# Patient Record
Sex: FEMALE | Race: WHITE | NOT HISPANIC OR LATINO | Employment: FULL TIME | URBAN - METROPOLITAN AREA
[De-identification: names, ages, dates, MRNs, and addresses within clinical notes are randomized per-mention and may not be internally consistent; named-entity substitution may affect disease eponyms.]

---

## 2017-01-02 ENCOUNTER — OFFICE VISIT (OUTPATIENT)
Dept: ORTHOPEDICS | Facility: CLINIC | Age: 32
End: 2017-01-02

## 2017-01-02 DIAGNOSIS — M25.551 RIGHT HIP PAIN: Primary | ICD-10-CM

## 2017-01-02 NOTE — PROGRESS NOTES
Subjective:   Nory Camargo is a 31 year old female who is here to f/u on her R hip MRI with gerardo and DXA scan. She also notes some recent increased in L hip pain (s/p ORIF 2011 for a tension sided femoral neck stress fracture) that feels similar but not nearly as painful as the RIGHT side does.   MRI at [facility].       Exam:   There were no vitals taken for this visit.           CONSTITUTIONIAL: healthy, alert and no distress  GAIT: normal  NEUROLOGIC: Non-focal  PSYCHIATRIC: affect normal/bright and mentation appears normal.    MUSCULOSKELETAL:  No hip exam performed today.     MRI with gerardo of the R hip:  2. 1 cm labral tear and KEILA, no stress injury    DXA:  Lowest Z score = 0.0          ATTENTION:  Easy to read DXA/VFA reports (formatted and presented as tables)   can be found in EPIC under Chart review >  Imaging tab >  DXA    [facility]    [address] [place] [address]  Phone: [phone]   Fax: [phone]      FINAL RESULT    Patient name:                           NORY CAMARGO ([id number] )  Patient demographics:               [age] year [age] White Female of 66.0 in. height and 125.0 lbs. weight  Ordering provider:                     ZAHRAA LAU  History:                                     left hip hardware  Current treatments:                   Calcium, DIABETES MEDICATIONS, DIURETICS, Vitamin D  Scan:                                        DXA exam ([id number] ): [employer] [facility]  Exam date:                               12/09/2016    Comparison:                             None provided.     Dual energy x-ray absorptiometry results:      Region  BMD  T - score  Z - score    L1-L4  1.143 g/cm   -0.3  0.0              Right Neck  0.988 g/cm   -0.4  0.0    Right Total  0.978 g/cm   -0.2  0.1      Conclusions:  The most negative and valid T-score of  -0.4 at the level of the right femoral neck,    corresponds  with    normal.     The most negative and valid Z-score of   0.0 at the level of the lumbar spine,  0.0 at the level of the right femoral neck,  is   WITHIN expected range for age.    The risk of osteoporotic fracture increases approximately 2-fold for each 1.0 SD decrease in T-score.  Low bone density is not the only risk factor for fracture;  consider factors such as patient's age, fall risk, injury risk, previous osteoporotic fracture, family history of osteoporosis, etc.  People with elevated risk of fracture should be regularly evaluated for low bone mineral density.  For patients eligible for Medicare, routine testing is allowed once every 2 years.  Testing frequency can be increased for patients on corticosteroids.  Clinical correlation is recommended.    Considerations:    Feel free to contact DXA services if you have any questions or comments.  Thank you for the opportunity to be of service to you and your patient.    Principal result :          Munira Sellers MD, CCD   of Medicine  Division of Endocrinology    References:  1.  ISCD position statements:  www.[email or web address].org  (includes the report of the 2015  Position Development Conference)    Template revised 8/6/2015    Technical comments:        Satisfactory.          Assessment/Plan:     R hip labral tear with KEILA  L femor ORIF in 2011 secondary to tension sided femoral neck stress fracture   Normal bone mineral density    PLAN:  I have reviewed the results of both tests with the patient and discussed the treatment options.  I have recommended a MSK US CSI into the hip joint on the RIGHT and we will see how she responds.  We may consider doing the LEFT hip in the future but that pain may settle down if we can improve her RIGHT hip pain.    Injection is scheduled for this Friday afternoon.  We may need to xray her L hip as well to assess the hardware and for evidence of post-traumatic arthritis.     Ksenia Yin MD, CAQ, FACSM,  CCD  [facility]  Sports Medicine and Bone Health  Team Physician;  Athletics

## 2017-01-02 NOTE — Clinical Note
1/2/2017      RE: Nory Camargo  [address]  [facility] [address]        Subjective:   Nory Camargo is a 31 year old female who is here to f/u on her R hip MRI with gerardo and DXA scan. She also notes some recent increased in L hip pain (s/p ORIF 2011 for a tension sided femoral neck stress fracture) that feels similar but not nearly as painful as the RIGHT side does.   MRI at [facility].       Exam:   There were no vitals taken for this visit.           CONSTITUTIONIAL: healthy, alert and no distress  GAIT: normal  NEUROLOGIC: Non-focal  PSYCHIATRIC: affect normal/bright and mentation appears normal.    MUSCULOSKELETAL:  No hip exam performed today.     MRI with gerardo of the R hip:  2. 1 cm labral tear and KEILA, no stress injury    DXA:  Lowest Z score = 0.0          ATTENTION:  Easy to read DXA/VFA reports (formatted and presented as tables)   can be found in EPIC under Chart review >  Imaging tab >  DXA    [facility]    [address] [place] [address]  Phone: [phone]   Fax: [phone]      FINAL RESULT    Patient name:                           NORY CAMARGO ([id number] )  Patient demographics:               [age] year [age] White Female of 66.0 in. height and 125.0 lbs. weight  Ordering provider:                     ZAHRAA LAU  History:                                     left hip hardware  Current treatments:                   Calcium, DIABETES MEDICATIONS, DIURETICS, Vitamin D  Scan:                                        DXA exam ([id number] ): [employer]  Exam date:                               12/09/2016    Comparison:                             None provided.     Dual energy x-ray absorptiometry results:      Region  BMD  T - score  Z - score    L1-L4  1.143 g/cm   -0.3  0.0              Right Neck  0.988 g/cm   -0.4  0.0    Right Total  0.978 g/cm   -0.2  0.1      Conclusions:  The most negative  and valid T-score of  -0.4 at the level of the right femoral neck,    corresponds with    normal.     The most negative and valid Z-score of   0.0 at the level of the lumbar spine,  0.0 at the level of the right femoral neck,  is   WITHIN expected range for age.    The risk of osteoporotic fracture increases approximately 2-fold for each 1.0 SD decrease in T-score.  Low bone density is not the only risk factor for fracture;  consider factors such as patient's age, fall risk, injury risk, previous osteoporotic fracture, family history of osteoporosis, etc.  People with elevated risk of fracture should be regularly evaluated for low bone mineral density.  For patients eligible for Medicare, routine testing is allowed once every 2 years.  Testing frequency can be increased for patients on corticosteroids.  Clinical correlation is recommended.    Considerations:    Feel free to contact DXA services if you have any questions or comments.  Thank you for the opportunity to be of service to you and your patient.    Principal result :          Munira Sellers MD, CCD   of Medicine  Division of Endocrinology    References:  1.  ISCD position statements:  www.iscd.org  (includes the report of the 2015  Position Development Conference)    Template revised 8/6/2015    Technical comments:        Satisfactory.          Assessment/Plan:     R hip labral tear with KEILA  L femor ORIF in 2011 secondary to tension sided femoral neck stress fracture   Normal bone mineral density    PLAN:  I have reviewed the results of both tests with the patient and discussed the treatment options.  I have recommended a MSK US CSI into the hip joint on the RIGHT and we will see how she responds.  We may consider doing the LEFT hip in the future but that pain may settle down if we can improve her RIGHT hip pain.    Injection is scheduled for this Friday afternoon.  We may need to xray her L hip as well to assess the hardware  and for evidence of post-traumatic arthritis.     Ksenia Yin MD, CAQ, FACSM, CCD  [facility]  Sports Medicine and Bone Health  Team Physician;  Athletics

## 2017-01-02 NOTE — MR AVS SNAPSHOT
After Visit Summary   1/2/2017    Marilyn Ortega    MRN: [id number]           Patient Information     Date Of Birth          1985        Visit Information        Provider Department      1/2/2017 11:40 AM Ksenia Yin MD [facility] Sports Medicine         Follow-ups after your visit        Your next 10 appointments already scheduled     Jan 06, 2017  1:40 PM   (Arrive by 1:25 PM)   Return Visit with Ksenia Yin MD   [facility] ([facility])    [address] [place] [id number] [phone]              Who to contact     Please call your clinic at [phone] to:    Ask questions about your health    Make or cancel appointments    Discuss your medicines    Learn about your test results    Speak to your doctor   If you have compliments or concerns about an experience at your clinic, or if you wish to file a complaint, please contact [facility] Patient Relations at [phone] or email us at [email or web address]         Additional Information About Your Visit        MyChart Information     [employer] gives you secure access to your electronic health record. If you see a primary care provider, you can also send messages to your care team and make appointments. If you have questions, please call your primary care clinic.  If you do not have a primary care provider, please call [phone] and they will assist you.      ex[employer]et is an electronic gateway that provides easy, online access to your medical records. With exozet, you can request a clinic appointment, read your test results, renew a prescription or communicate with your care team.     To access your existing account, please contact your [facility] or call [phone] for assistance.         Blood Pressure from Last 3 Encounters:   10/16/12 131/86   07/11/12 120/84    06/20/12 103/70    Weight from Last 3 Encounters:   12/09/16 125 lb (56.7 kg)   10/16/12 131 lb 11.2 oz (59.739 kg)   07/11/12 131 lb (59.421 kg)              Today, you had the following     No orders found for display       Primary Care Provider Office Phone # Fax #    Chace Patel [phone]       [facility] [address] [address]        Thank you!     Thank you for choosing [facility]  for your care. Our goal is always to provide you with excellent care. Hearing back from our patients is one way we can continue to improve our services. Please take a few minutes to complete the written survey that you may receive in the mail after your visit with us. Thank you!             Your Updated Medication List - Protect others around you: Learn how to safely use, store and throw away your medicines at www.disposemymeds.org.          This list is accurate as of: 1/2/17 12:35 PM.  Always use your most recent med list.                   Brand Name Dispense Instructions for use    blood glucose monitoring test strip    no brand specified     4 times daily.       LEVEMIR FLEXpen 100 UNIT/ML injection   Generic drug:  insulin detemir      Inject  Subcutaneous See Admin Instructions. 25u in am, 10u pm       LISINOPRIL PO      Take 1 tablet by mouth.       NovoLOG penFILL 100 UNITS/ML injection   Generic drug:  insulin aspart      Inject  Subcutaneous. 4 units per 15 units of carb, plus 1 unit for 50 over 150.  .       SPRINTEC 28 0.25-35 MG-MCG per tablet   Generic drug:  norgestimate-ethinyl estradiol      Take 1 tablet by mouth daily.       TYLENOL 500 MG tablet   Generic drug:  acetaminophen      Take 1-2 tablets by mouth every 4 hours as needed.

## 2017-01-03 ASSESSMENT — ENCOUNTER SYMPTOMS
MYALGIAS: 1
JOINT SWELLING: 0
ARTHRALGIAS: 1
NECK PAIN: 0
MUSCLE WEAKNESS: 0
BACK PAIN: 1
STIFFNESS: 1
MUSCLE CRAMPS: 0

## 2017-01-06 ENCOUNTER — OFFICE VISIT (OUTPATIENT)
Dept: ORTHOPEDICS | Facility: CLINIC | Age: 32
End: 2017-01-06

## 2017-01-06 DIAGNOSIS — S73.191D ACETABULAR LABRUM TEAR, RIGHT, SUBSEQUENT ENCOUNTER: Primary | ICD-10-CM

## 2017-01-06 RX ORDER — TRIAMCINOLONE ACETONIDE 40 MG/ML
40 INJECTION, SUSPENSION INTRA-ARTICULAR; INTRAMUSCULAR ONCE
Qty: 2 ML | Refills: 0 | OUTPATIENT
Start: 2017-01-06 | End: 2017-01-06

## 2017-01-06 NOTE — NURSING NOTE
Reason For Visit:   Chief Complaint   Patient presents with     RECHECK     Right Hip US guided CSI.        Pain Assessment  Patient Currently in Pain: Denies            Current Outpatient Prescriptions   Medication Sig Dispense Refill     insulin detemir (LEVEMIR FLEXPEN) 100 UNIT/ML injection Inject  Subcutaneous See Admin Instructions. 25u in am, 10u pm       LISINOPRIL PO Take 1 tablet by mouth.       NOVOLOG PENFILL Inject  Subcutaneous. 4 units per 15 units of carb, plus 1 unit for 50 over 150.  .        norgestimate-ethinyl estradiol (SPRINTEC 28) 0.25-35 MG-MCG tablet Take 1 tablet by mouth daily.       acetaminophen (TYLENOL) 500 MG tablet Take 1-2 tablets by mouth every 4 hours as needed.       glucose blood VI test strips strip 4 times daily.            Allergies   Allergen Reactions     Chlorhexidine Hives, Itching and Rash     PN: Patient had swelling/rash that was very itchy with chlorprep.     Ceclor [Cefaclor Monohydrate]      Cefaclor Rash

## 2017-01-06 NOTE — Clinical Note
1/6/2017       RE: Marilyn Ortega  [address]  [facility] [address]     Dear Colleague,    Thank you for referring your patient, Marilyn Ortega, to the [facility] at [facility]. Please see a copy of my visit note below.    SUBJECTIVE: Marilyn Ortega is a 31 year old female who presents for f/u R hip pain.  She recently had an MRI which showed a labral tear, xrays show mild CAM deformity.  Her symptoms remain unchanged.    PAST MEDICAL, SOCIAL, SURGICAL AND FAMILY HISTORY: She  has a past medical history of Diabetes mellitus (H). She also has no past medical history of Arthritis, Congestive heart failure, unspecified, Asthma, Coronary artery disease, Hypertension, Unspecified cerebral artery occlusion with cerebral infarction, Thyroid disease, COPD (chronic obstructive pulmonary disease) (H), Malignant neoplasm (H), or Blood transfusion.  She  has no past surgical history on file.  Her family history includes Arthritis in her maternal grandmother; Cancer - colorectal in her maternal grandfather; DIABETES in her paternal grandfather; Hypertension in her maternal grandmother.  She reports that she has never smoked. She has never used smokeless tobacco. She reports that she drinks alcohol. She reports that she does not use illicit drugs.      ALLERGIES: She is allergic to chlorhexidine; ceclor; and cefaclor.    CURRENT MEDICATIONS: She has a current medication list which includes the following prescription(s): insulin detemir, lisinopril, novolog penfill, norgestimate-ethinyl estradiol, acetaminophen, and blood glucose monitoring.     REVIEW OF SYSTEMS:  CONSTITUTIONAL:NEGATIVE for fever, chills, change in weight  INTEGUMENTARY/SKIN: NEGATIVE for worrisome rashes, moles or lesions  MUSCULOSKELETAL:See HPI above  NEURO: NEGATIVE for weakness, dizziness or paresthesias      EXAM:  There were no vitals taken for this visit.  CONSTITUTIONIAL: healthy, alert  and no distress  HEENT: NC/AT, no scleral icteris  SKIN: no suspicious lesions or rashes  GAIT: normal  CARDIO: no LE edema  LUNGS: breathing non labored  ABD: soft, non obese  NEUROLOGIC: No focal neuro deficits  PSYCHIATRIC: affect normal/bright and mentation appears normal.    ASSESSMENT/PLAN:  R hip labral tear  -US guided steroid hip intra articular injection performed today, see procedure note below    PROCEDURE: Right hip intra articular injection  After discussing the indications, risks, and expected benefits, a formal consent was obtained. The acetabulum, femoral head, femoral neck and the anterior joint recess were identified by ultrasound.  Initially, 8 mL 1% Lidocaine were injected along the injection path using US guidance.  Using sterile technique and an anterolateral approach, an ultrasound-guided corticosteroid injection was performed into the right femoroacetabular joint space injecting 8 mL 1% Lidocaine and 2 mL 40 mg/mL Kenalog with a 110 mm, 22 gauge needle. Images and video were recorded demonstrating proper needle position. The wound was dressed with a bandaid.  The procedure was well tolerated.    F/u in 2-3 weeks for left hip injection if desired    Patient seen and examined with attending physician, Dr. Yin.    Lalitha Storey DO  Primary Care Sports Medicine Fellow          Attending Note:   I have personally examined this patient and have reviewed the clinical presentation and progress note with the fellow. I agree with the treatment plan as outlined. The plan was formulated with the fellow on the day of the patient's visit. I have reviewed all imaging with the fellow and agree with the findings in the documentation.   I have supervised the entire injection procedure.     Ksenia Yin MD, CAQ, CCD  [facility]  Sports Medicine and Bone Health

## 2017-01-09 NOTE — PROGRESS NOTES
Attending Note:   I have personally examined this patient and have reviewed the clinical presentation and progress note with the fellow. I agree with the treatment plan as outlined. The plan was formulated with the fellow on the day of the patient's visit. I have reviewed all imaging with the fellow and agree with the findings in the documentation.   I have supervised the entire injection procedure.     Ksenia Yin MD, CAQ, CCD  [facility]  Sports Medicine and Bone Health

## 2017-02-10 ENCOUNTER — OFFICE VISIT (OUTPATIENT)
Dept: ORTHOPEDICS | Facility: CLINIC | Age: 32
End: 2017-02-10

## 2017-02-10 DIAGNOSIS — M25.552 BILATERAL HIP PAIN: Primary | ICD-10-CM

## 2017-02-10 DIAGNOSIS — M25.551 BILATERAL HIP PAIN: Primary | ICD-10-CM

## 2017-02-10 NOTE — MR AVS SNAPSHOT
After Visit Summary   2/10/2017    Marilyn Ortega    MRN: [id number]           Patient Information     Date Of Birth          1985        Visit Information        Provider Department      2/10/2017 10:30 AM Ksenia Yin MD [facility]        Today's Diagnoses     Bilateral hip pain    -  1       Follow-ups after your visit        Additional Services     ORTHOPEDICS ADULT REFERRAL       Your provider has referred you to: Dr. Alan Rider at [facility]  Referral for: Labral tear and KEILA R hip  L hip ORIF 2011 secondary to femoral neck tension sided stress fracture with revision in 2015    Please be aware that coverage of these services is subject to the terms and limitations of your health insurance plan.  Call member services at your health plan with any benefit or coverage questions.      Please bring the following to your appointment:    >>   Any x-rays, CTs or MRIs which have been performed.  Contact the facility where they were done to arrange for  prior to your scheduled appointment.    >>   List of current medications   >>   This referral request   >>   Any documents/labs given to you for this referral                  Who to contact     Please call your clinic at [phone] to:    Ask questions about your health    Make or cancel appointments    Discuss your medicines    Learn about your test results    Speak to your doctor   If you have compliments or concerns about an experience at your clinic, or if you wish to file a complaint, please contact [facility] Patient Relations at [phone] or email us at [email or web address]         Additional Information About Your Visit        MyChart Information     [employer] gives you secure access to your electronic health record. If you see a primary care provider, you can also send messages to your care team and make appointments. If you have questions, please call your  primary care clinic.  If you do not have a primary care provider, please call [phone] and they will assist you.      [employer] is an electronic gateway that provides easy, online access to your medical records. With [employer], you can request a clinic appointment, read your test results, renew a prescription or communicate with your care team.     To access your existing account, please contact your [facility] or call [phone] for assistance.        Care EveryWhere ID     This is your Care EveryWhere ID. This could be used by other organizations to access your [facility] medical records  [id number]         Blood Pressure from Last 3 Encounters:   10/16/12 131/86   07/11/12 120/84   06/20/12 103/70    Weight from Last 3 Encounters:   12/09/16 125 lb (56.7 kg)   10/16/12 131 lb 11.2 oz (59.7 kg)   07/11/12 131 lb (59.4 kg)              We Performed the Following     ORTHOPEDICS ADULT REFERRAL        Primary Care Provider Office Phone # Fax #    Chace Patel [phone]       [facility] [address] [address]        Thank you!     Thank you for choosing [facility]  for your care. Our goal is always to provide you with excellent care. Hearing back from our patients is one way we can continue to improve our services. Please take a few minutes to complete the written survey that you may receive in the mail after your visit with us. Thank you!             Your Updated Medication List - Protect others around you: Learn how to safely use, store and throw away your medicines at www.disposemymeds.org.          This list is accurate as of: 2/10/17 11:59 PM.  Always use your most recent med list.                   Brand Name Dispense Instructions for use    blood glucose monitoring test strip    no brand specified     4 times daily.       LEVEMIR FLEXpen 100 UNIT/ML injection   Generic drug:  insulin detemir      Inject   Subcutaneous See Admin Instructions. 25u in am, 10u pm       LISINOPRIL PO      Take 1 tablet by mouth.       NovoLOG penFILL 100 UNITS/ML injection   Generic drug:  insulin aspart      Inject  Subcutaneous. 4 units per 15 units of carb, plus 1 unit for 50 over 150.  .       SPRINTEC 28 0.25-35 MG-MCG per tablet   Generic drug:  norgestimate-ethinyl estradiol      Take 1 tablet by mouth daily.       TYLENOL 500 MG tablet   Generic drug:  acetaminophen      Take 1-2 tablets by mouth every 4 hours as needed.

## 2017-02-10 NOTE — PROGRESS NOTES
SUBJECTIVE:     1.  Marilyn is a 31-year-old who is here today to follow up on her musculoskeletal ultrasound-guided right hip corticosteroid injection done for a labral tear.  We performed this in early January.  She states that the lidocaine numbing medicine took her pain away.  She reports that since that time the steroid has helped her some, approximately 30%, but she continues to have some significant difficulties with a catching type of pain with certain positions as well as limitation of external rotation.  It definitely hurts her more to try to work out, so she has not been exercising as much.   2.  Left hip tension side femoral neck stress fracture requiring ORIF in 2011 with a revision done afterwards.  It is somewhat bothersome.  She noticed that she had been doing well with this until her right hip was bothering her and then it seemed to flare up the left hip some.      OBJECTIVE:  Her exam is essentially unchanged, although she has less pain with FADIR testing on the right, although it is still present.      IMAGING:  Hip x-rays, an AP pelvis with bilateral modified Hedrick views and a complete left femur due to her hardware were obtained today.  It appears to me that the hardware is intact on the left side throughout.  On the right side, she has some flattening of the anterior femoral neck with some degenerative cyst formation noted.      ASSESSMENT AND PLAN:     1.  Right hip KEILA with labral tear with a good response to lidocaine and some benefit from a steroid injection but still having significant symptoms.  I would like to refer her to Dr. Alan Rider at [facility] to consider surgical options for her right hip.   2.  Left hip ORIF for a tension side femoral neck stress fracture/completed fracture with revision which appears to have intact hardware on the x-rays done today.  Likely this hip may settle down from its symptoms when she further addresses the right hip.  We will wait to make  any decisions about this and ask Dr. Rider for his input on this as well.  Marilyn can see me back on an as-needed basis.     Ksenia Yin MD, CAQ, FACSM, CCD  [facility]  Sports Medicine and Bone Health  Team Physician;  Athletics

## 2017-02-10 NOTE — LETTER
2/10/2017       RE: Marilyn Ortega  [address]  [place] [address]     Dear Colleague,    Thank you for referring your patient, Marilyn Ortega, to the [facility] at [facility]. Please see a copy of my visit note below.    SUBJECTIVE:     1.  Marilyn is a 31-year-old who is here today to follow up on her musculoskeletal ultrasound-guided right hip corticosteroid injection done for a labral tear.  We performed this in early January.  She states that the lidocaine numbing medicine took her pain away.  She reports that since that time the steroid has helped her some, approximately 30%, but she continues to have some significant difficulties with a catching type of pain with certain positions as well as limitation of external rotation.  It definitely hurts her more to try to work out, so she has not been exercising as much.   2.  Left hip tension side femoral neck stress fracture requiring ORIF in 2011 with a revision done afterwards.  It is somewhat bothersome.  She noticed that she had been doing well with this until her right hip was bothering her and then it seemed to flare up the left hip some.      OBJECTIVE:  Her exam is essentially unchanged, although she has less pain with FADIR testing on the right, although it is still present.      IMAGING:  Hip x-rays, an AP pelvis with bilateral modified Hedrick views and a complete left femur due to her hardware were obtained today.  It appears to me that the hardware is intact on the left side throughout.  On the right side, she has some flattening of the anterior femoral neck with some degenerative cyst formation noted.      ASSESSMENT AND PLAN:     1.  Right hip KEILA with labral tear with a good response to lidocaine and some benefit from a steroid injection but still having significant symptoms.  I would like to refer her to Dr. Alan Rider at [facility] to consider surgical options for her  right hip.   2.  Left hip ORIF for a tension side femoral neck stress fracture/completed fracture with revision which appears to have intact hardware on the x-rays done today.  Likely this hip may settle down from its symptoms when she further addresses the right hip.  We will wait to make any decisions about this and ask Dr. Rider for his input on this as well.  Marilyn can see me back on an as-needed basis.     Ksenia Yin MD, CAQ, FACSM, CCD  [facility]  Sports Medicine and Bone Health  Team Physician;  Athletics

## 2017-08-12 ENCOUNTER — HEALTH MAINTENANCE LETTER (OUTPATIENT)
Age: 32
End: 2017-08-12

## 2019-02-01 ENCOUNTER — TRANSFERRED RECORDS (OUTPATIENT)
Dept: HEALTH INFORMATION MANAGEMENT | Facility: CLINIC | Age: 34
End: 2019-02-01

## 2019-03-07 NOTE — PROGRESS NOTES
SUBJECTIVE: Marilyn Ortega is a 31 year old female who presents for f/u R hip pain.  She recently had an MRI which showed a labral tear, xrays show mild CAM deformity.  Her symptoms remain unchanged.    PAST MEDICAL, SOCIAL, SURGICAL AND FAMILY HISTORY: She  has a past medical history of Diabetes mellitus (H). She also has no past medical history of Arthritis, Congestive heart failure, unspecified, Asthma, Coronary artery disease, Hypertension, Unspecified cerebral artery occlusion with cerebral infarction, Thyroid disease, COPD (chronic obstructive pulmonary disease) (H), Malignant neoplasm (H), or Blood transfusion.  She  has no past surgical history on file.  Her family history includes Arthritis in her maternal grandmother; Cancer - colorectal in her maternal grandfather; DIABETES in her paternal grandfather; Hypertension in her maternal grandmother.  She reports that she has never smoked. She has never used smokeless tobacco. She reports that she drinks alcohol. She reports that she does not use illicit drugs.      ALLERGIES: She is allergic to chlorhexidine; ceclor; and cefaclor.    CURRENT MEDICATIONS: She has a current medication list which includes the following prescription(s): insulin detemir, lisinopril, novolog penfill, norgestimate-ethinyl estradiol, acetaminophen, and blood glucose monitoring.     REVIEW OF SYSTEMS:  CONSTITUTIONAL:NEGATIVE for fever, chills, change in weight  INTEGUMENTARY/SKIN: NEGATIVE for worrisome rashes, moles or lesions  MUSCULOSKELETAL:See HPI above  NEURO: NEGATIVE for weakness, dizziness or paresthesias      EXAM:  There were no vitals taken for this visit.  CONSTITUTIONIAL: healthy, alert and no distress  HEENT: NC/AT, no scleral icteris  SKIN: no suspicious lesions or rashes  GAIT: normal  CARDIO: no LE edema  LUNGS: breathing non labored  ABD: soft, non obese  NEUROLOGIC: No focal neuro deficits  PSYCHIATRIC: affect normal/bright and mentation appears  54 year old male with PMH of HTN, OA, otherwise with previous PNA - 2-3 years ago, was severe at the time, today noted some streaked blood in phlegm and cough - denies any definite fever, some chill noted last night otherwise - came in for evaluation due to cough. normal.    ASSESSMENT/PLAN:  R hip labral tear  -US guided steroid hip intra articular injection performed today, see procedure note below    PROCEDURE: Right hip intra articular injection  After discussing the indications, risks, and expected benefits, a formal consent was obtained. The acetabulum, femoral head, femoral neck and the anterior joint recess were identified by ultrasound.  Initially, 8 mL 1% Lidocaine were injected along the injection path using US guidance.  Using sterile technique and an anterolateral approach, an ultrasound-guided corticosteroid injection was performed into the right femoroacetabular joint space injecting 8 mL 1% Lidocaine and 2 mL 40 mg/mL Kenalog with a 110 mm, 22 gauge needle. Images and video were recorded demonstrating proper needle position. The wound was dressed with a bandaid.  The procedure was well tolerated.    F/u in 2-3 weeks for left hip injection if desired    Patient seen and examined with attending physician, Dr. Yin.    Lalitha Storey DO  Primary Care Sports Medicine Fellow

## 2019-11-06 ENCOUNTER — HEALTH MAINTENANCE LETTER (OUTPATIENT)
Age: 34
End: 2019-11-06

## 2020-06-11 ENCOUNTER — TRANSFERRED RECORDS (OUTPATIENT)
Dept: HEALTH INFORMATION MANAGEMENT | Facility: CLINIC | Age: 35
End: 2020-06-11

## 2020-08-14 ENCOUNTER — TRANSFERRED RECORDS (OUTPATIENT)
Dept: HEALTH INFORMATION MANAGEMENT | Facility: CLINIC | Age: 35
End: 2020-08-14

## 2020-08-15 ENCOUNTER — TRANSFERRED RECORDS (OUTPATIENT)
Dept: HEALTH INFORMATION MANAGEMENT | Facility: CLINIC | Age: 35
End: 2020-08-15

## 2020-09-09 ENCOUNTER — TRANSFERRED RECORDS (OUTPATIENT)
Dept: HEALTH INFORMATION MANAGEMENT | Facility: CLINIC | Age: 35
End: 2020-09-09

## 2020-11-29 ENCOUNTER — HEALTH MAINTENANCE LETTER (OUTPATIENT)
Age: 35
End: 2020-11-29

## 2020-12-15 LAB
ALBUMIN (URINE) MG/SPEC: 972.6 MG/L
ALBUMIN/CREATININE RATIO: 1351 MG/G
CREAT SERPL-MCNC: 2.59 MG/DL (ref 0.55–1.02)
CREATININE (URINE): 72 MG/DL
GFR SERPL CREATININE-BSD FRML MDRD: 23 ML/MIN/1.73M2
GLUCOSE SERPL-MCNC: 248 MG/DL (ref 70–100)
POTASSIUM SERPL-SCNC: 4.5 MMOL/L (ref 3.5–5.1)

## 2021-01-13 ENCOUNTER — TRANSFERRED RECORDS (OUTPATIENT)
Dept: HEALTH INFORMATION MANAGEMENT | Facility: CLINIC | Age: 36
End: 2021-01-13

## 2021-04-08 LAB
ALBUMIN (URINE) MG/SPEC: 421.1 MG/L
ALBUMIN/CREATININE RATIO: 2106 MG/G
CREAT SERPL-MCNC: 2.93 MG/DL (ref 0.55–1.02)
CREATININE (URINE): 20 MG/DL
GFR SERPL CREATININE-BSD FRML MDRD: 20 ML/MIN/1.73M2
GLUCOSE SERPL-MCNC: 106 MG/DL (ref 70–100)
POTASSIUM SERPL-SCNC: 3.8 MMOL/L (ref 3.5–5.1)

## 2021-05-05 ENCOUNTER — TRANSFERRED RECORDS (OUTPATIENT)
Dept: HEALTH INFORMATION MANAGEMENT | Facility: CLINIC | Age: 36
End: 2021-05-05

## 2021-05-05 LAB
ALBUMIN (URINE) MG/SPEC: 982.3 MG/L
ALBUMIN/CREATININE RATIO: 1786 MG/G
CREAT SERPL-MCNC: 2.69 MG/DL (ref 0.55–1.02)
CREATININE (URINE): 55 MG/DL
GFR SERPL CREATININE-BSD FRML MDRD: 22 ML/MIN/1.73M2
GLUCOSE SERPL-MCNC: 171 MG/DL (ref 70–100)
POTASSIUM SERPL-SCNC: 5 MMOL/L (ref 3.5–5.1)

## 2021-05-11 ENCOUNTER — REFERRAL (OUTPATIENT)
Dept: TRANSPLANT | Facility: CLINIC | Age: 36
End: 2021-05-11

## 2021-05-11 ENCOUNTER — TRANSFERRED RECORDS (OUTPATIENT)
Dept: HEALTH INFORMATION MANAGEMENT | Facility: CLINIC | Age: 36
End: 2021-05-11

## 2021-05-11 DIAGNOSIS — I10 ESSENTIAL HYPERTENSION: ICD-10-CM

## 2021-05-11 DIAGNOSIS — K31.84 GASTROPARESIS: ICD-10-CM

## 2021-05-11 DIAGNOSIS — N18.9 CHRONIC RENAL FAILURE: ICD-10-CM

## 2021-05-11 DIAGNOSIS — Z76.82 ORGAN TRANSPLANT CANDIDATE: ICD-10-CM

## 2021-05-11 DIAGNOSIS — Z01.818 ENCOUNTER FOR PRE-TRANSPLANT EVALUATION FOR KIDNEY AND PANCREAS TRANSPLANT: ICD-10-CM

## 2021-05-11 DIAGNOSIS — E10.9 TYPE 1 DIABETES MELLITUS (H): ICD-10-CM

## 2021-05-11 DIAGNOSIS — I10 HTN (HYPERTENSION): ICD-10-CM

## 2021-05-11 DIAGNOSIS — N18.4 CHRONIC KIDNEY DISEASE, STAGE 4, SEVERELY DECREASED GFR (H): ICD-10-CM

## 2021-05-11 DIAGNOSIS — N18.5 CHRONIC KIDNEY DISEASE, STAGE V (H): ICD-10-CM

## 2021-05-11 DIAGNOSIS — N18.9 CHRONIC KIDNEY DISEASE: ICD-10-CM

## 2021-05-11 DIAGNOSIS — E10.9 DIABETES MELLITUS TYPE 1 (H): Primary | ICD-10-CM

## 2021-05-11 NOTE — LETTER
May 20, 2021    Marilyn Ortega  [address]  [place] [address]    Dear Marilyn,    Thank you for your interest in the Transplant Center at [facility]. We look forward to being a part of your care team and assisting you through the transplant process.    As we discussed, your transplant coordinator is Renetta Haro (Pancreas, Kidney).  You may call your coordinator at any time with questions or concerns call [phone].    Please complete the following.    1. Fill out and return the enclosed forms    Authorization for Electronic Communication    Authorization to Discuss Protected Health Information    Authorization for Release of Protected Health Information    Authorization for Care Everywhere Release of Information    2. Sign up for:    [employer], access to your electronic medical record (see enclosed pamphlet)    [email or web address], a transplant education website    You can use these tools to learn more about your transplant, communicate with your care team, and track your medical details      Sincerely,  Solid Organ Transplant  [facility]    cc: Ines Collazo (PCP); Martha Christianson, CAITLIN

## 2021-05-18 VITALS — BODY MASS INDEX: 20.09 KG/M2 | HEIGHT: 66 IN | WEIGHT: 125 LBS

## 2021-05-18 ASSESSMENT — MIFFLIN-ST. JEOR: SCORE: 1273.75

## 2021-05-18 NOTE — TELEPHONE ENCOUNTER
PCP: Dr. Ines Collazo   Referring Provider: Dr. Martha Christianson   Referring Diagnosis: Type 1 DM, Insulin Depend/CKD Stage 4    Is patient under the age of 65? Yes  Is patient diabetic? Yes  Is patient on insulin? Yes  Was patient offered a pancreas transplant referral? Yes    Is patient in a group home/assisted living? No  Does patient have a guardian? No    Referral intake process completed.  Patient is aware that after financial approval is received, medical records will be requested.   Patient confirmed for a callback from transplant coordinator on 6/8/21. (within 2 weeks)  Tentative evaluation date 7/6/21. (within 4 weeks)    Confirmed coordinator will discuss evaluation process in more detail at the time of their call.   Patient is aware of the need to arrange age appropriate cancer screening, vaccinations, and dental care.  Reminded patient to complete questionnaire, complete medical records release, and review packet prior to evaluation visit .  Assessed patient for special needs (ie--wheelchair, assistance, guardian, and ):  None   Patient instructed to call [phone] with questions.     Patient gave verbal consent during intake call to obtain medical records and documents outside of [facility]:  Yes     WALESKA Enamorado, LPN   Solid Organ Transplant

## 2021-05-18 NOTE — TELEPHONE ENCOUNTER
Called patient kasandra as she was given a STD for Tues July 6 and she looks to be a KP patient, I'll move her to a virtual date with a KP surgeon.

## 2021-05-30 ENCOUNTER — HEALTH MAINTENANCE LETTER (OUTPATIENT)
Age: 36
End: 2021-05-30

## 2021-06-08 NOTE — TELEPHONE ENCOUNTER
Contacted patient and introduced myself as their Transplant Coordinator, also introduced the role of the Transplant Coordinator in the transplant process.  Explained the purpose of this call including reviewing next steps and answering questions.    Confirmed Referring Provider, Dialysis Center, and Primary Care Physician. Notified patient of the importance of continued communication with referring providers and primary care physicians.    Reviewed components of transplant evaluation process including necessary appointments, tests, and procedures.    Answered questions for patient regarding evaluation, provided my name and contact information and requested they call with any additional questions.    Determined that patient would like additional information regarding transplant by:     Drop Down choices: Mail, Email, MyChart, Phone Call   Encourage MyChart   Notified patients that they will hear from a Transplant  to schedule evaluation.     Reviewed medical records and interviewed the patient. CKD with GFR of 20 on 4/8/2021. No kidney biopsy. DM1 since age 15 and is managed with an insulin pump and CGM. HTN and gastroparesis. She recovered from COVID 11/2020 and has been vaccinated since. She broke her hip in 2011 and had a hip arthroplasty with revision and a labral tear repair on the other side. She has also had a toe surgery. No diabetic foot issues. Never received blood, non smoker, socially uses etoh and no recreational drugs. BMI 20.2 and is very active. Lives alone but has a significant other and family that will help her. She is independent in her ADL's and works full time. No potential donors at this time. PAP will be requested and dental is up to date.    We discussed the evaluation process including the goals and approval process. She has a virtual STD for 7/6/21 but does understand she will need to be seen in clinic in person by a pancreas surgeon. She will be ready by 0730 for the virtual  visit. Reviewed the list of providers she will see and their roles. She will watch the online MTP videos and knows she will receive electronic communication. Provided her my contact information.     Smart set orders routed to scheduling.

## 2021-06-08 NOTE — TELEPHONE ENCOUNTER
Tried to reach Marilyn for  scheduled call  for  referral. Got her voice mail. Introduced myself and purpose of my call. Asked for a return call.

## 2021-07-06 NOTE — PROGRESS NOTES
Pt evaluated via billable telephone visit d/t COVID-19 restrictions. Time spent: 15 min    [facility] Solid Organ Transplant  Outpatient MNT: Kidney Pancreas Transplant Evaluation    Current BMI: 20.2 (HT 66 in,  lbs/57 kg)- data from 5/18   BMI is within recommendation of <35 for KP transplant     Time Spent: 15 minutes  Visit Type: Initial   Referring Physician: Mark   Pt accompanied by: self    History of previous txp: none   Frequency of BG checks: has CGM  Dialysis: no     Nutrition Assessment  Gluten free since 2003. Mostly dairy free diet.    Appetite: good/baseline     Vitamins, Supplements, Pertinent Meds: vit D  Herbal Medicines/Supplements: none    Edema: yes     Weight hx: overall stable     Food Security: any concerns about having enough money to buy food or access to grocery stores? No     Diet Recall  Breakfast Mid AM- yogurt, turkey roll ups, crackers   Lunch Less of a structured lunch d/t not going into work, but often grazes per BF   Dinner Protein + veggie, some rice; eggs or omelet   Snacks Some chips, fruit   Beverages Water, diet soda (Dr Pepper, Cokaylee Zero, total of 1/day), black tea, coffee, some almond milk w/ cereal or smoothie, juice with low BG   Alcohol 4 drinks/week at most   Dining out Infrequent      Physical Activity  Runs 4x/week (3-4 miles each time)  Walking, some fitness classes      Labs  5/5 K 5 Phos 2.1    Nutrition Diagnosis  No nutrition diagnosis identified at this time.     Nutrition Intervention  Nutrition education provided:  Discussed sodium intake (low sodium foods and drinks, seasoning food without salt and tips for low sodium diet).  Reviewed K/Phos labs.     Reviewed post txp diet guidelines in brief (will review in further detail post txp):  (1) Review of proper food safety measures d/t immunosuppressant therapy post-op and increased risk for food-borne illness    (2) Avoid the following post txp d/t risk for rejection, unknown effects on the  organs, and/or potential interactions with immunosuppressants:  - Herbal, Chinese, holistic, chiropractic, natural, alternative medicines and supplements  - Detoxes and cleanses  - Weight loss pills  - Protein powders or other products with extracts or herbs (ie green tea extract)    (3) Med regimen and possible side effects    Patient Understanding: Pt verbalized understanding of education provided.  Expected Engagement: Good  Follow-Up Plans: PRN     Nutrition Goals  No nutrition goals identified at this time     Jovana Wright, RD, LD, CCTD

## 2021-07-07 ENCOUNTER — DOCUMENTATION ONLY (OUTPATIENT)
Dept: TRANSPLANT | Facility: CLINIC | Age: 36
End: 2021-07-07

## 2021-07-07 ENCOUNTER — ALLIED HEALTH/NURSE VISIT (OUTPATIENT)
Dept: TRANSPLANT | Facility: CLINIC | Age: 36
End: 2021-07-07
Attending: PHYSICIAN ASSISTANT
Payer: COMMERCIAL

## 2021-07-07 DIAGNOSIS — E10.22 TYPE 1 DIABETES MELLITUS WITH STAGE 5 CHRONIC KIDNEY DISEASE NOT ON CHRONIC DIALYSIS (H): ICD-10-CM

## 2021-07-07 DIAGNOSIS — Z01.818 ENCOUNTER FOR PRE-TRANSPLANT EVALUATION FOR KIDNEY AND PANCREAS TRANSPLANT: ICD-10-CM

## 2021-07-07 DIAGNOSIS — E10.22 TYPE 1 DIABETES MELLITUS WITH STAGE 4 CHRONIC KIDNEY DISEASE (H): Primary | ICD-10-CM

## 2021-07-07 DIAGNOSIS — Z01.818 ENCOUNTER FOR PRE-TRANSPLANT EVALUATION FOR KIDNEY AND PANCREAS TRANSPLANT: Primary | ICD-10-CM

## 2021-07-07 DIAGNOSIS — Z76.82 ORGAN TRANSPLANT CANDIDATE: Primary | ICD-10-CM

## 2021-07-07 DIAGNOSIS — N18.5 TYPE 1 DIABETES MELLITUS WITH STAGE 5 CHRONIC KIDNEY DISEASE NOT ON CHRONIC DIALYSIS (H): ICD-10-CM

## 2021-07-07 DIAGNOSIS — Z76.82 ORGAN TRANSPLANT CANDIDATE: ICD-10-CM

## 2021-07-07 DIAGNOSIS — N18.4 TYPE 1 DIABETES MELLITUS WITH STAGE 4 CHRONIC KIDNEY DISEASE (H): Primary | ICD-10-CM

## 2021-07-07 DIAGNOSIS — N18.5 STAGE 5 CHRONIC KIDNEY DISEASE NOT ON CHRONIC DIALYSIS (H): ICD-10-CM

## 2021-07-07 DIAGNOSIS — N18.4 CHRONIC KIDNEY DISEASE, STAGE 4, SEVERELY DECREASED GFR (H): ICD-10-CM

## 2021-07-07 PROCEDURE — 99204 OFFICE O/P NEW MOD 45 MIN: CPT | Mod: GT | Performed by: SURGERY

## 2021-07-07 PROCEDURE — 99205 OFFICE O/P NEW HI 60 MIN: CPT | Mod: GT

## 2021-07-07 RX ORDER — FUROSEMIDE 20 MG
TABLET ORAL
Status: ON HOLD | COMMUNITY
Start: 2021-06-15 | End: 2021-11-22

## 2021-07-07 RX ORDER — PRUCALOPRIDE 2 MG/1
TABLET, FILM COATED ORAL
COMMUNITY
Start: 2021-05-06 | End: 2022-04-25

## 2021-07-07 RX ORDER — PROCHLORPERAZINE 25 MG/1
SUPPOSITORY RECTAL
COMMUNITY
Start: 2020-08-06 | End: 2023-02-09

## 2021-07-07 RX ORDER — LOSARTAN POTASSIUM 50 MG/1
50 TABLET ORAL
Status: ON HOLD | COMMUNITY
Start: 2021-05-11 | End: 2021-11-22

## 2021-07-07 RX ORDER — SPIRONOLACTONE 25 MG/1
50 TABLET ORAL DAILY
Status: ON HOLD | COMMUNITY
Start: 2020-12-28 | End: 2021-11-22

## 2021-07-07 RX ORDER — CALCITRIOL 0.25 UG/1
0.25 CAPSULE, LIQUID FILLED ORAL
Status: ON HOLD | COMMUNITY
Start: 2021-05-11 | End: 2021-11-15

## 2021-07-07 NOTE — PROGRESS NOTES
"Kidney, Pancreas Transplant Referral - 5/11/2021  Marilyn Ortega attended the pre-transplant patient evaluation VIRTUALLY  The [facility]plant [facility] website pre-transplant modules were viewed;   Content reviewed:    Living Donation and how to access that program  [email or web address] donor phone [phone]    Paired exchange    Kidney Donor Profile Index (KDPI)     Kidney Donor Profile Index (KDPI) \"Donors get scored 1-100 based on their age, sex, race, cause of death and organ function.  This score is a very general predictor of future kidney performance. On average, kidneys with a higher score may not function as long as kidneys with a lower score.  This score does not necessarily predict how this specific kidney will perform.    Your surgeon will review KDPI and give recommendations of what is  appropriate for you to consider.\"    Marilyn will sign DocuSign NOT Willing to accept >85%    Waiting list issues (right to decline without penalty, high PHS risk donors, what to expect when called with an offer)    Hospital experience,  length of stay , need to stay locally post-discharge (2-4 weeks)    Surgical options (with pictures)                             Post-surgery lifting and driving restrictions    Post-transplant routines, frequency of lab work and clinic visits    Need to stay locally post-discharge (2-4 weeks)    Role of Transplant Coordinator    Participants were informed of the benefits of transplant as well as potential risks such as infection, cancer, and death.  The need for total adherence with immunosuppression medications and following transplant regimens was stressed.  The overall evaluation/approval/listing process was reviewed.        The patient was provided with the following documents:  What You Need to Know About a Kidney Transplant  Adult Kidney Transplant - A Guide for Patients  SRTR Data Sheet - Kidney  Brochure - Kidney Allocation  What You Need to Know About a Pancreas " "Transplant  Adult Pancreas Transplant-A Guide for Patients  SRTR Data Sheet - Pancreas  Brochure - Pancreas  SRTR Data Sheet - Kidney/Pancreas  Brochure - SPK  Brochure - Multiple Listing and Waiting Time Transfer  What Every Patient Needs to Know (UNOS)  UNOS Facts and Figures  Finding a Donor  My Transplant Place - Quick Start Guide  KDPI Consent  Receipt of Information form    Marilyn Ortega and I discussed the KDPI form and I advised her to sign NOT Willing as she is younger than 45, and surgeons recommend person 45 and younger to accept the 1-85% KDPI scored patients.  Marilyn to sign the JESSICA  Receipt of Information for Organ Transplant Recipient.\" She was provided Heyo VINICIO [employer]'s business card and instructed to call with additional questions.      Summary    Team s concerns/comments: CKD with GFR of 20 on 4/8/2021. No kidney biopsy. DM1 HTN and gastroparesis. She recovered from COVID 11/2020 and has been vaccinated since.BMI 20.2. No potential donors at this time.     Candidacy category: Green    Action/Plan: Pt wants K/P. She reports possible live donor    Expected Selection Meeting Discussion: 7/14/2021    "

## 2021-07-07 NOTE — LETTER
7/7/2021         RE: Marilyn Ortega  [address]  [facility] [address]        Dear Colleague,    Thank you for referring your patient, Marilyn Ortega, to the [facility]. Please see a copy of my visit note below.    Transplant Surgery Consult Note    Medical record number: [id number]  YOB: 1985,   Consult requested by Martha Christianson NP for evaluation of kidney and pancreas transplant candidacy.    Assessment and Recommendations: Ms. Ortega is a excellent candidate for transplantation and has a good understanding of the risks and benefits of this approach to management of renal failure and diabetes. The following issues should be addressed prior to transplant:     Ms. Ortega has Chronic renal failure due to diabetes mellitus type 1 whose condition is not expected to resolve, is expected to progress, and is expected to continue to develop related comorbid conditions.  Cardiology consult for cardiac risk stratification to be ordered: Yes  CT abdomen and pelvis without contrast to be ordered for assessment of vascular targets: Yes  Transplant listing labs ordered to include HLA, ABOx2, Cr, etc.  Dietician consult ordered: Yes  Social work consult ordered: Yes  Imaging reports reviewed: None available  Radiology images reviewed: None available  Recipient suitable to move forward with work up of living donors:  Yes  Her GFR only recently dropped to below 20.  Her last check was a GFR of 15.  She does endorse uremic symptoms such as nausea and extreme fatigue.  She also says it has been very difficult to keep her hemoglobin up as of late.  She does feel as though her decline has accelerated.  For this reason I do think she may benefit from a living donor kidney transplant followed by a pancreas after kidney given how she describes her symptoms and how miserable she is.  Her mother did express interest in donating.  I encouraged her to speak with her nephrologist  about timing of kidney transplant but given her description it does sound as though she is progressing more quickly and will likely benefit from a kidney transplant sooner than later.    She will need in person surgeon visit prior to being listed active.  She will need Dopplers of the iliacs.    The majority of our visit was spent in counselling, discussing the medical and surgical risks of living or  donor kidney and pancreas transplantation, either in a simultaneous or sequential fashion. I contrasted approximate wait time for SPK vs living vs  donor kidneys from normal (0-85%) or higher (%) kidney donor profile index (KDPI) donors and their associated outcomes. I would not recommend this individual to consider kidneys from high KDPI donors. The reason for this decision is best summarized as: improved long term graft survival.  Access to transplant will be impacted by living donor availability and overall candidacy for SPK, as well as the influence of blood type and degree of sensitization. We discussed advantages of preemptive transplant as well as living donor kidney transplant, and graft and patient survival outcomes associated with these options. Potential surgical complications of kidney and pancreas transplantation include bleeding, clotting, infection, wound complications, anastomotic failure and other issues such as cardiac complications, pneumonia, deep venous thrombosis, pulmonary embolism, post transplant diabetes and death. We discussed the need for protocol biopsy of the kidney and the possible need for a ureteral stent (and subsequent removal). We discussed benefits and risks associated with different approaches to exocrine drainage of pancreatic secretions. We also discussed differences in the average length of stay, recovery process, and posttransplant lab and monitoring protocol. We discussed the risk of graft rejection and recurrent diabetic nephropathy in the setting of  poor glycemic control. I emphasized the need for strict immunosuppression adherence and the potential for complications of immunosuppression such as skin cancer or lymphoma, as well as a very low but not zero risk of donor-derived disease transmission risks (infection, cancer). Ms. Ortega asked good questions and the patient's candidacy will be reviewed at our Multidisciplinary Selection Committee. Thank you for the opportunity to participate in Ms. Ortega's care.    Total time: 45 minutes        Paola Flores MD FACS  Assistant Professor of Surgery  Director, Living Kidney Donor Program.  ---------------------------------------------------------------------------------------------------    HPI: Ms. Ortega has Chronic renal failure due to diabetes mellitus type 1. The patient has had diabetes for 21 years. Management is by Humalog 25-30 units via pump. The patient usually checks her blood sugar CGM times/day. Has been told she is a brittle diabetic with wide swings especially with exercise.  Hypoglyemic unawareness is not an issue but doesn't feel it until it's less than 50 and did have to call the ambulance once.  The diabetes is controlled.    Complications of diabetes include:    Retinopathy:  No  Neuropathy: No  Gastroparesis:  No.  On procalipride for 1 year and has constipation.  Has diabetic bowel     The patient is not on dialysis.    Has potential kidney donors:  Yes .  Interested in participation in paired exchange if a donor is willing: Doesn't know     The patient has the following pertinent history:       No    Yes  Dialysis:    [x]      [] via:       Blood Transfusion                  []      [x]  Number of units:  1 Most recently:2015  Pregnancy:    [x]      [] Number:       Previous Transplant:  [x]      [] Details:    Cancer    [x]      [] Comment:   Kidney stones   [x]      [] Comment:      Recurrent infections  [x]      []  Type:  Had osteo once due to aggressive debridement on  ertapenum x 6 weeks                Bladder dysfunction  [x]      [] Cause:    Claudication   [x]      [] Distance:    Previous Amputation  [x]      [] Cause:     Chronic anticoagulation  [x]      [] Indication:  Faith  [x]      []     Past Medical History:   Diagnosis Date     Diabetes mellitus (H)     Type 1 DM, Insulin Depend.      HTN (hypertension)      Past Surgical History:   Procedure Laterality Date     ARTHROPLASTY HIP BILATERAL Bilateral      ORTHOPEDIC SURGERY       Family History   Problem Relation Age of Onset     Diabetes Paternal Grandfather      Arthritis Maternal Grandmother      Hypertension Maternal Grandmother      Cancer - colorectal Maternal Grandfather      Social History     Socioeconomic History     Marital status: Single     Spouse name: Not on file     Number of children: Not on file     Years of education: Not on file     Highest education level: Not on file   Occupational History     Not on file   Social Needs     Financial resource strain: Not on file     Food insecurity     Worry: Not on file     Inability: Not on file     Transportation needs     Medical: Not on file     Non-medical: Not on file   Tobacco Use     Smoking status: Never Smoker     Smokeless tobacco: Never Used   Substance and Sexual Activity     Alcohol use: Yes     Comment: socially     Drug use: No     Sexual activity: Yes   Lifestyle     Physical activity     Days per week: Not on file     Minutes per session: Not on file     Stress: Not on file   Relationships     Social connections     Talks on phone: Not on file     Gets together: Not on file     Attends Zoroastrian service: Not on file     Active member of club or organization: Not on file     Attends meetings of clubs or organizations: Not on file     Relationship status: Not on file     Intimate partner violence     Fear of current or ex partner: Not on file     Emotionally abused: Not on file     Physically abused: Not on file     Forced sexual  activity: Not on file   Other Topics Concern     Parent/sibling w/ CABG, MI or angioplasty before 65F 55M? Not Asked   Social History Narrative     Not on file       ROS:   CONSTITUTIONAL:  No fevers or chills  EYES: negative for icterus  ENT:  negative for hearing loss, tinnitus and sore throat  RESPIRATORY:  negative for cough, sputum, dyspnea  CARDIOVASCULAR:  negative for chest pain Fatigue  Renal Insufficiency  GASTROINTESTINAL:  negative for nausea, vomiting, diarrhea or constipation  GENITOURINARY:  negative for incontinence, dysuria, bladder emptying problems  HEME:  No easy bruising  INTEGUMENT:  negative for rash and pruritus  NEURO:  Negative for headache, seizure disorder    Allergies:   Allergies   Allergen Reactions     Chlorhexidine Hives, Itching and Rash     PN: Patient had swelling/rash that was very itchy with chlorprep.     Ceclor [Cefaclor Monohydrate]      Cefaclor Rash       Medications:  Prescription Medications as of 7/7/2021       Rx Number Disp Refills Start End Last Dispensed Date Next Fill Date Owning Pharmacy    acetaminophen (TYLENOL) 500 MG tablet            Sig: Take 1-2 tablets by mouth every 4 hours as needed.    Class: Historical    Route: Oral    calcitRIOL (ROCALTROL) 0.25 MCG capsule    5/11/2021    [id number] [place] [address] [address]    Sig: Take 0.25 mcg by mouth    Class: Historical    Route: Oral    cholecalciferol 25 MCG (1000 UT) TABS    5/11/2021    [id number] [place] [address] [address]    Sig: Take 2,000 Units by mouth    Class: Historical    Route: Oral    Continuous Blood Gluc Sensor (DEXCOM G6 SENSOR) MIS    8/6/2020    [id number] [place] [address] [address]    Sig: Use as directed for continuous glucose monitoring.  Change sensor every 10 days.    Class: Historical    Continuous Blood Gluc Transmit (DEXCOM G6 TRANSMITTER) MISC    8/6/2020    [id number] [place] [address] [address]     Sig: Use as directed for continuous glucose monitoring.  Change every 3 months.    Class: Historical    darbepoetin neftaly (ARANESP) 60 MCG/0.3ML injection    2021   [id number] [place] [address] [address]    Sig: Inject 60 mcg Subcutaneous    Class: Historical    Route: Subcutaneous    furosemide (LASIX) 20 MG tablet    6/15/2021    [id number] [place] [address] [address]    Sig: TAKE 1 TABLET BY MOUTH EVERY DAY    Class: Historical    glucose blood VI test strips strip            Si times daily.    Class: Historical    Route: As instructed    insulin detemir (LEVEMIR FLEXPEN) 100 UNIT/ML injection            Sig: Inject  Subcutaneous See Admin Instructions. 25u in am, 10u pm    Class: Historical    Route: Subcutaneous    insulin lispro (HUMALOG VIAL) 100 UNIT/ML vial    2021    [facility] [place] [address] [address]    Sig: Inject 40 Units Subcutaneous    Class: Historical    Route: Subcutaneous    LISINOPRIL PO            Sig: Take 1 tablet by mouth.    Class: Historical    Route: Oral    losartan (COZAAR) 50 MG tablet    2021    [facility] [place] [address] [address]    Sig: Take 50 mg by mouth    Class: Historical    Route: Oral    norgestimate-ethinyl estradiol (SPRINTEC 28) 0.25-35 MG-MCG tablet            Sig: Take 1 tablet by mouth daily.    Class: Historical    Route: Oral    NOVOLOG PENFILL            Sig: Inject  Subcutaneous. 4 units per 15 units of carb, plus 1 unit for 50 over 150.  .     Class: Historical    Route: Subcutaneous    Prucalopride Succinate (MOTEGRITY) 2 MG TABS    2021    [facility] [place] [address] [address]    Sig: Take one tablet by mouth daily    Class: Historical    spironolactone (ALDACTONE) 25 MG tablet    2020    [facility] [place] [address] [address]    Sig: Take 12.5 mg by mouth    Class: Historical    Route: Oral          Exam:    Constitutional - A&O in NAD.   Eyes - no redness or discharge.  Sclera anicteric  Respiratory - no cough, no labored breathing  Musculoskeletal - range of motion normal  Skin - no discoloration, no jaundice  Neurological - no tremors.  No facial droop or dysarthria  Psychiatric - normal mood and affect  The rest of a comprehensive physical examination is deferred due to PHE (public health emergency) video visit restrictions    Diagnostics:   No results found for this or any previous visit (from the past 672 hour(s)).  No results found for: JEN Sanders is a 36 year old who is being evaluated via a billable video visit.      How would you like to obtain your AVS? MyChart  If the video visit is dropped, the invitation should be resent by: Text to cell phone: [phone]  Will anyone else be joining your video visit? No      Video Start Time: 10:40 AM  Video-Visit Details    Type of service:  Video Visit    Video End Time:11:23 AM    Originating Location (pt. Location): Home    Distant Location (provider location):  [facility]     Platform used for Video Visit: Ronyimity      Again, thank you for allowing me to participate in the care of your patient.        Sincerely,        TOBIAS

## 2021-07-07 NOTE — LETTER
7/7/2021         RE: Marilyn Ortega  [address]  [facility] [address]        Dear Colleague,    Thank you for referring your patient, Marilyn Ortega, to the [facility]. Please see a copy of my visit note below.    Marilyn is a 36-year-old who is being evaluated via a billable video visit.      How would you like to obtain your AVS? MyChart  If the video visit is dropped, the invitation should be resent by: Text to cell phone: [phone]  Will anyone else be joining your video visit? No      Video Start Time: 9:09 AM  Video-Visit Details    Type of service:  Video Visit    Video End Time: 10:29 Am    Originating Location (pt. Location): Home    Distant Location (provider location):  [facility]     Platform used for Video Visit: [employer]          TRANSPLANT NEPHROLOGY RECIPIENT EVALUATION NOTE    Assessment and Plan:  # Kidney/Pancreas Transplant Evaluation: Patient is an excellent candidate overall. Benefits of a living donor transplant were discussed.    # CKD Stage 4 from Type 1 Diabetes: with progression of disease over the last several years, not yet on dialysis, and overall feeling OK, but starting to develop some uremic symptoms, and would likely benefit from a kidney transplant.    # Type 1 Diabetes: currently controlled with 25-35 units insulin daily via pump and CGM. She does have unawareness. Given evidence of end organ damage, she may benefit from a pancreas transplant.    # Cardiac Risk: she will need risk assessment due to longstanding type 1 diabetes, CKD, and HTN. She is quite physically active and denies exertional symptoms.     # Gastroparesis/GI Dysmotility: significant delay noted on 2018 study, as below. Symptoms typically include abdominal bloating with alternating diarrhea and constipation. Followed by local GI and maintained on Motegrity, now with resolution of diarrhea, but tends more towards constipation.    FINDINGS: (2018  GES)  Percent remaining activity within the stomach:   Immediate: 100% (normal greater than 70%)   1 hour: 94% (normal 30-90%)   2 hours: 93% (normal less than 60%)  3 hours: 81% (normal less than 30%)  4 hours: 61% (normal less than 10%)    # COVID-19: November 2020. Since recovered. She has been vaccinated.    # Health Maintenance: PAP: Up to date and Dental: Up to date    Discussed the risks and benefits of a transplant, including the risk of surgery and immunosuppression medications.  Patient's overall evaluation will be discussed in the Transplant Program's regular meeting with a final recommendation on the patients suitability for transplant to be made at that time.    Pending completion of the full evaluation, patient presently appears to be enough of an acceptable kidney transplant recipient candidate to have any potential kidney donors start the evaluation process.  Patient was seen in conjunction with Dr. Reanna Lee as part of a shared visit.    Evaluation:  Marilyn Ortega was seen in consultation at the request of Dr. Paola Flores for evaluation as a potential kidney/pancreas transplant recipient.    Reason for Visit:  Marilyn Ortega is a 36-year-old female with type 1 diabetes who presents for kidney/pancreas transplant evaluation.    History of Present Illness:  Ms Ortega is a 36-year-old female with type 1 diabetes with CKD stage 4, not yet on dialysis, gastroparesis and GI dysmotility, history of bilateral toe osteomyelitis, and left femur fracture s/p surgical intervention x 4.         Kidney Disease Hx: followed by nephrology for elevated creatinine (since at least 2002) and proteinuria since 2016. Has had continued progression. Baseline creatinine 2.4-2.6 mg/dl, but was 3.47 mg/dl with eGFR of 16 ml/min on 7/1/2021. Reports fatigue and BLE edema.       Kidney Disease Dx: Diabetes mellitus type 1       Biopsy Proven: No         On Dialysis: No       Primary Nephrologist:   Yamini/Martha Christianson       H/o Kidney Stones: No       H/o Recurrent/Frequent UTI: No         Diabetic Hx: Type 1        Diagnosis Date: age 15       Medication History: Followed by local endocrinology. Currently uses 25-35 units insulin via pump and CGM daily.        Diabetic Control: Borderline control (HbA1c 7-9%)   Last HbA1c: 7.7%       Hypoglycemic Unawareness: Yes; typically symptomatic with sugars around 50-60, but occasionally can drop into the 20's without symptoms       End-Organ Damage due to DM: nephropathy. Denies retinopathy or peripheral neuropathy.    Gastroparesis/GI dysmotility: has followed with local GI for several years. Had issues with chronic diarrhea in the past. Started on motegrity and now tends more towards constipation. Did not have BM last week x 6 days.           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 No chest pain, SOB, or claudication.      Fatigue/Decreased Energy: [] No [x] Yes    Chest Pain or SOB with Exertion: [x] No [] Yes    Significant Weight Change: [x] No [] Yes    Nausea, Vomiting or Diarrhea: [x] No [] Yes    Fever, Sweats or Chills:  [x] No [] Yes    Leg Swelling [] No [x] Yes        History of Cancer: None    Potential Living Kidney Donors: Yes;     Review of Systems:  A comprehensive review of systems was obtained and negative, except as noted in the HPI or PMH.    Past Medical History:   Medical record was reviewed and PMH was discussed with patient and noted below.  Past Medical History:   Diagnosis Date     CKD (chronic kidney disease) stage 4, GFR 15-29 ml/min (H)      Gastroparesis      HTN (hypertension)      Type 1 diabetes mellitus (H)        Past Social History:   Past  Surgical History:   Procedure Laterality Date     ARTHROPLASTY HIP BILATERAL Bilateral      ORTHOPEDIC SURGERY       Personal history of bleeding or anesthesia problems: No    Family History:  Family History   Problem Relation Age of Onset     Diabetes Paternal Grandfather      Arthritis Maternal Grandmother      Hypertension Maternal Grandmother      Cancer - colorectal Maternal Grandfather        Personal History:   Social History     Socioeconomic History     Marital status: Single     Spouse name: Not on file     Number of children: Not on file     Years of education: Not on file     Highest education level: Not on file   Occupational History     Not on file   Social Needs     Financial resource strain: Not on file     Food insecurity     Worry: Not on file     Inability: Not on file     Transportation needs     Medical: Not on file     Non-medical: Not on file   Tobacco Use     Smoking status: Never Smoker     Smokeless tobacco: Never Used   Substance and Sexual Activity     Alcohol use: Yes     Comment: socially     Drug use: No     Sexual activity: Yes   Lifestyle     Physical activity     Days per week: Not on file     Minutes per session: Not on file     Stress: Not on file   Relationships     Social connections     Talks on phone: Not on file     Gets together: Not on file     Attends Gnosticist service: Not on file     Active member of club or organization: Not on file     Attends meetings of clubs or organizations: Not on file     Relationship status: Not on file     Intimate partner violence     Fear of current or ex partner: Not on file     Emotionally abused: Not on file     Physically abused: Not on file     Forced sexual activity: Not on file   Other Topics Concern     Parent/sibling w/ CABG, MI or angioplasty before 65F 55M? Not Asked   Social History Narrative     Not on file       Allergies:  Allergies   Allergen Reactions     Chlorhexidine Hives, Itching and Rash     PN: Patient had swelling/rash  that was very itchy with chlorprep.     Ceclor [Cefaclor Monohydrate]      Cefaclor Rash       Medications:  Current Outpatient Medications   Medication Sig     calcitRIOL (ROCALTROL) 0.25 MCG capsule Take 0.25 mcg by mouth     cholecalciferol 25 MCG (1000 UT) TABS Take 2,000 Units by mouth     Continuous Blood Gluc Sensor (DEXCOM G6 SENSOR) MISC Use as directed for continuous glucose monitoring.  Change sensor every 10 days.     Continuous Blood Gluc Transmit (DEXCOM G6 TRANSMITTER) MISC Use as directed for continuous glucose monitoring.  Change every 3 months.     darbepoetin neftaly (ARANESP) 60 MCG/0.3ML injection Inject 60 mcg Subcutaneous     furosemide (LASIX) 20 MG tablet TAKE 1 TABLET BY MOUTH EVERY DAY     glucose blood VI test strips strip 4 times daily.     insulin lispro (HUMALOG VIAL) 100 UNIT/ML vial Inject 40 Units Subcutaneous     losartan (COZAAR) 50 MG tablet Take 50 mg by mouth     Prucalopride Succinate (MOTEGRITY) 2 MG TABS Take one tablet by mouth daily     spironolactone (ALDACTONE) 25 MG tablet Take 12.5 mg by mouth     acetaminophen (TYLENOL) 500 MG tablet Take 1-2 tablets by mouth every 4 hours as needed.     insulin detemir (LEVEMIR FLEXPEN) 100 UNIT/ML injection Inject  Subcutaneous See Admin Instructions. 25u in am, 10u pm     LISINOPRIL PO Take 1 tablet by mouth.     norgestimate-ethinyl estradiol (SPRINTEC 28) 0.25-35 MG-MCG tablet Take 1 tablet by mouth daily.     NOVOLOG PENFILL Inject  Subcutaneous. 4 units per 15 units of carb, plus 1 unit for 50 over 150.  .      No current facility-administered medications for this visit.        Exam:  GENERAL: Healthy, alert and no distress  EYES: Eyes grossly normal to inspection.  No discharge or erythema, or obvious scleral/conjunctival abnormalities.  RESP: No audible wheeze, cough, or visible cyanosis.  No visible retractions or increased work of breathing.    SKIN: Visible skin clear. No significant rash, abnormal pigmentation or  lesions.  NEURO: Cranial nerves grossly intact.  Mentation and speech appropriate for age.  PSYCH: Mentation appears normal, affect normal/bright, judgement and insight intact, normal speech and appearance well-groomed.    Physician Attestation   I, Reanna Lee, saw and evaluated Marilyn Ortega as part of a shared visit.  I have reviewed and discussed with the advanced practice provider their history, physical and plan.    I personally reviewed the vital signs, medications, labs and imaging.    My key history or physical exam findings: 37 yo female with CKD IV 2/2 DM I, gastroparesis, COVID 19 11/2021 presents for kidney transplant evaluation    Key management decisions made by me:  . Kidney/Pancreas Transplant candidacy: good candidate overall, CKD IV with mild uremic sx's & type1 DM with microvascular complications and hypoglycemia unawareness, would benefit from SPK. No potential living donors at present  . Type 1 DM: +nephropathy and hypoglycemia unawareness, on insulin pump & CGM, would benefit from pancreas transplant   . Cardiac risk: asymptomatic on exertion. will need EKG, echocardiogram. Cardiac risk assessment  . COVID19: 11/2020. Recovered   . Cancer screening: UTD pap smear    Reanna Lee  Date of Service (when I saw the patient): 7/7/2021        Again, thank you for allowing me to participate in the care of your patient.        Sincerely,        TOBIAS

## 2021-07-07 NOTE — PROGRESS NOTES
"Marilyn is a 36 year old who is being evaluated via a billable video visit.      How would you like to obtain your AVS? MyChart  If the video visit is dropped, the invitation should be resent by: Text to cell phone: [phone]  Will anyone else be joining your video visit? No  {If patient encounters technical issues they should call [phone] :[phone]}    Video Start Time: {video visit start/end time for provider to select:152948}  Video-Visit Details    Type of service:  Video Visit    Video End Time:{video visit start/end time for provider to select:152948}    Originating Location (pt. Location): {video visit patient location:638040::\"Home\"}    Distant Location (provider location):  [facility]     Platform used for Video Visit: {Virtual Visit Platforms:680340::\"[employer] [address] [employer]\"}    "

## 2021-07-07 NOTE — PROGRESS NOTES
Psychosocial Assessment  Patient Name/ Age: Marilyn Ortega 36 year old   Medical Record #: [id number]  Duration of Interview:     40  min  Process:   Phone Interview                (counseling < 50%)   Present at Appointment: Kami        :SUSI Conway, [facility] Date:  July 7, 2021        Type of transplant: Kidney/Pancreas    Donor type:   Kami indicated she does not know of any potential kidney donors at this time.   Cadaver   Prior Transplants:    No Status of Transplant:       Current Living Situation    Location:   [address] [place] [address] [address]  [facility] [address]  With Whom: lives alone       Family/ Social Support:    Marilyn's parents live in [place], [place].  She has one brother ([place], [place]).   Available, helpful   Committed relationship:   Single   Other supports:   Friends Available, helpful       Activities/ Functional Ability    Current level:  Marilyn indicated she wears corrective lenses (contacts).   Ambulatory, visually impaired and independent with ADL's     Transportation Drives self       Vocational/Employment/Financial     Employment  [employer]   Full time   Job Description  Sales      Income   Salary/wages   Insurance  [employer] through employer.    At this time, patient can afford medication costs:  Yes  Private Insurance       Medical Status    Current mode of treatment for ESRD None   Complications - Diabetes controlled with an insulin pump and she has a monitor. Glucose Unawareness       Behavioral    Tobacco Use No Chemical Dependency No    Kamilla indicated she may have 2-3 drinks 2-3 times a week.     Psychiatric Impairment No    Reading ability Good  Education Level: Bachelors Degree Recent Legal History No    Coping Style/Strategies: Marilyn indicated when under stress she will exercise or be alone.   Ability to Adhere to Complex Medical Regime: Yes     Adherence History:  Kamilla indicated she will usually follow her physician's recommendations.         Education  _X_ Medicare  _X_ Rehabilitation  _X_ Donor issues  _X_ Community resources  _X_ Post discharge housing  _X_ Financial resources  _X_ Medical insurance options  _X_ Psych adjustment  _X_ Family adjustment  _X_ Health Care Directive - Provided Education and Declined Completing at this time.  Kamilla indicated she is in agreement with her parents making her medical decisions for her if she is unable. Psychosocial Risks of Transplant Reviewed and Discussed:  _X_ Increased stress related to emotional,            family, social, employment or financial           situation  _X_ Affect on work and/or disability benefits  _X_ Affect on future life insurance  _X_ Transplant outcome expectations may           not be met  _X_ Mental Health Risks: anxiety,           depression, PTSD, guilt, grief and           chronic fatigue     Notable Items:   Kamilla indicated her parents will be able to assist her post transplant.      Final Evaluation/Assessment   Patient seemed to process information well. Appeared well informed, motivated and able to follow post transplant requirements. Behavior was appropriate during interview. Has adequate income and insurance coverage. Adequate social support. No major contraindications noted for transplant.  At this time patient appears to understand the risks and benefits of transplant.      Recommendation  Acceptable    Selection Criteria Met:  Plan for support Yes   Chemical Dependence Yes   Smoking Yes   Mental Health Yes   Adequate Finances Yes    Signature: SUSI Conway, [facility]   Title: Clinical

## 2021-07-07 NOTE — PROGRESS NOTES
Marilyn is a 36-year-old who is being evaluated via a billable video visit.      How would you like to obtain your AVS? MyChart  If the video visit is dropped, the invitation should be resent by: Text to cell phone: [phone]  Will anyone else be joining your video visit? No      Video Start Time: 9:09 AM  Video-Visit Details    Type of service:  Video Visit    Video End Time: 10:29 Am    Originating Location (pt. Location): Home    Distant Location (provider location):  [facility]     Platform used for Video Visit: [facility]          TRANSPLANT NEPHROLOGY RECIPIENT EVALUATION NOTE    Assessment and Plan:  # Kidney/Pancreas Transplant Evaluation: Patient is an excellent candidate overall. Benefits of a living donor transplant were discussed.    # CKD Stage 4 from Type 1 Diabetes: with progression of disease over the last several years, not yet on dialysis, and overall feeling OK, but starting to develop some uremic symptoms, and would likely benefit from a kidney transplant.    # Type 1 Diabetes: currently controlled with 25-35 units insulin daily via pump and CGM. She does have unawareness. Given evidence of end organ damage, she may benefit from a pancreas transplant.    # Cardiac Risk: she will need risk assessment due to longstanding type 1 diabetes, CKD, and HTN. She is quite physically active and denies exertional symptoms.     # Gastroparesis/GI Dysmotility: significant delay noted on 2018 study, as below. Symptoms typically include abdominal bloating with alternating diarrhea and constipation. Followed by local GI and maintained on Motegrity, now with resolution of diarrhea, but tends more towards constipation.    FINDINGS: (2018 GES)  Percent remaining activity within the stomach:   Immediate: 100% (normal greater than 70%)   1 hour: 94% (normal 30-90%)   2 hours: 93% (normal less than 60%)  3 hours: 81% (normal less than 30%)  4 hours: 61% (normal less than 10%)    # COVID-19:  November 2020. Since recovered. She has been vaccinated.    # Health Maintenance: PAP: Up to date and Dental: Up to date    Discussed the risks and benefits of a transplant, including the risk of surgery and immunosuppression medications.  Patient's overall evaluation will be discussed in the Transplant Program's regular meeting with a final recommendation on the patients suitability for transplant to be made at that time.    Pending completion of the full evaluation, patient presently appears to be enough of an acceptable kidney transplant recipient candidate to have any potential kidney donors start the evaluation process.  Patient was seen in conjunction with Dr. Reanna Lee as part of a shared visit.    Evaluation:  Marilyn Ortega was seen in consultation at the request of Dr. Paola Flores for evaluation as a potential kidney/pancreas transplant recipient.    Reason for Visit:  Marilyn Ortega is a 36-year-old female with type 1 diabetes who presents for kidney/pancreas transplant evaluation.    History of Present Illness:  Ms Ortega is a 36-year-old female with type 1 diabetes with CKD stage 4, not yet on dialysis, gastroparesis and GI dysmotility, history of bilateral toe osteomyelitis, and left femur fracture s/p surgical intervention x 4.         Kidney Disease Hx: followed by nephrology for elevated creatinine (since at least 2002) and proteinuria since 2016. Has had continued progression. Baseline creatinine 2.4-2.6 mg/dl, but was 3.47 mg/dl with eGFR of 16 ml/min on 7/1/2021. Reports fatigue and BLE edema.       Kidney Disease Dx: Diabetes mellitus type 1       Biopsy Proven: No         On Dialysis: No       Primary Nephrologist: Dr. Kc/Martha Christianson       H/o Kidney Stones: No       H/o Recurrent/Frequent UTI: No         Diabetic Hx: Type 1        Diagnosis Date: age 15       Medication History: Followed by local endocrinology. Currently uses 25-35 units insulin via pump and CGM  daily.        Diabetic Control: Borderline control (HbA1c 7-9%)   Last HbA1c: 7.7%       Hypoglycemic Unawareness: Yes; typically symptomatic with sugars around 50-60, but occasionally can drop into the 20's without symptoms       End-Organ Damage due to DM: nephropathy. Denies retinopathy or peripheral neuropathy.    Gastroparesis/GI dysmotility: has followed with local GI for several years. Had issues with chronic diarrhea in the past. Started on motegrity and now tends more towards constipation. Did not have BM last week x 6 days.           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 No chest pain, SOB, or claudication.      Fatigue/Decreased Energy: [] No [x] Yes    Chest Pain or SOB with Exertion: [x] No [] Yes    Significant Weight Change: [x] No [] Yes    Nausea, Vomiting or Diarrhea: [x] No [] Yes    Fever, Sweats or Chills:  [x] No [] Yes    Leg Swelling [] No [x] Yes        History of Cancer: None    Potential Living Kidney Donors: Yes;     Review of Systems:  A comprehensive review of systems was obtained and negative, except as noted in the HPI or PMH.    Past Medical History:   Medical record was reviewed and PMH was discussed with patient and noted below.  Past Medical History:   Diagnosis Date     CKD (chronic kidney disease) stage 4, GFR 15-29 ml/min (H)      Gastroparesis      HTN (hypertension)      Type 1 diabetes mellitus (H)        Past Social History:   Past Surgical History:   Procedure Laterality Date     ARTHROPLASTY HIP BILATERAL Bilateral      ORTHOPEDIC SURGERY       Personal history of bleeding or anesthesia problems: No    Family History:  Family History   Problem Relation Age of Onset     Diabetes  Paternal Grandfather      Arthritis Maternal Grandmother      Hypertension Maternal Grandmother      Cancer - colorectal Maternal Grandfather        Personal History:   Social History     Socioeconomic History     Marital status: Single     Spouse name: Not on file     Number of children: Not on file     Years of education: Not on file     Highest education level: Not on file   Occupational History     Not on file   Social Needs     Financial resource strain: Not on file     Food insecurity     Worry: Not on file     Inability: Not on file     Transportation needs     Medical: Not on file     Non-medical: Not on file   Tobacco Use     Smoking status: Never Smoker     Smokeless tobacco: Never Used   Substance and Sexual Activity     Alcohol use: Yes     Comment: socially     Drug use: No     Sexual activity: Yes   Lifestyle     Physical activity     Days per week: Not on file     Minutes per session: Not on file     Stress: Not on file   Relationships     Social connections     Talks on phone: Not on file     Gets together: Not on file     Attends Synagogue service: Not on file     Active member of club or organization: Not on file     Attends meetings of clubs or organizations: Not on file     Relationship status: Not on file     Intimate partner violence     Fear of current or ex partner: Not on file     Emotionally abused: Not on file     Physically abused: Not on file     Forced sexual activity: Not on file   Other Topics Concern     Parent/sibling w/ CABG, MI or angioplasty before 65F 55M? Not Asked   Social History Narrative     Not on file       Allergies:  Allergies   Allergen Reactions     Chlorhexidine Hives, Itching and Rash     PN: Patient had swelling/rash that was very itchy with chlorprep.     Ceclor [Cefaclor Monohydrate]      Cefaclor Rash       Medications:  Current Outpatient Medications   Medication Sig     calcitRIOL (ROCALTROL) 0.25 MCG capsule Take 0.25 mcg by mouth     cholecalciferol 25 MCG  (1000 UT) TABS Take 2,000 Units by mouth     Continuous Blood Gluc Sensor (DEXCOM G6 SENSOR) MISC Use as directed for continuous glucose monitoring.  Change sensor every 10 days.     Continuous Blood Gluc Transmit (DEXCOM G6 TRANSMITTER) MISC Use as directed for continuous glucose monitoring.  Change every 3 months.     darbepoetin neftaly (ARANESP) 60 MCG/0.3ML injection Inject 60 mcg Subcutaneous     furosemide (LASIX) 20 MG tablet TAKE 1 TABLET BY MOUTH EVERY DAY     glucose blood VI test strips strip 4 times daily.     insulin lispro (HUMALOG VIAL) 100 UNIT/ML vial Inject 40 Units Subcutaneous     losartan (COZAAR) 50 MG tablet Take 50 mg by mouth     Prucalopride Succinate (MOTEGRITY) 2 MG TABS Take one tablet by mouth daily     spironolactone (ALDACTONE) 25 MG tablet Take 12.5 mg by mouth     acetaminophen (TYLENOL) 500 MG tablet Take 1-2 tablets by mouth every 4 hours as needed.     insulin detemir (LEVEMIR FLEXPEN) 100 UNIT/ML injection Inject  Subcutaneous See Admin Instructions. 25u in am, 10u pm     LISINOPRIL PO Take 1 tablet by mouth.     norgestimate-ethinyl estradiol (SPRINTEC 28) 0.25-35 MG-MCG tablet Take 1 tablet by mouth daily.     NOVOLOG PENFILL Inject  Subcutaneous. 4 units per 15 units of carb, plus 1 unit for 50 over 150.  .      No current facility-administered medications for this visit.        Exam:  GENERAL: Healthy, alert and no distress  EYES: Eyes grossly normal to inspection.  No discharge or erythema, or obvious scleral/conjunctival abnormalities.  RESP: No audible wheeze, cough, or visible cyanosis.  No visible retractions or increased work of breathing.    SKIN: Visible skin clear. No significant rash, abnormal pigmentation or lesions.  NEURO: Cranial nerves grossly intact.  Mentation and speech appropriate for age.  PSYCH: Mentation appears normal, affect normal/bright, judgement and insight intact, normal speech and appearance well-groomed.    Physician Attestation   Reanna DERAS  Rosa, saw and evaluated Marilyn Ortega as part of a shared visit.  I have reviewed and discussed with the advanced practice provider their history, physical and plan.    I personally reviewed the vital signs, medications, labs and imaging.    My key history or physical exam findings: 35 yo female with CKD IV 2/2 DM I, gastroparesis, COVID 19 11/2021 presents for kidney transplant evaluation    Key management decisions made by me:  . Kidney/Pancreas Transplant candidacy: good candidate overall, CKD IV with mild uremic sx's & type1 DM with microvascular complications and hypoglycemia unawareness, would benefit from SPK. No potential living donors at present  . Type 1 DM: +nephropathy and hypoglycemia unawareness, on insulin pump & CGM, would benefit from pancreas transplant   . Cardiac risk: asymptomatic on exertion. will need EKG, echocardiogram. Cardiac risk assessment  . COVID19: 11/2020. Recovered   . Cancer screening: UTD pap smear    Reanna Lee  Date of Service (when I saw the patient): 7/7/2021

## 2021-07-07 NOTE — PROGRESS NOTES
Transplant Surgery Consult Note    Medical record number: [id number]  YOB: 1985,   Consult requested by Martha Christianson NP for evaluation of kidney and pancreas transplant candidacy.    Assessment and Recommendations: Ms. Ortega is a excellent candidate for transplantation and has a good understanding of the risks and benefits of this approach to management of renal failure and diabetes. The following issues should be addressed prior to transplant:     Ms. Ortega has Chronic renal failure due to diabetes mellitus type 1 whose condition is not expected to resolve, is expected to progress, and is expected to continue to develop related comorbid conditions.  Cardiology consult for cardiac risk stratification to be ordered: Yes  CT abdomen and pelvis without contrast to be ordered for assessment of vascular targets: Yes  Transplant listing labs ordered to include HLA, ABOx2, Cr, etc.  Dietician consult ordered: Yes  Social work consult ordered: Yes  Imaging reports reviewed: None available  Radiology images reviewed: None available  Recipient suitable to move forward with work up of living donors:  Yes  Her GFR only recently dropped to below 20.  Her last check was a GFR of 15.  She does endorse uremic symptoms such as nausea and extreme fatigue.  She also says it has been very difficult to keep her hemoglobin up as of late.  She does feel as though her decline has accelerated.  For this reason I do think she may benefit from a living donor kidney transplant followed by a pancreas after kidney given how she describes her symptoms and how miserable she is.  Her mother did express interest in donating.  I encouraged her to speak with her nephrologist about timing of kidney transplant but given her description it does sound as though she is progressing more quickly and will likely benefit from a kidney transplant sooner than later.    She will need in person surgeon visit prior to being listed  active.  She will need Dopplers of the iliacs.    The majority of our visit was spent in counselling, discussing the medical and surgical risks of living or  donor kidney and pancreas transplantation, either in a simultaneous or sequential fashion. I contrasted approximate wait time for SPK vs living vs  donor kidneys from normal (0-85%) or higher (%) kidney donor profile index (KDPI) donors and their associated outcomes. I would not recommend this individual to consider kidneys from high KDPI donors. The reason for this decision is best summarized as: improved long term graft survival.  Access to transplant will be impacted by living donor availability and overall candidacy for SPK, as well as the influence of blood type and degree of sensitization. We discussed advantages of preemptive transplant as well as living donor kidney transplant, and graft and patient survival outcomes associated with these options. Potential surgical complications of kidney and pancreas transplantation include bleeding, clotting, infection, wound complications, anastomotic failure and other issues such as cardiac complications, pneumonia, deep venous thrombosis, pulmonary embolism, post transplant diabetes and death. We discussed the need for protocol biopsy of the kidney and the possible need for a ureteral stent (and subsequent removal). We discussed benefits and risks associated with different approaches to exocrine drainage of pancreatic secretions. We also discussed differences in the average length of stay, recovery process, and posttransplant lab and monitoring protocol. We discussed the risk of graft rejection and recurrent diabetic nephropathy in the setting of poor glycemic control. I emphasized the need for strict immunosuppression adherence and the potential for complications of immunosuppression such as skin cancer or lymphoma, as well as a very low but not zero risk of donor-derived disease  transmission risks (infection, cancer). Ms. Orteag asked good questions and the patient's candidacy will be reviewed at our Multidisciplinary Selection Committee. Thank you for the opportunity to participate in Ms. Ortega's care.    Total time: 45 minutes        Paola Flores MD FACS  Assistant Professor of Surgery  Director, Living Kidney Donor Program.  ---------------------------------------------------------------------------------------------------    HPI: Ms. Ortega has Chronic renal failure due to diabetes mellitus type 1. The patient has had diabetes for 21 years. Management is by Humalog 25-30 units via pump. The patient usually checks her blood sugar CGM times/day. Has been told she is a brittle diabetic with wide swings especially with exercise.  Hypoglyemic unawareness is not an issue but doesn't feel it until it's less than 50 and did have to call the ambulance once.  The diabetes is controlled.    Complications of diabetes include:    Retinopathy:  No  Neuropathy: No  Gastroparesis:  No.  On procalipride for 1 year and has constipation.  Has diabetic bowel     The patient is not on dialysis.    Has potential kidney donors:  Yes .  Interested in participation in paired exchange if a donor is willing: Doesn't know     The patient has the following pertinent history:       No    Yes  Dialysis:    [x]      [] via:       Blood Transfusion                  []      [x]  Number of units:  1 Most recently:2015  Pregnancy:    [x]      [] Number:       Previous Transplant:  [x]      [] Details:    Cancer    [x]      [] Comment:   Kidney stones   [x]      [] Comment:      Recurrent infections  [x]      []  Type:  Had osteo once due to aggressive debridement on ertapenum x 6 weeks                Bladder dysfunction  [x]      [] Cause:    Claudication   [x]      [] Distance:    Previous Amputation  [x]      [] Cause:     Chronic anticoagulation  [x]      [] Indication:  Christian  [x]       []     Past Medical History:   Diagnosis Date     Diabetes mellitus (H)     Type 1 DM, Insulin Depend.      HTN (hypertension)      Past Surgical History:   Procedure Laterality Date     ARTHROPLASTY HIP BILATERAL Bilateral      ORTHOPEDIC SURGERY       Family History   Problem Relation Age of Onset     Diabetes Paternal Grandfather      Arthritis Maternal Grandmother      Hypertension Maternal Grandmother      Cancer - colorectal Maternal Grandfather      Social History     Socioeconomic History     Marital status: Single     Spouse name: Not on file     Number of children: Not on file     Years of education: Not on file     Highest education level: Not on file   Occupational History     Not on file   Social Needs     Financial resource strain: Not on file     Food insecurity     Worry: Not on file     Inability: Not on file     Transportation needs     Medical: Not on file     Non-medical: Not on file   Tobacco Use     Smoking status: Never Smoker     Smokeless tobacco: Never Used   Substance and Sexual Activity     Alcohol use: Yes     Comment: socially     Drug use: No     Sexual activity: Yes   Lifestyle     Physical activity     Days per week: Not on file     Minutes per session: Not on file     Stress: Not on file   Relationships     Social connections     Talks on phone: Not on file     Gets together: Not on file     Attends Jain service: Not on file     Active member of club or organization: Not on file     Attends meetings of clubs or organizations: Not on file     Relationship status: Not on file     Intimate partner violence     Fear of current or ex partner: Not on file     Emotionally abused: Not on file     Physically abused: Not on file     Forced sexual activity: Not on file   Other Topics Concern     Parent/sibling w/ CABG, MI or angioplasty before 65F 55M? Not Asked   Social History Narrative     Not on file       ROS:   CONSTITUTIONAL:  No fevers or chills  EYES: negative for icterus  ENT:   negative for hearing loss, tinnitus and sore throat  RESPIRATORY:  negative for cough, sputum, dyspnea  CARDIOVASCULAR:  negative for chest pain Fatigue  Renal Insufficiency  GASTROINTESTINAL:  negative for nausea, vomiting, diarrhea or constipation  GENITOURINARY:  negative for incontinence, dysuria, bladder emptying problems  HEME:  No easy bruising  INTEGUMENT:  negative for rash and pruritus  NEURO:  Negative for headache, seizure disorder    Allergies:   Allergies   Allergen Reactions     Chlorhexidine Hives, Itching and Rash     PN: Patient had swelling/rash that was very itchy with chlorprep.     Ceclor [Cefaclor Monohydrate]      Cefaclor Rash       Medications:  Prescription Medications as of 7/7/2021       Rx Number Disp Refills Start End Last Dispensed Date Next Fill Date Owning Pharmacy    acetaminophen (TYLENOL) 500 MG tablet            Sig: Take 1-2 tablets by mouth every 4 hours as needed.    Class: Historical    Route: Oral    calcitRIOL (ROCALTROL) 0.25 MCG capsule    5/11/2021    [facility] [place] [address]    Sig: Take 0.25 mcg by mouth    Class: Historical    Route: Oral    cholecalciferol 25 MCG (1000 UT) TABS    5/11/2021    [facility] [place] [address]    Sig: Take 2,000 Units by mouth    Class: Historical    Route: Oral    Continuous Blood Gluc Sensor (DEXCOM G6 SENSOR) [facility]    8/6/2020    [facility] [place] [address]    Sig: Use as directed for continuous glucose monitoring.  Change sensor every 10 days.    Class: Historical    Continuous Blood Gluc Transmit (DEXCOM G6 TRANSMITTER) [facility]    8/6/2020    [facility] [place] [address] [address]    Sig: Use as directed for continuous glucose monitoring.  Change every 3 months.    Class: Historical    darbepoetin neftaly (ARANESP) 60 MCG/0.3ML injection    6/4/2021 6/17/2022   [facility] [place] [address] [address]    Sig:  Inject 60 mcg Subcutaneous    Class: Historical    Route: Subcutaneous    furosemide (LASIX) 20 MG tablet    6/15/2021    [facility] [place] [address] [address]    Sig: TAKE 1 TABLET BY MOUTH EVERY DAY    Class: Historical    glucose blood VI test strips strip            Si times daily.    Class: Historical    Route: As instructed    insulin detemir (LEVEMIR FLEXPEN) 100 UNIT/ML injection            Sig: Inject  Subcutaneous See Admin Instructions. 25u in am, 10u pm    Class: Historical    Route: Subcutaneous    insulin lispro (HUMALOG VIAL) 100 UNIT/ML vial    2021    [facility] [place] [address]    Sig: Inject 40 Units Subcutaneous    Class: Historical    Route: Subcutaneous    LISINOPRIL PO            Sig: Take 1 tablet by mouth.    Class: Historical    Route: Oral    losartan (COZAAR) 50 MG tablet    2021    [facility] [place] [address]    Sig: Take 50 mg by mouth    Class: Historical    Route: Oral    norgestimate-ethinyl estradiol (SPRINTEC 28) 0.25-35 MG-MCG tablet            Sig: Take 1 tablet by mouth daily.    Class: Historical    Route: Oral    NOVOLOG PENFILL            Sig: Inject  Subcutaneous. 4 units per 15 units of carb, plus 1 unit for 50 over 150.  .     Class: Historical    Route: Subcutaneous    Prucalopride Succinate (MOTEGRITY) 2 MG TABS    2021    [facility] [place] [address]    Sig: Take one tablet by mouth daily    Class: Historical    spironolactone (ALDACTONE) 25 MG tablet    2020    [facility] [place] [address]    Sig: Take 12.5 mg by mouth    Class: Historical    Route: Oral          Exam:   Constitutional - A&O in NAD.   Eyes - no redness or discharge.  Sclera anicteric  Respiratory - no cough, no labored breathing  Musculoskeletal - range of motion normal  Skin - no discoloration, no jaundice  Neurological - no tremors.  No  facial droop or dysarthria  Psychiatric - normal mood and affect  The rest of a comprehensive physical examination is deferred due to PHE (public health emergency) video visit restrictions    Diagnostics:   No results found for this or any previous visit (from the past 672 hour(s)).  No results found for: JEN Sanders is a 36 year old who is being evaluated via a billable video visit.      How would you like to obtain your AVS? MyChart  If the video visit is dropped, the invitation should be resent by: Text to cell phone: [phone]  Will anyone else be joining your video visit? No      Video Start Time: 10:40 AM  Video-Visit Details    Type of service:  Video Visit    Video End Time:11:23 AM    Originating Location (pt. Location): Home    Distant Location (provider location):  [facility]     Platform used for Video Visit: [employer]

## 2021-07-14 ENCOUNTER — COMMITTEE REVIEW (OUTPATIENT)
Dept: TRANSPLANT | Facility: CLINIC | Age: 36
End: 2021-07-14

## 2021-07-14 DIAGNOSIS — Z76.82 AWAITING ORGAN TRANSPLANT: Primary | ICD-10-CM

## 2021-07-14 NOTE — LETTER
2021    Marilyn Ortega  [address]  [facility] [address]      Dear Marilyn,    It was a pleasure to start working with you toward the goal of a kidney and pancreas transplant. Your pre transplant evaluation began as a virtual visit on 2021. You evaluation results were discussed at our Multidisciplinary Selection Committee on 2021. You have been approved for simultaneous kidney/pancreas and kidney alone pending the completion of your evaluation. We are asking for the following items:    1. You will need an in person visit with a pancreas surgeon. Our  will call you with this appointment.  2. You will need to see cardiology to make sure your heart is healthy enough for a transplant. Our  will call you with this appointment.  3. We are asking you to have a CT of the abdomen and pelvis to look at the best place for new organs. Our  will call you with this appointment.  4. We are asking you to have iliac ultrasounds done to look at the blood flow to your legs. Our  will call you with this appointment.  5. We are asking you to complete the evaluation items of chest xray, ekg, echocardiogram and labs. Our  will call you with these appointments.    Once I have your listing labs I will place you on the  donor wait list for simultaneous kidney/pancreas and kidney alone with a start date of 2021. A modification form will be sent to [employer] to reflect the start date as the date you were approved for transplant. This is done so you are not disadvantaged by the pandemic. I will let you know when you are listed.    Please have any potential live donors register now online with our program to initiate their evaluations at [email or web address] or call [phone]. You will be notified by our office in the event of an approved live donor.    If you have any questions please call, e mail or [employer] message me.      Sincerely,        Diane Haro, RN, BSN Transplant Coordinator  Solid Organ Transplant PWB [address]  [facility], [address] [address]  Phone [phone]  Fax [phone]  [email or web address]    CC:Ines Collazo, Martha Christianson, Venice Banegas

## 2021-07-14 NOTE — COMMITTEE REVIEW
Abdominal Committee Review Note     Evaluation Date: 7/7/2021  Committee Review Date: 7/14/2021    Organ being evaluated for: Kidney/Pancreas    Transplant Phase: Evaluation  Transplant Status: Active    Transplant Coordinator: Renetta Haro  Transplant Surgeon:   Paola Flores    Referring Physician: Martha Christianson    Primary Diagnosis: Diabetes Mellitus - Type I (Pancreas)  Secondary Diagnosis:     Committee Review Members:  Nephrology Kamran Corona MD, Fransico Moreno, APRN CNP, Joseph Motta MD   Nutrition Jovana Wright,    Pharmacy Beka Roldan, [facility]    - Clinical Lisa Laura Katie, [facility]   Transplant Jaydon Wright MD, Madalyn Blanco, RN, Sally Brito, CAITLIN, Caitlyn Arriola, EDWARD, Radha Porter, RN, Renetta Haro, RN       Transplant Eligibility: Insulin-dependent diabetes mellitus, Irreversible chronic kidney disease treated w/dialysis or expected need for dialysis, Significant diabetic complications    Committee Review Decision: Approved    Relative Contraindications: None    Absolute Contraindications: None    Committee Chair Jaydon Wright MD verbally attested to the committee's decision.    Committee Discussion Details: Reviewed medical records and evaluation results to date. She is approved for spk and kidney alone. She was seen virtually so will need an in person visit with a pancreas surgeon, labs, ekg, echo and cxr to be able to list her. Modification form will be sent to [employer] for listing date to coincide with the approval date so she is not disadvantaged by the pandemic. She will also need cardiology, CT a/p and iliacs. Patient will be called and transplant summary letter will be sent.

## 2021-07-14 NOTE — Clinical Note
Needs cxr, ekg, echo, labs, cardiology, CT a/p, iliacs and in person with pancreas surgeon and it should be Paola

## 2021-07-15 ENCOUNTER — DOCUMENTATION ONLY (OUTPATIENT)
Dept: TRANSPLANT | Facility: CLINIC | Age: 36
End: 2021-07-15

## 2021-07-15 ENCOUNTER — TELEPHONE (OUTPATIENT)
Dept: TRANSPLANT | Facility: CLINIC | Age: 36
End: 2021-07-15

## 2021-07-15 NOTE — TELEPHONE ENCOUNTER
Called Marilyn to discuss the outcome of the Selection Committee from yesterday 7/14/2021. She is approved for spk and kidney alone. Once we have her listing labs she will be listed with a start date of 7/14/2021 to coincide with her approval date so she is not disadvantaged by the pandemic. She will need the following items: cardiology, CT a/p, iliacs, labs, ekg, cxr, and echo. She has a PAP scheduled for 7/20/2021 at [facility]. She will also need an in person visit with a pancreas surgeon. Transplant summary letter sent.

## 2021-07-15 NOTE — PROGRESS NOTES
Kidney, Pancreas Transplant Evaluation - 7/7/2021  Marilyn GERA Ortega watched the online MTP videos at home due to the pandemic. We reviewed the content by phone today.    Content reviewed:    Living Donation and how to access that program    Paired exchange    Kidney Donor Profile Index (KDPI)    Waiting list issues (right to decline without penalty, high PHS risk donors, what to expect when called with an offer)    Hospital experience,  length of stay , need to stay locally post-discharge (2-4 weeks)    Surgical options (with pictures)                             Post-surgery lifting and driving restrictions    Post-transplant routines, frequency of lab work and clinic visits    Need to stay locally post-discharge (2-4 weeks)    Role of Transplant Coordinator    Participants were informed of the benefits of transplant as well as potential risks such as infection, cancer, and death.  The need for total adherence with immunosuppression medications and following transplant regimens was stressed.  The overall evaluation/approval/listing process was reviewed.        The patient was provided with the following documents:  What You Need to Know About a Kidney Transplant  Adult Kidney Transplant - A Guide for Patients  SRTR Data Sheet - Kidney  Brochure - Kidney Allocation  What You Need to Know About a Pancreas Transplant  Adult Pancreas Transplant-A Guide for Patients  SRTR Data Sheet - Pancreas  Brochure - Pancreas  SRTR Data Sheet - Kidney/Pancreas  Brochure - SPK  Brochure - Multiple Listing and Waiting Time Transfer  What Every Patient Needs to Know ([facility])  [employer] Facts and Figures  Finding a Donor  My Transplant Place - Quick Start Guide  KDPI Consent  Receipt of Information form

## 2021-07-16 NOTE — TELEPHONE ENCOUNTER
Action    Action Taken 7-16: Requested EKG 8-25-20 from   7-20: sent second request for EKG from   7-20: sent EKG to scanning

## 2021-07-20 ENCOUNTER — TRANSFERRED RECORDS (OUTPATIENT)
Dept: HEALTH INFORMATION MANAGEMENT | Facility: CLINIC | Age: 36
End: 2021-07-20

## 2021-07-20 LAB — PAP-ABSTRACT: NORMAL

## 2021-07-20 NOTE — PROGRESS NOTES
"Electrophysiology Clinic Video Virtual Visit    Marilyn Ortega is a 36 year old female who is being evaluated via a billable video visit.      The patient has been notified of following:     \"This video visit will be conducted via a call between you and your physician/provider. We have found that certain health care needs can be provided without the need for an in-person physical exam.  This service lets us provide the care you need with a video conversation.  If a prescription is necessary we can send it directly to your pharmacy.  If lab work is needed we can place an order for that and you can then stop by our lab to have the test done at a later time.    If during the course of the call the physician/provider feels a video visit is not appropriate, you will not be charged for this service.\"     Physician has received verbal consent for a Video Visit from the patient? Yes    Patient would like the video invitation sent by: [employer]    Video Start Time: 10:30 am    Video Stop Time: 10:54 am    Mode of Communication:  Video Conference via Friend Traveler    Originating Location (pt. Location): Home    Distant Location (provider location): [facility]    Mode of Communication:  Video Conference via Friend Traveler      HPI:   35 yo referred for cardiovascular evaluation prior to possible kidney/pancreas transplant.   She has Type I diabetes since age 14, brittle. Kidney failure, not yet on dialysis.   No prior cardiac history. She works in sales, single, lives alone. Very active, main constraint is in regulating blood glucose. She can run up to 4 miles in 45 minutes without chest discomfort, dyspnea, lightheadedness. No h/o syncope, denies orthopnea/PND.    Cardiac risk factors: +T1D, -HTN, -FHx, -HL, never smoked    PAST MEDICAL HISTORY:  1. Diabetes type I diagnosed age 14, brittle. Called EMT once when BG 20-30s for over an hour, admitted 2/28/2015 - found to have significant EKG changes ultimately attributed " to high K or 6.8.  Echo was normal. She has Dexcom CGM, omnipod pump.  2. Left femoral neck fracture 2011 sustained while running, s/p ORIF  3. COVID+ November 2020; now fully vaccinated    CURRENT MEDICATIONS:  Losartan 50 every day  Spironolactone 12.5 qd  Lasix 40 every day qd    Insulin pump    ALLERGIES:     Allergies   Allergen Reactions     Chlorhexidine Hives, Itching and Rash     PN: Patient had swelling/rash that was very itchy with chlorprep.     Ceclor [Cefaclor Monohydrate]      Cefaclor Rash       FAMILY HISTORY:  Family History   Problem Relation Age of Onset     Diabetes Paternal Grandfather      Arthritis Maternal Grandmother      Hypertension Maternal Grandmother      Cancer - colorectal Maternal Grandfather        SOCIAL HISTORY:  Social History     Tobacco Use     Smoking status: Never Smoker     Smokeless tobacco: Never Used   Substance Use Topics     Alcohol use: Yes     Comment: socially     Drug use: No       ROS:  10 point ROS neg other than the symptoms noted above in the HPI.    Labs:  7/21/2021: K 3.3, cr 3.64, A1C 7.1, hgb 12, plt 284K    Testing/Procedures:  EKG 7/21/2021 - not available    Exam:  The rest of a comprehensive physical examination is deferred due to public health emergency video visit restrictions.  CONSITUTIONAL: no acute distress  HEENT: no icterus, no redness or discharge, neck supple  CV: no visible edema of visualized extremities. No JVD.   RESPIRATORY: respirations nonlabored, no cough  NEURO: AA&Ox3, speech fluent/appropriate, motor grossly nonfocal  PSYCH: cooperative, affect appropriate  DERM: no rashes on visualized face/neck/upper extremities      Assessment and Plan:   Cardiovascular assessment prior to possible kidney/pancreas transplant.  She has no ischemic symptoms at a high level of activity, but she does have type 1 diabetes since age 14. Given chronic renal disease not yet on dialysis, I would recommend exercise stress echocardiogram. I will also try to  find the EKG she did yesterday, as well as her EKG from 2/28/2015 for review.      In addition to video time documented above, I spent an additional 15 minutes on data review and documentation.    I have reviewed the note as documented above.  This accurately captures the substance of my virtual visit with the patient. The patient states understanding and is agreeable with the plan.     Prema Diez MD  Cardiology      Addendum:  EKG 8/14/2020: normal sinus, normal intervals  Exercise stress echo 8/24/2021: baseline normal EF 55-60%.   Biked for 14:06 minutes, MVO2 99%. Target heart rate achieved, normal BP response, no ischemia.    She is a low risk for major cardiovascular events during surgery.    RUSSELL  8/25/2021

## 2021-07-21 ENCOUNTER — ANCILLARY PROCEDURE (OUTPATIENT)
Dept: ULTRASOUND IMAGING | Facility: CLINIC | Age: 36
End: 2021-07-21
Attending: PHYSICIAN ASSISTANT
Payer: COMMERCIAL

## 2021-07-21 ENCOUNTER — ANCILLARY PROCEDURE (OUTPATIENT)
Dept: CT IMAGING | Facility: CLINIC | Age: 36
End: 2021-07-21
Attending: PHYSICIAN ASSISTANT
Payer: COMMERCIAL

## 2021-07-21 ENCOUNTER — ANCILLARY PROCEDURE (OUTPATIENT)
Dept: GENERAL RADIOLOGY | Facility: CLINIC | Age: 36
End: 2021-07-21
Attending: PHYSICIAN ASSISTANT
Payer: COMMERCIAL

## 2021-07-21 ENCOUNTER — LAB (OUTPATIENT)
Dept: LAB | Facility: CLINIC | Age: 36
End: 2021-07-21
Attending: SURGERY
Payer: COMMERCIAL

## 2021-07-21 ENCOUNTER — LAB (OUTPATIENT)
Dept: LAB | Facility: CLINIC | Age: 36
End: 2021-07-21
Attending: SURGERY

## 2021-07-21 DIAGNOSIS — Z76.82 ORGAN TRANSPLANT CANDIDATE: ICD-10-CM

## 2021-07-21 DIAGNOSIS — R77.8 ELEVATED TOTAL PROTEIN: Primary | ICD-10-CM

## 2021-07-21 DIAGNOSIS — Z76.82 AWAITING ORGAN TRANSPLANT: ICD-10-CM

## 2021-07-21 DIAGNOSIS — N18.4 CHRONIC KIDNEY DISEASE, STAGE 4, SEVERELY DECREASED GFR (H): ICD-10-CM

## 2021-07-21 DIAGNOSIS — E10.9 TYPE 1 DIABETES MELLITUS (H): ICD-10-CM

## 2021-07-21 DIAGNOSIS — N18.5 CHRONIC KIDNEY DISEASE, STAGE V (H): ICD-10-CM

## 2021-07-21 DIAGNOSIS — I10 ESSENTIAL HYPERTENSION: ICD-10-CM

## 2021-07-21 DIAGNOSIS — N18.9 CHRONIC RENAL FAILURE: ICD-10-CM

## 2021-07-21 DIAGNOSIS — N18.4 CKD (CHRONIC KIDNEY DISEASE) STAGE 4, GFR 15-29 ML/MIN (H): ICD-10-CM

## 2021-07-21 DIAGNOSIS — E10.22 TYPE 1 DIABETES MELLITUS WITH STAGE 4 CHRONIC KIDNEY DISEASE (H): ICD-10-CM

## 2021-07-21 DIAGNOSIS — Z01.818 ENCOUNTER FOR PRE-TRANSPLANT EVALUATION FOR KIDNEY AND PANCREAS TRANSPLANT: ICD-10-CM

## 2021-07-21 DIAGNOSIS — N18.4 TYPE 1 DIABETES MELLITUS WITH STAGE 4 CHRONIC KIDNEY DISEASE (H): ICD-10-CM

## 2021-07-21 DIAGNOSIS — E10.9 DIABETES MELLITUS TYPE 1 (H): ICD-10-CM

## 2021-07-21 LAB
ALBUMIN SERPL-MCNC: 4.7 G/DL (ref 3.4–5)
ALBUMIN UR-MCNC: 100 MG/DL
ALP SERPL-CCNC: 94 U/L (ref 40–150)
ALT SERPL W P-5'-P-CCNC: 24 U/L (ref 0–50)
ANION GAP SERPL CALCULATED.3IONS-SCNC: 12 MMOL/L (ref 3–14)
APPEARANCE UR: CLEAR
APTT PPP: 30 SECONDS (ref 22–38)
AST SERPL W P-5'-P-CCNC: 19 U/L (ref 0–45)
BASOPHILS # BLD AUTO: 0.1 10E3/UL (ref 0–0.2)
BASOPHILS NFR BLD AUTO: 1 %
BILIRUB SERPL-MCNC: 0.4 MG/DL (ref 0.2–1.3)
BILIRUB UR QL STRIP: NEGATIVE
BUN SERPL-MCNC: 58 MG/DL (ref 7–30)
CALCIUM SERPL-MCNC: 10.3 MG/DL (ref 8.5–10.1)
CHLORIDE BLD-SCNC: 100 MMOL/L (ref 94–109)
CO2 SERPL-SCNC: 28 MMOL/L (ref 20–32)
COLOR UR AUTO: ABNORMAL
CREAT SERPL-MCNC: 3.64 MG/DL (ref 0.52–1.04)
EOSINOPHIL # BLD AUTO: 0.3 10E3/UL (ref 0–0.7)
EOSINOPHIL NFR BLD AUTO: 3 %
ERYTHROCYTE [DISTWIDTH] IN BLOOD BY AUTOMATED COUNT: 12.2 % (ref 10–15)
FACTOR 2 INTERPRETATION: ABNORMAL
FACTOR V INTERPRETATION: ABNORMAL
GFR SERPL CREATININE-BSD FRML MDRD: 15 ML/MIN/1.73M2
GLUCOSE BLD-MCNC: 56 MG/DL (ref 70–99)
GLUCOSE UR STRIP-MCNC: 150 MG/DL
HBA1C MFR BLD: 7.1 % (ref 0–5.6)
HBV CORE AB SERPL QL IA: NONREACTIVE
HBV SURFACE AB SERPL IA-ACNC: 35.12 M[IU]/ML
HBV SURFACE AG SERPL QL IA: NONREACTIVE
HCG SERPL QL: NEGATIVE
HCT VFR BLD AUTO: 36.8 % (ref 35–47)
HCV AB SERPL QL IA: NONREACTIVE
HGB BLD-MCNC: 12.1 G/DL (ref 11.7–15.7)
HGB UR QL STRIP: NEGATIVE
HIV 1+2 AB+HIV1 P24 AG SERPL QL IA: NONREACTIVE
HYALINE CASTS: 3 /LPF
IMM GRANULOCYTES # BLD: 0 10E3/UL
IMM GRANULOCYTES NFR BLD: 0 %
INR PPP: 0.89 (ref 0.85–1.15)
KETONES UR STRIP-MCNC: NEGATIVE MG/DL
LAB DIRECTOR COMMENTS: ABNORMAL
LAB DIRECTOR DISCLAIMER: ABNORMAL
LAB DIRECTOR INTERPRETATION: ABNORMAL
LAB DIRECTOR METHODOLOGY: ABNORMAL
LAB DIRECTOR RESULTS: ABNORMAL
LEUKOCYTE ESTERASE UR QL STRIP: NEGATIVE
LYMPHOCYTES # BLD AUTO: 2.3 10E3/UL (ref 0.8–5.3)
LYMPHOCYTES NFR BLD AUTO: 30 %
MCH RBC QN AUTO: 30 PG (ref 26.5–33)
MCHC RBC AUTO-ENTMCNC: 32.9 G/DL (ref 31.5–36.5)
MCV RBC AUTO: 91 FL (ref 78–100)
MDL NUMBER: ABNORMAL
MONOCYTES # BLD AUTO: 0.5 10E3/UL (ref 0–1.3)
MONOCYTES NFR BLD AUTO: 6 %
NEUTROPHILS # BLD AUTO: 4.4 10E3/UL (ref 1.6–8.3)
NEUTROPHILS NFR BLD AUTO: 60 %
NITRATE UR QL: NEGATIVE
NRBC # BLD AUTO: 0 10E3/UL
NRBC BLD AUTO-RTO: 0 /100
PH UR STRIP: 6 [PH] (ref 5–7)
PLATELET # BLD AUTO: 284 10E3/UL (ref 150–450)
POTASSIUM BLD-SCNC: 3.3 MMOL/L (ref 3.4–5.3)
PROT SERPL-MCNC: 9.4 G/DL (ref 6.8–8.8)
RBC # BLD AUTO: 4.04 10E6/UL (ref 3.8–5.2)
RBC URINE: 1 /HPF
SODIUM SERPL-SCNC: 140 MMOL/L (ref 133–144)
SP GR UR STRIP: 1.01 (ref 1–1.03)
SPECIMEN DESCRIPTION: ABNORMAL
SQUAMOUS EPITHELIAL: 5 /HPF
UROBILINOGEN UR STRIP-MCNC: NORMAL MG/DL
WBC # BLD AUTO: 7.6 10E3/UL (ref 4–11)
WBC URINE: 1 /HPF

## 2021-07-21 PROCEDURE — 87340 HEPATITIS B SURFACE AG IA: CPT | Mod: 90 | Performed by: PATHOLOGY

## 2021-07-21 PROCEDURE — 86481 TB AG RESPONSE T-CELL SUSP: CPT | Performed by: PHYSICIAN ASSISTANT

## 2021-07-21 PROCEDURE — 86803 HEPATITIS C AB TEST: CPT | Mod: 90 | Performed by: PATHOLOGY

## 2021-07-21 PROCEDURE — 83036 HEMOGLOBIN GLYCOSYLATED A1C: CPT | Performed by: PATHOLOGY

## 2021-07-21 PROCEDURE — 93978 VASCULAR STUDY: CPT | Mod: GC | Performed by: RADIOLOGY

## 2021-07-21 PROCEDURE — 85730 THROMBOPLASTIN TIME PARTIAL: CPT | Mod: 90 | Performed by: PATHOLOGY

## 2021-07-21 PROCEDURE — 85025 COMPLETE CBC W/AUTO DIFF WBC: CPT | Performed by: PATHOLOGY

## 2021-07-21 PROCEDURE — 80053 COMPREHEN METABOLIC PANEL: CPT | Performed by: PATHOLOGY

## 2021-07-21 PROCEDURE — 81241 F5 GENE: CPT | Mod: 90 | Performed by: PATHOLOGY

## 2021-07-21 PROCEDURE — 85613 RUSSELL VIPER VENOM DILUTED: CPT | Mod: 90 | Performed by: PATHOLOGY

## 2021-07-21 PROCEDURE — 86780 TREPONEMA PALLIDUM: CPT | Performed by: PHYSICIAN ASSISTANT

## 2021-07-21 PROCEDURE — 84703 CHORIONIC GONADOTROPIN ASSAY: CPT | Performed by: PATHOLOGY

## 2021-07-21 PROCEDURE — 86665 EPSTEIN-BARR CAPSID VCA: CPT | Mod: 90 | Performed by: PATHOLOGY

## 2021-07-21 PROCEDURE — 86900 BLOOD TYPING SEROLOGIC ABO: CPT | Performed by: SURGERY

## 2021-07-21 PROCEDURE — 86833 HLA CLASS II HIGH DEFIN QUAL: CPT | Performed by: PHYSICIAN ASSISTANT

## 2021-07-21 PROCEDURE — 86147 CARDIOLIPIN ANTIBODY EA IG: CPT | Mod: 90 | Performed by: PATHOLOGY

## 2021-07-21 PROCEDURE — 71046 X-RAY EXAM CHEST 2 VIEWS: CPT | Mod: GC | Performed by: RADIOLOGY

## 2021-07-21 PROCEDURE — 36415 COLL VENOUS BLD VENIPUNCTURE: CPT | Performed by: PATHOLOGY

## 2021-07-21 PROCEDURE — 86706 HEP B SURFACE ANTIBODY: CPT | Mod: 90 | Performed by: PATHOLOGY

## 2021-07-21 PROCEDURE — 81378 HLA I & II TYPING HR: CPT | Performed by: PHYSICIAN ASSISTANT

## 2021-07-21 PROCEDURE — 86886 COOMBS TEST INDIRECT TITER: CPT | Mod: 90 | Performed by: PATHOLOGY

## 2021-07-21 PROCEDURE — 81240 F2 GENE: CPT | Mod: 90 | Performed by: PATHOLOGY

## 2021-07-21 PROCEDURE — 85390 FIBRINOLYSINS SCREEN I&R: CPT | Mod: 26 | Performed by: PATHOLOGY

## 2021-07-21 PROCEDURE — 85670 THROMBIN TIME PLASMA: CPT | Mod: 90 | Performed by: PATHOLOGY

## 2021-07-21 PROCEDURE — 85730 THROMBOPLASTIN TIME PARTIAL: CPT | Mod: 59 | Performed by: PATHOLOGY

## 2021-07-21 PROCEDURE — 86704 HEP B CORE ANTIBODY TOTAL: CPT | Mod: 90 | Performed by: PATHOLOGY

## 2021-07-21 PROCEDURE — 86832 HLA CLASS I HIGH DEFIN QUAL: CPT | Performed by: PHYSICIAN ASSISTANT

## 2021-07-21 PROCEDURE — 86644 CMV ANTIBODY: CPT | Mod: 90 | Performed by: PATHOLOGY

## 2021-07-21 PROCEDURE — 85610 PROTHROMBIN TIME: CPT | Performed by: PATHOLOGY

## 2021-07-21 PROCEDURE — 86787 VARICELLA-ZOSTER ANTIBODY: CPT | Mod: 90 | Performed by: PATHOLOGY

## 2021-07-21 PROCEDURE — 81001 URINALYSIS AUTO W/SCOPE: CPT | Performed by: PATHOLOGY

## 2021-07-21 PROCEDURE — 84681 ASSAY OF C-PEPTIDE: CPT | Mod: 90 | Performed by: PATHOLOGY

## 2021-07-21 PROCEDURE — 74176 CT ABD & PELVIS W/O CONTRAST: CPT | Mod: GC | Performed by: RADIOLOGY

## 2021-07-21 PROCEDURE — G0452 MOLECULAR PATHOLOGY INTERPR: HCPCS | Mod: 26 | Performed by: PATHOLOGY

## 2021-07-22 ENCOUNTER — PRE VISIT (OUTPATIENT)
Dept: CARDIOLOGY | Facility: CLINIC | Age: 36
End: 2021-07-22

## 2021-07-22 ENCOUNTER — VIRTUAL VISIT (OUTPATIENT)
Dept: CARDIOLOGY | Facility: CLINIC | Age: 36
End: 2021-07-22
Attending: INTERNAL MEDICINE
Payer: COMMERCIAL

## 2021-07-22 DIAGNOSIS — Z01.810 PREOP CARDIOVASCULAR EXAM: ICD-10-CM

## 2021-07-22 DIAGNOSIS — E10.22 TYPE 1 DIABETES MELLITUS WITH DIABETIC CHRONIC KIDNEY DISEASE, UNSPECIFIED CKD STAGE (H): Primary | ICD-10-CM

## 2021-07-22 LAB
ABO/RH(D): NORMAL
ABO/RH(D): NORMAL
ANTIBODY SCREEN: NEGATIVE
C PEPTIDE SERPL-MCNC: <0.1 NG/ML (ref 0.9–6.9)
CARDIOLIPIN IGG SER IA-ACNC: <2 GPL-U/ML
CARDIOLIPIN IGG SER IA-ACNC: NEGATIVE
CARDIOLIPIN IGM SER IA-ACNC: 2.2 MPL-U/ML
CARDIOLIPIN IGM SER IA-ACNC: NEGATIVE
CMV IGG SERPL IA-ACNC: <0.2 U/ML
CMV IGG SERPL IA-ACNC: NORMAL
DRVVT SCREEN RATIO: 0.84
EBV VCA IGG SER IA-ACNC: 412 U/ML
EBV VCA IGG SER IA-ACNC: POSITIVE
LA PPP-IMP: NEGATIVE
LUPUS INTERPRETATION: NORMAL
PTT RATIO: 0.95
REVIEWING PATHOLOGIST:: NORMAL
SPECIMEN EXPIRATION DATE: NORMAL
SPECIMEN EXPIRATION DATE: NORMAL
THROMBIN TIME: 16.7 SECONDS (ref 13–19)
VZV IGG SER QL IA: 3362 INDEX
VZV IGG SER QL IA: POSITIVE
XXX BLOOD GROUP AB TITR SERPL: NORMAL {TITER}

## 2021-07-22 PROCEDURE — 99204 OFFICE O/P NEW MOD 45 MIN: CPT | Mod: GT | Performed by: INTERNAL MEDICINE

## 2021-07-22 NOTE — PROGRESS NOTES
Marilyn Ortega is a 36 year old who is being evaluated via a billable video visit.      How would you like to obtain your AVS? MyChart  If the video visit is dropped, the invitation should be resent by: Text to cell phone: [phone]  Will anyone else be joining your video visit? no      Vitals - Patient Reported  Weight (Patient Reported): 55.8 kg (123 lb)  Pain Score: No Pain (0) (No SOB)    Vitals were taken and medications reconciled.    Austin Baldwin, EMT  9:00 AM

## 2021-07-22 NOTE — PATIENT INSTRUCTIONS
"You were evaluated today in the Cardiovascular Telemedicine Clinic at the [facility].     Cardiology Provider during your visit: Dr. Prema Diez    Diagnosis:  Type 1 diabetes, kidney failure    Results: discussed with patient    Orders:   None    Medication Changes:   None    Recommendations:   Exercise stress echocardiogram    Follow-up:   As needed  Please follow up with primary care provider for medication refills         Please feel free to call me with any questions or concerns.     Maegan Hanna RN     Questions and [phone].   First press #1 for the [employer] and then press #3 for \"Medical Questions\" to reach us Cardiology Nurses.      On Call Cardiologist for after hours or on weekends: [phone]   option #4 and ask to speak to the on-call Cardiologist.          If you need a medication refill please contact your pharmacy.  Please allow 3 business days for your refill to be completed.   "

## 2021-07-22 NOTE — LETTER
"7/22/2021      RE: Marilyn Ortega  [address]  [facility] [address]       Dear Colleague,    Thank you for the opportunity to participate in the care of your patient, Marilyn Ortega, at the [facility] [place] [facility]. Please see a copy of my visit note below.    Electrophysiology Clinic Video Virtual Visit    Marilyn Ortega is a 36 year old female who is being evaluated via a billable video visit.      The patient has been notified of following:     \"This video visit will be conducted via a call between you and your physician/provider. We have found that certain health care needs can be provided without the need for an in-person physical exam.  This service lets us provide the care you need with a video conversation.  If a prescription is necessary we can send it directly to your pharmacy.  If lab work is needed we can place an order for that and you can then stop by our lab to have the test done at a later time.    If during the course of the call the physician/provider feels a video visit is not appropriate, you will not be charged for this service.\"     Physician has received verbal consent for a Video Visit from the patient? Yes    Patient would like the video invitation sent by: [email or web address]    Video Start Time: 10:30 am    Video Stop Time: 10:54 am    Mode of Communication:  Video Conference via [employer]    Originating Location (pt. Location): Home    Distant Location (provider location): [facility]    Mode of Communication:  Video Conference via [employer]      HPI:   35 yo referred for cardiovascular evaluation prior to possible kidney/pancreas transplant.   She has Type I diabetes since age 14, brittle. Kidney failure, not yet on dialysis.   No prior cardiac history. She works in sales, single, lives alone. Very active, main constraint is in regulating blood glucose. She can run up to 4 miles in 45 minutes " without chest discomfort, dyspnea, lightheadedness. No h/o syncope, denies orthopnea/PND.    Cardiac risk factors: +T1D, -HTN, -FHx, -HL, never smoked    PAST MEDICAL HISTORY:  1. Diabetes type I diagnosed age 14, brittle. Called EMT once when BG 20-30s for over an hour, admitted 2/28/2015 - found to have significant EKG changes ultimately attributed to high K or 6.8.  Echo was normal. She has Dexcom CGM, omnipod pump.  2. Left femoral neck fracture 2011 sustained while running, s/p ORIF  3. COVID+ November 2020; now fully vaccinated    CURRENT MEDICATIONS:  Losartan 50 every day  Spironolactone 12.5 qd  Lasix 40 every day qd    Insulin pump    ALLERGIES:     Allergies   Allergen Reactions     Chlorhexidine Hives, Itching and Rash     PN: Patient had swelling/rash that was very itchy with chlorprep.     Ceclor [Cefaclor Monohydrate]      Cefaclor Rash       FAMILY HISTORY:  Family History   Problem Relation Age of Onset     Diabetes Paternal Grandfather      Arthritis Maternal Grandmother      Hypertension Maternal Grandmother      Cancer - colorectal Maternal Grandfather        SOCIAL HISTORY:  Social History     Tobacco Use     Smoking status: Never Smoker     Smokeless tobacco: Never Used   Substance Use Topics     Alcohol use: Yes     Comment: socially     Drug use: No       ROS:  10 point ROS neg other than the symptoms noted above in the HPI.    Labs:  7/21/2021: K 3.3, cr 3.64, A1C 7.1, hgb 12, plt 284K    Testing/Procedures:  EKG 7/21/2021 - not available    Exam:  The rest of a comprehensive physical examination is deferred due to public health emergency video visit restrictions.  CONSITUTIONAL: no acute distress  HEENT: no icterus, no redness or discharge, neck supple  CV: no visible edema of visualized extremities. No JVD.   RESPIRATORY: respirations nonlabored, no cough  NEURO: AA&Ox3, speech fluent/appropriate, motor grossly nonfocal  PSYCH: cooperative, affect appropriate  DERM: no rashes on visualized  face/neck/upper extremities      Assessment and Plan:   Cardiovascular assessment prior to possible kidney/pancreas transplant.  She has no ischemic symptoms at a high level of activity, but she does have type 1 diabetes since age 14. Given chronic renal disease not yet on dialysis, I would recommend exercise stress echocardiogram. I will also try to find the EKG she did yesterday, as well as her EKG from 2/28/2015 for review.      In addition to video time documented above, I spent an additional 15 minutes on data review and documentation.    I have reviewed the note as documented above.  This accurately captures the substance of my virtual visit with the patient. The patient states understanding and is agreeable with the plan.     Prema Diez MD  Cardiology        CC  CRISTOFER HUANG

## 2021-07-23 LAB
QUANTIFERON MITOGEN: 5.25 IU/ML
QUANTIFERON NIL TUBE: 0.03 IU/ML
QUANTIFERON TB1 TUBE: 0.03 IU/ML
QUANTIFERON TB2 TUBE: 0.06

## 2021-07-24 LAB
GAMMA INTERFERON BACKGROUND BLD IA-ACNC: 0.03 IU/ML
M TB IFN-G BLD-IMP: NEGATIVE
M TB IFN-G CD4+ BCKGRND COR BLD-ACNC: 5.22 IU/ML
MITOGEN IGNF BCKGRD COR BLD-ACNC: 0 IU/ML
MITOGEN IGNF BCKGRD COR BLD-ACNC: 0.03 IU/ML
T PALLIDUM AB SER QL: NONREACTIVE

## 2021-07-26 DIAGNOSIS — Z76.82 AWAITING ORGAN TRANSPLANT: Primary | ICD-10-CM

## 2021-07-27 ENCOUNTER — DOCUMENTATION ONLY (OUTPATIENT)
Dept: TRANSPLANT | Facility: CLINIC | Age: 36
End: 2021-07-27

## 2021-07-27 NOTE — PROGRESS NOTES
Reviewed CT a/p and iliacs with Dr Flores 7/27/2021 and all vessels are acceptable for transplant.

## 2021-07-28 LAB
A*: NORMAL
A*LOCUS SEROLOGIC EQUIVALENT: 1
A*LOCUS: NORMAL
A*SEROLOGIC EQUIVALENT: 11
ABTEST METHOD: NORMAL
B*: NORMAL
B*LOCUS SEROLOGIC EQUIVALENT: 8
B*LOCUS: NORMAL
B*SEROLOGIC EQUIVALENT: 51
BW-1: NORMAL
BW-2: NORMAL
C*: NORMAL
C*LOCUS SEROLOGIC EQUIVALENT: 9
C*LOCUS: NORMAL
C*SEROLOGIC EQUIVALENT: 7
DPA1*: NORMAL
DPA1*LOCUS: NORMAL
DPB1*: NORMAL
DPB1*LOCUS NMDP: NORMAL
DPB1*LOCUS: NORMAL
DPB1*NMDP: NORMAL
DQA1*: NORMAL
DQA1*LOCUS: NORMAL
DQB1*: NORMAL
DQB1*LOCUS SEROLOGIC EQUIVALENT: 8
DQB1*LOCUS: NORMAL
DQB1*SEROLOGIC EQUIVALENT: 9
DRB1*: NORMAL
DRB1*LOCUS SEROLOGIC EQUIVALENT: 4
DRB1*LOCUS: NORMAL
DRB1*SEROLOGIC EQUIVALENT: 9
DRB4*LOCUS SEROLOGIC EQUIVALENT: 53
DRB4*LOCUS: NORMAL
DRSSO TEST METHOD: NORMAL
PROTOCOL CUTOFF: NORMAL
SA 1 CELL: NORMAL
SA 1 TEST METHOD: NORMAL
SA 2 CELL: NORMAL
SA 2 TEST METHOD: NORMAL
SA1 HI RISK ABY: NORMAL
SA1 MOD RISK ABY: NORMAL
SA2 HI RISK ABY: NORMAL
SA2 MOD RISK ABY: NORMAL
UNACCEPTABLE ANTIGENS: NORMAL
UNOS CPRA: 29
ZZZSA 1  COMMENTS: NORMAL
ZZZSA 2 COMMENTS: NORMAL

## 2021-07-29 LAB
HISTOTRAC: YES
HISTOTRAC: YES

## 2021-08-24 ENCOUNTER — HOSPITAL ENCOUNTER (OUTPATIENT)
Dept: CARDIOLOGY | Facility: CLINIC | Age: 36
End: 2021-08-24
Attending: INTERNAL MEDICINE
Payer: COMMERCIAL

## 2021-08-24 ENCOUNTER — HOSPITAL ENCOUNTER (OUTPATIENT)
Dept: GENERAL RADIOLOGY | Facility: CLINIC | Age: 36
End: 2021-08-24
Attending: PHYSICIAN ASSISTANT
Payer: COMMERCIAL

## 2021-08-24 ENCOUNTER — OFFICE VISIT (OUTPATIENT)
Dept: TRANSPLANT | Facility: CLINIC | Age: 36
End: 2021-08-24
Attending: SURGERY
Payer: COMMERCIAL

## 2021-08-24 VITALS
DIASTOLIC BLOOD PRESSURE: 88 MMHG | HEIGHT: 66 IN | TEMPERATURE: 97.8 F | HEART RATE: 84 BPM | OXYGEN SATURATION: 100 % | BODY MASS INDEX: 19.59 KG/M2 | SYSTOLIC BLOOD PRESSURE: 142 MMHG | WEIGHT: 121.9 LBS

## 2021-08-24 DIAGNOSIS — E10.22 TYPE 1 DIABETES MELLITUS WITH DIABETIC CHRONIC KIDNEY DISEASE, UNSPECIFIED CKD STAGE (H): ICD-10-CM

## 2021-08-24 DIAGNOSIS — Z01.810 PREOP CARDIOVASCULAR EXAM: ICD-10-CM

## 2021-08-24 DIAGNOSIS — N18.5 STAGE 5 CHRONIC KIDNEY DISEASE NOT ON CHRONIC DIALYSIS (H): ICD-10-CM

## 2021-08-24 DIAGNOSIS — Z01.818 ENCOUNTER FOR PRE-TRANSPLANT EVALUATION FOR KIDNEY AND PANCREAS TRANSPLANT: Primary | ICD-10-CM

## 2021-08-24 DIAGNOSIS — E10.22 TYPE 1 DIABETES MELLITUS WITH STAGE 5 CHRONIC KIDNEY DISEASE NOT ON CHRONIC DIALYSIS (H): ICD-10-CM

## 2021-08-24 DIAGNOSIS — Z76.82 AWAITING ORGAN TRANSPLANT: ICD-10-CM

## 2021-08-24 DIAGNOSIS — N18.5 TYPE 1 DIABETES MELLITUS WITH STAGE 5 CHRONIC KIDNEY DISEASE NOT ON CHRONIC DIALYSIS (H): ICD-10-CM

## 2021-08-24 PROCEDURE — 93321 DOPPLER ECHO F-UP/LMTD STD: CPT | Mod: TC

## 2021-08-24 PROCEDURE — 93321 DOPPLER ECHO F-UP/LMTD STD: CPT | Mod: 26 | Performed by: STUDENT IN AN ORGANIZED HEALTH CARE EDUCATION/TRAINING PROGRAM

## 2021-08-24 PROCEDURE — 93018 CV STRESS TEST I&R ONLY: CPT | Performed by: STUDENT IN AN ORGANIZED HEALTH CARE EDUCATION/TRAINING PROGRAM

## 2021-08-24 PROCEDURE — 255N000002 HC RX 255 OP 636: Performed by: STUDENT IN AN ORGANIZED HEALTH CARE EDUCATION/TRAINING PROGRAM

## 2021-08-24 PROCEDURE — 93016 CV STRESS TEST SUPVJ ONLY: CPT | Performed by: STUDENT IN AN ORGANIZED HEALTH CARE EDUCATION/TRAINING PROGRAM

## 2021-08-24 PROCEDURE — 71046 X-RAY EXAM CHEST 2 VIEWS: CPT

## 2021-08-24 PROCEDURE — 71046 X-RAY EXAM CHEST 2 VIEWS: CPT | Mod: 26 | Performed by: RADIOLOGY

## 2021-08-24 PROCEDURE — 93325 DOPPLER ECHO COLOR FLOW MAPG: CPT | Mod: 26 | Performed by: STUDENT IN AN ORGANIZED HEALTH CARE EDUCATION/TRAINING PROGRAM

## 2021-08-24 PROCEDURE — C8928 TTE W OR W/O FOL W/CON,STRES: HCPCS

## 2021-08-24 PROCEDURE — 99215 OFFICE O/P EST HI 40 MIN: CPT | Performed by: SURGERY

## 2021-08-24 PROCEDURE — 93350 STRESS TTE ONLY: CPT | Mod: 26 | Performed by: STUDENT IN AN ORGANIZED HEALTH CARE EDUCATION/TRAINING PROGRAM

## 2021-08-24 RX ADMIN — PERFLUTREN 5 ML: 6.52 INJECTION, SUSPENSION INTRAVENOUS at 10:46

## 2021-08-24 ASSESSMENT — MIFFLIN-ST. JEOR: SCORE: 1259.68

## 2021-08-24 ASSESSMENT — PAIN SCALES - GENERAL: PAINLEVEL: NO PAIN (0)

## 2021-08-24 NOTE — PROGRESS NOTES
Transplant Surgery Consult Note    Medical record number: [id number]  YOB: 1985,   Consult requested by Sulema Servin evaluation of kidney and pancreas transplant candidacy.    Assessment and Recommendations: Ms. Ortega is a excellent candidate for transplantation and has a good understanding of the risks and benefits of this approach to management of renal failure and diabetes. The following issues should be addressed prior to transplant:     Ms. Ortega has Chronic renal failure due to diabetes mellitus type 1 whose condition is not expected to resolve, is expected to progress, and is expected to continue to develop related comorbid conditions.  Cardiology consult for cardiac risk stratification to be ordered: Yes. Completed   CT abdomen and pelvis without contrast to be ordered for assessment of vascular targets: Yes  Transplant listing labs ordered to include HLA, ABOx2, Cr, etc.  Dietician consult ordered: Yes  Social work consult ordered: Yes  Imaging reports reviewed:  IMPRESSION:   1. No acute pathology in the abdomen or pelvis.  2. Incidental findings as described above.  Radiology images reviewed: CT from 2021 reviewed and vessels suitable for SPK   Recipient suitable to move forward with work up of living donors:  Yes  Make active on P and KP list  Het for factor V so will need DVT prophylaxis and may want to consider higher dose of heparin gtt post op.  We did discuss LDKT followed by DENNIS given her uremic symptoms and having living donors    The majority of our visit was spent in counselling, discussing the medical and surgical risks of living or  donor kidney and pancreas transplantation, either in a simultaneous or sequential fashion. I contrasted approximate wait time for SPK vs living vs  donor kidneys from normal (0-85%) or higher (%) kidney donor profile index (KDPI) donors and their associated outcomes. I would not recommend this individual to  consider kidneys from high KDPI donors. The reason for this decision is best summarized as: multi-organ transplant candidate.  Access to transplant will be impacted by living donor availability and overall candidacy for SPK, as well as the influence of blood type and degree of sensitization. We discussed advantages of preemptive transplant as well as living donor kidney transplant, and graft and patient survival outcomes associated with these options. Potential surgical complications of kidney and pancreas transplantation include bleeding, clotting, infection, wound complications, anastomotic failure and other issues such as cardiac complications, pneumonia, deep venous thrombosis, pulmonary embolism, post transplant diabetes and death. We discussed the need for protocol biopsy of the kidney and the possible need for a ureteral stent (and subsequent removal). We discussed benefits and risks associated with different approaches to exocrine drainage of pancreatic secretions. We also discussed differences in the average length of stay, recovery process, and posttransplant lab and monitoring protocol. We discussed the risk of graft rejection and recurrent diabetic nephropathy in the setting of poor glycemic control. I emphasized the need for strict immunosuppression adherence and the potential for complications of immunosuppression such as skin cancer or lymphoma, as well as a very low but not zero risk of donor-derived disease transmission risks (infection, cancer). Ms. Ortega asked good questions and the patient's candidacy will be reviewed at our Multidisciplinary Selection Committee. Thank you for the opportunity to participate in Ms. Ortega's care.      Sally Brito CNP  Total time: 45 minutes        Paola Flores MD FACS  Assistant Professor of Surgery  Director, Living Kidney Donor Program.  ---------------------------------------------------------------------------------------------------    HPI:  Ms. Ortega has Chronic renal failure due to diabetes mellitus type 1. The patient has had diabetes for 21 years. Management is by Humalog 25-30 units via pump. The patient usually checks her blood sugar CGM times/day. Has been told she is a brittle diabetic with wide swings especially with exercise.  Hypoglyemic unawareness is not an issue but doesn't feel it until it's less than 50 and did have to call the ambulance once.  The diabetes is controlled.    Complications of diabetes include:    Retinopathy:  No  Neuropathy: No  Gastroparesis:  No.  On procalipride for 1 year and has constipation.  Has diabetic bowel        The patient is not on dialysis.    Has potential kidney donors:  Yes .  Interested in participation in paired exchange if a donor is willing: Doesn't know     The patient has the following pertinent history:                                                   No    Yes  Dialysis:                                   [x]?      []?          via:         Blood Transfusion                   []?      [x]?          Number of units:  1     Most recently:2015  Pregnancy:                              [x]?      []?          Number:       Previous Transplant:               [x]?      []?          Details:    Cancer                                     [x]?      []?          Comment:   Kidney stones                         [x]?      []?          Comment:      Recurrent infections                [x]?      []?          Type:  Had osteo once due to aggressive debridement on ertapenum x 6 weeks                Bladder dysfunction                [x]?      []?          Cause:    Claudication                            [x]?      []?          Distance:    Previous Amputation               [x]?      []?          Cause:     Chronic anticoagulation                      [x]?      []?          Indication:  Druze                  [x]?      []?              Past Medical History:   Diagnosis Date     CKD (chronic kidney  disease) stage 4, GFR 15-29 ml/min (H)      Gastroparesis      HTN (hypertension)      Type 1 diabetes mellitus (H)      Past Surgical History:   Procedure Laterality Date     ARTHROPLASTY HIP BILATERAL Bilateral      ORTHOPEDIC SURGERY       Family History   Problem Relation Age of Onset     Diabetes Paternal Grandfather      Arthritis Maternal Grandmother      Hypertension Maternal Grandmother      Cancer - colorectal Maternal Grandfather      Social History     Socioeconomic History     Marital status: Single     Spouse name: Not on file     Number of children: Not on file     Years of education: Not on file     Highest education level: Not on file   Occupational History     Not on file   Tobacco Use     Smoking status: Never Smoker     Smokeless tobacco: Never Used   Substance and Sexual Activity     Alcohol use: Yes     Comment: socially     Drug use: No     Sexual activity: Yes   Other Topics Concern     Parent/sibling w/ CABG, MI or angioplasty before 65F 55M? Not Asked   Social History Narrative     Not on file     Social Determinants of Health     Financial Resource Strain:      Difficulty of Paying Living Expenses:    Food Insecurity:      Worried About Running Out of Food in the Last Year:      Ran Out of Food in the Last Year:    Transportation Needs:      Lack of Transportation (Medical):      Lack of Transportation (Non-Medical):    Physical Activity:      Days of Exercise per Week:      Minutes of Exercise per Session:    Stress:      Feeling of Stress :    Social Connections:      Frequency of Communication with Friends and Family:      Frequency of Social Gatherings with Friends and Family:      Attends Jew Services:      Active Member of Clubs or Organizations:      Attends Club or Organization Meetings:      Marital Status:    Intimate Partner Violence:      Fear of Current or Ex-Partner:      Emotionally Abused:      Physically Abused:      Sexually Abused:        ROS:   CONSTITUTIONAL:  No  fevers or chills  EYES: negative for icterus  ENT:  negative for hearing loss, tinnitus and sore throat  RESPIRATORY:  negative for cough, sputum, dyspnea  CARDIOVASCULAR:  negative for chest pain Fatigue  Renal Insufficiency  GASTROINTESTINAL:  negative for nausea, vomiting, diarrhea or constipation  GENITOURINARY:  negative for incontinence, dysuria, bladder emptying problems  HEME:  No easy bruising  INTEGUMENT:  negative for rash and pruritus  NEURO:  Negative for headache, seizure disorder    Allergies:   Allergies   Allergen Reactions     Chlorhexidine Hives, Itching and Rash     PN: Patient had swelling/rash that was very itchy with chlorprep.     Ceclor [Cefaclor Monohydrate]      Cefaclor Rash       Medications:  Prescription Medications as of 8/24/2021       Rx Number Disp Refills Start End Last Dispensed Date Next Fill Date Owning Pharmacy    calcitRIOL (ROCALTROL) 0.25 MCG capsule    5/11/2021    [facility] [place] [address]    Sig: Take 0.25 mcg by mouth    Class: Historical    Route: Oral    cholecalciferol 25 MCG (1000 UT) TABS    5/11/2021    [facility] [place] [address]    Sig: Take 2,000 Units by mouth    Class: Historical    Route: Oral    Continuous Blood Gluc Sensor (DEXCOM G6 SENSOR) MIS    8/6/2020    [facility] [place] [address]    Sig: Use as directed for continuous glucose monitoring.  Change sensor every 10 days.    Class: Historical    Continuous Blood Gluc Transmit (DEXCOM G6 TRANSMITTER) MISC    8/6/2020    [facility] [place] [address]    Sig: Use as directed for continuous glucose monitoring.  Change every 3 months.    Class: Historical    darbepoetin neftaly (ARANESP) 60 MCG/0.3ML injection    6/4/2021 6/17/2022   [facility] [place] [address]    Sig: Inject 60 mcg Subcutaneous    Class: Historical    Route: Subcutaneous    furosemide (LASIX) 20  MG tablet    6/15/2021    [facility] [place] [address] [place]    Sig: TAKE 1 TABLET BY MOUTH EVERY DAY    Class: Historical    glucose blood VI test strips strip            Si times daily.    Class: Historical    Route: As instructed    insulin lispro (HUMALOG VIAL) 100 UNIT/ML vial    2021    [facility] [place] [address] [place]    Sig: Inject 40 Units Subcutaneous    Class: Historical    Route: Subcutaneous    losartan (COZAAR) 50 MG tablet    2021    [facility] [place] [address] [place]    Sig: Take 50 mg by mouth    Class: Historical    Route: Oral    Prucalopride Succinate (MOTEGRITY) 2 MG TABS    2021    [facility] [place] [address] [place]    Sig: Take one tablet by mouth daily    Class: Historical    spironolactone (ALDACTONE) 25 MG tablet    2020    [facility] [place] [address] [place]    Sig: Take 12.5 mg by mouth    Class: Historical    Route: Oral    acetaminophen (TYLENOL) 500 MG tablet            Sig: Take 1-2 tablets by mouth every 4 hours as needed.    Class: Historical    Route: Oral    insulin detemir (LEVEMIR FLEXPEN) 100 UNIT/ML injection            Sig: Inject  Subcutaneous See Admin Instructions. 25u in am, 10u pm    Class: Historical    Route: Subcutaneous    LISINOPRIL PO            Sig: Take 1 tablet by mouth.    Class: Historical    Route: Oral    norgestimate-ethinyl estradiol (SPRINTEC 28) 0.25-35 MG-MCG tablet            Sig: Take 1 tablet by mouth daily.    Class: Historical    Route: Oral    NOVOLOG PENFILL            Sig: Inject  Subcutaneous. 4 units per 15 units of carb, plus 1 unit for 50 over 150.  .     Class: Historical    Route: Subcutaneous      Hospital Medications as of 2021       Dose Frequency Start End    perflutren diluted in saline (DEFINITY) injection 5 mL (Completed) 5 mL ONCE 2021    Class: E-Prescribe    Route:  Intravenous          Exam:   Temp:  [97.8  F (36.6  C)] 97.8  F (36.6  C)  Pulse:  [84] 84  BP: (142)/(88) 142/88  SpO2:  [100 %] 100 %  Appearance: in no apparent distress.   Skin: normal  Head and Neck: Normal, no rashes or jaundice  Respiratory: easy respirations, no audible wheezing.  Cardiovascular: RRR  Abdomen: flat, No distention and No surgical scars   Extremeties: femoral 3+/3+, Edema, none  Neuro: without deficit     Diagnostics:   No results found for this or any previous visit (from the past 672 hour(s)).  No results found for: CPRA

## 2021-08-24 NOTE — LETTER
2021         RE: Marilyn Ortega  [address]  [facility] [address]        Dear Colleague,    Thank you for referring your patient, Marilyn Ortega, to the [facility]. Please see a copy of my visit note below.    Transplant Surgery Consult Note    Medical record number: [id number]  YOB: 1985,   Consult requested by Sulema Servin evaluation of kidney and pancreas transplant candidacy.    Assessment and Recommendations: Ms. Ortega is a excellent candidate for transplantation and has a good understanding of the risks and benefits of this approach to management of renal failure and diabetes. The following issues should be addressed prior to transplant:     Ms. Ortega has Chronic renal failure due to diabetes mellitus type 1 whose condition is not expected to resolve, is expected to progress, and is expected to continue to develop related comorbid conditions.  Cardiology consult for cardiac risk stratification to be ordered: Yes. Completed   CT abdomen and pelvis without contrast to be ordered for assessment of vascular targets: Yes  Transplant listing labs ordered to include HLA, ABOx2, Cr, etc.  Dietician consult ordered: Yes  Social work consult ordered: Yes  Imaging reports reviewed:  IMPRESSION:   1. No acute pathology in the abdomen or pelvis.  2. Incidental findings as described above.  Radiology images reviewed: CT from 2021 reviewed and vessels suitable for SPK   Recipient suitable to move forward with work up of living donors:  Yes  Make active on P and KP list  Het for factor V so will need DVT prophylaxis and may want to consider higher dose of heparin gtt post op.  We did discuss LDKT followed by DENNIS given her uremic symptoms and having living donors    The majority of our visit was spent in counselling, discussing the medical and surgical risks of living or  donor kidney and pancreas transplantation, either in a simultaneous or  sequential fashion. I contrasted approximate wait time for SPK vs living vs  donor kidneys from normal (0-85%) or higher (%) kidney donor profile index (KDPI) donors and their associated outcomes. I would not recommend this individual to consider kidneys from high KDPI donors. The reason for this decision is best summarized as: multi-organ transplant candidate.  Access to transplant will be impacted by living donor availability and overall candidacy for SPK, as well as the influence of blood type and degree of sensitization. We discussed advantages of preemptive transplant as well as living donor kidney transplant, and graft and patient survival outcomes associated with these options. Potential surgical complications of kidney and pancreas transplantation include bleeding, clotting, infection, wound complications, anastomotic failure and other issues such as cardiac complications, pneumonia, deep venous thrombosis, pulmonary embolism, post transplant diabetes and death. We discussed the need for protocol biopsy of the kidney and the possible need for a ureteral stent (and subsequent removal). We discussed benefits and risks associated with different approaches to exocrine drainage of pancreatic secretions. We also discussed differences in the average length of stay, recovery process, and posttransplant lab and monitoring protocol. We discussed the risk of graft rejection and recurrent diabetic nephropathy in the setting of poor glycemic control. I emphasized the need for strict immunosuppression adherence and the potential for complications of immunosuppression such as skin cancer or lymphoma, as well as a very low but not zero risk of donor-derived disease transmission risks (infection, cancer). Ms. Ortega asked good questions and the patient's candidacy will be reviewed at our Multidisciplinary Selection Committee. Thank you for the opportunity to participate in Ms. Ortega's care.      Sally  Alisha, CNP  Total time: 45 minutes        Paola Flores MD FACS  Assistant Professor of Surgery  Director, Living Kidney Donor Program.  ---------------------------------------------------------------------------------------------------    HPI: Ms. Ortega has Chronic renal failure due to diabetes mellitus type 1. The patient has had diabetes for 21 years. Management is by Humalog 25-30 units via pump. The patient usually checks her blood sugar CGM times/day. Has been told she is a brittle diabetic with wide swings especially with exercise.  Hypoglyemic unawareness is not an issue but doesn't feel it until it's less than 50 and did have to call the ambulance once.  The diabetes is controlled.    Complications of diabetes include:    Retinopathy:  No  Neuropathy: No  Gastroparesis:  No.  On procalipride for 1 year and has constipation.  Has diabetic bowel        The patient is not on dialysis.    Has potential kidney donors:  Yes .  Interested in participation in paired exchange if a donor is willing: Doesn't know     The patient has the following pertinent history:                                                   No    Yes  Dialysis:                                   [x]?      []?          via:         Blood Transfusion                   []?      [x]?          Number of units:  1     Most recently:2015  Pregnancy:                              [x]?      []?          Number:       Previous Transplant:               [x]?      []?          Details:    Cancer                                     [x]?      []?          Comment:   Kidney stones                         [x]?      []?          Comment:      Recurrent infections                [x]?      []?          Type:  Had osteo once due to aggressive debridement on ertapenum x 6 weeks                Bladder dysfunction                [x]?      []?          Cause:    Claudication                            [x]?      []?          Distance:    Previous  Amputation               [x]?      []?          Cause:     Chronic anticoagulation                      [x]?      []?          Indication:  Mormonism                  [x]?      []?              Past Medical History:   Diagnosis Date     CKD (chronic kidney disease) stage 4, GFR 15-29 ml/min (H)      Gastroparesis      HTN (hypertension)      Type 1 diabetes mellitus (H)      Past Surgical History:   Procedure Laterality Date     ARTHROPLASTY HIP BILATERAL Bilateral      ORTHOPEDIC SURGERY       Family History   Problem Relation Age of Onset     Diabetes Paternal Grandfather      Arthritis Maternal Grandmother      Hypertension Maternal Grandmother      Cancer - colorectal Maternal Grandfather      Social History     Socioeconomic History     Marital status: Single     Spouse name: Not on file     Number of children: Not on file     Years of education: Not on file     Highest education level: Not on file   Occupational History     Not on file   Tobacco Use     Smoking status: Never Smoker     Smokeless tobacco: Never Used   Substance and Sexual Activity     Alcohol use: Yes     Comment: socially     Drug use: No     Sexual activity: Yes   Other Topics Concern     Parent/sibling w/ CABG, MI or angioplasty before 65F 55M? Not Asked   Social History Narrative     Not on file     Social Determinants of Health     Financial Resource Strain:      Difficulty of Paying Living Expenses:    Food Insecurity:      Worried About Running Out of Food in the Last Year:      Ran Out of Food in the Last Year:    Transportation Needs:      Lack of Transportation (Medical):      Lack of Transportation (Non-Medical):    Physical Activity:      Days of Exercise per Week:      Minutes of Exercise per Session:    Stress:      Feeling of Stress :    Social Connections:      Frequency of Communication with Friends and Family:      Frequency of Social Gatherings with Friends and Family:      Attends L[employer] Services:      Active  Member of Clubs or Organizations:      Attends Club or Organization Meetings:      Marital Status:    Intimate Partner Violence:      Fear of Current or Ex-Partner:      Emotionally Abused:      Physically Abused:      Sexually Abused:        ROS:   CONSTITUTIONAL:  No fevers or chills  EYES: negative for icterus  ENT:  negative for hearing loss, tinnitus and sore throat  RESPIRATORY:  negative for cough, sputum, dyspnea  CARDIOVASCULAR:  negative for chest pain Fatigue  Renal Insufficiency  GASTROINTESTINAL:  negative for nausea, vomiting, diarrhea or constipation  GENITOURINARY:  negative for incontinence, dysuria, bladder emptying problems  HEME:  No easy bruising  INTEGUMENT:  negative for rash and pruritus  NEURO:  Negative for headache, seizure disorder    Allergies:   Allergies   Allergen Reactions     Chlorhexidine Hives, Itching and Rash     PN: Patient had swelling/rash that was very itchy with chlorprep.     Ceclor [Cefaclor Monohydrate]      Cefaclor Rash       Medications:  Prescription Medications as of 8/24/2021       Rx Number Disp Refills Start End Last Dispensed Date Next Fill Date Owning Pharmacy    calcitRIOL (ROCALTROL) 0.25 MCG capsule    5/11/2021    [facility] [place] [address]    Sig: Take 0.25 mcg by mouth    Class: Historical    Route: Oral    cholecalciferol 25 MCG (1000 UT) TABS    5/11/2021    [facility] [place] [address]    Sig: Take 2,000 Units by mouth    Class: Historical    Route: Oral    Continuous Blood Gluc Sensor (DEXCOM G6 SENSOR) MIS    8/6/2020    [facility] [place] [address]    Sig: Use as directed for continuous glucose monitoring.  Change sensor every 10 days.    Class: Historical    Continuous Blood Gluc Transmit (DEXCOM G6 TRANSMITTER) MISC    8/6/2020    [facility] [place] [address] [address]    Sig: Use as directed for continuous glucose monitoring.  Change every  3 months.    Class: Historical    darbepoetin neftaly (ARANESP) 60 MCG/0.3ML injection    2021   [facility] [place] [address] [address]    Sig: Inject 60 mcg Subcutaneous    Class: Historical    Route: Subcutaneous    furosemide (LASIX) 20 MG tablet    6/15/2021    [facility] [place] [address]    Sig: TAKE 1 TABLET BY MOUTH EVERY DAY    Class: Historical    glucose blood VI test strips strip            Si times daily.    Class: Historical    Route: As instructed    insulin lispro (HUMALOG VIAL) 100 UNIT/ML vial    2021    [facility] [place] [address] [address]    Sig: Inject 40 Units Subcutaneous    Class: Historical    Route: Subcutaneous    losartan (COZAAR) 50 MG tablet    2021    [facility] [place] [address]    Sig: Take 50 mg by mouth    Class: Historical    Route: Oral    Prucalopride Succinate (MOTEGRITY) 2 MG TABS    2021    [facility] [place] [address]    Sig: Take one tablet by mouth daily    Class: Historical    spironolactone (ALDACTONE) 25 MG tablet    2020    [facility] [place] [address]    Sig: Take 12.5 mg by mouth    Class: Historical    Route: Oral    acetaminophen (TYLENOL) 500 MG tablet            Sig: Take 1-2 tablets by mouth every 4 hours as needed.    Class: Historical    Route: Oral    insulin detemir (LEVEMIR FLEXPEN) 100 UNIT/ML injection            Sig: Inject  Subcutaneous See Admin Instructions. 25u in am, 10u pm    Class: Historical    Route: Subcutaneous    LISINOPRIL PO            Sig: Take 1 tablet by mouth.    Class: Historical    Route: Oral    norgestimate-ethinyl estradiol (SPRINTEC 28) 0.25-35 MG-MCG tablet            Sig: Take 1 tablet by mouth daily.    Class: Historical    Route: Oral    NOVOLOG PENFILL            Sig: Inject  Subcutaneous. 4 units per 15 units of carb, plus 1 unit for 50  over 150.  .     Class: Historical    Route: Subcutaneous      Hospital Medications as of 8/24/2021       Dose Frequency Start End    perflutren diluted in saline (DEFINITY) injection 5 mL (Completed) 5 mL ONCE 8/24/2021 8/24/2021    Class: E-Prescribe    Route: Intravenous          Exam:   Temp:  [97.8  F (36.6  C)] 97.8  F (36.6  C)  Pulse:  [84] 84  BP: (142)/(88) 142/88  SpO2:  [100 %] 100 %  Appearance: in no apparent distress.   Skin: normal  Head and Neck: Normal, no rashes or jaundice  Respiratory: easy respirations, no audible wheezing.  Cardiovascular: RRR  Abdomen: flat, No distention and No surgical scars   Extremeties: femoral 3+/3+, Edema, none  Neuro: without deficit     Diagnostics:   No results found for this or any previous visit (from the past 672 hour(s)).  No results found for: CPRA      Again, thank you for allowing me to participate in the care of your patient.        Sincerely,        Paola Flores MD, MD

## 2021-09-01 ENCOUNTER — COMMITTEE REVIEW (OUTPATIENT)
Dept: TRANSPLANT | Facility: CLINIC | Age: 36
End: 2021-09-01

## 2021-09-01 DIAGNOSIS — Z76.82 AWAITING ORGAN TRANSPLANT: Primary | ICD-10-CM

## 2021-09-01 NOTE — LETTER
PHYSICIAN ORDER   ALA/PRA BLOOD    DATE & TIME ISSUED: 2021 3:06 PM  PATIENT NAME: Marilyn Ortega   : 1985     [facility] MR#  [id number]     DIAGNOSIS/ICD-10 CODE: Awaiting Organ transplant [Z76.82}   EXPIRES: (1 YEAR AFTER DATE ISSUED)  EVERY 12 weeks / 3 months  Your next PRA will be due 2021  1. Please draw 20ml of blood in red top (plain) tube for Antileukocyte Antibody (ALA or PRA).   2. Label tubes with the patient s name, complete lab slip.         3. Mailers, lab slips with instructions are sent to patient separately.      4. Call the Outreach Lab at [phone] to reorder mailers.       5. Mail blood to (this address is also on the mailers):    IMMUNOLOGY LABORATORY   [facility]   Room 7139 [address]  [address]      Paola Flores MD FACS  Assistant Professor of Surgery  Director, Living Kidney Donor Program.

## 2021-09-01 NOTE — COMMITTEE REVIEW
Abdominal Committee Review Note     Evaluation Date: 7/7/2021  Committee Review Date: 9/1/2021    Organ being evaluated for: Kidney/Pancreas    Transplant Phase: Waitlist  Transplant Status: Inactive    Transplant Coordinator: Renetta Haro  Transplant Surgeon:   Paola Flores    Referring Physician: Martha Christianson    Primary Diagnosis: Diabetes Mellitus - Type I (Pancreas)  Secondary Diagnosis:     Committee Review Members:  Nephrology Kamran Corona MD, Fransico Moreno, APRN CNP, Mason Pack MD   Nutrition Jovana Wright,    Pharmacy Jane Benavidez [facility]    - Clinical Grace Bashir [facility], Lisa Wilson, [facility]   Transplant Ricarda Ravi, RN, Madalyn Blanco, RN, Caitlyn Arriola, RN, Radha Porter, RN, Renetta Haro, RN   Transplant Surgery Paola Flores MD, MD       Transplant Eligibility: Insulin-dependent diabetes mellitus, Irreversible chronic kidney disease treated w/dialysis or expected need for dialysis, Insulin-dependent diabetes mellitus w/hypoglycemic unawareness    Committee Review Decision: Make Active    Relative Contraindications: None    Absolute Contraindications: None    Committee Chair Paola Flores MD verbally attested to the committee's decision.    Committee Discussion Details: Reviewed completed evaluation items. EKG and echo were missed at [facility] and team did not feel it was necessary to complete since cardiology cleared her with a normal exercise stress echo. She is listed today as inactive to capture the modified wait time back to her initial committee approval of 7/14/2021. She is listed for spk and kidney alone.. She will need  PA before attaining active status. Factor V heterozygous noted on snapshot. Patient will be called and listing letter/ PRA orders and mailers will be sent.

## 2021-09-01 NOTE — LETTER
September 1, 2021    Marilyn Ortega  [address]  [facility] [address]      Dear Marilyn,    This letter is sent to confirm that you have completed your transplant work-up and you are a candidate in the simultaneous kidney/pancreas and kidney alone transplant program at the [facility]. You were placed on the simultaneous kidney/pancreas and kidney alone inactive waitlist on 9/1/2021 and a modification form was sent to [employer] to adjust your wait time to the date you were approved as a transplant candidate. Your approval date is  7/14/2021. This is done so you are not disadvantaged by the pandemic. We are waiting for financial approval from your insurance carrier and then will be able to change you to active status.      When you are active on the waitlist and an organ becomes available, a coordinator will need to speak to you immediately.  You could be contacted at any time during the day or night as an organ could become available at any time.  Please make certain our office always has your current telephone numbers and address. Keep your phone on and charged at all times. Answer all incoming calls even if you do not recognize the incoming number.If you miss a call return it as soon as possible. Remember, the on call coordinator will not leave a message and you have 30 minutes to return the call before the organ is offered to the next recipient. You will be given all known information about the donor and you may decline an offer for any reason without penalty or consequence. You will be told when to arrive at the hospital, when to stop eating/drinking and what medications to take.     Items we will need from you:      We have received approval from your insurance company for the transplant procedure.  It is critical that you notify us if there is any change in your insurance.  It is also important that you familiarize yourself with the details of your specific  insurance policy.  Our patient  is available to assist you if you should have any questions regarding your coverage.      An ALA or PRA blood sample will need to be sent here every 3 months to match you with  donors or any potential living donors.  If you need this testing, special mailing boxes (called mailers) will be sent to you directly from the Outreach Department. You should take the physician order form and the  to your home laboratory when it is time to for this testing to be done.  Additional mailers can be obtained by calling the Transplant Office and asking to speak to a kidney . Your next PRA is due 2021.      During this waiting period, we may request additional periodic laboratory tests with your primary physician.  It will be your responsibility to remind your physician to forward your results to the Transplant Office.      We need to be kept informed of any changes in your medical condition such as:    o changes in your medications,   o significant changes in your health  o significant infections (such as pneumonia or abscesses)  o blood transfusions  o any condition which requires hospitalization  o any surgery  o If you start dialysis      Remember to complete any routine cancer screening tests required before your transplant.  This includes colonoscopy; prostate screening for men, and mammogram and gynecologic testing for women, as well as dental work.  Your primary care clinic can assist you with arranging for these exams.  Remind your caregivers to forward copies of the records and final reports.    We want you to know that our program has physician and surgeon coverage 24 hours a day, 365 days a year. If this coverage changes or there are substantial program changes, you will be notified in writing by letter. You will now have a new set of coordinators while on the wait list. Ricarda Ravi [phone] and Naz Sierra LPN  [phone].     Attached is a letter from the [employer]aring ([employer]). It describes the services and information offered to patients by [employer] and the Organ Procurement and Transplantation Network.    We appreciate having had the opportunity to participate in your care.  If you have questions, please feel free to call the Transplant Office at [phone] or [phone].      Sincerely,      Solid Organ Transplant  [facility], [facility]      Enclosures: Telephone Contact List, Travel Resources, [employer] Letter, Waitlist Information Update and While You Are Waiting  cc: Ines Collazo, Martha Christianson, Venice Banegas                          The Organ Procurement and Transplantation Network  Toll-free patient services line:     Your resource for organ transplant information    If you have a question regarding your own medical care, you always should call your transplant hospital first. However, for general organ transplant-related information, you can call the Organ Procurement and Transplantation Network (OPTN) toll-free patient services line at [phone]- 3499. Anyone, including potential transplant candidates, candidates, recipients, family members, friends, living donors, and donor family members, can call this number to:          Talk about organ donation, living donation, the transplant process, the donation process, and transplant policies.    Get a free patient information kit with helpful booklets, waiting list and transplant information, and a list of all transplant hospitals.    Ask questions about the OPTN website (https://optn.transplant.hrsa.gov/), the [employer] Organ [employer]aring s ([employer]) website (https://unos.org/), or the [employer] website for living donors and transplant recipients. (https://www.transplantliving.org/).    Learn how the OPTN can help you.    Talk about any concerns that you may have  with a transplant hospital.    The [facility] s transplant system, the OPTN, is managed under federal contract by the [employer]aring ([employer]), which is a non-profit charitable organization. The OPTN helps create and define organ sharing policies that make the best use of donated organs. This process continuously evaluating new advances and discoveries so policies can be adapted to best serve patients waiting for transplants. To do so, the OPTN works closely with transplant professionals, transplant patients, transplant candidates, donor families, living donors, and the public. All transplant programs and organ procurement organizations throughout the country are OPTN members and are obligated to follow the policies the OPTN creates for allocating organs.    The OPTN also is responsible for:      Providing educational material for patients, the public, and professionals.    Raising awareness of the need for donated organs and tissue.    Coordinating organ procurement, matching, and placement.    Collecting information about every organ transplant and donation that occurs in the [place].    Remember, you should contact your transplant hospital directly if you have questions or concerns about your own medical care including medical records, work-up progress, and test results.    We are not your transplant hospital, and our staff will not be able to answer questions about your case, so please keep your transplant hospital s phone number handy.    However, while you research your transplant needs and learn as much as you can about transplantation and donation, we welcome your call to our toll-free patient services line at [phone].          Updated 4/1/2019

## 2021-09-01 NOTE — Clinical Note
Inactive status. Vidal, tammy will be registering to donate and she will be eligible to switch to active as soon as PA comes through.

## 2021-09-02 ENCOUNTER — TELEPHONE (OUTPATIENT)
Dept: TRANSPLANT | Facility: CLINIC | Age: 36
End: 2021-09-02

## 2021-09-02 NOTE — TELEPHONE ENCOUNTER
Called Marilyn to discuss the outcome of the Selection Committee from 9/1/2021. I was only able to leave her a message that I was able to list her for transplant but still need to talk to her. Asked for a return call.

## 2021-09-02 NOTE — TELEPHONE ENCOUNTER
Spoke to Marilyn and she is aware she was listed as inactive for spk and kidney alone yesterday 9/1/2021 so the modification form could be sent to [employer] to capture her wait time from the date she was approved. Her insurance requires PA so as soon as that comes through she will be changed to active status. We discussed active status and she will keep her phone on and charged at all times. Answer all incoming calls even if she does not recognize the incoming number. If she misses a call she should return it asap. Reminded her the on call coordinator will not leave a message and she has 30 minutes to return the missed call before the organ is offered to the next recipient. She will be given all known information about the donor and she may decline an offer for any reason without penalty or consequence. She will be told when to arrive at the hospital, when to stop eating/drinking and what medications to take. Reviewed the admission process, perip, surgery length, pacu, intermediate care and 7A routines. She is aware of length of stay for each organ and the expectation to be seen in [facility] daily for 2-4 weeks and the strict medication regimen. Her next PRA is due in October. She is aware she will have an new set of coordinators while on the wait list and their names have been included in the listing letter. Listing letter/PRA orders and mailers have been sent.

## 2021-09-03 ENCOUNTER — DOCUMENTATION ONLY (OUTPATIENT)
Dept: TRANSPLANT | Facility: CLINIC | Age: 36
End: 2021-09-03

## 2021-09-07 ENCOUNTER — TELEPHONE (OUTPATIENT)
Dept: TRANSPLANT | Facility: CLINIC | Age: 36
End: 2021-09-07

## 2021-09-07 NOTE — TELEPHONE ENCOUNTER
Called Marilyn to let her know I received her insurance PA this morning and changed her to active status for spk and kidney alone. She did not have any questions. Active listing letter will be sent.

## 2021-09-07 NOTE — LETTER
2021    Marilyn Ortega  [address]  [facility] [address]      Dear Marilyn,    This letter is sent to confirm that you have completed your transplant work-up and you are a candidate in the simultaneous kidey/pancreas and kidney alone transplant program at the [facility].  You were placed on the simultaneous kidney/pancreas and kidney alone active waitlist on 2021.      When you are active on the waitlist and an organ becomes available, a coordinator will need to speak to you immediately.  You could be contacted at any time during the day or night as an organ could become available at any time.  Please make certain our office always has your current telephone numbers and address.  Please keep your phone on and charged at all times. Answer all incoming calls even if you do not recognize the incoming number. If you miss a call you should return it as soon as possible. Remember, the on call coordinator will not leave a message and you have 30 minutes to return the missed call before the organ(s) are offered to the next recipient. You will be given all known information about the donor and you may decline an offer for any reason without penalty or consequence. You will be told when to report to the hospital, when to stop eating/drinking and what medications to take.    Items we will need from you:      We have received approval from your insurance company for the transplant procedure.  It is critical that you notify us if there is any change in your insurance.  It is also important that you familiarize yourself with the details of your specific insurance policy.  Our patient  is available to assist you if you should have any questions regarding your coverage.      An ALA or PRA blood sample will  need to be sent here every 3 months to match you with  donors or any potential living donors.  If you need this  testing, special mailing boxes (called mailers) will be sent to you directly from the Outreach Department. You should take the physician order form and the  to your home laboratory when it is time to for this testing to be done.  Additional mailers can be obtained by calling the Transplant Office and asking to speak to a kidney and pancreas . Your next PRA is due October 21, 2021.      During this waiting period, we may request additional periodic laboratory tests with your primary physician.  It will be your responsibility to remind your physician to forward your results to the Transplant Office.      We need to be kept informed of any changes in your medical condition such as:    o changes in your medications,   o significant changes in your health  o significant infections (such as pneumonia or abscesses)  o blood transfusions  o any condition which requires hospitalization  o any surgery  o If you start dialysis      Remember to complete any routine cancer screening tests required before your transplant.  This includes colonoscopy; prostate screening for men, and mammogram and gynecologic testing for women, as well as dental work.  Your primary care clinic can assist you with arranging for these exams.  Remind your caregivers to forward copies of the records and final reports.    We want you to know that our program has physician and surgeon coverage 24 hours a day, 365 days a year. If this coverage changes or there are substantial program changes, you will be notified in writing by letter. You will now have a new set of coordinators while on the wait list. Ricarda Ravi -[phone] and Naz Sierra [phone].    Attached is a letter from the [employer] ([employer]). It describes the services and information offered to patients by [employer] and the Organ Procurement and Transplantation Network.    We appreciate having had the opportunity to participate in your  care.  If you have questions, please feel free to call the Transplant Office at [phone] or [phone].      Sincerely,      Solid Organ Transplant  [facility]      cc: Martha Mack, Venice Berry                The Organ Procurement and Transplantation Network  Toll-free patient services line:     Your resource for organ transplant information    If you have a question regarding your own medical care, you always should call your transplant hospital first. However, for general organ transplant-related information, you can call the Organ Procurement and Transplantation Network (OPTN) toll-free patient services line at [phone]. Anyone, including potential transplant candidates, candidates, recipients, family members, friends, living donors, and donor family members, can call this number to:          Talk about organ donation, living donation, the transplant process, the donation process, and transplant policies.    Get a free patient information kit with helpful booklets, waiting list and transplant information, and a list of all transplant hospitals.    Ask questions about the OPTN website (https://optn.transplant.hrsa.gov/), the [employer] s ([employer]) website (https://unos.org/), or the [employer] website for living donors and transplant recipients. (https://www.transplantliving.org/).    Learn how the OPTN can help you.    Talk about any concerns that you may have with a transplant hospital.    The [facility] s transplant system, the OPTN, is managed under federal contract by the [employer] ([employer]), which is a non-profit charitable organization. The OPTN helps create and define organ sharing policies that make the best use of donated organs. This process continuously evaluating new advances and discoveries so policies can be adapted to best serve  patients waiting for transplants. To do so, the OPTN works closely with transplant professionals, transplant patients, transplant candidates, donor families, living donors, and the public. All transplant programs and organ procurement organizations throughout the country are OPTN members and are obligated to follow the policies the OPTN creates for allocating organs.    The OPTN also is responsible for:      Providing educational material for patients, the public, and professionals.    Raising awareness of the need for donated organs and tissue.    Coordinating organ procurement, matching, and placement.    Collecting information about every organ transplant and donation that occurs in the [place].                  Remember, you should contact your transplant hospital directly if you have questions or concerns about your own medical care including medical records, work-up progress, and test results.    We are not your transplant hospital, and our staff will not be able to answer questions about your case, so please keep your transplant hospital s phone number handy.    However, while you research your transplant needs and learn as much as you can about transplantation and donation, we welcome your call to our toll-free patient services line at [phone].          Updated 4/1/2019

## 2021-09-09 ENCOUNTER — TELEPHONE (OUTPATIENT)
Dept: TRANSPLANT | Facility: CLINIC | Age: 36
End: 2021-09-09

## 2021-09-10 ENCOUNTER — DOCUMENTATION ONLY (OUTPATIENT)
Dept: TRANSPLANT | Facility: CLINIC | Age: 36
End: 2021-09-10

## 2021-09-10 NOTE — PROGRESS NOTES
Diane Haro, RN BSN asked for Modification form to [employer] on Marilyn Ortega. Approval date 7/14/2021. eGFR 16 met minimum criteria used the 7/1/2021 lab date     elvira Lambert      MODIFICATION OF KIDNEY WAITING TIME INITIATION FOR NON-DIALYSIS CANDIDATES     CANDIDATE NAME  Marilyn Ortega  WAITING LIST ORGAN  Kidney _______  CANDIDATE HIC #/SSN [id number]  TRANSPLANT PROGRAM NAME/CODE  MNUM___  Requested listing/waiting time start date:  07/14/2021    Brief Explanation for request (include additional documentation as necessary): Patient completed a multi-virtual evaluation utilizing video visiting technology to assess candidacy for transplant and was approved by the selection committee to be listed as soon as it was safe for the candidate to complete on site testing including lab work, imaging, and appropriate consults. Due to the risk of exposure to COVID-19, the team planned to defer on-site testing until it is safe for ESRD patient to enter the health care facility.  At this time we are requesting the candidate s wait time be modified to the date indication above.     To apply for a kidney waiting time modification for candidates affected by the COVID-19 public health emergency, the candidate s transplant program must submit an application to the OPTN with all of the following information:     1. DONE Documentation confirming the transplant program s decision to register the candidate at the requested listing/waiting time start date above.   2. DONE Documentation explaining why the circumstances of the COVID-19 public health emergency prevented the candidate from beginning to accrue waiting time.   3. For candidates registered at age 18 years or older, documentation confirming candidate s measured or calculated creatinine clearance or GFR was less than or equal to 20ml/min.   Date criteria was met (must be prior to the requested listing/waiting time start date): eGFR = 16 on 07/01/2021  ( see lab  report)    TRANSPLANT PHYSICIAN/SURGEON SIGNATURE DATE  09/10/2021                  TRANSPLANT PHYSICIAN/SURGEON NAME (Please print or type)   Transplant Program Contact Name Ricarda Ravi RN BSN  Transplant Program Contact Email  [email or web address]  Transplant Program Contact Phone Number [phone] option 4     **The OPTN will notify the transplant program involved by secure email of the completed modification of waiting time**     PLEASE FAX TO [facility] AT [phone]                                    Date:  09/10/2021  Total Pages (Including Cover):    5 pages  To: [facility]   Name:   [facility]   Fax Number:  [phone]  From:  Transplant Office /Coordinator   Name: Ricarda Ravi RN BSN   Best way to contact:  [phone] or [phone]  Fax Number: [phone]  Message: Modification of Kidney Waiting Time Initiation for Non-Dialysis Candidates form, Selection committee, eGFR results on patient:  Regarding: Marilyn Otto  1985  If you have questions you may call Ricarda at [phone] or [phone]  Thank you so much,    Sincerely, Ricarda   submitted by Maggy Pineda RN BSN on behalf of Ricarda

## 2021-09-19 ENCOUNTER — HEALTH MAINTENANCE LETTER (OUTPATIENT)
Age: 36
End: 2021-09-19

## 2021-10-13 DIAGNOSIS — Z76.82 AWAITING ORGAN TRANSPLANT: Primary | ICD-10-CM

## 2021-10-14 ENCOUNTER — DOCUMENTATION ONLY (OUTPATIENT)
Dept: TRANSPLANT | Facility: CLINIC | Age: 36
End: 2021-10-14

## 2021-10-14 NOTE — PROGRESS NOTES
PATHOLOGY HLA CROSSMATCH CONSULTATION: DONOR/RECIPIENT  VIRTUAL CROSSMATCH - Kidney-Pancreas  Consultation Date: 10/14/2021  Consultation Requested by: Dr. Reed    Regarding: Compatibility of  donor organ UNOS #[id number] from OPO: CAGS with Marilyn Ortega    Findings: Regarding a virtual crossmatch between Marilyn Ortega and  donor listed above (match ID [id number]):  The most recent (7.) and no additional patient serum/sera  were analyzed.  The patient has no antibodies listed with HLA specificity against the donor organ.      Record Review Indicates: I personally reviewed the most recent serum, the historic peak sera, and all other sera with solid-phase HLA Single Antigen test results:  The patient has no HLA antibodies against the donor organ.     The results of this virtual XM are:   -most recent serum: compatible     Disclaimer: Clinical judgement must take into account other factors, such as non-HLA antibodies not detected in the assay. The VXM gives probabilities only.  The probability does not account for the potential for auto-antibodies that may be present in the patient's serum.  These autoantibodies may render the physical crossmatch falsely positive, and would be detected by an autologous crossmatch.  When possible, confirm findings with prospective allogeneic and autologous flow crossmatches before going to transplant as clinically indicated.     Rashmi Shi MD    Medical Director, Immunology/Histocompatibility Laboratory

## 2021-10-18 ENCOUNTER — TELEPHONE (OUTPATIENT)
Dept: TRANSPLANT | Facility: CLINIC | Age: 36
End: 2021-10-18

## 2021-10-18 NOTE — TELEPHONE ENCOUNTER
Patient reports she received the PRA kits, but didn't receive the 09/01/2021 PRA order nor the 09/01/2021 nor 09/07/2021 listing letters.  Confirmed patient's mailing address and will have the information re-sent to patient.  Per patient request, reviewed organ offer process and average wait times for patient's blood type.  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  Noted we recommend patients follow current CDC and MD guidelines related to COVID19.   Patient will contact me with the fax number for her local lab and I will fax the PRA order to the lab, so she may have the PRA sample drawn on 10/21/2021 when she has other labs drawn.  Encouraged patient to contact me should she have additional questions or concerns.

## 2021-10-20 ENCOUNTER — LAB (OUTPATIENT)
Dept: LAB | Facility: CLINIC | Age: 36
End: 2021-10-20
Payer: COMMERCIAL

## 2021-10-20 DIAGNOSIS — Z76.82 AWAITING ORGAN TRANSPLANT: ICD-10-CM

## 2021-10-20 PROCEDURE — 86832 HLA CLASS I HIGH DEFIN QUAL: CPT

## 2021-10-20 PROCEDURE — 86833 HLA CLASS II HIGH DEFIN QUAL: CPT

## 2021-10-26 ENCOUNTER — DOCUMENTATION ONLY (OUTPATIENT)
Dept: TRANSPLANT | Facility: CLINIC | Age: 36
End: 2021-10-26

## 2021-10-26 DIAGNOSIS — Z76.82 AWAITING ORGAN TRANSPLANT: Primary | ICD-10-CM

## 2021-10-26 LAB
PROTOCOL CUTOFF: NORMAL
SA 1 CELL: NORMAL
SA 1 TEST METHOD: NORMAL
SA 2 CELL: NORMAL
SA 2 TEST METHOD: NORMAL
SA1 HI RISK ABY: NORMAL
SA1 MOD RISK ABY: NORMAL
SA2 HI RISK ABY: NORMAL
SA2 MOD RISK ABY: NORMAL
UNACCEPTABLE ANTIGENS: NORMAL
UNOS CPRA: 29
ZZZSA 1  COMMENTS: NORMAL
ZZZSA 2 COMMENTS: NORMAL

## 2021-10-27 NOTE — PROGRESS NOTES
PATHOLOGY HLA CROSSMATCH CONSULTATION: DONOR/RECIPIENT  VIRTUAL CROSSMATCH - Kidney-Pancreas  Consultation Date: 10/26/2021  Consultation Requested by: Dr. Reed    Regarding: Compatibility of  donor organ UNOS #[id number] from OPO: MOMA  with Marilyn Ortega    Findings: Regarding a virtual crossmatch between Marilyn Ortega and  donor listed above (match ID [id number]):  The most recent (10.20.21) and 1 additional patient serum/sera  were analyzed.  The patient has no antibodies listed with HLA specificity against the donor organ.      Record Review Indicates: I personally reviewed the most recent serum, the historic peak sera, and all other sera with solid-phase HLA Single Antigen test results:  The patient has no HLA antibodies against the donor organ.     The results of this virtual XM are:   -most recent serum: compatible   -peak #1: compatible      Disclaimer: Clinical judgement must take into account other factors, such as non-HLA antibodies not detected in the assay. The VXM gives probabilities only.  The probability does not account for the potential for auto-antibodies that may be present in the patient's serum.  These autoantibodies may render the physical crossmatch falsely positive, and would be detected by an autologous crossmatch.  When possible, confirm findings with prospective allogeneic and autologous flow crossmatches before going to transplant as clinically indicated.     Rashmi Shi MD  Medical Director, Immunology/Histocompatibility Laboratory

## 2021-11-15 ENCOUNTER — DOCUMENTATION ONLY (OUTPATIENT)
Dept: TRANSPLANT | Facility: CLINIC | Age: 36
End: 2021-11-15

## 2021-11-15 ENCOUNTER — HOSPITAL ENCOUNTER (INPATIENT)
Facility: CLINIC | Age: 36
LOS: 7 days | Discharge: HOME | DRG: 008 | End: 2021-11-22
Attending: SURGERY | Admitting: SURGERY
Payer: COMMERCIAL

## 2021-11-15 ENCOUNTER — ANESTHESIA EVENT (OUTPATIENT)
Dept: SURGERY | Facility: CLINIC | Age: 36
DRG: 008 | End: 2021-11-15
Payer: COMMERCIAL

## 2021-11-15 ENCOUNTER — ANESTHESIA (OUTPATIENT)
Dept: SURGERY | Facility: CLINIC | Age: 36
DRG: 008 | End: 2021-11-15
Payer: COMMERCIAL

## 2021-11-15 ENCOUNTER — APPOINTMENT (OUTPATIENT)
Dept: GENERAL RADIOLOGY | Facility: CLINIC | Age: 36
DRG: 008 | End: 2021-11-15
Attending: NURSE PRACTITIONER
Payer: COMMERCIAL

## 2021-11-15 ENCOUNTER — ORGAN (OUTPATIENT)
Dept: TRANSPLANT | Facility: CLINIC | Age: 36
End: 2021-11-15

## 2021-11-15 DIAGNOSIS — Z76.82 AWAITING ORGAN TRANSPLANT: Primary | ICD-10-CM

## 2021-11-15 DIAGNOSIS — Z94.83 PANCREAS REPLACED BY TRANSPLANT (H): Primary | ICD-10-CM

## 2021-11-15 LAB
ABO/RH(D): NORMAL
ALBUMIN SERPL-MCNC: 4 G/DL (ref 3.4–5)
ALBUMIN UR-MCNC: 100 MG/DL
ALP SERPL-CCNC: 104 U/L (ref 40–150)
ALT SERPL W P-5'-P-CCNC: 23 U/L (ref 0–50)
AMYLASE SERPL-CCNC: 56 U/L (ref 30–110)
ANION GAP SERPL CALCULATED.3IONS-SCNC: 10 MMOL/L (ref 3–14)
ANTIBODY SCREEN: NEGATIVE
APPEARANCE UR: CLEAR
APTT PPP: 30 SECONDS (ref 22–38)
AST SERPL W P-5'-P-CCNC: 16 U/L (ref 0–45)
B-HCG SERPL-ACNC: <1 IU/L (ref 0–5)
BACTERIA #/AREA URNS HPF: ABNORMAL /HPF
BASE EXCESS BLDA CALC-SCNC: -8.4 MMOL/L (ref -9.6–2)
BASE EXCESS BLDA CALC-SCNC: -8.8 MMOL/L (ref -9–1.8)
BASE EXCESS BLDA CALC-SCNC: -9.2 MMOL/L (ref -9.6–2)
BASE EXCESS BLDA CALC-SCNC: -9.6 MMOL/L (ref -9.6–2)
BILIRUB SERPL-MCNC: 0.4 MG/DL (ref 0.2–1.3)
BILIRUB UR QL STRIP: NEGATIVE
BUN SERPL-MCNC: 71 MG/DL (ref 7–30)
CA-I BLD-MCNC: 4.4 MG/DL (ref 4.4–5.2)
CA-I BLD-MCNC: 4.7 MG/DL (ref 4.4–5.2)
CA-I BLD-MCNC: 4.8 MG/DL (ref 4.4–5.2)
CA-I BLD-MCNC: 4.8 MG/DL (ref 4.4–5.2)
CALCIUM SERPL-MCNC: 8.9 MG/DL (ref 8.5–10.1)
CHLORIDE BLD-SCNC: 109 MMOL/L (ref 94–109)
CO2 SERPL-SCNC: 19 MMOL/L (ref 20–32)
COLOR UR AUTO: ABNORMAL
CREAT SERPL-MCNC: 2.65 MG/DL (ref 0.52–1.04)
CREAT UR-MCNC: 35 MG/DL
ERYTHROCYTE [DISTWIDTH] IN BLOOD BY AUTOMATED COUNT: 14.4 % (ref 10–15)
GFR SERPL CREATININE-BSD FRML MDRD: 22 ML/MIN/1.73M2
GLUCOSE BLD-MCNC: 139 MG/DL (ref 70–99)
GLUCOSE BLD-MCNC: 150 MG/DL (ref 70–99)
GLUCOSE BLD-MCNC: 202 MG/DL (ref 70–99)
GLUCOSE BLD-MCNC: 220 MG/DL (ref 70–99)
GLUCOSE BLD-MCNC: 78 MG/DL (ref 70–99)
GLUCOSE BLDC GLUCOMTR-MCNC: 122 MG/DL (ref 70–99)
GLUCOSE BLDC GLUCOMTR-MCNC: 163 MG/DL (ref 70–99)
GLUCOSE BLDC GLUCOMTR-MCNC: 170 MG/DL (ref 70–99)
GLUCOSE BLDC GLUCOMTR-MCNC: 91 MG/DL (ref 70–99)
GLUCOSE UR STRIP-MCNC: 30 MG/DL
HBA1C MFR BLD: 7.7 % (ref 0–5.6)
HCO3 BLD-SCNC: 17 MMOL/L (ref 21–28)
HCO3 BLDA-SCNC: 16 MMOL/L (ref 21–28)
HCO3 BLDA-SCNC: 17 MMOL/L (ref 21–28)
HCO3 BLDA-SCNC: 18 MMOL/L (ref 21–28)
HCT VFR BLD AUTO: 33.8 % (ref 35–47)
HGB BLD-MCNC: 10.2 G/DL (ref 11.7–15.7)
HGB BLD-MCNC: 10.3 G/DL (ref 11.7–15.7)
HGB BLD-MCNC: 11 G/DL (ref 11.7–15.7)
HGB BLD-MCNC: 7.8 G/DL (ref 11.7–15.7)
HGB BLD-MCNC: 9.8 G/DL (ref 11.7–15.7)
HGB UR QL STRIP: ABNORMAL
HYALINE CASTS: 4 /LPF
INR PPP: 0.96 (ref 0.85–1.15)
KETONES UR STRIP-MCNC: NEGATIVE MG/DL
LACTATE BLD-SCNC: 0.8 MMOL/L
LACTATE BLD-SCNC: 1.1 MMOL/L
LACTATE BLD-SCNC: 2.8 MMOL/L
LEUKOCYTE ESTERASE UR QL STRIP: NEGATIVE
LIPASE SERPL-CCNC: 78 U/L (ref 73–393)
MCH RBC QN AUTO: 28.3 PG (ref 26.5–33)
MCHC RBC AUTO-ENTMCNC: 32.5 G/DL (ref 31.5–36.5)
MCV RBC AUTO: 87 FL (ref 78–100)
MUCOUS THREADS #/AREA URNS LPF: PRESENT /LPF
NITRATE UR QL: NEGATIVE
O2/TOTAL GAS SETTING VFR VENT: 40 %
OXYHGB MFR BLDA: 74 % (ref 92–100)
OXYHGB MFR BLDA: 82 % (ref 92–100)
OXYHGB MFR BLDA: 98 % (ref 92–100)
PCO2 BLD: 35 MM HG (ref 35–45)
PCO2 BLDA: 35 MM HG (ref 35–45)
PCO2 BLDA: 35 MM HG (ref 35–45)
PCO2 BLDA: 37 MM HG (ref 35–45)
PH BLD: 7.29 [PH] (ref 7.35–7.45)
PH BLDA: 7.27 [PH] (ref 7.35–7.45)
PH BLDA: 7.28 [PH] (ref 7.35–7.45)
PH BLDA: 7.29 [PH] (ref 7.35–7.45)
PH UR STRIP: 5.5 [PH] (ref 5–7)
PLATELET # BLD AUTO: 282 10E3/UL (ref 150–450)
PO2 BLD: 48 MM HG (ref 80–105)
PO2 BLDA: 230 MM HG (ref 80–105)
PO2 BLDA: 44 MM HG (ref 80–105)
PO2 BLDA: 51 MM HG (ref 80–105)
POTASSIUM BLD-SCNC: 3 MMOL/L (ref 3.5–5)
POTASSIUM BLD-SCNC: 3.1 MMOL/L (ref 3.4–5.3)
POTASSIUM BLD-SCNC: 3.2 MMOL/L (ref 3.4–5.3)
POTASSIUM BLD-SCNC: 3.5 MMOL/L (ref 3.5–5)
POTASSIUM BLD-SCNC: 3.5 MMOL/L (ref 3.5–5)
PROT SERPL-MCNC: 8.4 G/DL (ref 6.8–8.8)
PROT UR-MCNC: 1.67 G/L
PROT/CREAT 24H UR: 4.77 G/G CR (ref 0–0.2)
RBC # BLD AUTO: 3.89 10E6/UL (ref 3.8–5.2)
RBC URINE: 2 /HPF
SARS-COV-2 RNA RESP QL NAA+PROBE: NEGATIVE
SODIUM BLD-SCNC: 138 MMOL/L (ref 133–144)
SODIUM BLD-SCNC: 138 MMOL/L (ref 133–144)
SODIUM BLD-SCNC: 139 MMOL/L (ref 133–144)
SODIUM SERPL-SCNC: 138 MMOL/L (ref 133–144)
SODIUM SERPL-SCNC: 139 MMOL/L (ref 133–144)
SP GR UR STRIP: 1.01 (ref 1–1.03)
SPECIMEN EXPIRATION DATE: NORMAL
TRANSITIONAL EPI: <1 /HPF
UROBILINOGEN UR STRIP-MCNC: NORMAL MG/DL
WBC # BLD AUTO: 8.9 10E3/UL (ref 4–11)
WBC URINE: 1 /HPF

## 2021-11-15 PROCEDURE — 30233S1 TRANSFUSION OF NONAUTOLOGOUS GLOBULIN INTO PERIPHERAL VEIN, PERCUTANEOUS APPROACH: ICD-10-PCS | Performed by: SURGERY

## 2021-11-15 PROCEDURE — 81001 URINALYSIS AUTO W/SCOPE: CPT | Performed by: NURSE PRACTITIONER

## 2021-11-15 PROCEDURE — C2617 STENT, NON-COR, TEM W/O DEL: HCPCS | Performed by: SURGERY

## 2021-11-15 PROCEDURE — 93010 ELECTROCARDIOGRAM REPORT: CPT | Mod: 59 | Performed by: INTERNAL MEDICINE

## 2021-11-15 PROCEDURE — 86833 HLA CLASS II HIGH DEFIN QUAL: CPT | Performed by: NURSE PRACTITIONER

## 2021-11-15 PROCEDURE — 87516 HEPATITIS B DNA AMP PROBE: CPT | Performed by: NURSE PRACTITIONER

## 2021-11-15 PROCEDURE — 250N000009 HC RX 250: Performed by: NURSE PRACTITIONER

## 2021-11-15 PROCEDURE — 84156 ASSAY OF PROTEIN URINE: CPT | Performed by: NURSE PRACTITIONER

## 2021-11-15 PROCEDURE — 250N000011 HC RX IP 250 OP 636: Performed by: NURSE PRACTITIONER

## 2021-11-15 PROCEDURE — 86825 HLA X-MATH NON-CYTOTOXIC: CPT | Performed by: NURSE PRACTITIONER

## 2021-11-15 PROCEDURE — 87389 HIV-1 AG W/HIV-1&-2 AB AG IA: CPT | Performed by: NURSE PRACTITIONER

## 2021-11-15 PROCEDURE — 82330 ASSAY OF CALCIUM: CPT | Performed by: SURGERY

## 2021-11-15 PROCEDURE — 82330 ASSAY OF CALCIUM: CPT

## 2021-11-15 PROCEDURE — 250N000025 HC SEVOFLURANE, PER MIN: Performed by: SURGERY

## 2021-11-15 PROCEDURE — 258N000003 HC RX IP 258 OP 636: Performed by: STUDENT IN AN ORGANIZED HEALTH CARE EDUCATION/TRAINING PROGRAM

## 2021-11-15 PROCEDURE — 83690 ASSAY OF LIPASE: CPT | Performed by: NURSE PRACTITIONER

## 2021-11-15 PROCEDURE — 82150 ASSAY OF AMYLASE: CPT | Performed by: NURSE PRACTITIONER

## 2021-11-15 PROCEDURE — 999N000141 HC STATISTIC PRE-PROCEDURE NURSING ASSESSMENT: Performed by: SURGERY

## 2021-11-15 PROCEDURE — 250N000009 HC RX 250: Performed by: STUDENT IN AN ORGANIZED HEALTH CARE EDUCATION/TRAINING PROGRAM

## 2021-11-15 PROCEDURE — 82810 BLOOD GASES O2 SAT ONLY: CPT

## 2021-11-15 PROCEDURE — 88304 TISSUE EXAM BY PATHOLOGIST: CPT | Mod: 26 | Performed by: PATHOLOGY

## 2021-11-15 PROCEDURE — 250N000011 HC RX IP 250 OP 636: Performed by: STUDENT IN AN ORGANIZED HEALTH CARE EDUCATION/TRAINING PROGRAM

## 2021-11-15 PROCEDURE — 85027 COMPLETE CBC AUTOMATED: CPT | Performed by: NURSE PRACTITIONER

## 2021-11-15 PROCEDURE — 88304 TISSUE EXAM BY PATHOLOGIST: CPT | Mod: TC | Performed by: SURGERY

## 2021-11-15 PROCEDURE — P9041 ALBUMIN (HUMAN),5%, 50ML: HCPCS | Performed by: STUDENT IN AN ORGANIZED HEALTH CARE EDUCATION/TRAINING PROGRAM

## 2021-11-15 PROCEDURE — 812N000002 HC ACQUISITION KIDNEY CADAVER OF SPK

## 2021-11-15 PROCEDURE — 86706 HEP B SURFACE ANTIBODY: CPT | Performed by: NURSE PRACTITIONER

## 2021-11-15 PROCEDURE — 71046 X-RAY EXAM CHEST 2 VIEWS: CPT | Mod: 26 | Performed by: RADIOLOGY

## 2021-11-15 PROCEDURE — 272N000001 HC OR GENERAL SUPPLY STERILE: Performed by: SURGERY

## 2021-11-15 PROCEDURE — U0005 INFEC AGEN DETEC AMPLI PROBE: HCPCS | Performed by: NURSE PRACTITIONER

## 2021-11-15 PROCEDURE — 360N000078 HC SURGERY LEVEL 5, PER MIN: Performed by: SURGERY

## 2021-11-15 PROCEDURE — 370N000017 HC ANESTHESIA TECHNICAL FEE, PER MIN: Performed by: SURGERY

## 2021-11-15 PROCEDURE — 71046 X-RAY EXAM CHEST 2 VIEWS: CPT

## 2021-11-15 PROCEDURE — 0TY10Z0 TRANSPLANTATION OF LEFT KIDNEY, ALLOGENEIC, OPEN APPROACH: ICD-10-PCS | Performed by: SURGERY

## 2021-11-15 PROCEDURE — 0FYG0Z0 TRANSPLANTATION OF PANCREAS, ALLOGENEIC, OPEN APPROACH: ICD-10-PCS | Performed by: SURGERY

## 2021-11-15 PROCEDURE — 85730 THROMBOPLASTIN TIME PARTIAL: CPT | Performed by: NURSE PRACTITIONER

## 2021-11-15 PROCEDURE — 86704 HEP B CORE ANTIBODY TOTAL: CPT | Performed by: NURSE PRACTITIONER

## 2021-11-15 PROCEDURE — 85610 PROTHROMBIN TIME: CPT | Performed by: NURSE PRACTITIONER

## 2021-11-15 PROCEDURE — 83036 HEMOGLOBIN GLYCOSYLATED A1C: CPT | Performed by: NURSE PRACTITIONER

## 2021-11-15 PROCEDURE — 87340 HEPATITIS B SURFACE AG IA: CPT | Performed by: NURSE PRACTITIONER

## 2021-11-15 PROCEDURE — 87086 URINE CULTURE/COLONY COUNT: CPT | Performed by: NURSE PRACTITIONER

## 2021-11-15 PROCEDURE — 86832 HLA CLASS I HIGH DEFIN QUAL: CPT | Performed by: NURSE PRACTITIONER

## 2021-11-15 PROCEDURE — 250N000009 HC RX 250: Performed by: SURGERY

## 2021-11-15 PROCEDURE — 258N000003 HC RX IP 258 OP 636: Performed by: NURSE PRACTITIONER

## 2021-11-15 PROCEDURE — 93005 ELECTROCARDIOGRAM TRACING: CPT

## 2021-11-15 PROCEDURE — 86644 CMV ANTIBODY: CPT | Performed by: NURSE PRACTITIONER

## 2021-11-15 PROCEDURE — 250N000013 HC RX MED GY IP 250 OP 250 PS 637: Performed by: ANESTHESIOLOGY

## 2021-11-15 PROCEDURE — 48554 TRANSPL ALLOGRAFT PANCREAS: CPT | Mod: GC | Performed by: SURGERY

## 2021-11-15 PROCEDURE — 812N000013 HC ACQUISITION PANCREAS CADAVER OF SPK

## 2021-11-15 PROCEDURE — 50360 RNL ALTRNSPLJ W/O RCP NFRCT: CPT | Mod: LT | Performed by: SURGERY

## 2021-11-15 PROCEDURE — 710N000011 HC RECOVERY PHASE 1, LEVEL 3, PER MIN: Performed by: SURGERY

## 2021-11-15 PROCEDURE — 86900 BLOOD TYPING SEROLOGIC ABO: CPT | Performed by: NURSE PRACTITIONER

## 2021-11-15 PROCEDURE — 0DTJ0ZZ RESECTION OF APPENDIX, OPEN APPROACH: ICD-10-PCS | Performed by: SURGERY

## 2021-11-15 PROCEDURE — 84702 CHORIONIC GONADOTROPIN TEST: CPT | Performed by: NURSE PRACTITIONER

## 2021-11-15 PROCEDURE — 250N000011 HC RX IP 250 OP 636: Mod: JA | Performed by: NURSE PRACTITIONER

## 2021-11-15 PROCEDURE — 86645 CMV ANTIBODY IGM: CPT | Performed by: NURSE PRACTITIONER

## 2021-11-15 PROCEDURE — 82803 BLOOD GASES ANY COMBINATION: CPT

## 2021-11-15 PROCEDURE — 999N000127 HC STATISTIC PERIPHERAL IV START W US GUIDANCE

## 2021-11-15 PROCEDURE — 82962 GLUCOSE BLOOD TEST: CPT

## 2021-11-15 PROCEDURE — 86803 HEPATITIS C AB TEST: CPT | Performed by: NURSE PRACTITIONER

## 2021-11-15 PROCEDURE — 86665 EPSTEIN-BARR CAPSID VCA: CPT | Performed by: NURSE PRACTITIONER

## 2021-11-15 PROCEDURE — 80053 COMPREHEN METABOLIC PANEL: CPT | Performed by: NURSE PRACTITIONER

## 2021-11-15 PROCEDURE — 36415 COLL VENOUS BLD VENIPUNCTURE: CPT | Performed by: NURSE PRACTITIONER

## 2021-11-15 RX ORDER — PAPAVERINE HYDROCHLORIDE 30 MG/ML
INJECTION INTRAMUSCULAR; INTRAVENOUS PRN
Status: DISCONTINUED | OUTPATIENT
Start: 2021-11-15 | End: 2021-11-16 | Stop reason: HOSPADM

## 2021-11-15 RX ORDER — NICOTINE POLACRILEX 4 MG
15-30 LOZENGE BUCCAL
Status: DISCONTINUED | OUTPATIENT
Start: 2021-11-15 | End: 2021-11-16 | Stop reason: HOSPADM

## 2021-11-15 RX ORDER — MANNITOL 20 G/100ML
INJECTION, SOLUTION INTRAVENOUS PRN
Status: DISCONTINUED | OUTPATIENT
Start: 2021-11-15 | End: 2021-11-16

## 2021-11-15 RX ORDER — PROPOFOL 10 MG/ML
INJECTION, EMULSION INTRAVENOUS PRN
Status: DISCONTINUED | OUTPATIENT
Start: 2021-11-15 | End: 2021-11-16

## 2021-11-15 RX ORDER — BROMFENAC SODIUM 0.7 MG/ML
1 SOLUTION/ DROPS OPHTHALMIC DAILY
COMMUNITY
End: 2022-01-21

## 2021-11-15 RX ORDER — BROMFENAC SODIUM 0.7 MG/ML
1 SOLUTION/ DROPS OPHTHALMIC DAILY
Status: DISCONTINUED | OUTPATIENT
Start: 2021-11-16 | End: 2021-11-22 | Stop reason: HOSPADM

## 2021-11-15 RX ORDER — DEXTROSE MONOHYDRATE 100 MG/ML
INJECTION, SOLUTION INTRAVENOUS CONTINUOUS PRN
Status: DISCONTINUED | OUTPATIENT
Start: 2021-11-15 | End: 2021-11-16

## 2021-11-15 RX ORDER — FENTANYL CITRATE 50 UG/ML
INJECTION, SOLUTION INTRAMUSCULAR; INTRAVENOUS PRN
Status: DISCONTINUED | OUTPATIENT
Start: 2021-11-15 | End: 2021-11-16

## 2021-11-15 RX ORDER — DEXTROSE MONOHYDRATE 25 G/50ML
25-50 INJECTION, SOLUTION INTRAVENOUS
Status: DISCONTINUED | OUTPATIENT
Start: 2021-11-15 | End: 2021-11-16

## 2021-11-15 RX ORDER — NICOTINE POLACRILEX 4 MG
15-30 LOZENGE BUCCAL
Status: DISCONTINUED | OUTPATIENT
Start: 2021-11-15 | End: 2021-11-16

## 2021-11-15 RX ORDER — OCTREOTIDE ACETATE 100 UG/ML
100 INJECTION, SOLUTION INTRAVENOUS; SUBCUTANEOUS ONCE
Status: COMPLETED | OUTPATIENT
Start: 2021-11-15 | End: 2021-11-15

## 2021-11-15 RX ORDER — ACETAMINOPHEN 325 MG/1
975 TABLET ORAL ONCE
Status: COMPLETED | OUTPATIENT
Start: 2021-11-15 | End: 2021-11-15

## 2021-11-15 RX ORDER — LOTEPREDNOL ETABONATE 3.8 MG/G
1 GEL OPHTHALMIC
COMMUNITY
End: 2022-01-21

## 2021-11-15 RX ORDER — DEXTROSE MONOHYDRATE 25 G/50ML
25-50 INJECTION, SOLUTION INTRAVENOUS
Status: DISCONTINUED | OUTPATIENT
Start: 2021-11-15 | End: 2021-11-16 | Stop reason: HOSPADM

## 2021-11-15 RX ORDER — LIDOCAINE 40 MG/G
CREAM TOPICAL
Status: DISCONTINUED | OUTPATIENT
Start: 2021-11-15 | End: 2021-11-16 | Stop reason: HOSPADM

## 2021-11-15 RX ORDER — MOXIFLOXACIN 5 MG/ML
1 SOLUTION/ DROPS OPHTHALMIC 3 TIMES DAILY
Status: DISCONTINUED | OUTPATIENT
Start: 2021-11-15 | End: 2021-11-17

## 2021-11-15 RX ORDER — LIDOCAINE HYDROCHLORIDE 20 MG/ML
INJECTION, SOLUTION INFILTRATION; PERINEURAL PRN
Status: DISCONTINUED | OUTPATIENT
Start: 2021-11-15 | End: 2021-11-16

## 2021-11-15 RX ORDER — CARDIOPLEG/ORGAN PRESERV NO.1 9-198-2-1
BOTTLE PERFUSION PRN
Status: DISCONTINUED | OUTPATIENT
Start: 2021-11-15 | End: 2021-11-16 | Stop reason: HOSPADM

## 2021-11-15 RX ORDER — SODIUM CHLORIDE, SODIUM LACTATE, POTASSIUM CHLORIDE, CALCIUM CHLORIDE 600; 310; 30; 20 MG/100ML; MG/100ML; MG/100ML; MG/100ML
INJECTION, SOLUTION INTRAVENOUS CONTINUOUS PRN
Status: DISCONTINUED | OUTPATIENT
Start: 2021-11-15 | End: 2021-11-16

## 2021-11-15 RX ORDER — FUROSEMIDE 10 MG/ML
INJECTION INTRAMUSCULAR; INTRAVENOUS PRN
Status: DISCONTINUED | OUTPATIENT
Start: 2021-11-15 | End: 2021-11-16

## 2021-11-15 RX ORDER — HEPARIN SODIUM 1000 [USP'U]/ML
INJECTION, SOLUTION INTRAVENOUS; SUBCUTANEOUS PRN
Status: DISCONTINUED | OUTPATIENT
Start: 2021-11-15 | End: 2021-11-16

## 2021-11-15 RX ORDER — MOXIFLOXACIN 5 MG/ML
1 SOLUTION/ DROPS OPHTHALMIC 3 TIMES DAILY
Status: ON HOLD | COMMUNITY
Start: 2021-11-15 | End: 2021-11-22

## 2021-11-15 RX ORDER — ALBUMIN HUMAN 5 %
INTRAVENOUS SOLUTION INTRAVENOUS PRN
Status: DISCONTINUED | OUTPATIENT
Start: 2021-11-15 | End: 2021-11-16

## 2021-11-15 RX ORDER — CETIRIZINE HYDROCHLORIDE 10 MG/1
10 TABLET ORAL DAILY
COMMUNITY
End: 2022-03-17

## 2021-11-15 RX ORDER — EPHEDRINE SULFATE 50 MG/ML
INJECTION, SOLUTION INTRAMUSCULAR; INTRAVENOUS; SUBCUTANEOUS PRN
Status: DISCONTINUED | OUTPATIENT
Start: 2021-11-15 | End: 2021-11-16

## 2021-11-15 RX ORDER — ACETAMINOPHEN 325 MG/1
975 TABLET ORAL ONCE
Status: DISCONTINUED | OUTPATIENT
Start: 2021-11-15 | End: 2021-11-16 | Stop reason: HOSPADM

## 2021-11-15 RX ORDER — CIPROFLOXACIN 2 MG/ML
400 INJECTION, SOLUTION INTRAVENOUS ONCE
Status: COMPLETED | OUTPATIENT
Start: 2021-11-15 | End: 2021-11-15

## 2021-11-15 RX ADMIN — CIPROFLOXACIN 400 MG: 2 INJECTION, SOLUTION INTRAVENOUS at 17:39

## 2021-11-15 RX ADMIN — OCTREOTIDE ACETATE 100 MCG: 100 INJECTION, SOLUTION INTRAVENOUS; SUBCUTANEOUS at 19:26

## 2021-11-15 RX ADMIN — FENTANYL CITRATE 100 MCG: 50 INJECTION, SOLUTION INTRAMUSCULAR; INTRAVENOUS at 18:04

## 2021-11-15 RX ADMIN — SODIUM CHLORIDE 500 MG: 9 INJECTION, SOLUTION INTRAVENOUS at 18:28

## 2021-11-15 RX ADMIN — SODIUM CHLORIDE, SODIUM LACTATE, POTASSIUM CHLORIDE, CALCIUM CHLORIDE: 600; 310; 30; 20 INJECTION, SOLUTION INTRAVENOUS at 17:52

## 2021-11-15 RX ADMIN — HEPARIN SODIUM 2000 UNITS: 1000 INJECTION INTRAVENOUS; SUBCUTANEOUS at 22:33

## 2021-11-15 RX ADMIN — SODIUM CHLORIDE, SODIUM LACTATE, POTASSIUM CHLORIDE, CALCIUM CHLORIDE: 600; 310; 30; 20 INJECTION, SOLUTION INTRAVENOUS at 18:52

## 2021-11-15 RX ADMIN — ROCURONIUM BROMIDE 20 MG: 50 INJECTION, SOLUTION INTRAVENOUS at 19:27

## 2021-11-15 RX ADMIN — PROPOFOL 200 MG: 10 INJECTION, EMULSION INTRAVENOUS at 18:04

## 2021-11-15 RX ADMIN — ALBUMIN (HUMAN) 250 ML: 12.5 SOLUTION INTRAVENOUS at 22:58

## 2021-11-15 RX ADMIN — ROCURONIUM BROMIDE 20 MG: 50 INJECTION, SOLUTION INTRAVENOUS at 21:37

## 2021-11-15 RX ADMIN — MYCOPHENOLATE MOFETIL 1000 MG: 500 INJECTION, POWDER, LYOPHILIZED, FOR SOLUTION INTRAVENOUS at 19:18

## 2021-11-15 RX ADMIN — SODIUM CHLORIDE, SODIUM LACTATE, POTASSIUM CHLORIDE, CALCIUM CHLORIDE: 600; 310; 30; 20 INJECTION, SOLUTION INTRAVENOUS at 22:34

## 2021-11-15 RX ADMIN — MICAFUNGIN SODIUM 100 MG: 50 INJECTION, POWDER, LYOPHILIZED, FOR SOLUTION INTRAVENOUS at 17:52

## 2021-11-15 RX ADMIN — Medication 5 MG: at 18:24

## 2021-11-15 RX ADMIN — HUMAN INSULIN 2 UNITS/HR: 100 INJECTION, SOLUTION SUBCUTANEOUS at 20:20

## 2021-11-15 RX ADMIN — Medication 100 MG: at 18:04

## 2021-11-15 RX ADMIN — ANTI-THYMOCYTE GLOBULIN (RABBIT) 100 MG: 5 INJECTION, POWDER, LYOPHILIZED, FOR SOLUTION INTRAVENOUS at 19:18

## 2021-11-15 RX ADMIN — ROCURONIUM BROMIDE 20 MG: 50 INJECTION, SOLUTION INTRAVENOUS at 23:28

## 2021-11-15 RX ADMIN — SODIUM CHLORIDE, POTASSIUM CHLORIDE, SODIUM LACTATE AND CALCIUM CHLORIDE: 600; 310; 30; 20 INJECTION, SOLUTION INTRAVENOUS at 18:47

## 2021-11-15 RX ADMIN — METRONIDAZOLE 500 MG: 500 INJECTION, SOLUTION INTRAVENOUS at 17:52

## 2021-11-15 RX ADMIN — SODIUM CHLORIDE, SODIUM LACTATE, POTASSIUM CHLORIDE, CALCIUM CHLORIDE: 600; 310; 30; 20 INJECTION, SOLUTION INTRAVENOUS at 21:52

## 2021-11-15 RX ADMIN — PHENYLEPHRINE HYDROCHLORIDE 200 MCG: 10 INJECTION INTRAVENOUS at 18:15

## 2021-11-15 RX ADMIN — Medication 5 MG: at 18:29

## 2021-11-15 RX ADMIN — SODIUM CHLORIDE, SODIUM LACTATE, POTASSIUM CHLORIDE, CALCIUM CHLORIDE: 600; 310; 30; 20 INJECTION, SOLUTION INTRAVENOUS at 19:05

## 2021-11-15 RX ADMIN — ACETAMINOPHEN 975 MG: 325 TABLET, FILM COATED ORAL at 17:31

## 2021-11-15 RX ADMIN — PHENYLEPHRINE HYDROCHLORIDE 100 MCG: 10 INJECTION INTRAVENOUS at 18:29

## 2021-11-15 RX ADMIN — SODIUM CHLORIDE, SODIUM LACTATE, POTASSIUM CHLORIDE, CALCIUM CHLORIDE: 600; 310; 30; 20 INJECTION, SOLUTION INTRAVENOUS at 23:37

## 2021-11-15 RX ADMIN — PHENYLEPHRINE HYDROCHLORIDE 100 MCG: 10 INJECTION INTRAVENOUS at 18:24

## 2021-11-15 RX ADMIN — PHENYLEPHRINE HYDROCHLORIDE 100 MCG: 10 INJECTION INTRAVENOUS at 18:52

## 2021-11-15 RX ADMIN — FUROSEMIDE 40 MG: 10 INJECTION, SOLUTION INTRAVENOUS at 23:12

## 2021-11-15 RX ADMIN — LIDOCAINE HYDROCHLORIDE 100 MG: 20 INJECTION, SOLUTION INFILTRATION; PERINEURAL at 18:04

## 2021-11-15 RX ADMIN — PHENYLEPHRINE HYDROCHLORIDE 100 MCG: 10 INJECTION INTRAVENOUS at 18:35

## 2021-11-15 RX ADMIN — FENTANYL CITRATE 50 MCG: 50 INJECTION, SOLUTION INTRAMUSCULAR; INTRAVENOUS at 19:27

## 2021-11-15 RX ADMIN — ALBUMIN (HUMAN) 250 ML: 12.5 SOLUTION INTRAVENOUS at 22:13

## 2021-11-15 RX ADMIN — MANNITOL 12.5 G: 20 INJECTION, SOLUTION INTRAVENOUS at 23:12

## 2021-11-15 RX ADMIN — ROCURONIUM BROMIDE 30 MG: 50 INJECTION, SOLUTION INTRAVENOUS at 18:24

## 2021-11-15 ASSESSMENT — MIFFLIN-ST. JEOR: SCORE: 1257.5

## 2021-11-15 NOTE — TELEPHONE ENCOUNTER
"TRANSPLANT OR REPORT    Organ: KP  Laterality (if known): L Kid  Organ Location: Import    UNOS ID: [id number]  Donor OR Time: 0600pst  Expected/Actual Cross Clamp Time: 0730pst  Expected Organ Arrival Time: 1700cst    Surgeon: Manoj  Time in OR: 1700  Time in 3C (N/A for LI): 1600    Recipient Details  Admission ETA: 1400  Unit: 6B  Isolation: No  Latex Allergy: No  : N/A  Diagnosis: DM    Liver Transplants  Bypass: N/A  Hemodialysis: N/A  ~ \"RENAL STAFF TEACHING SERVICE MEDICINE\" : N/A  ~ CRRT Resource Nurse: N/A  (Telephone Number for CRRT [phone])    Kidney/Panc Transplants  XM Status (Need to wait for XM?): No    Liver or KP/PA Recipients - Vessel Banking:  Donor has positive serologies for HIV/HCV/HBV: No  Donor has risk criteria for HIV/HCV/HBV: No      Transplant Coordinator Contact Info: Steph [phone]      Vessel Bank Information  Transplant hospitals must not store a donor s extra vessels if the donor has tested positive for any of the following:   - HIV by antibody, antigen, or nucleic acid test (RUCHI)   - Hepatitis B surface antigen (HBsAg)   - Hepatitis B (HBV) by RUCHI   - Hepatitis C (HCV) by antibody or RUCHI     Extra vessels from donors that do not test positive for HIV, HBV, or HCV as above may be stored      "

## 2021-11-15 NOTE — ANESTHESIA PREPROCEDURE EVALUATION
Anesthesia Pre-Procedure Evaluation    Patient: Marilyn Ortega   MRN: [id number] : 1985        Preoperative Diagnosis: Type 1 diabetes (H) [E10.9]    Procedure : Procedure(s):  TRANSPLANT, KIDNEY AND PANCREAS,  DONOR          Past Medical History:   Diagnosis Date     C. difficile diarrhea      CKD (chronic kidney disease) stage 4, GFR 15-29 ml/min (H)      Gastroparesis      Gluten intolerance      Heterozygous factor V Leiden mutation (H)      HTN (hypertension)      Infection due to  novel coronavirus 2020     Osteomyelitis (H) 2013     Shingles      Type 1 diabetes mellitus (H)       Past Surgical History:   Procedure Laterality Date     CATARACT EXTRACTION  2021     ORIF HIP FRACTURE Left      ORTHOPEDIC SURGERY Bilateral     Repair of labral tear      Allergies   Allergen Reactions     Chlorhexidine Hives, Itching and Rash     PN: Patient had swelling/rash that was very itchy with chlorprep.     Ceclor [Cefaclor Monohydrate]      Cefaclor Rash      Social History     Tobacco Use     Smoking status: Never Smoker     Smokeless tobacco: Never Used   Substance Use Topics     Alcohol use: Yes     Comment: socially      Wt Readings from Last 1 Encounters:   21 55.3 kg (121 lb 14.4 oz)        Anesthesia Evaluation   Pt has had prior anesthetic. Type: General.    No history of anesthetic complications       ROS/MED HX  ENT/Pulmonary:  - neg pulmonary ROS     Neurologic:  - neg neurologic ROS     Cardiovascular:     (+) hypertension-----Previous cardiac testing   Echo: Date: Results:    Stress Test: Date: Results:  2021: baseline normal EF 55-60%.  ECG Reviewed: Date: Results:    Cath: Date: Results:      METS/Exercise Tolerance:     Hematologic: Comments: Factor v leiden      Musculoskeletal:       GI/Hepatic:  - neg GI/hepatic ROS     Renal/Genitourinary:     (+) renal disease, type: ESRD, Pt does not require dialysis,     Endo:     (+) type I DM, Using insulin, -  using insulin pump. Diabetic complications: nephropathy.     Psychiatric/Substance Use:  - neg psychiatric ROS     Infectious Disease:  - neg infectious disease ROS     Malignancy:       Other:            Physical Exam    Airway        Mallampati: I   TM distance: > 3 FB   Neck ROM: full   Mouth opening: > 3 cm    Respiratory Devices and Support         Dental  no notable dental history         Cardiovascular   cardiovascular exam normal          Pulmonary   pulmonary exam normal                OUTSIDE LABS:  CBC:   Lab Results   Component Value Date    WBC 8.9 11/15/2021    WBC 7.6 07/21/2021    HGB 11.0 (L) 11/15/2021    HGB 12.1 07/21/2021    HCT 33.8 (L) 11/15/2021    HCT 36.8 07/21/2021     11/15/2021     07/21/2021     BMP:   Lab Results   Component Value Date     11/15/2021     07/21/2021    POTASSIUM 3.2 (L) 11/15/2021    POTASSIUM 3.3 (L) 07/21/2021    CHLORIDE 109 11/15/2021    CHLORIDE 100 07/21/2021    CO2 19 (L) 11/15/2021    CO2 28 07/21/2021    BUN 71 (H) 11/15/2021    BUN 58 (H) 07/21/2021    CR 2.65 (H) 11/15/2021    CR 3.64 (H) 07/21/2021     (H) 11/15/2021     (H) 11/15/2021     COAGS:   Lab Results   Component Value Date    PTT 30 11/15/2021    INR 0.96 11/15/2021     POC:   Lab Results   Component Value Date    HCGS Negative 07/21/2021     HEPATIC:   Lab Results   Component Value Date    ALBUMIN 4.0 11/15/2021    PROTTOTAL 8.4 11/15/2021    ALT 23 11/15/2021    AST 16 11/15/2021    ALKPHOS 104 11/15/2021    BILITOTAL 0.4 11/15/2021     OTHER:   Lab Results   Component Value Date    A1C 7.1 (H) 07/21/2021    STEPHANIE 8.9 11/15/2021    PHOS 3.8 07/05/2012    MAG 2.0 07/05/2012    LIPASE 78 11/15/2021    AMYLASE 56 11/15/2021    TSH 3.30 10/16/2012    T4 1.04 10/16/2012    CRP 14.0 (H) 04/25/2012    SED 30 (H) 05/24/2012       Anesthesia Plan    ASA Status:  3   NPO Status:  NPO Appropriate    Anesthesia Type: General.     - Airway: ETT   Induction: RSI,  Intravenous.   Maintenance: Balanced.   Techniques and Equipment:     - Lines/Monitors: 2nd IV, Central Line, Arterial Line     Consents    Anesthesia Plan(s) and associated risks, benefits, and realistic alternatives discussed. Questions answered and patient/representative(s) expressed understanding.     - Discussed with:  Patient         Postoperative Care    Pain management: Multi-modal analgesia.   PONV prophylaxis: Ondansetron (or other 5HT-3)     Comments:                Masoud Vanessa MD

## 2021-11-15 NOTE — H&P
[facility]    History and Physical  Transplant Surgery     Date of Admission:  11/15/21    Assessment & Plan   Marilyn Ortega is a 36 year old female with history of DM type 1 since age 14, CKD (not on dialysis), HTN, gastroparesis, gluten intolerance, and heterozygous for Factor V Leiden mutation. She presents today for a possible  donor kidney and pancreas transplant.    -NO CHLORHEXIDINE due to allergy  -Labs, CXR, EKG, crossmatch, COVID PCR. PRA is 29%.  -Pre-op shower with regular soap  -NPO  -Remove pump and start insulin gtt  -Restart eye drop tapers post-op as per patient's chart    Joanne Winn NP    Attestation: I saw and examined this patient with Joanne Winn NP, and the transplant team. I independently reviewed all pertinent laboratory and imaging results. I agree with the findings and plan as documented in the note above.  -Branden Aranda MD     Chief Complaint    Organ transplant candidate    History of Present Illness   Marilyn Ortega is a 36 year old female with history of DM type 1 since age 14, CKD (not on dialysis), HTN, gastroparesis, gluten intolerance, and heterozygous for Factor V Leiden mutation. She is COVID vaccinated + booster. She presents today for a possible  donor kidney and pancreas transplant.    Patient feels well. Had left cataract removed last week, but no other recent medical issues. Denies symptoms of COVID-19. Denies CP, dyspnea, N/V/D, fevers, chills. Occasional hypoglycemic unawareness.     Past Medical History    I have reviewed this patient's medical history and updated it with pertinent information if needed.   Past Medical History:   Diagnosis Date     C. difficile diarrhea      CKD (chronic kidney disease) stage 4, GFR 15-29 ml/min (H)      Gastroparesis      Gluten intolerance      Heterozygous factor V Leiden mutation (H)      HTN (hypertension)      Infection due to 2019 novel coronavirus  11/2020     Osteomyelitis (H) 2013     Shingles 2013     Type 1 diabetes mellitus (H) 2000       Past Surgical History   I have reviewed this patient's surgical history and updated it with pertinent information if needed.  Past Surgical History:   Procedure Laterality Date     CATARACT EXTRACTION  11/2021     ORIF HIP FRACTURE Left      ORTHOPEDIC SURGERY Bilateral     Repair of labral tear       Prior to Admission Medications   Cannot display prior to admission medications because the patient has not been admitted in this contact.     Allergies   Allergies   Allergen Reactions     Chlorhexidine Hives, Itching and Rash     PN: Patient had swelling/rash that was very itchy with chlorprep.     Ceclor [Cefaclor Monohydrate]      Cefaclor Rash       Social History   I have reviewed this patient's social history and updated it with pertinent information if needed. Marilyn Ortega  reports that she has never smoked. She has never used smokeless tobacco. She reports current occasional social alcohol use. She reports that she does not use drugs.    Family History   I have reviewed this patient's family history and updated it with pertinent information if needed.   Family History   Problem Relation Age of Onset     Diabetes Paternal Grandfather      Arthritis Maternal Grandmother      Hypertension Maternal Grandmother      Cancer - colorectal Maternal Grandfather        Review of Systems   The 10 point Review of Systems is negative other than noted in the HPI or here.     Physical Exam                      Vital Signs with Ranges     Vital signs:                              Constitutional: NAD  Eyes: Clear, no icterus or injection  ENT: Deferred oral exam   Respiratory: CTA, unlabored  Cardiovascular: RRR, no murmur  GI: Soft, non tender, no scars, no hepatomegaly  Lymph/Hematologic: No edema  Genitourinary: No catheter  Skin: warm, pink, fissure and bruising on right 1st toe without erythema  Musculoskeletal: Strength  5/5  Neurologic: A&Ox4  Neuropsychiatric: Calm, appropriate    Data   Pending

## 2021-11-15 NOTE — PROGRESS NOTES
PATHOLOGY HLA CROSSMATCH CONSULTATION: DONOR/RECIPIENT  VIRTUAL CROSSMATCH - Kidney-pancreas  Consultation Date: 11/15/2021  Consultation Requested by: Dr. Alexander    Regarding: Compatibility of  donor organ UNOS #[id number] from OPO: [facility] with Marilyn Ortega    Findings: Regarding a virtual crossmatch between Marilyn Ortega and  donor listed above (match ID [id number]):  The most recent (10.20.21) and 1 additional patient serum/sera  were analyzed.  The patient has no antibodies listed with HLA specificity against the donor organ.      Record Review Indicates: I personally reviewed the most recent serum, the historic peak sera, and all other sera with solid-phase HLA Single Antigen test results:  The patient has no HLA antibodies against the donor organ.     The results of this virtual XM are:   -most recent serum: compatible   -peak #1: compatible      Disclaimer: Clinical judgement must take into account other factors, such as non-HLA antibodies not detected in the assay. The VXM gives probabilities only.  The probability does not account for the potential for auto-antibodies that may be present in the patient's serum.  These autoantibodies may render the physical crossmatch falsely positive, and would be detected by an autologous crossmatch.  When possible, confirm findings with prospective allogeneic and autologous flow crossmatches before going to transplant as clinically indicated.     This virtual crossmatch took 15 minutes for data collection, analysis and charting time    Rashmi Shi MD

## 2021-11-15 NOTE — PLAN OF CARE
Accepted pt around 1445 for pre-op kidney/pancreas transplant. Pt VSS, afebrile and A&Ox4. Pt has no complaints of pain. Skin checked with Beth RN, no skin issues noted. Pt went down for chest xray. Covid swab was negative. Pt had labs drawn and IV placed. Pt transferred down to 3C with preop checklist for the remainder to be completed.

## 2021-11-16 ENCOUNTER — APPOINTMENT (OUTPATIENT)
Dept: ULTRASOUND IMAGING | Facility: CLINIC | Age: 36
DRG: 008 | End: 2021-11-16
Attending: PHYSICIAN ASSISTANT
Payer: COMMERCIAL

## 2021-11-16 ENCOUNTER — APPOINTMENT (OUTPATIENT)
Dept: GENERAL RADIOLOGY | Facility: CLINIC | Age: 36
DRG: 008 | End: 2021-11-16
Attending: PHYSICIAN ASSISTANT
Payer: COMMERCIAL

## 2021-11-16 ENCOUNTER — DOCUMENTATION ONLY (OUTPATIENT)
Dept: TRANSPLANT | Facility: CLINIC | Age: 36
End: 2021-11-16
Payer: COMMERCIAL

## 2021-11-16 ENCOUNTER — APPOINTMENT (OUTPATIENT)
Dept: ULTRASOUND IMAGING | Facility: CLINIC | Age: 36
DRG: 008 | End: 2021-11-16
Attending: SURGERY
Payer: COMMERCIAL

## 2021-11-16 ENCOUNTER — APPOINTMENT (OUTPATIENT)
Dept: GENERAL RADIOLOGY | Facility: CLINIC | Age: 36
DRG: 008 | End: 2021-11-16
Attending: SURGERY
Payer: COMMERCIAL

## 2021-11-16 ENCOUNTER — LAB REQUISITION (OUTPATIENT)
Dept: LAB | Facility: CLINIC | Age: 36
End: 2021-11-16
Payer: COMMERCIAL

## 2021-11-16 PROBLEM — Z94.83 PANCREAS REPLACED BY TRANSPLANT (H): Status: ACTIVE | Noted: 2021-11-16

## 2021-11-16 LAB
AMYLASE SERPL-CCNC: 411 U/L (ref 30–110)
ANION GAP SERPL CALCULATED.3IONS-SCNC: 11 MMOL/L (ref 3–14)
APTT PPP: 30 SECONDS (ref 22–38)
ATRIAL RATE - MUSE: 98 BPM
BACTERIA UR CULT: NO GROWTH
BASOPHILS # BLD AUTO: 0 10E3/UL (ref 0–0.2)
BASOPHILS NFR BLD AUTO: 0 %
BLD PROD TYP BPU: NORMAL
BLOOD COMPONENT TYPE: NORMAL
BUN SERPL-MCNC: 53 MG/DL (ref 7–30)
CALCIUM SERPL-MCNC: 8.8 MG/DL (ref 8.5–10.1)
CHLORIDE BLD-SCNC: 107 MMOL/L (ref 94–109)
CMV IGG SERPL IA-ACNC: <0.2 U/ML
CMV IGG SERPL IA-ACNC: NORMAL
CMV IGM SERPL IA-ACNC: <8 AU/ML
CMV IGM SERPL IA-ACNC: NEGATIVE
CO2 SERPL-SCNC: 19 MMOL/L (ref 20–32)
CODING SYSTEM: NORMAL
CREAT SERPL-MCNC: 2 MG/DL (ref 0.52–1.04)
CROSSMATCH: NORMAL
DIASTOLIC BLOOD PRESSURE - MUSE: NORMAL MMHG
DONOR IDENTIFICATION: NORMAL
DSA COMMENTS: NORMAL
DSA PRESENT: NO
DSA TEST METHOD: NORMAL
EBV VCA IGM SER IA-ACNC: 34.6 U/ML
EBV VCA IGM SER IA-ACNC: NORMAL
EOSINOPHIL # BLD AUTO: 0 10E3/UL (ref 0–0.7)
EOSINOPHIL NFR BLD AUTO: 0 %
ERYTHROCYTE [DISTWIDTH] IN BLOOD BY AUTOMATED COUNT: 14.8 % (ref 10–15)
ERYTHROCYTE [DISTWIDTH] IN BLOOD BY AUTOMATED COUNT: 15.3 % (ref 10–15)
GFR SERPL CREATININE-BSD FRML MDRD: 31 ML/MIN/1.73M2
GLUCOSE BLD-MCNC: 90 MG/DL (ref 70–99)
GLUCOSE BLDC GLUCOMTR-MCNC: 101 MG/DL (ref 70–99)
GLUCOSE BLDC GLUCOMTR-MCNC: 102 MG/DL (ref 70–99)
GLUCOSE BLDC GLUCOMTR-MCNC: 102 MG/DL (ref 70–99)
GLUCOSE BLDC GLUCOMTR-MCNC: 103 MG/DL (ref 70–99)
GLUCOSE BLDC GLUCOMTR-MCNC: 104 MG/DL (ref 70–99)
GLUCOSE BLDC GLUCOMTR-MCNC: 106 MG/DL (ref 70–99)
GLUCOSE BLDC GLUCOMTR-MCNC: 107 MG/DL (ref 70–99)
GLUCOSE BLDC GLUCOMTR-MCNC: 109 MG/DL (ref 70–99)
GLUCOSE BLDC GLUCOMTR-MCNC: 110 MG/DL (ref 70–99)
GLUCOSE BLDC GLUCOMTR-MCNC: 110 MG/DL (ref 70–99)
GLUCOSE BLDC GLUCOMTR-MCNC: 111 MG/DL (ref 70–99)
GLUCOSE BLDC GLUCOMTR-MCNC: 113 MG/DL (ref 70–99)
GLUCOSE BLDC GLUCOMTR-MCNC: 121 MG/DL (ref 70–99)
GLUCOSE BLDC GLUCOMTR-MCNC: 124 MG/DL (ref 70–99)
GLUCOSE BLDC GLUCOMTR-MCNC: 130 MG/DL (ref 70–99)
GLUCOSE BLDC GLUCOMTR-MCNC: 131 MG/DL (ref 70–99)
GLUCOSE BLDC GLUCOMTR-MCNC: 132 MG/DL (ref 70–99)
GLUCOSE BLDC GLUCOMTR-MCNC: 60 MG/DL (ref 70–99)
GLUCOSE BLDC GLUCOMTR-MCNC: 87 MG/DL (ref 70–99)
GLUCOSE BLDC GLUCOMTR-MCNC: 90 MG/DL (ref 70–99)
GLUCOSE BLDC GLUCOMTR-MCNC: 92 MG/DL (ref 70–99)
HBV CORE AB SERPL QL IA: NONREACTIVE
HBV SURFACE AB SERPL IA-ACNC: 88.48 M[IU]/ML
HBV SURFACE AG SERPL QL IA: NONREACTIVE
HCT VFR BLD AUTO: 31.4 % (ref 35–47)
HCT VFR BLD AUTO: 32.2 % (ref 35–47)
HCV AB SERPL QL IA: NONREACTIVE
HGB BLD-MCNC: 10 G/DL (ref 11.7–15.7)
HGB BLD-MCNC: 10.2 G/DL (ref 11.7–15.7)
HGB BLD-MCNC: 10.8 G/DL (ref 11.7–15.7)
HGB BLD-MCNC: 8.5 G/DL (ref 11.7–15.7)
HGB BLD-MCNC: 9.2 G/DL (ref 11.7–15.7)
HIV 1+2 AB+HIV1 P24 AG SERPL QL IA: NONREACTIVE
IMM GRANULOCYTES # BLD: 0.1 10E3/UL
IMM GRANULOCYTES NFR BLD: 0 %
INR PPP: 1.11 (ref 0.85–1.15)
INTERPRETATION ECG - MUSE: NORMAL
ISSUE DATE AND TIME: NORMAL
LIPASE SERPL-CCNC: 4205 U/L (ref 73–393)
LYMPHOCYTES # BLD AUTO: 0.1 10E3/UL (ref 0.8–5.3)
LYMPHOCYTES NFR BLD AUTO: 0 %
MAGNESIUM SERPL-MCNC: 1.7 MG/DL (ref 1.6–2.3)
MCH RBC QN AUTO: 27.6 PG (ref 26.5–33)
MCH RBC QN AUTO: 27.8 PG (ref 26.5–33)
MCHC RBC AUTO-ENTMCNC: 32.5 G/DL (ref 31.5–36.5)
MCHC RBC AUTO-ENTMCNC: 33.5 G/DL (ref 31.5–36.5)
MCV RBC AUTO: 83 FL (ref 78–100)
MCV RBC AUTO: 85 FL (ref 78–100)
MONOCYTES # BLD AUTO: 0.3 10E3/UL (ref 0–1.3)
MONOCYTES NFR BLD AUTO: 2 %
NEUTROPHILS # BLD AUTO: 15.4 10E3/UL (ref 1.6–8.3)
NEUTROPHILS NFR BLD AUTO: 98 %
NRBC # BLD AUTO: 0 10E3/UL
NRBC BLD AUTO-RTO: 0 /100
ORGAN: NORMAL
P AXIS - MUSE: 77 DEGREES
PHOSPHATE SERPL-MCNC: 3.2 MG/DL (ref 2.5–4.5)
PLATELET # BLD AUTO: 176 10E3/UL (ref 150–450)
PLATELET # BLD AUTO: 183 10E3/UL (ref 150–450)
POTASSIUM BLD-SCNC: 3.1 MMOL/L (ref 3.4–5.3)
POTASSIUM BLD-SCNC: 3.3 MMOL/L (ref 3.4–5.3)
POTASSIUM BLD-SCNC: 3.4 MMOL/L (ref 3.4–5.3)
POTASSIUM BLD-SCNC: 3.4 MMOL/L (ref 3.4–5.3)
PR INTERVAL - MUSE: 130 MS
QRS DURATION - MUSE: 90 MS
QT - MUSE: 352 MS
QTC - MUSE: 449 MS
R AXIS - MUSE: 74 DEGREES
RBC # BLD AUTO: 3.69 10E6/UL (ref 3.8–5.2)
RBC # BLD AUTO: 3.89 10E6/UL (ref 3.8–5.2)
SA 1 CELL: NORMAL
SA 1 TEST METHOD: NORMAL
SA 2 CELL: NORMAL
SA 2 TEST METHOD: NORMAL
SA1 HI RISK ABY: NORMAL
SA1 MOD RISK ABY: NORMAL
SA2 HI RISK ABY: NORMAL
SA2 MOD RISK ABY: NORMAL
SODIUM SERPL-SCNC: 137 MMOL/L (ref 133–144)
SYSTOLIC BLOOD PRESSURE - MUSE: NORMAL MMHG
T AXIS - MUSE: 59 DEGREES
UNACCEPTABLE ANTIGENS: NORMAL
UNIT ABO/RH: NORMAL
UNIT NUMBER: NORMAL
UNIT STATUS: NORMAL
UNIT TYPE ISBT: 5100
UNOS CPRA: 32
VENTRICULAR RATE- MUSE: 98 BPM
WBC # BLD AUTO: 15.8 10E3/UL (ref 4–11)
WBC # BLD AUTO: 17.3 10E3/UL (ref 4–11)
ZZZSA 1  COMMENTS: NORMAL
ZZZSA 2 COMMENTS: NORMAL

## 2021-11-16 PROCEDURE — 76776 US EXAM K TRANSPL W/DOPPLER: CPT

## 2021-11-16 PROCEDURE — 76705 ECHO EXAM OF ABDOMEN: CPT | Mod: 77

## 2021-11-16 PROCEDURE — P9041 ALBUMIN (HUMAN),5%, 50ML: HCPCS | Performed by: PHYSICIAN ASSISTANT

## 2021-11-16 PROCEDURE — 80048 BASIC METABOLIC PNL TOTAL CA: CPT | Performed by: SURGERY

## 2021-11-16 PROCEDURE — 82962 GLUCOSE BLOOD TEST: CPT

## 2021-11-16 PROCEDURE — 76776 US EXAM K TRANSPL W/DOPPLER: CPT | Mod: 26 | Performed by: RADIOLOGY

## 2021-11-16 PROCEDURE — 84100 ASSAY OF PHOSPHORUS: CPT | Performed by: SURGERY

## 2021-11-16 PROCEDURE — 85018 HEMOGLOBIN: CPT | Performed by: SURGERY

## 2021-11-16 PROCEDURE — 93976 VASCULAR STUDY: CPT

## 2021-11-16 PROCEDURE — 120N000003 HC R&B IMCU UMMC

## 2021-11-16 PROCEDURE — 93976 VASCULAR STUDY: CPT | Mod: 26 | Performed by: RADIOLOGY

## 2021-11-16 PROCEDURE — 83735 ASSAY OF MAGNESIUM: CPT | Performed by: SURGERY

## 2021-11-16 PROCEDURE — 76776 US EXAM K TRANSPL W/DOPPLER: CPT | Mod: 26

## 2021-11-16 PROCEDURE — 250N000012 HC RX MED GY IP 250 OP 636 PS 637: Performed by: SURGERY

## 2021-11-16 PROCEDURE — 258N000003 HC RX IP 258 OP 636: Performed by: PHYSICIAN ASSISTANT

## 2021-11-16 PROCEDURE — 250N000009 HC RX 250: Performed by: STUDENT IN AN ORGANIZED HEALTH CARE EDUCATION/TRAINING PROGRAM

## 2021-11-16 PROCEDURE — 84132 ASSAY OF SERUM POTASSIUM: CPT | Performed by: SURGERY

## 2021-11-16 PROCEDURE — 250N000011 HC RX IP 250 OP 636: Performed by: STUDENT IN AN ORGANIZED HEALTH CARE EDUCATION/TRAINING PROGRAM

## 2021-11-16 PROCEDURE — 85730 THROMBOPLASTIN TIME PARTIAL: CPT | Performed by: SURGERY

## 2021-11-16 PROCEDURE — 36592 COLLECT BLOOD FROM PICC: CPT | Performed by: SURGERY

## 2021-11-16 PROCEDURE — 250N000011 HC RX IP 250 OP 636

## 2021-11-16 PROCEDURE — P9016 RBC LEUKOCYTES REDUCED: HCPCS | Performed by: SURGERY

## 2021-11-16 PROCEDURE — 250N000013 HC RX MED GY IP 250 OP 250 PS 637: Performed by: SURGERY

## 2021-11-16 PROCEDURE — 83690 ASSAY OF LIPASE: CPT | Performed by: SURGERY

## 2021-11-16 PROCEDURE — 36592 COLLECT BLOOD FROM PICC: CPT | Performed by: PHYSICIAN ASSISTANT

## 2021-11-16 PROCEDURE — 250N000009 HC RX 250: Performed by: NURSE PRACTITIONER

## 2021-11-16 PROCEDURE — 258N000002 HC RX IP 258 OP 250: Performed by: SURGERY

## 2021-11-16 PROCEDURE — 250N000011 HC RX IP 250 OP 636: Performed by: PHYSICIAN ASSISTANT

## 2021-11-16 PROCEDURE — 85018 HEMOGLOBIN: CPT | Performed by: PHYSICIAN ASSISTANT

## 2021-11-16 PROCEDURE — 258N000001 HC RX 258: Performed by: STUDENT IN AN ORGANIZED HEALTH CARE EDUCATION/TRAINING PROGRAM

## 2021-11-16 PROCEDURE — 250N000009 HC RX 250: Performed by: SURGERY

## 2021-11-16 PROCEDURE — 85610 PROTHROMBIN TIME: CPT | Performed by: SURGERY

## 2021-11-16 PROCEDURE — 250N000013 HC RX MED GY IP 250 OP 250 PS 637: Performed by: PHYSICIAN ASSISTANT

## 2021-11-16 PROCEDURE — 250N000011 HC RX IP 250 OP 636: Performed by: SURGERY

## 2021-11-16 PROCEDURE — 76776 US EXAM K TRANSPL W/DOPPLER: CPT | Mod: 77

## 2021-11-16 PROCEDURE — 99222 1ST HOSP IP/OBS MODERATE 55: CPT | Mod: GC | Performed by: INTERNAL MEDICINE

## 2021-11-16 PROCEDURE — 74018 RADEX ABDOMEN 1 VIEW: CPT | Mod: 26 | Performed by: RADIOLOGY

## 2021-11-16 PROCEDURE — 85027 COMPLETE CBC AUTOMATED: CPT | Performed by: PHYSICIAN ASSISTANT

## 2021-11-16 PROCEDURE — 82150 ASSAY OF AMYLASE: CPT | Performed by: SURGERY

## 2021-11-16 PROCEDURE — 71045 X-RAY EXAM CHEST 1 VIEW: CPT | Mod: 26 | Performed by: RADIOLOGY

## 2021-11-16 PROCEDURE — 999N000065 XR ABDOMEN PORT 1 VIEWS

## 2021-11-16 PROCEDURE — 86923 COMPATIBILITY TEST ELECTRIC: CPT | Performed by: SURGERY

## 2021-11-16 PROCEDURE — 258N000003 HC RX IP 258 OP 636: Performed by: SURGERY

## 2021-11-16 PROCEDURE — 85025 COMPLETE CBC W/AUTO DIFF WBC: CPT | Performed by: SURGERY

## 2021-11-16 PROCEDURE — 999N000063 XR CHEST PORT 1 VIEW

## 2021-11-16 DEVICE — STENT URETERAL DBL PIGTAIL INLAY 6FRX16CM G14865
Type: IMPLANTABLE DEVICE | Site: URETER | Status: NON-FUNCTIONAL
Removed: 2021-12-16

## 2021-11-16 RX ORDER — SULFAMETHOXAZOLE AND TRIMETHOPRIM 200; 40 MG/5ML; MG/5ML
10 SUSPENSION ORAL DAILY
Status: DISCONTINUED | OUTPATIENT
Start: 2021-11-16 | End: 2021-11-19

## 2021-11-16 RX ORDER — MYCOPHENOLATE MOFETIL 200 MG/ML
1000 POWDER, FOR SUSPENSION ORAL
Status: DISCONTINUED | OUTPATIENT
Start: 2021-11-16 | End: 2021-11-19

## 2021-11-16 RX ORDER — ONDANSETRON 2 MG/ML
4 INJECTION INTRAMUSCULAR; INTRAVENOUS EVERY 6 HOURS PRN
Status: DISCONTINUED | OUTPATIENT
Start: 2021-11-16 | End: 2021-11-22 | Stop reason: HOSPADM

## 2021-11-16 RX ORDER — ONDANSETRON 2 MG/ML
4 INJECTION INTRAMUSCULAR; INTRAVENOUS EVERY 30 MIN PRN
Status: DISCONTINUED | OUTPATIENT
Start: 2021-11-16 | End: 2021-11-16 | Stop reason: HOSPADM

## 2021-11-16 RX ORDER — POTASSIUM CHLORIDE 20MEQ/15ML
40 LIQUID (ML) ORAL ONCE
Status: COMPLETED | OUTPATIENT
Start: 2021-11-16 | End: 2021-11-16

## 2021-11-16 RX ORDER — DEXTROSE MONOHYDRATE 100 MG/ML
INJECTION, SOLUTION INTRAVENOUS CONTINUOUS PRN
Status: DISCONTINUED | OUTPATIENT
Start: 2021-11-16 | End: 2021-11-22 | Stop reason: HOSPADM

## 2021-11-16 RX ORDER — POTASSIUM CHLORIDE 20MEQ/15ML
40 LIQUID (ML) ORAL ONCE
Status: COMPLETED | OUTPATIENT
Start: 2021-11-17 | End: 2021-11-17

## 2021-11-16 RX ORDER — SODIUM CHLORIDE, SODIUM LACTATE, POTASSIUM CHLORIDE, CALCIUM CHLORIDE 600; 310; 30; 20 MG/100ML; MG/100ML; MG/100ML; MG/100ML
INJECTION, SOLUTION INTRAVENOUS CONTINUOUS
Status: DISCONTINUED | OUTPATIENT
Start: 2021-11-16 | End: 2021-11-16 | Stop reason: HOSPADM

## 2021-11-16 RX ORDER — PROCHLORPERAZINE MALEATE 5 MG
10 TABLET ORAL EVERY 6 HOURS PRN
Status: DISCONTINUED | OUTPATIENT
Start: 2021-11-16 | End: 2021-11-22 | Stop reason: HOSPADM

## 2021-11-16 RX ORDER — ASPIRIN 81 MG/1
81 TABLET, CHEWABLE ORAL DAILY
Status: DISCONTINUED | OUTPATIENT
Start: 2021-11-16 | End: 2021-11-21

## 2021-11-16 RX ORDER — CIPROFLOXACIN 2 MG/ML
400 INJECTION, SOLUTION INTRAVENOUS EVERY 24 HOURS
Status: DISCONTINUED | OUTPATIENT
Start: 2021-11-16 | End: 2021-11-16

## 2021-11-16 RX ORDER — DEXTROSE MONOHYDRATE 25 G/50ML
25-50 INJECTION, SOLUTION INTRAVENOUS
Status: DISCONTINUED | OUTPATIENT
Start: 2021-11-16 | End: 2021-11-22 | Stop reason: HOSPADM

## 2021-11-16 RX ORDER — OCTREOTIDE ACETATE 100 UG/ML
100 INJECTION, SOLUTION INTRAVENOUS; SUBCUTANEOUS EVERY 12 HOURS
Status: DISCONTINUED | OUTPATIENT
Start: 2021-11-16 | End: 2021-11-16

## 2021-11-16 RX ORDER — HYDROMORPHONE HYDROCHLORIDE 1 MG/ML
0.2 INJECTION, SOLUTION INTRAMUSCULAR; INTRAVENOUS; SUBCUTANEOUS EVERY 5 MIN PRN
Status: DISCONTINUED | OUTPATIENT
Start: 2021-11-16 | End: 2021-11-16 | Stop reason: HOSPADM

## 2021-11-16 RX ORDER — HEPARIN SODIUM (PORCINE) LOCK FLUSH IV SOLN 100 UNIT/ML 100 UNIT/ML
1000 SOLUTION INTRAVENOUS ONCE
Status: COMPLETED | OUTPATIENT
Start: 2021-11-16 | End: 2021-11-16

## 2021-11-16 RX ORDER — NALOXONE HYDROCHLORIDE 0.4 MG/ML
0.2 INJECTION, SOLUTION INTRAMUSCULAR; INTRAVENOUS; SUBCUTANEOUS
Status: DISCONTINUED | OUTPATIENT
Start: 2021-11-16 | End: 2021-11-22 | Stop reason: HOSPADM

## 2021-11-16 RX ORDER — NALOXONE HYDROCHLORIDE 0.4 MG/ML
0.4 INJECTION, SOLUTION INTRAMUSCULAR; INTRAVENOUS; SUBCUTANEOUS
Status: DISCONTINUED | OUTPATIENT
Start: 2021-11-16 | End: 2021-11-22 | Stop reason: HOSPADM

## 2021-11-16 RX ORDER — MAGNESIUM OXIDE 400 MG/1
400 TABLET ORAL EVERY 24 HOURS
Status: DISCONTINUED | OUTPATIENT
Start: 2021-11-17 | End: 2021-11-18

## 2021-11-16 RX ORDER — MAGNESIUM OXIDE 400 MG/1
400 TABLET ORAL EVERY 24 HOURS
Status: DISCONTINUED | OUTPATIENT
Start: 2021-11-18 | End: 2021-11-16

## 2021-11-16 RX ORDER — CHLOROTHIAZIDE SODIUM 500 MG/1
INJECTION INTRAVENOUS PRN
Status: DISCONTINUED | OUTPATIENT
Start: 2021-11-16 | End: 2021-11-16

## 2021-11-16 RX ORDER — ONDANSETRON 2 MG/ML
4 INJECTION INTRAMUSCULAR; INTRAVENOUS EVERY 6 HOURS
Status: DISCONTINUED | OUTPATIENT
Start: 2021-11-16 | End: 2021-11-19

## 2021-11-16 RX ORDER — HEPARIN SODIUM 10000 [USP'U]/100ML
600 INJECTION, SOLUTION INTRAVENOUS CONTINUOUS
Status: DISCONTINUED | OUTPATIENT
Start: 2021-11-16 | End: 2021-11-21

## 2021-11-16 RX ORDER — FENTANYL CITRATE 50 UG/ML
25 INJECTION, SOLUTION INTRAMUSCULAR; INTRAVENOUS EVERY 5 MIN PRN
Status: DISCONTINUED | OUTPATIENT
Start: 2021-11-16 | End: 2021-11-16 | Stop reason: HOSPADM

## 2021-11-16 RX ORDER — ONDANSETRON 4 MG/1
4 TABLET, ORALLY DISINTEGRATING ORAL EVERY 6 HOURS PRN
Status: DISCONTINUED | OUTPATIENT
Start: 2021-11-16 | End: 2021-11-22 | Stop reason: HOSPADM

## 2021-11-16 RX ORDER — PIPERACILLIN SODIUM, TAZOBACTAM SODIUM 2; .25 G/10ML; G/10ML
2.25 INJECTION, POWDER, LYOPHILIZED, FOR SOLUTION INTRAVENOUS EVERY 6 HOURS
Status: DISCONTINUED | OUTPATIENT
Start: 2021-11-16 | End: 2021-11-17

## 2021-11-16 RX ORDER — FUROSEMIDE 10 MG/ML
80 INJECTION INTRAMUSCULAR; INTRAVENOUS
Status: DISCONTINUED | OUTPATIENT
Start: 2021-11-16 | End: 2021-11-16

## 2021-11-16 RX ORDER — CLOTRIMAZOLE 10 MG/1
10 LOZENGE ORAL 4 TIMES DAILY
Status: DISCONTINUED | OUTPATIENT
Start: 2021-11-16 | End: 2021-11-22 | Stop reason: HOSPADM

## 2021-11-16 RX ORDER — ACETAMINOPHEN 325 MG/1
650 TABLET ORAL EVERY 4 HOURS PRN
Status: DISCONTINUED | OUTPATIENT
Start: 2021-11-19 | End: 2021-11-22 | Stop reason: HOSPADM

## 2021-11-16 RX ORDER — OXYCODONE HYDROCHLORIDE 5 MG/1
5 TABLET ORAL EVERY 4 HOURS PRN
Status: DISCONTINUED | OUTPATIENT
Start: 2021-11-16 | End: 2021-11-16 | Stop reason: HOSPADM

## 2021-11-16 RX ORDER — ALBUMIN, HUMAN INJ 5% 5 %
50 SOLUTION INTRAVENOUS ONCE
Status: COMPLETED | OUTPATIENT
Start: 2021-11-16 | End: 2021-11-16

## 2021-11-16 RX ORDER — HEPARIN SODIUM 10000 [USP'U]/100ML
200 INJECTION, SOLUTION INTRAVENOUS CONTINUOUS
Status: DISCONTINUED | OUTPATIENT
Start: 2021-11-16 | End: 2021-11-16

## 2021-11-16 RX ORDER — POLYETHYLENE GLYCOL 3350 17 G/17G
17 POWDER, FOR SOLUTION ORAL DAILY
Status: DISCONTINUED | OUTPATIENT
Start: 2021-11-17 | End: 2021-11-20

## 2021-11-16 RX ORDER — VALGANCICLOVIR 450 MG/1
450 TABLET, FILM COATED ORAL EVERY OTHER DAY
Status: DISCONTINUED | OUTPATIENT
Start: 2021-11-16 | End: 2021-11-17

## 2021-11-16 RX ORDER — METHOCARBAMOL 750 MG/1
750 TABLET, FILM COATED ORAL EVERY 6 HOURS PRN
Status: DISCONTINUED | OUTPATIENT
Start: 2021-11-16 | End: 2021-11-22 | Stop reason: HOSPADM

## 2021-11-16 RX ORDER — MYCOPHENOLATE MOFETIL 200 MG/ML
1000 POWDER, FOR SUSPENSION ORAL 2 TIMES DAILY
Status: DISCONTINUED | OUTPATIENT
Start: 2021-11-16 | End: 2021-11-16

## 2021-11-16 RX ORDER — POTASSIUM CHLORIDE 20MEQ/15ML
40 LIQUID (ML) ORAL DAILY
Status: DISCONTINUED | OUTPATIENT
Start: 2021-11-17 | End: 2021-11-16

## 2021-11-16 RX ORDER — ONDANSETRON 4 MG/1
4 TABLET, ORALLY DISINTEGRATING ORAL EVERY 30 MIN PRN
Status: DISCONTINUED | OUTPATIENT
Start: 2021-11-16 | End: 2021-11-16 | Stop reason: HOSPADM

## 2021-11-16 RX ORDER — ACETAMINOPHEN 325 MG/1
975 TABLET ORAL EVERY 8 HOURS
Status: COMPLETED | OUTPATIENT
Start: 2021-11-16 | End: 2021-11-19

## 2021-11-16 RX ORDER — CALCIUM CHLORIDE 100 MG/ML
INJECTION INTRAVENOUS; INTRAVENTRICULAR PRN
Status: DISCONTINUED | OUTPATIENT
Start: 2021-11-16 | End: 2021-11-16

## 2021-11-16 RX ORDER — AMOXICILLIN 250 MG
1 CAPSULE ORAL 2 TIMES DAILY
Status: DISCONTINUED | OUTPATIENT
Start: 2021-11-16 | End: 2021-11-17

## 2021-11-16 RX ORDER — ONDANSETRON 2 MG/ML
INJECTION INTRAMUSCULAR; INTRAVENOUS PRN
Status: DISCONTINUED | OUTPATIENT
Start: 2021-11-16 | End: 2021-11-16

## 2021-11-16 RX ORDER — MYCOPHENOLATE MOFETIL 200 MG/ML
1000 POWDER, FOR SUSPENSION ORAL
Status: DISCONTINUED | OUTPATIENT
Start: 2021-11-16 | End: 2021-11-16

## 2021-11-16 RX ORDER — NICOTINE POLACRILEX 4 MG
15-30 LOZENGE BUCCAL
Status: DISCONTINUED | OUTPATIENT
Start: 2021-11-16 | End: 2021-11-22 | Stop reason: HOSPADM

## 2021-11-16 RX ORDER — POTASSIUM CHLORIDE 20MEQ/15ML
20 LIQUID (ML) ORAL ONCE
Status: COMPLETED | OUTPATIENT
Start: 2021-11-16 | End: 2021-11-16

## 2021-11-16 RX ORDER — HYDROMORPHONE HCL IN WATER/PF 6 MG/30 ML
0.2 PATIENT CONTROLLED ANALGESIA SYRINGE INTRAVENOUS
Status: DISCONTINUED | OUTPATIENT
Start: 2021-11-16 | End: 2021-11-21

## 2021-11-16 RX ORDER — OCTREOTIDE ACETATE 100 UG/ML
100 INJECTION, SOLUTION INTRAVENOUS; SUBCUTANEOUS 3 TIMES DAILY
Status: DISCONTINUED | OUTPATIENT
Start: 2021-11-16 | End: 2021-11-17

## 2021-11-16 RX ORDER — DEXTROSE MONOHYDRATE 25 G/50ML
INJECTION, SOLUTION INTRAVENOUS PRN
Status: DISCONTINUED | OUTPATIENT
Start: 2021-11-16 | End: 2021-11-16

## 2021-11-16 RX ORDER — HYDROMORPHONE HCL IN WATER/PF 6 MG/30 ML
0.4 PATIENT CONTROLLED ANALGESIA SYRINGE INTRAVENOUS
Status: DISCONTINUED | OUTPATIENT
Start: 2021-11-16 | End: 2021-11-21

## 2021-11-16 RX ORDER — CHLOROTHIAZIDE SODIUM 500 MG/1
500 INJECTION INTRAVENOUS ONCE
Status: DISCONTINUED | OUTPATIENT
Start: 2021-11-16 | End: 2021-11-16

## 2021-11-16 RX ORDER — BISACODYL 10 MG
10 SUPPOSITORY, RECTAL RECTAL DAILY PRN
Status: DISCONTINUED | OUTPATIENT
Start: 2021-11-16 | End: 2021-11-22 | Stop reason: HOSPADM

## 2021-11-16 RX ADMIN — MANNITOL 25 G: 20 INJECTION, SOLUTION INTRAVENOUS at 01:34

## 2021-11-16 RX ADMIN — FENTANYL CITRATE 50 MCG: 50 INJECTION, SOLUTION INTRAMUSCULAR; INTRAVENOUS at 00:23

## 2021-11-16 RX ADMIN — MYCOPHENOLATE MOFETIL 1000 MG: 200 POWDER, FOR SUSPENSION ORAL at 09:29

## 2021-11-16 RX ADMIN — HEPARIN SODIUM 200 UNITS/HR: 10000 INJECTION, SOLUTION INTRAVENOUS at 09:29

## 2021-11-16 RX ADMIN — MYCOPHENOLATE MOFETIL 1000 MG: 200 POWDER, FOR SUSPENSION ORAL at 18:08

## 2021-11-16 RX ADMIN — MICAFUNGIN SODIUM 100 MG: 50 INJECTION, POWDER, LYOPHILIZED, FOR SOLUTION INTRAVENOUS at 08:24

## 2021-11-16 RX ADMIN — ROCURONIUM BROMIDE 10 MG: 50 INJECTION, SOLUTION INTRAVENOUS at 01:02

## 2021-11-16 RX ADMIN — HYDROMORPHONE HYDROCHLORIDE 0.2 MG: 1 INJECTION, SOLUTION INTRAMUSCULAR; INTRAVENOUS; SUBCUTANEOUS at 07:24

## 2021-11-16 RX ADMIN — ONDANSETRON 4 MG: 2 INJECTION INTRAMUSCULAR; INTRAVENOUS at 16:50

## 2021-11-16 RX ADMIN — ACETAMINOPHEN 975 MG: 325 TABLET, FILM COATED ORAL at 09:00

## 2021-11-16 RX ADMIN — ASPIRIN 81 MG CHEWABLE TABLET 81 MG: 81 TABLET CHEWABLE at 09:35

## 2021-11-16 RX ADMIN — METRONIDAZOLE 500 MG: 500 INJECTION, SOLUTION INTRAVENOUS at 21:18

## 2021-11-16 RX ADMIN — METRONIDAZOLE 500 MG: 500 INJECTION, SOLUTION INTRAVENOUS at 08:33

## 2021-11-16 RX ADMIN — ROCURONIUM BROMIDE 10 MG: 50 INJECTION, SOLUTION INTRAVENOUS at 02:35

## 2021-11-16 RX ADMIN — HYDROMORPHONE HYDROCHLORIDE 0.3 MG: 1 INJECTION, SOLUTION INTRAMUSCULAR; INTRAVENOUS; SUBCUTANEOUS at 02:53

## 2021-11-16 RX ADMIN — ACETAMINOPHEN 975 MG: 325 TABLET, FILM COATED ORAL at 16:50

## 2021-11-16 RX ADMIN — SODIUM CHLORIDE, SODIUM LACTATE, POTASSIUM CHLORIDE, CALCIUM CHLORIDE: 600; 310; 30; 20 INJECTION, SOLUTION INTRAVENOUS at 03:01

## 2021-11-16 RX ADMIN — SODIUM CHLORIDE, SODIUM LACTATE, POTASSIUM CHLORIDE, CALCIUM CHLORIDE AND DEXTROSE MONOHYDRATE: 5; 600; 310; 30; 20 INJECTION, SOLUTION INTRAVENOUS at 13:24

## 2021-11-16 RX ADMIN — SODIUM CHLORIDE, SODIUM LACTATE, POTASSIUM CHLORIDE, CALCIUM CHLORIDE: 600; 310; 30; 20 INJECTION, SOLUTION INTRAVENOUS at 01:09

## 2021-11-16 RX ADMIN — SODIUM CHLORIDE: 9 INJECTION, SOLUTION INTRAVENOUS at 04:01

## 2021-11-16 RX ADMIN — SULFAMETHOXAZOLE AND TRIMETHOPRIM 80 MG: 200; 40 SUSPENSION ORAL at 11:08

## 2021-11-16 RX ADMIN — PROCHLORPERAZINE EDISYLATE 5 MG: 5 INJECTION INTRAMUSCULAR; INTRAVENOUS at 04:20

## 2021-11-16 RX ADMIN — SENNOSIDES AND DOCUSATE SODIUM 1 TABLET: 8.6; 5 TABLET ORAL at 09:00

## 2021-11-16 RX ADMIN — OCTREOTIDE ACETATE 100 MCG: 100 INJECTION, SOLUTION INTRAVENOUS; SUBCUTANEOUS at 09:57

## 2021-11-16 RX ADMIN — ONDANSETRON 4 MG: 2 INJECTION INTRAMUSCULAR; INTRAVENOUS at 22:03

## 2021-11-16 RX ADMIN — HYDROMORPHONE HYDROCHLORIDE 0.4 MG: 0.2 INJECTION, SOLUTION INTRAMUSCULAR; INTRAVENOUS; SUBCUTANEOUS at 20:58

## 2021-11-16 RX ADMIN — SODIUM CHLORIDE, SODIUM LACTATE, POTASSIUM CHLORIDE, CALCIUM CHLORIDE AND DEXTROSE MONOHYDRATE: 5; 600; 310; 30; 20 INJECTION, SOLUTION INTRAVENOUS at 03:49

## 2021-11-16 RX ADMIN — SODIUM CHLORIDE: 4.5 INJECTION, SOLUTION INTRAVENOUS at 04:07

## 2021-11-16 RX ADMIN — OCTREOTIDE ACETATE 100 MCG: 100 INJECTION, SOLUTION INTRAVENOUS; SUBCUTANEOUS at 13:57

## 2021-11-16 RX ADMIN — ONDANSETRON 4 MG: 2 INJECTION INTRAMUSCULAR; INTRAVENOUS at 03:46

## 2021-11-16 RX ADMIN — Medication 1000 UNITS: at 13:57

## 2021-11-16 RX ADMIN — ONDANSETRON 4 MG: 2 INJECTION INTRAMUSCULAR; INTRAVENOUS at 08:32

## 2021-11-16 RX ADMIN — CLOTRIMAZOLE 10 MG: 10 LOZENGE ORAL at 11:57

## 2021-11-16 RX ADMIN — PROCHLORPERAZINE EDISYLATE 10 MG: 5 INJECTION INTRAMUSCULAR; INTRAVENOUS at 18:22

## 2021-11-16 RX ADMIN — HYDROMORPHONE HYDROCHLORIDE 0.4 MG: 0.2 INJECTION, SOLUTION INTRAMUSCULAR; INTRAVENOUS; SUBCUTANEOUS at 11:53

## 2021-11-16 RX ADMIN — POTASSIUM CHLORIDE 40 MEQ: 40 SOLUTION ORAL at 15:05

## 2021-11-16 RX ADMIN — METHOCARBAMOL 750 MG: 750 TABLET ORAL at 12:30

## 2021-11-16 RX ADMIN — SODIUM CHLORIDE: 4.5 INJECTION, SOLUTION INTRAVENOUS at 06:56

## 2021-11-16 RX ADMIN — MOXIFLOXACIN 1 DROP: 5 SOLUTION/ DROPS OPHTHALMIC at 21:51

## 2021-11-16 RX ADMIN — PIPERACILLIN SODIUM AND TAZOBACTAM SODIUM 2.25 G: 2; .25 INJECTION, POWDER, LYOPHILIZED, FOR SOLUTION INTRAVENOUS at 11:58

## 2021-11-16 RX ADMIN — ROCURONIUM BROMIDE 20 MG: 50 INJECTION, SOLUTION INTRAVENOUS at 00:00

## 2021-11-16 RX ADMIN — CLOTRIMAZOLE 10 MG: 10 LOZENGE ORAL at 15:05

## 2021-11-16 RX ADMIN — SODIUM CHLORIDE: 9 INJECTION, SOLUTION INTRAVENOUS at 09:56

## 2021-11-16 RX ADMIN — OCTREOTIDE ACETATE 100 MCG: 100 INJECTION, SOLUTION INTRAVENOUS; SUBCUTANEOUS at 21:20

## 2021-11-16 RX ADMIN — Medication 40 MG: at 09:29

## 2021-11-16 RX ADMIN — SODIUM CHLORIDE: 9 INJECTION, SOLUTION INTRAVENOUS at 06:57

## 2021-11-16 RX ADMIN — Medication 1 TABLET: at 18:08

## 2021-11-16 RX ADMIN — DEXTROSE MONOHYDRATE 25 ML: 25 INJECTION, SOLUTION INTRAVENOUS at 01:26

## 2021-11-16 RX ADMIN — FUROSEMIDE 80 MG: 10 INJECTION, SOLUTION INTRAVENOUS at 01:34

## 2021-11-16 RX ADMIN — BROMFENAC SODIUM 1 DROP: 0.7 SOLUTION/ DROPS OPHTHALMIC at 09:08

## 2021-11-16 RX ADMIN — SUGAMMADEX 200 MG: 100 INJECTION, SOLUTION INTRAVENOUS at 03:00

## 2021-11-16 RX ADMIN — CALCIUM CHLORIDE 500 MG: 100 INJECTION INTRAVENOUS; INTRAVENTRICULAR at 00:12

## 2021-11-16 RX ADMIN — FENTANYL CITRATE 25 MCG: 50 INJECTION, SOLUTION INTRAMUSCULAR; INTRAVENOUS at 03:45

## 2021-11-16 RX ADMIN — HEPARIN SODIUM 400 UNITS/HR: 1000 INJECTION INTRAVENOUS; SUBCUTANEOUS at 13:24

## 2021-11-16 RX ADMIN — TACROLIMUS 2 MG: 5 CAPSULE ORAL at 18:08

## 2021-11-16 RX ADMIN — MOXIFLOXACIN 1 DROP: 5 SOLUTION/ DROPS OPHTHALMIC at 09:08

## 2021-11-16 RX ADMIN — HYDROMORPHONE HYDROCHLORIDE 0.2 MG: 1 INJECTION, SOLUTION INTRAMUSCULAR; INTRAVENOUS; SUBCUTANEOUS at 04:02

## 2021-11-16 RX ADMIN — ONDANSETRON 4 MG: 2 INJECTION INTRAMUSCULAR; INTRAVENOUS at 02:44

## 2021-11-16 RX ADMIN — ROCURONIUM BROMIDE 10 MG: 50 INJECTION, SOLUTION INTRAVENOUS at 02:05

## 2021-11-16 RX ADMIN — PIPERACILLIN SODIUM AND TAZOBACTAM SODIUM 2.25 G: 2; .25 INJECTION, POWDER, LYOPHILIZED, FOR SOLUTION INTRAVENOUS at 16:50

## 2021-11-16 RX ADMIN — SENNOSIDES AND DOCUSATE SODIUM 1 TABLET: 8.6; 5 TABLET ORAL at 21:49

## 2021-11-16 RX ADMIN — HYDROMORPHONE HYDROCHLORIDE 0.2 MG: 1 INJECTION, SOLUTION INTRAMUSCULAR; INTRAVENOUS; SUBCUTANEOUS at 03:06

## 2021-11-16 RX ADMIN — HYDROMORPHONE HYDROCHLORIDE 0.4 MG: 0.2 INJECTION, SOLUTION INTRAMUSCULAR; INTRAVENOUS; SUBCUTANEOUS at 08:56

## 2021-11-16 RX ADMIN — HYDROMORPHONE HYDROCHLORIDE 0.2 MG: 1 INJECTION, SOLUTION INTRAMUSCULAR; INTRAVENOUS; SUBCUTANEOUS at 04:23

## 2021-11-16 RX ADMIN — CHLOROTHIAZIDE SODIUM 500 MG: 500 INJECTION, POWDER, LYOPHILIZED, FOR SOLUTION INTRAVENOUS at 02:49

## 2021-11-16 RX ADMIN — Medication 1 TABLET: at 09:00

## 2021-11-16 RX ADMIN — HYDROMORPHONE HYDROCHLORIDE 0.2 MG: 1 INJECTION, SOLUTION INTRAMUSCULAR; INTRAVENOUS; SUBCUTANEOUS at 06:48

## 2021-11-16 RX ADMIN — ONDANSETRON 4 MG: 2 INJECTION INTRAMUSCULAR; INTRAVENOUS at 13:57

## 2021-11-16 RX ADMIN — VALGANCICLOVIR 450 MG: 450 TABLET, FILM COATED ORAL at 09:02

## 2021-11-16 RX ADMIN — PROCHLORPERAZINE EDISYLATE 10 MG: 5 INJECTION INTRAMUSCULAR; INTRAVENOUS at 09:02

## 2021-11-16 RX ADMIN — HYDROMORPHONE HYDROCHLORIDE 0.4 MG: 0.2 INJECTION, SOLUTION INTRAMUSCULAR; INTRAVENOUS; SUBCUTANEOUS at 15:05

## 2021-11-16 RX ADMIN — POTASSIUM CHLORIDE 20 MEQ: 20 SOLUTION ORAL at 11:08

## 2021-11-16 RX ADMIN — ALBUMIN (HUMAN) 50 G: 12.5 INJECTION, SOLUTION INTRAVENOUS at 15:46

## 2021-11-16 ASSESSMENT — ACTIVITIES OF DAILY LIVING (ADL)
ADLS_ACUITY_SCORE: 4

## 2021-11-16 ASSESSMENT — MIFFLIN-ST. JEOR: SCORE: 1293.5

## 2021-11-16 NOTE — TELEPHONE ENCOUNTER
Organ Offer Encounter Information    Organ Offer Information  Organ offer date & time: 11/15/2021 11:05 AM  Coordinator/Fellow/Attending name: Steph Bennett RN   Organ(s):  Organ UNOS ID Match Run ID Comment Organ Laterality   [facility] [id number] [facility]    Pancreas [id number] [facility]       Recent infections?: No    New medications?: No Recent pregnancy?: No   Angicoagulation medications?: No Recent vaccinations?: Yes (Comment: booster & flu vaccine October)   Recent blood transfusions?: No Recent hospitalizations?: No   Has your insurance changed in the last 6-12 months?: Neg    Discussed organ offer with: Patient  Patient/Caregiver name: Marilyn  Discussed risk category with Patient/Other: N/A  Understood donor criteria, verbalized understanding  Patient/Other asked to speak to a surgeon?: No  Discussed program-specific outcomes: Did not have questions regarding SRTR  Right to decline organ offer without penalty, Patient/Other: Aware of option to decline without penalty  Organ offer decision status Patient/Other: Accepted Offer  Organ disposition: Transplanted  Additional Comments: 11/15/2021 11:14 AM  KP/Panc: Primary import likely LKI/PANC  MD: Manoj  OPO Contact: Nicolasa ANDERSON Results: Compatible no DSA per Dr. Shi  XM Plan (FXM must be done with serum no older than 10 days from transplant): Will do FXM concurrently once blood & organ arrive   Plan (Admission, NPO, Donor OR): Donor OR this morning. Became primary 45min post Xclamp. Visual and pictures/anatomy look good per Dr. Aranda will accept Left Kid/Panc if we can find transportation. Nicolasa at [facility] looking into flights out of [place] and [place] via [email or web address]. Also asked Sandy at [employer] to look through [employer] or [email or web address] to have all possible options.   - - -   COVID Screening  In the past month, have you had:  Any close contact with a suspect or laboratory-confirmed COVID-19 patient: No  Travel anywhere: No  COVID Symptoms (Fever,  Cough, Short of Breath, Loss of Taste/Smell, Rash): No    Admissions: 1125 - Yessica  Unit: 1120 - 7A - Shaye, does not have a bed at this time, will work on it, PT GOING TO 6B  Update Provider Entering Orders (XM Plan & COVID Testing):  1127 - Joanne NP, aware of STAT covid swab & FXM  Immunology: 1143 - [facility]   Inpatient Lab (COVID Testing [phone], Option 2): 1138 - Fan, aware of STAT covid swab  Book OR: 1335 - Booked for 1700 with Carolyn  Vessel Storage Confirmation (PA/WINSTON/ROD): OK to bank  Blood Bank: [address] [place] [address]   Research: ON HOLD  TransNet/ABO Verification: printed @ 1325  Add Organ: Done @ 1140    11/15/2021 1:26 PM:   After many issues finding transportation, [facility] was able to approve sending an employee to deliver the organs to ground transport here at [facility] via flight [id number]. Dr. Aranda and Dr. Alexander aware. Patient placement called to say that there is a bed available on 6B, patient on her way to the U now for pre-op workup.   Steph Bennett  Transplant Coordinator    Attestation I have discussed all of the above with the Patient/Legal Guardian/Caregiver regarding this organ offer.: Yes  Coordinator/Fellow/Attending name: Steph Bennett RN

## 2021-11-16 NOTE — PROGRESS NOTES
"CLINICAL NUTRITION SERVICES - ASSESSMENT NOTE     Nutrition Prescription    RECOMMENDATIONS FOR MDs/PROVIDERS TO ORDER:  ADAT    Malnutrition Status:    Patient does not meet two of the established criteria necessary for diagnosing malnutrition but is at risk for malnutrition    Recommendations already ordered by Registered Dietitian (RD):  Continue NPO, per team and guidelines post tx    Future/Additional Recommendations:  - Nutrition education for Post tx diet ed  - Monitor for diet advancement      REASON FOR ASSESSMENT  Marilyn Ortega is a/an 36 year old female assessed by the dietitian for Provider Order - Kidney/Pancreas Transplant Post Op, Assess and Educate Post SOT    NUTRITION HISTORY  - Per SOT Kid/Panc Tx Evaluation RD note (7/7/21), Gluten free since 2003. Mostly dairy free diet.  - No reduced intake PTA.    CURRENT NUTRITION ORDERS  Diet: NPO  Intake/Tolerance: No PO intake since admit. NG to LIS.    LABS  BUN 53 (H)  Cr 2.00 (H)  GFR 31 (L)  Amylase 411 (H)  Lipase 4205 (H)  -111    MEDICATIONS  Calcium carbonate - Vit D  Mag-Ox  Cellcept  Zofran  Protonix  Miralax  Senokot  Tacrolimus  D5 LR @ 100 mL/hr  IV Insulin  NaCl - bolus    ANTHROPOMETRICS  Height: 167.6 cm (5' 5.984\")  Most Recent Weight: 55.1 kg (121 lb 7.6 oz)    IBW: 59 kg  BMI: Normal BMI  Weight History: No recent wt loss noted.  Wt Readings from Last 5 Encounters:   11/15/21 55.1 kg (121 lb 7.6 oz)   08/24/21 55.3 kg (121 lb 14.4 oz)   05/18/21 56.7 kg (125 lb)   12/09/16 56.7 kg (125 lb)   10/16/12 59.7 kg (131 lb 11.2 oz)     Dosing Weight: 55 kg    ASSESSED NUTRITION NEEDS  Estimated Energy Needs: 6318-3122 kcals/day (30 - 35 kcals/kg )  Justification: Post-op SOT  Estimated Protein Needs:  grams protein/day (1.3 - 2 grams of pro/kg)  Justification: Increased needs and Post-op SOT  Estimated Fluid Needs: (1 mL/kcal)   Justification: Maintenance and Per provider pending fluid status    PHYSICAL FINDINGS  See " malnutrition section below.    MALNUTRITION  % Intake: Decreased intake does not meet criteria  % Weight Loss: None noted  Subcutaneous Fat Loss: None observed  Muscle Loss: None observed  Fluid Accumulation/Edema: None noted  Malnutrition Diagnosis: Patient does not meet two of the established criteria necessary for diagnosing malnutrition but is at risk for malnutrition    NUTRITION DIAGNOSIS  Predicted inadequate nutrient intake (kcal and protein) related to NPO status required post tx as evidenced by pt diet will advance POD6 to CLD.      INTERVENTIONS  Implementation  Nutrition Education: Once diet advances     Goals  Diet adv v nutrition support on POD6.     Monitoring/Evaluation  Progress toward goals will be monitored and evaluated per protocol.    Patricia Miles RDN, LD  6B Pg [phone]

## 2021-11-16 NOTE — ANESTHESIA PROCEDURE NOTES
Arterial Line Procedure Note    Pre-Procedure   Staff -        Anesthesiologist:  Swapna Mao MD       Performed By: anesthesiologist       Location: OR       Pre-Anesthestic Checklist: patient identified, IV checked, risks and benefits discussed, informed consent, monitors and equipment checked, pre-op evaluation and at physician/surgeon's request  Timeout:       Correct Patient: Yes        Correct Procedure: Yes        Correct Site: Yes        Correct Position: Yes   Procedure   Procedure: arterial line       Laterality: left       Insertion Site: radial.  Sterile Prep        Standard elements of sterile barrier followed       Skin prep: Betadine  Insertion/Injection        Technique: Seldinger Technique        Catheter Type/Size: 20 G, 1.75 in/4.5 cm quick cath (integral wire)  Narrative         Secured by: suture       Tegaderm dressing used.       Complications: None apparent,        Arterial waveform: Yes        IBP within 10% of NIBP: Yes

## 2021-11-16 NOTE — PROGRESS NOTES
Transplant Surgery  Inpatient Daily Progress Note  11/16/2021    Assessment & Plan: 36 year old female with PMH significant for DMI complicated by gastroparesis and CKD due to diabetic nephropathy, HTN, gluten intolerance and Factor V Leiden mutation, heterozygous. Admitted for DD SPK 11/16/21.     Graft function:  Pancreas: Lipase elevated to 4200 immediately post transplant, will recheck tomorrow AM. US immediately post transplant with good flow. No insulin during the transplant, however it was discovered 11/16 AM that patient had insulin pump connected overnight with Humalog at 0.75 units/hour, removed this AM. Continue insulin gtt. Repeat US this afternoon pending. Continue Octreotide x 5 days. Heparin gtt @200units/hour this AM, now increased to 600units/hour this afternoon.   Kidney: CKD, made urine and was not on HD prior to transplant. Cr 2.0<-2.7. US this AM with good flow. Discontinued Lasix 80 mg IV this AM, UOP decreased early afternoon. Repeat US kidney STAT this afternoon, pending. Ureteral stent in place.   Immunosuppression management: PRA 29, will discuss intermediate vs high risk induction.   Thymo 100 mg intra-op  Solu-medrol 500 mg intra-op  Cellcept 1000 mg BID  Tac 1.5 mg BID. Goal 8-10.   Complexity of management:Medium. Contributing factors: anemia and nausea   HEENT:   Recent cataract removal: Continue drops as prescribed.   Hematology:   Anemia of chronic disease: HGB stable.   Cardiorespiratory: Stable on 2 L oxygen, continue IS.   GI/Nutrition: NPO with NG in place.   Nausea: NG in place. Zofran every 6 hours.   Endocrine: See above. HGBA1C 7.7%.   Fluid/Electrolytes:   MIVF: D5LR@100/hour. Will give albumin 5% bolus.   Hypomagnesemia: replace  Hypokalemia: replace  : Cage to remain due to new surgical anastomosis, remove on POD 3   Infectious disease: Tmax 99.2. Leukocytosis secondary to steroids, will monitor.   Prophylaxis: Bactrim indefinitely, Valcyte x 3 months (CMV D-/R-). Zosyn  and Stephanie per protocol.   Disposition: 6B, transfer to  tomorrow    Medical Decision Making: Medium  Subsequent visit 76514 (moderate level decision making)    LUIS E/Fellow/Resident Provider: Cathie Moeller PA-C [id number]    Faculty: Branden Aranda MD     Attestation: I saw and examined this patient with Cathie Moeller PA-C, and the transplant team. I independently reviewed all pertinent laboratory and imaging results. I agree with the findings and plan as documented in the note above.  -Branden Aranda MD  _________________________________________________________________  Transplant History: Admitted 11/15/2021 for pancreas and kidney transplant.  11/15/2021 (Kidney / Pancreas), Postoperative day: 1     Interval History: History is obtained from the patient  Overnight events: Nausea this AM. No other complaints.     ROS:   A 10-point review of systems was negative except as noted above.    Meds:    acetaminophen  975 mg Oral Q8H     albumin human  50 g Intravenous Once     aspirin  81 mg Oral or Feeding Tube Daily     bromfenac  1 drop Left Eye Daily     calcium carbonate-vitamin D  1 tablet Oral BID w/meals     clotrimazole  10 mg Buccal 4x Daily     Loteprednol Etabonate  1 drop Left Eye TID     [START ON 11/17/2021] magnesium oxide  400 mg Oral Q24H     metroNIDAZOLE  500 mg Intravenous Q12H     micafungin  100 mg Intravenous Q24H     moxifloxacin  1 drop Left Eye TID     mycophenolate  1,000 mg Oral BID IS     octreotide  100 mcg Subcutaneous TID     ondansetron  4 mg Intravenous Q6H     pantoprazole  40 mg Per NG tube Daily     piperacillin-tazobactam  2.25 g Intravenous Q6H     [START ON 11/17/2021] polyethylene glycol  17 g Oral Daily     potassium chloride  40 mEq Per NG tube Once     senna-docusate  1 tablet Oral BID     sodium chloride (PF)  3 mL Intravenous Q8H     sulfamethoxazole-trimethoprim  10 mL Per NG tube Daily     tacrolimus  2 mg Oral or NG Tube BID IS     valGANciclovir  450 mg Oral Every  "Other Day       Physical Exam:     Admit Weight: 55.1 kg (121 lb 7.6 oz)    Current vitals:   /82   Pulse 84   Temp 98.7  F (37.1  C) (Oral)   Resp 12   Ht 1.676 m (5' 5.98\")   Wt 55.1 kg (121 lb 7.6 oz)   LMP  (LMP Unknown)   SpO2 100%   BMI 19.62 kg/m      CVP (mmHg): 9 mmHg    Vital sign ranges:    Temp:  [97.4  F (36.3  C)-99.2  F (37.3  C)] 98.7  F (37.1  C)  Pulse:  [79-90] 84  Resp:  [10-20] 12  BP: (115-154)/(73-99) 130/82  MAP:  [93 mmHg-120 mmHg] 93 mmHg  Arterial Line BP: (131-165)/(71-94) 131/71  SpO2:  [98 %-100 %] 100 %  Patient Vitals for the past 24 hrs:   BP Temp Temp src Pulse Resp SpO2 Height Weight   11/16/21 1400 130/82 -- -- 84 12 100 % -- --   11/16/21 1300 133/87 -- -- 82 10 99 % -- --   11/16/21 1200 135/87 98.7  F (37.1  C) Oral 86 10 100 % -- --   11/16/21 1100 (!) 154/99 -- -- 84 10 100 % -- --   11/16/21 1000 130/89 -- -- 82 -- 100 % -- --   11/16/21 0900 133/86 98.5  F (36.9  C) Oral 79 18 100 % -- --   11/16/21 0800 (!) 141/92 98.7  F (37.1  C) Oral 85 16 100 % -- --   11/16/21 0715 120/79 -- -- 82 14 100 % -- --   11/16/21 0700 -- 98.5  F (36.9  C) Oral 82 -- 100 % -- --   11/16/21 0645 -- -- -- 84 -- 100 % -- --   11/16/21 0630 -- -- -- 85 -- 100 % -- --   11/16/21 0615 119/83 -- -- 85 -- 99 % -- --   11/16/21 0600 119/82 -- -- 85 14 100 % -- --   11/16/21 0545 121/77 -- -- 86 14 -- -- --   11/16/21 0530 117/78 -- -- 85 16 100 % -- --   11/16/21 0515 118/80 -- -- 90 14 100 % -- --   11/16/21 0500 118/75 -- -- 89 16 100 % -- --   11/16/21 0445 115/73 -- -- 87 14 100 % -- --   11/16/21 0430 128/80 -- -- 89 14 100 % -- --   11/16/21 0415 136/86 -- -- 88 14 100 % -- --   11/16/21 0400 (!) 133/93 97.4  F (36.3  C) Oral 89 18 100 % -- --   11/16/21 0345 (!) 144/91 -- -- 90 16 100 % -- --   11/16/21 0330 131/82 97.9  F (36.6  C) Oral 90 18 100 % -- --   11/16/21 0326 -- -- -- -- -- 100 % -- --   11/15/21 1700 -- -- -- -- -- -- 1.676 m (5' 5.98\") 55.1 kg (121 lb 7.6 oz) "   11/15/21 1625 (!) 150/95 99.2  F (37.3  C) Oral 88 20 98 % -- --   11/15/21 1600 -- -- -- -- -- 98 % -- --     General Appearance: in no apparent distress.   Skin: normal  Heart: regular rate and rhythm  Lungs: NLB on 2 L oxygen  Abdomen: The abdomen is flat, and  mildly tender, generalized. she is not tender over the kidney and pancreas graft. The wound is covered with operative dressing. SARAI in place with thin serosang output.   : harris is present. Urine has no gross hematuria.   Extremities: edema: present bilaterally. 1+  Neurologic: awake, alert and oriented. Tremor absent.    Data:   CMP  Recent Labs   Lab 11/16/21  1418 11/16/21  1256 11/16/21  1205 11/16/21  1147 11/16/21  0434 11/16/21  0402 11/16/21  0052 11/15/21  2346 11/15/21  2317 11/15/21  2210 11/15/21  1639 11/15/21  1550   NA  --   --   --   --   --  137  --  138  --  139   < > 138   POTASSIUM  --   --   --  3.1*  --  3.4  --  3.0*  --  3.1*   < > 3.2*   CHLORIDE  --   --   --   --   --  107  --   --   --   --   --  109   CO2  --   --   --   --   --  19*  --   --   --   --   --  19*   * 130*   < >  --    < > 90   < > 78   < > 150*   < > 139*   BUN  --   --   --   --   --  53*  --   --   --   --   --  71*   CR  --   --   --   --   --  2.00*  --   --   --   --   --  2.65*   GFRESTIMATED  --   --   --   --   --  31*  --   --   --   --   --  22*   STEPHANIE  --   --   --   --   --  8.8  --   --   --   --   --  8.9   ICAW  --   --   --   --   --   --   --  4.4  --  4.7   < >  --    MAG  --   --   --   --   --  1.7  --   --   --   --   --   --    PHOS  --   --   --   --   --  3.2  --   --   --   --   --   --    AMYLASE  --   --   --   --   --  411*  --   --   --   --   --  56   LIPASE  --   --   --   --   --  4,205*  --   --   --   --   --  78   ALBUMIN  --   --   --   --   --   --   --   --   --   --   --  4.0   BILITOTAL  --   --   --   --   --   --   --   --   --   --   --  0.4   ALKPHOS  --   --   --   --   --   --   --   --   --   --   --  104    AST  --   --   --   --   --   --   --   --   --   --   --  16   ALT  --   --   --   --   --   --   --   --   --   --   --  23    < > = values in this interval not displayed.     CBC  Recent Labs   Lab 11/16/21  1147 11/16/21  0950 11/16/21  0402 11/15/21  2004 11/15/21  1549   HGB 10.2* 10.0* 10.8*   < > 11.0*   WBC 17.3*  --  15.8*  --  8.9     --  183  --  282   A1C  --   --   --   --  7.7*    < > = values in this interval not displayed.     COAGS  Recent Labs   Lab 11/16/21  0402 11/15/21  1550   INR 1.11 0.96   PTT 30 30      Urinalysis  Recent Labs   Lab Test 11/15/21  1530 07/21/21  0825   COLOR Straw Straw   APPEARANCE Clear Clear   URINEGLC 30 * 150 *   URINEBILI Negative Negative   URINEKETONE Negative Negative   SG 1.010 1.010   UBLD Small* Negative   URINEPH 5.5 6.0   PROTEIN 100 * 100 *   NITRITE Negative Negative   LEUKEST Negative Negative   RBCU 2 1   WBCU 1 1   UTPG 4.77*  --      Virology:  Hepatitis C Antibody   Date Value Ref Range Status   11/15/2021 Nonreactive Nonreactive Final

## 2021-11-16 NOTE — OR NURSING
Verbal sign out received from Dr. Sweet, patient OK to proceed to floor. MD to write sign out note when able.

## 2021-11-16 NOTE — ANESTHESIA PROCEDURE NOTES
Airway       Patient location during procedure: OR       Procedure Start/Stop Times: 11/15/2021 6:05 PM  Staff -        Anesthesiologist:  Kelly Sweet MD       Resident/Fellow: Masoud Vanessa MD       Performed By: resident  Consent for Airway        Urgency: elective  Indications and Patient Condition       Indications for airway management: sunni-procedural       Induction type:intravenous       Mask difficulty assessment: 0 - not attempted    Final Airway Details       Final airway type: endotracheal airway       Successful airway: ETT - single and Oral  Endotracheal Airway Details        ETT size (mm): 7.0       Cuffed: yes       Successful intubation technique: direct laryngoscopy       DL Blade Type: MAC 4       Grade View of Cords: 1       Adjucts: stylet       Measured from: lips       Secured at (cm): 23       Bite block used: None    Post intubation assessment        Placement verified by: capnometry and equal breath sounds        Number of attempts at approach: 1       Number of other approaches attempted: 0       Secured with: pink tape       Ease of procedure: easy       Dentition: Intact and Unchanged

## 2021-11-16 NOTE — ANESTHESIA CARE TRANSFER NOTE
Patient: Marilyn Ortega    Procedure: Procedure(s):  TRANSPLANT, KIDNEY AND PANCREAS,  DONOR  Bench kidney  Bench pancreas       Diagnosis: Type 1 diabetes (H) [E10.9]  Diagnosis Additional Information: No value filed.    Anesthesia Type:   General     Note:    Oropharynx: spontaneously breathing  Level of Consciousness: awake  Oxygen Supplementation: face mask  Level of Supplemental Oxygen (L/min / FiO2): 6  Independent Airway: airway patency satisfactory and stable  Dentition: dentition unchanged  Vital Signs Stable: post-procedure vital signs reviewed and stable  Report to RN Given: handoff report given  Patient transferred to: PACU    Handoff Report: Identifed the Patient, Identified the Reponsible Provider, Reviewed the pertinent medical history, Discussed the surgical course, Reviewed Intra-OP anesthesia mangement and issues during anesthesia, Set expectations for post-procedure period and Allowed opportunity for questions and acknowledgement of understanding      Vitals:  Vitals Value Taken Time   /91 21 0340   Temp     Pulse 92 21 0341   Resp     SpO2 100 % 21 0341   Vitals shown include unvalidated device data.    Electronically Signed By: Masoud Vanessa MD  2021  3:41 AM

## 2021-11-16 NOTE — ANESTHESIA POSTPROCEDURE EVALUATION
Patient: Marilyn Ortega    Procedure: Procedure(s):  TRANSPLANT, KIDNEY AND PANCREAS,  DONOR  Bench kidney  Bench pancreas       Diagnosis:Type 1 diabetes (H) [E10.9]  Diagnosis Additional Information: No value filed.    Anesthesia Type:  General    Note:  Disposition: Admission   Postop Pain Control: Uneventful            Sign Out: Well controlled pain   PONV: No   Neuro/Psych: Uneventful            Sign Out: Acceptable/Baseline neuro status   Airway/Respiratory: Uneventful            Sign Out: Acceptable/Baseline resp. status   CV/Hemodynamics: Uneventful            Sign Out: Acceptable CV status; No obvious hypovolemia; No obvious fluid overload   Other NRE: NONE   DID A NON-ROUTINE EVENT OCCUR? No           Last vitals:  Vitals Value Taken Time   /80 21 0540   Temp 36.3  C (97.4  F) 21 0400   Pulse 86 21 0543   Resp 16 21 0530   SpO2 100 % 21 0543   Vitals shown include unvalidated device data.    Electronically Signed By: NIC ZENDEJAS MD  2021  5:44 AM

## 2021-11-16 NOTE — OR NURSING
"Page sent to Transplant Resident MD:    \"HAYDEN Ortega in PACU, kidney/panc. US, Xray, Labs completed, making good urine. OK to proceed to floor?\"    MD at bedside to see patient, ok to proceed to floor at this time  "

## 2021-11-16 NOTE — PHARMACY-ADMISSION MEDICATION HISTORY
Admission Medication History Completed by Pharmacy    See [facility] Admission Navigator for allergy information, preferred outpatient pharmacy, prior to admission medications and immunization status.     Medication History Sources:     Patient Interview and SureScripts    Changes made to PTA medication list (reason):    Added: None    Deleted: None    Changed:   o humalog used to refill insulin pump    Additional Information:    Clarified with patient if she takes losartan at twice daily or 100mg daily as surescripts has a different quantity but she reports just taking 1 tablet daily     Patient reports that she can bring [employer] in if hospital team resumes it    Gets aranesp injection every other week, couldn't recall when her last injection was    Saw that calcitriol, ferrous sulfate and amlodipine was recently filled but patient states she is no longer taking these    Prior to Admission medications    Medication Sig Last Dose Taking? Auth Provider   bromfenac (PROLENSA) 0.07 % ophthalmic solution Place 1 drop Into the left eye daily 11/15/2021 at Unknown time Yes Reported, Patient   cetirizine (ZYRTEC) 10 MG tablet Take 10 mg by mouth daily 11/15/2021 at Unknown time Yes Reported, Patient   cholecalciferol 25 MCG (1000 UT) TABS Take 2,000 Units by mouth every other day  11/15/2021 at Unknown time Yes Reported, Patient   Continuous Blood Gluc Sensor (DEXCOM G6 SENSOR) MISC Use as directed for continuous glucose monitoring.  Change sensor every 10 days. 11/15/2021 at Unknown time Yes Reported, Patient   Continuous Blood Gluc Transmit (DEXCOM G6 TRANSMITTER) MISC Use as directed for continuous glucose monitoring.  Change every 3 months. 11/15/2021 at Unknown time Yes Reported, Patient   darbepoetin neftaly (ARANESP) 60 MCG/0.3ML injection Inject 60 mcg Subcutaneous every 14 days  Unknown at Unknown time Yes Reported, Patient   furosemide (LASIX) 20 MG tablet TAKE 1 TABLET BY MOUTH EVERY DAY 11/14/2021 at Unknown time  Yes Reported, Patient   glucose blood VI test strips strip 4 times daily. Unknown at Unknown time Yes Reported, Patient   insulin lispro (HUMALOG VIAL) 100 UNIT/ML vial To refill ambulatory pump 11/15/2021 at Unknown time Yes Reported, Patient   losartan (COZAAR) 50 MG tablet Take 50 mg by mouth 11/15/2021 at Unknown time Yes Reported, Patient   Loteprednol Etabonate (LOTEMAX SM) 0.38 % GEL Apply 1 drop to eye 3 times daily Left eye 11/15/2021 at Unknown time Yes Reported, Patient   moxifloxacin (VIGAMOX) 0.5 % ophthalmic solution Place 1 drop Into the left eye 3 times daily 11/15/2021 at Unknown time Yes Reported, Patient   Prucalopride Succinate (MOTEGRITY) 2 MG TABS Take one tablet by mouth daily 11/15/2021 at Unknown time Yes Reported, Patient   spironolactone (ALDACTONE) 25 MG tablet Take 50 mg by mouth daily  11/15/2021 at Unknown time Yes Reported, Patient       Date completed: 11/16/21    Medication history completed by: Jony Lipscomb RPH

## 2021-11-16 NOTE — PROGRESS NOTES
Final positive donor blood & sputum culture results have been uploaded to [facility].  Donor ID [id number].  Dr. Alexander notified of results.    Pablo Terrell RN on 11/16/2021 at 4:25 PM

## 2021-11-16 NOTE — PROGRESS NOTES
Patient removed from [facility] waitlist after  donor kidney/pancreas transplant. OS ID [id number].    Donor Has Risk Criteria for Transmission of HIV/HCV/HBV: No  Recipient Notified of Risk Criteria: N/A

## 2021-11-16 NOTE — OP NOTE
Transplant Surgery  Operative Note     Procedure Date:  Case start 11/15, complete 11/16/21    Preoperative Diagnosis:  Chronic renal failure due to diabetes mellitus type 1    Postoperative Diagnosis:  Same    Procedure:  1. Left Kidney Donation after Brain Death Kidney, Left iliac fossa, without vascular reconstruction. A J-J stent was placed.   2. Kidney allograft preparation on Back Table   3. Open appendectomy    4. Whole Pancreas Donation after Brain Death Pancreas Transplant   5. Pancreas allograft preparation on Back Table    Surgeon:  Surgeon(s) and Role:     * Branden Aranda MD - Primary     * Jannet Mas MD - Fellow - Assisting- primary surgeon for pancreas and kidney back bench preparation     * Kamran Carreon MD - Fellow - Assisting- assisted Dr. Mas with organ preparation     * Conrad Overton MD - Fellow - Assisting- primary assistant for case apart from portions specified    Fellow/Assistant: Conrad Overton MD, fellow, Kamran Carreon MD fellow.     Anesthesia:  General    Specimen:  appendix (permanent)    Drains:  Nolan-Romero drain    Urine Output:  2,300 mls    Estimated Blood Loss:  400    Fluids Administered:        Intraoperative Events: None    Complications: None.    Findings: whole graft pancreas, good quality. Standard anatomy with Y graft reconstruction. Left kidney with single vessels. Pancreas placed head down in right iliac fossa. Internal iliac vein branches ligated and divided. Portal vein end to side to external iliac vein, Y graft end to side to external iliac artery. Good reperfusion. Moderate tail and mesenteric hematomas, but not growing. Pancreas soft and pink on reperfusion, though moderate saponification through case.    Kidney placed with renal vein end to side to external iliac vein and artery end to side to common iliac artery. Good reperfusion, good urine output. Bladder managed with liche gregoir anterior multistitch anastomosis.  URETERAL STENT PLACED.       None.    Indication: The patient has Type 1 diabetes with Chronic kidney failure. The patient received an organ offer for a Donation after Brain Death kidney and Donation after Brain Death pancreas. After discussing the risks and benefits of proceeding, the patient agreed to proceed with surgery and provided informed consent.    Final ABO/Crossmatch Verification: After the donor organ arrived to the operating room and prior to anastomosis, I participated in the transplant pre-verification upon organ receipt timeout by visually verifying the donor ID, organ and laterality, donor blood type, recipient unique identifier, recipient blood type, and that the donor and recipient are blood type compatible. The crossmatch was done retrospectively; and the T cell crossmatch result was negative and B cell Flow crossmatch result was negative. The patient received Thymoglobulin, Cellcept and Solumedrol on induction.    Pancreas Donor Organ Information:   Donor [facility] ID: [id number]  Donor ABO: O  Donor Arterial Clamp on: 11/15/2021  9:29 AM  Vessels with organ: Yes    Ischemic time:  Total:  824  Cold: 796  Warm: 28     Pancreas Back Table Details:   Procedure:  Bench preparation of the pancreas allograft for transplantation with arterial reconstruction    Preoperative Diagnosis:  Chronic renal failure due to diabetes mellitus type 1    Postoperative Diagnosis:  Same    Surgeon:  Surgeon(s) and Role:     * Danuta Aranda MD - Primary     * Jannet Mas MD - Fellow - Assisting     * Kamran Carreon MD - Fellow - Assisting     * Conrad Overton MD - Fellow - Assisting    Faculty Co-Surgeon:  DANUTA ARANDA    Fellow/Assistant:  Jannet Mas MD- fellow, Kamran Carreon MD- fellow. There was no qualified resident to assist with this procedure    Anesthesia:  None    Graft Injury:  No    Donor Arrival to Recipient Room:  11/15/2021  6:17 PM    Portal Venous  "Extension:  No    Donor Iliac Vessel Quality:  Normal     Findings: as above    Pancreas Back Table Preparation: The donor pancreas was received and inspected. It had been previously flushed with UW. We removed the spleen by doubly ligating vessels between the tail of the pancreas and the spleen. The peripancreatic fat was ligated with 3-0 silk ties and removed. The proximal duodenum staple line was inverted and oversewn using running 4-0 Prolene suture. The bile duct and GDA were re-secured. The previously stapled root of the mesentery was oversewn using 4-0 Prolene forward and back. The third and fourth portions of the duodenum were freed away from the pancreas by ligating and dividing the intervening tissue with a series of 3-0 and 4-0 silk ties. Ganglionectomy was performed with 3-0 and 4-0 silk ties.    Y-Graft to SPA and SMA Arterial \"Y graft\" construction was required: the donor iliac artery was dilated, leak checked, and tailored to create an extension \"Y' graft by anastomosing the internal iliac artery to the splenic artery and the external iliac artery to the superior mesenteric artery in an end-to end fashion using running 7-0 Prolene suture.  . The pancreas was transferred to a new bowl with fresh iced cold preservation solution. Faculty was present for key portions of the procedure.    Operative Procedure:   Arterial Anastomosis Start:  11/15/2021 10:45 PM    Recipient Arterial Unclamp:  11/15/2021 11:13 PM    Extra Vessels Used:  yes- artery    Extra Vessels Banked:  Yes- vein     Kidney Donor Organ Information:    Donor [facility] ID: [id number]  Donor ABO: O  Donor Arterial Clamp on: 11/15/2021  9:29 AM  Vessels with organ: No    Ischemic time:  Total:  964  Cold: 927  Warm: 37     Kidney Back Table Details:    Preoperative Procedure:  Bench preparation of the kidney allograft for transplantation without vascular reconstruction    Diagnosis:  Chronic renal failure due to diabetes mellitus type 1    " Postoperative Diagnosis:  Same    Surgeon:  Surgeon(s) and Role:     * Danuta Aranda MD - Primary     * Jannet aMs MD - Fellow - Assisting     * Kamran Carreon MD - Fellow - Assisting     * Conrad Ovetron MD - Fellow - Assisting    Faculty Co-Surgeon:  DANUTA ARANDA    Fellow/Assistant:  Conrad Overton MD- fellow. There was no qualified resident to assist with this procedure    Anesthesia:  None    Donor Arrival to Recipient Room:  11/15/2021  6:17 PM      Graft Injury: No  Graft Biopsy: no      Organ Received On: Ice  Pump Resistance: n/a   Pump Flow: n/a    Ureteral Anatomy: Single  Arterial Anatomy: Single  Venous Anatomy: Single      Any Reconstruction:  No    Artery:   single, normal    Vein:   single, normal     Findings: Normal. As above    Kidney Back Table Preparation: The donor kidney was received and inspected. It had been previously flushed with UW. The graft was prepared on the back table by removing perinephric fat and ligating venous tributaries and lymphatics. The ureter was also cleaned of excess tissue. If required, reconstruction was performed as detailed above. The kidney was stored in iced cold preservation solution until ready for transplantation. Faculty was present for the critical portions of the procedure.    Operative Procedure:   Arterial Anastomosis Start:  11/16/2021 12:56 AM    Recipient Arterial Unclamp:  11/16/2021  1:33 AM    Extra Vessels Used:  no    Extra Vessels Banked:  n/a       The patient was brought to the operating room, placed in a supine position, and a time out was performed. Sequential compression devices were placed on both lower extremities and general endotracheal anesthesia was induced. The patient was given IV antibiotics, Thymoglobulin, Cellcept and Solumedrol, and a Cage catheter. A central line and arterial lines were placed by Anesthesia service. The abdomen was then shaved, prepped, and draped in the  usual sterile fashion. We performed a lower midline incision, divided the linea alba and opened the peritoneum sharply under direct vision. Retractors were placed.    Pancreas Transplant: We mobilized the right colon and the ureter medially and proceeded to circumferentially dissect the right iliac vessels. The internal iliac vein was ligated and divided. We obtained vascular control, performed a venotomy, and performed an anastomosis between the donor portal vein and the recipient's right External Iliac. We then obtained proximal and distal control of the right external iliac artery . Arteriotomy and irrigation ensued, and the donor Y graft was anastomosed to the external iliac artery  in an end to side fashion. After both anastomoses were completed, we opened the clamps. The pancreas consistency and reperfusion quality was Pink throughout, the graft duodenum was Pink throughout. There was mild pancreatitis. Overall the graft was rated [place] grade B. The exocrine secretions of the pancreas were drained via Enteric w/o [id number] employing  a side to side hand sewn 2-layered. The pancreas placement was Intra-Peritoneal. Faculty was present for key portions of the procedure.    Kidney Transplant: The patient was heparinized. We applied atraumatic vascular clamps and the donor kidney was brought to the operative field. We made a venotomy and the renal vein was anastomosed to the recipient left External Iliac vein in an end-to-side fashion. An arteriotomy was made and the donor renal artery was anastomosed to the recipient left common iliac artery in an end to side fashion. The patient was simultaneously loaded with IV mannitol, Lasix and volume. The renal artery was protected and the clamps were removed. After several cardiac cycles, we opened the renal artery and the kidney had Good reperfusion and was firm and pink . It did have intraparenchymal spasm initially though this improved with distal clamping and  improved hemodynamics.     The transplant ureter was managed by creating a Liche (anterior multistitch) anastomosis with absorbable suture. A stent was placed across the anastomosis. The kidney made Yes urine prior to implantation.    Hemostasis was obtained, the anastomoses inspected, and the kidney placed in the iliac fossa. After placement, the vessel lay was inspected and found to be acceptable. The kidney position was Intra-peritoneal. The field was irrigated with antibiotic solution. A drain was placed. The retractor was removed and the abdominal wall fascia reapproximated. Subcutaneous tissues were irrigated and hemostasis obtained. The skin was reapproximated with staples and a dry dressing was applied. Faculty was present for key portions of the procedure.    The order of the organ reperfusion was: pancreas then kidney.    The appendix was excised using standard open technique..    All needle, sponge and instrument counts were correct x 2. The patient was awakened, extubated, and transferred to the PACU for post-op monitoring.

## 2021-11-16 NOTE — PLAN OF CARE
Transfer  Transferred from: PACU   Via:bed  Reason for transfer: Pt appropriate for 6B- KP txp  Family: Aware of transfer  Belongings: Received with pt  Chart: Received with pt  Medications: Meds received from old unit with pt  Code Status verified on armband: yes   2 RN Skin Assessment Completed By: Nena LEON & Beth LEON   Med rec completed: done by pharmacy  Bed surface reassessed with algorithm and charted: yes  New bed surface ordered: no, ambulatory  Suction/Ambu bag/Flowmeter at bedside: yes    Report received from: Becca LEON   Pt status: Unchanged, nauseous and having pain. Vitals unchanged on 2L NC sating >92%, CVP's 8-10, harris in place, BG checked hourly.

## 2021-11-16 NOTE — ANESTHESIA PROCEDURE NOTES
Central Line/PA Catheter Placement    Pre-Procedure   Staff -        Anesthesiologist:  Swapna Mao MD       Performed By: anesthesiologist       Location: OR       Pre-Anesthestic Checklist: patient identified, IV checked, site marked, risks and benefits discussed, informed consent, monitors and equipment checked, pre-op evaluation and at physician/surgeon's request  Timeout:       Correct Patient: Yes        Correct Procedure: Yes        Correct Site: Yes        Correct Position: Yes        Correct Laterality: Yes     Procedure   Procedure: central line       Laterality: left       Insertion Site: internal jugular.       Patient Position: Trendelenburg  Sterile Prep        All elements of maximal sterile barrier technique followed       Patient Prep/Sterile Barriers: draped, hand hygiene, gloves , hat , mask , draped, gown, sterile gel and probe cover       Skin prep: Betadine  Insertion/Injection        Technique: ultrasound guided        1. Ultrasound was used to evaluate the access site.       2. Vein evaluated via ultrasound for patency/adequacy.       3. Using real-time ultrasound the needle/catheter was observed entering the artery/vein.       5. The visualized structures were anatomically normal.       6. There were no apparent abnormal pathologic findings.       Catheter Type/Size: 7 Fr, 20 cm, 3-lumen  Narrative         Secured by: suture       Tegaderm and Biopatch dressing used.       Complications: None apparent,        blood aspirated from all lumens,        All lumens flushed: Yes       Verification method: Ultrasound and Placement to be verified post-op

## 2021-11-16 NOTE — CONSULTS
[facility]  Transplant Nephrology Consult  Date of Admission:  11/15/2021  Today's Date: 11/16/2021  Requesting physician: Branden Aranda*    Recommendations:  - Continue routine post-transplant care - will need to watch blood sugars closely since patient just removed insulin pump this AM   -11/16 being counted as POD 0    Assessment & Plan   # DDKT (SPK): Trend down   - Baseline Creatinine: ~ TBD   - Proteinuria: Not checked post transplant   - Date DSA Last Checked: Nov/2021      Latest DSA: No DSA at time of transplant   - BK Viremia: Not checked post transplant   - Kidney Tx Biopsy: No    -Stent in place. Remove 4-6 weeks post txp   -3-4d harris per txp surgery    # Pancreas Tx (SPK):    - Pancreatic Exocrine Drainage: Enteric drained     - Blood glucose: Near euglycemia      On insulin: Yes - on gtt, but pt had been wearing her insulin pump until this AM (11/16) and had been receiving 0.75u/hr from her pump   - HbA1c: Last checked at time of transplant      Latest HbA1c: 7.7%   - Pancreatic enzymes: Increased post txp. Noted saponification intra op on pancreas   - Date DSA Last Checked: Nov/2021  Latest DSA: No DSA at time of transplant   - Pancreas Tx Biopsy: No    # Immunosuppression: Tacrolimus immediate release (goal 8-10), Mycophenolate mofetil (dose 1000 mg every 12 hours) and Prednisone (dose taper)   - Changes: No    -Intermediate risk induction protocol.     # Infection Prophylaxis:   - PJP: Sulfa/TMP (Bactrim)  - CMV: Valganciclovir (Valcyte) (D-/R-). 3m valcyte  - Thrush: Clotrimazole yodit (Mycelex) and Micafungin (Mycamine)    # Blood Pressure: Controlled;  Goal BP: < 150/90   - Volume status: Euvolemic     - Changes: No    # Anemia in Chronic Renal Disease: Hgb: Stable      SYLVAIN: Yes- prior to admission was on darbe 60mcg q2w. Hold post txp   - Iron studies: Not checked recently    # Mineral Bone Disorder:   - Secondary renal  hyperparathyroidism; PTH level: Not checked recently        On treatment: None  - Vitamin D; level: Not checked recently        On supplement: No  - Calcium; level: Normal        On supplement: No  - Phosphorus; level: Normal        On supplement: No    # Electrolytes:   - Potassium; level: Low        On supplement: Yes  - Magnesium; level: Normal        On supplement: Yes  - Bicarbonate; level: Low        On supplement: No. Will trend prior to adding bicarb    # Hypokalemia:   -Replete and recheck    # Cataract: Had cataract removed in early November. On eye drops.    # Transplant History:  Etiology of Kidney Failure: Diabetes mellitus type 1  Tx: DDKT (SPK)  Transplant: 11/15/2021 (Kidney / Pancreas)  Crossmatch at time of Tx: Pending  Significant changes in immunosuppression: None  Significant transplant-related complications: None    Recommendations were communicated to the primary team verbally.    Seen and discussed with Dr. Chloe Wheeler MD  Pager: [phone]    Physician Attestation   I, Kamran Corona MD, personally examined and evaluated this patient.  I discussed the patient with the resident/fellow and care team, and agree with the assessment and plan of care as documented in the note on 21 .      I personally reviewed vital signs, medications, labs and imaging.    Kamran Corona MD  Date of Service (when I saw the patient): 21          REASON FOR CONSULT   SPK    History of Present Illness   Marilyn Ortega is a 36 year old female with history of DM type 1 since age 14, CKD (not on dialysis), HTN, gastroparesis, gluten intolerance, and heterozygous for Factor V Leiden mutation. She is COVID vaccinated + booster. She underwent  donor kidney and pancreas transplant on 21. She reports significant nausea post-op and has had dry heaves. She feels hungry and wishes she could take in PO. She also feels tired and very sore. She denies any fevers or chills. No chest pain  or SOB. She just was removing her insulin pump with Dr. Corona and this writer rounded this AM and reports it had been on all this time and had been delivering humalog at 0.75u/hr.    Review of Systems    The 10 point Review of Systems is negative other than noted in the HPI or here.    Past Medical History    I have reviewed this patient's medical history and updated it with pertinent information if needed.   Past Medical History:   Diagnosis Date     C. difficile diarrhea      CKD (chronic kidney disease) stage 4, GFR 15-29 ml/min (H)      Gastroparesis      Gluten intolerance      Heterozygous factor V Leiden mutation (H)      HTN (hypertension)      Infection due to 2019 novel coronavirus 2020     Osteomyelitis (H) 2013     Shingles 2013     Type 1 diabetes mellitus (H)        Past Surgical History   I have reviewed this patient's surgical history and updated it with pertinent information if needed.  Past Surgical History:   Procedure Laterality Date     BENCH KIDNEY N/A 11/15/2021    Procedure: Bench kidney;  Surgeon: Branden Aranda MD;  Location: [facility] OR     BENCH PANCREAS N/A 11/15/2021    Procedure: Bench pancreas;  Surgeon: Branden Aranda MD;  Location: [facility] OR     CATARACT EXTRACTION  2021     ORIF HIP FRACTURE Left      ORTHOPEDIC SURGERY Bilateral     Repair of labral tear     TRANSPLANT PANCREAS, KIDNEY  DONOR, COMBINED Left 11/15/2021    Procedure: TRANSPLANT, KIDNEY AND PANCREAS,  DONOR;  Surgeon: Branden Aranda MD;  Location: [facility] OR       Family History   I have reviewed this patient's family history and updated it with pertinent information if needed.   Family History   Problem Relation Age of Onset     Diabetes Paternal Grandfather      Arthritis Maternal Grandmother      Hypertension Maternal Grandmother      Cancer - colorectal Maternal Grandfather        Social History   I have reviewed this patient's social  history and updated it with pertinent information if needed. Marilyn Ortega  reports that she has never smoked. She has never used smokeless tobacco. She reports current alcohol use. She reports that she does not use drugs.    Allergies   Allergies   Allergen Reactions     Chlorhexidine Hives, Itching and Rash     PN: Patient had swelling/rash that was very itchy with chlorprep.     Ceclor [Cefaclor Monohydrate]      Cefaclor Rash     Prior to Admission Medications     acetaminophen  975 mg Oral Q8H     aspirin  81 mg Oral or Feeding Tube Daily     bromfenac  1 drop Left Eye Daily     calcium carbonate-vitamin D  1 tablet Oral BID w/meals     clotrimazole  10 mg Buccal 4x Daily     Loteprednol Etabonate  1 drop Left Eye TID     [START ON 2021] magnesium oxide  400 mg Oral Q24H     metroNIDAZOLE  500 mg Intravenous Q12H     micafungin  100 mg Intravenous Q24H     moxifloxacin  1 drop Left Eye TID     mycophenolate  1,000 mg Oral BID IS     octreotide  100 mcg Subcutaneous TID     ondansetron  4 mg Intravenous Q6H     pantoprazole  40 mg Per NG tube Daily     piperacillin-tazobactam  2.25 g Intravenous Q6H     [START ON 2021] polyethylene glycol  17 g Oral Daily     potassium chloride  40 mEq Per NG tube Once     senna-docusate  1 tablet Oral BID     sodium chloride (PF)  3 mL Intravenous Q8H     sulfamethoxazole-trimethoprim  10 mL Per NG tube Daily     tacrolimus  2 mg Oral or NG Tube BID IS     valGANciclovir  450 mg Oral Every Other Day       dextrose       IV fluid REPLACEMENT ONLY 100 mL/hr at 21 1324     heparin 400 Units/hr (21 1324)     insulin regular       IV fluid REPLACEMENT ONLY Stopped (21 0911)     IV fluid REPLACEMENT ONLY Stopped (21 1200)       Physical Exam   Temp  Av.4  F (36.9  C)  Min: 97.4  F (36.3  C)  Max: 99.2  F (37.3  C)  Arterial Line BP  Min: 131/71  Max: 165/94  Arterial Line MAP (mmHg)  Av.8 mmHg  Min: 93 mmHg  Max: 120 mmHg      Pulse   "Av.7  Min: 79  Max: 90 Resp  Av.8  Min: 10  Max: 20  SpO2  Av.8 %  Min: 98 %  Max: 100 %    CVP (mmHg): 10 mmHgBP 133/87 (BP Location: Left arm)   Pulse 82   Temp 98.7  F (37.1  C) (Oral)   Resp 10   Ht 1.676 m (5' 5.98\")   Wt 55.1 kg (121 lb 7.6 oz)   LMP  (LMP Unknown)   SpO2 99%   BMI 19.62 kg/m     Date 21 0700 - 21 0659   Shift 4655-9093 1405-7930 4953-7146 24 Hour Total   INTAKE   P.O. 0   0   I.V. 3950.45   3950.45   NG/GT 60   60   Shift Total(mL/kg) 4010.45(72.79)   4010.45(72.79)   OUTPUT   Urine 2640   2640   Drains 160   160   Shift Total(mL/kg) 2800(50.82)   2800(50.82)   Weight (kg) 55.1 55.1 55.1 55.1      Admit Weight: 55.1 kg (121 lb 7.6 oz)     GENERAL APPEARANCE: alert and no distress  HENT: mouth without ulcers or lesions  LYMPHATICS: no cervical or supraclavicular nodes  RESP: lungs clear to auscultation - no rales, rhonchi or wheezes  CV: regular rhythm, normal rate, no rub, no murmur  EDEMA: no LE edema bilaterally  ABDOMEN: soft, nondistended, nontender, bowel sounds normal  MS: extremities normal - no gross deformities noted, no evidence of inflammation in joints, no muscle tenderness  SKIN: no rash  TX KIDNEY: appropriately TTP, incision bandaged   DIALYSIS ACCESS: none    Data   CMP  Recent Labs   Lab 21  1256 21  1205 21  1147 21  1101 21  1001 21  0434 21  0402 21  0052 11/15/21  2346 11/15/21  2317 11/15/21  2210 11/15/21  2132 11/15/21  2058 11/15/21  1639 11/15/21  1550   NA  --   --   --   --   --   --  137  --  138  --  139  --  138   < > 138   POTASSIUM  --   --  3.1*  --   --   --  3.4  --  3.0*  --  3.1*  --  3.5   < > 3.2*   CHLORIDE  --   --   --   --   --   --  107  --   --   --   --   --   --   --  109   CO2  --   --   --   --   --   --  19*  --   --   --   --   --   --   --  19*   ANIONGAP  --   --   --   --   --   --  11  --   --   --   --   --   --   --  10   * 124*  --  104* 111*   < " > 90   < > 78   < > 150*   < > 220*   < > 139*   BUN  --   --   --   --   --   --  53*  --   --   --   --   --   --   --  71*   CR  --   --   --   --   --   --  2.00*  --   --   --   --   --   --   --  2.65*   GFRESTIMATED  --   --   --   --   --   --  31*  --   --   --   --   --   --   --  22*   STEPHANIE  --   --   --   --   --   --  8.8  --   --   --   --   --   --   --  8.9   MAG  --   --   --   --   --   --  1.7  --   --   --   --   --   --   --   --    PHOS  --   --   --   --   --   --  3.2  --   --   --   --   --   --   --   --    PROTTOTAL  --   --   --   --   --   --   --   --   --   --   --   --   --   --  8.4   ALBUMIN  --   --   --   --   --   --   --   --   --   --   --   --   --   --  4.0   BILITOTAL  --   --   --   --   --   --   --   --   --   --   --   --   --   --  0.4   ALKPHOS  --   --   --   --   --   --   --   --   --   --   --   --   --   --  104   AST  --   --   --   --   --   --   --   --   --   --   --   --   --   --  16   ALT  --   --   --   --   --   --   --   --   --   --   --   --   --   --  23    < > = values in this interval not displayed.     CBC  Recent Labs   Lab 11/16/21  1147 11/16/21  0950 11/16/21  0402 11/15/21  2346 11/15/21  2004 11/15/21  1549   HGB 10.2* 10.0* 10.8* 7.8*   < > 11.0*   WBC 17.3*  --  15.8*  --   --  8.9   RBC 3.69*  --  3.89  --   --  3.89   HCT 31.4*  --  32.2*  --   --  33.8*   MCV 85  --  83  --   --  87   MCH 27.6  --  27.8  --   --  28.3   MCHC 32.5  --  33.5  --   --  32.5   RDW 15.3*  --  14.8  --   --  14.4     --  183  --   --  282    < > = values in this interval not displayed.     INR  Recent Labs   Lab 11/16/21  0402 11/15/21  1550   INR 1.11 0.96   PTT 30 30     ABG  Recent Labs   Lab 11/15/21  2346 11/15/21  2210 11/15/21  2058 11/15/21  2004   PH 7.28* 7.29* 7.29* 7.27*   PCO2 35 35 37 35   PO2 44* 48* 51* 230*   HCO3 17* 17* 18* 16*   O2PER  --  40  --   --       Urine Studies  Recent Labs   Lab Test 11/15/21  1530 07/21/21  0825   COLOR  Straw Straw   APPEARANCE Clear Clear   URINEGLC 30 * 150 *   URINEBILI Negative Negative   URINEKETONE Negative Negative   SG 1.010 1.010   UBLD Small* Negative   URINEPH 5.5 6.0   PROTEIN 100 * 100 *   NITRITE Negative Negative   LEUKEST Negative Negative   RBCU 2 1   WBCU 1 1     Recent Labs   Lab Test 11/15/21  1530   UTPG 4.77*     PTH  No lab results found.  Iron Studies  No lab results found.    IMAGING:  All imaging studies reviewed by me.

## 2021-11-16 NOTE — PLAN OF CARE
A: A&Ox4, calm and cooperative, lethargic at beginning of shift but improved throughout day. VSS weaned from 2L to room air, vitals Q1H and BG Q1H (maintain between 100-150). SR, CVP's 6-11. Afebrile. Incisional pain managed with prn dilaudid and scheduled tylenol. Muscle spasms managed with prn robaxin. IV zofran and compazine for nausea. NPO (ice chips OK) with NG to LIS, clamped intermittently for medications. SARAI to bulb suction, see flow sheet for I&O's. Urine dropped off a little during shift, (team aware, see provider notification note). No new skin deficits, incision covered, drainage marked. Cage in place, dumped Q1H and fluids adjusted per order set. No BM or flatus during shift. Up with assist x1 to standing scale. Mother came to visit today, supportive. Lab book and med sheet given, education completed, and IS instruction shown. Pt able to make needs known via call light.     I/O this shift:  In: 1530 [I.V.:470; NG/GT:60]  Out: 615 [Urine:375; Emesis/NG output:200; Drains:40]    Temp:  [97.4  F (36.3  C)-98.9  F (37.2  C)] 98.9  F (37.2  C)  Pulse:  [79-91] 91  Resp:  [10-18] 16  BP: (115-154)/(73-99) 136/80  MAP:  [93 mmHg-120 mmHg] 93 mmHg  Arterial Line BP: (131-165)/(71-94) 131/71  SpO2:  [96 %-100 %] 96 %     R: Continue with POC. Notify primary team with changes.

## 2021-11-16 NOTE — PROGRESS NOTES
"Post-Op Check    S: doing well after surgery. Making urine. US, XR and labs completed. Some right diaphragmatic pain when taking deep breaths but otherwise expected abdominal/incisional pain. Denies ha, lightheadedness, chest pain, shortness of breath, or fevers.    O:  /75   Pulse 89   Temp 97.4  F (36.3  C) (Oral)   Resp 14   Ht 1.676 m (5' 5.98\")   Wt 55.1 kg (121 lb 7.6 oz)   LMP  (LMP Unknown)   SpO2 100%   BMI 19.62 kg/m     Gen: NAD  HEENT: NGT in place, bilious output  CV: RRR  Pulm: Normal WOB on RA  GI: soft, non-distended, appropriately tender around midline incision  Incisions: c/d/I  Drains: right SARAI with serosang output  : harris in place with yellow urine in bag  Extremities: WWP, 2+ distal pulses throughout    A/P:  36 year old female now POD0 from a kidney and pancreas transplant. Doing well and recovering appropriately. Will start heparin at 200. Ok to transfer to the floor when bed is available.     Don Alexander  General Surgery PGY1  Night float    "

## 2021-11-17 ENCOUNTER — APPOINTMENT (OUTPATIENT)
Dept: ULTRASOUND IMAGING | Facility: CLINIC | Age: 36
DRG: 008 | End: 2021-11-17
Attending: PHYSICIAN ASSISTANT
Payer: COMMERCIAL

## 2021-11-17 ENCOUNTER — DOCUMENTATION ONLY (OUTPATIENT)
Dept: TRANSPLANT | Facility: CLINIC | Age: 36
End: 2021-11-17
Payer: COMMERCIAL

## 2021-11-17 LAB
AMYLASE SERPL-CCNC: 86 U/L (ref 30–110)
ANION GAP SERPL CALCULATED.3IONS-SCNC: 4 MMOL/L (ref 3–14)
BASOPHILS # BLD AUTO: 0 10E3/UL (ref 0–0.2)
BASOPHILS NFR BLD AUTO: 0 %
BUN SERPL-MCNC: 25 MG/DL (ref 7–30)
CALCIUM SERPL-MCNC: 8.2 MG/DL (ref 8.5–10.1)
CELL TYPE ALLO: NORMAL
CELL TYPE AUTO: NORMAL
CHANNELSHIFTALLOB1: 24
CHANNELSHIFTALLOB2: 18
CHANNELSHIFTALLOT1: 35
CHANNELSHIFTALLOT2: 31
CHANNELSHIFTAUTOB1: -40
CHANNELSHIFTAUTOB2: -41
CHANNELSHIFTAUTOT1: -64
CHANNELSHIFTAUTOT2: -67
CHLORIDE BLD-SCNC: 115 MMOL/L (ref 94–109)
CO2 SERPL-SCNC: 25 MMOL/L (ref 20–32)
CREAT SERPL-MCNC: 1.14 MG/DL (ref 0.52–1.04)
CROSSMATCHDATEALLO: NORMAL
CROSSMATCHDATEAUTO: NORMAL
DONOR ALLO: NORMAL
DONOR AUTO: NORMAL
DONORCELLDATE ALLO: NORMAL
DONORCELLDATE AUTO: NORMAL
EOSINOPHIL # BLD AUTO: 0 10E3/UL (ref 0–0.7)
EOSINOPHIL NFR BLD AUTO: 0 %
ERYTHROCYTE [DISTWIDTH] IN BLOOD BY AUTOMATED COUNT: 15.8 % (ref 10–15)
GFR SERPL CREATININE-BSD FRML MDRD: 62 ML/MIN/1.73M2
GLUCOSE BLD-MCNC: 105 MG/DL (ref 70–99)
GLUCOSE BLDC GLUCOMTR-MCNC: 101 MG/DL (ref 70–99)
GLUCOSE BLDC GLUCOMTR-MCNC: 104 MG/DL (ref 70–99)
GLUCOSE BLDC GLUCOMTR-MCNC: 106 MG/DL (ref 70–99)
GLUCOSE BLDC GLUCOMTR-MCNC: 107 MG/DL (ref 70–99)
GLUCOSE BLDC GLUCOMTR-MCNC: 110 MG/DL (ref 70–99)
GLUCOSE BLDC GLUCOMTR-MCNC: 113 MG/DL (ref 70–99)
GLUCOSE BLDC GLUCOMTR-MCNC: 114 MG/DL (ref 70–99)
GLUCOSE BLDC GLUCOMTR-MCNC: 126 MG/DL (ref 70–99)
GLUCOSE BLDC GLUCOMTR-MCNC: 133 MG/DL (ref 70–99)
GLUCOSE BLDC GLUCOMTR-MCNC: 136 MG/DL (ref 70–99)
GLUCOSE BLDC GLUCOMTR-MCNC: 138 MG/DL (ref 70–99)
GLUCOSE BLDC GLUCOMTR-MCNC: 140 MG/DL (ref 70–99)
GLUCOSE BLDC GLUCOMTR-MCNC: 141 MG/DL (ref 70–99)
GLUCOSE BLDC GLUCOMTR-MCNC: 143 MG/DL (ref 70–99)
GLUCOSE BLDC GLUCOMTR-MCNC: 144 MG/DL (ref 70–99)
GLUCOSE BLDC GLUCOMTR-MCNC: 144 MG/DL (ref 70–99)
GLUCOSE BLDC GLUCOMTR-MCNC: 150 MG/DL (ref 70–99)
GLUCOSE BLDC GLUCOMTR-MCNC: 152 MG/DL (ref 70–99)
GLUCOSE BLDC GLUCOMTR-MCNC: 155 MG/DL (ref 70–99)
GLUCOSE BLDC GLUCOMTR-MCNC: 168 MG/DL (ref 70–99)
GLUCOSE BLDC GLUCOMTR-MCNC: 168 MG/DL (ref 70–99)
HCT VFR BLD AUTO: 26 % (ref 35–47)
HGB BLD-MCNC: 8.4 G/DL (ref 11.7–15.7)
HGB BLD-MCNC: 8.5 G/DL (ref 11.7–15.7)
HGB BLD-MCNC: 8.6 G/DL (ref 11.7–15.7)
IMM GRANULOCYTES # BLD: 0.1 10E3/UL
IMM GRANULOCYTES NFR BLD: 1 %
LIPASE SERPL-CCNC: 385 U/L (ref 73–393)
LYMPHOCYTES # BLD AUTO: 0.1 10E3/UL (ref 0.8–5.3)
LYMPHOCYTES NFR BLD AUTO: 1 %
MAGNESIUM SERPL-MCNC: 1.8 MG/DL (ref 1.6–2.3)
MCH RBC QN AUTO: 27.5 PG (ref 26.5–33)
MCHC RBC AUTO-ENTMCNC: 32.3 G/DL (ref 31.5–36.5)
MCV RBC AUTO: 85 FL (ref 78–100)
MONOCYTES # BLD AUTO: 0.7 10E3/UL (ref 0–1.3)
MONOCYTES NFR BLD AUTO: 5 %
NEUTROPHILS # BLD AUTO: 12.8 10E3/UL (ref 1.6–8.3)
NEUTROPHILS NFR BLD AUTO: 93 %
NRBC # BLD AUTO: 0 10E3/UL
NRBC BLD AUTO-RTO: 0 /100
PATH REPORT.COMMENTS IMP SPEC: NORMAL
PATH REPORT.COMMENTS IMP SPEC: NORMAL
PATH REPORT.FINAL DX SPEC: NORMAL
PATH REPORT.GROSS SPEC: NORMAL
PATH REPORT.MICROSCOPIC SPEC OTHER STN: NORMAL
PATH REPORT.RELEVANT HX SPEC: NORMAL
PHOSPHATE SERPL-MCNC: 2.5 MG/DL (ref 2.5–4.5)
PHOTO IMAGE: NORMAL
PLATELET # BLD AUTO: 131 10E3/UL (ref 150–450)
POS CUT OFF ALLO B: >93
POS CUT OFF ALLO T: >79
POS CUT OFF AUTO B: >93
POS CUT OFF AUTO T: >79
POTASSIUM BLD-SCNC: 3.4 MMOL/L (ref 3.4–5.3)
POTASSIUM BLD-SCNC: 3.5 MMOL/L (ref 3.4–5.3)
POTASSIUM BLD-SCNC: 3.5 MMOL/L (ref 3.4–5.3)
POTASSIUM BLD-SCNC: 3.6 MMOL/L (ref 3.4–5.3)
RBC # BLD AUTO: 3.05 10E6/UL (ref 3.8–5.2)
RESULT ALLO B1: NORMAL
RESULT ALLO B2: NORMAL
RESULT ALLO T1: NORMAL
RESULT ALLO T2: NORMAL
RESULT AUTO B1: NORMAL
RESULT AUTO B2: NORMAL
RESULT AUTO T1: NORMAL
RESULT AUTO T2: NORMAL
SERUM DATE ALLO B1: NORMAL
SERUM DATE ALLO B2: NORMAL
SERUM DATE ALLO T1: NORMAL
SERUM DATE ALLO T2: NORMAL
SERUM DATE AUTO B1: NORMAL
SERUM DATE AUTO B2: NORMAL
SERUM DATE AUTO T1: NORMAL
SERUM DATE AUTO T2: NORMAL
SODIUM SERPL-SCNC: 144 MMOL/L (ref 133–144)
TESTMETHODALLO: NORMAL
TESTMETHODAUTO: NORMAL
TREATMENT ALLO B1: NORMAL
TREATMENT ALLO B2: NORMAL
TREATMENT ALLO T1: NORMAL
TREATMENT ALLO T2: NORMAL
TREATMENT AUTO B1: NORMAL
TREATMENT AUTO B2: NORMAL
TREATMENT AUTO T1: NORMAL
TREATMENT AUTO T2: NORMAL
UFH PPP CHRO-ACNC: <0.1 IU/ML
WBC # BLD AUTO: 13.7 10E3/UL (ref 4–11)
ZZZCOMMENT ALLOB2: NORMAL

## 2021-11-17 PROCEDURE — 84132 ASSAY OF SERUM POTASSIUM: CPT | Performed by: SURGERY

## 2021-11-17 PROCEDURE — 250N000013 HC RX MED GY IP 250 OP 250 PS 637

## 2021-11-17 PROCEDURE — 250N000009 HC RX 250: Performed by: SURGERY

## 2021-11-17 PROCEDURE — 258N000003 HC RX IP 258 OP 636: Performed by: SURGERY

## 2021-11-17 PROCEDURE — 250N000013 HC RX MED GY IP 250 OP 250 PS 637: Performed by: SURGERY

## 2021-11-17 PROCEDURE — 76776 US EXAM K TRANSPL W/DOPPLER: CPT | Mod: 26 | Performed by: RADIOLOGY

## 2021-11-17 PROCEDURE — 99233 SBSQ HOSP IP/OBS HIGH 50: CPT | Mod: GC | Performed by: INTERNAL MEDICINE

## 2021-11-17 PROCEDURE — 250N000013 HC RX MED GY IP 250 OP 250 PS 637: Performed by: PHYSICIAN ASSISTANT

## 2021-11-17 PROCEDURE — 80048 BASIC METABOLIC PNL TOTAL CA: CPT | Performed by: SURGERY

## 2021-11-17 PROCEDURE — 85018 HEMOGLOBIN: CPT | Performed by: SURGERY

## 2021-11-17 PROCEDURE — 250N000012 HC RX MED GY IP 250 OP 636 PS 637: Performed by: SURGERY

## 2021-11-17 PROCEDURE — 83690 ASSAY OF LIPASE: CPT | Performed by: SURGERY

## 2021-11-17 PROCEDURE — 250N000011 HC RX IP 250 OP 636: Mod: JB | Performed by: SURGERY

## 2021-11-17 PROCEDURE — 85520 HEPARIN ASSAY: CPT | Performed by: SURGERY

## 2021-11-17 PROCEDURE — 258N000003 HC RX IP 258 OP 636: Performed by: PHYSICIAN ASSISTANT

## 2021-11-17 PROCEDURE — 36592 COLLECT BLOOD FROM PICC: CPT | Performed by: SURGERY

## 2021-11-17 PROCEDURE — 120N000005 HC R&B MS OVERFLOW UMMC

## 2021-11-17 PROCEDURE — 250N000011 HC RX IP 250 OP 636

## 2021-11-17 PROCEDURE — 250N000011 HC RX IP 250 OP 636: Performed by: PHYSICIAN ASSISTANT

## 2021-11-17 PROCEDURE — 85004 AUTOMATED DIFF WBC COUNT: CPT | Performed by: SURGERY

## 2021-11-17 PROCEDURE — 84100 ASSAY OF PHOSPHORUS: CPT | Performed by: SURGERY

## 2021-11-17 PROCEDURE — 83735 ASSAY OF MAGNESIUM: CPT | Performed by: SURGERY

## 2021-11-17 PROCEDURE — 76776 US EXAM K TRANSPL W/DOPPLER: CPT

## 2021-11-17 PROCEDURE — 82150 ASSAY OF AMYLASE: CPT | Performed by: SURGERY

## 2021-11-17 PROCEDURE — 999N000155 HC STATISTIC RAPCV CVP MONITORING

## 2021-11-17 RX ORDER — AMOXICILLIN 250 MG
2 CAPSULE ORAL 2 TIMES DAILY
Status: DISCONTINUED | OUTPATIENT
Start: 2021-11-17 | End: 2021-11-20

## 2021-11-17 RX ORDER — OCTREOTIDE ACETATE 100 UG/ML
100 INJECTION, SOLUTION INTRAVENOUS; SUBCUTANEOUS 2 TIMES DAILY
Status: DISCONTINUED | OUTPATIENT
Start: 2021-11-17 | End: 2021-11-22 | Stop reason: HOSPADM

## 2021-11-17 RX ORDER — PREDNISONE 20 MG/1
40 TABLET ORAL DAILY
Status: COMPLETED | OUTPATIENT
Start: 2021-11-21 | End: 2021-11-22

## 2021-11-17 RX ORDER — POTASSIUM CHLORIDE 20MEQ/15ML
40 LIQUID (ML) ORAL ONCE
Status: COMPLETED | OUTPATIENT
Start: 2021-11-17 | End: 2021-11-17

## 2021-11-17 RX ORDER — PREDNISONE 10 MG/1
10 TABLET ORAL DAILY
Status: DISCONTINUED | OUTPATIENT
Start: 2021-12-07 | End: 2021-11-22 | Stop reason: HOSPADM

## 2021-11-17 RX ORDER — BISACODYL 10 MG
10 SUPPOSITORY, RECTAL RECTAL DAILY PRN
Status: DISCONTINUED | OUTPATIENT
Start: 2021-11-17 | End: 2021-11-18

## 2021-11-17 RX ORDER — PREDNISONE 20 MG/1
20 TABLET ORAL DAILY
Status: DISCONTINUED | OUTPATIENT
Start: 2021-11-23 | End: 2021-11-22 | Stop reason: HOSPADM

## 2021-11-17 RX ORDER — VALGANCICLOVIR 450 MG/1
900 TABLET, FILM COATED ORAL DAILY
Status: DISCONTINUED | OUTPATIENT
Start: 2021-11-18 | End: 2021-11-17

## 2021-11-17 RX ORDER — VALGANCICLOVIR HYDROCHLORIDE 50 MG/ML
900 POWDER, FOR SOLUTION ORAL DAILY
Status: DISCONTINUED | OUTPATIENT
Start: 2021-11-18 | End: 2021-11-19

## 2021-11-17 RX ORDER — METHYLPREDNISOLONE SODIUM SUCCINATE 125 MG/2ML
100 INJECTION, POWDER, LYOPHILIZED, FOR SOLUTION INTRAMUSCULAR; INTRAVENOUS ONCE
Status: COMPLETED | OUTPATIENT
Start: 2021-11-18 | End: 2021-11-18

## 2021-11-17 RX ORDER — PREDNISONE 20 MG/1
60 TABLET ORAL DAILY
Status: COMPLETED | OUTPATIENT
Start: 2021-11-19 | End: 2021-11-20

## 2021-11-17 RX ORDER — VALGANCICLOVIR 450 MG/1
900 TABLET, FILM COATED ORAL DAILY
Status: DISCONTINUED | OUTPATIENT
Start: 2021-11-18 | End: 2021-11-17 | Stop reason: ALTCHOICE

## 2021-11-17 RX ORDER — PREDNISONE 5 MG/1
5 TABLET ORAL DAILY
Status: DISCONTINUED | OUTPATIENT
Start: 2021-12-14 | End: 2021-11-22 | Stop reason: HOSPADM

## 2021-11-17 RX ORDER — PIPERACILLIN SODIUM, TAZOBACTAM SODIUM 3; .375 G/15ML; G/15ML
3.38 INJECTION, POWDER, LYOPHILIZED, FOR SOLUTION INTRAVENOUS EVERY 6 HOURS
Status: DISCONTINUED | OUTPATIENT
Start: 2021-11-17 | End: 2021-11-19

## 2021-11-17 RX ORDER — OXYCODONE HCL 5 MG/5 ML
5 SOLUTION, ORAL ORAL EVERY 4 HOURS PRN
Status: DISCONTINUED | OUTPATIENT
Start: 2021-11-17 | End: 2021-11-19

## 2021-11-17 RX ADMIN — PIPERACILLIN SODIUM AND TAZOBACTAM SODIUM 2.25 G: 2; .25 INJECTION, POWDER, LYOPHILIZED, FOR SOLUTION INTRAVENOUS at 05:49

## 2021-11-17 RX ADMIN — CLOTRIMAZOLE 10 MG: 10 LOZENGE ORAL at 08:01

## 2021-11-17 RX ADMIN — TACROLIMUS 2 MG: 5 CAPSULE ORAL at 17:01

## 2021-11-17 RX ADMIN — METHOCARBAMOL 750 MG: 750 TABLET ORAL at 00:36

## 2021-11-17 RX ADMIN — SODIUM CHLORIDE, SODIUM LACTATE, POTASSIUM CHLORIDE, CALCIUM CHLORIDE AND DEXTROSE MONOHYDRATE: 5; 600; 310; 30; 20 INJECTION, SOLUTION INTRAVENOUS at 00:35

## 2021-11-17 RX ADMIN — POTASSIUM CHLORIDE 40 MEQ: 40 SOLUTION ORAL at 00:35

## 2021-11-17 RX ADMIN — SENNOSIDES AND DOCUSATE SODIUM 1 TABLET: 8.6; 5 TABLET ORAL at 08:01

## 2021-11-17 RX ADMIN — TACROLIMUS 2 MG: 5 CAPSULE ORAL at 08:01

## 2021-11-17 RX ADMIN — HYDROMORPHONE HYDROCHLORIDE 0.4 MG: 0.2 INJECTION, SOLUTION INTRAMUSCULAR; INTRAVENOUS; SUBCUTANEOUS at 11:06

## 2021-11-17 RX ADMIN — HYDROMORPHONE HYDROCHLORIDE 0.4 MG: 0.2 INJECTION, SOLUTION INTRAMUSCULAR; INTRAVENOUS; SUBCUTANEOUS at 04:39

## 2021-11-17 RX ADMIN — HYDROMORPHONE HYDROCHLORIDE 0.4 MG: 0.2 INJECTION, SOLUTION INTRAMUSCULAR; INTRAVENOUS; SUBCUTANEOUS at 17:29

## 2021-11-17 RX ADMIN — HYDROMORPHONE HYDROCHLORIDE 0.4 MG: 0.2 INJECTION, SOLUTION INTRAMUSCULAR; INTRAVENOUS; SUBCUTANEOUS at 15:33

## 2021-11-17 RX ADMIN — HYDROMORPHONE HYDROCHLORIDE 0.4 MG: 0.2 INJECTION, SOLUTION INTRAMUSCULAR; INTRAVENOUS; SUBCUTANEOUS at 22:02

## 2021-11-17 RX ADMIN — BROMFENAC SODIUM 1 DROP: 0.7 SOLUTION/ DROPS OPHTHALMIC at 08:13

## 2021-11-17 RX ADMIN — POLYETHYLENE GLYCOL 3350 17 G: 17 POWDER, FOR SOLUTION ORAL at 08:01

## 2021-11-17 RX ADMIN — HYDROMORPHONE HYDROCHLORIDE 0.4 MG: 0.2 INJECTION, SOLUTION INTRAMUSCULAR; INTRAVENOUS; SUBCUTANEOUS at 08:20

## 2021-11-17 RX ADMIN — ACETAMINOPHEN 975 MG: 325 TABLET, FILM COATED ORAL at 08:01

## 2021-11-17 RX ADMIN — SODIUM CHLORIDE, SODIUM LACTATE, POTASSIUM CHLORIDE, CALCIUM CHLORIDE AND DEXTROSE MONOHYDRATE: 5; 600; 310; 30; 20 INJECTION, SOLUTION INTRAVENOUS at 19:35

## 2021-11-17 RX ADMIN — CLOTRIMAZOLE 10 MG: 10 LOZENGE ORAL at 15:48

## 2021-11-17 RX ADMIN — MYCOPHENOLATE MOFETIL 1000 MG: 200 POWDER, FOR SUSPENSION ORAL at 08:01

## 2021-11-17 RX ADMIN — Medication 400 MG: at 06:00

## 2021-11-17 RX ADMIN — HYDROMORPHONE HYDROCHLORIDE 0.4 MG: 0.2 INJECTION, SOLUTION INTRAMUSCULAR; INTRAVENOUS; SUBCUTANEOUS at 19:50

## 2021-11-17 RX ADMIN — OCTREOTIDE ACETATE 100 MCG: 100 INJECTION, SOLUTION INTRAVENOUS; SUBCUTANEOUS at 08:07

## 2021-11-17 RX ADMIN — CLOTRIMAZOLE 10 MG: 10 LOZENGE ORAL at 19:50

## 2021-11-17 RX ADMIN — ASPIRIN 81 MG CHEWABLE TABLET 81 MG: 81 TABLET CHEWABLE at 08:01

## 2021-11-17 RX ADMIN — Medication 40 MG: at 08:00

## 2021-11-17 RX ADMIN — ACETAMINOPHEN 975 MG: 325 TABLET, FILM COATED ORAL at 00:36

## 2021-11-17 RX ADMIN — POTASSIUM CHLORIDE 40 MEQ: 40 SOLUTION ORAL at 08:10

## 2021-11-17 RX ADMIN — SODIUM CHLORIDE 250 MG: 9 INJECTION, SOLUTION INTRAVENOUS at 10:00

## 2021-11-17 RX ADMIN — DOCUSATE SODIUM 50 MG AND SENNOSIDES 8.6 MG 2 TABLET: 8.6; 5 TABLET, FILM COATED ORAL at 21:23

## 2021-11-17 RX ADMIN — Medication 1 TABLET: at 17:02

## 2021-11-17 RX ADMIN — ONDANSETRON 4 MG: 2 INJECTION INTRAMUSCULAR; INTRAVENOUS at 04:33

## 2021-11-17 RX ADMIN — PIPERACILLIN AND TAZOBACTAM 3.38 G: 3; .375 INJECTION, POWDER, LYOPHILIZED, FOR SOLUTION INTRAVENOUS at 17:02

## 2021-11-17 RX ADMIN — ONDANSETRON 4 MG: 2 INJECTION INTRAMUSCULAR; INTRAVENOUS at 17:01

## 2021-11-17 RX ADMIN — PROCHLORPERAZINE EDISYLATE 10 MG: 5 INJECTION INTRAMUSCULAR; INTRAVENOUS at 07:56

## 2021-11-17 RX ADMIN — HYDROMORPHONE HYDROCHLORIDE 0.4 MG: 0.2 INJECTION, SOLUTION INTRAMUSCULAR; INTRAVENOUS; SUBCUTANEOUS at 01:24

## 2021-11-17 RX ADMIN — SODIUM CHLORIDE 20 MG: 9 INJECTION, SOLUTION INTRAVENOUS at 10:36

## 2021-11-17 RX ADMIN — PIPERACILLIN SODIUM AND TAZOBACTAM SODIUM 2.25 G: 2; .25 INJECTION, POWDER, LYOPHILIZED, FOR SOLUTION INTRAVENOUS at 11:14

## 2021-11-17 RX ADMIN — Medication 1 TABLET: at 08:01

## 2021-11-17 RX ADMIN — MICAFUNGIN SODIUM 100 MG: 50 INJECTION, POWDER, LYOPHILIZED, FOR SOLUTION INTRAVENOUS at 08:10

## 2021-11-17 RX ADMIN — ACETAMINOPHEN 975 MG: 325 TABLET, FILM COATED ORAL at 15:48

## 2021-11-17 RX ADMIN — OCTREOTIDE ACETATE 100 MCG: 100 INJECTION, SOLUTION INTRAVENOUS; SUBCUTANEOUS at 19:52

## 2021-11-17 RX ADMIN — SULFAMETHOXAZOLE AND TRIMETHOPRIM 80 MG: 200; 40 SUSPENSION ORAL at 08:00

## 2021-11-17 RX ADMIN — PIPERACILLIN SODIUM AND TAZOBACTAM SODIUM 2.25 G: 2; .25 INJECTION, POWDER, LYOPHILIZED, FOR SOLUTION INTRAVENOUS at 00:08

## 2021-11-17 RX ADMIN — ONDANSETRON 4 MG: 2 INJECTION INTRAMUSCULAR; INTRAVENOUS at 11:01

## 2021-11-17 RX ADMIN — HUMAN INSULIN 1 UNITS/HR: 100 INJECTION, SOLUTION SUBCUTANEOUS at 13:02

## 2021-11-17 RX ADMIN — HYDROMORPHONE HYDROCHLORIDE 0.4 MG: 0.2 INJECTION, SOLUTION INTRAMUSCULAR; INTRAVENOUS; SUBCUTANEOUS at 13:24

## 2021-11-17 RX ADMIN — MYCOPHENOLATE MOFETIL 1000 MG: 200 POWDER, FOR SUSPENSION ORAL at 17:01

## 2021-11-17 ASSESSMENT — ACTIVITIES OF DAILY LIVING (ADL)
ADLS_ACUITY_SCORE: 6
ADLS_ACUITY_SCORE: 6
ADLS_ACUITY_SCORE: 8
ADLS_ACUITY_SCORE: 10
ADLS_ACUITY_SCORE: 10
ADLS_ACUITY_SCORE: 6
ADLS_ACUITY_SCORE: 6
ADLS_ACUITY_SCORE: 4
ADLS_ACUITY_SCORE: 10
ADLS_ACUITY_SCORE: 10
ADLS_ACUITY_SCORE: 6
ADLS_ACUITY_SCORE: 8
ADLS_ACUITY_SCORE: 4
ADLS_ACUITY_SCORE: 8
ADLS_ACUITY_SCORE: 6
ADLS_ACUITY_SCORE: 10
ADLS_ACUITY_SCORE: 8
ADLS_ACUITY_SCORE: 10
ADLS_ACUITY_SCORE: 10
ADLS_ACUITY_SCORE: 6
ADLS_ACUITY_SCORE: 10
ADLS_ACUITY_SCORE: 8
ADLS_ACUITY_SCORE: 10
ADLS_ACUITY_SCORE: 6

## 2021-11-17 ASSESSMENT — MIFFLIN-ST. JEOR: SCORE: 1304.5

## 2021-11-17 NOTE — CONSULTS
Transplant Admission Psychosocial Assessment    Patient Name: Marilyn Ortega  : 1985  Age: 36 year old  MRN: [id number]  Completed assessment with: Patient     Patient underwent a  donor kidney/pancreas transplant.  Met with patient to update psychosocial assessment and provide brief education about SW role while inpatient, as well as expectations/requirements and follow up needs post-transplant. SW also provided education about need for compliance with transplant medications, and explained ESRD Medicare benefits and medication coverage under Medicare part B.  Medicare 2728 forms completed and signed by patient.    Presenting Information   Living Situation: Patient lives alone in [place], [place]  If not local, plans for short term stay:  N/A  Previous Functional Status: Independent  Cultural/Language/Spiritual Considerations: None indicated    Support System  Primary Support Person Parents, Brother  Other support:  Friends  Plan for support in immediate post-transplant period: Friends and Family     Health Care Directive  Decision Maker: Patient  Alternate Decision Maker: Legal NOK would her parents  Health Care Directive: Provided education    Mental Health/Coping:   History of Mental Health: None indicated  History of Chemical Health: None indicated  Current status: Appropriate, tired  Coping: Patient appears to be coping well  Services Needed/Recommended: SW provided information on how to write to her donor's family    Financial   Income: Salary/Wages  Impact of transplant on income: Unable to work for 6-8 weeks  Insurance and medication coverage: SOT*LIAFADIA CUETO IS A KIDNEY/PANCREAS TRANSPLANT PATIENT  (DATE OF TRANSPLANT: 11/15/21)     HEALTH BENEFIT: [employer]   ID#[id number] GRP#[id number] (EFFECTIVE 21)     PHARMACY BENEFIT: [employer]  PROCESSING INFO: ID#[id number] GRP#[id number] PCN#[id number] BIN#[id number] (EFFECTIVE 21)   DEDUCTIBLE $2400 & OUT-OF-POCKET $2400  COPAY STRUCTURE:  $0 FOR  GENERIC  $0 FOR BRAND  $0 FOR NON-FORMULARY MEDICATIONS ONCE DEDUCTIBLE/OOP ARE MET    [facility] SPECIALTY #28:   MYCOPHENOLATE 250MG=$0,   PROGRAF 1MG=PA REQUIRED,   TACROLIMUS 1MG=$0,   CYCLOSPORINE 100MG=$0,   VALGANCICLOVIR 450MG=$0  Financial concerns: None indicated  Resources needed: None indicated    Assessment, recommendations and discharge plan: Patient is a 36-year-old, female, who underwent a  donor kidney/pancreas on 11/15/2021. Patient was not on dialysis prior to transplant. Patient was lying in bed and willing to talk with . SW provided information on Medicare and how to sign up for Medicare post-transplant. SW also provided information on how to write to her donor's family. SW provided contact information for inpatient and outpatient SW. SW then provided the cost of patient's transplant medication. Patient has adequate insurance coverage and social support. No coping issues or psychosocial concerns. Patient was well informed of post-transplant expectations/follow through.    SUSI Gordon, ALFRED  7A   Ph: [phone]  Pager: [phone]

## 2021-11-17 NOTE — PROGRESS NOTES
[facility]   Transplant Nephrology Progress Note  Date of Admission:  11/15/2021  Today's Date: 11/17/2021    Recommendations:  - No new recommendations at this time.    Assessment & Plan   # DDKT (SPK): Trend down   - Baseline Creatinine: ~ TBD   - Proteinuria: Not checked recently   - Date DSA Last Checked: Nov/2021      Latest DSA: No DSA at time of transplant   - BK Viremia: Not checked post transplant   - Kidney Tx Biopsy: No    # Pancreas Tx (SPK):    - Pancreatic Exocrine Drainage: Enteric drained     - Blood glucose: Near euglycemia      On insulin: Yes   - HbA1c: Last checked at time of transplant      Latest HbA1c: 7.7%   - Pancreatic enzymes: Trend down   - Date DSA Last Checked: Nov/2021  Latest DSA: No DSA at time of transplant   - Pancreas Tx Biopsy: No    # Immunosuppression: Tacrolimus immediate release (goal 8-10), Mycophenolate mofetil (dose 1000 mg every 12 hours) and Prednisone (dose taper)   - Induction with Recent Transplant:  Intermediate Intensity   - Changes: No    # Infection Prophylaxis:   - PJP: Sulfa/TMP (Bactrim)  - CMV: Valganciclovir (Valcyte)  - Thrush: Clotrimazole yodit (Mycelex) and Micafungin (Mycamine)    # Hypertension: Borderline control;  Goal BP: < 150/90   - Volume status: Total body volume up, but intravascularly hypovolemic     - Changes: Yes - Would give additional IVF - seems intravascularly volume down    # Elevated Blood Glucose: Glucose generally running ~ 100-110s   - Management as per primary team.   - Elevated BG following steroids    # Anemia in Chronic Renal Disease: Hgb: Stable      SYLVAIN: No   - Iron studies: Not checked recently    # Mineral Bone Disorder:   - Secondary renal hyperparathyroidism; PTH level: Not checked recently        On treatment: None  - Vitamin D; level: Not checked recently        On supplement: No  - Calcium; level: Normal        On supplement: No  - Phosphorus; level: Normal        On  supplement: No    # Electrolytes:   - Potassium; level: Low normal        On supplement: No  - Magnesium; level: Normal        On supplement: Yes  - Bicarbonate; level: Normal        On supplement: No    # Heterozygous Factor V Leiden: On heparin. No personal hx of blood clots.    # Cataract: Had cataract removed in early November. On eye drops.    # Transplant History:  Etiology of Kidney Failure: Diabetes mellitus type 1  Tx: DDKT (SPK)  Transplant: 11/15/2021 (Kidney / Pancreas)  Donor Type: Donation after Brain Death Donor Class:   Crossmatch at time of Tx: negative  DSA at time of Tx: No  Significant changes in immunosuppression: None  Significant transplant-related complications: None    Recommendations were communicated to the primary team verbally.    Seen and discussed with Dr. Kalia Wheeler MD   Pager: [phone]    Interval History   Nausea is improved today & pt has more energy. Pain is also better controlled. Urine is dark & brown. No fevers or chills. No SOB or chest pain.    Review of Systems   4 point ROS was obtained and negative except as noted in the Interval History.    MEDICATIONS:    acetaminophen  975 mg Oral Q8H     aspirin  81 mg Oral or Feeding Tube Daily     [START ON 11/21/2021] basiliximab (SIMULECT) infusion  20 mg Intravenous Once     bromfenac  1 drop Left Eye Daily     calcium carbonate-vitamin D  1 tablet Oral BID w/meals     clotrimazole  10 mg Buccal 4x Daily     Loteprednol Etabonate  1 drop Left Eye BID     magnesium oxide  400 mg Oral Q24H     [START ON 11/18/2021] methylPREDNISolone  100 mg Intravenous Once     micafungin  100 mg Intravenous Q24H     mycophenolate  1,000 mg Oral BID IS     octreotide  100 mcg Subcutaneous BID     ondansetron  4 mg Intravenous Q6H     pantoprazole  40 mg Per NG tube Daily     piperacillin-tazobactam  3.375 g Intravenous Q6H     polyethylene glycol  17 g Oral Daily     [START ON 11/19/2021] predniSONE  60 mg Oral Daily    Followed by  "    [START ON 2021] predniSONE  40 mg Oral Daily    Followed by     [START ON 2021] predniSONE  20 mg Oral Daily    Followed by     [START ON 2021] predniSONE  15 mg Oral Daily    Followed by     [START ON 2021] predniSONE  10 mg Oral Daily    Followed by     [START ON 2021] predniSONE  5 mg Oral Daily     [START ON 2021] Prucalopride Succinate  1 tablet Oral Daily     senna-docusate  2 tablet Oral BID     sodium chloride (PF)  3 mL Intravenous Q8H     sulfamethoxazole-trimethoprim  10 mL Per NG tube Daily     tacrolimus  2 mg Oral or NG Tube BID IS     [START ON 2021] valGANciclovir  900 mg Oral or NG Tube Daily       dextrose       IV fluid REPLACEMENT ONLY 125 mL/hr at 21 1455     heparin 600 Units/hr (21)     insulin regular 1 Units/hr (21)     IV fluid REPLACEMENT ONLY Stopped (21 09)     IV fluid REPLACEMENT ONLY Stopped (21)       Physical Exam   Temp  Av.5  F (36.9  C)  Min: 97.4  F (36.3  C)  Max: 99.3  F (37.4  C)  Arterial Line BP  Min: 131/71  Max: 165/94  Arterial Line MAP (mmHg)  Av.8 mmHg  Min: 93 mmHg  Max: 120 mmHg      Pulse  Av.4  Min: 78  Max: 94 Resp  Av.9  Min: 10  Max: 20  SpO2  Av.9 %  Min: 96 %  Max: 100 %    CVP (mmHg): 6 mmHgBP (!) 159/102   Pulse 81   Temp 98.6  F (37  C) (Oral)   Resp 16   Ht 1.676 m (5' 5.98\")   Wt 59.8 kg (131 lb 13.4 oz)   LMP  (LMP Unknown)   SpO2 99%   BMI 21.29 kg/m     Date 21 0700 - 21 0659   Shift 6889-8931 8577-9719 7458-7197 24 Hour Total   INTAKE   I.V. 1368 476  1844   NG/ 140  290   Shift Total(mL/kg) 1518(25.38) 616(10.3)  2134(35.69)   OUTPUT   Urine 525 170  695   Emesis/NG output 400 200  600   Drains 290 160  450   Shift Total(mL/kg) 1215(20.32) 530(8.86)  1745(29.18)   Weight (kg) 59.8 59.8 59.8 59.8      Admit Weight: 55.1 kg (121 lb 7.6 oz)     GENERAL APPEARANCE: alert and no distress  HENT: mouth without " ulcers or lesions  LYMPHATICS: no cervical or supraclavicular nodes  RESP: lungs clear to auscultation - no rales, rhonchi or wheezes  CV: regular rhythm, normal rate, no rub, no murmur  EDEMA: no LE edema bilaterally  ABDOMEN: soft, nondistended, nontender, bowel sounds normal  MS: extremities normal - no gross deformities noted, no evidence of inflammation in joints, no muscle tenderness  SKIN: no rash  TX KIDNEY: minimal TTP    Data   All labs reviewed by me.  CMP  Recent Labs   Lab 11/17/21  1656 11/17/21  1553 11/17/21  1458 11/17/21  1402 11/17/21  0902 11/17/21  0746 11/17/21  0519 11/17/21  0503 11/17/21  0016 11/16/21  2354 11/16/21  2110 11/16/21  2022 11/16/21  0434 11/16/21  0402 11/16/21  0052 11/15/21  2346 11/15/21  2317 11/15/21  2210 11/15/21  1639 11/15/21  1550   NA  --   --   --   --   --   --   --  144  --   --   --   --   --  137  --  138  --  139   < > 138   POTASSIUM  --   --   --   --   --  3.6  --  3.5  3.5  --  3.4  --  3.3*   < > 3.4  --  3.0*  --  3.1*   < > 3.2*   CHLORIDE  --   --   --   --   --   --   --  115*  --   --   --   --   --  107  --   --   --   --   --  109   CO2  --   --   --   --   --   --   --  25  --   --   --   --   --  19*  --   --   --   --   --  19*   ANIONGAP  --   --   --   --   --   --   --  4  --   --   --   --   --  11  --   --   --   --   --  10   * 144* 136* 144*   < >  --    < > 105*   < >  --    < >  --    < > 90   < > 78   < > 150*   < > 139*   BUN  --   --   --   --   --   --   --  25  --   --   --   --   --  53*  --   --   --   --   --  71*   CR  --   --   --   --   --   --   --  1.14*  --   --   --   --   --  2.00*  --   --   --   --   --  2.65*   GFRESTIMATED  --   --   --   --   --   --   --  62  --   --   --   --   --  31*  --   --   --   --   --  22*   STEPHANIE  --   --   --   --   --   --   --  8.2*  --   --   --   --   --  8.8  --   --   --   --   --  8.9   MAG  --   --   --   --   --   --   --  1.8  --   --   --   --   --  1.7  --   --   --    --   --   --    PHOS  --   --   --   --   --   --   --  2.5  --   --   --   --   --  3.2  --   --   --   --   --   --    PROTTOTAL  --   --   --   --   --   --   --   --   --   --   --   --   --   --   --   --   --   --   --  8.4   ALBUMIN  --   --   --   --   --   --   --   --   --   --   --   --   --   --   --   --   --   --   --  4.0   BILITOTAL  --   --   --   --   --   --   --   --   --   --   --   --   --   --   --   --   --   --   --  0.4   ALKPHOS  --   --   --   --   --   --   --   --   --   --   --   --   --   --   --   --   --   --   --  104   AST  --   --   --   --   --   --   --   --   --   --   --   --   --   --   --   --   --   --   --  16   ALT  --   --   --   --   --   --   --   --   --   --   --   --   --   --   --   --   --   --   --  23    < > = values in this interval not displayed.     CBC  Recent Labs   Lab 11/17/21  0746 11/17/21  0503 11/16/21  2354 11/16/21  2022 11/16/21  1649 11/16/21  1147 11/16/21  0950 11/16/21  0402 11/15/21  2004 11/15/21  1549   HGB 8.6* 8.4* 8.5* 8.5*   < > 10.2*   < > 10.8*   < > 11.0*   WBC  --  13.7*  --   --   --  17.3*  --  15.8*  --  8.9   RBC  --  3.05*  --   --   --  3.69*  --  3.89  --  3.89   HCT  --  26.0*  --   --   --  31.4*  --  32.2*  --  33.8*   MCV  --  85  --   --   --  85  --  83  --  87   MCH  --  27.5  --   --   --  27.6  --  27.8  --  28.3   MCHC  --  32.3  --   --   --  32.5  --  33.5  --  32.5   RDW  --  15.8*  --   --   --  15.3*  --  14.8  --  14.4   PLT  --  131*  --   --   --  176  --  183  --  282    < > = values in this interval not displayed.     INR  Recent Labs   Lab 11/16/21  0402 11/15/21  1550   INR 1.11 0.96   PTT 30 30     ABG  Recent Labs   Lab 11/15/21  2346 11/15/21  2210 11/15/21  2058 11/15/21  2004   PH 7.28* 7.29* 7.29* 7.27*   PCO2 35 35 37 35   PO2 44* 48* 51* 230*   HCO3 17* 17* 18* 16*   O2PER  --  40  --   --       Urine Studies  Recent Labs   Lab Test 11/15/21  1530 07/21/21  0825   COLOR Straw Straw   APPEARANCE  Clear Clear   URINEGLC 30 * 150 *   URINEBILI Negative Negative   URINEKETONE Negative Negative   SG 1.010 1.010   UBLD Small* Negative   URINEPH 5.5 6.0   PROTEIN 100 * 100 *   NITRITE Negative Negative   LEUKEST Negative Negative   RBCU 2 1   WBCU 1 1     Recent Labs   Lab Test 11/15/21  1530   UTPG 4.77*     PTH  No lab results found.  Iron Studies  No lab results found.    IMAGING:  All imaging studies reviewed by me.    Patient was seen and evaluated by me, Joseph Motta MD. I have reviewed the note and agree with the the plan of care as documented by the fellow.

## 2021-11-17 NOTE — PROGRESS NOTES
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 Admitted for DD SPK 11/16/21.     Graft function:  Pancreas: Lipase elevated to 4200 immediately post transplant, down to 385 POD 1. US immediately post transplant with good flow. No insulin during the transplant, however it was discovered 11/16 AM that patient had insulin pump connected overnight with Humalog at 0.75 units/hour, removed 11/16 AM. Continue insulin gtt, no insulin use. Repeat US 11/16 with good flow. Continue Octreotide x 5 days. Heparin gtt @600units/hour.    Kidney: CKD, made urine and was not on HD prior to transplant. Cr 1.1<-2.0<-2.7. US 11/16 with good flow. Ureteral stent in place.   Immunosuppression management: PRA 29, intermediate risk induction.   Thymo 100 mg intra-op  Solu-medrol 500 mg intra-op, 250 mg POD 1, 100 mg POD 2 then steroid taper  Simulect POD 1 & 5  Cellcept 1000 mg BID  Tac 1.5 mg BID. Goal 8-10.   Complexity of management:Medium. Contributing factors: anemia and nausea   HEENT:   Recent cataract removal: Continue drops as prescribed per taper.   Hematology:   Anemia of chronic disease: HGB 8.6 from 10.2, partially dilutional  Cardiorespiratory: Stable on room air, continue IS.   GI/Nutrition: NPO with NG in place.   Nausea: NG in place. Zofran every 6 hours.   Endocrine: See above. HGBA1C 7.7%.   Fluid/Electrolytes:   MIVF: D5LR@100/hour.   Hypomagnesemia: replace  Hypokalemia: replace  : Cage to remain due to new surgical anastomosis, remove on POD 3   Infectious disease: Tmax 99.3. Leukocytosis secondary to steroids, improving today.   Prophylaxis: Bactrim indefinitely, Valcyte x 3 months (CMV D-/R-). Zosyn and Stephanie per protocol.   Disposition: 6B, transfer to  later this afternoon if UOP  stabilizes    Medical Decision Making: Medium  Subsequent visit 32473 (moderate level decision making)    LUIS E/Fellow/Resident Provider: Cathie Moeller PA-C [id number]    Faculty: Branden Aranda MD    Attestation: I saw and examined this patient with Cathie Moeller PA-C, and the transplant team. I independently reviewed all pertinent laboratory and imaging results. I agree with the findings and plan as documented in the note above.  -Branden Aranda MD   _________________________________________________________________  Transplant History: Admitted 11/15/2021 for pancreas and kidney transplant.  11/15/2021 (Kidney / Pancreas), Postoperative day: 2     Interval History: History is obtained from the patient  Overnight events: Nausea this AM. No other complaints.     ROS:   A 10-point review of systems was negative except as noted above.    Meds:    acetaminophen  975 mg Oral Q8H     aspirin  81 mg Oral or Feeding Tube Daily     basiliximab (SIMULECT) infusion  20 mg Intravenous Once     [START ON 11/21/2021] basiliximab (SIMULECT) infusion  20 mg Intravenous Once     bromfenac  1 drop Left Eye Daily     calcium carbonate-vitamin D  1 tablet Oral BID w/meals     clotrimazole  10 mg Buccal 4x Daily     Loteprednol Etabonate  1 drop Left Eye BID     magnesium oxide  400 mg Oral Q24H     methylPREDNISolone  250 mg Intravenous Once     [START ON 11/18/2021] methylPREDNISolone  100 mg Intravenous Once     micafungin  100 mg Intravenous Q24H     mycophenolate  1,000 mg Oral BID IS     octreotide  100 mcg Subcutaneous BID     ondansetron  4 mg Intravenous Q6H     pantoprazole  40 mg Per NG tube Daily     piperacillin-tazobactam  2.25 g Intravenous Q6H     polyethylene glycol  17 g Oral Daily     [START ON 11/19/2021] predniSONE  60 mg Oral Daily    Followed by     [START ON 11/21/2021] predniSONE  40 mg Oral Daily    Followed by     [START ON 11/23/2021] predniSONE  20 mg Oral Daily    Followed by     [START ON 11/30/2021]  "predniSONE  15 mg Oral Daily    Followed by     [START ON 12/7/2021] predniSONE  10 mg Oral Daily    Followed by     [START ON 12/14/2021] predniSONE  5 mg Oral Daily     senna-docusate  2 tablet Oral BID     sodium chloride (PF)  3 mL Intravenous Q8H     sulfamethoxazole-trimethoprim  10 mL Per NG tube Daily     tacrolimus  2 mg Oral or NG Tube BID IS     valGANciclovir  450 mg Oral Every Other Day       Physical Exam:     Admit Weight: 55.1 kg (121 lb 7.6 oz)    Current vitals:   BP (!) 145/94 (BP Location: Left arm)   Pulse 83   Temp 98.4  F (36.9  C) (Oral)   Resp 14   Ht 1.676 m (5' 5.98\")   Wt 59.8 kg (131 lb 13.4 oz)   LMP  (LMP Unknown)   SpO2 98%   BMI 21.29 kg/m      CVP (mmHg): 9 mmHg    Vital sign ranges:    Temp:  [98  F (36.7  C)-99.3  F (37.4  C)] 98.4  F (36.9  C)  Pulse:  [82-94] 83  Resp:  [10-16] 14  BP: (130-154)/(79-99) 145/94  SpO2:  [96 %-100 %] 98 %  Patient Vitals for the past 24 hrs:   BP Temp Temp src Pulse Resp SpO2 Weight   11/17/21 0904 (!) 145/94 98.4  F (36.9  C) Oral 83 14 98 % --   11/17/21 0800 (!) 148/97 -- -- 86 -- 99 % --   11/17/21 0725 (!) 145/89 98.6  F (37  C) Oral 91 14 98 % --   11/17/21 0700 (!) 154/91 -- -- 83 -- 98 % --   11/17/21 0600 (!) 142/92 -- -- 84 14 96 % 59.8 kg (131 lb 13.4 oz)   11/17/21 0500 134/86 -- -- 84 12 97 % --   11/17/21 0400 135/87 -- -- 83 12 98 % --   11/17/21 0300 132/85 98.4  F (36.9  C) Oral 83 14 97 % --   11/17/21 0200 132/80 -- -- 83 12 97 % --   11/17/21 0100 (!) 133/90 -- -- 94 11 99 % --   11/17/21 0000 (!) 143/90 -- -- 84 11 97 % --   11/16/21 2300 137/87 98.5  F (36.9  C) Oral 85 10 97 % --   11/16/21 2200 136/85 98.5  F (36.9  C) Oral 86 12 98 % --   11/16/21 2100 131/83 98.4  F (36.9  C) Oral 93 12 100 % --   11/16/21 2000 135/88 99.1  F (37.3  C) Oral 87 14 99 % --   11/16/21 1919 134/79 99.3  F (37.4  C) Oral 85 12 98 % --   11/16/21 1900 132/82 -- -- 86 -- 99 % --   11/16/21 1800 132/81 -- -- 86 -- 100 % --   11/16/21 1600 " 136/80 -- -- 91 -- 96 % 58.7 kg (129 lb 6.6 oz)   11/16/21 1533 (!) 134/90 -- -- -- -- -- --   11/16/21 1531 (!) 149/95 98.9  F (37.2  C) Oral 84 16 100 % --   11/16/21 1500 133/87 98  F (36.7  C) Oral 85 -- 100 % --   11/16/21 1400 130/82 -- -- 84 12 100 % --   11/16/21 1300 133/87 -- -- 82 10 99 % --   11/16/21 1200 135/87 98.7  F (37.1  C) Oral 86 10 100 % --   11/16/21 1100 (!) 154/99 -- -- 84 10 100 % --   11/16/21 1000 130/89 -- -- 82 -- 100 % --     General Appearance: in no apparent distress.   Skin: normal  Heart: regular rate and rhythm  Lungs: NLB on room air  Abdomen: The abdomen is flat, and  mildly tender, generalized. she is not tender over the kidney and pancreas graft. The wound is c/d/i. SARAI in place with thin serosang output.   : harris is present. Urine has no gross hematuria.   Extremities: edema: present bilaterally. 1+  Neurologic: awake, alert and oriented. Tremor absent.    Data:   CMP  Recent Labs   Lab 11/17/21  0902 11/17/21  0746 11/17/21  0725 11/17/21  0519 11/17/21  0503 11/16/21  0434 11/16/21  0402 11/16/21  0052 11/15/21  2346 11/15/21  2317 11/15/21  2210 11/15/21  1639 11/15/21  1550   NA  --   --   --   --  144  --  137  --  138  --  139   < > 138   POTASSIUM  --  3.6  --   --  3.5  3.5   < > 3.4  --  3.0*  --  3.1*   < > 3.2*   CHLORIDE  --   --   --   --  115*  --  107  --   --   --   --   --  109   CO2  --   --   --   --  25  --  19*  --   --   --   --   --  19*   *  --  110*   < > 105*   < > 90   < > 78   < > 150*   < > 139*   BUN  --   --   --   --  25  --  53*  --   --   --   --   --  71*   CR  --   --   --   --  1.14*  --  2.00*  --   --   --   --   --  2.65*   GFRESTIMATED  --   --   --   --  62  --  31*  --   --   --   --   --  22*   STEPHANIE  --   --   --   --  8.2*  --  8.8  --   --   --   --   --  8.9   ICAW  --   --   --   --   --   --   --   --  4.4  --  4.7   < >  --    MAG  --   --   --   --  1.8  --  1.7  --   --   --   --   --   --    PHOS  --   --   --   --   2.5  --  3.2  --   --   --   --   --   --    AMYLASE  --   --   --   --  86  --  411*  --   --   --   --   --  56   LIPASE  --   --   --   --  385  --  4,205*  --   --   --   --   --  78   ALBUMIN  --   --   --   --   --   --   --   --   --   --   --   --  4.0   BILITOTAL  --   --   --   --   --   --   --   --   --   --   --   --  0.4   ALKPHOS  --   --   --   --   --   --   --   --   --   --   --   --  104   AST  --   --   --   --   --   --   --   --   --   --   --   --  16   ALT  --   --   --   --   --   --   --   --   --   --   --   --  23    < > = values in this interval not displayed.     CBC  Recent Labs   Lab 11/17/21  0746 11/17/21  0503 11/16/21  1649 11/16/21  1147 11/15/21  2004 11/15/21  1549   HGB 8.6* 8.4*   < > 10.2*   < > 11.0*   WBC  --  13.7*  --  17.3*   < > 8.9   PLT  --  131*  --  176   < > 282   A1C  --   --   --   --   --  7.7*    < > = values in this interval not displayed.     COAGS  Recent Labs   Lab 11/16/21  0402 11/15/21  1550   INR 1.11 0.96   PTT 30 30      Urinalysis  Recent Labs   Lab Test 11/15/21  1530 07/21/21  0825   COLOR Straw Straw   APPEARANCE Clear Clear   URINEGLC 30 * 150 *   URINEBILI Negative Negative   URINEKETONE Negative Negative   SG 1.010 1.010   UBLD Small* Negative   URINEPH 5.5 6.0   PROTEIN 100 * 100 *   NITRITE Negative Negative   LEUKEST Negative Negative   RBCU 2 1   WBCU 1 1   UTPG 4.77*  --      Virology:  Hepatitis C Antibody   Date Value Ref Range Status   11/15/2021 Nonreactive Nonreactive Final

## 2021-11-17 NOTE — PHARMACY-TRANSPLANT NOTE
Adult Kidney/Pancreas Transplant Post Operative Note    36 year old female s/p SPK  transplant on 11/16 for Type I diabetes.      Planned immunosuppression regimen per kidney/pancreas transplant protocol:    TRINH 31- Intermediate risk    11/16-POD0 - Thymo 2 mg/kg and  mg   11/17: POD1- Basiliximab 20 mg IV and  mg   11/18-POD2-  mg   11/19-POD3- Prednisone taper   11/21- POD5- Basiliximab 20 mg IV     MAINTENANCE:  mycophenolate and tacrolimus with goal trough levels of 8-10 mcg/L for the first 6 months post-transplant.      Opportunistic pathogen prophylaxis includes: trimethoprim/sulfamethoxazole, clotrimazole and valganciclovir.    Post-op antibiotic/antifungal surgical prophylaxis includes: piperacillin-tazobactam for 3 days post-procedure and micafungin IV for 6 days post-procedure.    Patient is not enrolled in medication study.    Pharmacy will monitor for medication interactions and immunosuppression levels in conjunction with the team.  Medication therapy needs for discharge planning will continue to be addressed throughout the current admission via multidisciplinary rounds and order review.   Pharmacy will make recommendations as appropriate.

## 2021-11-17 NOTE — PLAN OF CARE
Neuro: A/Ox4, able to make needs known. No deficits noted.  Cardiac: SR with HR 80s. VSS.   Respiratory: Sating upper 90s on RA. Pain with coughing, encouraging deep breathing. Capno for additional monitoring.  GI/: Adequate hourly urine output via harris;  ml/hr overnight. NG to LCS with moderate output. Bowel sounds hypoactive, abdomen tender. Scheduled zofran helpful for nausea.  Diet/appetite: NPO with ice chips.    Activity:  Ax 2. Weight shifting in bed. Encourage OOB today.  Pain: Prn dilaudid q2h for incision/abdominal pain. Prn robaxin as well.   Skin: Intact, no new deficits noted. Abdominal dressing CDI.  LDA's: L triple lumen internal jugular wdl; infusing maintenance fluids @ 100ml/hr, replacement #1/2 remain off overnight. PIV x 2- infusing heparin gtt @ 600 units/hr, other infusing TKO/abx. NG to LCS, patent. Harris wdl. R SARAI to bulb suction.    Plan: Pain control, encourage OOB. Continue with POC. Notify primary team with changes.

## 2021-11-17 NOTE — PROGRESS NOTES
Handoff information     Type of transplant: Kidney/Pancreas  Date of transplant: 11/15/2021  Direct/non-direct/PEP- Not applicable.  Transplant history: Transplant naive; no previous organ transplant  Outstanding items for patient: None  Pertinent history: Diabetes Mellitus Type 1, chronic constipation, irritable bowel syndrome, Chronic Kidney Disease Stage IV  Barriers to post transplant care: None

## 2021-11-18 ENCOUNTER — DOCUMENTATION ONLY (OUTPATIENT)
Dept: TRANSPLANT | Facility: CLINIC | Age: 36
End: 2021-11-18
Payer: COMMERCIAL

## 2021-11-18 LAB
AMYLASE SERPL-CCNC: 54 U/L (ref 30–110)
ANION GAP SERPL CALCULATED.3IONS-SCNC: 4 MMOL/L (ref 3–14)
BASOPHILS # BLD AUTO: 0 10E3/UL (ref 0–0.2)
BASOPHILS NFR BLD AUTO: 0 %
BUN SERPL-MCNC: 22 MG/DL (ref 7–30)
CALCIUM SERPL-MCNC: 8.7 MG/DL (ref 8.5–10.1)
CHLORIDE BLD-SCNC: 116 MMOL/L (ref 94–109)
CO2 SERPL-SCNC: 26 MMOL/L (ref 20–32)
CREAT SERPL-MCNC: 0.92 MG/DL (ref 0.52–1.04)
EOSINOPHIL # BLD AUTO: 0 10E3/UL (ref 0–0.7)
EOSINOPHIL NFR BLD AUTO: 0 %
ERYTHROCYTE [DISTWIDTH] IN BLOOD BY AUTOMATED COUNT: 15.8 % (ref 10–15)
GFR SERPL CREATININE-BSD FRML MDRD: 80 ML/MIN/1.73M2
GLUCOSE BLD-MCNC: 114 MG/DL (ref 70–99)
GLUCOSE BLDC GLUCOMTR-MCNC: 110 MG/DL (ref 70–99)
GLUCOSE BLDC GLUCOMTR-MCNC: 110 MG/DL (ref 70–99)
GLUCOSE BLDC GLUCOMTR-MCNC: 111 MG/DL (ref 70–99)
GLUCOSE BLDC GLUCOMTR-MCNC: 112 MG/DL (ref 70–99)
GLUCOSE BLDC GLUCOMTR-MCNC: 115 MG/DL (ref 70–99)
GLUCOSE BLDC GLUCOMTR-MCNC: 117 MG/DL (ref 70–99)
GLUCOSE BLDC GLUCOMTR-MCNC: 117 MG/DL (ref 70–99)
GLUCOSE BLDC GLUCOMTR-MCNC: 124 MG/DL (ref 70–99)
GLUCOSE BLDC GLUCOMTR-MCNC: 125 MG/DL (ref 70–99)
GLUCOSE BLDC GLUCOMTR-MCNC: 135 MG/DL (ref 70–99)
GLUCOSE BLDC GLUCOMTR-MCNC: 137 MG/DL (ref 70–99)
GLUCOSE BLDC GLUCOMTR-MCNC: 140 MG/DL (ref 70–99)
GLUCOSE BLDC GLUCOMTR-MCNC: 142 MG/DL (ref 70–99)
GLUCOSE BLDC GLUCOMTR-MCNC: 143 MG/DL (ref 70–99)
GLUCOSE BLDC GLUCOMTR-MCNC: 143 MG/DL (ref 70–99)
GLUCOSE BLDC GLUCOMTR-MCNC: 144 MG/DL (ref 70–99)
GLUCOSE BLDC GLUCOMTR-MCNC: 146 MG/DL (ref 70–99)
GLUCOSE BLDC GLUCOMTR-MCNC: 147 MG/DL (ref 70–99)
GLUCOSE BLDC GLUCOMTR-MCNC: 152 MG/DL (ref 70–99)
GLUCOSE BLDC GLUCOMTR-MCNC: 161 MG/DL (ref 70–99)
HCT VFR BLD AUTO: 27.8 % (ref 35–47)
HGB BLD-MCNC: 8.8 G/DL (ref 11.7–15.7)
IMM GRANULOCYTES # BLD: 0.1 10E3/UL
IMM GRANULOCYTES NFR BLD: 1 %
LIPASE SERPL-CCNC: 175 U/L (ref 73–393)
LYMPHOCYTES # BLD AUTO: 0.2 10E3/UL (ref 0.8–5.3)
LYMPHOCYTES NFR BLD AUTO: 2 %
MAGNESIUM SERPL-MCNC: 1.8 MG/DL (ref 1.6–2.3)
MCH RBC QN AUTO: 27.6 PG (ref 26.5–33)
MCHC RBC AUTO-ENTMCNC: 31.7 G/DL (ref 31.5–36.5)
MCV RBC AUTO: 87 FL (ref 78–100)
MONOCYTES # BLD AUTO: 0.8 10E3/UL (ref 0–1.3)
MONOCYTES NFR BLD AUTO: 6 %
NEUTROPHILS # BLD AUTO: 11.8 10E3/UL (ref 1.6–8.3)
NEUTROPHILS NFR BLD AUTO: 91 %
NRBC # BLD AUTO: 0 10E3/UL
NRBC BLD AUTO-RTO: 0 /100
PHOSPHATE SERPL-MCNC: 1.7 MG/DL (ref 2.5–4.5)
PLATELET # BLD AUTO: 139 10E3/UL (ref 150–450)
POTASSIUM BLD-SCNC: 3.7 MMOL/L (ref 3.4–5.3)
RBC # BLD AUTO: 3.19 10E6/UL (ref 3.8–5.2)
SODIUM SERPL-SCNC: 146 MMOL/L (ref 133–144)
SODIUM SERPL-SCNC: 146 MMOL/L (ref 133–144)
UFH PPP CHRO-ACNC: <0.1 IU/ML
WBC # BLD AUTO: 12.8 10E3/UL (ref 4–11)

## 2021-11-18 PROCEDURE — 250N000013 HC RX MED GY IP 250 OP 250 PS 637: Performed by: PHYSICIAN ASSISTANT

## 2021-11-18 PROCEDURE — 250N000013 HC RX MED GY IP 250 OP 250 PS 637: Performed by: SURGERY

## 2021-11-18 PROCEDURE — 80048 BASIC METABOLIC PNL TOTAL CA: CPT | Performed by: SURGERY

## 2021-11-18 PROCEDURE — 258N000003 HC RX IP 258 OP 636: Performed by: SURGERY

## 2021-11-18 PROCEDURE — 250N000012 HC RX MED GY IP 250 OP 636 PS 637: Performed by: SURGERY

## 2021-11-18 PROCEDURE — 84295 ASSAY OF SERUM SODIUM: CPT | Performed by: PHYSICIAN ASSISTANT

## 2021-11-18 PROCEDURE — 99232 SBSQ HOSP IP/OBS MODERATE 35: CPT | Mod: GC | Performed by: INTERNAL MEDICINE

## 2021-11-18 PROCEDURE — 83735 ASSAY OF MAGNESIUM: CPT | Performed by: SURGERY

## 2021-11-18 PROCEDURE — 84100 ASSAY OF PHOSPHORUS: CPT | Performed by: SURGERY

## 2021-11-18 PROCEDURE — 82150 ASSAY OF AMYLASE: CPT | Performed by: SURGERY

## 2021-11-18 PROCEDURE — 250N000011 HC RX IP 250 OP 636

## 2021-11-18 PROCEDURE — 83690 ASSAY OF LIPASE: CPT | Performed by: SURGERY

## 2021-11-18 PROCEDURE — 250N000011 HC RX IP 250 OP 636: Performed by: SURGERY

## 2021-11-18 PROCEDURE — 250N000011 HC RX IP 250 OP 636: Performed by: PHYSICIAN ASSISTANT

## 2021-11-18 PROCEDURE — 85520 HEPARIN ASSAY: CPT | Performed by: SURGERY

## 2021-11-18 PROCEDURE — 36592 COLLECT BLOOD FROM PICC: CPT | Performed by: SURGERY

## 2021-11-18 PROCEDURE — 36592 COLLECT BLOOD FROM PICC: CPT | Performed by: PHYSICIAN ASSISTANT

## 2021-11-18 PROCEDURE — P9041 ALBUMIN (HUMAN),5%, 50ML: HCPCS | Performed by: PHYSICIAN ASSISTANT

## 2021-11-18 PROCEDURE — 85025 COMPLETE CBC W/AUTO DIFF WBC: CPT | Performed by: SURGERY

## 2021-11-18 PROCEDURE — 120N000011 HC R&B TRANSPLANT UMMC

## 2021-11-18 PROCEDURE — 258N000003 HC RX IP 258 OP 636: Performed by: PHYSICIAN ASSISTANT

## 2021-11-18 PROCEDURE — 250N000012 HC RX MED GY IP 250 OP 636 PS 637: Performed by: PHYSICIAN ASSISTANT

## 2021-11-18 RX ORDER — ALBUMIN, HUMAN INJ 5% 5 %
25 SOLUTION INTRAVENOUS ONCE
Status: COMPLETED | OUTPATIENT
Start: 2021-11-18 | End: 2021-11-18

## 2021-11-18 RX ORDER — ALBUMIN, HUMAN INJ 5% 5 %
500 SOLUTION INTRAVENOUS ONCE
Status: COMPLETED | OUTPATIENT
Start: 2021-11-19 | End: 2021-11-19

## 2021-11-18 RX ORDER — MAGNESIUM OXIDE 400 MG/1
400 TABLET ORAL 2 TIMES DAILY
Status: DISCONTINUED | OUTPATIENT
Start: 2021-11-18 | End: 2021-11-22 | Stop reason: HOSPADM

## 2021-11-18 RX ADMIN — OXYCODONE HYDROCHLORIDE 5 MG: 5 SOLUTION ORAL at 22:45

## 2021-11-18 RX ADMIN — TACROLIMUS 2 MG: 5 CAPSULE ORAL at 18:26

## 2021-11-18 RX ADMIN — TACROLIMUS 2 MG: 5 CAPSULE ORAL at 07:51

## 2021-11-18 RX ADMIN — ONDANSETRON 4 MG: 2 INJECTION INTRAMUSCULAR; INTRAVENOUS at 17:04

## 2021-11-18 RX ADMIN — ONDANSETRON 4 MG: 2 INJECTION INTRAMUSCULAR; INTRAVENOUS at 11:08

## 2021-11-18 RX ADMIN — CLOTRIMAZOLE 10 MG: 10 LOZENGE ORAL at 19:41

## 2021-11-18 RX ADMIN — MYCOPHENOLATE MOFETIL 1000 MG: 200 POWDER, FOR SUSPENSION ORAL at 18:26

## 2021-11-18 RX ADMIN — OXYCODONE HYDROCHLORIDE 5 MG: 5 SOLUTION ORAL at 18:42

## 2021-11-18 RX ADMIN — METHYLPREDNISOLONE SODIUM SUCCINATE 100 MG: 125 INJECTION, POWDER, FOR SOLUTION INTRAMUSCULAR; INTRAVENOUS at 07:51

## 2021-11-18 RX ADMIN — Medication 400 MG: at 19:41

## 2021-11-18 RX ADMIN — ACETAMINOPHEN 975 MG: 325 TABLET, FILM COATED ORAL at 00:16

## 2021-11-18 RX ADMIN — OXYCODONE HYDROCHLORIDE 5 MG: 5 SOLUTION ORAL at 13:38

## 2021-11-18 RX ADMIN — Medication 40 MG: at 07:51

## 2021-11-18 RX ADMIN — PIPERACILLIN AND TAZOBACTAM 3.38 G: 3; .375 INJECTION, POWDER, LYOPHILIZED, FOR SOLUTION INTRAVENOUS at 00:19

## 2021-11-18 RX ADMIN — HYDROMORPHONE HYDROCHLORIDE 0.4 MG: 0.2 INJECTION, SOLUTION INTRAMUSCULAR; INTRAVENOUS; SUBCUTANEOUS at 00:16

## 2021-11-18 RX ADMIN — VALGANCICLOVIR HYDROCHLORIDE 900 MG: 50 POWDER, FOR SOLUTION ORAL at 07:51

## 2021-11-18 RX ADMIN — ACETAMINOPHEN 975 MG: 325 TABLET, FILM COATED ORAL at 17:03

## 2021-11-18 RX ADMIN — HYDROMORPHONE HYDROCHLORIDE 0.4 MG: 0.2 INJECTION, SOLUTION INTRAMUSCULAR; INTRAVENOUS; SUBCUTANEOUS at 10:55

## 2021-11-18 RX ADMIN — POTASSIUM & SODIUM PHOSPHATES POWDER PACK 280-160-250 MG 1 PACKET: 280-160-250 PACK at 19:45

## 2021-11-18 RX ADMIN — PIPERACILLIN AND TAZOBACTAM 3.38 G: 3; .375 INJECTION, POWDER, LYOPHILIZED, FOR SOLUTION INTRAVENOUS at 06:24

## 2021-11-18 RX ADMIN — HYDROMORPHONE HYDROCHLORIDE 0.4 MG: 0.2 INJECTION, SOLUTION INTRAMUSCULAR; INTRAVENOUS; SUBCUTANEOUS at 02:16

## 2021-11-18 RX ADMIN — CLOTRIMAZOLE 10 MG: 10 LOZENGE ORAL at 17:04

## 2021-11-18 RX ADMIN — SODIUM CHLORIDE, SODIUM LACTATE, POTASSIUM CHLORIDE, CALCIUM CHLORIDE AND DEXTROSE MONOHYDRATE: 5; 600; 310; 30; 20 INJECTION, SOLUTION INTRAVENOUS at 11:29

## 2021-11-18 RX ADMIN — HYDROMORPHONE HYDROCHLORIDE 0.4 MG: 0.2 INJECTION, SOLUTION INTRAMUSCULAR; INTRAVENOUS; SUBCUTANEOUS at 19:54

## 2021-11-18 RX ADMIN — OXYCODONE HYDROCHLORIDE 5 MG: 5 SOLUTION ORAL at 07:53

## 2021-11-18 RX ADMIN — SULFAMETHOXAZOLE AND TRIMETHOPRIM 80 MG: 200; 40 SUSPENSION ORAL at 07:51

## 2021-11-18 RX ADMIN — ASPIRIN 81 MG CHEWABLE TABLET 81 MG: 81 TABLET CHEWABLE at 07:53

## 2021-11-18 RX ADMIN — MYCOPHENOLATE MOFETIL 1000 MG: 200 POWDER, FOR SUSPENSION ORAL at 07:51

## 2021-11-18 RX ADMIN — BROMFENAC SODIUM 1 DROP: 0.7 SOLUTION/ DROPS OPHTHALMIC at 07:54

## 2021-11-18 RX ADMIN — MICAFUNGIN SODIUM 100 MG: 50 INJECTION, POWDER, LYOPHILIZED, FOR SOLUTION INTRAVENOUS at 07:49

## 2021-11-18 RX ADMIN — PIPERACILLIN AND TAZOBACTAM 3.38 G: 3; .375 INJECTION, POWDER, LYOPHILIZED, FOR SOLUTION INTRAVENOUS at 18:29

## 2021-11-18 RX ADMIN — ONDANSETRON 4 MG: 2 INJECTION INTRAMUSCULAR; INTRAVENOUS at 10:59

## 2021-11-18 RX ADMIN — POTASSIUM & SODIUM PHOSPHATES POWDER PACK 280-160-250 MG 1 PACKET: 280-160-250 PACK at 09:30

## 2021-11-18 RX ADMIN — CLOTRIMAZOLE 10 MG: 10 LOZENGE ORAL at 07:53

## 2021-11-18 RX ADMIN — ALBUMIN (HUMAN) 25 G: 12.5 INJECTION, SOLUTION INTRAVENOUS at 11:07

## 2021-11-18 RX ADMIN — HYDROMORPHONE HYDROCHLORIDE 0.4 MG: 0.2 INJECTION, SOLUTION INTRAMUSCULAR; INTRAVENOUS; SUBCUTANEOUS at 07:13

## 2021-11-18 RX ADMIN — CLOTRIMAZOLE 10 MG: 10 LOZENGE ORAL at 13:38

## 2021-11-18 RX ADMIN — DEXTROSE AND SODIUM CHLORIDE 1000 ML: 5; 200 INJECTION, SOLUTION INTRAVENOUS at 17:04

## 2021-11-18 RX ADMIN — DOCUSATE SODIUM 50 MG AND SENNOSIDES 8.6 MG 2 TABLET: 8.6; 5 TABLET, FILM COATED ORAL at 19:41

## 2021-11-18 RX ADMIN — PIPERACILLIN AND TAZOBACTAM 3.38 G: 3; .375 INJECTION, POWDER, LYOPHILIZED, FOR SOLUTION INTRAVENOUS at 13:36

## 2021-11-18 RX ADMIN — DOCUSATE SODIUM 50 MG AND SENNOSIDES 8.6 MG 2 TABLET: 8.6; 5 TABLET, FILM COATED ORAL at 07:52

## 2021-11-18 RX ADMIN — HYDROMORPHONE HYDROCHLORIDE 0.4 MG: 0.2 INJECTION, SOLUTION INTRAMUSCULAR; INTRAVENOUS; SUBCUTANEOUS at 04:37

## 2021-11-18 RX ADMIN — OCTREOTIDE ACETATE 100 MCG: 100 INJECTION, SOLUTION INTRAVENOUS; SUBCUTANEOUS at 19:47

## 2021-11-18 RX ADMIN — OCTREOTIDE ACETATE 100 MCG: 100 INJECTION, SOLUTION INTRAVENOUS; SUBCUTANEOUS at 07:53

## 2021-11-18 RX ADMIN — Medication 400 MG: at 06:14

## 2021-11-18 RX ADMIN — POLYETHYLENE GLYCOL 3350 17 G: 17 POWDER, FOR SOLUTION ORAL at 07:52

## 2021-11-18 RX ADMIN — HYDROMORPHONE HYDROCHLORIDE 0.4 MG: 0.2 INJECTION, SOLUTION INTRAMUSCULAR; INTRAVENOUS; SUBCUTANEOUS at 17:24

## 2021-11-18 RX ADMIN — SODIUM CHLORIDE, SODIUM LACTATE, POTASSIUM CHLORIDE, CALCIUM CHLORIDE AND DEXTROSE MONOHYDRATE: 5; 600; 310; 30; 20 INJECTION, SOLUTION INTRAVENOUS at 03:13

## 2021-11-18 RX ADMIN — Medication 1 TABLET: at 07:53

## 2021-11-18 RX ADMIN — ACETAMINOPHEN 975 MG: 325 TABLET, FILM COATED ORAL at 07:52

## 2021-11-18 ASSESSMENT — ACTIVITIES OF DAILY LIVING (ADL)
ADLS_ACUITY_SCORE: 6
ADLS_ACUITY_SCORE: 6
ADLS_ACUITY_SCORE: 8
ADLS_ACUITY_SCORE: 6
ADLS_ACUITY_SCORE: 8
ADLS_ACUITY_SCORE: 6
ADLS_ACUITY_SCORE: 6
ADLS_ACUITY_SCORE: 10
ADLS_ACUITY_SCORE: 8
ADLS_ACUITY_SCORE: 6
ADLS_ACUITY_SCORE: 8
ADLS_ACUITY_SCORE: 10
ADLS_ACUITY_SCORE: 6
ADLS_ACUITY_SCORE: 8
ADLS_ACUITY_SCORE: 8
ADLS_ACUITY_SCORE: 10
ADLS_ACUITY_SCORE: 8
ADLS_ACUITY_SCORE: 6
ADLS_ACUITY_SCORE: 8
ADLS_ACUITY_SCORE: 6

## 2021-11-18 ASSESSMENT — MIFFLIN-ST. JEOR
SCORE: 1315.5
SCORE: 1333.37

## 2021-11-18 NOTE — PROGRESS NOTES
Preliminary positive donor blood culture results have been uploaded to [facility].  Donor ID  [id number].  Dr. Alexander/Manoj notified of results.

## 2021-11-18 NOTE — PROGRESS NOTES
[facility]   Transplant Nephrology Progress Note  Date of Admission:  11/15/2021  Today's Date: 11/18/2021    Recommendations:  - Due to hypernatremia, recommend D5 with 1/4NS at 100ml/hr, trend Na q6h and change to 1/2NS once Na is 140    Assessment & Plan   # DDKT (SPK): Trend down - Cr  From 1.1 to 0.9 today   - Baseline Creatinine: ~ TBD   - Proteinuria: Not checked recently   - Date DSA Last Checked: Nov/2021      Latest DSA: No DSA at time of transplant   - BK Viremia: Not checked post transplant   - Kidney Tx Biopsy: No    # Pancreas Tx (SPK):    - Pancreatic Exocrine Drainage: Enteric drained     - Blood glucose: Near euglycemia      On insulin: Yes   - HbA1c: Last checked at time of transplant      Latest HbA1c: 7.7%   - Pancreatic enzymes: Trend down   - Date DSA Last Checked: Nov/2021  Latest DSA: No DSA at time of transplant   - Pancreas Tx Biopsy: No    # Immunosuppression: Tacrolimus immediate release (goal 8-10), Mycophenolate mofetil (dose 1000 mg every 12 hours) and Prednisone (dose taper)   - Induction with Recent Transplant:  Intermediate Intensity   - Changes: No    # Infection Prophylaxis:   - PJP: Sulfa/TMP (Bactrim)  - CMV: Valganciclovir (Valcyte)  - Thrush: Clotrimazole yodit (Mycelex) and Micafungin (Mycamine)    # Hypertension: Borderline control;  Goal BP: < 150/90   - Volume status: Total body volume up, but intravascularly hypovolemic     - Changes: No    # Elevated Blood Glucose: Glucose generally running ~ 100-110s   - Management as per primary team.   - Elevated BG following steroids, remains on insulin gtt    # Anemia in Chronic Renal Disease: Hgb: Stable      SYLVAIN: No   - Iron studies: Not checked recently    # Mineral Bone Disorder:   - Secondary renal hyperparathyroidism; PTH level: Not checked recently        On treatment: None  - Vitamin D; level: Not checked recently        On supplement: No  - Calcium; level: Normal        On  supplement: No  - Phosphorus; level: Normal        On supplement: No    # Electrolytes:   - Potassium; level: Low normal        On supplement: No  - Magnesium; level: Normal        On supplement: Yes  - Bicarbonate; level: Normal        On supplement: No    # Heterozygous Factor V Leiden: On heparin. No personal hx of blood clots.    # Cataract: Had cataract removed in early November. On eye drops.    # Transplant History:  Etiology of Kidney Failure: Diabetes mellitus type 1  Tx: DDKT (SPK)  Transplant: 11/15/2021 (Kidney / Pancreas)  Donor Type: Donation after Brain Death Donor Class:   Crossmatch at time of Tx: negative  DSA at time of Tx: No  Significant changes in immunosuppression: None  Significant transplant-related complications: None    Recommendations were communicated to the primary team verbally.    Seen and discussed with Dr. Kalia Wheeler MD   Pager: [phone]    Interval History   Pt feeling much better today - up to chair and reading on her ipad. Pain is better controlled. She's been up and walking around. Not passing gas yet, but feels like she may soon. No fevers or chills. No SOB or chest pain. Nausea is improved. Urine is less dark today.    Review of Systems   4 point ROS was obtained and negative except as noted in the Interval History.    MEDICATIONS:    acetaminophen  975 mg Oral Q8H     aspirin  81 mg Oral or Feeding Tube Daily     [START ON 11/21/2021] basiliximab (SIMULECT) infusion  20 mg Intravenous Once     bromfenac  1 drop Left Eye Daily     calcium carbonate-vitamin D  1 tablet Oral BID w/meals     clotrimazole  10 mg Buccal 4x Daily     Loteprednol Etabonate  1 drop Left Eye BID     magnesium oxide  400 mg Oral BID     micafungin  100 mg Intravenous Q24H     mycophenolate  1,000 mg Oral BID IS     octreotide  100 mcg Subcutaneous BID     ondansetron  4 mg Intravenous Q6H     pantoprazole  40 mg Per NG tube Daily     piperacillin-tazobactam  3.375 g Intravenous Q6H      "polyethylene glycol  17 g Oral Daily     potassium & sodium phosphates  1 packet Oral BID     [START ON 2021] predniSONE  60 mg Oral Daily    Followed by     [START ON 2021] predniSONE  40 mg Oral Daily    Followed by     [START ON 2021] predniSONE  20 mg Oral Daily    Followed by     [START ON 2021] predniSONE  15 mg Oral Daily    Followed by     [START ON 2021] predniSONE  10 mg Oral Daily    Followed by     [START ON 2021] predniSONE  5 mg Oral Daily     Prucalopride Succinate  1 tablet Oral Daily     senna-docusate  2 tablet Oral BID     sodium chloride (PF)  3 mL Intravenous Q8H     sulfamethoxazole-trimethoprim  10 mL Per NG tube Daily     tacrolimus  2 mg Oral or NG Tube BID IS     valGANciclovir  900 mg Oral or NG Tube Daily       dextrose       dextrose 5% and 0.2% NaCl       heparin 600 Units/hr (21 1248)     insulin regular 0.5 Units/hr (21 1519)     IV fluid REPLACEMENT ONLY Stopped (21 0911)     IV fluid REPLACEMENT ONLY Stopped (21)       Physical Exam   Temp  Av.5  F (36.9  C)  Min: 97.4  F (36.3  C)  Max: 99.3  F (37.4  C)  Arterial Line BP  Min: 131/71  Max: 165/94  Arterial Line MAP (mmHg)  Av.8 mmHg  Min: 93 mmHg  Max: 120 mmHg      Pulse  Av.4  Min: 78  Max: 94 Resp  Av.9  Min: 10  Max: 20  SpO2  Av.9 %  Min: 96 %  Max: 100 %    CVP (mmHg): 6 mmHgBP (!) 159/98 (BP Location: Left arm)   Pulse 86   Temp 97.9  F (36.6  C) (Oral)   Resp 16   Ht 1.676 m (5' 5.98\")   Wt 60.9 kg (134 lb 4.2 oz)   LMP  (LMP Unknown)   SpO2 100%   BMI 21.68 kg/m     Date 21 0700 - 21 0659   Shift 0175-0900 5990-8653 3424-7941 24 Hour Total   INTAKE   I.V. 1368 476  1844   NG/ 140  290   Shift Total(mL/kg) 1518(25.38) 616(10.3)  2134(35.69)   OUTPUT   Urine 525 170  695   Emesis/NG output 400 200  600   Drains 290 160  450   Shift Total(mL/kg) 1215(20.32) 530(8.86)  1745(29.18)   Weight (kg) 59.8 59.8 59.8 " 59.8      Admit Weight: 55.1 kg (121 lb 7.6 oz)     GENERAL APPEARANCE: alert and no distress  HENT: mouth without ulcers or lesions  LYMPHATICS: no cervical or supraclavicular nodes  RESP: lungs clear to auscultation - no rales, rhonchi or wheezes  CV: regular rhythm, normal rate, no rub, no murmur  EDEMA: no LE edema bilaterally  ABDOMEN: soft, nondistended, nontender, bowel sounds normal  MS: extremities normal - no gross deformities noted, no evidence of inflammation in joints, no muscle tenderness  SKIN: no rash  TX KIDNEY: minimal TTP    Data   All labs reviewed by me.  CMP  Recent Labs   Lab 11/18/21  1500 11/18/21  1349 11/18/21  1259 11/18/21  1200 11/18/21  0502 11/18/21  0436 11/17/21  0902 11/17/21  0746 11/17/21  0519 11/17/21  0503 11/17/21  0016 11/16/21  2354 11/16/21  0434 11/16/21  0402 11/16/21  0052 11/15/21  2346 11/15/21  1639 11/15/21  1550   NA  --   --   --   --   --  146*  --   --   --  144  --   --   --  137  --  138   < > 138   POTASSIUM  --   --   --   --   --  3.7  --  3.6  --  3.5  3.5  --  3.4   < > 3.4  --  3.0*   < > 3.2*   CHLORIDE  --   --   --   --   --  116*  --   --   --  115*  --   --   --  107  --   --   --  109   CO2  --   --   --   --   --  26  --   --   --  25  --   --   --  19*  --   --   --  19*   ANIONGAP  --   --   --   --   --  4  --   --   --  4  --   --   --  11  --   --   --  10   * 161* 146* 137*   < > 114*   < >  --    < > 105*   < >  --    < > 90   < > 78   < > 139*   BUN  --   --   --   --   --  22  --   --   --  25  --   --   --  53*  --   --   --  71*   CR  --   --   --   --   --  0.92  --   --   --  1.14*  --   --   --  2.00*  --   --   --  2.65*   GFRESTIMATED  --   --   --   --   --  80  --   --   --  62  --   --   --  31*  --   --   --  22*   STEPHANIE  --   --   --   --   --  8.7  --   --   --  8.2*  --   --   --  8.8  --   --   --  8.9   MAG  --   --   --   --   --  1.8  --   --   --  1.8  --   --   --  1.7  --   --   --   --    PHOS  --   --   --    --   --  1.7*  --   --   --  2.5  --   --   --  3.2  --   --   --   --    PROTTOTAL  --   --   --   --   --   --   --   --   --   --   --   --   --   --   --   --   --  8.4   ALBUMIN  --   --   --   --   --   --   --   --   --   --   --   --   --   --   --   --   --  4.0   BILITOTAL  --   --   --   --   --   --   --   --   --   --   --   --   --   --   --   --   --  0.4   ALKPHOS  --   --   --   --   --   --   --   --   --   --   --   --   --   --   --   --   --  104   AST  --   --   --   --   --   --   --   --   --   --   --   --   --   --   --   --   --  16   ALT  --   --   --   --   --   --   --   --   --   --   --   --   --   --   --   --   --  23    < > = values in this interval not displayed.     CBC  Recent Labs   Lab 11/18/21  0436 11/17/21  0746 11/17/21  0503 11/16/21  2354 11/16/21  1649 11/16/21  1147 11/16/21  0950 11/16/21  0402   HGB 8.8* 8.6* 8.4* 8.5*   < > 10.2*   < > 10.8*   WBC 12.8*  --  13.7*  --   --  17.3*  --  15.8*   RBC 3.19*  --  3.05*  --   --  3.69*  --  3.89   HCT 27.8*  --  26.0*  --   --  31.4*  --  32.2*   MCV 87  --  85  --   --  85  --  83   MCH 27.6  --  27.5  --   --  27.6  --  27.8   MCHC 31.7  --  32.3  --   --  32.5  --  33.5   RDW 15.8*  --  15.8*  --   --  15.3*  --  14.8   *  --  131*  --   --  176  --  183    < > = values in this interval not displayed.     INR  Recent Labs   Lab 11/16/21  0402 11/15/21  1550   INR 1.11 0.96   PTT 30 30     ABG  Recent Labs   Lab 11/15/21  2346 11/15/21  2210 11/15/21  2058 11/15/21  2004   PH 7.28* 7.29* 7.29* 7.27*   PCO2 35 35 37 35   PO2 44* 48* 51* 230*   HCO3 17* 17* 18* 16*   O2PER  --  40  --   --       Urine Studies  Recent Labs   Lab Test 11/15/21  1530 07/21/21  0825   COLOR Straw Straw   APPEARANCE Clear Clear   URINEGLC 30 * 150 *   URINEBILI Negative Negative   URINEKETONE Negative Negative   SG 1.010 1.010   UBLD Small* Negative   URINEPH 5.5 6.0   PROTEIN 100 * 100 *   NITRITE Negative Negative   LEUKEST Negative  Negative   RBCU 2 1   WBCU 1 1     Recent Labs   Lab Test 11/15/21  1530   UTPG 4.77*     PTH  No lab results found.  Iron Studies  No lab results found.    IMAGING:  All imaging studies reviewed by me.    Patient was seen and evaluated by me, Joseph Motta MD. I have reviewed the note and agree with the the plan of care as documented by the fellow.

## 2021-11-18 NOTE — PROGRESS NOTES
Care Management Follow Up    Length of Stay (days): 3    Expected Discharge Date: 11/23/2021     Concerns to be Addressed: discharge planning,care coordination/care conferences     Patient plan of care discussed at interdisciplinary rounds: Yes    Anticipated Discharge Disposition: Home     Anticipated Discharge Services: None  Anticipated Discharge DME: None    Patient/family educated on Medicare website which has current facility and service quality ratings: no  Education Provided on the Discharge Plan:    Patient/Family in Agreement with the Plan: yes    Referrals Placed by CM/SW: External Care Coordination    Additional Information:  Pt s/p kidney/pancreas transplant.  Team anticipates discharge early next week.  Pt will need ATC appointments confirmed prior to discharge.      Met with pt.  Introduced RNCC role.  Briefly discussed ATC clinic appointments.  Discussed home care f/u.  Per discussion with pt she declines need for home care.  Pt has family and friends that are able to assist her as needed.   Pt notes no concerns or questions at this time.  Agreed to f/u with pt when closer to discharge.     Zulay Valadez RN BSN, PHN, ACM-RN  7A RN Care Coordinator  Phone: [phone]  Pager [phone]  To contact the weekend RNCC, Page: [phone]    11/18/2021 1:17 PM

## 2021-11-18 NOTE — PLAN OF CARE
Neuro: Patient alert and oriented x4.   Cardiac: NSR 70's-90's. BP's hypertensive (team aware, no PRN). Afebrile.    Respiratory: Sating >92% on RA.  GI/: Adequate urine output. Very hypoactive bowel sounds. Not passing gas.   Diet/appetite: Tolerating NPO diet with ice chips. Eating well.  Activity: Assist of 1, up to chair and in halls.  Pain: At acceptable level on current regimen.   Skin: No new deficits noted.  LDA's: Right internal jugular, right and left PIV. Right SARAI with around 80ml out q 2 hours - serosang (team aware).   Plan: Renal ultrasound completed. Continue with POC. Notify primary team with changes.

## 2021-11-18 NOTE — PROGRESS NOTES
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 Admitted for DD SPK 11/16/21.     Graft function:  Pancreas: Lipase elevated to 4200 immediately post transplant, down to 175 POD 2. US immediately post transplant with good flow. No insulin during the transplant, however it was discovered 11/16 AM that patient had insulin pump connected overnight with Humalog at 0.75 units/hour, removed 11/16 AM. Continue insulin gtt, 0.2-0.5 units/hour started following steroid dose yesterday. Repeat US 11/16 with good flow. Continue Octreotide x 5 days. Heparin gtt @600units/hour.    Kidney: CKD, made urine and was not on HD prior to transplant. Cr 0.9<-1.1<-2.0<-2.7. US 11/16 with good flow. Ureteral stent in place.   Immunosuppression management: PRA 29, intermediate risk induction.   Thymo 100 mg intra-op  Solu-medrol 500 mg intra-op, 250 mg POD 1, 100 mg POD 2 then steroid taper  Simulect POD 1 & 5  Cellcept 1000 mg BID  Tac 1.5 mg BID. Goal 8-10.   Complexity of management:Medium. Contributing factors: anemia and nausea   HEENT:   Recent cataract removal: Continue drops as prescribed per taper.   Hematology:   Anemia of chronic disease: HGB 8.8<-8.6  Cardiorespiratory: Stable on room air, continue IS.   GI/Nutrition: NPO with NG in place. No flatus. Continue bowel regimen.   Nausea: NG in place. Zofran every 6 hours.   Endocrine: See above. HGBA1C 7.7%.   Fluid/Electrolytes:   MIVF: D5LR@125/hour.   Hypomagnesemia: replace  Hypokalemia: replace  : Cage to remain due to new surgical anastomosis, remove on POD 3   Infectious disease: Tmax 98.6. Leukocytosis secondary to steroids, improving today.   Prophylaxis: Bactrim indefinitely, Valcyte x 3 months (CMV D-/R-). Zosyn and Stephanie per protocol.   Disposition: 6B,  transfer to  when bed available    Medical Decision Making: Medium  Subsequent visit 07598 (moderate level decision making)    LUIS E/Fellow/Resident Provider: Cathie Moeller PA-C [id number]    Faculty: Branden Aranda MD     Attestation: I saw and examined this patient with Cathie Moeller PA-C, and the transplant team. I independently reviewed all pertinent laboratory and imaging results. I agree with the findings and plan as documented in the note above.  -Branden Aranda MD  _________________________________________________________________  Transplant History: Admitted 11/15/2021 for pancreas and kidney transplant.  11/15/2021 (Kidney / Pancreas), Postoperative day: 3     Interval History: History is obtained from the patient  Overnight events: Nausea improving. No flatus.     ROS:   A 10-point review of systems was negative except as noted above.     Meds:    acetaminophen  975 mg Oral Q8H     aspirin  81 mg Oral or Feeding Tube Daily     [START ON 11/21/2021] basiliximab (SIMULECT) infusion  20 mg Intravenous Once     bromfenac  1 drop Left Eye Daily     calcium carbonate-vitamin D  1 tablet Oral BID w/meals     clotrimazole  10 mg Buccal 4x Daily     Loteprednol Etabonate  1 drop Left Eye BID     magnesium oxide  400 mg Oral BID     micafungin  100 mg Intravenous Q24H     mycophenolate  1,000 mg Oral BID IS     octreotide  100 mcg Subcutaneous BID     ondansetron  4 mg Intravenous Q6H     pantoprazole  40 mg Per NG tube Daily     piperacillin-tazobactam  3.375 g Intravenous Q6H     polyethylene glycol  17 g Oral Daily     potassium & sodium phosphates  1 packet Oral BID     [START ON 11/19/2021] predniSONE  60 mg Oral Daily    Followed by     [START ON 11/21/2021] predniSONE  40 mg Oral Daily    Followed by     [START ON 11/23/2021] predniSONE  20 mg Oral Daily    Followed by     [START ON 11/30/2021] predniSONE  15 mg Oral Daily    Followed by     [START ON 12/7/2021] predniSONE  10 mg Oral Daily    Followed  "by     [START ON 12/14/2021] predniSONE  5 mg Oral Daily     Prucalopride Succinate  1 tablet Oral Daily     senna-docusate  2 tablet Oral BID     sodium chloride (PF)  3 mL Intravenous Q8H     sulfamethoxazole-trimethoprim  10 mL Per NG tube Daily     tacrolimus  2 mg Oral or NG Tube BID IS     valGANciclovir  900 mg Oral or NG Tube Daily       Physical Exam:     Admit Weight: 55.1 kg (121 lb 7.6 oz)    Current vitals:   BP (!) 163/100 (BP Location: Left arm)   Pulse 90   Temp 98.1  F (36.7  C) (Oral)   Resp 16   Ht 1.676 m (5' 5.98\")   Wt 60.9 kg (134 lb 4.2 oz)   LMP  (LMP Unknown)   SpO2 99%   BMI 21.68 kg/m      CVP (mmHg): 9 mmHg    Vital sign ranges:    Temp:  [97.6  F (36.4  C)-98.6  F (37  C)] 98.1  F (36.7  C)  Pulse:  [73-93] 90  Resp:  [13-16] 16  BP: (134-163)/() 163/100  SpO2:  [97 %-100 %] 99 %  Patient Vitals for the past 24 hrs:   BP Temp Temp src Pulse Resp SpO2 Weight   11/18/21 0800 (!) 163/100 98.1  F (36.7  C) Oral 90 16 99 % --   11/18/21 0700 (!) 159/100 -- -- 89 -- -- --   11/18/21 0600 (!) 149/106 -- -- 82 -- -- --   11/18/21 0500 (!) 162/100 -- -- 83 -- -- 60.9 kg (134 lb 4.2 oz)   11/18/21 0400 (!) 163/104 97.6  F (36.4  C) Oral 79 14 99 % --   11/18/21 0300 (!) 152/95 -- -- -- -- -- --   11/18/21 0200 (!) 136/90 -- -- 73 -- -- --   11/18/21 0100 (!) 140/89 -- -- 76 -- -- --   11/18/21 0000 (!) 145/90 -- -- 75 -- -- --   11/17/21 2347 (!) 148/93 98.3  F (36.8  C) Oral 80 16 97 % --   11/17/21 2300 134/86 -- -- 78 -- -- --   11/17/21 2200 (!) 138/92 -- -- 92 -- -- --   11/17/21 2100 (!) 141/87 -- -- 80 -- 97 % --   11/17/21 2000 (!) 152/102 97.8  F (36.6  C) Oral 82 14 100 % --   11/17/21 1900 -- -- -- 84 14 100 % --   11/17/21 1800 (!) 151/89 -- -- 92 16 100 % --   11/17/21 1700 (!) 159/102 -- -- 81 16 99 % --   11/17/21 1600 (!) 157/95 -- -- 80 14 98 % --   11/17/21 1500 (!) 152/105 98.6  F (37  C) Oral 78 16 99 % --   11/17/21 1400 (!) 158/90 -- -- 84 16 99 % --   11/17/21 " 1300 (!) 154/101 -- -- 84 14 98 % --   11/17/21 1200 (!) 151/96 98.1  F (36.7  C) Oral 93 14 99 % --   11/17/21 1100 (!) 151/95 -- -- 83 13 98 % --   11/17/21 1000 (!) 148/94 -- -- 81 13 97 % --     General Appearance: in no apparent distress.   Skin: normal  Heart: regular rate and rhythm  Lungs: NLB on room air  Abdomen: The abdomen is flat, and mildly tender, generalized. she is not tender over the kidney and pancreas graft. The wound is c/d/i. SARAI in place with thin serosang output.   : harris is present. Urine has no gross hematuria.   Extremities: edema: present bilaterally. 1+  Neurologic: awake, alert and oriented. Tremor absent.    Data:   CMP  Recent Labs   Lab 11/18/21  0900 11/18/21  0819 11/18/21  0502 11/18/21  0436 11/17/21  0902 11/17/21  0746 11/17/21  0519 11/17/21  0503 11/16/21  0052 11/15/21  2346 11/15/21  2317 11/15/21  2210 11/15/21  1639 11/15/21  1550   NA  --   --   --  146*  --   --   --  144   < > 138  --  139   < > 138   POTASSIUM  --   --   --  3.7  --  3.6  --  3.5  3.5   < > 3.0*  --  3.1*   < > 3.2*   CHLORIDE  --   --   --  116*  --   --   --  115*   < >  --   --   --   --  109   CO2  --   --   --  26  --   --   --  25   < >  --   --   --   --  19*   * 115*   < > 114*   < >  --    < > 105*   < > 78   < > 150*   < > 139*   BUN  --   --   --  22  --   --   --  25   < >  --   --   --   --  71*   CR  --   --   --  0.92  --   --   --  1.14*   < >  --   --   --   --  2.65*   GFRESTIMATED  --   --   --  80  --   --   --  62   < >  --   --   --   --  22*   STEPHANIE  --   --   --  8.7  --   --   --  8.2*   < >  --   --   --   --  8.9   ICAW  --   --   --   --   --   --   --   --   --  4.4  --  4.7   < >  --    MAG  --   --   --  1.8  --   --   --  1.8   < >  --   --   --   --   --    PHOS  --   --   --  1.7*  --   --   --  2.5   < >  --   --   --   --   --    AMYLASE  --   --   --  54  --   --   --  86   < >  --   --   --   --  56   LIPASE  --   --   --  175  --   --   --  385   < >  --    --   --   --  78   ALBUMIN  --   --   --   --   --   --   --   --   --   --   --   --   --  4.0   BILITOTAL  --   --   --   --   --   --   --   --   --   --   --   --   --  0.4   ALKPHOS  --   --   --   --   --   --   --   --   --   --   --   --   --  104   AST  --   --   --   --   --   --   --   --   --   --   --   --   --  16   ALT  --   --   --   --   --   --   --   --   --   --   --   --   --  23    < > = values in this interval not displayed.     CBC  Recent Labs   Lab 11/18/21  0436 11/17/21  0746 11/17/21  0503 11/15/21  2004 11/15/21  1549   HGB 8.8* 8.6* 8.4*   < > 11.0*   WBC 12.8*  --  13.7*   < > 8.9   *  --  131*   < > 282   A1C  --   --   --   --  7.7*    < > = values in this interval not displayed.     COAGS  Recent Labs   Lab 11/16/21  0402 11/15/21  1550   INR 1.11 0.96   PTT 30 30      Urinalysis  Recent Labs   Lab Test 11/15/21  1530 07/21/21  0825   COLOR Straw Straw   APPEARANCE Clear Clear   URINEGLC 30 * 150 *   URINEBILI Negative Negative   URINEKETONE Negative Negative   SG 1.010 1.010   UBLD Small* Negative   URINEPH 5.5 6.0   PROTEIN 100 * 100 *   NITRITE Negative Negative   LEUKEST Negative Negative   RBCU 2 1   WBCU 1 1   UTPG 4.77*  --      Virology:  Hepatitis C Antibody   Date Value Ref Range Status   11/15/2021 Nonreactive Nonreactive Final

## 2021-11-18 NOTE — PLAN OF CARE
Transfer  Transferred to:    Via: walked with RN   Reason for transfer: Pt no longer appropriate for 6B- improved  Family: Aware of transfer  Belongings: Packed and sent with pt  Chart: Delivered with pt to next unit  Medications: Meds sent to new unit with pt  Report given to: ANA RN   Pt status: Patient alert and oriented x4. NSR 70's-90's. BP's hypertensive 150's/100's (team aware). Afebrile. Satting >92% on RA. Urine output marginal, possibly going to give extra fluid today? Not passing gas yet, hypoactive bowel sounds. Tolerating NPO diet with minimal ice chips. Assist 1 of up to chair and in hallway, two walks completed so far today. Dilaudid and oxycodone given for head and abdominal pain. Midline lower abdominal incision with staples, FREDERICK. Right SARAI with serosang output. D5LR infusing at 125ml/hr. Insulin gtt on algorithm 1. Heparin straight rate at 600 units/hr. Left internal jugular, triple lumen and 2 PIV's for access. Transferred to  for lower level of care.

## 2021-11-18 NOTE — PLAN OF CARE
"BP (!) 162/100   Pulse 83   Temp 97.6  F (36.4  C) (Oral)   Resp 14   Ht 1.676 m (5' 5.98\")   Wt 60.9 kg (134 lb 4.2 oz)   LMP  (LMP Unknown)   SpO2 99%   BMI 21.68 kg/m    Neuro: A&Ox4.   Cardiac: SR. Bps systolically 140-160s. Team notified of Bps in 160s - will continue to monitor per team.   Respiratory: Sating >95% on RA.  GI/: Harris in place. Urine output low but stable. Pink/lillie. No BM.  Diet/appetite: Tolerating NPO (ice chips ok).   Activity:  Assist of 1 up for standing weight.  Pain: Given 0.4mg PRN dilaudid q2h.   Skin: No new deficits noted. Midline abdominal incision open to air, staples. SARAI drain site covered with gauze.  LDA's: SARAI drain, L internal jugular, harris, NG to LCS    Plan: Continue with POC. Notify primary team with changes.    "

## 2021-11-19 ENCOUNTER — DOCUMENTATION ONLY (OUTPATIENT)
Dept: TRANSPLANT | Facility: CLINIC | Age: 36
End: 2021-11-19
Payer: COMMERCIAL

## 2021-11-19 LAB
AMYLASE SERPL-CCNC: 74 U/L (ref 30–110)
ANION GAP SERPL CALCULATED.3IONS-SCNC: 1 MMOL/L (ref 3–14)
BASOPHILS # BLD AUTO: 0 10E3/UL (ref 0–0.2)
BASOPHILS NFR BLD AUTO: 0 %
BUN SERPL-MCNC: 23 MG/DL (ref 7–30)
CALCIUM SERPL-MCNC: 8.4 MG/DL (ref 8.5–10.1)
CHLORIDE BLD-SCNC: 112 MMOL/L (ref 94–109)
CO2 SERPL-SCNC: 28 MMOL/L (ref 20–32)
CREAT SERPL-MCNC: 1.01 MG/DL (ref 0.52–1.04)
EOSINOPHIL # BLD AUTO: 0 10E3/UL (ref 0–0.7)
EOSINOPHIL NFR BLD AUTO: 0 %
ERYTHROCYTE [DISTWIDTH] IN BLOOD BY AUTOMATED COUNT: 15.7 % (ref 10–15)
GFR SERPL CREATININE-BSD FRML MDRD: 72 ML/MIN/1.73M2
GLUCOSE BLD-MCNC: 91 MG/DL (ref 70–99)
GLUCOSE BLDC GLUCOMTR-MCNC: 100 MG/DL (ref 70–99)
GLUCOSE BLDC GLUCOMTR-MCNC: 100 MG/DL (ref 70–99)
GLUCOSE BLDC GLUCOMTR-MCNC: 101 MG/DL (ref 70–99)
GLUCOSE BLDC GLUCOMTR-MCNC: 110 MG/DL (ref 70–99)
GLUCOSE BLDC GLUCOMTR-MCNC: 110 MG/DL (ref 70–99)
GLUCOSE BLDC GLUCOMTR-MCNC: 112 MG/DL (ref 70–99)
GLUCOSE BLDC GLUCOMTR-MCNC: 112 MG/DL (ref 70–99)
GLUCOSE BLDC GLUCOMTR-MCNC: 118 MG/DL (ref 70–99)
GLUCOSE BLDC GLUCOMTR-MCNC: 124 MG/DL (ref 70–99)
GLUCOSE BLDC GLUCOMTR-MCNC: 132 MG/DL (ref 70–99)
GLUCOSE BLDC GLUCOMTR-MCNC: 78 MG/DL (ref 70–99)
GLUCOSE BLDC GLUCOMTR-MCNC: 85 MG/DL (ref 70–99)
GLUCOSE BLDC GLUCOMTR-MCNC: 86 MG/DL (ref 70–99)
GLUCOSE BLDC GLUCOMTR-MCNC: 88 MG/DL (ref 70–99)
GLUCOSE BLDC GLUCOMTR-MCNC: 93 MG/DL (ref 70–99)
GLUCOSE BLDC GLUCOMTR-MCNC: 97 MG/DL (ref 70–99)
GLUCOSE BLDC GLUCOMTR-MCNC: 98 MG/DL (ref 70–99)
GLUCOSE BLDC GLUCOMTR-MCNC: 98 MG/DL (ref 70–99)
HBV DNA SERPL QL NAA+PROBE: NORMAL
HCT VFR BLD AUTO: 24.4 % (ref 35–47)
HCV RNA SERPL QL NAA+PROBE: NORMAL
HGB BLD-MCNC: 7.7 G/DL (ref 11.7–15.7)
HGB BLD-MCNC: 8.4 G/DL (ref 11.7–15.7)
HIV1+2 RNA SERPL QL NAA+PROBE: NORMAL
IMM GRANULOCYTES # BLD: 0.1 10E3/UL
IMM GRANULOCYTES NFR BLD: 0 %
LIPASE FLD-CCNC: 840 U/L
LIPASE SERPL-CCNC: 246 U/L (ref 73–393)
LYMPHOCYTES # BLD AUTO: 0.6 10E3/UL (ref 0.8–5.3)
LYMPHOCYTES NFR BLD AUTO: 5 %
MAGNESIUM SERPL-MCNC: 1.7 MG/DL (ref 1.6–2.3)
MCH RBC QN AUTO: 27.9 PG (ref 26.5–33)
MCHC RBC AUTO-ENTMCNC: 31.6 G/DL (ref 31.5–36.5)
MCV RBC AUTO: 88 FL (ref 78–100)
MONOCYTES # BLD AUTO: 0.9 10E3/UL (ref 0–1.3)
MONOCYTES NFR BLD AUTO: 7 %
NEUTROPHILS # BLD AUTO: 11 10E3/UL (ref 1.6–8.3)
NEUTROPHILS NFR BLD AUTO: 88 %
NRBC # BLD AUTO: 0 10E3/UL
NRBC BLD AUTO-RTO: 0 /100
PHOSPHATE SERPL-MCNC: 2 MG/DL (ref 2.5–4.5)
PLATELET # BLD AUTO: 122 10E3/UL (ref 150–450)
POTASSIUM BLD-SCNC: 3.3 MMOL/L (ref 3.4–5.3)
RBC # BLD AUTO: 2.76 10E6/UL (ref 3.8–5.2)
SODIUM SERPL-SCNC: 141 MMOL/L (ref 133–144)
SODIUM SERPL-SCNC: 141 MMOL/L (ref 133–144)
SODIUM SERPL-SCNC: 145 MMOL/L (ref 133–144)
TACROLIMUS BLD-MCNC: 5.4 UG/L (ref 5–15)
TME LAST DOSE: NORMAL H
TME LAST DOSE: NORMAL H
UFH PPP CHRO-ACNC: <0.1 IU/ML
WBC # BLD AUTO: 12.5 10E3/UL (ref 4–11)

## 2021-11-19 PROCEDURE — 250N000013 HC RX MED GY IP 250 OP 250 PS 637: Performed by: PHYSICIAN ASSISTANT

## 2021-11-19 PROCEDURE — 85025 COMPLETE CBC W/AUTO DIFF WBC: CPT | Performed by: PHYSICIAN ASSISTANT

## 2021-11-19 PROCEDURE — 99232 SBSQ HOSP IP/OBS MODERATE 35: CPT | Mod: GC | Performed by: INTERNAL MEDICINE

## 2021-11-19 PROCEDURE — 250N000011 HC RX IP 250 OP 636: Performed by: PHYSICIAN ASSISTANT

## 2021-11-19 PROCEDURE — 84100 ASSAY OF PHOSPHORUS: CPT | Performed by: PHYSICIAN ASSISTANT

## 2021-11-19 PROCEDURE — 85018 HEMOGLOBIN: CPT | Performed by: PHYSICIAN ASSISTANT

## 2021-11-19 PROCEDURE — P9041 ALBUMIN (HUMAN),5%, 50ML: HCPCS

## 2021-11-19 PROCEDURE — 120N000011 HC R&B TRANSPLANT UMMC

## 2021-11-19 PROCEDURE — 83690 ASSAY OF LIPASE: CPT | Performed by: PHYSICIAN ASSISTANT

## 2021-11-19 PROCEDURE — 84295 ASSAY OF SERUM SODIUM: CPT | Performed by: PHYSICIAN ASSISTANT

## 2021-11-19 PROCEDURE — 80197 ASSAY OF TACROLIMUS: CPT | Performed by: PHYSICIAN ASSISTANT

## 2021-11-19 PROCEDURE — 258N000003 HC RX IP 258 OP 636: Performed by: PHYSICIAN ASSISTANT

## 2021-11-19 PROCEDURE — 85520 HEPARIN ASSAY: CPT | Performed by: PHYSICIAN ASSISTANT

## 2021-11-19 PROCEDURE — 250N000011 HC RX IP 250 OP 636

## 2021-11-19 PROCEDURE — 250N000012 HC RX MED GY IP 250 OP 636 PS 637: Performed by: PHYSICIAN ASSISTANT

## 2021-11-19 PROCEDURE — 250N000013 HC RX MED GY IP 250 OP 250 PS 637

## 2021-11-19 PROCEDURE — 250N000009 HC RX 250: Performed by: PHYSICIAN ASSISTANT

## 2021-11-19 PROCEDURE — 250N000012 HC RX MED GY IP 250 OP 636 PS 637

## 2021-11-19 PROCEDURE — 36592 COLLECT BLOOD FROM PICC: CPT | Performed by: PHYSICIAN ASSISTANT

## 2021-11-19 PROCEDURE — P9041 ALBUMIN (HUMAN),5%, 50ML: HCPCS | Performed by: PHYSICIAN ASSISTANT

## 2021-11-19 PROCEDURE — 250N000011 HC RX IP 250 OP 636: Mod: JB | Performed by: PHYSICIAN ASSISTANT

## 2021-11-19 PROCEDURE — 258N000002 HC RX IP 258 OP 250: Performed by: PHYSICIAN ASSISTANT

## 2021-11-19 PROCEDURE — 82150 ASSAY OF AMYLASE: CPT | Performed by: PHYSICIAN ASSISTANT

## 2021-11-19 PROCEDURE — 83735 ASSAY OF MAGNESIUM: CPT | Performed by: PHYSICIAN ASSISTANT

## 2021-11-19 RX ORDER — PANTOPRAZOLE SODIUM 40 MG/1
40 TABLET, DELAYED RELEASE ORAL
Status: DISCONTINUED | OUTPATIENT
Start: 2021-11-20 | End: 2021-11-22 | Stop reason: HOSPADM

## 2021-11-19 RX ORDER — TACROLIMUS 1 MG/1
2 CAPSULE ORAL
Status: DISCONTINUED | OUTPATIENT
Start: 2021-11-19 | End: 2021-11-19

## 2021-11-19 RX ORDER — FUROSEMIDE 10 MG/ML
40 INJECTION INTRAMUSCULAR; INTRAVENOUS ONCE
Status: COMPLETED | OUTPATIENT
Start: 2021-11-19 | End: 2021-11-19

## 2021-11-19 RX ORDER — POTASSIUM CHLORIDE 750 MG/1
40 TABLET, EXTENDED RELEASE ORAL ONCE
Status: COMPLETED | OUTPATIENT
Start: 2021-11-19 | End: 2021-11-19

## 2021-11-19 RX ORDER — ONDANSETRON 2 MG/ML
4 INJECTION INTRAMUSCULAR; INTRAVENOUS EVERY 6 HOURS PRN
Status: DISCONTINUED | OUTPATIENT
Start: 2021-11-19 | End: 2021-11-22

## 2021-11-19 RX ORDER — VALGANCICLOVIR 450 MG/1
900 TABLET, FILM COATED ORAL DAILY
Status: DISCONTINUED | OUTPATIENT
Start: 2021-11-20 | End: 2021-11-22 | Stop reason: HOSPADM

## 2021-11-19 RX ORDER — SODIUM CHLORIDE 450 MG/100ML
INJECTION, SOLUTION INTRAVENOUS CONTINUOUS
Status: DISCONTINUED | OUTPATIENT
Start: 2021-11-19 | End: 2021-11-20

## 2021-11-19 RX ORDER — MYCOPHENOLATE MOFETIL 250 MG/1
1000 CAPSULE ORAL
Status: DISCONTINUED | OUTPATIENT
Start: 2021-11-19 | End: 2021-11-21

## 2021-11-19 RX ORDER — ALBUMIN, HUMAN INJ 5% 5 %
500 SOLUTION INTRAVENOUS ONCE
Status: COMPLETED | OUTPATIENT
Start: 2021-11-19 | End: 2021-11-19

## 2021-11-19 RX ORDER — OXYCODONE HYDROCHLORIDE 5 MG/1
5 TABLET ORAL EVERY 4 HOURS PRN
Status: DISCONTINUED | OUTPATIENT
Start: 2021-11-19 | End: 2021-11-22 | Stop reason: HOSPADM

## 2021-11-19 RX ORDER — OXYMETAZOLINE HYDROCHLORIDE 0.05 G/100ML
2 SPRAY NASAL 2 TIMES DAILY
Status: DISCONTINUED | OUTPATIENT
Start: 2021-11-19 | End: 2021-11-22 | Stop reason: HOSPADM

## 2021-11-19 RX ORDER — SULFAMETHOXAZOLE AND TRIMETHOPRIM 400; 80 MG/1; MG/1
1 TABLET ORAL DAILY
Status: DISCONTINUED | OUTPATIENT
Start: 2021-11-20 | End: 2021-11-22 | Stop reason: HOSPADM

## 2021-11-19 RX ADMIN — ALBUMIN HUMAN 250 ML: 0.05 INJECTION, SOLUTION INTRAVENOUS at 11:16

## 2021-11-19 RX ADMIN — MICAFUNGIN SODIUM 100 MG: 50 INJECTION, POWDER, LYOPHILIZED, FOR SOLUTION INTRAVENOUS at 08:46

## 2021-11-19 RX ADMIN — ALBUMIN HUMAN: 0.05 INJECTION, SOLUTION INTRAVENOUS at 12:25

## 2021-11-19 RX ADMIN — OCTREOTIDE ACETATE 100 MCG: 100 INJECTION, SOLUTION INTRAVENOUS; SUBCUTANEOUS at 23:00

## 2021-11-19 RX ADMIN — TACROLIMUS 2 MG: 5 CAPSULE ORAL at 08:35

## 2021-11-19 RX ADMIN — CLOTRIMAZOLE 10 MG: 10 LOZENGE ORAL at 19:44

## 2021-11-19 RX ADMIN — CLOTRIMAZOLE 10 MG: 10 LOZENGE ORAL at 08:33

## 2021-11-19 RX ADMIN — ASPIRIN 81 MG CHEWABLE TABLET 81 MG: 81 TABLET CHEWABLE at 08:30

## 2021-11-19 RX ADMIN — CLOTRIMAZOLE 10 MG: 10 LOZENGE ORAL at 13:45

## 2021-11-19 RX ADMIN — MYCOPHENOLATE MOFETIL 1000 MG: 200 POWDER, FOR SUSPENSION ORAL at 08:35

## 2021-11-19 RX ADMIN — POTASSIUM & SODIUM PHOSPHATES POWDER PACK 280-160-250 MG 1 PACKET: 280-160-250 PACK at 08:57

## 2021-11-19 RX ADMIN — POTASSIUM & SODIUM PHOSPHATES POWDER PACK 280-160-250 MG 1 PACKET: 280-160-250 PACK at 19:45

## 2021-11-19 RX ADMIN — OXYCODONE HYDROCHLORIDE 5 MG: 5 TABLET ORAL at 11:51

## 2021-11-19 RX ADMIN — SODIUM CHLORIDE: 4.5 INJECTION, SOLUTION INTRAVENOUS at 19:58

## 2021-11-19 RX ADMIN — Medication 400 MG: at 19:44

## 2021-11-19 RX ADMIN — OXYCODONE HYDROCHLORIDE 5 MG: 5 TABLET ORAL at 16:26

## 2021-11-19 RX ADMIN — ONDANSETRON 4 MG: 2 INJECTION INTRAMUSCULAR; INTRAVENOUS at 00:05

## 2021-11-19 RX ADMIN — MYCOPHENOLATE MOFETIL 1000 MG: 250 CAPSULE ORAL at 18:01

## 2021-11-19 RX ADMIN — SULFAMETHOXAZOLE AND TRIMETHOPRIM 80 MG: 200; 40 SUSPENSION ORAL at 08:35

## 2021-11-19 RX ADMIN — BISACODYL 10 MG: 10 SUPPOSITORY RECTAL at 04:45

## 2021-11-19 RX ADMIN — ACETAMINOPHEN 975 MG: 325 TABLET, FILM COATED ORAL at 00:04

## 2021-11-19 RX ADMIN — SODIUM CHLORIDE: 4.5 INJECTION, SOLUTION INTRAVENOUS at 10:02

## 2021-11-19 RX ADMIN — POTASSIUM CHLORIDE 40 MEQ: 750 TABLET, EXTENDED RELEASE ORAL at 11:10

## 2021-11-19 RX ADMIN — OXYCODONE HYDROCHLORIDE 5 MG: 5 SOLUTION ORAL at 06:58

## 2021-11-19 RX ADMIN — OXYCODONE HYDROCHLORIDE 5 MG: 5 TABLET ORAL at 21:27

## 2021-11-19 RX ADMIN — OCTREOTIDE ACETATE 100 MCG: 100 INJECTION, SOLUTION INTRAVENOUS; SUBCUTANEOUS at 11:14

## 2021-11-19 RX ADMIN — HYDROMORPHONE HYDROCHLORIDE 0.4 MG: 0.2 INJECTION, SOLUTION INTRAMUSCULAR; INTRAVENOUS; SUBCUTANEOUS at 04:45

## 2021-11-19 RX ADMIN — PIPERACILLIN AND TAZOBACTAM 3.38 G: 3; .375 INJECTION, POWDER, LYOPHILIZED, FOR SOLUTION INTRAVENOUS at 06:58

## 2021-11-19 RX ADMIN — DOCUSATE SODIUM 50 MG AND SENNOSIDES 8.6 MG 2 TABLET: 8.6; 5 TABLET, FILM COATED ORAL at 08:31

## 2021-11-19 RX ADMIN — FUROSEMIDE 40 MG: 10 INJECTION, SOLUTION INTRAVENOUS at 13:07

## 2021-11-19 RX ADMIN — BROMFENAC SODIUM 1 DROP: 0.7 SOLUTION/ DROPS OPHTHALMIC at 08:38

## 2021-11-19 RX ADMIN — PREDNISONE 60 MG: 20 TABLET ORAL at 08:30

## 2021-11-19 RX ADMIN — ALBUMIN (HUMAN) 500 ML: 12.5 INJECTION, SOLUTION INTRAVENOUS at 00:32

## 2021-11-19 RX ADMIN — Medication 400 MG: at 13:45

## 2021-11-19 RX ADMIN — ONDANSETRON 4 MG: 2 INJECTION INTRAMUSCULAR; INTRAVENOUS at 06:58

## 2021-11-19 RX ADMIN — ACETAMINOPHEN 650 MG: 325 TABLET, FILM COATED ORAL at 22:59

## 2021-11-19 RX ADMIN — Medication 40 MG: at 08:34

## 2021-11-19 RX ADMIN — OXYCODONE HYDROCHLORIDE 5 MG: 5 SOLUTION ORAL at 03:23

## 2021-11-19 RX ADMIN — PIPERACILLIN AND TAZOBACTAM 3.38 G: 3; .375 INJECTION, POWDER, LYOPHILIZED, FOR SOLUTION INTRAVENOUS at 00:04

## 2021-11-19 RX ADMIN — CLOTRIMAZOLE 10 MG: 10 LOZENGE ORAL at 18:02

## 2021-11-19 RX ADMIN — OXYMETAZOLINE HYDROCHLORIDE 2 SPRAY: 0.05 SPRAY NASAL at 15:35

## 2021-11-19 RX ADMIN — TACROLIMUS 2.5 MG: 1 CAPSULE ORAL at 18:01

## 2021-11-19 RX ADMIN — Medication 1 TABLET: at 18:01

## 2021-11-19 RX ADMIN — OXYMETAZOLINE HYDROCHLORIDE 2 SPRAY: 0.05 SPRAY NASAL at 19:44

## 2021-11-19 RX ADMIN — VALGANCICLOVIR HYDROCHLORIDE 900 MG: 50 POWDER, FOR SOLUTION ORAL at 08:35

## 2021-11-19 ASSESSMENT — ACTIVITIES OF DAILY LIVING (ADL)
ADLS_ACUITY_SCORE: 6

## 2021-11-19 ASSESSMENT — MIFFLIN-ST. JEOR: SCORE: 1326.5

## 2021-11-19 NOTE — PLAN OF CARE
[id number]    VS: hypertensive. On room air.   B-84.  Was on insulin gtt 0.2u/hr until blood glucose dropped to 84.  Insulin gtt turned off at 6am.   Labs:pending  Pain/Nausea: oxy 5mg X 2, dilaudid IV X 1 for breakthrough pain.  Denies nausea. On scheduled zofran.    Diet: NPO except for ice chips.    LDA: internal jugular with MIV @ 100cc/hr, heparin gtt @ 600u/hr.  SARAI leaking at site, 75-100cc serosang output every 2 hours.    GI: Pt has a h/o gastroparesis and constipation.  Large BM after suppository.  NG tube has been clamped for much of the night due to meds- tolerating fine.    : Cage with decreased output at end of evening shift ~ 25cc/hr.   Albumin given.  Urine output increased to ~ 50cc/hr.    Skin: Pt edematous in hands, arms and legs.  Weight is up ~ 20 pounds.    Pt has her menses.    Mobility: pt ambulating in halls with stand by assist.    Plan: med card and lab book at bedside, start MTP today.    Pt had little to no sleep, allow for a nap today.

## 2021-11-19 NOTE — PROGRESS NOTES
Transplant Surgery  Inpatient Daily Progress Note  11/19/2021    Assessment & Plan: 36 year old female with PMH significant for DMI complicated by gastroparesis and CKD due to diabetic nephropathy, HTN, gluten intolerance and Factor V Leiden mutation, heterozygous. Admitted for DD SPK 11/16/21.     Graft function:  Pancreas: Lipase elevated to 4200 immediately post transplant, down to 246. US immediately post transplant with good flow. No insulin during the transplant, however it was discovered 11/16 AM that patient had insulin pump connected overnight with Humalog at 0.75 units/hour, removed 11/16 AM. Continue insulin gtt, 0-0.5 units/hour started following steroid dose. Repeat US 11/16 with good flow. Continue Octreotide x 5 days. Heparin gtt @600units/hour. Fluid lipase pending.   Kidney: CKD, made urine and was not on HD prior to transplant. Cr 1.0<-0.9<-1.1<-2.0<-2.7. US 11/16 with good flow. Ureteral stent in place.   Immunosuppression management: PRA 29, intermediate risk induction.   Thymo 100 mg intra-op  Solu-medrol 500 mg intra-op, 250 mg POD 1, 100 mg POD 2 then steroid taper  Simulect POD 1 & 5  Cellcept 1000 mg BID  Tac 2.5 mg BID. Goal 8-10. 11/19 level 5.4 (11.5 hour trough), increased dose from 2 to 2.5 mg BID.   Complexity of management: Medium. Contributing factors: anemia and nausea   HEENT:   Recent cataract removal: Continue drops as prescribed per taper.   Hematology:   Anemia of chronic disease: HGB 7.7<-8.8<-8.6, likely dilutional, will recheck HGB this afternoon.   Cardiorespiratory: Stable on room air, continue IS.   GI/Nutrition: NPO with NG in place. Positive bowel function, removed NG.   Nausea: Resolved. Change Zofran to prn.   Endocrine: See above. HGBA1C 7.7%.   Fluid/Electrolytes:   MIVF: D5LR@125/hour.   Hypomagnesemia: replace  Hypophosphatemia: replace  Hypokalemia: replace  : Cage to remain due to new surgical anastomosis, removed on POD 3. Check PVR  Infectious disease: Tmax  98.4. Leukocytosis secondary to steroids, improving today.   Prophylaxis: Bactrim indefinitely, Valcyte x 3 months (CMV D-/R-). Zosyn and Stephanie per protocol.   Disposition: 6B, transfer to 7A when bed available    Medical Decision Making: Medium  Subsequent visit 90303 (moderate level decision making)    LUIS E/Fellow/Resident Provider: Cathie Moeller PA-C [id number]    Faculty: Branden Aranda MD     Attestation: I saw and examined this patient with Cathie Moeller PA-C, and the transplant team. I independently reviewed all pertinent laboratory and imaging results. I agree with the findings and plan as documented in the note above.  -Branden Aranda MD  _________________________________________________________________  Transplant History: Admitted 11/15/2021 for pancreas and kidney transplant.  11/15/2021 (Kidney / Pancreas), Postoperative day: 4     Interval History: History is obtained from the patient  Overnight events: Nausea improved. Positive bowel function.     ROS:   A 10-point review of systems was negative except as noted above.     Meds:    aspirin  81 mg Oral or Feeding Tube Daily     [START ON 11/21/2021] basiliximab (SIMULECT) infusion  20 mg Intravenous Once     bromfenac  1 drop Left Eye Daily     calcium carbonate-vitamin D  1 tablet Oral BID w/meals     clotrimazole  10 mg Buccal 4x Daily     furosemide  40 mg Intravenous Once     Loteprednol Etabonate  1 drop Left Eye BID     magnesium oxide  400 mg Oral BID     micafungin  100 mg Intravenous Q24H     mycophenolate  1,000 mg Oral BID IS     octreotide  100 mcg Subcutaneous BID     ondansetron  4 mg Intravenous Q6H     [START ON 11/20/2021] pantoprazole  40 mg Oral QAM AC     polyethylene glycol  17 g Oral Daily     potassium & sodium phosphates  1 packet Oral BID     predniSONE  60 mg Oral Daily    Followed by     [START ON 11/21/2021] predniSONE  40 mg Oral Daily    Followed by     [START ON 11/23/2021] predniSONE  20 mg Oral Daily    Followed by      "[START ON 11/30/2021] predniSONE  15 mg Oral Daily    Followed by     [START ON 12/7/2021] predniSONE  10 mg Oral Daily    Followed by     [START ON 12/14/2021] predniSONE  5 mg Oral Daily     Prucalopride Succinate  1 tablet Oral Daily     senna-docusate  2 tablet Oral BID     sodium chloride (PF)  3 mL Intravenous Q8H     [START ON 11/20/2021] sulfamethoxazole-trimethoprim  1 tablet Oral Daily     tacrolimus  2 mg Oral BID IS     [START ON 11/20/2021] valGANciclovir  900 mg Oral Daily       Physical Exam:     Admit Weight: 55.1 kg (121 lb 7.6 oz)    Current vitals:   BP (!) 162/107 (BP Location: Left arm)   Pulse 80   Temp 98.1  F (36.7  C) (Oral)   Resp 16   Ht 1.676 m (5' 5.98\")   Wt 62.7 kg (138 lb 3.2 oz)   LMP  (LMP Unknown)   SpO2 97%   BMI 22.32 kg/m      CVP (mmHg): 9 mmHg    Vital sign ranges:    Temp:  [97.5  F (36.4  C)-98.4  F (36.9  C)] 98.1  F (36.7  C)  Pulse:  [77-86] 80  Resp:  [16] 16  BP: (143-162)/() 162/107  SpO2:  [97 %-100 %] 97 %  Patient Vitals for the past 24 hrs:   BP Temp Temp src Pulse Resp SpO2 Weight   11/19/21 1135 (!) 162/107 98.1  F (36.7  C) Oral 80 16 97 % --   11/19/21 0835 (!) 151/103 98.1  F (36.7  C) Oral 82 16 99 % --   11/19/21 0308 (!) 158/95 98.4  F (36.9  C) Oral 77 16 98 % --   11/18/21 2340 (!) 143/92 97.8  F (36.6  C) Oral 78 16 97 % 62.7 kg (138 lb 3.2 oz)   11/18/21 1859 (!) 143/92 97.5  F (36.4  C) Oral 80 16 100 % --   11/18/21 1550 (!) 159/98 97.9  F (36.6  C) Oral 86 16 100 % --     General Appearance: in no apparent distress.   Skin: normal  Heart: regular rate and rhythm  Lungs: NLB on room air  Abdomen: The abdomen is flat, and mildly tender, generalized. she is not tender over the kidney and pancreas graft. The wound is c/d/i. SARAI in place with thin serosang output.   : harris is present. Urine has no gross hematuria.   Extremities: edema: present bilaterally. 1+  Neurologic: awake, alert and oriented. Tremor absent.    Data:   CMP  Recent " Labs   Lab 11/19/21  1112 11/19/21  1006 11/19/21  0827 11/19/21  0604 11/19/21  0002 11/18/21  2319 11/18/21  0502 11/18/21  0436 11/16/21  0052 11/15/21  2346 11/15/21  2317 11/15/21  2210 11/15/21  1639 11/15/21  1550   NA  --   --   --  141  141  --  145*   < > 146*   < > 138  --  139   < > 138   POTASSIUM  --   --   --  3.3*  --   --   --  3.7   < > 3.0*  --  3.1*   < > 3.2*   CHLORIDE  --   --   --  112*  --   --   --  116*   < >  --   --   --   --  109   CO2  --   --   --  28  --   --   --  26   < >  --   --   --   --  19*   GLC 86 97   < > 91   < >  --    < > 114*   < > 78   < > 150*   < > 139*   BUN  --   --   --  23  --   --   --  22   < >  --   --   --   --  71*   CR  --   --   --  1.01  --   --   --  0.92   < >  --   --   --   --  2.65*   GFRESTIMATED  --   --   --  72  --   --   --  80   < >  --   --   --   --  22*   STEPHANIE  --   --   --  8.4*  --   --   --  8.7   < >  --   --   --   --  8.9   ICAW  --   --   --   --   --   --   --   --   --  4.4  --  4.7   < >  --    MAG  --   --   --  1.7  --   --   --  1.8   < >  --   --   --   --   --    PHOS  --   --   --  2.0*  --   --   --  1.7*   < >  --   --   --   --   --    AMYLASE  --   --   --  74  --   --   --  54   < >  --   --   --   --  56   LIPASE  --   --   --  246  --   --   --  175   < >  --   --   --   --  78   ALBUMIN  --   --   --   --   --   --   --   --   --   --   --   --   --  4.0   BILITOTAL  --   --   --   --   --   --   --   --   --   --   --   --   --  0.4   ALKPHOS  --   --   --   --   --   --   --   --   --   --   --   --   --  104   AST  --   --   --   --   --   --   --   --   --   --   --   --   --  16   ALT  --   --   --   --   --   --   --   --   --   --   --   --   --  23    < > = values in this interval not displayed.     CBC  Recent Labs   Lab 11/19/21  0604 11/18/21  0436 11/15/21  2004 11/15/21  1549   HGB 7.7* 8.8*   < > 11.0*   WBC 12.5* 12.8*   < > 8.9   * 139*   < > 282   A1C  --   --   --  7.7*    < > = values in this  interval not displayed.     COAGS  Recent Labs   Lab 11/16/21  0402 11/15/21  1550   INR 1.11 0.96   PTT 30 30      Urinalysis  Recent Labs   Lab Test 11/15/21  1530 07/21/21  0825   COLOR Straw Straw   APPEARANCE Clear Clear   URINEGLC 30 * 150 *   URINEBILI Negative Negative   URINEKETONE Negative Negative   SG 1.010 1.010   UBLD Small* Negative   URINEPH 5.5 6.0   PROTEIN 100 * 100 *   NITRITE Negative Negative   LEUKEST Negative Negative   RBCU 2 1   WBCU 1 1   UTPG 4.77*  --      Virology:  Hepatitis C Antibody   Date Value Ref Range Status   11/15/2021 Nonreactive Nonreactive Final

## 2021-11-19 NOTE — PROGRESS NOTES
[facility]   Transplant Nephrology Progress Note  Date of Admission:  11/15/2021  Today's Date: 11/19/2021    Recommendations:  - Agree with diuretics today     Assessment & Plan   # DDKT (SPK): Stable - Cr ~1   - Baseline Creatinine: ~ TBD   - Proteinuria: Not checked recently   - Date DSA Last Checked: Nov/2021      Latest DSA: No DSA at time of transplant   - BK Viremia: Not checked post transplant   - Kidney Tx Biopsy: No    # Pancreas Tx (SPK):    - Pancreatic Exocrine Drainage: Enteric drained     - Blood glucose: Near euglycemia      On insulin: Yes   - HbA1c: Last checked at time of transplant      Latest HbA1c: 7.7%   - Pancreatic enzymes: Trend down   - Date DSA Last Checked: Nov/2021  Latest DSA: No DSA at time of transplant   - Pancreas Tx Biopsy: No    # Immunosuppression: Tacrolimus immediate release (goal 8-10), Mycophenolate mofetil (dose 1000 mg every 12 hours) and Prednisone (dose taper)   - Induction with Recent Transplant:  Intermediate Intensity   - Changes: No    # Infection Prophylaxis:   - PJP: Sulfa/TMP (Bactrim)  - CMV: Valganciclovir (Valcyte)  - Thrush: Clotrimazole yodit (Mycelex) and Micafungin (Mycamine)    # Hypertension: Borderline control;  Goal BP: < 150/90   - Volume status: Moderately hypervolemic     - Changes: Yes - Agree with starting diuretics today    # Elevated Blood Glucose: Glucose generally running ~ 100-120s   - Management as per primary team.    # Anemia in Chronic Renal Disease: Hgb: Stable      SYLVAIN: No   - Iron studies: Not checked recently    # Mineral Bone Disorder:   - Secondary renal hyperparathyroidism; PTH level: Not checked recently        On treatment: None  - Vitamin D; level: Not checked recently        On supplement: No  - Calcium; level: Normal        On supplement: No  - Phosphorus; level: Normal        On supplement: No    # Electrolytes:   - Potassium; level: Low normal        On supplement: No  -  Magnesium; level: Normal        On supplement: Yes  - Bicarbonate; level: Normal        On supplement: No    # Heterozygous Factor V Leiden: On heparin. No personal hx of blood clots.    # Cataract: Had cataract removed in early November. On eye drops.    # Transplant History:  Etiology of Kidney Failure: Diabetes mellitus type 1  Tx: DDKT (SPK)  Transplant: 11/15/2021 (Kidney / Pancreas)  Donor Type: Donation after Brain Death Donor Class:   Crossmatch at time of Tx: negative  DSA at time of Tx: No  Significant changes in immunosuppression: None  Significant transplant-related complications: None    Recommendations were communicated to the primary team verbally.    Seen and discussed with Dr. Kalia Wheeler MD   Pager: [phone]    Interval History   Pt continues to feel better each day - NG came out this morning and she's had some ice chips. Cage has also been removed. No fevers or chills. No SOB or chest pain. She is getting up and ambulating. She's had a BM.    Review of Systems   4 point ROS was obtained and negative except as noted in the Interval History.    MEDICATIONS:    aspirin  81 mg Oral or Feeding Tube Daily     [START ON 11/21/2021] basiliximab (SIMULECT) infusion  20 mg Intravenous Once     bromfenac  1 drop Left Eye Daily     calcium carbonate-vitamin D  1 tablet Oral BID w/meals     clotrimazole  10 mg Buccal 4x Daily     Loteprednol Etabonate  1 drop Left Eye BID     magnesium oxide  400 mg Oral BID     micafungin  100 mg Intravenous Q24H     mycophenolate  1,000 mg Oral BID IS     octreotide  100 mcg Subcutaneous BID     oxymetazoline  2 spray Both Nostrils BID     [START ON 11/20/2021] pantoprazole  40 mg Oral QAM AC     polyethylene glycol  17 g Oral Daily     potassium & sodium phosphates  1 packet Oral BID     predniSONE  60 mg Oral Daily    Followed by     [START ON 11/21/2021] predniSONE  40 mg Oral Daily    Followed by     [START ON 11/23/2021] predniSONE  20 mg Oral Daily     "Followed by     [START ON 2021] predniSONE  15 mg Oral Daily    Followed by     [START ON 2021] predniSONE  10 mg Oral Daily    Followed by     [START ON 2021] predniSONE  5 mg Oral Daily     Prucalopride Succinate  1 tablet Oral Daily     senna-docusate  2 tablet Oral BID     sodium chloride (PF)  3 mL Intravenous Q8H     [START ON 2021] sulfamethoxazole-trimethoprim  1 tablet Oral Daily     tacrolimus  2.5 mg Oral BID IS     [START ON 2021] valGANciclovir  900 mg Oral Daily       dextrose       [Held by provider] heparin Stopped (21 1510)     insulin regular 0.2 Units/hr (21 1509)     NaCl 100 mL/hr at 21 1002       Physical Exam   Temp  Av.5  F (36.9  C)  Min: 97.4  F (36.3  C)  Max: 99.3  F (37.4  C)  Arterial Line BP  Min: 131/71  Max: 165/94  Arterial Line MAP (mmHg)  Av.8 mmHg  Min: 93 mmHg  Max: 120 mmHg      Pulse  Av.4  Min: 78  Max: 94 Resp  Av.9  Min: 10  Max: 20  SpO2  Av.9 %  Min: 96 %  Max: 100 %    CVP (mmHg): 6 mmHgBP (!) 162/107 (BP Location: Left arm)   Pulse 80   Temp 98.1  F (36.7  C) (Oral)   Resp 16   Ht 1.676 m (5' 5.98\")   Wt 62.7 kg (138 lb 3.2 oz)   LMP  (LMP Unknown)   SpO2 97%   BMI 22.32 kg/m     Date 21 0700 - 21 0659   Shift 0661-8677 3461-1843 0450-4172 24 Hour Total   INTAKE   I.V. 1368 476  1844   NG/ 140  290   Shift Total(mL/kg) 1518(25.38) 616(10.3)  2134(35.69)   OUTPUT   Urine 525 170  695   Emesis/NG output 400 200  600   Drains 290 160  450   Shift Total(mL/kg) 1215(20.32) 530(8.86)  1745(29.18)   Weight (kg) 59.8 59.8 59.8 59.8      Admit Weight: 55.1 kg (121 lb 7.6 oz)     GENERAL APPEARANCE: alert and no distress  HENT: mouth without ulcers or lesions  LYMPHATICS: no cervical or supraclavicular nodes  RESP: lungs clear to auscultation - no rales, rhonchi or wheezes  CV: regular rhythm, normal rate, no rub, no murmur  EDEMA: 1+ edema diffusely  ABDOMEN: soft, nondistended, " nontender, bowel sounds normal  MS: extremities normal - no gross deformities noted, no evidence of inflammation in joints, no muscle tenderness  SKIN: no rash  TX KIDNEY: minimal TTP    Data   All labs reviewed by me.  CMP  Recent Labs   Lab 11/19/21  1508 11/19/21  1433 11/19/21  1315 11/19/21  1112 11/19/21  0827 11/19/21  0604 11/19/21  0002 11/18/21  2319 11/18/21  2130 11/18/21  2121 11/18/21  0502 11/18/21  0436 11/17/21  0902 11/17/21  0746 11/17/21  0519 11/17/21  0503 11/16/21  0434 11/16/21  0402 11/15/21  1639 11/15/21  1550   NA  --   --   --   --   --  141  141  --  145*  --  146*  --  146*  --   --   --  144  --  137   < > 138   POTASSIUM  --   --   --   --   --  3.3*  --   --   --   --   --  3.7  --  3.6  --  3.5  3.5   < > 3.4   < > 3.2*   CHLORIDE  --   --   --   --   --  112*  --   --   --   --   --  116*  --   --   --  115*  --  107  --  109   CO2  --   --   --   --   --  28  --   --   --   --   --  26  --   --   --  25  --  19*  --  19*   ANIONGAP  --   --   --   --   --  1*  --   --   --   --   --  4  --   --   --  4  --  11  --  10   * 118* 100* 86   < > 91   < >  --    < >  --    < > 114*   < >  --    < > 105*   < > 90   < > 139*   BUN  --   --   --   --   --  23  --   --   --   --   --  22  --   --   --  25  --  53*  --  71*   CR  --   --   --   --   --  1.01  --   --   --   --   --  0.92  --   --   --  1.14*  --  2.00*  --  2.65*   GFRESTIMATED  --   --   --   --   --  72  --   --   --   --   --  80  --   --   --  62  --  31*  --  22*   STEPHANIE  --   --   --   --   --  8.4*  --   --   --   --   --  8.7  --   --   --  8.2*  --  8.8  --  8.9   MAG  --   --   --   --   --  1.7  --   --   --   --   --  1.8  --   --   --  1.8  --  1.7  --   --    PHOS  --   --   --   --   --  2.0*  --   --   --   --   --  1.7*  --   --   --  2.5  --  3.2  --   --    PROTTOTAL  --   --   --   --   --   --   --   --   --   --   --   --   --   --   --   --   --   --   --  8.4   ALBUMIN  --   --   --   --   --    --   --   --   --   --   --   --   --   --   --   --   --   --   --  4.0   BILITOTAL  --   --   --   --   --   --   --   --   --   --   --   --   --   --   --   --   --   --   --  0.4   ALKPHOS  --   --   --   --   --   --   --   --   --   --   --   --   --   --   --   --   --   --   --  104   AST  --   --   --   --   --   --   --   --   --   --   --   --   --   --   --   --   --   --   --  16   ALT  --   --   --   --   --   --   --   --   --   --   --   --   --   --   --   --   --   --   --  23    < > = values in this interval not displayed.     CBC  Recent Labs   Lab 11/19/21  1354 11/19/21  0604 11/18/21  0436 11/17/21  0746 11/17/21  0503 11/16/21  1649 11/16/21  1147   HGB 8.4* 7.7* 8.8* 8.6* 8.4*   < > 10.2*   WBC  --  12.5* 12.8*  --  13.7*  --  17.3*   RBC  --  2.76* 3.19*  --  3.05*  --  3.69*   HCT  --  24.4* 27.8*  --  26.0*  --  31.4*   MCV  --  88 87  --  85  --  85   MCH  --  27.9 27.6  --  27.5  --  27.6   MCHC  --  31.6 31.7  --  32.3  --  32.5   RDW  --  15.7* 15.8*  --  15.8*  --  15.3*   PLT  --  122* 139*  --  131*  --  176    < > = values in this interval not displayed.     INR  Recent Labs   Lab 11/16/21  0402 11/15/21  1550   INR 1.11 0.96   PTT 30 30     ABG  Recent Labs   Lab 11/15/21  2346 11/15/21  2210 11/15/21  2058 11/15/21  2004   PH 7.28* 7.29* 7.29* 7.27*   PCO2 35 35 37 35   PO2 44* 48* 51* 230*   HCO3 17* 17* 18* 16*   O2PER  --  40  --   --       Urine Studies  Recent Labs   Lab Test 11/15/21  1530 07/21/21  0825   COLOR Straw Straw   APPEARANCE Clear Clear   URINEGLC 30 * 150 *   URINEBILI Negative Negative   URINEKETONE Negative Negative   SG 1.010 1.010   UBLD Small* Negative   URINEPH 5.5 6.0   PROTEIN 100 * 100 *   NITRITE Negative Negative   LEUKEST Negative Negative   RBCU 2 1   WBCU 1 1     Recent Labs   Lab Test 11/15/21  1530   UTPG 4.77*     PTH  No lab results found.  Iron Studies  No lab results found.    IMAGING:  All imaging studies reviewed by me.      Patient was  seen and evaluated by me, Joseph Motta MD. I have reviewed the note and agree with the the plan of care as documented by the fellow.

## 2021-11-19 NOTE — PLAN OF CARE
Pt is POD 4 s/p SPK.  VS:  Afebrile, HR 80s, /103, 162/107, and 149/97, O2 sats 97-99% with RA.  Using incentive spirometer independently.  ENDO:  Insulin drip has been off since early this AM; blood glucose 80s-100.  Will continue to check BG q 2hr, will re-start drip if appropriate.    LABS:   Creatinine 1.01; K+ 3.3 - oral replacement given per 1x order; amylase & lipase trending within normal.    NEURO:  Alert, oriented x4.  Using call light appropriately.  No new deficits.  PAIN:  Incisional pain controlled with prn oxycodone x 2.    DIET:  NPO, sips for po meds only.  Taking small amounts of ice chips.  GI:  Passing flatus.  Loose watery BMs x4 prior to XL formed BM.  Held Miralax today.  Continue to assess.  :  Cage patent, draining adequate amounts.  Given 1x dose iv Lasix after albumin infusion.  SKIN:  Midline incision with staples.   ACTIVITY:  Needed and received minimal assistance for transfers, ambulation due to lines/cords.  Up to chair x 2, ambulated in halls x 3.  Continue to encourage activity to prevent post-op complications.  TUBES:  NG tube discontinued by team this AM.  SARAI x1 with moderate output, fluid sent for lipase level.  LINE:  BUE PIV, L-internal jugular; MIV changed to 1/2 NS. Heparin drip at straight rate.     EDUCATION:  Pt entered labs into book independently.  Reviewed post-transplant medication during AM administration.  Reviewed rationale for SARAI and discussed incision care with pt.  PLAN:  Continue current plan of care.  Update Pancreas team prn any change in pt status or other concerns.    Update:    Diet advanced to clears, tolerating well.  Blood glucose 118, 124, 132 - insulin infusion restarted, titrated with Algorithm #1.  Voiding adequate amounts, PVR now less than 100.  Currently having menses, UOP light red and team aware.  Loose stools again this evening, will hold PM bowel medications.    Epistaxis resolved with Afrin admin and tampon application.  Heparin  drip remains off until tomorrow AM per order.  Up to ambulate in halls frequently.

## 2021-11-19 NOTE — PLAN OF CARE
"VS: BP (!) 159/98 (BP Location: Left arm)   Pulse 86   Temp 97.9  F (36.6  C) (Oral)   Resp 16   Ht 1.676 m (5' 5.98\")   Wt 60.9 kg (134 lb 4.2 oz)   LMP  (LMP Unknown)   SpO2 100%   BMI 21.68 kg/m      Cares:    Current condition:   Neuro: alert and oriented X4   Cardio: WNL   Respiratory: RA   GI/: had a BM X2 cont. And has a Cage in place   Skin: has an incision that is CDI to the abdomen   Diet:   NPO with ice chips   Labs:   BG: is on a insulin drip with q 1 hr BS checks   LDA:  has NG to cont LS, SARAI with triple lumen to the Left, 1 PIV to LFA, SARAI drain to the right was emptied X3   Mobility:  SBA   Pain: surgical pain PRN Diladid X2 and Oxycodone X1   Changes: Is on continuous heparin drip, some teaching was done with the medications   Plan of Care:    "

## 2021-11-19 NOTE — PROGRESS NOTES
Notified PA at 1445 PM regarding epistaxis.      Spoke with: TIARRA Campos - Pancreas Service    Orders were obtained.    Comments: Pt having intermittent epistaxis from R-nare after NG tube removed this AM.  Able to stop flow throughout the day with direct pressure.  Bleeding has been small amounts until this afternoon - PA informed and Afrin nasal spray ordered.  Passed moderate sized bright red clots x3.  PA and Attending informed at bedside, received instruction to stop Heparin drip until bleeding stopped.  Attempted to place rhino-rocket device obtained from ER, would not stay in place so tampon placed in R-nare.  Continue to assess.

## 2021-11-19 NOTE — PROGRESS NOTES
Admitted/transferred from:   2 RN skin assessment completed by Zaria Flores RN and Ashley Brush RN.  Skin assessment finding: abdominal incision, other skin intact  Interventions/actions: none    Will continue to monitor.

## 2021-11-19 NOTE — PROGRESS NOTES
Preliminary positive donor blood & sputum culture results have been uploaded to [facility].  Donor ID [id number].  Dr. Reed notified of results.    Pablo Terrell RN on 11/19/2021 at 3:41 PM

## 2021-11-19 NOTE — PLAN OF CARE
After up and walking Marilyn was c/o increasing abdominal comfort and straining to pass stool. Also she was c/o bloody spotting and felt it was coming from her rectum. After taking a closer look prior to giving a dulcolax noted that it was vaginal bleeding and she stated surprisingly that she's on her menses. She stated it did not even dawned on her. Gave her pads and karina panties. She stated that the increasing abdominal comfort now all makes sense. I also had given her 0.4mg of IV dilaudid. Told her if possible to hold her suppository for a couple of hours and I did feel hard stool when inserting the suppository, but not able to digitally remove. Will continue to monitor and report any significant changes.

## 2021-11-19 NOTE — PLAN OF CARE
"BP (!) 143/92 (BP Location: Left arm)   Pulse 80   Temp 97.5  F (36.4  C) (Oral)   Resp 16   Ht 1.676 m (5' 5.98\")   Wt 60.9 kg (134 lb 4.2 oz)   LMP  (LMP Unknown)   SpO2 100%   BMI 21.68 kg/m      Shift: 2956-1284  Isolation Status: NA  VS: WDL on RA, afebrile  Neuro: Aox4  Behaviors: Pleasant, cooperative with cares, able to make her needs known  BG: Continuous insulin infusion.  Currently checking q2h as sugars have been in range.  Currently algorithm 1.  Labs: Na 146  Respiratory: Clear lung sounds, no shortness of breath  Cardiac: regular rhythm/rate  Pain/Nausea/PRN: Ongoing abdominal pain.  Scheduled Tylenol.  PRN 0.4 mg IV Dilaudid x1, 5mg oxycodone x1  Diet: NPO  LDA: NG to LCS.  Triple lumen internal jugular, RUE PIV, LLQ SARAI, harris catheter  Infusion(s): Heparin straight rated at 600 units/hour.  Insulin infusion on algorithm 1.  D5/0.2%NS at 100 mL/hour.  NS and 1/2 NS ordered for fluid replacement rate based on urine output.  Micafungin daily, Zosyn q6h  GI/: Bowel movement x2 earlier in day.  Approximately 25 mL/hour clear yellow urine output since 1830  Skin: No changes  Mobility: Standby assist to manage lines.  Would be independent otherwise  Events/Education: Med card and lab book are up to date.  Plan: Continue plan of care.    "

## 2021-11-20 LAB
AMYLASE SERPL-CCNC: 84 U/L (ref 30–110)
ANION GAP SERPL CALCULATED.3IONS-SCNC: 7 MMOL/L (ref 3–14)
BASOPHILS # BLD AUTO: 0 10E3/UL (ref 0–0.2)
BASOPHILS NFR BLD AUTO: 0 %
BUN SERPL-MCNC: 20 MG/DL (ref 7–30)
C DIFF TOX B STL QL: NEGATIVE
CALCIUM SERPL-MCNC: 8.7 MG/DL (ref 8.5–10.1)
CHLORIDE BLD-SCNC: 108 MMOL/L (ref 94–109)
CO2 SERPL-SCNC: 25 MMOL/L (ref 20–32)
CREAT SERPL-MCNC: 1 MG/DL (ref 0.52–1.04)
EOSINOPHIL # BLD AUTO: 0.1 10E3/UL (ref 0–0.7)
EOSINOPHIL NFR BLD AUTO: 0 %
ERYTHROCYTE [DISTWIDTH] IN BLOOD BY AUTOMATED COUNT: 15.3 % (ref 10–15)
GFR SERPL CREATININE-BSD FRML MDRD: 73 ML/MIN/1.73M2
GLUCOSE BLD-MCNC: 91 MG/DL (ref 70–99)
GLUCOSE BLDC GLUCOMTR-MCNC: 100 MG/DL (ref 70–99)
GLUCOSE BLDC GLUCOMTR-MCNC: 111 MG/DL (ref 70–99)
GLUCOSE BLDC GLUCOMTR-MCNC: 117 MG/DL (ref 70–99)
GLUCOSE BLDC GLUCOMTR-MCNC: 117 MG/DL (ref 70–99)
GLUCOSE BLDC GLUCOMTR-MCNC: 121 MG/DL (ref 70–99)
GLUCOSE BLDC GLUCOMTR-MCNC: 125 MG/DL (ref 70–99)
GLUCOSE BLDC GLUCOMTR-MCNC: 127 MG/DL (ref 70–99)
GLUCOSE BLDC GLUCOMTR-MCNC: 129 MG/DL (ref 70–99)
GLUCOSE BLDC GLUCOMTR-MCNC: 139 MG/DL (ref 70–99)
GLUCOSE BLDC GLUCOMTR-MCNC: 145 MG/DL (ref 70–99)
GLUCOSE BLDC GLUCOMTR-MCNC: 166 MG/DL (ref 70–99)
GLUCOSE BLDC GLUCOMTR-MCNC: 175 MG/DL (ref 70–99)
GLUCOSE BLDC GLUCOMTR-MCNC: 77 MG/DL (ref 70–99)
GLUCOSE BLDC GLUCOMTR-MCNC: 81 MG/DL (ref 70–99)
GLUCOSE BLDC GLUCOMTR-MCNC: 81 MG/DL (ref 70–99)
GLUCOSE BLDC GLUCOMTR-MCNC: 82 MG/DL (ref 70–99)
GLUCOSE BLDC GLUCOMTR-MCNC: 83 MG/DL (ref 70–99)
GLUCOSE BLDC GLUCOMTR-MCNC: 88 MG/DL (ref 70–99)
GLUCOSE BLDC GLUCOMTR-MCNC: 90 MG/DL (ref 70–99)
GLUCOSE BLDC GLUCOMTR-MCNC: 93 MG/DL (ref 70–99)
GLUCOSE BLDC GLUCOMTR-MCNC: 93 MG/DL (ref 70–99)
HCT VFR BLD AUTO: 25.9 % (ref 35–47)
HGB BLD-MCNC: 8.1 G/DL (ref 11.7–15.7)
IMM GRANULOCYTES # BLD: 0.1 10E3/UL
IMM GRANULOCYTES NFR BLD: 1 %
LIPASE SERPL-CCNC: 304 U/L (ref 73–393)
LYMPHOCYTES # BLD AUTO: 1.2 10E3/UL (ref 0.8–5.3)
LYMPHOCYTES NFR BLD AUTO: 10 %
MAGNESIUM SERPL-MCNC: 1.7 MG/DL (ref 1.6–2.3)
MCH RBC QN AUTO: 27.5 PG (ref 26.5–33)
MCHC RBC AUTO-ENTMCNC: 31.3 G/DL (ref 31.5–36.5)
MCV RBC AUTO: 88 FL (ref 78–100)
MONOCYTES # BLD AUTO: 0.8 10E3/UL (ref 0–1.3)
MONOCYTES NFR BLD AUTO: 7 %
NEUTROPHILS # BLD AUTO: 9.9 10E3/UL (ref 1.6–8.3)
NEUTROPHILS NFR BLD AUTO: 82 %
NRBC # BLD AUTO: 0 10E3/UL
NRBC BLD AUTO-RTO: 0 /100
PHOSPHATE SERPL-MCNC: 1.6 MG/DL (ref 2.5–4.5)
PLATELET # BLD AUTO: 147 10E3/UL (ref 150–450)
POTASSIUM BLD-SCNC: 3.4 MMOL/L (ref 3.4–5.3)
RBC # BLD AUTO: 2.95 10E6/UL (ref 3.8–5.2)
SODIUM SERPL-SCNC: 140 MMOL/L (ref 133–144)
TACROLIMUS BLD-MCNC: 9.9 UG/L (ref 5–15)
TME LAST DOSE: NORMAL H
TME LAST DOSE: NORMAL H
UFH PPP CHRO-ACNC: <0.1 IU/ML
WBC # BLD AUTO: 12 10E3/UL (ref 4–11)

## 2021-11-20 PROCEDURE — 258N000003 HC RX IP 258 OP 636: Performed by: PHYSICIAN ASSISTANT

## 2021-11-20 PROCEDURE — 250N000013 HC RX MED GY IP 250 OP 250 PS 637

## 2021-11-20 PROCEDURE — 250N000012 HC RX MED GY IP 250 OP 636 PS 637

## 2021-11-20 PROCEDURE — 250N000013 HC RX MED GY IP 250 OP 250 PS 637: Performed by: NURSE PRACTITIONER

## 2021-11-20 PROCEDURE — 82150 ASSAY OF AMYLASE: CPT | Performed by: PHYSICIAN ASSISTANT

## 2021-11-20 PROCEDURE — 250N000013 HC RX MED GY IP 250 OP 250 PS 637: Performed by: PHYSICIAN ASSISTANT

## 2021-11-20 PROCEDURE — 83690 ASSAY OF LIPASE: CPT | Performed by: PHYSICIAN ASSISTANT

## 2021-11-20 PROCEDURE — 120N000011 HC R&B TRANSPLANT UMMC

## 2021-11-20 PROCEDURE — 250N000011 HC RX IP 250 OP 636: Mod: JB | Performed by: PHYSICIAN ASSISTANT

## 2021-11-20 PROCEDURE — 85520 HEPARIN ASSAY: CPT | Performed by: PHYSICIAN ASSISTANT

## 2021-11-20 PROCEDURE — 80048 BASIC METABOLIC PNL TOTAL CA: CPT | Performed by: PHYSICIAN ASSISTANT

## 2021-11-20 PROCEDURE — 87493 C DIFF AMPLIFIED PROBE: CPT

## 2021-11-20 PROCEDURE — 258N000002 HC RX IP 258 OP 250: Performed by: PHYSICIAN ASSISTANT

## 2021-11-20 PROCEDURE — 80197 ASSAY OF TACROLIMUS: CPT | Performed by: PHYSICIAN ASSISTANT

## 2021-11-20 PROCEDURE — 250N000011 HC RX IP 250 OP 636: Performed by: NURSE PRACTITIONER

## 2021-11-20 PROCEDURE — 83735 ASSAY OF MAGNESIUM: CPT | Performed by: PHYSICIAN ASSISTANT

## 2021-11-20 PROCEDURE — 250N000012 HC RX MED GY IP 250 OP 636 PS 637: Performed by: PHYSICIAN ASSISTANT

## 2021-11-20 PROCEDURE — 85025 COMPLETE CBC W/AUTO DIFF WBC: CPT | Performed by: PHYSICIAN ASSISTANT

## 2021-11-20 PROCEDURE — 250N000012 HC RX MED GY IP 250 OP 636 PS 637: Performed by: NURSE PRACTITIONER

## 2021-11-20 PROCEDURE — 250N000011 HC RX IP 250 OP 636: Performed by: PHYSICIAN ASSISTANT

## 2021-11-20 PROCEDURE — 36592 COLLECT BLOOD FROM PICC: CPT | Performed by: PHYSICIAN ASSISTANT

## 2021-11-20 PROCEDURE — 84100 ASSAY OF PHOSPHORUS: CPT | Performed by: PHYSICIAN ASSISTANT

## 2021-11-20 RX ORDER — FUROSEMIDE 40 MG
40 TABLET ORAL
Status: DISCONTINUED | OUTPATIENT
Start: 2021-11-21 | End: 2021-11-21

## 2021-11-20 RX ORDER — POLYETHYLENE GLYCOL 3350 17 G/17G
17 POWDER, FOR SOLUTION ORAL DAILY PRN
Status: DISCONTINUED | OUTPATIENT
Start: 2021-11-20 | End: 2021-11-22 | Stop reason: HOSPADM

## 2021-11-20 RX ORDER — FUROSEMIDE 10 MG/ML
40 INJECTION INTRAMUSCULAR; INTRAVENOUS ONCE
Status: COMPLETED | OUTPATIENT
Start: 2021-11-20 | End: 2021-11-20

## 2021-11-20 RX ORDER — AMOXICILLIN 250 MG
2 CAPSULE ORAL 2 TIMES DAILY PRN
Status: DISCONTINUED | OUTPATIENT
Start: 2021-11-20 | End: 2021-11-22 | Stop reason: HOSPADM

## 2021-11-20 RX ORDER — TACROLIMUS 1 MG/1
2 CAPSULE ORAL
Status: DISCONTINUED | OUTPATIENT
Start: 2021-11-20 | End: 2021-11-21

## 2021-11-20 RX ORDER — CARVEDILOL 3.12 MG/1
3.12 TABLET ORAL 2 TIMES DAILY WITH MEALS
Status: DISCONTINUED | OUTPATIENT
Start: 2021-11-20 | End: 2021-11-22

## 2021-11-20 RX ADMIN — OXYMETAZOLINE HYDROCHLORIDE 2 SPRAY: 0.05 SPRAY NASAL at 08:22

## 2021-11-20 RX ADMIN — OXYCODONE HYDROCHLORIDE 5 MG: 5 TABLET ORAL at 16:10

## 2021-11-20 RX ADMIN — PANTOPRAZOLE SODIUM 40 MG: 40 TABLET, DELAYED RELEASE ORAL at 08:02

## 2021-11-20 RX ADMIN — TACROLIMUS 2.5 MG: 1 CAPSULE ORAL at 08:02

## 2021-11-20 RX ADMIN — OXYMETAZOLINE HYDROCHLORIDE 2 SPRAY: 0.05 SPRAY NASAL at 21:48

## 2021-11-20 RX ADMIN — DIBASIC SODIUM PHOSPHATE, MONOBASIC POTASSIUM PHOSPHATE AND MONOBASIC SODIUM PHOSPHATE 500 MG: 852; 155; 130 TABLET ORAL at 13:22

## 2021-11-20 RX ADMIN — CLOTRIMAZOLE 10 MG: 10 LOZENGE ORAL at 08:08

## 2021-11-20 RX ADMIN — HEPARIN SODIUM 600 UNITS/HR: 1000 INJECTION INTRAVENOUS; SUBCUTANEOUS at 12:51

## 2021-11-20 RX ADMIN — SODIUM CHLORIDE: 4.5 INJECTION, SOLUTION INTRAVENOUS at 06:07

## 2021-11-20 RX ADMIN — MYCOPHENOLATE MOFETIL 1000 MG: 250 CAPSULE ORAL at 18:01

## 2021-11-20 RX ADMIN — Medication 1 TABLET: at 18:01

## 2021-11-20 RX ADMIN — VALGANCICLOVIR 900 MG: 450 TABLET, FILM COATED ORAL at 08:01

## 2021-11-20 RX ADMIN — POTASSIUM & SODIUM PHOSPHATES POWDER PACK 280-160-250 MG 1 PACKET: 280-160-250 PACK at 08:08

## 2021-11-20 RX ADMIN — ASPIRIN 81 MG CHEWABLE TABLET 81 MG: 81 TABLET CHEWABLE at 08:01

## 2021-11-20 RX ADMIN — Medication 400 MG: at 19:57

## 2021-11-20 RX ADMIN — BROMFENAC SODIUM 1 DROP: 0.7 SOLUTION/ DROPS OPHTHALMIC at 08:06

## 2021-11-20 RX ADMIN — TACROLIMUS 2 MG: 1 CAPSULE ORAL at 18:01

## 2021-11-20 RX ADMIN — CARVEDILOL 3.12 MG: 3.12 TABLET, FILM COATED ORAL at 18:01

## 2021-11-20 RX ADMIN — MYCOPHENOLATE MOFETIL 1000 MG: 250 CAPSULE ORAL at 08:00

## 2021-11-20 RX ADMIN — MICAFUNGIN SODIUM 100 MG: 50 INJECTION, POWDER, LYOPHILIZED, FOR SOLUTION INTRAVENOUS at 08:03

## 2021-11-20 RX ADMIN — PREDNISONE 60 MG: 20 TABLET ORAL at 08:01

## 2021-11-20 RX ADMIN — OXYCODONE HYDROCHLORIDE 5 MG: 5 TABLET ORAL at 02:15

## 2021-11-20 RX ADMIN — Medication 1 TABLET: at 08:01

## 2021-11-20 RX ADMIN — CLOTRIMAZOLE 10 MG: 10 LOZENGE ORAL at 13:22

## 2021-11-20 RX ADMIN — FUROSEMIDE 40 MG: 10 INJECTION, SOLUTION INTRAVENOUS at 13:23

## 2021-11-20 RX ADMIN — DIBASIC SODIUM PHOSPHATE, MONOBASIC POTASSIUM PHOSPHATE AND MONOBASIC SODIUM PHOSPHATE 500 MG: 852; 155; 130 TABLET ORAL at 19:56

## 2021-11-20 RX ADMIN — SULFAMETHOXAZOLE AND TRIMETHOPRIM 1 TABLET: 400; 80 TABLET ORAL at 08:01

## 2021-11-20 RX ADMIN — CLOTRIMAZOLE 10 MG: 10 LOZENGE ORAL at 16:11

## 2021-11-20 RX ADMIN — OCTREOTIDE ACETATE 100 MCG: 100 INJECTION, SOLUTION INTRAVENOUS; SUBCUTANEOUS at 19:57

## 2021-11-20 RX ADMIN — OCTREOTIDE ACETATE 100 MCG: 100 INJECTION, SOLUTION INTRAVENOUS; SUBCUTANEOUS at 08:22

## 2021-11-20 RX ADMIN — Medication 400 MG: at 13:22

## 2021-11-20 RX ADMIN — CLOTRIMAZOLE 10 MG: 10 LOZENGE ORAL at 19:57

## 2021-11-20 ASSESSMENT — ACTIVITIES OF DAILY LIVING (ADL)
ADLS_ACUITY_SCORE: 6
ADLS_ACUITY_SCORE: 6
ADLS_ACUITY_SCORE: 4
ADLS_ACUITY_SCORE: 6
ADLS_ACUITY_SCORE: 4
ADLS_ACUITY_SCORE: 6
ADLS_ACUITY_SCORE: 6
ADLS_ACUITY_SCORE: 4
ADLS_ACUITY_SCORE: 6
ADLS_ACUITY_SCORE: 4
ADLS_ACUITY_SCORE: 6
ADLS_ACUITY_SCORE: 4

## 2021-11-20 ASSESSMENT — MIFFLIN-ST. JEOR: SCORE: 1334.28

## 2021-11-20 NOTE — PROGRESS NOTES
[facility]   Transplant Nephrology Progress Note  Date of Admission:  11/15/2021  Today's Date: 11/20/2021    Recommendations:  - Start 40 mg oral Lasix BID and Coreg 3.125 mg BID.  - Agree with increased tac to 2.5 mg BID.    Assessment & Plan   # DDKT (SPK): Stable - Cr ~1   - Baseline Creatinine: ~ TBD   - Proteinuria: Not checked recently   - Date DSA Last Checked: Nov/2021      Latest DSA: No DSA at time of transplant   - BK Viremia: Not checked post transplant   - Kidney Tx Biopsy: No    # Pancreas Tx (SPK):    - Pancreatic Exocrine Drainage: Enteric drained     - Blood glucose: Near euglycemia      On insulin: Yes   - HbA1c: Last checked at time of transplant      Latest HbA1c: 7.7%   - Pancreatic enzymes: Trend down   - Date DSA Last Checked: Nov/2021  Latest DSA: No DSA at time of transplant   - Pancreas Tx Biopsy: No    # Immunosuppression: Tacrolimus immediate release (goal 8-10), Mycophenolate mofetil (dose 1000 mg every 12 hours) and Prednisone (dose taper)   - Induction with Recent Transplant:  Intermediate Intensity   - Changes: Yes - agree with increased tac to 2.5 mg BID.     # Infection Prophylaxis:   - PJP: Sulfa/TMP (Bactrim)  - CMV: Valganciclovir (Valcyte)  - Thrush: Clotrimazole yodit (Mycelex) and Micafungin (Mycamine)    # Hypertension: Borderline control;  Goal BP: < 150/90   - Volume status: Moderately hypervolemic     - Changes: Yes - start 40 mg oral Lasix BID and Coreg 3.125 mg BID    # Elevated Blood Glucose: Glucose generally running ~ 100-120s   - Management as per primary team.    # Anemia in Chronic Renal Disease: Hgb: Stable      SYLVAIN: No   - Iron studies: Not checked recently    # Mineral Bone Disorder:   - Secondary renal hyperparathyroidism; PTH level: Not checked recently        On treatment: None  - Vitamin D; level: Not checked recently        On supplement: No  - Calcium; level: Normal        On supplement: No  - Phosphorus;  level: Normal        On supplement: No    # Electrolytes:   - Potassium; level: Low normal        On supplement: No  - Magnesium; level: Low        On supplement: Yes  - Bicarbonate; level: Normal        On supplement: No    # Heterozygous Factor V Leiden: On heparin. No personal hx of blood clots.    # Cataract: Had cataract removed in early November. On eye drops.    # Transplant History:  Etiology of Kidney Failure: Diabetes mellitus type 1  Tx: DDKT (SPK)  Transplant: 11/15/2021 (Kidney / Pancreas)  Donor Type: Donation after Brain Death Donor Class:   Crossmatch at time of Tx: negative  DSA at time of Tx: No  Significant changes in immunosuppression: None  Significant transplant-related complications: None    Recommendations were communicated to the primary team verbally.    Seen and discussed with Dr. Kalia Moreno, BETITO CNP   Pager: [phone]    Interval History   C/o persistent LE swelling and diarrhea. Improved appetite and will be advancing diet today that may help diarrhea.  No CP, SOB, fevers, sweat or chills.     Review of Systems   4 point ROS was obtained and negative except as noted in the Interval History.    MEDICATIONS:    aspirin  81 mg Oral or Feeding Tube Daily     [START ON 11/21/2021] basiliximab (SIMULECT) infusion  20 mg Intravenous Once     bromfenac  1 drop Left Eye Daily     calcium carbonate-vitamin D  1 tablet Oral BID w/meals     clotrimazole  10 mg Buccal 4x Daily     Loteprednol Etabonate  1 drop Left Eye BID     magnesium oxide  400 mg Oral BID     micafungin  100 mg Intravenous Q24H     mycophenolate  1,000 mg Oral BID IS     octreotide  100 mcg Subcutaneous BID     oxymetazoline  2 spray Both Nostrils BID     pantoprazole  40 mg Oral QAM AC     polyethylene glycol  17 g Oral Daily     potassium & sodium phosphates  1 packet Oral BID     predniSONE  60 mg Oral Daily    Followed by     [START ON 11/21/2021] predniSONE  40 mg Oral Daily    Followed by     [START ON  "2021] predniSONE  20 mg Oral Daily    Followed by     [START ON 2021] predniSONE  15 mg Oral Daily    Followed by     [START ON 2021] predniSONE  10 mg Oral Daily    Followed by     [START ON 2021] predniSONE  5 mg Oral Daily     Prucalopride Succinate  1 tablet Oral Daily     senna-docusate  2 tablet Oral BID     sodium chloride (PF)  3 mL Intravenous Q8H     sulfamethoxazole-trimethoprim  1 tablet Oral Daily     tacrolimus  2.5 mg Oral BID IS     valGANciclovir  900 mg Oral Daily       dextrose       [Held by provider] heparin Stopped (21 1510)     insulin regular Stopped (21 0322)     NaCl 100 mL/hr at 21 0607       Physical Exam   Temp  Av.5  F (36.9  C)  Min: 97.4  F (36.3  C)  Max: 99.3  F (37.4  C)  Arterial Line BP  Min: 131/71  Max: 165/94  Arterial Line MAP (mmHg)  Av.8 mmHg  Min: 93 mmHg  Max: 120 mmHg      Pulse  Av.4  Min: 78  Max: 94 Resp  Av.9  Min: 10  Max: 20  SpO2  Av.9 %  Min: 96 %  Max: 100 %    CVP (mmHg): 6 mmHgBP 135/87 (BP Location: Left arm)   Pulse 74   Temp 98  F (36.7  C) (Oral)   Resp 16   Ht 1.676 m (5' 5.98\")   Wt 62 kg (136 lb 11 oz)   LMP  (LMP Unknown)   SpO2 98%   BMI 22.07 kg/m     Date 21 07 - 21 0659   Shift 7181-6569 4219-2399 5470-8983 24 Hour Total   INTAKE   I.V. 1368 476  1844   NG/ 140  290   Shift Total(mL/kg) 1518(25.38) 616(10.3)  2134(35.69)   OUTPUT   Urine 525 170  695   Emesis/NG output 400 200  600   Drains 290 160  450   Shift Total(mL/kg) 1215(20.32) 530(8.86)  1745(29.18)   Weight (kg) 59.8 59.8 59.8 59.8      Admit Weight: 55.1 kg (121 lb 7.6 oz)     GENERAL APPEARANCE: alert and no distress  HENT: mouth without ulcers or lesions  LYMPHATICS: no cervical or supraclavicular nodes  RESP: lungs clear to auscultation - no rales, rhonchi or wheezes  CV: regular rhythm, normal rate, no rub, no murmur  EDEMA: 1+ edema diffusely  ABDOMEN: soft, nondistended, nontender, bowel " sounds normal  MS: extremities normal - no gross deformities noted, no evidence of inflammation in joints, no muscle tenderness  SKIN: no rash  TX KIDNEY: minimal TTP    Data   All labs reviewed by me.  CMP  Recent Labs   Lab 11/20/21  0656 11/20/21  0558 11/20/21  0426 11/20/21  0321 11/19/21  0827 11/19/21  0604 11/19/21  0002 11/18/21  2319 11/18/21  2130 11/18/21  2121 11/18/21  0502 11/18/21  0436 11/17/21  0902 11/17/21  0746 11/17/21  0519 11/17/21  0503 11/16/21  0434 11/16/21  0402 11/15/21  1639 11/15/21  1550   NA  --   --   --   --   --  141  141  --  145*  --  146*  --  146*  --   --   --  144  --  137   < > 138   POTASSIUM  --   --   --   --   --  3.3*  --   --   --   --   --  3.7  --  3.6  --  3.5  3.5   < > 3.4   < > 3.2*   CHLORIDE  --   --   --   --   --  112*  --   --   --   --   --  116*  --   --   --  115*  --  107  --  109   CO2  --   --   --   --   --  28  --   --   --   --   --  26  --   --   --  25  --  19*  --  19*   ANIONGAP  --   --   --   --   --  1*  --   --   --   --   --  4  --   --   --  4  --  11  --  10   GLC 93 81 88 77   < > 91   < >  --    < >  --    < > 114*   < >  --    < > 105*   < > 90   < > 139*   BUN  --   --   --   --   --  23  --   --   --   --   --  22  --   --   --  25  --  53*  --  71*   CR  --   --   --   --   --  1.01  --   --   --   --   --  0.92  --   --   --  1.14*  --  2.00*  --  2.65*   GFRESTIMATED  --   --   --   --   --  72  --   --   --   --   --  80  --   --   --  62  --  31*  --  22*   STEPHANIE  --   --   --   --   --  8.4*  --   --   --   --   --  8.7  --   --   --  8.2*  --  8.8  --  8.9   MAG  --   --   --   --   --  1.7  --   --   --   --   --  1.8  --   --   --  1.8  --  1.7  --   --    PHOS  --   --   --   --   --  2.0*  --   --   --   --   --  1.7*  --   --   --  2.5  --  3.2  --   --    PROTTOTAL  --   --   --   --   --   --   --   --   --   --   --   --   --   --   --   --   --   --   --  8.4   ALBUMIN  --   --   --   --   --   --   --   --   --    --   --   --   --   --   --   --   --   --   --  4.0   BILITOTAL  --   --   --   --   --   --   --   --   --   --   --   --   --   --   --   --   --   --   --  0.4   ALKPHOS  --   --   --   --   --   --   --   --   --   --   --   --   --   --   --   --   --   --   --  104   AST  --   --   --   --   --   --   --   --   --   --   --   --   --   --   --   --   --   --   --  16   ALT  --   --   --   --   --   --   --   --   --   --   --   --   --   --   --   --   --   --   --  23    < > = values in this interval not displayed.     CBC  Recent Labs   Lab 11/19/21  1354 11/19/21  0604 11/18/21  0436 11/17/21  0746 11/17/21  0503 11/16/21  1649 11/16/21  1147   HGB 8.4* 7.7* 8.8* 8.6* 8.4*   < > 10.2*   WBC  --  12.5* 12.8*  --  13.7*  --  17.3*   RBC  --  2.76* 3.19*  --  3.05*  --  3.69*   HCT  --  24.4* 27.8*  --  26.0*  --  31.4*   MCV  --  88 87  --  85  --  85   MCH  --  27.9 27.6  --  27.5  --  27.6   MCHC  --  31.6 31.7  --  32.3  --  32.5   RDW  --  15.7* 15.8*  --  15.8*  --  15.3*   PLT  --  122* 139*  --  131*  --  176    < > = values in this interval not displayed.     INR  Recent Labs   Lab 11/16/21  0402 11/15/21  1550   INR 1.11 0.96   PTT 30 30     ABG  Recent Labs   Lab 11/15/21  2346 11/15/21  2210 11/15/21  2058 11/15/21  2004   PH 7.28* 7.29* 7.29* 7.27*   PCO2 35 35 37 35   PO2 44* 48* 51* 230*   HCO3 17* 17* 18* 16*   O2PER  --  40  --   --       Urine Studies  Recent Labs   Lab Test 11/15/21  1530 07/21/21  0825   COLOR Straw Straw   APPEARANCE Clear Clear   URINEGLC 30 * 150 *   URINEBILI Negative Negative   URINEKETONE Negative Negative   SG 1.010 1.010   UBLD Small* Negative   URINEPH 5.5 6.0   PROTEIN 100 * 100 *   NITRITE Negative Negative   LEUKEST Negative Negative   RBCU 2 1   WBCU 1 1     Recent Labs   Lab Test 11/15/21  1530   UTPG 4.77*     PTH  No lab results found.  Iron Studies  No lab results found.    IMAGING:  All imaging studies reviewed by me.

## 2021-11-20 NOTE — PLAN OF CARE
"/87 (BP Location: Left arm)   Pulse 74   Temp 98  F (36.7  C) (Oral)   Resp 16   Ht 1.676 m (5' 5.98\")   Wt 62 kg (136 lb 11 oz)   LMP  (LMP Unknown)   SpO2 98%   BMI 22.07 kg/m      Shift: 6599-4643  VS: -150s on RA, afebrile  Neuro: AOx4  BG: insulin gtt, q1h; in 90's  Lab: stool sample sent to rule out c diff  Respiratory: WDL  Pain/Nausea/PRN: oxy 5mg x2, tylenol x1 with relief  Diet: clears  LDA: internal jugular infusing 1/2NS, PIV SL  GI/: 3 loose BM overnight, voiding  Skin: incision stapled, open to air, SARAI dressing changed  Mobility: IND  Plan: monitor pending stool.    Handoff given to following RN.    "

## 2021-11-20 NOTE — PROGRESS NOTES
Transplant Surgery  Inpatient Daily Progress Note  11/20/2021    Assessment & Plan: 36 year old female with PMH significant for DMI complicated by gastroparesis and CKD due to diabetic nephropathy, HTN, gluten intolerance and Factor V Leiden mutation, heterozygous. Admitted for DD SPK 11/16/21.     Graft function:  Pancreas: Lipase elevated to 4200 immediately post transplant, down to 246. US immediately post transplant with good flow. No insulin during the transplant, however it was discovered 11/16 AM that patient had insulin pump connected overnight with Humalog at 0.75 units/hour, removed 11/16 AM. Continue insulin gtt, 0-0.5 units/hour started following steroid dose. Repeat US 11/16 with good flow. Continue Octreotide x 5 days. Heparin gtt @600units/hour. Fluid lipase 840 .   Kidney: CKD, made urine and was not on HD prior to transplant. Cr 1.1<1.0<-0.9<-1.1<-2.0<-2.7. US 11/16 with good flow. Ureteral stent in place.   Immunosuppression management: PRA 29, intermediate risk induction.   Thymo 100 mg intra-op  Solu-medrol 500 mg intra-op, 250 mg POD 1, 100 mg POD 2 then steroid taper  Simulect POD 1 & 5  Cellcept 1000 mg BID  Tac 2.5 mg BID. Goal 8-10. 11/19 level 5.4 (11.5 hour trough), increased dose from 2 to 2.5 mg BID.   Complexity of management: Medium. Contributing factors: anemia and nausea   HEENT:   Recent cataract removal: Continue drops as prescribed per taper.   Hematology:   Anemia of chronic disease: HGB 8.8<7.7<-8.8<-8.6, likely dilutional,    Cardiorespiratory: Stable on room air, continue IS.   GI/Nutrition: Advanced to regular diet, discontinue IV fluids   Nausea: Resolved. Change Zofran to prn.   Endocrine: See above. HGBA1C 7.7%.   Fluid/Electrolytes:   MIVF: D5LR@125/hour.   Hypomagnesemia: replace  Hypophosphatemia: replace  Hypokalemia: replace  : Cage removed yesterday, voiding well.   Infectious disease: Tmax 98.4. Leukocytosis secondary to steroids, improving today.   Prophylaxis:  Bactrim indefinitely, Valcyte x 3 months (CMV D-/R-). Zosyn and Stephanie per protocol.   Disposition: 6B, transfer to  when bed available    Medical Decision Making: Medium  Subsequent visit 25208 (moderate level decision making)    LUIS E/Fellow/Resident Provider: Dank Reed MD    Faculty: Branden Aranda MD     Attestation: I saw and examined this patient with Dank Reed MD, and the transplant team. I independently reviewed all pertinent laboratory and imaging results. I agree with the findings and plan as documented in the note above.  -Branden Aranda MD  _________________________________________________________________  Transplant History: Admitted 11/15/2021 for pancreas and kidney transplant.  11/15/2021 (Kidney / Pancreas), Postoperative day: 5     Interval History: History is obtained from the patient  Overnight events: Doing well, tolerating liquid diet, pain well controlled, ambulating    ROS:   A 10-point review of systems was negative except as noted above.     Meds:    aspirin  81 mg Oral or Feeding Tube Daily     [START ON 11/21/2021] basiliximab (SIMULECT) infusion  20 mg Intravenous Once     bromfenac  1 drop Left Eye Daily     calcium carbonate-vitamin D  1 tablet Oral BID w/meals     clotrimazole  10 mg Buccal 4x Daily     Loteprednol Etabonate  1 drop Left Eye BID     magnesium oxide  400 mg Oral BID     micafungin  100 mg Intravenous Q24H     mycophenolate  1,000 mg Oral BID IS     octreotide  100 mcg Subcutaneous BID     oxymetazoline  2 spray Both Nostrils BID     pantoprazole  40 mg Oral QAM AC     phosphorus tablet 250 mg  500 mg Oral BID     [START ON 11/21/2021] predniSONE  40 mg Oral Daily    Followed by     [START ON 11/23/2021] predniSONE  20 mg Oral Daily    Followed by     [START ON 11/30/2021] predniSONE  15 mg Oral Daily    Followed by     [START ON 12/7/2021] predniSONE  10 mg Oral Daily    Followed by     [START ON 12/14/2021] predniSONE  5 mg Oral Daily     Prucalopride  "Succinate  1 tablet Oral Daily     sodium chloride (PF)  3 mL Intravenous Q8H     sulfamethoxazole-trimethoprim  1 tablet Oral Daily     tacrolimus  2 mg Oral BID IS     valGANciclovir  900 mg Oral Daily       Physical Exam:     Admit Weight: 55.1 kg (121 lb 7.6 oz)    Current vitals:   BP (!) 156/100 (BP Location: Left arm)   Pulse 91   Temp 98.2  F (36.8  C) (Oral)   Resp 16   Ht 1.676 m (5' 5.98\")   Wt 62.8 kg (138 lb 6.4 oz)   LMP  (LMP Unknown)   SpO2 98%   BMI 22.35 kg/m      CVP (mmHg): 9 mmHg    Vital sign ranges:    Temp:  [97.6  F (36.4  C)-98.2  F (36.8  C)] 98.2  F (36.8  C)  Pulse:  [74-91] 91  Resp:  [16] 16  BP: (135-163)/() 156/100  SpO2:  [98 %-100 %] 98 %  Patient Vitals for the past 24 hrs:   BP Temp Temp src Pulse Resp SpO2 Weight   11/20/21 1215 (!) 156/100 98.2  F (36.8  C) Oral 91 16 98 % --   11/20/21 0700 -- -- -- -- -- -- 62.8 kg (138 lb 6.4 oz)   11/20/21 0025 135/87 98  F (36.7  C) Oral 74 16 98 % --   11/19/21 2132 (!) 152/101 -- -- -- -- -- --   11/19/21 2009 (!) 153/103 -- -- -- -- -- --   11/19/21 2000 (!) 163/106 97.7  F (36.5  C) Oral 86 16 100 % --   11/19/21 1910 -- -- -- -- -- -- 62 kg (136 lb 11 oz)   11/19/21 1621 (!) 149/97 97.6  F (36.4  C) Oral 84 16 99 % --     General Appearance: in no apparent distress.   Skin: normal  Heart: regular rate and rhythm  Lungs: NLB on room air  Abdomen: The abdomen is flat, and mildly tender, generalized. she is not tender over the kidney and pancreas graft. The wound is c/d/i. SARAI in place with thin serosang output.   : harris is present. Urine has no gross hematuria.   Extremities: edema: present bilaterally. 1+  Neurologic: awake, alert and oriented. Tremor absent.    Data:   CMP  Recent Labs   Lab 11/20/21  1409 11/20/21  1243 11/20/21  0814 11/20/21  0717 11/19/21  1315 11/19/21  1131 11/19/21  0827 11/19/21  0604 11/16/21  0052 11/15/21  2346 11/15/21  2317 11/15/21  2210 11/15/21  1639 11/15/21  1550   NA  --   --   --  " 140  --   --   --  141  141   < > 138  --  139   < > 138   POTASSIUM  --   --   --  3.4  --   --   --  3.3*   < > 3.0*  --  3.1*   < > 3.2*   CHLORIDE  --   --   --  108  --   --   --  112*   < >  --   --   --   --  109   CO2  --   --   --  25  --   --   --  28   < >  --   --   --   --  19*   * 111*   < > 91   < >  --    < > 91   < > 78   < > 150*   < > 139*   BUN  --   --   --  20  --   --   --  23   < >  --   --   --   --  71*   CR  --   --   --  1.00  --   --   --  1.01   < >  --   --   --   --  2.65*   GFRESTIMATED  --   --   --  73  --   --   --  72   < >  --   --   --   --  22*   STEPHANIE  --   --   --  8.7  --   --   --  8.4*   < >  --   --   --   --  8.9   ICAW  --   --   --   --   --   --   --   --   --  4.4  --  4.7   < >  --    MAG  --   --   --  1.7  --   --   --  1.7   < >  --   --   --   --   --    PHOS  --   --   --  1.6*  --   --   --  2.0*   < >  --   --   --   --   --    AMYLASE  --   --   --  84  --   --   --  74   < >  --   --   --   --  56   LIPASE  --   --   --  304  --   --   --  246   < >  --   --   --   --  78   ALBUMIN  --   --   --   --   --   --   --   --   --   --   --   --   --  4.0   BILITOTAL  --   --   --   --   --   --   --   --   --   --   --   --   --  0.4   ALKPHOS  --   --   --   --   --   --   --   --   --   --   --   --   --  104   AST  --   --   --   --   --   --   --   --   --   --   --   --   --  16   ALT  --   --   --   --   --   --   --   --   --   --   --   --   --  23   FLIPA  --   --   --   --   --  840  --   --   --   --   --   --   --   --     < > = values in this interval not displayed.     CBC  Recent Labs   Lab 11/20/21  0717 11/19/21  1354 11/19/21  0604 11/15/21  2004 11/15/21  1549   HGB 8.1* 8.4* 7.7*   < > 11.0*   WBC 12.0*  --  12.5*   < > 8.9   *  --  122*   < > 282   A1C  --   --   --   --  7.7*    < > = values in this interval not displayed.     COAGS  Recent Labs   Lab 11/16/21  0402 11/15/21  1550   INR 1.11 0.96   PTT 30 30       Urinalysis  Recent Labs   Lab Test 11/15/21  1530 07/21/21  0825   COLOR Straw Straw   APPEARANCE Clear Clear   URINEGLC 30 * 150 *   URINEBILI Negative Negative   URINEKETONE Negative Negative   SG 1.010 1.010   UBLD Small* Negative   URINEPH 5.5 6.0   PROTEIN 100 * 100 *   NITRITE Negative Negative   LEUKEST Negative Negative   RBCU 2 1   WBCU 1 1   UTPG 4.77*  --      Virology:  Hepatitis C Antibody   Date Value Ref Range Status   11/15/2021 Nonreactive Nonreactive Final

## 2021-11-21 ENCOUNTER — APPOINTMENT (OUTPATIENT)
Dept: OCCUPATIONAL THERAPY | Facility: CLINIC | Age: 36
DRG: 008 | End: 2021-11-21
Attending: NURSE PRACTITIONER
Payer: COMMERCIAL

## 2021-11-21 DIAGNOSIS — Z79.899 ENCOUNTER FOR LONG-TERM CURRENT USE OF MEDICATION: ICD-10-CM

## 2021-11-21 DIAGNOSIS — Z48.298 AFTERCARE FOLLOWING ORGAN TRANSPLANT: ICD-10-CM

## 2021-11-21 DIAGNOSIS — Z94.0 KIDNEY REPLACED BY TRANSPLANT: ICD-10-CM

## 2021-11-21 DIAGNOSIS — Z94.83 PANCREAS REPLACED BY TRANSPLANT (H): Primary | ICD-10-CM

## 2021-11-21 LAB
AMYLASE SERPL-CCNC: 90 U/L (ref 30–110)
ANION GAP SERPL CALCULATED.3IONS-SCNC: 5 MMOL/L (ref 3–14)
BASOPHILS # BLD AUTO: 0 10E3/UL (ref 0–0.2)
BASOPHILS NFR BLD AUTO: 0 %
BUN SERPL-MCNC: 20 MG/DL (ref 7–30)
CALCIUM SERPL-MCNC: 8.6 MG/DL (ref 8.5–10.1)
CHLORIDE BLD-SCNC: 106 MMOL/L (ref 94–109)
CO2 SERPL-SCNC: 29 MMOL/L (ref 20–32)
CREAT SERPL-MCNC: 0.98 MG/DL (ref 0.52–1.04)
EOSINOPHIL # BLD AUTO: 0 10E3/UL (ref 0–0.7)
EOSINOPHIL NFR BLD AUTO: 0 %
ERYTHROCYTE [DISTWIDTH] IN BLOOD BY AUTOMATED COUNT: 14.9 % (ref 10–15)
GFR SERPL CREATININE-BSD FRML MDRD: 74 ML/MIN/1.73M2
GLUCOSE BLD-MCNC: 94 MG/DL (ref 70–99)
GLUCOSE BLDC GLUCOMTR-MCNC: 100 MG/DL (ref 70–99)
GLUCOSE BLDC GLUCOMTR-MCNC: 106 MG/DL (ref 70–99)
GLUCOSE BLDC GLUCOMTR-MCNC: 130 MG/DL (ref 70–99)
GLUCOSE BLDC GLUCOMTR-MCNC: 151 MG/DL (ref 70–99)
GLUCOSE BLDC GLUCOMTR-MCNC: 163 MG/DL (ref 70–99)
GLUCOSE BLDC GLUCOMTR-MCNC: 70 MG/DL (ref 70–99)
GLUCOSE BLDC GLUCOMTR-MCNC: 75 MG/DL (ref 70–99)
GLUCOSE BLDC GLUCOMTR-MCNC: 90 MG/DL (ref 70–99)
GLUCOSE BLDC GLUCOMTR-MCNC: 93 MG/DL (ref 70–99)
GLUCOSE BLDC GLUCOMTR-MCNC: 94 MG/DL (ref 70–99)
GLUCOSE BLDC GLUCOMTR-MCNC: 99 MG/DL (ref 70–99)
HCT VFR BLD AUTO: 27.8 % (ref 35–47)
HGB BLD-MCNC: 8.8 G/DL (ref 11.7–15.7)
IMM GRANULOCYTES # BLD: 0.1 10E3/UL
IMM GRANULOCYTES NFR BLD: 0 %
LIPASE FLD-CCNC: 627 U/L
LIPASE SERPL-CCNC: 374 U/L (ref 73–393)
LYMPHOCYTES # BLD AUTO: 1 10E3/UL (ref 0.8–5.3)
LYMPHOCYTES NFR BLD AUTO: 9 %
MAGNESIUM SERPL-MCNC: 1.6 MG/DL (ref 1.6–2.3)
MCH RBC QN AUTO: 27.4 PG (ref 26.5–33)
MCHC RBC AUTO-ENTMCNC: 31.7 G/DL (ref 31.5–36.5)
MCV RBC AUTO: 87 FL (ref 78–100)
MONOCYTES # BLD AUTO: 0.8 10E3/UL (ref 0–1.3)
MONOCYTES NFR BLD AUTO: 7 %
NEUTROPHILS # BLD AUTO: 9.9 10E3/UL (ref 1.6–8.3)
NEUTROPHILS NFR BLD AUTO: 84 %
NRBC # BLD AUTO: 0 10E3/UL
NRBC BLD AUTO-RTO: 0 /100
PHOSPHATE SERPL-MCNC: 2.3 MG/DL (ref 2.5–4.5)
PLATELET # BLD AUTO: 191 10E3/UL (ref 150–450)
POTASSIUM BLD-SCNC: 3.3 MMOL/L (ref 3.4–5.3)
RBC # BLD AUTO: 3.21 10E6/UL (ref 3.8–5.2)
SODIUM SERPL-SCNC: 140 MMOL/L (ref 133–144)
TACROLIMUS BLD-MCNC: 21.9 UG/L (ref 5–15)
TME LAST DOSE: ABNORMAL H
TME LAST DOSE: ABNORMAL H
UFH PPP CHRO-ACNC: <0.1 IU/ML
WBC # BLD AUTO: 11.8 10E3/UL (ref 4–11)

## 2021-11-21 PROCEDURE — 82150 ASSAY OF AMYLASE: CPT | Performed by: PHYSICIAN ASSISTANT

## 2021-11-21 PROCEDURE — 83690 ASSAY OF LIPASE: CPT | Performed by: NURSE PRACTITIONER

## 2021-11-21 PROCEDURE — 85025 COMPLETE CBC W/AUTO DIFF WBC: CPT | Performed by: PHYSICIAN ASSISTANT

## 2021-11-21 PROCEDURE — 80048 BASIC METABOLIC PNL TOTAL CA: CPT | Performed by: PHYSICIAN ASSISTANT

## 2021-11-21 PROCEDURE — 83690 ASSAY OF LIPASE: CPT | Performed by: PHYSICIAN ASSISTANT

## 2021-11-21 PROCEDURE — 120N000011 HC R&B TRANSPLANT UMMC

## 2021-11-21 PROCEDURE — 250N000013 HC RX MED GY IP 250 OP 250 PS 637

## 2021-11-21 PROCEDURE — 258N000003 HC RX IP 258 OP 636: Performed by: PHYSICIAN ASSISTANT

## 2021-11-21 PROCEDURE — 250N000012 HC RX MED GY IP 250 OP 636 PS 637

## 2021-11-21 PROCEDURE — 97535 SELF CARE MNGMENT TRAINING: CPT | Mod: GO | Performed by: OCCUPATIONAL THERAPIST

## 2021-11-21 PROCEDURE — 97165 OT EVAL LOW COMPLEX 30 MIN: CPT | Mod: GO | Performed by: OCCUPATIONAL THERAPIST

## 2021-11-21 PROCEDURE — 250N000011 HC RX IP 250 OP 636: Performed by: NURSE PRACTITIONER

## 2021-11-21 PROCEDURE — 250N000012 HC RX MED GY IP 250 OP 636 PS 637: Performed by: PHYSICIAN ASSISTANT

## 2021-11-21 PROCEDURE — 83735 ASSAY OF MAGNESIUM: CPT | Performed by: PHYSICIAN ASSISTANT

## 2021-11-21 PROCEDURE — 250N000011 HC RX IP 250 OP 636: Performed by: PHYSICIAN ASSISTANT

## 2021-11-21 PROCEDURE — 250N000013 HC RX MED GY IP 250 OP 250 PS 637: Performed by: NURSE PRACTITIONER

## 2021-11-21 PROCEDURE — 84100 ASSAY OF PHOSPHORUS: CPT | Performed by: PHYSICIAN ASSISTANT

## 2021-11-21 PROCEDURE — 250N000013 HC RX MED GY IP 250 OP 250 PS 637: Performed by: PHYSICIAN ASSISTANT

## 2021-11-21 PROCEDURE — 80197 ASSAY OF TACROLIMUS: CPT | Performed by: PHYSICIAN ASSISTANT

## 2021-11-21 PROCEDURE — 250N000012 HC RX MED GY IP 250 OP 636 PS 637: Performed by: NURSE PRACTITIONER

## 2021-11-21 PROCEDURE — 85520 HEPARIN ASSAY: CPT | Performed by: PHYSICIAN ASSISTANT

## 2021-11-21 PROCEDURE — 36592 COLLECT BLOOD FROM PICC: CPT | Performed by: PHYSICIAN ASSISTANT

## 2021-11-21 RX ORDER — FUROSEMIDE 40 MG
40 TABLET ORAL DAILY
Status: DISCONTINUED | OUTPATIENT
Start: 2021-11-22 | End: 2021-11-22

## 2021-11-21 RX ORDER — MAGNESIUM SULFATE HEPTAHYDRATE 40 MG/ML
2 INJECTION, SOLUTION INTRAVENOUS ONCE
Status: COMPLETED | OUTPATIENT
Start: 2021-11-21 | End: 2021-11-21

## 2021-11-21 RX ORDER — NICOTINE POLACRILEX 4 MG
15-30 LOZENGE BUCCAL
Status: DISCONTINUED | OUTPATIENT
Start: 2021-11-21 | End: 2021-11-21

## 2021-11-21 RX ORDER — ASPIRIN 325 MG
325 TABLET ORAL DAILY
Status: DISCONTINUED | OUTPATIENT
Start: 2021-11-21 | End: 2021-11-22 | Stop reason: HOSPADM

## 2021-11-21 RX ORDER — MYCOPHENOLIC ACID 180 MG/1
720 TABLET, DELAYED RELEASE ORAL
Status: DISCONTINUED | OUTPATIENT
Start: 2021-11-21 | End: 2021-11-22 | Stop reason: HOSPADM

## 2021-11-21 RX ORDER — DEXTROSE MONOHYDRATE 25 G/50ML
25-50 INJECTION, SOLUTION INTRAVENOUS
Status: DISCONTINUED | OUTPATIENT
Start: 2021-11-21 | End: 2021-11-21

## 2021-11-21 RX ORDER — TACROLIMUS 1 MG/1
1 CAPSULE ORAL
Status: DISCONTINUED | OUTPATIENT
Start: 2021-11-22 | End: 2021-11-22 | Stop reason: HOSPADM

## 2021-11-21 RX ORDER — LOPERAMIDE HCL 2 MG
2 CAPSULE ORAL EVERY 8 HOURS PRN
Status: DISCONTINUED | OUTPATIENT
Start: 2021-11-21 | End: 2021-11-22 | Stop reason: HOSPADM

## 2021-11-21 RX ORDER — CALCIUM POLYCARBOPHIL 625 MG 625 MG/1
625 TABLET ORAL 2 TIMES DAILY
Status: DISCONTINUED | OUTPATIENT
Start: 2021-11-21 | End: 2021-11-22 | Stop reason: HOSPADM

## 2021-11-21 RX ORDER — POTASSIUM CHLORIDE 750 MG/1
40 TABLET, EXTENDED RELEASE ORAL ONCE
Status: COMPLETED | OUTPATIENT
Start: 2021-11-21 | End: 2021-11-21

## 2021-11-21 RX ADMIN — INSULIN ASPART 1 UNITS: 100 INJECTION, SOLUTION INTRAVENOUS; SUBCUTANEOUS at 18:09

## 2021-11-21 RX ADMIN — CARVEDILOL 3.12 MG: 3.12 TABLET, FILM COATED ORAL at 18:08

## 2021-11-21 RX ADMIN — POTASSIUM CHLORIDE 40 MEQ: 750 TABLET, EXTENDED RELEASE ORAL at 11:03

## 2021-11-21 RX ADMIN — HEPARIN SODIUM 600 UNITS/HR: 1000 INJECTION INTRAVENOUS; SUBCUTANEOUS at 09:10

## 2021-11-21 RX ADMIN — MYCOPHENOLATE MOFETIL 1000 MG: 250 CAPSULE ORAL at 08:26

## 2021-11-21 RX ADMIN — MICAFUNGIN SODIUM 100 MG: 50 INJECTION, POWDER, LYOPHILIZED, FOR SOLUTION INTRAVENOUS at 08:35

## 2021-11-21 RX ADMIN — VALGANCICLOVIR 900 MG: 450 TABLET, FILM COATED ORAL at 08:25

## 2021-11-21 RX ADMIN — OXYCODONE HYDROCHLORIDE 5 MG: 5 TABLET ORAL at 00:12

## 2021-11-21 RX ADMIN — INSULIN ASPART 1 UNITS: 100 INJECTION, SOLUTION INTRAVENOUS; SUBCUTANEOUS at 12:32

## 2021-11-21 RX ADMIN — CLOTRIMAZOLE 10 MG: 10 LOZENGE ORAL at 20:10

## 2021-11-21 RX ADMIN — DIBASIC SODIUM PHOSPHATE, MONOBASIC POTASSIUM PHOSPHATE AND MONOBASIC SODIUM PHOSPHATE 500 MG: 852; 155; 130 TABLET ORAL at 20:10

## 2021-11-21 RX ADMIN — CLOTRIMAZOLE 10 MG: 10 LOZENGE ORAL at 18:09

## 2021-11-21 RX ADMIN — CARVEDILOL 3.12 MG: 3.12 TABLET, FILM COATED ORAL at 08:25

## 2021-11-21 RX ADMIN — OCTREOTIDE ACETATE 100 MCG: 100 INJECTION, SOLUTION INTRAVENOUS; SUBCUTANEOUS at 08:33

## 2021-11-21 RX ADMIN — OXYCODONE HYDROCHLORIDE 5 MG: 5 TABLET ORAL at 11:04

## 2021-11-21 RX ADMIN — PREDNISONE 40 MG: 20 TABLET ORAL at 08:26

## 2021-11-21 RX ADMIN — FUROSEMIDE 40 MG: 40 TABLET ORAL at 08:25

## 2021-11-21 RX ADMIN — TACROLIMUS 2 MG: 1 CAPSULE ORAL at 08:26

## 2021-11-21 RX ADMIN — ASPIRIN 81 MG CHEWABLE TABLET 81 MG: 81 TABLET CHEWABLE at 08:27

## 2021-11-21 RX ADMIN — MYCOPHENOLIC ACID 720 MG: 180 TABLET, DELAYED RELEASE ORAL at 18:08

## 2021-11-21 RX ADMIN — SODIUM CHLORIDE 20 MG: 9 INJECTION, SOLUTION INTRAVENOUS at 09:31

## 2021-11-21 RX ADMIN — BROMFENAC SODIUM 1 DROP: 0.7 SOLUTION/ DROPS OPHTHALMIC at 08:24

## 2021-11-21 RX ADMIN — ASPIRIN 325 MG ORAL TABLET 325 MG: 325 PILL ORAL at 11:04

## 2021-11-21 RX ADMIN — MAGNESIUM SULFATE HEPTAHYDRATE 2 G: 40 INJECTION, SOLUTION INTRAVENOUS at 11:04

## 2021-11-21 RX ADMIN — OCTREOTIDE ACETATE 100 MCG: 100 INJECTION, SOLUTION INTRAVENOUS; SUBCUTANEOUS at 20:11

## 2021-11-21 RX ADMIN — CLOTRIMAZOLE 10 MG: 10 LOZENGE ORAL at 08:31

## 2021-11-21 RX ADMIN — SULFAMETHOXAZOLE AND TRIMETHOPRIM 1 TABLET: 400; 80 TABLET ORAL at 08:27

## 2021-11-21 RX ADMIN — PANTOPRAZOLE SODIUM 40 MG: 40 TABLET, DELAYED RELEASE ORAL at 08:26

## 2021-11-21 RX ADMIN — Medication 1 TABLET: at 18:08

## 2021-11-21 RX ADMIN — CALCIUM POLYCARBOPHIL 625 MG: 625 TABLET, FILM COATED ORAL at 20:10

## 2021-11-21 RX ADMIN — ACETAMINOPHEN 650 MG: 325 TABLET, FILM COATED ORAL at 23:32

## 2021-11-21 RX ADMIN — CLOTRIMAZOLE 10 MG: 10 LOZENGE ORAL at 12:25

## 2021-11-21 RX ADMIN — OXYMETAZOLINE HYDROCHLORIDE 2 SPRAY: 0.05 SPRAY NASAL at 20:11

## 2021-11-21 RX ADMIN — DIBASIC SODIUM PHOSPHATE, MONOBASIC POTASSIUM PHOSPHATE AND MONOBASIC SODIUM PHOSPHATE 500 MG: 852; 155; 130 TABLET ORAL at 08:27

## 2021-11-21 RX ADMIN — Medication 1 TABLET: at 08:27

## 2021-11-21 ASSESSMENT — ACTIVITIES OF DAILY LIVING (ADL)
ADLS_ACUITY_SCORE: 4
ADLS_ACUITY_SCORE: 6
ADLS_ACUITY_SCORE: 4
ADLS_ACUITY_SCORE: 6
ADLS_ACUITY_SCORE: 4
ADLS_ACUITY_SCORE: 4
ADLS_ACUITY_SCORE: 6
ADLS_ACUITY_SCORE: 4
ADLS_ACUITY_SCORE: 6
ADLS_ACUITY_SCORE: 4
ADLS_ACUITY_SCORE: 6
ADLS_ACUITY_SCORE: 4
ADLS_ACUITY_SCORE: 4
ADLS_ACUITY_SCORE: 6
ADLS_ACUITY_SCORE: 4
ADLS_ACUITY_SCORE: 6
ADLS_ACUITY_SCORE: 4

## 2021-11-21 ASSESSMENT — MIFFLIN-ST. JEOR
SCORE: 1312.51
SCORE: 1318.86

## 2021-11-21 NOTE — PLAN OF CARE
VS:  Afebrile, HR 80-90s, /100 - 147/99 (started on Coreg  this evening), O2 sats 96-98% with RA.  ENDO:  Insulin infusion remained off this AM per protocol.  Re-started drip at 1400 when blood glucose reached 145.  Titrated throughout evening with Algorithm #1.  Blood sugars 121-175.  Continue to check hourly.  LABS:  Creatinine 1.00; Amylase & Lipase trending within normal range.  WBC trending down. Phos 1.6 - changed oral replacement form.  C. difficile negative, enteric isolation discontinued.  NEURO:  Alert, oriented x4.  No new deficits.  PAIN:  Well controlled, prn oxycodone x1 this shift.  Adbominal binder applied.  DIET:  Advanced to Regular, tolerating well.  Taking adequate po fluids - MIV fluid discontinued.  GI:  Diarrhea continued today.  Watery BMs this AM changed to liquid stool by afternoon.  NP and MD aware, may require change in immunosuppression at some point.    :  Voiding spontaneously, large volume - appropriate response to  1x iv Lasix dose.  Will start scheduled po Lasix tomorrow.    SKIN:  Anal area irritated due to frequent loose stools, provided pt with protective ointment to apply prn.  Showered this AM.  Increasing edema in lower adbomen, pubic area - spoke to  Dr. Guillory  (on-call), he will come to see her later this evening.  Continue to  encourage activity, position changes to mobilize fluid.  Lymphedema consult ordered for BLE edema.  ACTIVITY:  Out of room to ambulate in halls frequently.  Up to chair x2, now resting in recliner.  Using incentive spirometer independently.  DRAIN:  SARAI leaky this AM, dressing changed.   Large volume output today, team aware.  LINE:  L- I J with TKO for insulin drip.  Heparin drip restarted this morning.    EDUCATION:  Pt updating lab book independently.  Started [facility] videos yesterday, encouraged to  continue viewing medication videos.  Med card updated and reviewed wit her  this evening.  Continue to reinforce understanding  of post-transplant medications.  Pt very motivated.  Specialty Pharmacy appointment scheduled for Monday AM.  PLAN:  Continue current plan of care.  Update Surgery cross-cover prn any change in pt status or other concerns.

## 2021-11-21 NOTE — PROGRESS NOTES
[facility]   Transplant Nephrology Progress Note  Date of Admission:  11/15/2021  Today's Date: 11/21/2021    Recommendations:  - Agree with holding second dose of lasix today.   - Start potassium 40 mEq daily.       Assessment & Plan   # DDKT (SPK): Stable - Cr ~1   - Baseline Creatinine: ~ TBD   - Proteinuria: Not checked recently   - Date DSA Last Checked: Nov/2021      Latest DSA: No DSA at time of transplant   - BK Viremia: Not checked post transplant   - Kidney Tx Biopsy: No    # Pancreas Tx (SPK):    - Pancreatic Exocrine Drainage: Enteric drained     - Blood glucose: Near euglycemia      On insulin: Yes   - HbA1c: Last checked at time of transplant      Latest HbA1c: 7.7%   - Pancreatic enzymes: Trend down   - Date DSA Last Checked: Nov/2021  Latest DSA: No DSA at time of transplant   - Pancreas Tx Biopsy: No    # Immunosuppression: Tacrolimus immediate release (goal 8-10), Mycophenolate mofetil (dose 1000 mg every 12 hours) and Prednisone (dose taper)   - Induction with Recent Transplant:  Intermediate Intensity   - Changes: No, cont tac 2.0 mg BID (tac level: 9.9 on 11/2021).     # Infection Prophylaxis:   - PJP: Sulfa/TMP (Bactrim)  - CMV: Valganciclovir (Valcyte)  - Thrush: Clotrimazole yodit (Mycelex) and Micafungin (Mycamine)    # Hypertension: Controlled;  Goal BP: < 150/90   - Volume status: Moderately hypervolemic     - Changes: Yes - hold second dose of Lasix, cont Coreg 3.125 mg BID    # Elevated Blood Glucose: Glucose generally running ~ 100-120s   - Management as per primary team.    # Anemia in Chronic Renal Disease: Hgb: Stable      SYLVAIN: No   - Iron studies: Not checked recently    # Mineral Bone Disorder:   - Secondary renal hyperparathyroidism; PTH level: Not checked recently        On treatment: None  - Vitamin D; level: Not checked recently        On supplement: No  - Calcium; level: Normal        On supplement: No  - Phosphorus; level: Low  normal        On supplement: No    # Electrolytes:   - Potassium; level: Low        On supplement: No, recommend starting potassium 40 mEq daily.   - Magnesium; level: Low        On supplement: Yes  - Bicarbonate; level: Normal        On supplement: No    # Heterozygous Factor V Leiden: On heparin. No personal hx of blood clots.    # Cataract: Had cataract removed in early November. On eye drops.    # Transplant History:  Etiology of Kidney Failure: Diabetes mellitus type 1  Tx: DDKT (SPK)  Transplant: 11/15/2021 (Kidney / Pancreas)  Donor Type: Donation after Brain Death Donor Class:   Crossmatch at time of Tx: negative  DSA at time of Tx: No  Significant changes in immunosuppression: None  Significant transplant-related complications: None    Recommendations were communicated to the primary team via this note.    Seen and discussed with BETITO Sharma CNP   Pager: [phone]    Interval History   C/o persistent but improved LE swelling and diarrhea. No CP, SOB, fevers, sweat or chills.     Review of Systems   4 point ROS was obtained and negative except as noted in the Interval History.    MEDICATIONS:    aspirin  81 mg Oral or Feeding Tube Daily     basiliximab (SIMULECT) infusion  20 mg Intravenous Once     bromfenac  1 drop Left Eye Daily     calcium carbonate-vitamin D  1 tablet Oral BID w/meals     carvedilol  3.125 mg Oral BID w/meals     clotrimazole  10 mg Buccal 4x Daily     furosemide  40 mg Oral BID     Loteprednol Etabonate  1 drop Left Eye BID     magnesium oxide  400 mg Oral BID     micafungin  100 mg Intravenous Q24H     mycophenolate  1,000 mg Oral BID IS     octreotide  100 mcg Subcutaneous BID     oxymetazoline  2 spray Both Nostrils BID     pantoprazole  40 mg Oral QAM AC     phosphorus tablet 250 mg  500 mg Oral BID     predniSONE  40 mg Oral Daily    Followed by     [START ON 11/23/2021] predniSONE  20 mg Oral Daily    Followed by     [START ON 11/30/2021] predniSONE  15 mg  "Oral Daily    Followed by     [START ON 2021] predniSONE  10 mg Oral Daily    Followed by     [START ON 2021] predniSONE  5 mg Oral Daily     Prucalopride Succinate  1 tablet Oral Daily     sodium chloride (PF)  3 mL Intravenous Q8H     sulfamethoxazole-trimethoprim  1 tablet Oral Daily     tacrolimus  2 mg Oral BID IS     valGANciclovir  900 mg Oral Daily       dextrose       heparin 600 Units/hr (21 0823)     insulin regular Stopped (21 0113)       Physical Exam   Temp  Av.5  F (36.9  C)  Min: 97.4  F (36.3  C)  Max: 99.3  F (37.4  C)  Arterial Line BP  Min: 131/71  Max: 165/94  Arterial Line MAP (mmHg)  Av.8 mmHg  Min: 93 mmHg  Max: 120 mmHg      Pulse  Av.4  Min: 78  Max: 94 Resp  Av.9  Min: 10  Max: 20  SpO2  Av.9 %  Min: 96 %  Max: 100 %    CVP (mmHg): 6 mmHgBP (!) 140/100   Pulse 80   Temp 98.2  F (36.8  C)   Resp 16   Ht 1.676 m (5' 5.98\")   Wt 60.6 kg (133 lb 9.6 oz)   LMP  (LMP Unknown)   SpO2 99%   BMI 21.57 kg/m     Date 21 0700 - 21 0659   Shift 7440-4261 2150-8183 3474-4743 24 Hour Total   INTAKE   I.V. 1368 476  1844   NG/ 140  290   Shift Total(mL/kg) 1518(25.38) 616(10.3)  2134(35.69)   OUTPUT   Urine 525 170  695   Emesis/NG output 400 200  600   Drains 290 160  450   Shift Total(mL/kg) 1215(20.32) 530(8.86)  1745(29.18)   Weight (kg) 59.8 59.8 59.8 59.8      Admit Weight: 55.1 kg (121 lb 7.6 oz)     GENERAL APPEARANCE: alert and no distress  HENT: mouth without ulcers or lesions  LYMPHATICS: no cervical or supraclavicular nodes  RESP: lungs clear to auscultation - no rales, rhonchi or wheezes  CV: regular rhythm, normal rate, no rub, no murmur  EDEMA: 1+ edema diffusely  ABDOMEN: soft, nondistended, nontender, bowel sounds normal  MS: extremities normal - no gross deformities noted, no evidence of inflammation in joints, no muscle tenderness  SKIN: no rash  TX KIDNEY: minimal TTP    Data   All labs reviewed by " me.  [facility]  Recent Labs   Lab 11/21/21  0805 11/21/21  0725 11/21/21  0704 11/21/21  0609 11/20/21  0814 11/20/21  0717 11/19/21  0827 11/19/21  0604 11/19/21  0002 11/18/21  2319 11/18/21  0502 11/18/21  0436 11/15/21  1639 11/15/21  1550   NA  --  140  --   --   --  140  --  141  141  --  145*   < > 146*   < > 138   POTASSIUM  --  3.3*  --   --   --  3.4  --  3.3*  --   --   --  3.7   < > 3.2*   CHLORIDE  --  106  --   --   --  108  --  112*  --   --   --  116*   < > 109   CO2  --  29  --   --   --  25  --  28  --   --   --  26   < > 19*   ANIONGAP  --  5  --   --   --  7  --  1*  --   --   --  4   < > 10   GLC 70 94 94 100*   < > 91   < > 91   < >  --    < > 114*   < > 139*   BUN  --  20  --   --   --  20  --  23  --   --   --  22   < > 71*   CR  --  0.98  --   --   --  1.00  --  1.01  --   --   --  0.92   < > 2.65*   GFRESTIMATED  --  74  --   --   --  73  --  72  --   --   --  80   < > 22*   STEPHANIE  --  8.6  --   --   --  8.7  --  8.4*  --   --   --  8.7   < > 8.9   MAG  --  1.6  --   --   --  1.7  --  1.7  --   --   --  1.8   < >  --    PHOS  --  2.3*  --   --   --  1.6*  --  2.0*  --   --   --  1.7*   < >  --    PROTTOTAL  --   --   --   --   --   --   --   --   --   --   --   --   --  8.4   ALBUMIN  --   --   --   --   --   --   --   --   --   --   --   --   --  4.0   BILITOTAL  --   --   --   --   --   --   --   --   --   --   --   --   --  0.4   ALKPHOS  --   --   --   --   --   --   --   --   --   --   --   --   --  104   AST  --   --   --   --   --   --   --   --   --   --   --   --   --  16   ALT  --   --   --   --   --   --   --   --   --   --   --   --   --  23    < > = values in this interval not displayed.     CBC  Recent Labs   Lab 11/21/21  0725 11/20/21  0717 11/19/21  1354 11/19/21  0604 11/18/21  0436   HGB 8.8* 8.1* 8.4* 7.7* 8.8*   WBC 11.8* 12.0*  --  12.5* 12.8*   RBC 3.21* 2.95*  --  2.76* 3.19*   HCT 27.8* 25.9*  --  24.4* 27.8*   MCV 87 88  --  88 87   MCH 27.4 27.5  --  27.9 27.6   MCHC  31.7 31.3*  --  31.6 31.7   RDW 14.9 15.3*  --  15.7* 15.8*    147*  --  122* 139*     INR  Recent Labs   Lab 11/16/21  0402 11/15/21  1550   INR 1.11 0.96   PTT 30 30     ABG  Recent Labs   Lab 11/15/21  2346 11/15/21  2210 11/15/21  2058 11/15/21  2004   PH 7.28* 7.29* 7.29* 7.27*   PCO2 35 35 37 35   PO2 44* 48* 51* 230*   HCO3 17* 17* 18* 16*   O2PER  --  40  --   --       Urine Studies  Recent Labs   Lab Test 11/15/21  1530 07/21/21  0825   COLOR Straw Straw   APPEARANCE Clear Clear   URINEGLC 30 * 150 *   URINEBILI Negative Negative   URINEKETONE Negative Negative   SG 1.010 1.010   UBLD Small* Negative   URINEPH 5.5 6.0   PROTEIN 100 * 100 *   NITRITE Negative Negative   LEUKEST Negative Negative   RBCU 2 1   WBCU 1 1     Recent Labs   Lab Test 11/15/21  1530   UTPG 4.77*     PTH  No lab results found.  Iron Studies  No lab results found.    IMAGING:  All imaging studies reviewed by me.

## 2021-11-21 NOTE — PROGRESS NOTES
11/21/21 1300   Quick Adds   Type of Visit Initial Occupational Therapy Evaluation  (Lymphedema evaluation)   Living Environment   People in home alone   Current Living Arrangements house   Self-Care   Usual Activity Tolerance good   Current Activity Tolerance good   Disability/Function   Hearing Difficulty or Deaf no   Wear Glasses or Blind no   Concentrating, Remembering or Making Decisions Difficulty no   Difficulty Communicating no   Difficulty Eating/Swallowing no   Walking or Climbing Stairs Difficulty no   Dressing/Bathing Difficulty no   Toileting issues no   Sensory   Sensory Comments light touch intact   Pain Assessment   Patient Currently in Pain No   Edema General Information   Onset of Edema   (acute onset with hospitalization, per patient)   Affected Body Part(s) Right LE;Left LE   Etiology Comments recent transplant, decreased mobility, fluid overload    General Comments/Previous Edema Treatment/Edema Equipment patient reports use of stockings as needed prior to admission.    Edema Examination/Assessment   Skin Condition Pitting;Dryness;Intact   Skin Condition Comments Skin intact with firm 1+ pitting edema throughout B LEs. Skin with tight/stretched appearance distally.    Skin Integrity intact   Pitting Assessment 1+ pitting edema   Edema Assessment Comments Fit with size M, 20-30mmHg, thigh jose Jobst compression stockings.    Range of Motion Comprehensive   General Range of Motion no range of motion deficits identified   Strength Comprehensive (MMT)   General Manual Muscle Testing (MMT) Assessment no strength deficits identified   Bed Mobility   Comment (Bed Mobility) IND   Transfers   Transfer Comments IND   Clinical Impression   Criteria for Skilled Therapeutic Interventions Met (OT) yes   Edema: Patient Presentation Edema   Influenced by the following impairments decreased functional mobility    Assessment of Occupational Performance 1-3 Performance Deficits   Edema: Planned Interventions  Gradient compression bandaging;Fit for compression garment;Edema exercises;Precautions to prevent infection/exacerbation;Education   Intervention Comments Jobst compression stockings   Clinical Decision Making Complexity (OT) low complexity   Therapy Frequency (OT) 2x/week   Predicted Duration of Therapy 1 week   Risk & Benefits of therapy have been explained evaluation/treatment results reviewed;care plan/treatment goals reviewed   OT Discharge Planning    OT Discharge Recommendation (DC Rec) Home with assist   OT Rationale for DC Rec Recommend continued use of LE compression stockings to manage acute onset LE edema. Patient may require assistance with don/doff of stockings when returning home.

## 2021-11-21 NOTE — PROGRESS NOTES
Transplant Surgery  Inpatient Daily Progress Note  11/21/2021    Assessment & Plan: Marilyn Ortega is a 36 year old female with PMH significant for DMI complicated by gastroparesis and CKD due to diabetic nephropathy, HTN, gluten intolerance and Factor V Leiden mutation, heterozygous. Admitted for DD SPK 11/16/21.     Graft function: SPK 11/16/21:  Pancreas: Lipase Peaked at 4200 immediately post transplant, down to 374 today.   -US immediately post transplant with good flow. Repeat US 11/16 with good flow.   -No insulin during the transplant, however it was discovered 11/16 AM that patient had insulin pump connected overnight with Humalog at 0.75 units/hour, removed 11/16 AM. Remains on insulin gtt, 0-0.5 units/hour started following steroid dose.  Octreotide x 5 days.  Fluid lipase 840-repeat today.   Graft patency ppx: Heparin gtt @600units/hour-stop today. Continue ASA-increase to 325mg daily.  Kidney: CKD, made urine and was not on HD prior to transplant. SCr 0.9 today.   -US 11/16 with good flow. Ureteral stent in place.   Immunosuppression management: PRA 29, intermediate risk induction.   Thymo 100 mg intra-op  Solu-medrol 500 mg intra-op, 250 mg POD 1, 100 mg POD 2 then steroid taper  Simulect POD 1 & 5  Cellcept 1000 mg BID-stop 11/21  Myfortic 720mg BID due to diarrhea  Tac 2 mg BID. Goal 8-10. Level 22 today in the setting of diarrhea, Hold tonight, decrease to 1mg BID.    Complexity of management: Medium. Contributing factors: anemia and nausea   HEENT:   Recent cataract removal: Continue drops as prescribed per taper.   Hematology:   Anemia of chronic disease: HGB 8.8    Cardiorespiratory: HTN:start Coreg 3.125mg BID  Stable on room air, continue IS.   GI/Nutrition: Regular diet  Nausea: Resolved. Change Zofran to prn.   Diarrhea:stop MMF. Start fiber. Add immodium PRN  Endocrine: Steroid induced hyperglycemia: See above. HGBA1C 7.7%. Stop insulin gtt. Start sliding scale insulin  PRN  Fluid/Electrolytes: CIV  Hypomagnesemia: replace 2gm IV  Hypophosphatemia: replace PRN  Hypokalemia: replace 40meq KCL x1  :Voiding without retention  Infectious disease: Tmax 98.4.   Leukocytosis secondary to steroids, stable today~ 12.   Prophylaxis: Bactrim indefinitely, Valcyte x 3 months (CMV D-/R-). Zosyn and Stephanie per protocol.   Disposition: 7A     Medical Decision Making: Medium  Subsequent visit 45882 (moderate level decision making)    LUIS E/Fellow/Resident Provider: Mattie Dodson NP    Faculty: Branden Aranda MD    Attestation: I saw and examined this patient with Mattie Dodson NP, and the transplant team. I independently reviewed all pertinent laboratory and imaging results. I agree with the findings and plan as documented in the note above.  -Branden Aranda MD   _________________________________________________________________  Transplant History: Admitted 11/15/2021 for pancreas and kidney transplant.  11/15/2021 (Kidney / Pancreas), Postoperative day: 6     Interval History: History is obtained from the patient  Overnight events: Tolerating diet. Good po intake. Continues to report diarrhea. Had slowed following introduction of diet but returns this am.  Some abdominal discomfort. Denies cramping.  Ambulating frequently.    ROS:   A 10-point review of systems was negative except as noted above.     Meds:    aspirin  325 mg Oral Daily     bromfenac  1 drop Left Eye Daily     calcium carbonate-vitamin D  1 tablet Oral BID w/meals     carvedilol  3.125 mg Oral BID w/meals     clotrimazole  10 mg Buccal 4x Daily     [START ON 11/22/2021] furosemide  40 mg Oral Daily     insulin aspart  1-7 Units Subcutaneous TID AC     insulin aspart  1-5 Units Subcutaneous At Bedtime     Loteprednol Etabonate  1 drop Left Eye BID     [Held by provider] magnesium oxide  400 mg Oral BID     mycophenolic acid  720 mg Oral BID IS     octreotide  100 mcg Subcutaneous BID     oxymetazoline  2 spray Both  "Nostrils BID     pantoprazole  40 mg Oral QAM AC     phosphorus tablet 250 mg  500 mg Oral BID     predniSONE  40 mg Oral Daily    Followed by     [START ON 11/23/2021] predniSONE  20 mg Oral Daily    Followed by     [START ON 11/30/2021] predniSONE  15 mg Oral Daily    Followed by     [START ON 12/7/2021] predniSONE  10 mg Oral Daily    Followed by     [START ON 12/14/2021] predniSONE  5 mg Oral Daily     Prucalopride Succinate  1 tablet Oral Daily     sodium chloride (PF)  3 mL Intravenous Q8H     sulfamethoxazole-trimethoprim  1 tablet Oral Daily     [START ON 11/22/2021] tacrolimus  1 mg Oral BID IS     valGANciclovir  900 mg Oral Daily       Physical Exam:     Admit Weight: 55.1 kg (121 lb 7.6 oz)    Current vitals:   BP (!) 133/92   Pulse 82   Temp 97.8  F (36.6  C)   Resp 16   Ht 1.676 m (5' 5.98\")   Wt 61.2 kg (135 lb)   LMP  (LMP Unknown)   SpO2 98%   BMI 21.80 kg/m      CVP (mmHg): 9 mmHg    Vital sign ranges:    Temp:  [97.8  F (36.6  C)-98.7  F (37.1  C)] 97.8  F (36.6  C)  Pulse:  [80-90] 82  Resp:  [15-16] 16  BP: (128-142)/() 133/92  SpO2:  [98 %-100 %] 98 %  Patient Vitals for the past 24 hrs:   BP Temp Temp src Pulse Resp SpO2 Weight   11/21/21 1101 (!) 133/92 97.8  F (36.6  C) -- 82 16 98 % --   11/21/21 0900 -- -- -- -- -- -- 61.2 kg (135 lb)   11/21/21 0719 (!) 140/100 98.2  F (36.8  C) -- 80 16 99 % --   11/21/21 0415 128/84 98.1  F (36.7  C) Oral 81 16 99 % --   11/21/21 0021 -- -- -- -- -- -- 60.6 kg (133 lb 9.6 oz)   11/20/21 2308 (!) 142/94 97.8  F (36.6  C) Oral 81 16 100 % --   11/20/21 1615 (!) 132/106 98.7  F (37.1  C) Axillary 90 15 100 % --     General Appearance: in no apparent distress.   Skin: normal, warm, dry  Heart: regular rate and rhythm  Lungs: NLB on room air  Abdomen: The abdomen is slightly distended, mildly tender, generalized. she is not tender over the kidney and pancreas graft. The wound is closed with staples, c/d/i. SARAI x1 in place with thin serosang " output.   : voiding without retention  Extremities: edema: present bilaterally. 2+   Neurologic: awake, alert and oriented. Tremor absent.    Data:   CMP  Recent Labs   Lab 11/21/21  1231 11/21/21  0805 11/21/21  0725 11/20/21  0814 11/20/21  0717 11/19/21  1315 11/19/21  1131 11/16/21  0052 11/15/21  2346 11/15/21  2317 11/15/21  2210 11/15/21  1639 11/15/21  1550   NA  --   --  140  --  140  --   --    < > 138  --  139   < > 138   POTASSIUM  --   --  3.3*  --  3.4  --   --    < > 3.0*  --  3.1*   < > 3.2*   CHLORIDE  --   --  106  --  108  --   --    < >  --   --   --   --  109   CO2  --   --  29  --  25  --   --    < >  --   --   --   --  19*   * 70 94   < > 91   < >  --    < > 78   < > 150*   < > 139*   BUN  --   --  20  --  20  --   --    < >  --   --   --   --  71*   CR  --   --  0.98  --  1.00  --   --    < >  --   --   --   --  2.65*   GFRESTIMATED  --   --  74  --  73  --   --    < >  --   --   --   --  22*   STEPHANIE  --   --  8.6  --  8.7  --   --    < >  --   --   --   --  8.9   ICAW  --   --   --   --   --   --   --   --  4.4  --  4.7   < >  --    MAG  --   --  1.6  --  1.7  --   --    < >  --   --   --   --   --    PHOS  --   --  2.3*  --  1.6*  --   --    < >  --   --   --   --   --    AMYLASE  --   --  90  --  84  --   --    < >  --   --   --   --  56   LIPASE  --   --  374  --  304  --   --    < >  --   --   --   --  78   ALBUMIN  --   --   --   --   --   --   --   --   --   --   --   --  4.0   BILITOTAL  --   --   --   --   --   --   --   --   --   --   --   --  0.4   ALKPHOS  --   --   --   --   --   --   --   --   --   --   --   --  104   AST  --   --   --   --   --   --   --   --   --   --   --   --  16   ALT  --   --   --   --   --   --   --   --   --   --   --   --  23   FLIPA  --   --   --   --   --   --  840  --   --   --   --   --   --     < > = values in this interval not displayed.     CBC  Recent Labs   Lab 11/21/21  0725 11/20/21  0717 11/15/21  2004 11/15/21  1549   HGB 8.8* 8.1*    < > 11.0*   WBC 11.8* 12.0*   < > 8.9    147*   < > 282   A1C  --   --   --  7.7*    < > = values in this interval not displayed.     COAGS  Recent Labs   Lab 11/16/21  0402 11/15/21  1550   INR 1.11 0.96   PTT 30 30      Urinalysis  Recent Labs   Lab Test 11/15/21  1530 07/21/21  0825   COLOR Straw Straw   APPEARANCE Clear Clear   URINEGLC 30 * 150 *   URINEBILI Negative Negative   URINEKETONE Negative Negative   SG 1.010 1.010   UBLD Small* Negative   URINEPH 5.5 6.0   PROTEIN 100 * 100 *   NITRITE Negative Negative   LEUKEST Negative Negative   RBCU 2 1   WBCU 1 1   UTPG 4.77*  --      Virology:  Hepatitis C Antibody   Date Value Ref Range Status   11/15/2021 Nonreactive Nonreactive Final

## 2021-11-21 NOTE — PROVIDER NOTIFICATION
Notified NP at 1156 AM regarding critical results read back.      Spoke with: Page sent to Mattie Dodson, [facility] - Pancreas Transplant service    Orders were not obtained.    Comments:   Charge RN received call from drug analysis lab at 1155 with critical high tacrolimus level of 21.9 from sample drawn this AM.   This writer at desk when call received, immediately aware.  Page sent to NP to inform her of level.  Anticipate holding tacrolimus dose this evening.    Awaiting response from NP now.        
Notified Panc PA/NP in regards to patients urine output dropping significantly compared to while in PACU. Now urine output around 100-200 an hour.   
Notified Panc PA/NP latest K was 3.1, also last urine output was only 25.     Ordered renal/panc US and potassium.   
Paged surgical provider at 0209 with notification that patient has averaged approximately 25 mL/hour over past four hours.      Provider returned call at [phone] indicating that one or more boluses would be ordered.  
Pg'd Panc PA/NP in regards to abdomen xray and if NG tube OK to use for meds.     0928-Per PA/NP OK to use for meds.   
.

## 2021-11-21 NOTE — PLAN OF CARE
Pt is POD 6 s/p SPK transplant.  VS:  Afebrile, HR 80s, /100, 133/92, and 134/90.  Continues on scheduled Coreg.  O2 sats 98-99% with RA.  ENDO:  Blood glucose 70 this AM - drip remained off all morning then discontinued altogether by NP.  Started AC+ HS checks.  Blood glucose 151, 163 - correction given per scale.  LABS:  Amylase, lipase, creatinine within normal.  K+ 3.3 - oral replacement given; Mag 1.6 - iv replacement given - both per 1x order.  WBC trending down.  SARAI fluid sent for lipase level.  Tacrolimus level critical high, see Provider Notification Note.    NEURO:  Alert, oriented x4.  PAIN:  Adbominal/groin discomfort due to edema , wearing adbominal binder and ice packs applied intermittently to groin .  Incisional pain controlled by prn oxycodone x 1.  DIET:  Tolerating regular diet, taking adequate po fluids.  GI:  XL watery BM this AM, smaller loose BM x1.  Passing flatus.  Imodium available.  :  Large volume UOP - appropriate response to po Lasix (now discontinued).    SKIN:  BLE and adbominal/groin edema persists.  Lymphedema consult today - jobst stockings on until HS.  SARAI with moderate output.  Midline incision with intact staples.  ACTIVITY:  Up in halls to walk frequently.  Up in recliner or resting in bed between walks.  Using incentive spirometer independently.  LINE:  I J saline locked.  DRIPS:  Heparin drip discontinued, started aspirin 325mg.  EDUCATION:  Updating lab book independently.  Med card updated by this writer.  Pt awtched [facility] videos this afternoon.  Continued to reinforce understanding of post-transplant medications.  Pt asking many good questions, motivated to learn and states feeling ready for discharge.  Encouraged Marilyn to have her brother and/or mother create log-in for [facility] to view videos.  SARAI education completed, supplies provided for discharge.  Reviewed plan for clinic visits (not taking meds before blood draw, ).  To have  Specialty Pharmacy education in AM by phone, pt will ask Pharmacist questions re: birth control options.    PLAN:  Continue current plan of care.  Continue preparing for possible discharge tomorrow.  Update surgery cross-cover prn any change in pt status or other concerns.

## 2021-11-21 NOTE — PLAN OF CARE
"/84 (BP Location: Right arm, Patient Position: Sitting)   Pulse 81   Temp 98.1  F (36.7  C) (Oral)   Resp 16   Ht 1.676 m (5' 5.98\")   Wt 60.6 kg (133 lb 9.6 oz)   LMP  (LMP Unknown)   SpO2 99%   BMI 21.57 kg/m      Shift: 0373-3798  VS: -140 on RA, afebrile  Neuro: AOx4  BG: insulin gtt  Respiratory: WDL  Pain/Nausea/PRN: oxy x1 for pain, no nausea  Diet: regular  LDA: internal jugular infusing hep 600, TKO with insulin  GI/: voiding, no BM overnight  Skin: incision stapled, SARAI dressing changed, 240 ML output  Mobility: IND  Plan: continue POC    Handoff given to following RN.    "

## 2021-11-22 ENCOUNTER — APPOINTMENT (OUTPATIENT)
Dept: OCCUPATIONAL THERAPY | Facility: CLINIC | Age: 36
DRG: 008 | End: 2021-11-22
Attending: SURGERY
Payer: COMMERCIAL

## 2021-11-22 ENCOUNTER — TELEPHONE (OUTPATIENT)
Dept: TRANSPLANT | Facility: CLINIC | Age: 36
End: 2021-11-22
Payer: COMMERCIAL

## 2021-11-22 VITALS
WEIGHT: 128.5 LBS | DIASTOLIC BLOOD PRESSURE: 94 MMHG | RESPIRATION RATE: 18 BRPM | OXYGEN SATURATION: 99 % | HEIGHT: 66 IN | HEART RATE: 78 BPM | SYSTOLIC BLOOD PRESSURE: 138 MMHG | BODY MASS INDEX: 20.65 KG/M2 | TEMPERATURE: 98 F

## 2021-11-22 DIAGNOSIS — Z94.0 KIDNEY REPLACED BY TRANSPLANT: ICD-10-CM

## 2021-11-22 DIAGNOSIS — Z94.83 PANCREAS REPLACED BY TRANSPLANT (H): Primary | ICD-10-CM

## 2021-11-22 PROBLEM — D84.9 IMMUNOSUPPRESSED STATUS (H): Status: ACTIVE | Noted: 2021-11-22

## 2021-11-22 LAB
AMYLASE SERPL-CCNC: 93 U/L (ref 30–110)
ANION GAP SERPL CALCULATED.3IONS-SCNC: 6 MMOL/L (ref 3–14)
BASOPHILS # BLD AUTO: 0 10E3/UL (ref 0–0.2)
BASOPHILS NFR BLD AUTO: 0 %
BUN SERPL-MCNC: 22 MG/DL (ref 7–30)
CALCIUM SERPL-MCNC: 9.1 MG/DL (ref 8.5–10.1)
CHLORIDE BLD-SCNC: 106 MMOL/L (ref 94–109)
CO2 SERPL-SCNC: 27 MMOL/L (ref 20–32)
CREAT SERPL-MCNC: 1.2 MG/DL (ref 0.52–1.04)
EOSINOPHIL # BLD AUTO: 0.1 10E3/UL (ref 0–0.7)
EOSINOPHIL NFR BLD AUTO: 1 %
ERYTHROCYTE [DISTWIDTH] IN BLOOD BY AUTOMATED COUNT: 15 % (ref 10–15)
GFR SERPL CREATININE-BSD FRML MDRD: 58 ML/MIN/1.73M2
GLUCOSE BLD-MCNC: 70 MG/DL (ref 70–99)
GLUCOSE BLDC GLUCOMTR-MCNC: 107 MG/DL (ref 70–99)
GLUCOSE BLDC GLUCOMTR-MCNC: 126 MG/DL (ref 70–99)
GLUCOSE BLDC GLUCOMTR-MCNC: 136 MG/DL (ref 70–99)
GLUCOSE BLDC GLUCOMTR-MCNC: 57 MG/DL (ref 70–99)
GLUCOSE BLDC GLUCOMTR-MCNC: 66 MG/DL (ref 70–99)
GLUCOSE BLDC GLUCOMTR-MCNC: 81 MG/DL (ref 70–99)
GLUCOSE BLDC GLUCOMTR-MCNC: 85 MG/DL (ref 70–99)
GLUCOSE BLDC GLUCOMTR-MCNC: 89 MG/DL (ref 70–99)
GLUCOSE BLDC GLUCOMTR-MCNC: 93 MG/DL (ref 70–99)
GLUCOSE BLDC GLUCOMTR-MCNC: 94 MG/DL (ref 70–99)
GLUCOSE BLDC GLUCOMTR-MCNC: 98 MG/DL (ref 70–99)
HCT VFR BLD AUTO: 29.4 % (ref 35–47)
HGB BLD-MCNC: 9.4 G/DL (ref 11.7–15.7)
IMM GRANULOCYTES # BLD: 0.1 10E3/UL
IMM GRANULOCYTES NFR BLD: 1 %
LIPASE SERPL-CCNC: 386 U/L (ref 73–393)
LYMPHOCYTES # BLD AUTO: 1.4 10E3/UL (ref 0.8–5.3)
LYMPHOCYTES NFR BLD AUTO: 11 %
MAGNESIUM SERPL-MCNC: 1.7 MG/DL (ref 1.6–2.3)
MCH RBC QN AUTO: 27.7 PG (ref 26.5–33)
MCHC RBC AUTO-ENTMCNC: 32 G/DL (ref 31.5–36.5)
MCV RBC AUTO: 87 FL (ref 78–100)
MONOCYTES # BLD AUTO: 0.8 10E3/UL (ref 0–1.3)
MONOCYTES NFR BLD AUTO: 6 %
NEUTROPHILS # BLD AUTO: 10.6 10E3/UL (ref 1.6–8.3)
NEUTROPHILS NFR BLD AUTO: 81 %
NRBC # BLD AUTO: 0 10E3/UL
NRBC BLD AUTO-RTO: 0 /100
PHOSPHATE SERPL-MCNC: 3 MG/DL (ref 2.5–4.5)
PLATELET # BLD AUTO: 220 10E3/UL (ref 150–450)
POTASSIUM BLD-SCNC: 3.9 MMOL/L (ref 3.4–5.3)
RBC # BLD AUTO: 3.39 10E6/UL (ref 3.8–5.2)
SODIUM SERPL-SCNC: 139 MMOL/L (ref 133–144)
TACROLIMUS BLD-MCNC: 11.1 UG/L (ref 5–15)
TME LAST DOSE: NORMAL H
TME LAST DOSE: NORMAL H
UFH PPP CHRO-ACNC: <0.1 IU/ML
WBC # BLD AUTO: 12.9 10E3/UL (ref 4–11)

## 2021-11-22 PROCEDURE — 80048 BASIC METABOLIC PNL TOTAL CA: CPT | Performed by: PHYSICIAN ASSISTANT

## 2021-11-22 PROCEDURE — 84100 ASSAY OF PHOSPHORUS: CPT | Performed by: PHYSICIAN ASSISTANT

## 2021-11-22 PROCEDURE — 250N000013 HC RX MED GY IP 250 OP 250 PS 637: Performed by: NURSE PRACTITIONER

## 2021-11-22 PROCEDURE — 250N000013 HC RX MED GY IP 250 OP 250 PS 637

## 2021-11-22 PROCEDURE — 83735 ASSAY OF MAGNESIUM: CPT | Performed by: PHYSICIAN ASSISTANT

## 2021-11-22 PROCEDURE — 82150 ASSAY OF AMYLASE: CPT | Performed by: PHYSICIAN ASSISTANT

## 2021-11-22 PROCEDURE — 36592 COLLECT BLOOD FROM PICC: CPT | Performed by: PHYSICIAN ASSISTANT

## 2021-11-22 PROCEDURE — 80197 ASSAY OF TACROLIMUS: CPT | Performed by: PHYSICIAN ASSISTANT

## 2021-11-22 PROCEDURE — 250N000012 HC RX MED GY IP 250 OP 636 PS 637: Performed by: NURSE PRACTITIONER

## 2021-11-22 PROCEDURE — 250N000013 HC RX MED GY IP 250 OP 250 PS 637: Performed by: PHYSICIAN ASSISTANT

## 2021-11-22 PROCEDURE — 85004 AUTOMATED DIFF WBC COUNT: CPT | Performed by: PHYSICIAN ASSISTANT

## 2021-11-22 PROCEDURE — 97535 SELF CARE MNGMENT TRAINING: CPT | Mod: GO | Performed by: OCCUPATIONAL THERAPIST

## 2021-11-22 PROCEDURE — 83690 ASSAY OF LIPASE: CPT | Performed by: PHYSICIAN ASSISTANT

## 2021-11-22 PROCEDURE — 250N000011 HC RX IP 250 OP 636: Mod: JB | Performed by: PHYSICIAN ASSISTANT

## 2021-11-22 PROCEDURE — 85520 HEPARIN ASSAY: CPT | Performed by: PHYSICIAN ASSISTANT

## 2021-11-22 PROCEDURE — 250N000012 HC RX MED GY IP 250 OP 636 PS 637: Performed by: PHYSICIAN ASSISTANT

## 2021-11-22 RX ORDER — CLOTRIMAZOLE 10 MG/1
10 LOZENGE ORAL 4 TIMES DAILY
Qty: 360 LOZENGE | Refills: 0 | Status: SHIPPED | OUTPATIENT
Start: 2021-11-22 | End: 2022-02-17

## 2021-11-22 RX ORDER — ASPIRIN 325 MG
325 TABLET ORAL DAILY
Qty: 30 TABLET | Refills: 5 | Status: ON HOLD | OUTPATIENT
Start: 2021-11-23 | End: 2022-09-08

## 2021-11-22 RX ORDER — SULFAMETHOXAZOLE AND TRIMETHOPRIM 400; 80 MG/1; MG/1
1 TABLET ORAL DAILY
Qty: 30 TABLET | Refills: 11 | Status: SHIPPED | OUTPATIENT
Start: 2021-11-23 | End: 2022-11-15

## 2021-11-22 RX ORDER — CALCIUM POLYCARBOPHIL 625 MG 625 MG/1
2 TABLET ORAL 2 TIMES DAILY
Qty: 120 TABLET | Refills: 1 | Status: SHIPPED | OUTPATIENT
Start: 2021-11-22 | End: 2021-12-01

## 2021-11-22 RX ORDER — PANTOPRAZOLE SODIUM 40 MG/1
40 TABLET, DELAYED RELEASE ORAL
Qty: 30 TABLET | Refills: 1 | Status: SHIPPED | OUTPATIENT
Start: 2021-11-23 | End: 2022-01-10

## 2021-11-22 RX ORDER — TACROLIMUS 0.5 MG/1
CAPSULE ORAL
Qty: 60 CAPSULE | Refills: 1 | Status: SHIPPED | OUTPATIENT
Start: 2021-11-22 | End: 2021-11-30

## 2021-11-22 RX ORDER — TACROLIMUS 1 MG/1
1 CAPSULE ORAL 2 TIMES DAILY
Qty: 60 CAPSULE | Refills: 11 | Status: SHIPPED | OUTPATIENT
Start: 2021-11-22 | End: 2021-11-29

## 2021-11-22 RX ORDER — FUROSEMIDE 20 MG
20 TABLET ORAL DAILY
Qty: 5 TABLET | Refills: 0 | Status: SHIPPED | OUTPATIENT
Start: 2021-11-22 | End: 2021-11-23

## 2021-11-22 RX ORDER — ACETAMINOPHEN 325 MG/1
325-650 TABLET ORAL EVERY 4 HOURS PRN
Qty: 100 TABLET | Refills: 0 | Status: SHIPPED | OUTPATIENT
Start: 2021-11-22 | End: 2022-01-12

## 2021-11-22 RX ORDER — MYCOPHENOLIC ACID 360 MG/1
720 TABLET, DELAYED RELEASE ORAL 2 TIMES DAILY
Qty: 120 TABLET | Refills: 11 | Status: SHIPPED | OUTPATIENT
Start: 2021-11-22 | End: 2022-01-31

## 2021-11-22 RX ORDER — MAGNESIUM OXIDE 400 MG/1
400 TABLET ORAL DAILY
Qty: 30 TABLET | Refills: 3 | Status: SHIPPED | OUTPATIENT
Start: 2021-11-22 | End: 2021-11-27

## 2021-11-22 RX ORDER — ONDANSETRON 4 MG/1
4 TABLET, ORALLY DISINTEGRATING ORAL EVERY 6 HOURS PRN
Qty: 20 TABLET | Refills: 0 | Status: SHIPPED | OUTPATIENT
Start: 2021-11-22 | End: 2022-01-10

## 2021-11-22 RX ORDER — AMOXICILLIN 250 MG
2 CAPSULE ORAL DAILY PRN
Qty: 30 TABLET | Refills: 0 | Status: SHIPPED | OUTPATIENT
Start: 2021-11-22 | End: 2022-01-12

## 2021-11-22 RX ORDER — PREDNISONE 10 MG/1
TABLET ORAL
Qty: 35 TABLET | Refills: 0 | Status: SHIPPED | OUTPATIENT
Start: 2021-11-22 | End: 2021-12-20

## 2021-11-22 RX ORDER — VALGANCICLOVIR 450 MG/1
900 TABLET, FILM COATED ORAL DAILY
Qty: 60 TABLET | Refills: 2 | Status: SHIPPED | OUTPATIENT
Start: 2021-11-23 | End: 2022-02-17

## 2021-11-22 RX ORDER — OXYCODONE HYDROCHLORIDE 5 MG/1
5 TABLET ORAL EVERY 6 HOURS PRN
Qty: 10 TABLET | Refills: 0 | Status: SHIPPED | OUTPATIENT
Start: 2021-11-22 | End: 2021-12-01

## 2021-11-22 RX ADMIN — CLOTRIMAZOLE 10 MG: 10 LOZENGE ORAL at 08:33

## 2021-11-22 RX ADMIN — BROMFENAC SODIUM 1 DROP: 0.7 SOLUTION/ DROPS OPHTHALMIC at 08:40

## 2021-11-22 RX ADMIN — TACROLIMUS 1 MG: 1 CAPSULE ORAL at 08:35

## 2021-11-22 RX ADMIN — ASPIRIN 325 MG ORAL TABLET 325 MG: 325 PILL ORAL at 08:32

## 2021-11-22 RX ADMIN — MYCOPHENOLIC ACID 720 MG: 180 TABLET, DELAYED RELEASE ORAL at 08:31

## 2021-11-22 RX ADMIN — FUROSEMIDE 40 MG: 40 TABLET ORAL at 08:32

## 2021-11-22 RX ADMIN — OCTREOTIDE ACETATE 100 MCG: 100 INJECTION, SOLUTION INTRAVENOUS; SUBCUTANEOUS at 08:56

## 2021-11-22 RX ADMIN — VALGANCICLOVIR 900 MG: 450 TABLET, FILM COATED ORAL at 08:31

## 2021-11-22 RX ADMIN — PREDNISONE 40 MG: 20 TABLET ORAL at 08:31

## 2021-11-22 RX ADMIN — CLOTRIMAZOLE 10 MG: 10 LOZENGE ORAL at 11:32

## 2021-11-22 RX ADMIN — PANTOPRAZOLE SODIUM 40 MG: 40 TABLET, DELAYED RELEASE ORAL at 08:33

## 2021-11-22 RX ADMIN — SULFAMETHOXAZOLE AND TRIMETHOPRIM 1 TABLET: 400; 80 TABLET ORAL at 08:32

## 2021-11-22 RX ADMIN — Medication 1 TABLET: at 08:32

## 2021-11-22 RX ADMIN — CARVEDILOL 3.12 MG: 3.12 TABLET, FILM COATED ORAL at 08:34

## 2021-11-22 ASSESSMENT — ACTIVITIES OF DAILY LIVING (ADL)
ADLS_ACUITY_SCORE: 4

## 2021-11-22 ASSESSMENT — MIFFLIN-ST. JEOR: SCORE: 1289.37

## 2021-11-22 NOTE — TELEPHONE ENCOUNTER
A pharmacist spent 50 minutes on the phone providing medication teaching with Marilyn Ortega and Roxanna (mom)for discharge with a focus on new medications/dose changes.  The discharge medication list was reviewed with the patient/family and the following points were discussed, as applicable: Name, description, purpose, dose/strength, duration of medications, common side effects, food/medications to avoid, action to be taken if dose is missed, when to call MD and how to obtain refills.  The patient will be responsible for managing medications. Additionally, the following transplant related education was covered: Purpose of medication card, Medication videos, Timing of medications and day of lab draw considerations , Emesis or missed dose, Prescription Insurance  and Discharge process for receiving meds   Patient will  transplant supplies including 7 day pill organizer and BP monitor, at discharge pharmacy along with medications.  Patient chooses to receive medications from  specialty pharmacy.   Clinical Pharmacy Consult:                                                      Transplant Specific:   Date of Transplant: 11/15/2021  Type of Transplant: kidney and pancreas  First Transplant: yes  History of rejection: no    Immunosuppression Regimen   TAC 1mg qAM & 1mg qPM, Prednisone 20mg qAM & tapering off in 4 weeks, and Myforitic 720mg qAM & 720mg qPM  Patient specific goal: 8-10  Most recent level: 11.2, date 11/22/2021  Immunosuppressant Levels:  Supratherapeutic  Pt adherent to lab draws: yes  Scr:   Lab Results   Component Value Date    CR 1.20 11/22/2021    CR 2.69 05/05/2021     Side effects: Diarrhea and Edema    Prophylactic Medications  Antibacterial:  Bactrim 400-80mg daily  Scheduled Discontinue Date: Lifelong    Antifungal: Clotrimazole   Scheduled Discontinue Date: 3 months    Antiviral: CrCl >/=60 mL/minute: Valcyte 900mg daily   Scheduled Discontinue Date: 3 months    Acid Reducer: Protonix  (pantoprazole)  Scheduled Reviewed Date: 6-8 weeks    Thrombosis Prevention: Aspirin 325 mg PO daily  Scheduled Discontinue Date:     Blood Pressure Management  Frequency of home Blood Pressure checks: twice daily  Most recent home BP: 138/94  Patient Blood pressure goal: <150/90  Patient blood pressure at goal:  no  Hospitalizations/ER visits since last assessment: 0      Med rec/DUR performed: yes  Med Rec Discrepancies: no    Reminders:    1. Bring to first clinic appt: med box, med card, bp monitor, all medications being taken, and lab book.  2.   MTM pharmacist visit on first clinic appt and if ok, again in 3 to 4 months during follow up appt.  3.   Avoid Grapefruit and Grapefruit juice.   4.   Avoid herbal supplements. If wish to take other medications or supplements, call your coordinator.   5.   Keep lab appts.   6.   Can use apps on phone like [employer]ault[employer] to help manage medication lists and reminders.   7.   Make sure you are protecting your skin by wearing long sleeves and applying sunscreen to exposed skin, for any significant time in the sun.     Transplant Coordinator is Yessica Becker.      Jane Benavidez [facility]

## 2021-11-22 NOTE — PHARMACY-TRANSPLANT NOTE
Solid Organ Transplant Recipient Prior to Discharge Note    36 year old female s/p SPK transplant on 11/16 for T1DM.    PMHx: HTN, gastroparesis, heterozygous Factor V leiden    Maintainance:   - Mycophenolic acid 720 mg BID  - Tacrolimus 1 mg BID (Goal 8-10).    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 In anticipation for discharge, medication therapy needs have been addressed daily throughout the current admission via multidisciplinary rounds and/or discussions, order verification, daily clinical pharmacy review, and communication with prescribers.    Kelechi Kat PharmD, BCPS  Inpatient clinical pharmacist

## 2021-11-22 NOTE — DISCHARGE SUMMARY
[facility]    Discharge Summary  Transplant Surgery    Date of Admission:  11/15/2021  Date of Discharge:  2021  Discharging Provider: Mackenzie Franco PA-C/Dr. Chavarria  Date of Service (when I saw the patient): 21    Discharge Diagnoses   Principal Problem:    Status post simultaneous kidney and pancreas transplant (H)  Active Problems:    Irritable bowel syndrome    HTN (hypertension)    Heterozygous factor V Leiden mutation (H)    Organ transplant candidate    Immunosuppressed status (H)      Procedure/Surgery Information   Procedure: Procedure(s):  TRANSPLANT, KIDNEY AND PANCREAS,  DONOR  Bench kidney  Bench pancreas   Surgeon(s): Surgeon(s) and Role:     * Branden Aranda MD - Primary     * Jannet Mas MD - Fellow - Assisting     * Kamran Carreon MD - Fellow - Assisting     * Conrad Overton MD - Fellow - Assisting         History of Present Illness   Marilyn Ortega is a 36 year old female with PMH significant for DMI complicated by gastroparesis and CKD due to diabetic nephropathy, HTN, gluten intolerance and Factor V Leiden mutation, heterozygous. Admitted for DD SPK 21.     Hospital Course   Graft function: K 21:  Pancreas: Lipase Peaked at 4200 immediately post transplant, down to 386 on the day of discharge.   -US immediately post transplant with good flow. Repeat US  with good flow.   -No insulin during the transplant, however it was discovered  AM that patient had insulin pump connected overnight with Humalog at 0.75 units/hour, removed  AM. Insulin drip stopped POD 6, required 1 unit of insulin after stopped.   Octreotide x 5 days.  Fluid lipase 840 on , 627   Steroid induced hyperglycemia: Will have patient monitor intermittently at home, continues on extended steroid taper.  Graft patency ppx: Heparin gtt @600units/hour-stopped POD 6. Continue  ASA-increase to 325mg daily.  Kidney: CKD, made urine and was not on HD prior to transplant. SCr 1.2 on the day of discharge, increase likely secondary to supratherapeutic tac levels.  -US 11/16 with good flow. Ureteral stent in place.     Immunosuppression management: PRA 29, intermediate risk induction.   Thymo 100 mg intra-op  Solu-medrol 500 mg intra-op, 250 mg POD 1, 100 mg POD 2 then steroid taper  Simulect POD 1 & 5  Cellcept 1000 mg BID-stop 11/21  Myfortic 720mg BID due to diarrhea  Tac Goal 8-10. Level 22 11/21 in the setting of diarrhea, Hold 11/21 PM dose, decrease to 1mg BID. Level 11.1 (~22 hr trough) at the time of discharge.     Bactrim indefinitely, Valcyte x 3 months (CMV D-/R-). Zosyn and Stephanie completed per protocol.     HEENT:   Recent cataract removal: Continue drops as prescribed per taper.     Hematology:   Anemia of chronic disease: HGB 9.4      Cardiorespiratory:   HTN: Started Coreg 3.125mg BID, blood pressures improving with diuresis, will not discharge on antihypertensives.    GI/Nutrition: Regular diet  Nausea: Resolved. Change Zofran to prn.   Diarrhea:stop MMF. Start fiber. Patient stated she is used to dealing with motility issues at home, and would prefer not to take imodium at this time.    Fluid/Electrolytes:   Hypomagnesemia: Will discharge on oral supplementation  Hypophosphatemia: Replete  Hypokalemia: Replete prior to discharge    Infectious disease: Tmax 98.2.   Leukocytosis secondary to steroids, stable today~ 12.     Transplant coordinator: Yessica Becker [phone]  DSA at time of transplant: No  Ureteral stent: Yes  CMV: Donor -  /Recipient -  EBV: Donor + / Recipient +  Thymoglobulin: 100 mg (1.8 mg/kg)    Post-operative pain control: included tylenol and oxycodone on discharge.     Discharge Disposition   Discharged to home   Condition at discharge: Stable    Final pathology results: APPENDIX; INCIDENTAL APPENDECTOMY AT TIME OF KIDNEY/PANCREAS TRANSPLANTATION:  Appendix  with fibrous obliteration and no acute inflammation or other significant histologic abnormality    Primary Care Physician   Physician No Ref-Primary    Physical Exam   Temp: 98  F (36.7  C) Temp src: Oral BP: (!) 138/94 Pulse: 78   Resp: 18 SpO2: 99 % O2 Device: None (Room air)    Vitals:    11/21/21 0021 11/21/21 0900 11/22/21 0514   Weight: 60.6 kg (133 lb 9.6 oz) 61.2 kg (135 lb) 58.3 kg (128 lb 8 oz)     Vital Signs with Ranges  Temp:  [97.5  F (36.4  C)-98.2  F (36.8  C)] 98  F (36.7  C)  Pulse:  [76-82] 78  Resp:  [18] 18  BP: (116-155)/(67-99) 138/94  SpO2:  [99 %-100 %] 99 %  I/O last 3 completed shifts:  In: 510 [P.O.:500; I.V.:10]  Out: 1225 [Urine:800; Drains:425]    Constitutional: Awake, alert, cooperative, no apparent distress, and appears stated age.  Eyes: Lids and lashes normal, pupils equal, round, sclera clear, conjunctiva normal.  ENT: Normocephalic, without obvious abnormality, atraumatic  Respiratory: Nonlabored resps on RA  Cardiovascular: RRR  GI: Soft, non-distended, non-tender, no masses palpated, no hepatosplenomegaly.  Genitourinary:  Voiding  Skin: No bruising or bleeding, normal skin color, texture, turgor  Musculoskeletal: There is no redness, warmth, or swelling of the joints.    Neurologic: Awake, alert, oriented to name, place and time.    Neuropsychiatric: Calm, normal eye contact, alert, normal affect, oriented to self, place, time and situation, memory for past and recent events intact and thought process normal.    Consultations This Hospital Stay   NEPHROLOGY KIDNEY/PANCREAS TRANSPLANT ADULT IP CONSULT  VASCULAR ACCESS CARE ADULT IP CONSULT  PHARMACY IP CONSULT  PHARMACY IP CONSULT  NEPHROLOGY KIDNEY/PANCREAS TRANSPLANT ADULT IP CONSULT  SOCIAL WORK IP CONSULT  PHARMACY IP CONSULT  SOT MEDICATION HISTORY IP PHARMACY CONSULT  NUTRITION SERVICES ADULT IP CONSULT  CNS DIABETES IP CONSULT  DIABETES EDUCATION IP CONSULT  OCCUPATIONAL THERAPY ADULT IP CONSULT  SOCIAL WORK IP  CONSULT  VASCULAR ACCESS CARE ADULT IP CONSULT    Time Spent on this Encounter   I have spent greater than 30 minutes on this discharge.    Discharge Orders   Discharge Medications   Discharge Medication List as of 11/22/2021 12:51 PM      START taking these medications    Details   acetaminophen (TYLENOL) 325 MG tablet Take 1-2 tablets (325-650 mg) by mouth every 4 hours as needed for other (For optimal non-opioid multimodal pain management to improve pain control.), Disp-100 tablet, R-0, E-Prescribe      aspirin (ASA) 325 MG tablet Take 1 tablet (325 mg) by mouth daily, Disp-30 tablet, R-5, E-Prescribe      calcium carbonate-vitamin D (OS-STEPHANIE WITH D) 500-200 MG-UNIT tablet Take 1 tablet by mouth 2 times daily (with meals), Disp-60 tablet, R-11, E-Prescribe      calcium polycarbophil (FIBERCON) 625 MG tablet Take 2 tablets (1,250 mg) by mouth 2 times daily, Disp-120 tablet, R-1, E-Prescribe      clotrimazole (MYCELEX) 10 MG lozenge Place 1 lozenge (10 mg) inside cheek 4 times daily, Disp-360 lozenge, R-0, E-Prescribe      magnesium oxide (MAG-OX) 400 MG tablet Take 1 tablet (400 mg) by mouth daily, Disp-30 tablet, R-3, E-Prescribe      mycophenolic acid (GENERIC EQUIVALENT) 360 MG EC tablet Take 2 tablets (720 mg) by mouth 2 times daily, Disp-120 tablet, R-11, E-Prescribe      ondansetron (ZOFRAN-ODT) 4 MG ODT tab Take 1 tablet (4 mg) by mouth every 6 hours as needed for nausea or vomiting, Disp-20 tablet, R-0, E-Prescribe      oxyCODONE (ROXICODONE) 5 MG tablet Take 1 tablet (5 mg) by mouth every 6 hours as needed for moderate to severe pain, Disp-10 tablet, R-0, E-Prescribe      pantoprazole (PROTONIX) 40 MG EC tablet Take 1 tablet (40 mg) by mouth every morning (before breakfast), Disp-30 tablet, R-1, E-Prescribe      predniSONE (DELTASONE) 10 MG tablet Take 2 tablets (20 mg) by mouth daily for 7 days, THEN 1.5 tablets (15 mg) daily for 7 days, THEN 1 tablet (10 mg) daily for 7 days, THEN 0.5 tablets (5 mg)  daily for 7 days., Disp-35 tablet, R-0, E-Prescribe      senna-docusate (SENOKOT-S/PERICOLACE) 8.6-50 MG tablet Take 2 tablets by mouth daily as needed for constipation, Disp-30 tablet, R-0, E-Prescribe      sulfamethoxazole-trimethoprim (BACTRIM) 400-80 MG tablet Take 1 tablet by mouth daily, Disp-30 tablet, R-11, E-Prescribe      tacrolimus (GENERIC EQUIVALENT) 0.5 MG capsule Take 1 capsule by mouth twice daily when directed by transplant team, Disp-60 capsule, R-1, E-Prescribe      tacrolimus (GENERIC EQUIVALENT) 1 MG capsule Take 1 capsule (1 mg) by mouth 2 times daily, Disp-60 capsule, R-11, E-Prescribe      valGANciclovir (VALCYTE) 450 MG tablet Take 2 tablets (900 mg) by mouth daily, Disp-60 tablet, R-2, E-Prescribe         CONTINUE these medications which have NOT CHANGED    Details   bromfenac (PROLENSA) 0.07 % ophthalmic solution Place 1 drop Into the left eye daily, Historical      cetirizine (ZYRTEC) 10 MG tablet Take 10 mg by mouth daily, Historical      cholecalciferol 25 MCG (1000 UT) TABS Take 2,000 Units by mouth every other day , Historical      Continuous Blood Gluc Sensor (DEXCOM G6 SENSOR) MISC Use as directed for continuous glucose monitoring.  Change sensor every 10 days., Historical      Continuous Blood Gluc Transmit (DEXCOM G6 TRANSMITTER) MISC Use as directed for continuous glucose monitoring.  Change every 3 months., Historical      glucose blood VI test strips strip 4 times daily.Historical      Loteprednol Etabonate (LOTEMAX SM) 0.38 % GEL Apply 1 drop to eye 3 times daily Left eye, Historical      Prucalopride Succinate (MOTEGRITY) 2 MG TABS Take one tablet by mouth daily, Historical         STOP taking these medications       darbepoetin neftaly (ARANESP) 60 MCG/0.3ML injection Comments:   Reason for Stopping:         furosemide (LASIX) 20 MG tablet Comments:   Reason for Stopping:         insulin lispro (HUMALOG VIAL) 100 UNIT/ML vial Comments:   Reason for Stopping:         losartan  (COZAAR) 50 MG tablet Comments:   Reason for Stopping:         moxifloxacin (VIGAMOX) 0.5 % ophthalmic solution Comments:   Reason for Stopping:         spironolactone (ALDACTONE) 25 MG tablet Comments:   Reason for Stopping:                  Reason for your hospital stay    Patient underwent kidney pancreas transplant with ureteral stent placement on 11/16/21 with Dr. Aranda.     Activity    Your activity upon discharge: Walk at least four times a day, lift no greater than 10 pounds for 6 weeks from the time of surgery.  After 6 weeks time please return to your normal exercise routine.  No driving while taking narcotics or until 3 weeks after surgery.     Adult [facility] Follow-up and recommended labs and tests    Over the next 3-5 days you will be seen in the  [facility] at 7 am (ph. [phone], option 7) .  Your labs will be drawn at the beginning of your appointment at 7:00 am.  DO NOT take your medications prior to having labs drawn. Please bring all your medications with you from home to take after labs are drawn.    LABS:  Transplant labs (CBC, BMP, amylase, lipase, mag, phos, and tacrolimus levels (12 hours post administration)) to be drawn daily while in ATC, then every Monday, Wednesday, Friday by home health care nurse if arranged, or at an outpatient lab.    FOLLOW UP APPOINTMENTS:  Remember to always bring an updated medication list to all appointments.     Follow up with transplant surgery LUIS E in 1 week  Follow up with your transplant surgeon, Dr. Aranda in 2 weeks.    Additional follow up per protocol    Follow up with transplant nephrology as scheduled.    Follow up with primary care provider in 4-8 weeks. (Pt to schedule)    You have a ureteral stent in place which needs to be removed in 4-6 weeks. If a  does not contact you for this, please contact your transplant coordinator.    If you have staples in place, they will be removed in 3 weeks after operation.  Call  scheduling at [phone] if you have not heard about your appointments within 48 hours after discharge.     Monitor and record    Blood glucose intermittently or if feeling symptomatic (will follow fasting glucoses with labs)     When to contact your care team    Call your transplant coordinator at [phone] if you have any of the following: sustained temperature greater than 100.4 or isolated fever spike to 101.5, increased pain over graft or difficulty obtaining or taking your transplant medications.    If it is outside of office hours, please call the hospital switchboard at [phone] and ask to have the transplant surgery fellow paged for urgent medical questions.     Wound care and dressings    Instructions to care for your wound at home: Keep wound clean and dry.  Staples may be removed 3 weeks post-op.  Pt may shower but do not soak incision in pool, hot tub, or bath until drain is removed and site is healed.     Tubes and drains    You are going home with the following tubes or drains: SARAI.  Empty and record output daily. Drain to be removed per surgeon, when output is < 100-200mL/day. Tube cares per hospital or home care instructions     Diet    Follow this diet upon discharge: Diet recommendations post-transplant: Heart healthy dietary habits long term (low saturated/trans fat, low sodium). High protein diet x 8 weeks. Practice food safety precautions - no fish/seafood x 3 weeks.         Data   Most Recent 3 Creatinines:  Recent Labs   Lab Test 11/22/21  0601 11/21/21  0725 11/20/21  0717   CR 1.20* 0.98 1.00       Lab Results   Component Value Date    AMYLASE 93 11/22/2021    AMYLASE 90 11/21/2021    AMYLASE 84 11/20/2021     Lab Results   Component Value Date    LIPASE 386 11/22/2021    LIPASE 374 11/21/2021    LIPASE 304 11/20/2021     I have reviewed history, examined patient and discussed plan with the fellow/resident/LUIS E.    I concur with the findings in this note.    Time spent on discharge  activities: 45 minutes.

## 2021-11-22 NOTE — PLAN OF CARE
"/89 (BP Location: Left arm, Patient Position: Sitting)   Pulse 82   Temp 97.7  F (36.5  C) (Oral)   Resp 18   Ht 1.676 m (5' 5.98\")   Wt 58.3 kg (128 lb 8 oz)   LMP  (LMP Unknown)   SpO2 99%   BMI 20.75 kg/m      Shift: 6817-5841  VS: VSS on RA , afebrile  Neuro: AOx4  BG: ACHS, dropped to 57 this am, 2 apple juice and apple sauce, recheck  Labs: WBC 12.9  Respiratory: WDL  Pain/Nausea/PRN: denies  Diet: regular  LDA: internal jugular SL, SARAI 300ml out, serous  GI/: 3 loose BM this am, voiding   Skin: incision stapled, SARAI dressing intact  Mobility: IND  Plan: SARAI education provided, plans for discharge today    Handoff given to following RN.    "

## 2021-11-22 NOTE — PROGRESS NOTES
Transplant Social Work Service Discharge Note      Patient Name:  Marilyn Ortega     Anticipated Discharge Date:  11/22/2021    Discharge Disposition: Home    Plan for 24 hour care for immediate post transplant period: Patient will receive care from her family     If not local, plans for short term stay:  N/A, from [place], [place]    Additional Services/Equipment Arranged: RNCC set up appropriate ATC visits. She has declined home care and will go into clinic. No further SW needs.    Persons notified of above discharge plan:  Patient, patient's family, treatment team, nursing staff, ATC    Patient / Family response to discharge plan:  Agreeable    Education and resources provided by SW at discharge: Social Work role inpatient setting, 2728 Medicare Form, [employer] Donor information, and outpatient social work contact information.    Discussed anticipated pharmacy out of pocket costs: YES    Provided [employer] Donor Letter Writing packet : YES    Plan: Patient will discharge home into the care of her family in [place], [place]. Patient will attend ATC and has declined home care. No SW needs identified at discharge.    SUSI Gordon, ALFRED  7A   Ph: [phone]  Pager: [phone]

## 2021-11-22 NOTE — PROGRESS NOTES
CLINICAL NUTRITION SERVICES - BRIEF/DISCHARGE NOTE     Nutrition Prescription    Future/Additional Recommendations:  Minimize diet restrictions as able d/t high calorie/protein needs post-transplant.  Oral supplements as needed to help meet nutritional needs.     High protein food choices with meals to help meet high needs post-transplant over the next 6-8 weeks.     Heart-healthy diet (low saturated fat, low sodium, high fiber) and food safety precautions long term due to immunosuppression regimen post-transplant         EVALUATION OF THE PROGRESS TOWARD GOALS   Diet: Regular    Intake: Patient reports eating going well.  She has a hx of gastroparesis but reports no issues with this currently.  Avoids high fiber foods d/t this.  Having diarrhea.  Avoids gluten and consumes dairy free diet.  Consumes meat, fish/seafood, eggs, nut butters for protein sources.      INTERVENTIONS  Provided education on post-transplant diet guidelines.  Reviewed protein needs ( grams/day x 6 weeks), heart healthy diet recommendations, food safety precautions.  Patient verbalized understanding of education.     Patient s discharge needs assessed and discharge planning has been conducted with the multidisciplinary transplant care team including physicians, pharmacy, social work and transplant coordinator.     Monitoring/Evaluation  Progress toward goals will be monitored and evaluated per protocol.    Once discharged, place outpatient nutrition consult via the transplant team if nutrition concerns arise.    Ashely Coombs MS, RD, LD, CCTD  7A/Obs units, pager [phone]

## 2021-11-22 NOTE — PLAN OF CARE
"BP (!) 138/94 (BP Location: Left arm)   Pulse 78   Temp 98  F (36.7  C) (Oral)   Resp 18   Ht 1.676 m (5' 5.98\")   Wt 58.3 kg (128 lb 8 oz)   LMP  (LMP Unknown)   SpO2 99%   BMI 20.75 kg/m        2690-9432  HTN, scheduled BP meds given. OVSS on RA. Denies pain or nausea. ACHS, hypoglycemic on previous shift. Recheck 94, denied any additional interventions to get above 100 per protocol. 126 on next check. Regular diet. Internal jugular removed prior to discharge. Pt discharged with SARAI. Supplies and SARAI handout given. BM today. Voiding WOD. Incision stapled. Compression stockings on. Pt spoke with specialty pharmacy. Will continue to monitor and notify team with changes.     Patient with orders to discharge to home. Discharge instructions, medications, & follow ups reviewed with Copy of discharge summary given to pt. Internal jugular removed. All belongings packed & sent with patient. Medications filled & picked up at discharge pharmacy. Patient in stable condition. AVSS. Pt had no further questions regarding discharge instructions and medications. [facility] Patient left with mom.     "

## 2021-11-23 ENCOUNTER — DOCUMENTATION ONLY (OUTPATIENT)
Dept: TRANSPLANT | Facility: CLINIC | Age: 36
End: 2021-11-23

## 2021-11-23 ENCOUNTER — TELEPHONE (OUTPATIENT)
Dept: TRANSPLANT | Facility: CLINIC | Age: 36
End: 2021-11-23

## 2021-11-23 ENCOUNTER — OFFICE VISIT (OUTPATIENT)
Dept: INFUSION THERAPY | Facility: CLINIC | Age: 36
End: 2021-11-23
Attending: INTERNAL MEDICINE
Payer: COMMERCIAL

## 2021-11-23 ENCOUNTER — LAB (OUTPATIENT)
Dept: LAB | Facility: CLINIC | Age: 36
End: 2021-11-23
Payer: COMMERCIAL

## 2021-11-23 ENCOUNTER — ANCILLARY PROCEDURE (OUTPATIENT)
Dept: ULTRASOUND IMAGING | Facility: CLINIC | Age: 36
End: 2021-11-23
Attending: INTERNAL MEDICINE
Payer: COMMERCIAL

## 2021-11-23 VITALS
RESPIRATION RATE: 16 BRPM | BODY MASS INDEX: 19.86 KG/M2 | HEART RATE: 84 BPM | TEMPERATURE: 98.1 F | OXYGEN SATURATION: 100 % | WEIGHT: 123 LBS | SYSTOLIC BLOOD PRESSURE: 100 MMHG | DIASTOLIC BLOOD PRESSURE: 74 MMHG

## 2021-11-23 DIAGNOSIS — Z94.83 PANCREAS REPLACED BY TRANSPLANT (H): ICD-10-CM

## 2021-11-23 DIAGNOSIS — Z94.0 KIDNEY REPLACED BY TRANSPLANT: ICD-10-CM

## 2021-11-23 DIAGNOSIS — Z94.83 PANCREAS REPLACED BY TRANSPLANT (H): Primary | ICD-10-CM

## 2021-11-23 DIAGNOSIS — I15.1 HTN, KIDNEY TRANSPLANT RELATED: ICD-10-CM

## 2021-11-23 DIAGNOSIS — Z29.89 NEED FOR PNEUMOCYSTIS PROPHYLAXIS: ICD-10-CM

## 2021-11-23 DIAGNOSIS — Z79.899 ENCOUNTER FOR LONG-TERM CURRENT USE OF MEDICATION: ICD-10-CM

## 2021-11-23 DIAGNOSIS — D72.829 LEUKOCYTOSIS, UNSPECIFIED TYPE: ICD-10-CM

## 2021-11-23 DIAGNOSIS — Z94.0 STATUS POST SIMULTANEOUS KIDNEY AND PANCREAS TRANSPLANT (H): ICD-10-CM

## 2021-11-23 DIAGNOSIS — Z48.298 AFTERCARE FOLLOWING ORGAN TRANSPLANT: ICD-10-CM

## 2021-11-23 DIAGNOSIS — Z94.0 HTN, KIDNEY TRANSPLANT RELATED: ICD-10-CM

## 2021-11-23 DIAGNOSIS — Z94.83 STATUS POST SIMULTANEOUS KIDNEY AND PANCREAS TRANSPLANT (H): ICD-10-CM

## 2021-11-23 DIAGNOSIS — Z94.83 STATUS POST SIMULTANEOUS KIDNEY AND PANCREAS TRANSPLANT (H): Primary | ICD-10-CM

## 2021-11-23 DIAGNOSIS — Z94.0 STATUS POST SIMULTANEOUS KIDNEY AND PANCREAS TRANSPLANT (H): Primary | ICD-10-CM

## 2021-11-23 DIAGNOSIS — E86.1 HYPOTENSION DUE TO HYPOVOLEMIA: ICD-10-CM

## 2021-11-23 LAB
ALBUMIN UR-MCNC: 30 MG/DL
AMYLASE SERPL-CCNC: 127 U/L (ref 30–110)
ANION GAP SERPL CALCULATED.3IONS-SCNC: 7 MMOL/L (ref 3–14)
APPEARANCE UR: ABNORMAL
BACTERIA #/AREA URNS HPF: ABNORMAL /HPF
BASOPHILS # BLD AUTO: 0 10E3/UL (ref 0–0.2)
BASOPHILS NFR BLD AUTO: 0 %
BILIRUB UR QL STRIP: NEGATIVE
BUN SERPL-MCNC: 32 MG/DL (ref 7–30)
CALCIUM SERPL-MCNC: 8.9 MG/DL (ref 8.5–10.1)
CHLORIDE BLD-SCNC: 106 MMOL/L (ref 94–109)
CO2 SERPL-SCNC: 29 MMOL/L (ref 20–32)
COLOR UR AUTO: YELLOW
CREAT SERPL-MCNC: 1.43 MG/DL (ref 0.52–1.04)
EOSINOPHIL # BLD AUTO: 0.1 10E3/UL (ref 0–0.7)
EOSINOPHIL NFR BLD AUTO: 1 %
ERYTHROCYTE [DISTWIDTH] IN BLOOD BY AUTOMATED COUNT: 15 % (ref 10–15)
GFR SERPL CREATININE-BSD FRML MDRD: 47 ML/MIN/1.73M2
GLUCOSE BLD-MCNC: 80 MG/DL (ref 70–99)
GLUCOSE UR STRIP-MCNC: NEGATIVE MG/DL
HCT VFR BLD AUTO: 31.4 % (ref 35–47)
HGB BLD-MCNC: 10 G/DL (ref 11.7–15.7)
HGB UR QL STRIP: ABNORMAL
IMM GRANULOCYTES # BLD: 0.1 10E3/UL
IMM GRANULOCYTES NFR BLD: 1 %
KETONES UR STRIP-MCNC: NEGATIVE MG/DL
LEUKOCYTE ESTERASE UR QL STRIP: ABNORMAL
LIPASE SERPL-CCNC: 602 U/L (ref 73–393)
LYMPHOCYTES # BLD AUTO: 1 10E3/UL (ref 0.8–5.3)
LYMPHOCYTES NFR BLD AUTO: 6 %
MAGNESIUM SERPL-MCNC: 1.8 MG/DL (ref 1.6–2.3)
MCH RBC QN AUTO: 27.7 PG (ref 26.5–33)
MCHC RBC AUTO-ENTMCNC: 31.8 G/DL (ref 31.5–36.5)
MCV RBC AUTO: 87 FL (ref 78–100)
MONOCYTES # BLD AUTO: 0.9 10E3/UL (ref 0–1.3)
MONOCYTES NFR BLD AUTO: 5 %
MUCOUS THREADS #/AREA URNS LPF: PRESENT /LPF
NEUTROPHILS # BLD AUTO: 14.9 10E3/UL (ref 1.6–8.3)
NEUTROPHILS NFR BLD AUTO: 87 %
NITRATE UR QL: NEGATIVE
NRBC # BLD AUTO: 0 10E3/UL
NRBC BLD AUTO-RTO: 0 /100
PH UR STRIP: 6 [PH] (ref 5–7)
PHOSPHATE SERPL-MCNC: 2.8 MG/DL (ref 2.5–4.5)
PLATELET # BLD AUTO: 277 10E3/UL (ref 150–450)
POTASSIUM BLD-SCNC: 3.8 MMOL/L (ref 3.4–5.3)
RBC # BLD AUTO: 3.61 10E6/UL (ref 3.8–5.2)
RBC URINE: 26 /HPF
SODIUM SERPL-SCNC: 142 MMOL/L (ref 133–144)
SP GR UR STRIP: 1.01 (ref 1–1.03)
SQUAMOUS EPITHELIAL: 4 /HPF
TACROLIMUS BLD-MCNC: 12.5 UG/L (ref 5–15)
TME LAST DOSE: NORMAL H
TME LAST DOSE: NORMAL H
UROBILINOGEN UR STRIP-MCNC: NORMAL MG/DL
WBC # BLD AUTO: 17.1 10E3/UL (ref 4–11)
WBC URINE: 9 /HPF

## 2021-11-23 PROCEDURE — 36415 COLL VENOUS BLD VENIPUNCTURE: CPT | Performed by: PATHOLOGY

## 2021-11-23 PROCEDURE — 81001 URINALYSIS AUTO W/SCOPE: CPT

## 2021-11-23 PROCEDURE — 999N000127 HC STATISTIC PERIPHERAL IV START W US GUIDANCE

## 2021-11-23 PROCEDURE — 99215 OFFICE O/P EST HI 40 MIN: CPT | Mod: 24 | Performed by: INTERNAL MEDICINE

## 2021-11-23 PROCEDURE — G0463 HOSPITAL OUTPT CLINIC VISIT: HCPCS | Mod: 25

## 2021-11-23 PROCEDURE — 99000 SPECIMEN HANDLING OFFICE-LAB: CPT | Performed by: PATHOLOGY

## 2021-11-23 PROCEDURE — 258N000003 HC RX IP 258 OP 636: Performed by: INTERNAL MEDICINE

## 2021-11-23 PROCEDURE — 83735 ASSAY OF MAGNESIUM: CPT | Performed by: PATHOLOGY

## 2021-11-23 PROCEDURE — 93975 VASCULAR STUDY: CPT | Performed by: RADIOLOGY

## 2021-11-23 PROCEDURE — 82150 ASSAY OF AMYLASE: CPT | Performed by: PATHOLOGY

## 2021-11-23 PROCEDURE — 83690 ASSAY OF LIPASE: CPT | Performed by: PATHOLOGY

## 2021-11-23 PROCEDURE — 80197 ASSAY OF TACROLIMUS: CPT | Mod: 90 | Performed by: PATHOLOGY

## 2021-11-23 PROCEDURE — 84100 ASSAY OF PHOSPHORUS: CPT | Performed by: PATHOLOGY

## 2021-11-23 PROCEDURE — 85025 COMPLETE CBC W/AUTO DIFF WBC: CPT | Performed by: PATHOLOGY

## 2021-11-23 PROCEDURE — 80048 BASIC METABOLIC PNL TOTAL CA: CPT | Performed by: PATHOLOGY

## 2021-11-23 PROCEDURE — 96360 HYDRATION IV INFUSION INIT: CPT

## 2021-11-23 RX ADMIN — SODIUM CHLORIDE, POTASSIUM CHLORIDE, SODIUM LACTATE AND CALCIUM CHLORIDE 1000 ML: 600; 310; 30; 20 INJECTION, SOLUTION INTRAVENOUS at 09:54

## 2021-11-23 NOTE — TELEPHONE ENCOUNTER
ISSUE:   Tacrolimus IR level 12.5 on 11/23, goal 8-10, dose 1 mg BID.    PLAN:   Please call patient and confirm this was an accurate 12-hour trough. Verify Tacrolimus IR dose 1 mg BID. Confirm no new medications or illness. Confirm no missed doses. If accurate trough and accurate dose, decrease Tacrolimus IR dose to 1/0.5 mg and repeat labs in 1 day.    Yessica Becker RN BSN  Transplant Care Coordinator    OUTCOME:   Spoke with patient, they confirms this was not an accurate trough level and current dose 1 mg BID. Patient confirmed no dose change at this time and patient instructed to get a good 12 hour trough level.  Patient will repeat labs in 1 days. Orders sent to preferred pharmacy for dose change and lab for repeat labs. Patient voiced understanding of plan.

## 2021-11-23 NOTE — PATIENT INSTRUCTIONS
Dear Marilyn Ortega    Thank you for choosing [facility] ([facility]) for your transplant cares.  The following information is a summary of our appointment as well as important reminders.      PLAN    1. Medication changes:  -Stop Coreg (carvedilol)  -Stop Lasix  -Resume magnesium 400mg daily ( at least 2 hours from your immunosuppressants)     2. Changes to make once you get home:  --add aspirin to medication box    3. Return to clinic at 415pm for pancreas ultrasound at 430.     Contact Information:    Transplant Coordinator: Yessica Becker  Transplant Office:  [phone]  [facility]:  [phone]  Ask for physician on call for kidney/pancreas transplant.  [facility]:  [phone]  [facility]:  [phone]        We look forward in seeing you on your next appointment here at [facility] ([facility]).  Please don t hesitate to call us at [phone] to reschedule any of your appointments or to speak with one of the [facility] registered nurses.  It was a pleasure taking care of you today.    Sincerely,    [facility]  [address] [place] [address]  [id number]  Phone:  [phone]

## 2021-11-23 NOTE — LETTER
Date:December 30, 2021      Patient was self referred, no letter generated. Do not send.        [facility] Health Information

## 2021-11-23 NOTE — LETTER
"    11/23/2021         RE: Marilyn Ortega  [address]  [facility] [address]        Dear Colleague,    Thank you for referring your patient, Marilyn Ortega, to the [facility]. Please see a copy of my visit note below.    Marilyn Ortega came to [facility] today for labs and assessment following a kidney and pancreas transplant on 11/15/21.      Discharge date: 11/22  Transplant coordinator: Yessica Becker    Physical Assessment:  See physical assessment located under \"Document Flowsheets\".  Incision site: midline. Closed with staples. Well approximated. Free from sxs of infection.  Lines: SARAI right lower abdomen, output as follows  11/21: 655mLs (per I/O charting in hospital)  11/22: 450mLs (150mLs per hospital charting, 300mLs per pt at home from [id number])  11/23: 75mLs at 0200, 60mLs at 0600, 125mLs serosanguinous while in [facility]  Cage: NA  Urine clarity: straw-colored and yellow per patient. Denies pain or irritation with urination.  Hydration: 1.5 liters water intake since returning home from hospital yesterday (around 1500 yesterday afternoon). Reviewed 2-3 liter daily hydration goal.  Nutrition: appetite is \"good\". Denies N/V.   Last BM: yesterday late morning, loose.   Pain: incisional pain at 4-5/10 and tolerable. Hasn't needed oxy or tylenol.    Labs drawn by [facility] staff No. Drawn per first floor lab prior to [facility]    Plan of care for today:   Labs on first floor. Vitals obtained and nursing assessment completed. Dr. Pack in to evaluate. Med changes as described below. 1 liter LR given. UA obtained. Pancreas ultrasound scheduled for soonest appointment today at 430. Medication review and med box set-up. Reviewed education checklist. [facility] again tomorrow morning.    Medication changes:   1. Stop lasix  2. Stop coreg   3. Resume mangesium 400mg once daily    Medications administered:     Administrations This Visit     lactated ringers BOLUS 1,000 mL     Admin " Date  11/23/2021 Action  New Bag Dose  1,000 mL Rate  1,000 mL/hr Route  Intravenous Administered By  Jolene Allen RN                Patient education:    The following teaching topics were addressed: Importance of drinking 2L of non-caffeinated fluids daily, Incisional care, Signs/symptoms of infection, Medications (purposes, doses and times of administration), Plan of care and Drain care   Patient verbalized understanding and all questions answered.    Drug level:  Patient took 1mg of tacrolimus last evening at 8pm.  Care coordinator to follow up with the result.    Discharge Plan    Pt will follow up with imaging appointment today at 430, and [facility] again tomorrow.  Discharge instructions reviewed with patient: YES  Patient/Representative verbalized understanding, all questions answered: YES    Discharged from unit at 115 with whom: mom to home (in [place]).    Jolene Allen RN    TRANSPLANT NEPHROLOGY EARLY POST TRANSPLANT VISIT    Assessment & Plan   # DDKT (SPK): Trend up patient creatinine today is 1.4 however recent tacrolimus was 11.1 which indicates she has CNA toxicity and 2 days back it was in the 21.9.  She is also on Lasix at home reports she was started on 40 mg she has at her baseline weight and therefore will discontinue the Lasix and will give her 1 L of Ringer lactate as she requires some fluid  -We will get a repeat tacrolimus level today and based on that we will adjust the evening dose   - Baseline Creatinine: ~ 0.9-1.1   - Proteinuria: Not checked post transplant   - Date DSA Last Checked: Nov/2021      Latest DSA: No   - BK Viremia: Not checked recently   - Kidney Tx Biopsy: No    # Pancreas Tx (SPK):    - Pancreatic Exocrine Drainage: Enteric drained     - Blood glucose: Euglycemia      On insulin: No   - HbA1c: Last checked at time of transplant      Latest HbA1c: 7.7%   - Pancreatic enzymes: Trend up   - Date DSA Last Checked: Nov/2021  Latest DSA: No   - Pancreas Tx Biopsy: No    -Will get U/S abdomen as she had elevated lipase to 602.   -We will continue to monitor lipase level      # Immunosuppression: Tacrolimus immediate release (goal 8-10), Mycophenolic acid (dose 720 mg every 12 hours) and Prednisone (dose taper)   - Induction with Recent Transplant:  Intermediate Intensity   - Continue with intensive monitoring of immunosuppression for efficacy and toxicity.   - Changes: Not at this time  -Follow-up on the tacrolimus level as she was recently at 11.1 and did develop an BERNIE  # Infection Prophylaxis:   - PJP: Sulfa/TMP (Bactrim)  - CMV: Valganciclovir (Valcyte) R-/D- will stop in 3 months    # Hypertension: Controlled, but low at times;  Goal BP: < 150/90   - Volume status: Euvolemic  EDW ~ 121 pound   - Changes: Yes - discontinue the home dose Lasix. She was at 20 mg and at discharge was increased to 40 mg her blood pressure is in early 100's and does not have any swelling in the legs.    # Diabetes: Borderline control (HbA1c 7-9%) Last HbA1c: 7.7%   - Management as per primary care.But with the pancrease transplant her diabetes should resolve    # Anemia in Chronic Renal Disease: Hgb: Stable      SYLVAIN: No   - Iron studies: Not checked recently    # Mineral Bone Disorder:   - Calcium; level: Normal        On supplement: No    # Electrolytes:   - Potassium; level: Normal        On supplement: No  - Bicarbonate; level: Normal        On supplement: No   -Magnesium:Low                          On supplement:Yes    #Constipation: Patient was having diarrhea before but now has not had bowel movement since yesterday afternoon. All her Laxative are on hold.   -Will restart Docusate senna twice a day and if that does not work than start Miralax tonight    # Medical Compliance: Yes    # COVID-19 Virus Review: Discussed COVID-19 virus and the potential medical risks.  Reviewed preventative health recommendations, including wearing a mask where appropriate.  Recommended COVID vaccination should  be up to date with either an initial vaccination or booster shot when appropriate.  Asked the patient to inform the transplant center if they are exposed or diagnosed with this virus.  #Leukocytosis:  Patient is on a steroid taper which can cause her leukocytes to go up but she also has elevated lipase.  For concern of pancreatitis and will get ultrasound abdomen.  Monitor her lipase.  And order a UA with culture  Trend WBC we will see her tomorrow in the infusion center  # COVID Vaccination Up To Date: Yes    # Transplant History:  Etiology of Kidney Failure: Diabetes mellitus type 1  Tx: DDKT (SPK)  Transplant: 11/15/2021 (Kidney / Pancreas)  Donor Type: Donation after Brain Death Donor Class:   Crossmatch at time of Tx: negative  DSA at time of Tx: No  Significant changes in immunosuppression: None  Significant transplant-related complications: None    Transplant Office Phone Number: [phone]    Assessment and plan was discussed with the patient and she voiced her understanding and agreement.    Return visit: No follow-ups on file.    Chaka Lock MD    Chief Complaint   Ms. Ortega is a 36 year old here for hospital discharge after DDKT and SPK    History of Present Illness   36-year-old female with a past medical history of type 1 diabetes mellitus and CKD s/p  donor kidney transplant and SPK on 11/15/2021.  Patient reports that she was feeling jittery today but yesterday but it went away after 1-1/2 hours denies any tremors headache nausea vomiting or swelling in the legs.  Patient was found to have elevated tacrolimus at discharge it was 11.1 and before that 21.9.  She reports she was having diarrhea in the hospital as she was on Laxative and MMF which were changed to  mycophenolic acid and all her laxatives were on hold and since yesterday afternoon she did not have a bowel movement reports she is usually constipated before the surgery.  Does not drink plenty of water.  4 days before the surgery  she had 140 pounds was her weight and her dry weight was 121 which was on the day of her surgery and today it is 123 pounds.  She has been put on Lasix 40 mg at home and before the surgery she was taking 20 mg.  Currently is running the blood pressure on the lower side.  She also has elevated lipase but does not complain of any abdominal pain.    Home BP: Not checked    Problem List   Patient Active Problem List   Diagnosis     Type 1 diabetes mellitus (H)     Type 1 diabetes, HbA1c goal < 7% (H)     Osteopenia     Chronic constipation     Irritable bowel syndrome     CKD (chronic kidney disease) stage 4, GFR 15-29 ml/min (H)     HTN (hypertension)     Gastroparesis     Heterozygous factor V Leiden mutation (H)     Organ transplant candidate     Status post simultaneous kidney and pancreas transplant (H)     Immunosuppressed status (H)       Allergies   Allergies   Allergen Reactions     Chlorhexidine Hives, Itching and Rash     PN: Patient had swelling/rash that was very itchy with chlorprep.     Ceclor [Cefaclor Monohydrate]      Cefaclor Rash       Medications   Current Outpatient Medications   Medication Sig     acetaminophen (TYLENOL) 325 MG tablet Take 1-2 tablets (325-650 mg) by mouth every 4 hours as needed for other (For optimal non-opioid multimodal pain management to improve pain control.)     aspirin (ASA) 325 MG tablet Take 1 tablet (325 mg) by mouth daily     bromfenac (PROLENSA) 0.07 % ophthalmic solution Place 1 drop Into the left eye daily     calcium carbonate-vitamin D (OS-STEPHANIE WITH D) 500-200 MG-UNIT tablet Take 1 tablet by mouth 2 times daily (with meals)     calcium polycarbophil (FIBERCON) 625 MG tablet Take 2 tablets (1,250 mg) by mouth 2 times daily     cetirizine (ZYRTEC) 10 MG tablet Take 10 mg by mouth daily     cholecalciferol 25 MCG (1000 UT) TABS Take 2,000 Units by mouth every other day      clotrimazole (MYCELEX) 10 MG lozenge Place 1 lozenge (10 mg) inside cheek 4 times daily      Continuous Blood Gluc Sensor (DEXCOM G6 SENSOR) MISC Use as directed for continuous glucose monitoring.  Change sensor every 10 days.     Continuous Blood Gluc Transmit (DEXCOM G6 TRANSMITTER) MISC Use as directed for continuous glucose monitoring.  Change every 3 months.     glucose blood VI test strips strip 4 times daily.     Loteprednol Etabonate (LOTEMAX SM) 0.38 % GEL Apply 1 drop to eye 3 times daily Left eye     magnesium oxide (MAG-OX) 400 MG tablet Take 1 tablet (400 mg) by mouth daily     mycophenolic acid (GENERIC EQUIVALENT) 360 MG EC tablet Take 2 tablets (720 mg) by mouth 2 times daily     ondansetron (ZOFRAN-ODT) 4 MG ODT tab Take 1 tablet (4 mg) by mouth every 6 hours as needed for nausea or vomiting     oxyCODONE (ROXICODONE) 5 MG tablet Take 1 tablet (5 mg) by mouth every 6 hours as needed for moderate to severe pain     pantoprazole (PROTONIX) 40 MG EC tablet Take 1 tablet (40 mg) by mouth every morning (before breakfast)     predniSONE (DELTASONE) 10 MG tablet Take 2 tablets (20 mg) by mouth daily for 7 days, THEN 1.5 tablets (15 mg) daily for 7 days, THEN 1 tablet (10 mg) daily for 7 days, THEN 0.5 tablets (5 mg) daily for 7 days.     Prucalopride Succinate (MOTEGRITY) 2 MG TABS Take one tablet by mouth daily     senna-docusate (SENOKOT-S/PERICOLACE) 8.6-50 MG tablet Take 2 tablets by mouth daily as needed for constipation     sulfamethoxazole-trimethoprim (BACTRIM) 400-80 MG tablet Take 1 tablet by mouth daily     tacrolimus (GENERIC EQUIVALENT) 0.5 MG capsule Take 1 capsule by mouth twice daily when directed by transplant team     tacrolimus (GENERIC EQUIVALENT) 1 MG capsule Take 1 capsule (1 mg) by mouth 2 times daily     valGANciclovir (VALCYTE) 450 MG tablet Take 2 tablets (900 mg) by mouth daily     No current facility-administered medications for this visit.     There are no discontinued medications.    Physical Exam   Vital Signs: /74   Pulse 84   Temp 98.1  F (36.7  C) (Oral)    Resp 16   Wt 55.8 kg (123 lb)   LMP  (LMP Unknown)   SpO2 100%   BMI 19.86 kg/m      GENERAL APPEARANCE: alert and no distress  HENT: mouth without ulcers or lesions  LYMPHATICS: no cervical or supraclavicular nodes  RESP: lungs clear to auscultation - no rales, rhonchi or wheezes  CV: regular rhythm, normal rate, no rub, no murmur  EDEMA: no LE edema bilaterally  ABDOMEN: soft, nondistended, nontender, bowel sounds normal  MS: extremities normal - no gross deformities noted, no evidence of inflammation in joints, no muscle tenderness  SKIN: no rash    Data     Renal Latest Ref Rng & Units 11/23/2021 11/22/2021 11/22/2021   Na 133 - 144 mmol/L 142 - -   K 3.4 - 5.3 mmol/L 3.8 - -   Cl 94 - 109 mmol/L 106 - -   CO2 20 - 32 mmol/L 29 - -   BUN 7 - 30 mg/dL 32(H) - -   Cr 0.52 - 1.04 mg/dL 1.43(H) - -   Glucose 70 - 99 mg/dL 80 136(H) 126(H)   Ca  8.5 - 10.1 mg/dL 8.9 - -   Mg 1.6 - 2.3 mg/dL 1.8 - -     Bone Health Latest Ref Rng & Units 11/23/2021 11/22/2021 11/21/2021   Phos 2.5 - 4.5 mg/dL 2.8 3.0 2.3(L)   PTHi 12 - 72 pg/mL - - -     Heme Latest Ref Rng & Units 11/23/2021 11/22/2021 11/21/2021   WBC 4.0 - 11.0 10e3/uL 17.1(H) 12.9(H) 11.8(H)   Hgb 11.7 - 15.7 g/dL 10.0(L) 9.4(L) 8.8(L)   Plt 150 - 450 10e3/uL 277 220 191   ABSOLUTE NEUTROPHIL 1.6 - 8.3 10e9/L - - -   ABSOLUTE LYMPHOCYTES 0.8 - 5.3 10e9/L - - -   ABSOLUTE MONOCYTES 0.0 - 1.3 10e9/L - - -   ABSOLUTE EOSINOPHILS 0.0 - 0.7 10e9/L - - -   ABSOLUTE BASOPHILS 0.0 - 0.2 10e9/L - - -     Liver Latest Ref Rng & Units 11/15/2021 7/21/2021 6/8/2012   AP 40 - 150 U/L 104 94 96   TBili 0.2 - 1.3 mg/dL 0.4 0.4 0.3   ALT 0 - 50 U/L 23 24 13   AST 0 - 45 U/L 16 19 33   Tot Protein 6.8 - 8.8 g/dL 8.4 9.4(H) 7.8   Albumin 3.4 - 5.0 g/dL 4.0 4.7 4.2     Pancreas Latest Ref Rng & Units 11/23/2021 11/22/2021 11/21/2021   A1C 0.0 - 5.6 % - - -   Amylase 30 - 110 U/L 127(H) 93 90   Lipase 73 - 393 U/L 602(H) 386 374        [facility] Txp Virology Latest Ref Rng & Units  7/21/2021   EBV CAPSID ANTIBODY IGG No detectable antibody. Positive(A)        Recent Labs   Lab Test 11/19/21  0604 11/20/21  0717 11/21/21  0725 11/22/21  0601   DOSTAC  --   --   --  11/22/2021   TACROL 5.4 9.9 21.9* 11.1               Again, thank you for allowing me to participate in the care of your patient.        Sincerely,        [facility]

## 2021-11-23 NOTE — LETTER
11/23/2021         RE: Marilyn Ortega  [address]  [facility] [address]      TRANSPLANT NEPHROLOGY EARLY POST TRANSPLANT VISIT    Assessment & Plan   # DDKT (SPK): Trend up patient creatinine today is 1.4 however recent tacrolimus was 11.1 which indicates she has CNA toxicity and 2 days back it was in the 21.9.  She is also on Lasix at home reports she was started on 40 mg she has at her baseline weight and therefore will discontinue the Lasix and will give her 1 L of Ringer lactate as she requires some fluid  -We will get a repeat tacrolimus level today and based on that we will adjust the evening dose   - Baseline Creatinine: ~ 0.9-1.1   - Proteinuria: Not checked post transplant   - Date DSA Last Checked: Nov/2021      Latest DSA: No   - BK Viremia: Not checked recently   - Kidney Tx Biopsy: No    # Pancreas Tx (SPK):    - Pancreatic Exocrine Drainage: Enteric drained     - Blood glucose: Euglycemia      On insulin: No   - HbA1c: Last checked at time of transplant      Latest HbA1c: 7.7%   - Pancreatic enzymes: Trend up   - Date DSA Last Checked: Nov/2021  Latest DSA: No   - Pancreas Tx Biopsy: No   -Will get U/S abdomen as she had elevated lipase to 602.   -We will continue to monitor lipase level    # Immunosuppression: Tacrolimus immediate release (goal 8-10), Mycophenolic acid (dose 720 mg every 12 hours) and Prednisone (dose taper)   - Induction with Recent Transplant:  Intermediate Intensity   - Continue with intensive monitoring of immunosuppression for efficacy and toxicity.   - Changes: Not at this time  -Follow-up on the tacrolimus level as she was recently at 11.1 and did develop an BERNIE    # Infection Prophylaxis:   - PJP: Sulfa/TMP (Bactrim)  - CMV: Valganciclovir (Valcyte) R-/D- will stop in 3 months    # Hypertension: Controlled, but low at times;  Goal BP: < 150/90   - Volume status: Euvolemic  EDW ~ 121 pound   - Changes: Yes - discontinue the home dose Lasix. She was at 20 mg and  at discharge was increased to 40 mg her blood pressure is in early 100's and does not have any swelling in the legs.    # Diabetes: Borderline control (HbA1c 7-9%) Last HbA1c: 7.7%   - Management as per primary care.But with the pancrease transplant her diabetes should resolve    # Anemia in Chronic Renal Disease: Hgb: Stable      SYLVAIN: No   - Iron studies: Not checked recently    # Mineral Bone Disorder:   - Calcium; level: Normal        On supplement: No    # Electrolytes:   - Potassium; level: Normal        On supplement: No  - Bicarbonate; level: Normal        On supplement: No   - Magnesium:Low                          On supplement:Yes    # Constipation: Patient was having diarrhea before but now has not had bowel movement since yesterday afternoon. All her Laxative are on hold.   -Will restart Docusate senna twice a day and if that does not work than start Miralax tonight    # Leukocytosis:  Patient is on a steroid taper which can cause her leukocytes to go up but she also has elevated lipase.  For concern of pancreatitis and will get ultrasound abdomen.  Monitor her lipase.  And order a UA with culture  Trend WBC we will see her tomorrow in the infusion center    # Medical Compliance: Yes    # COVID-19 Virus Review: Discussed COVID-19 virus and the potential medical risks.  Reviewed preventative health recommendations, including wearing a mask where appropriate.  Recommended COVID vaccination should be up to date with either an initial vaccination or booster shot when appropriate.  Asked the patient to inform the transplant center if they are exposed or diagnosed with this virus.    # COVID Vaccination Up To Date: Yes    # Transplant History:  Etiology of Kidney Failure: Diabetes mellitus type 1  Tx: DDKT (SPK)  Transplant: 11/15/2021 (Kidney / Pancreas)  Donor Type: Donation after Brain Death Donor Class:   Crossmatch at time of Tx: negative  DSA at time of Tx: No  Significant changes in immunosuppression:  None  Significant transplant-related complications: None    Transplant Office Phone Number: [phone]    Assessment and plan was discussed with the patient and she voiced her understanding and agreement.    Return visit: Return in about 1 day (around 2021).    Chaka Lock MD    Attestation:  This patient has been seen and evaluated by me, Mason Pack MD.  I have reviewed the note and agree with plan of care as documented by the fellow.       Chief Complaint   Ms. Ortega is a 36 year old here for hospital follow up after kidney/pancreas transplant.    History of Present Illness   36-year-old female with a past medical history of type 1 diabetes mellitus and CKD s/p  donor kidney transplant and SPK on 11/15/2021.  Patient reports that she was feeling jittery today but yesterday but it went away after 1-1/2 hours denies any tremors headache nausea vomiting or swelling in the legs.  Patient was found to have elevated tacrolimus at discharge it was 11.1 and before that 21.9.  She reports she was having diarrhea in the hospital as she was on Laxative and MMF which were changed to  mycophenolic acid and all her laxatives were on hold and since yesterday afternoon she did not have a bowel movement reports she is usually constipated before the surgery.  Does not drink plenty of water.  4 days before the surgery she had 140 pounds was her weight and her dry weight was 121 which was on the day of her surgery and today it is 123 pounds.  She has been put on Lasix 40 mg at home and before the surgery she was taking 20 mg.  Currently is running the blood pressure on the lower side.  She also has elevated lipase but does not complain of any abdominal pain.    Home BP: Not checked    Problem List   Patient Active Problem List   Diagnosis     Type 1 diabetes mellitus (H)     Type 1 diabetes, HbA1c goal < 7% (H)     Osteopenia     Chronic constipation     Irritable bowel syndrome     CKD (chronic kidney  disease) stage 4, GFR 15-29 ml/min (H)     HTN, kidney transplant related     Gastroparesis     Heterozygous factor V Leiden mutation (H)     Status post simultaneous kidney and pancreas transplant (H)     Immunosuppression (H)     Kidney replaced by transplant     Pancreas replaced by transplant (H)     Need for pneumocystis prophylaxis       Allergies   Allergies   Allergen Reactions     Chlorhexidine Hives, Itching and Rash     PN: Patient had swelling/rash that was very itchy with chlorprep.     Ceclor [Cefaclor Monohydrate]      Cefaclor Rash       Medications   Current Outpatient Medications   Medication Sig     acetaminophen (TYLENOL) 325 MG tablet Take 1-2 tablets (325-650 mg) by mouth every 4 hours as needed for other (For optimal non-opioid multimodal pain management to improve pain control.) (Patient not taking: Reported on 12/1/2021)     aspirin (ASA) 325 MG tablet Take 1 tablet (325 mg) by mouth daily     bromfenac (PROLENSA) 0.07 % ophthalmic solution Place 1 drop Into the left eye daily     calcium carbonate-vitamin D (OS-STEPHANIE WITH D) 500-200 MG-UNIT tablet Take 1 tablet by mouth 2 times daily (with meals)     cetirizine (ZYRTEC) 10 MG tablet Take 10 mg by mouth daily     cholecalciferol 25 MCG (1000 UT) TABS Take 2,000 Units by mouth every other day      clotrimazole (MYCELEX) 10 MG lozenge Place 1 lozenge (10 mg) inside cheek 4 times daily     Continuous Blood Gluc Sensor (DEXCOM G6 SENSOR) MISC Use as directed for continuous glucose monitoring.  Change sensor every 10 days.     Continuous Blood Gluc Transmit (DEXCOM G6 TRANSMITTER) MISC Use as directed for continuous glucose monitoring.  Change every 3 months.     glucose blood VI test strips strip 4 times daily.     Loteprednol Etabonate (LOTEMAX SM) 0.38 % GEL Apply 1 drop to eye 3 times daily Left eye     magnesium oxide (MAG-OX) 400 MG tablet Take 1 tablet (400 mg) by mouth 2 times daily     mycophenolic acid (GENERIC EQUIVALENT) 360 MG EC  tablet Take 2 tablets (720 mg) by mouth 2 times daily     norgestimate-ethinyl estradiol (ORTHO-CYCLEN) 0.25-35 MG-MCG tablet Take 1 tablet by mouth daily     ondansetron (ZOFRAN-ODT) 4 MG ODT tab Take 1 tablet (4 mg) by mouth every 6 hours as needed for nausea or vomiting (Patient not taking: Reported on 12/1/2021)     pantoprazole (PROTONIX) 40 MG EC tablet Take 1 tablet (40 mg) by mouth every morning (before breakfast)     phosphorus tablet 250 mg (PHOSPHA 250 NEUTRAL) 250 MG per tablet Take 2 tablets (500 mg) by mouth 2 times daily     predniSONE (DELTASONE) 10 MG tablet Take 2 tablets (20 mg) by mouth daily for 7 days, THEN 1.5 tablets (15 mg) daily for 7 days, THEN 1 tablet (10 mg) daily for 7 days, THEN 0.5 tablets (5 mg) daily for 7 days.     Prucalopride Succinate (MOTEGRITY) 2 MG TABS Take one tablet by mouth daily     senna-docusate (SENOKOT-S/PERICOLACE) 8.6-50 MG tablet Take 2 tablets by mouth daily as needed for constipation     sulfamethoxazole-trimethoprim (BACTRIM) 400-80 MG tablet Take 1 tablet by mouth daily     tacrolimus (GENERIC EQUIVALENT) 0.5 MG capsule Take 1 capsule (0.5 mg) by mouth every evening Total PM dose=1.5mg     tacrolimus (GENERIC EQUIVALENT) 1 MG capsule Take 2 capsules (2 mg) by mouth every morning AND 1 capsule (1 mg) every evening. Total PM dose=1.5mg     valGANciclovir (VALCYTE) 450 MG tablet Take 2 tablets (900 mg) by mouth daily     No current facility-administered medications for this visit.     Medications Discontinued During This Encounter   Medication Reason     furosemide (LASIX) 20 MG tablet        Physical Exam   Vital Signs: Reviewed    GENERAL APPEARANCE: alert and no distress  HENT: mouth without ulcers or lesions  LYMPHATICS: no cervical or supraclavicular nodes  RESP: lungs clear to auscultation - no rales, rhonchi or wheezes  CV: regular rhythm, normal rate, no rub, no murmur  EDEMA: no LE edema bilaterally  ABDOMEN: soft, nondistended, nontender, bowel sounds  normal  MS: extremities normal - no gross deformities noted, no evidence of inflammation in joints, no muscle tenderness  SKIN: no rash  TX KIDNEY: mild TTP  DIALYSIS ACCESS: none    Data     Renal Latest Ref Rng & Units 12/6/2021 12/3/2021 12/1/2021   Na 133 - 144 mmol/L 141 143 140   K 3.4 - 5.3 mmol/L 3.8 4.3 4.2   Cl 94 - 109 mmol/L 110(H) 111(H) 109   CO2 20 - 32 mmol/L 26 28 28   BUN 7 - 30 mg/dL 30 31(H) 28   Cr 0.52 - 1.04 mg/dL 1.08(H) 0.93 1.03   Glucose 70 - 99 mg/dL 94 88 85   Ca  8.5 - 10.1 mg/dL 9.2 8.9 9.0   Mg 1.6 - 2.3 mg/dL 1.6 1.6 -     Bone Health Latest Ref Rng & Units 12/6/2021 12/3/2021 11/29/2021   Phos 2.5 - 4.5 mg/dL 2.5 2.1(L) 2.1(L)   PTHi pg/mL - - 30   Vit D Def 20 - 75 ug/L - - 52     Heme Latest Ref Rng & Units 12/6/2021 12/3/2021 12/1/2021   WBC 4.0 - 11.0 10e3/uL 14.9(H) 18.5(H) 17.5(H)   Hgb 11.7 - 15.7 g/dL 8.1(L) 8.3(L) 8.7(L)   Plt 150 - 450 10e3/uL 375 357 350   ABSOLUTE NEUTROPHIL 1.6 - 8.3 10e9/L - - -   ABSOLUTE LYMPHOCYTES 0.8 - 5.3 10e9/L - - -   ABSOLUTE MONOCYTES 0.0 - 1.3 10e9/L - - -   ABSOLUTE EOSINOPHILS 0.0 - 0.7 10e9/L - - -   ABSOLUTE BASOPHILS 0.0 - 0.2 10e9/L - - -     Liver Latest Ref Rng & Units 11/15/2021 7/21/2021 6/8/2012   AP 40 - 150 U/L 104 94 96   TBili 0.2 - 1.3 mg/dL 0.4 0.4 0.3   ALT 0 - 50 U/L 23 24 13   AST 0 - 45 U/L 16 19 33   Tot Protein 6.8 - 8.8 g/dL 8.4 9.4(H) 7.8   Albumin 3.4 - 5.0 g/dL 4.0 4.7 4.2     Pancreas Latest Ref Rng & Units 12/6/2021 12/3/2021 12/1/2021   A1C 0.0 - 5.6 % - - -   Amylase 30 - 110 U/L 38 46 52   Lipase 73 - 393 U/L 82 112 125     Iron studies Latest Ref Rng & Units 12/1/2021   Iron 35 - 180 ug/dL 29(L)   Iron sat 15 - 46 % 10(L)   Ferritin 12 - 150 ng/mL 49     [facility] Txp Virology Latest Ref Rng & Units 7/21/2021   EBV CAPSID ANTIBODY IGG No detectable antibody. Positive(A)        Recent Labs   Lab Test 12/01/21  0715 12/03/21  0724 12/06/21  0728   DOSTAC 11/30/2021 12/2/2021 12/5/2021   TACROL 7.7 12.1 9.1     Recent  Labs   Lab Test 11/24/21  0628   DOSMPA 11/23/2021   6:30 PM   MPACID 5.38*   MPAG 79.4         Mason Pack MD

## 2021-11-23 NOTE — LETTER
11/23/2021         RE: Marilyn Ortega  [address]  [facility] [address]        Dear Colleague,    Thank you for referring your patient, Marilyn Ortega, to the [facility]. Please see a copy of my visit note below.    TRANSPLANT NEPHROLOGY EARLY POST TRANSPLANT VISIT    Assessment & Plan   # DDKT (SPK): Trend up patient creatinine today is 1.4 however recent tacrolimus was 11.1 which indicates she has CNA toxicity and 2 days back it was in the 21.9.  She is also on Lasix at home reports she was started on 40 mg she has at her baseline weight and therefore will discontinue the Lasix and will give her 1 L of Ringer lactate as she requires some fluid  -We will get a repeat tacrolimus level today and based on that we will adjust the evening dose   - Baseline Creatinine: ~ 0.9-1.1   - Proteinuria: Not checked post transplant   - Date DSA Last Checked: Nov/2021      Latest DSA: No   - BK Viremia: Not checked recently   - Kidney Tx Biopsy: No    # Pancreas Tx (SPK):    - Pancreatic Exocrine Drainage: Enteric drained     - Blood glucose: Euglycemia      On insulin: No   - HbA1c: Last checked at time of transplant      Latest HbA1c: 7.7%   - Pancreatic enzymes: Trend up   - Date DSA Last Checked: Nov/2021  Latest DSA: No   - Pancreas Tx Biopsy: No   -Will get U/S abdomen as she had elevated lipase to 602.   -We will continue to monitor lipase level    # Immunosuppression: Tacrolimus immediate release (goal 8-10), Mycophenolic acid (dose 720 mg every 12 hours) and Prednisone (dose taper)   - Induction with Recent Transplant:  Intermediate Intensity   - Continue with intensive monitoring of immunosuppression for efficacy and toxicity.   - Changes: Not at this time  -Follow-up on the tacrolimus level as she was recently at 11.1 and did develop an BERNIE    # Infection Prophylaxis:   - PJP: Sulfa/TMP (Bactrim)  - CMV: Valganciclovir (Valcyte) R-/D- will stop in 3  months    # Hypertension: Controlled, but low at times;  Goal BP: < 150/90   - Volume status: Euvolemic  EDW ~ 121 pound   - Changes: Yes - discontinue the home dose Lasix. She was at 20 mg and at discharge was increased to 40 mg her blood pressure is in early 100's and does not have any swelling in the legs.    # Diabetes: Borderline control (HbA1c 7-9%) Last HbA1c: 7.7%   - Management as per primary care.But with the pancrease transplant her diabetes should resolve    # Anemia in Chronic Renal Disease: Hgb: Stable      SYLVAIN: No   - Iron studies: Not checked recently    # Mineral Bone Disorder:   - Calcium; level: Normal        On supplement: No    # Electrolytes:   - Potassium; level: Normal        On supplement: No  - Bicarbonate; level: Normal        On supplement: No   - Magnesium:Low                          On supplement:Yes    # Constipation: Patient was having diarrhea before but now has not had bowel movement since yesterday afternoon. All her Laxative are on hold.   -Will restart Docusate senna twice a day and if that does not work than start Miralax tonight    # Leukocytosis:  Patient is on a steroid taper which can cause her leukocytes to go up but she also has elevated lipase.  For concern of pancreatitis and will get ultrasound abdomen.  Monitor her lipase.  And order a UA with culture  Trend WBC we will see her tomorrow in the infusion center    # Medical Compliance: Yes    # COVID-19 Virus Review: Discussed COVID-19 virus and the potential medical risks.  Reviewed preventative health recommendations, including wearing a mask where appropriate.  Recommended COVID vaccination should be up to date with either an initial vaccination or booster shot when appropriate.  Asked the patient to inform the transplant center if they are exposed or diagnosed with this virus.    # COVID Vaccination Up To Date: Yes    # Transplant History:  Etiology of Kidney Failure: Diabetes mellitus type 1  Tx: DDKT  (SPK)  Transplant: 11/15/2021 (Kidney / Pancreas)  Donor Type: Donation after Brain Death Donor Class:   Crossmatch at time of Tx: negative  DSA at time of Tx: No  Significant changes in immunosuppression: None  Significant transplant-related complications: None    Transplant Office Phone Number: [phone]    Assessment and plan was discussed with the patient and she voiced her understanding and agreement.    Return visit: Return in about 1 day (around 2021).    Chaka Lock MD    Attestation:  This patient has been seen and evaluated by me, Mason Pack MD.  I have reviewed the note and agree with plan of care as documented by the fellow.       Chief Complaint   Ms. Ortega is a 36 year old here for hospital follow up after kidney/pancreas transplant.    History of Present Illness   36-year-old female with a past medical history of type 1 diabetes mellitus and CKD s/p  donor kidney transplant and SPK on 11/15/2021.  Patient reports that she was feeling jittery today but yesterday but it went away after 1-1/2 hours denies any tremors headache nausea vomiting or swelling in the legs.  Patient was found to have elevated tacrolimus at discharge it was 11.1 and before that 21.9.  She reports she was having diarrhea in the hospital as she was on Laxative and MMF which were changed to  mycophenolic acid and all her laxatives were on hold and since yesterday afternoon she did not have a bowel movement reports she is usually constipated before the surgery.  Does not drink plenty of water.  4 days before the surgery she had 140 pounds was her weight and her dry weight was 121 which was on the day of her surgery and today it is 123 pounds.  She has been put on Lasix 40 mg at home and before the surgery she was taking 20 mg.  Currently is running the blood pressure on the lower side.  She also has elevated lipase but does not complain of any abdominal pain.    Home BP: Not checked    Problem List    Patient Active Problem List   Diagnosis     Type 1 diabetes mellitus (H)     Type 1 diabetes, HbA1c goal < 7% (H)     Osteopenia     Chronic constipation     Irritable bowel syndrome     CKD (chronic kidney disease) stage 4, GFR 15-29 ml/min (H)     HTN, kidney transplant related     Gastroparesis     Heterozygous factor V Leiden mutation (H)     Status post simultaneous kidney and pancreas transplant (H)     Immunosuppression (H)     Kidney replaced by transplant     Pancreas replaced by transplant (H)     Need for pneumocystis prophylaxis       Allergies   Allergies   Allergen Reactions     Chlorhexidine Hives, Itching and Rash     PN: Patient had swelling/rash that was very itchy with chlorprep.     Ceclor [Cefaclor Monohydrate]      Cefaclor Rash       Medications   Current Outpatient Medications   Medication Sig     acetaminophen (TYLENOL) 325 MG tablet Take 1-2 tablets (325-650 mg) by mouth every 4 hours as needed for other (For optimal non-opioid multimodal pain management to improve pain control.) (Patient not taking: Reported on 12/1/2021)     aspirin (ASA) 325 MG tablet Take 1 tablet (325 mg) by mouth daily     bromfenac (PROLENSA) 0.07 % ophthalmic solution Place 1 drop Into the left eye daily     calcium carbonate-vitamin D (OS-STEPHANIE WITH D) 500-200 MG-UNIT tablet Take 1 tablet by mouth 2 times daily (with meals)     cetirizine (ZYRTEC) 10 MG tablet Take 10 mg by mouth daily     cholecalciferol 25 MCG (1000 UT) TABS Take 2,000 Units by mouth every other day      clotrimazole (MYCELEX) 10 MG lozenge Place 1 lozenge (10 mg) inside cheek 4 times daily     Continuous Blood Gluc Sensor (DEXCOM G6 SENSOR) MISC Use as directed for continuous glucose monitoring.  Change sensor every 10 days.     Continuous Blood Gluc Transmit (DEXCOM G6 TRANSMITTER) MISC Use as directed for continuous glucose monitoring.  Change every 3 months.     glucose blood VI test strips strip 4 times daily.     Loteprednol Etabonate  (LOTEMAX SM) 0.38 % GEL Apply 1 drop to eye 3 times daily Left eye     magnesium oxide (MAG-OX) 400 MG tablet Take 1 tablet (400 mg) by mouth 2 times daily     mycophenolic acid (GENERIC EQUIVALENT) 360 MG EC tablet Take 2 tablets (720 mg) by mouth 2 times daily     norgestimate-ethinyl estradiol (ORTHO-CYCLEN) 0.25-35 MG-MCG tablet Take 1 tablet by mouth daily     ondansetron (ZOFRAN-ODT) 4 MG ODT tab Take 1 tablet (4 mg) by mouth every 6 hours as needed for nausea or vomiting (Patient not taking: Reported on 12/1/2021)     pantoprazole (PROTONIX) 40 MG EC tablet Take 1 tablet (40 mg) by mouth every morning (before breakfast)     phosphorus tablet 250 mg (PHOSPHA 250 NEUTRAL) 250 MG per tablet Take 2 tablets (500 mg) by mouth 2 times daily     predniSONE (DELTASONE) 10 MG tablet Take 2 tablets (20 mg) by mouth daily for 7 days, THEN 1.5 tablets (15 mg) daily for 7 days, THEN 1 tablet (10 mg) daily for 7 days, THEN 0.5 tablets (5 mg) daily for 7 days.     Prucalopride Succinate (MOTEGRITY) 2 MG TABS Take one tablet by mouth daily     senna-docusate (SENOKOT-S/PERICOLACE) 8.6-50 MG tablet Take 2 tablets by mouth daily as needed for constipation     sulfamethoxazole-trimethoprim (BACTRIM) 400-80 MG tablet Take 1 tablet by mouth daily     tacrolimus (GENERIC EQUIVALENT) 0.5 MG capsule Take 1 capsule (0.5 mg) by mouth every evening Total PM dose=1.5mg     tacrolimus (GENERIC EQUIVALENT) 1 MG capsule Take 2 capsules (2 mg) by mouth every morning AND 1 capsule (1 mg) every evening. Total PM dose=1.5mg     valGANciclovir (VALCYTE) 450 MG tablet Take 2 tablets (900 mg) by mouth daily     No current facility-administered medications for this visit.     Medications Discontinued During This Encounter   Medication Reason     furosemide (LASIX) 20 MG tablet        Physical Exam   Vital Signs: Reviewed    GENERAL APPEARANCE: alert and no distress  HENT: mouth without ulcers or lesions  LYMPHATICS: no cervical or supraclavicular  nodes  RESP: lungs clear to auscultation - no rales, rhonchi or wheezes  CV: regular rhythm, normal rate, no rub, no murmur  EDEMA: no LE edema bilaterally  ABDOMEN: soft, nondistended, nontender, bowel sounds normal  MS: extremities normal - no gross deformities noted, no evidence of inflammation in joints, no muscle tenderness  SKIN: no rash  TX KIDNEY: mild TTP  DIALYSIS ACCESS: none    Data     Renal Latest Ref Rng & Units 12/6/2021 12/3/2021 12/1/2021   Na 133 - 144 mmol/L 141 143 140   K 3.4 - 5.3 mmol/L 3.8 4.3 4.2   Cl 94 - 109 mmol/L 110(H) 111(H) 109   CO2 20 - 32 mmol/L 26 28 28   BUN 7 - 30 mg/dL 30 31(H) 28   Cr 0.52 - 1.04 mg/dL 1.08(H) 0.93 1.03   Glucose 70 - 99 mg/dL 94 88 85   Ca  8.5 - 10.1 mg/dL 9.2 8.9 9.0   Mg 1.6 - 2.3 mg/dL 1.6 1.6 -     Bone Health Latest Ref Rng & Units 12/6/2021 12/3/2021 11/29/2021   Phos 2.5 - 4.5 mg/dL 2.5 2.1(L) 2.1(L)   PTHi pg/mL - - 30   Vit D Def 20 - 75 ug/L - - 52     Heme Latest Ref Rng & Units 12/6/2021 12/3/2021 12/1/2021   WBC 4.0 - 11.0 10e3/uL 14.9(H) 18.5(H) 17.5(H)   Hgb 11.7 - 15.7 g/dL 8.1(L) 8.3(L) 8.7(L)   Plt 150 - 450 10e3/uL 375 357 350   ABSOLUTE NEUTROPHIL 1.6 - 8.3 10e9/L - - -   ABSOLUTE LYMPHOCYTES 0.8 - 5.3 10e9/L - - -   ABSOLUTE MONOCYTES 0.0 - 1.3 10e9/L - - -   ABSOLUTE EOSINOPHILS 0.0 - 0.7 10e9/L - - -   ABSOLUTE BASOPHILS 0.0 - 0.2 10e9/L - - -     Liver Latest Ref Rng & Units 11/15/2021 7/21/2021 6/8/2012   AP 40 - 150 U/L 104 94 96   TBili 0.2 - 1.3 mg/dL 0.4 0.4 0.3   ALT 0 - 50 U/L 23 24 13   AST 0 - 45 U/L 16 19 33   Tot Protein 6.8 - 8.8 g/dL 8.4 9.4(H) 7.8   Albumin 3.4 - 5.0 g/dL 4.0 4.7 4.2     Pancreas Latest Ref Rng & Units 12/6/2021 12/3/2021 12/1/2021   A1C 0.0 - 5.6 % - - -   Amylase 30 - 110 U/L 38 46 52   Lipase 73 - 393 U/L 82 112 125     Iron studies Latest Ref Rng & Units 12/1/2021   Iron 35 - 180 ug/dL 29(L)   Iron sat 15 - 46 % 10(L)   Ferritin 12 - 150 ng/mL 49     [facility] Txp Virology Latest Ref Rng & Units 7/21/2021    EBV CAPSID ANTIBODY IGG No detectable antibody. Positive(A)        Recent Labs   Lab Test 12/01/21  0715 12/03/21  0724 12/06/21  0728   DOSTAC 11/30/2021 12/2/2021 12/5/2021   TACROL 7.7 12.1 9.1     Recent Labs   Lab Test 11/24/21  0628   DOSMPA 11/23/2021   6:30 PM   MPACID 5.38*   MPAG 79.4         Mason Pack MD

## 2021-11-23 NOTE — PROGRESS NOTES
TRANSPLANT NEPHROLOGY EARLY POST TRANSPLANT VISIT    Assessment & Plan   # DDKT (SPK): Trend up patient creatinine today is 1.4 however recent tacrolimus was 11.1 which indicates she has CNA toxicity and 2 days back it was in the 21.9.  She is also on Lasix at home reports she was started on 40 mg she has at her baseline weight and therefore will discontinue the Lasix and will give her 1 L of Ringer lactate as she requires some fluid  -We will get a repeat tacrolimus level today and based on that we will adjust the evening dose   - Baseline Creatinine: ~ 0.9-1.1   - Proteinuria: Not checked post transplant   - Date DSA Last Checked: Nov/2021      Latest DSA: No   - BK Viremia: Not checked recently   - Kidney Tx Biopsy: No    # Pancreas Tx (SPK):    - Pancreatic Exocrine Drainage: Enteric drained     - Blood glucose: Euglycemia      On insulin: No   - HbA1c: Last checked at time of transplant      Latest HbA1c: 7.7%   - Pancreatic enzymes: Trend up   - Date DSA Last Checked: Nov/2021  Latest DSA: No   - Pancreas Tx Biopsy: No   -Will get U/S abdomen as she had elevated lipase to 602.   -We will continue to monitor lipase level      # Immunosuppression: Tacrolimus immediate release (goal 8-10), Mycophenolic acid (dose 720 mg every 12 hours) and Prednisone (dose taper)   - Induction with Recent Transplant:  Intermediate Intensity   - Continue with intensive monitoring of immunosuppression for efficacy and toxicity.   - Changes: Not at this time  -Follow-up on the tacrolimus level as she was recently at 11.1 and did develop an BERNIE  # Infection Prophylaxis:   - PJP: Sulfa/TMP (Bactrim)  - CMV: Valganciclovir (Valcyte) R-/D- will stop in 3 months    # Hypertension: Controlled, but low at times;  Goal BP: < 150/90   - Volume status: Euvolemic  EDW ~ 121 pound   - Changes: Yes - discontinue the home dose Lasix. She was at 20 mg and at discharge was increased to 40 mg her blood pressure is in early 100's and does not have  any swelling in the legs.    # Diabetes: Borderline control (HbA1c 7-9%) Last HbA1c: 7.7%   - Management as per primary care.But with the pancrease transplant her diabetes should resolve    # Anemia in Chronic Renal Disease: Hgb: Stable      SYLVAIN: No   - Iron studies: Not checked recently    # Mineral Bone Disorder:   - Calcium; level: Normal        On supplement: No    # Electrolytes:   - Potassium; level: Normal        On supplement: No  - Bicarbonate; level: Normal        On supplement: No   -Magnesium:Low                          On supplement:Yes    #Constipation: Patient was having diarrhea before but now has not had bowel movement since yesterday afternoon. All her Laxative are on hold.   -Will restart Docusate senna twice a day and if that does not work than start Miralax tonight    # Medical Compliance: Yes    # COVID-19 Virus Review: Discussed COVID-19 virus and the potential medical risks.  Reviewed preventative health recommendations, including wearing a mask where appropriate.  Recommended COVID vaccination should be up to date with either an initial vaccination or booster shot when appropriate.  Asked the patient to inform the transplant center if they are exposed or diagnosed with this virus.  #Leukocytosis:  Patient is on a steroid taper which can cause her leukocytes to go up but she also has elevated lipase.  For concern of pancreatitis and will get ultrasound abdomen.  Monitor her lipase.  And order a UA with culture  Trend WBC we will see her tomorrow in the infusion center  # COVID Vaccination Up To Date: Yes    # Transplant History:  Etiology of Kidney Failure: Diabetes mellitus type 1  Tx: DDKT (SPK)  Transplant: 11/15/2021 (Kidney / Pancreas)  Donor Type: Donation after Brain Death Donor Class:   Crossmatch at time of Tx: negative  DSA at time of Tx: No  Significant changes in immunosuppression: None  Significant transplant-related complications: None    Transplant Office Phone Number:  [phone]    Assessment and plan was discussed with the patient and she voiced her understanding and agreement.    Return visit: No follow-ups on file.    Chaka Lock MD    Chief Complaint   Ms. Ortega is a 36 year old here for hospital discharge after DDKT and SPK    History of Present Illness   36-year-old female with a past medical history of type 1 diabetes mellitus and CKD s/p  donor kidney transplant and SPK on 11/15/2021.  Patient reports that she was feeling jittery today but yesterday but it went away after 1-1/2 hours denies any tremors headache nausea vomiting or swelling in the legs.  Patient was found to have elevated tacrolimus at discharge it was 11.1 and before that 21.9.  She reports she was having diarrhea in the hospital as she was on Laxative and MMF which were changed to  mycophenolic acid and all her laxatives were on hold and since yesterday afternoon she did not have a bowel movement reports she is usually constipated before the surgery.  Does not drink plenty of water.  4 days before the surgery she had 140 pounds was her weight and her dry weight was 121 which was on the day of her surgery and today it is 123 pounds.  She has been put on Lasix 40 mg at home and before the surgery she was taking 20 mg.  Currently is running the blood pressure on the lower side.  She also has elevated lipase but does not complain of any abdominal pain.    Home BP: Not checked    Problem List   Patient Active Problem List   Diagnosis     Type 1 diabetes mellitus (H)     Type 1 diabetes, HbA1c goal < 7% (H)     Osteopenia     Chronic constipation     Irritable bowel syndrome     CKD (chronic kidney disease) stage 4, GFR 15-29 ml/min (H)     HTN (hypertension)     Gastroparesis     Heterozygous factor V Leiden mutation (H)     Organ transplant candidate     Status post simultaneous kidney and pancreas transplant (H)     Immunosuppressed status (H)       Allergies   Allergies   Allergen Reactions      Chlorhexidine Hives, Itching and Rash     PN: Patient had swelling/rash that was very itchy with chlorprep.     Ceclor [Cefaclor Monohydrate]      Cefaclor Rash       Medications   Current Outpatient Medications   Medication Sig     acetaminophen (TYLENOL) 325 MG tablet Take 1-2 tablets (325-650 mg) by mouth every 4 hours as needed for other (For optimal non-opioid multimodal pain management to improve pain control.)     aspirin (ASA) 325 MG tablet Take 1 tablet (325 mg) by mouth daily     bromfenac (PROLENSA) 0.07 % ophthalmic solution Place 1 drop Into the left eye daily     calcium carbonate-vitamin D (OS-STEPHANIE WITH D) 500-200 MG-UNIT tablet Take 1 tablet by mouth 2 times daily (with meals)     calcium polycarbophil (FIBERCON) 625 MG tablet Take 2 tablets (1,250 mg) by mouth 2 times daily     cetirizine (ZYRTEC) 10 MG tablet Take 10 mg by mouth daily     cholecalciferol 25 MCG (1000 UT) TABS Take 2,000 Units by mouth every other day      clotrimazole (MYCELEX) 10 MG lozenge Place 1 lozenge (10 mg) inside cheek 4 times daily     Continuous Blood Gluc Sensor (DEXCOM G6 SENSOR) MISC Use as directed for continuous glucose monitoring.  Change sensor every 10 days.     Continuous Blood Gluc Transmit (DEXCOM G6 TRANSMITTER) MISC Use as directed for continuous glucose monitoring.  Change every 3 months.     glucose blood VI test strips strip 4 times daily.     Loteprednol Etabonate (LOTEMAX SM) 0.38 % GEL Apply 1 drop to eye 3 times daily Left eye     magnesium oxide (MAG-OX) 400 MG tablet Take 1 tablet (400 mg) by mouth daily     mycophenolic acid (GENERIC EQUIVALENT) 360 MG EC tablet Take 2 tablets (720 mg) by mouth 2 times daily     ondansetron (ZOFRAN-ODT) 4 MG ODT tab Take 1 tablet (4 mg) by mouth every 6 hours as needed for nausea or vomiting     oxyCODONE (ROXICODONE) 5 MG tablet Take 1 tablet (5 mg) by mouth every 6 hours as needed for moderate to severe pain     pantoprazole (PROTONIX) 40 MG EC tablet Take 1  tablet (40 mg) by mouth every morning (before breakfast)     predniSONE (DELTASONE) 10 MG tablet Take 2 tablets (20 mg) by mouth daily for 7 days, THEN 1.5 tablets (15 mg) daily for 7 days, THEN 1 tablet (10 mg) daily for 7 days, THEN 0.5 tablets (5 mg) daily for 7 days.     Prucalopride Succinate (MOTEGRITY) 2 MG TABS Take one tablet by mouth daily     senna-docusate (SENOKOT-S/PERICOLACE) 8.6-50 MG tablet Take 2 tablets by mouth daily as needed for constipation     sulfamethoxazole-trimethoprim (BACTRIM) 400-80 MG tablet Take 1 tablet by mouth daily     tacrolimus (GENERIC EQUIVALENT) 0.5 MG capsule Take 1 capsule by mouth twice daily when directed by transplant team     tacrolimus (GENERIC EQUIVALENT) 1 MG capsule Take 1 capsule (1 mg) by mouth 2 times daily     valGANciclovir (VALCYTE) 450 MG tablet Take 2 tablets (900 mg) by mouth daily     No current facility-administered medications for this visit.     There are no discontinued medications.    Physical Exam   Vital Signs: /74   Pulse 84   Temp 98.1  F (36.7  C) (Oral)   Resp 16   Wt 55.8 kg (123 lb)   LMP  (LMP Unknown)   SpO2 100%   BMI 19.86 kg/m      GENERAL APPEARANCE: alert and no distress  HENT: mouth without ulcers or lesions  LYMPHATICS: no cervical or supraclavicular nodes  RESP: lungs clear to auscultation - no rales, rhonchi or wheezes  CV: regular rhythm, normal rate, no rub, no murmur  EDEMA: no LE edema bilaterally  ABDOMEN: soft, nondistended, nontender, bowel sounds normal  MS: extremities normal - no gross deformities noted, no evidence of inflammation in joints, no muscle tenderness  SKIN: no rash    Data     Renal Latest Ref Rng & Units 11/23/2021 11/22/2021 11/22/2021   Na 133 - 144 mmol/L 142 - -   K 3.4 - 5.3 mmol/L 3.8 - -   Cl 94 - 109 mmol/L 106 - -   CO2 20 - 32 mmol/L 29 - -   BUN 7 - 30 mg/dL 32(H) - -   Cr 0.52 - 1.04 mg/dL 1.43(H) - -   Glucose 70 - 99 mg/dL 80 136(H) 126(H)   Ca  8.5 - 10.1 mg/dL 8.9 - -   Mg 1.6 -  2.3 mg/dL 1.8 - -     Bone Health Latest Ref Rng & Units 11/23/2021 11/22/2021 11/21/2021   Phos 2.5 - 4.5 mg/dL 2.8 3.0 2.3(L)   PTHi 12 - 72 pg/mL - - -     Heme Latest Ref Rng & Units 11/23/2021 11/22/2021 11/21/2021   WBC 4.0 - 11.0 10e3/uL 17.1(H) 12.9(H) 11.8(H)   Hgb 11.7 - 15.7 g/dL 10.0(L) 9.4(L) 8.8(L)   Plt 150 - 450 10e3/uL 277 220 191   ABSOLUTE NEUTROPHIL 1.6 - 8.3 10e9/L - - -   ABSOLUTE LYMPHOCYTES 0.8 - 5.3 10e9/L - - -   ABSOLUTE MONOCYTES 0.0 - 1.3 10e9/L - - -   ABSOLUTE EOSINOPHILS 0.0 - 0.7 10e9/L - - -   ABSOLUTE BASOPHILS 0.0 - 0.2 10e9/L - - -     Liver Latest Ref Rng & Units 11/15/2021 7/21/2021 6/8/2012   AP 40 - 150 U/L 104 94 96   TBili 0.2 - 1.3 mg/dL 0.4 0.4 0.3   ALT 0 - 50 U/L 23 24 13   AST 0 - 45 U/L 16 19 33   Tot Protein 6.8 - 8.8 g/dL 8.4 9.4(H) 7.8   Albumin 3.4 - 5.0 g/dL 4.0 4.7 4.2     Pancreas Latest Ref Rng & Units 11/23/2021 11/22/2021 11/21/2021   A1C 0.0 - 5.6 % - - -   Amylase 30 - 110 U/L 127(H) 93 90   Lipase 73 - 393 U/L 602(H) 386 374        [facility] Txp Virology Latest Ref Rng & Units 7/21/2021   EBV CAPSID ANTIBODY IGG No detectable antibody. Positive(A)        Recent Labs   Lab Test 11/19/21  0604 11/20/21  0717 11/21/21  0725 11/22/21  0601   DOSTAC  --   --   --  11/22/2021   TACROL 5.4 9.9 21.9* 11.1

## 2021-11-23 NOTE — TELEPHONE ENCOUNTER
Left message and sent [employer] message to patient regarding:  Tacrolimus IR level 12.5 on 11/23, goal 8-10, dose 1 mg BID.     PLAN:   Please call patient and confirm this was an accurate 12-hour trough. Verify Tacrolimus IR dose 1 mg BID. Confirm no new medications or illness. Confirm no missed doses. If accurate trough and accurate dose, decrease Tacrolimus IR dose to 1/0.5 mg and repeat labs in 1 day.

## 2021-11-23 NOTE — PROGRESS NOTES
"Marilyn Ortega came to [facility] today for labs and assessment following a kidney and pancreas transplant on 11/15/21.      Discharge date: 11/22  Transplant coordinator: Yessica Becker    Physical Assessment:  See physical assessment located under \"Document Flowsheets\".  Incision site: midline. Closed with staples. Well approximated. Free from sxs of infection.  Lines: SARAI right lower abdomen, output as follows  11/21: 655mLs (per I/O charting in hospital)  11/22: 450mLs (150mLs per hospital charting, 300mLs per pt at home from [id number])  11/23: 75mLs at 0200, 60mLs at 0600, 125mLs serosanguinous while in [facility]  Cage: NA  Urine clarity: straw-colored and yellow per patient. Denies pain or irritation with urination.  Hydration: 1.5 liters water intake since returning home from hospital yesterday (around 1500 yesterday afternoon). Reviewed 2-3 liter daily hydration goal.  Nutrition: appetite is \"good\". Denies N/V.   Last BM: yesterday late morning, loose.   Pain: incisional pain at 4-5/10 and tolerable. Hasn't needed oxy or tylenol.    Labs drawn by [facility] staff No. Drawn per first floor lab prior to [facility]    Plan of care for today:   Labs on first floor. Vitals obtained and nursing assessment completed. Dr. Pack in to evaluate. Med changes as described below. 1 liter LR given. UA obtained. Pancreas ultrasound scheduled for soonest appointment today at 430. Medication review and med box set-up. Reviewed education checklist. [facility] again tomorrow morning.    Medication changes:   1. Stop lasix  2. Stop coreg   3. Resume mangesium 400mg once daily    Medications administered:     Administrations This Visit     lactated ringers BOLUS 1,000 mL     Admin Date  11/23/2021 Action  New Bag Dose  1,000 mL Rate  1,000 mL/hr Route  Intravenous Administered By  Jolene Allen, EDWARD                Patient education:    The following teaching topics were addressed: Importance of drinking 2L of non-caffeinated fluids daily, Incisional " care, Signs/symptoms of infection, Medications (purposes, doses and times of administration), Plan of care and Drain care   Patient verbalized understanding and all questions answered.    Drug level:  Patient took 1mg of tacrolimus last evening at 8pm.  Care coordinator to follow up with the result.    Discharge Plan    Pt [facility] today at 430[facility]  Discharge instructions reviewed with patient: YES  Patient/Representative verbalized understanding, all questions answered: YES    Discharged from unit at 115 with whom: mom to home (in [place]).    Jolene Reyna RN

## 2021-11-23 NOTE — Clinical Note
Patient called regarding her Tac level of 12.5, she was told that if it was a true 12 hour trough that she should lower her dose to 0.5mg BID instead of 1mg BID. She got her blood drawn 11 hours after her last dose so I told her that was close enough to 12 hours and to drop down to the 0.5mg. Please follow up in the morning.

## 2021-11-23 NOTE — PROGRESS NOTES
Final positive donor sputum (respiratory) culture results have been uploaded to [facility].  Donor ID [id number] .  Dr. Alexander notified of results.

## 2021-11-24 ENCOUNTER — TEAM CONFERENCE (OUTPATIENT)
Dept: TRANSPLANT | Facility: CLINIC | Age: 36
End: 2021-11-24

## 2021-11-24 ENCOUNTER — LAB (OUTPATIENT)
Dept: LAB | Facility: CLINIC | Age: 36
End: 2021-11-24
Attending: INTERNAL MEDICINE
Payer: COMMERCIAL

## 2021-11-24 ENCOUNTER — OFFICE VISIT (OUTPATIENT)
Dept: INFUSION THERAPY | Facility: CLINIC | Age: 36
End: 2021-11-24
Attending: INTERNAL MEDICINE
Payer: COMMERCIAL

## 2021-11-24 ENCOUNTER — TELEPHONE (OUTPATIENT)
Dept: TRANSPLANT | Facility: CLINIC | Age: 36
End: 2021-11-24

## 2021-11-24 VITALS
DIASTOLIC BLOOD PRESSURE: 78 MMHG | SYSTOLIC BLOOD PRESSURE: 118 MMHG | WEIGHT: 125.6 LBS | BODY MASS INDEX: 20.28 KG/M2 | RESPIRATION RATE: 16 BRPM | TEMPERATURE: 98.8 F | HEART RATE: 94 BPM | OXYGEN SATURATION: 100 %

## 2021-11-24 DIAGNOSIS — Z94.83 PANCREAS REPLACED BY TRANSPLANT (H): ICD-10-CM

## 2021-11-24 DIAGNOSIS — Z48.298 AFTERCARE FOLLOWING ORGAN TRANSPLANT: ICD-10-CM

## 2021-11-24 DIAGNOSIS — K59.01 SLOW TRANSIT CONSTIPATION: Primary | ICD-10-CM

## 2021-11-24 DIAGNOSIS — D84.9 IMMUNOSUPPRESSION (H): ICD-10-CM

## 2021-11-24 DIAGNOSIS — Z79.899 ENCOUNTER FOR LONG-TERM CURRENT USE OF MEDICATION: ICD-10-CM

## 2021-11-24 DIAGNOSIS — Z94.83 PANCREAS REPLACED BY TRANSPLANT (H): Primary | ICD-10-CM

## 2021-11-24 DIAGNOSIS — Z94.0 KIDNEY REPLACED BY TRANSPLANT: ICD-10-CM

## 2021-11-24 DIAGNOSIS — Z29.89 NEED FOR PNEUMOCYSTIS PROPHYLAXIS: ICD-10-CM

## 2021-11-24 LAB
AMYLASE SERPL-CCNC: 111 U/L (ref 30–110)
ANION GAP SERPL CALCULATED.3IONS-SCNC: 7 MMOL/L (ref 3–14)
BASOPHILS # BLD AUTO: 0 10E3/UL (ref 0–0.2)
BASOPHILS NFR BLD AUTO: 0 %
BUN SERPL-MCNC: 26 MG/DL (ref 7–30)
CALCIUM SERPL-MCNC: 8.9 MG/DL (ref 8.5–10.1)
CHLORIDE BLD-SCNC: 108 MMOL/L (ref 94–109)
CO2 SERPL-SCNC: 29 MMOL/L (ref 20–32)
CREAT SERPL-MCNC: 1.33 MG/DL (ref 0.52–1.04)
EOSINOPHIL # BLD AUTO: 0.5 10E3/UL (ref 0–0.7)
EOSINOPHIL NFR BLD AUTO: 4 %
ERYTHROCYTE [DISTWIDTH] IN BLOOD BY AUTOMATED COUNT: 15.8 % (ref 10–15)
GFR SERPL CREATININE-BSD FRML MDRD: 51 ML/MIN/1.73M2
GLUCOSE BLD-MCNC: 74 MG/DL (ref 70–99)
HCT VFR BLD AUTO: 29.9 % (ref 35–47)
HGB BLD-MCNC: 9.7 G/DL (ref 11.7–15.7)
IMM GRANULOCYTES # BLD: 0.1 10E3/UL
IMM GRANULOCYTES NFR BLD: 1 %
LIPASE SERPL-CCNC: 497 U/L (ref 73–393)
LYMPHOCYTES # BLD AUTO: 2.2 10E3/UL (ref 0.8–5.3)
LYMPHOCYTES NFR BLD AUTO: 16 %
MAGNESIUM SERPL-MCNC: 1.6 MG/DL (ref 1.6–2.3)
MCH RBC QN AUTO: 28 PG (ref 26.5–33)
MCHC RBC AUTO-ENTMCNC: 32.4 G/DL (ref 31.5–36.5)
MCV RBC AUTO: 86 FL (ref 78–100)
MONOCYTES # BLD AUTO: 0.7 10E3/UL (ref 0–1.3)
MONOCYTES NFR BLD AUTO: 5 %
NEUTROPHILS # BLD AUTO: 10.8 10E3/UL (ref 1.6–8.3)
NEUTROPHILS NFR BLD AUTO: 74 %
NRBC # BLD AUTO: 0 10E3/UL
NRBC BLD AUTO-RTO: 0 /100
PHOSPHATE SERPL-MCNC: 2.8 MG/DL (ref 2.5–4.5)
PLATELET # BLD AUTO: 291 10E3/UL (ref 150–450)
POTASSIUM BLD-SCNC: 3.9 MMOL/L (ref 3.4–5.3)
RBC # BLD AUTO: 3.46 10E6/UL (ref 3.8–5.2)
SODIUM SERPL-SCNC: 144 MMOL/L (ref 133–144)
TACROLIMUS BLD-MCNC: 8 UG/L (ref 5–15)
TME LAST DOSE: NORMAL H
TME LAST DOSE: NORMAL H
WBC # BLD AUTO: 14.4 10E3/UL (ref 4–11)

## 2021-11-24 PROCEDURE — 80048 BASIC METABOLIC PNL TOTAL CA: CPT | Performed by: PATHOLOGY

## 2021-11-24 PROCEDURE — 83735 ASSAY OF MAGNESIUM: CPT | Performed by: PATHOLOGY

## 2021-11-24 PROCEDURE — G0463 HOSPITAL OUTPT CLINIC VISIT: HCPCS

## 2021-11-24 PROCEDURE — 83690 ASSAY OF LIPASE: CPT | Performed by: PATHOLOGY

## 2021-11-24 PROCEDURE — 99000 SPECIMEN HANDLING OFFICE-LAB: CPT | Performed by: PATHOLOGY

## 2021-11-24 PROCEDURE — 80180 DRUG SCRN QUAN MYCOPHENOLATE: CPT | Mod: 90 | Performed by: PATHOLOGY

## 2021-11-24 PROCEDURE — 99215 OFFICE O/P EST HI 40 MIN: CPT | Mod: 24 | Performed by: INTERNAL MEDICINE

## 2021-11-24 PROCEDURE — 82150 ASSAY OF AMYLASE: CPT | Performed by: PATHOLOGY

## 2021-11-24 PROCEDURE — 36415 COLL VENOUS BLD VENIPUNCTURE: CPT | Performed by: PATHOLOGY

## 2021-11-24 PROCEDURE — 85025 COMPLETE CBC W/AUTO DIFF WBC: CPT | Performed by: PATHOLOGY

## 2021-11-24 PROCEDURE — 84100 ASSAY OF PHOSPHORUS: CPT | Performed by: PATHOLOGY

## 2021-11-24 PROCEDURE — 80197 ASSAY OF TACROLIMUS: CPT | Mod: 90 | Performed by: PATHOLOGY

## 2021-11-24 PROCEDURE — 250N000013 HC RX MED GY IP 250 OP 250 PS 637: Performed by: INTERNAL MEDICINE

## 2021-11-24 RX ADMIN — DOCUSATE SODIUM 286 ML: 50 LIQUID ORAL at 10:38

## 2021-11-24 NOTE — LETTER
PHYSICIAN ORDERS      DATE & TIME ISSUED: 2021 9:27 AM  PATIENT NAME: Marilyn Ortega   : 1985     [facility] MR# [if applicable]: [id number]     DIAGNOSIS:  s/p kidney pancreas transplant  ICD-10 CODE: Z94.0, Z94.83     Orders for Home Care - skilled nursing, labs 2x/week on Monday/Thursday and medication education and set up    Any questions please call: [phone]        Branden Aranda MD                            PATIENT DEMOGRAPHICS:   Name: Marilyn Ortega MRN: [id number]   Address: [address] [place] [address] [place] Sex: Female   City/St/Zip: [place] [address] : 1985   Home Phone: [phone] Work Phone:     County: [place] Cell Phone: [phone]       EMERGENCY CONTACT:  Contact Name: Milagros Ortega Relationship: Mother   Home Phone: [phone] Work Phone:         Cell Phone: [phone]      GUARANTOR INFORMATION: Relationship to Patient: Self  Name: Marilyn Ortega       Address: [address] [place] [address] [place]  Account Type: Personal/family   [place] [address] Employer: [employer]   Home Phone: [phone] Work Phone:          COVERAGE INFORMATION:  Primary Payor: [employer] Plan: [employer]*   Subscriber: Marilyn Ortega Group #: [id number]   Subscriber Sex: Female       Subscriber : 1985 Patient Rel'ship: Self   Subscriber Effective Date:   Member Effective Date: 2018    Subscriber ID [id number] Member ID: [id number]      Secondary Payor:   Plan:     Subscriber:   Group #:     Subscriber Sex:         Subscriber :   Patient Rel'ship:     Subscriber Effective Date:   Member Effective Date:     Subscriber ID   Member ID:

## 2021-11-24 NOTE — TELEPHONE ENCOUNTER
Spoke w/ Margareth on intake team at [facility]. Tues blood draw is OK, but would want to make sure the on Friday, that labs could be dropped off at the [facility] to ensure tacro level would be back by end of day on Friday.     Margareth will forward message to local team to see if accomodation can be made and if there is staffing available. Will await call back.

## 2021-11-24 NOTE — LETTER
OUTPATIENT LABORATORY TEST ORDER    Patient Name: Marilyn Ortega                  Transplant Date: 11/15/2021   YOB: 1985                                   Issue Date & Time: 11/21/2021  6:12 PM  [facility] MR: [id number]                                 Exp. Date (1 year after date issued)    Diagnoses: Kidney Transplant (ICD-10  Z94.0)    Pancreas Transplant (ICD-10  Z94.83)   Long term use of medications (ICD-10  Z79.899)    ?Lab results to be available on the same day drawn.  ?Please release results to patient upon their request   ?Please fax to the Transplant Center at [phone].    2x/week, (M, TH) through first 3 months post-transplant Due: 11/15/2021 - 2/15/2022  1x/week, (M or T) 4 - 6 months post-transplant Due: 2/16/2022 - 5/16/2022   Every other week (M or T) months 7 - 13 post-transplant Due: 5/17/2022 - 11/17/2022          ?Complete Blood Count with Platelets & Differentials with Absolutes        ?Basic Metabolic Panel (Sodium, Potassium, Chloride, CO2, Creatinine, Urea Nitrogen, Glucose, Calcium)        ?Amylase, Lipase        ?/Tacrolimus/Prograf drug level                   Weekly through first 4 months post-transplant, then  Monthly       ?Phosphorous, Magnesium    Every other week through the 2 months post-transplant       ?Mycophenolic Acid (MPA level)    Monthly   ?BK PCR QT    At 1 month post-transplant  Due: 12/15/2021   ?Random urine protein/creatinine ratio   ?PRA/DSA (Donor Specific Antibodies)     At 2 months post-transplant  Due: 1/15/2022   ?PRA/DSA (Donor Specific Antibodies)        At 4 months post-transplant  Due: 3/15/2022   ?Random urine protein/creatinine ratio   ?PTH   ?PRA/DSA (Donor Specific Antibodies)    At 6 months post-transplant  Due: (Fasting labs) 5/15/2022   ?LFT's   ?Hgb A1c   ?Uric acid   ?Lipid panel    At 7 months post-transplant  Due: 6/15/2022   ?PRA/DSA (Donor Specific Antibodies)    At 9 months post-transplant  Due: 8/15/2022   ?Random urine  protein/creatinine ratio   ?PRA/DSA (Donor Specific Antibodies)    At 12 months post-transplant  Due: (Fasting lab) 11/15/2022   ?PRA/DSA (Donor Specific Antibodies)   ?LFT's   ?Lipid panel   ?Hgb A1c     At 13 months post-transplant  Due: 12/15/2022   ?PRA/DSA (Donor Specific Antibodies)        If you have any questions, please call The [facility]plant [facility] at [phone] or [phone].    Please fax all results to [phone].      Branden Aranda MD

## 2021-11-24 NOTE — PROGRESS NOTES
"Marilyn Ortega came to [facility] today for labs and assessment following a kidney/pancreas transplant on 11/15/2021     Discharge date: 11/22  Transplant coordinator: Yessica Becker    Physical Assessment:  See physical assessment located under \"Document Flowsheets\".  Incision site: midline. Closed with staples, WDL.  Lines: SARAI right abdomen. Approximately 500mLs serosanguinous output in last 24 hours. Per hospital discharge paperwork SARAI to remain in place till output is < 100-200mLs/day  Cage: NA  Urine clarity: straw colored and clear per patient. Denies pain or irritation with urination.  Hydration: 2-3 liters water intake yesterday  Nutrition: decreased appetite. Denies nausea or vomiting.  Last BM: morning of hospital discharge. Had 2 senna and miralax last night without results. Feeling bloated.  Pain: worse today due to feeling bloated. Rating 6/10. Not requiring tylenol or oxy.     Labs drawn by [facility] staff No. Drawn [facility]    Plan of care for today:   Labs on first floor. Vitals obtained and nursing assessment completed. Dr. Pack in to evaluate patient. No med changes. Tap water enema given without results within an hour. Pink Lady enema given. 2 medium BMs within 30 mins; still reporting some feelings of constipation, but wanting to discharge home. Will take miralax and senna again this evening. Can have tomorrow off, [facility] and labs again on Friday.     Medication changes: none    Medications administered:     Administrations This Visit     pink lady enema     Admin Date  11/24/2021 Action  Given Dose  286 mL Route  Rectal Administered By  Jolene Allen, RN                Patient education:    The following teaching topics were addressed: Importance of drinking 2L of non-caffeinated fluids daily, Incisional care, Medications (purposes, doses and times of administration), Plan of care and Drain care   Patient verbalized understanding and all questions answered.    Drug " level:  Patient took 0.5mg of tacrolimus last evening at 630pm.  Care coordinator to follow up with the result.    Discharge Plan    Pt will follow up with transplant coordinator and [facility] again on Friday.  Discharge instructions reviewed with patient: YES  Patient/Representative verbalized understanding, all questions answered: YES    Discharged from unit at 1120 with whom: mom to home (in [place]).    Jolene Reyna RN

## 2021-11-24 NOTE — LETTER
"    11/24/2021         RE: Marilyn Ortega  [address]  [place] [address]        Dear Colleague,    Thank you for referring your patient, Marilyn Ortega, to the [facility]. Please see a copy of my visit note below.    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  Incision site: midline. Closed with staples, WDL.  Lines: SARAI right abdomen. Approximately 500mLs serosanguinous output in last 24 hours. Per hospital discharge paperwork SARAI to remain in place till output is < 100-200mLs/day  Cage: NA  Urine clarity: straw colored and clear per patient. Denies pain or irritation with urination.  Hydration: 2-3 liters water intake yesterday  Nutrition: decreased appetite. Denies nausea or vomiting.  Last BM: morning of hospital discharge. Had 2 senna and miralax last night without results. Feeling bloated.  Pain: worse today due to feeling bloated. Rating 6/10. Not requiring tylenol or oxy.     Labs drawn by [facility] staff No. [facility]    Plan of care for today:   Labs on first floor. Vitals obtained and nursing assessment completed. Dr. Pack in to evaluate patient. No med changes. Tap water enema given without results within an hour. Pink Lady enema given. 2 medium BMs within 30 mins; still reporting some feelings of constipation, but wanting to discharge home. Will take miralax and senna again this evening. Can have tomorrow off, [facility] and labs again on Friday.     Medication changes: none    Medications administered:     Administrations This Visit     pink lady enema     Admin Date  11/24/2021 Action  Given Dose  286 mL Route  Rectal Administered By  Jolene Allen, RN                Patient education:    The " following teaching topics were addressed: Importance of drinking 2L of non-caffeinated fluids daily, Incisional care, Medications (purposes, doses and times of administration), Plan of care and Drain care   Patient verbalized understanding and all questions answered.    Drug level:  Patient took 0.5mg of tacrolimus last evening at 630pm.  Care coordinator to follow up with the result.    Discharge Plan    Pt will follow up with transplant coordinator and [facility] again on Friday.  Discharge instructions reviewed with patient: YES  Patient/Representative verbalized understanding, all questions answered: YES    Discharged from unit at 1120 with whom: mom to home (in [place]).    Jolene Reyna RN      Again, thank you for allowing me to participate in the care of your patient.        Sincerely,        [facility]

## 2021-11-24 NOTE — TELEPHONE ENCOUNTER
Received notification from [facility] RN that patient is interested in HomeCare services.     [facility] is not accepting new referrals at this time due to staffing constraints    [facility] accepted the referral, but all new cases are under review to determine staffing need.   Referral faxed to [facility]. Will await decision.

## 2021-11-24 NOTE — TELEPHONE ENCOUNTER
Post Kidney and Pancreas Transplant Team Conference  Date: 11/24/2021  Transplant Coordinator: Yessica Becker     Attendees:  []  Dr. Pack [] Brandi Hernandez, RN [] Tamiko Cárdenas LPN     []  Dr. Motta [] Lizette Park RN [] Radhika Arnett LPN   []  Dr. Lee [] Yessica Becker RN    []  Dr. Corona [] Tasha Cartwright RN [] Bulmaro Weiner, PharmD   [] Dr. El [] Hamida Guevara RN    [] Dr. Chavarria [] Roderick Messer RN    [] Dr. Ocampo [] Lynette De RN [] Nena Wheeler RN   [] Dr. De León [] Naz Glaser RN    []  Dr. Aranda [] Radha Hurt RN    [] Dr. Wright [] Ashely Tse RN    [] Sally Brito NP [] Jovana Lemso RN        Verbal Plan Read Back:   No changes to lab or medication schedule  Will continue to monitor    Routed to RN Coordinator   Radhika Arnett LPN

## 2021-11-24 NOTE — TELEPHONE ENCOUNTER
Provider Call: General  Route to LPN    Reason for call: they received orders for HHN- due to their capacity for Mon/Yhurs draws-  they are wondering if they can change lab draws to Tues/Fri  Return call to intake and they will notify Crystal     Call back needed? Yes    Return Call Needed  Same as documented in contacts section  When to return call?: Greater than one day: Route standard priority

## 2021-11-24 NOTE — LETTER
11/24/2021         RE: Marilyn Ortega  [address]  [place] [address]        Dear Colleague,    Thank you for referring your patient, Marilyn Ortega, to the [facility]. Please see a copy of my visit note below.    TRANSPLANT NEPHROLOGY EARLY POST TRANSPLANT VISIT    Assessment & Plan   # LDKT and DDKT (SPK): Trend down Patient creatinine trended down after holding Lasix and giving fluid she repots gaining 3 pond and feeling little puffy although no edema on exam. Her Tac level was 12.1 and had her dose reduce to 0.5 mg at night and continue with morning dose of 1 mg awaiting on the morning   - Baseline Creatinine: ~0.9-1.1   - Proteinuria: Not checked post transplant   - Date DSA Last Checked: Nov/2021      Latest DSA: No   - BK Viremia: Not checked recently   - Kidney Tx Biopsy: No    # Pancreas Tx (SPK):    - Pancreatic Exocrine Drainage: Enteric drained     - Blood glucose: Euglycemia      On insulin: No   - HbA1c: Last checked at time of transplant      Latest HbA1c: 7.7%   - Pancreatic enzymes: Trend down   - Date DSA Last Checked: Nov/2021  Latest DSA: No   - Pancreas Tx Biopsy: No  -U/s normal and lipase trending down yesterday was 602 and today its 497  # Immunosuppression: Tacrolimus immediate release (goal 8-10), Mycophenolic acid (dose 720 mg every 12 hours) and Prednisone (dose taper)   - Induction with Recent Transplant:  Intermediate Intensity   - Continue with intensive monitoring of immunosuppression for efficacy and toxicity.   - Changes: No    # Infection Prophylaxis:   - PJP: Sulfa/TMP (Bactrim)  - CMV: Valganciclovir (Valcyte) R-ve/D-ve    # Hypertension: Controlled, but low at times;  Goal BP: < 150/90   - Volume status: Euvolemic     - Changes: Not at this time    # Diabetes: Borderline control (HbA1c 7-9%) Last HbA1c: 7.7%   - Management as per primary team.But with the pancrease transplant her diabetes should resolve     #  Anemia in Chronic Renal Disease: Hgb: Stable      SYLVAIN: No   - Iron studies: Not checked recently    # Mineral Bone Disorder:   - Calcium; level: Normal        On supplement: No    # Electrolytes:   - Potassium; level: Normal        On supplement: No  - Bicarbonate; level: Normal        On supplement: No    # Constipation: Patient patient is still has not had a bowel movement since 2-1/2 days we will give her enema and also ask her to take to docusate senna twice a day and MiraLAX at night    # Medical Compliance: Yes    # COVID-19 Virus Review: Discussed COVID-19 virus and the potential medical risks.  Reviewed preventative health recommendations, including wearing a mask where appropriate.  Recommended COVID vaccination should be up to date with either an initial vaccination or booster shot when appropriate.  Asked the patient to inform the transplant center if they are exposed or diagnosed with this virus.    # COVID Vaccination Up To Date: Yes    #Leukocytosis(improving): Likely due to steroids  # Transplant History:  Etiology of Kidney Failure: Diabetes mellitus type 1  Tx: DDKT (SPK)  Transplant: 11/15/2021 (Kidney / Pancreas)  Donor Type: Donation after Brain Death Donor Class:   Crossmatch at time of Tx: negative  DSA at time of Tx: No  Significant changes in immunosuppression: None  Significant transplant-related complications: None    Transplant Office Phone Number: [phone]    Assessment and plan was discussed with the patient and she voiced her understanding and agreement.    Return visit: Return in about 1 day (around 11/25/2021).    Chaka Lock MD     Attestation:  This patient has been seen and evaluated by me, Mason Pack MD.  I have reviewed the note and agree with plan of care as documented by the fellow.       Chief Complaint   Ms. Ortega is a 36 year old here for kidney transplant, pancreas transplant and immunosuppression management.    History of Present Illness    36-year-old  female with a past medical history of type 1 diabetes mellitus and CKD s/p  donor kidney transplant and SPK on 11/15/2021. Patient reports she is complaint with her medication.Jurgen has not had a bowel movement since 2.5  Days. She reports gaining 3 pound and feeling puffy.    Home BP: systolic 100-120's    Problem List   Patient Active Problem List   Diagnosis     Type 1 diabetes mellitus (H)     Type 1 diabetes, HbA1c goal < 7% (H)     Osteopenia     Chronic constipation     Irritable bowel syndrome     CKD (chronic kidney disease) stage 4, GFR 15-29 ml/min (H)     HTN (hypertension)     Gastroparesis     Heterozygous factor V Leiden mutation (H)     Organ transplant candidate     Status post simultaneous kidney and pancreas transplant (H)     Immunosuppressed status (H)       Allergies   Allergies   Allergen Reactions     Chlorhexidine Hives, Itching and Rash     PN: Patient had swelling/rash that was very itchy with chlorprep.     Ceclor [Cefaclor Monohydrate]      Cefaclor Rash       Medications   Current Outpatient Medications   Medication Sig     acetaminophen (TYLENOL) 325 MG tablet Take 1-2 tablets (325-650 mg) by mouth every 4 hours as needed for other (For optimal non-opioid multimodal pain management to improve pain control.)     aspirin (ASA) 325 MG tablet Take 1 tablet (325 mg) by mouth daily     bromfenac (PROLENSA) 0.07 % ophthalmic solution Place 1 drop Into the left eye daily     calcium carbonate-vitamin D (OS-STEPHANIE WITH D) 500-200 MG-UNIT tablet Take 1 tablet by mouth 2 times daily (with meals)     calcium polycarbophil (FIBERCON) 625 MG tablet Take 2 tablets (1,250 mg) by mouth 2 times daily     cetirizine (ZYRTEC) 10 MG tablet Take 10 mg by mouth daily     cholecalciferol 25 MCG (1000 UT) TABS Take 2,000 Units by mouth every other day      clotrimazole (MYCELEX) 10 MG lozenge Place 1 lozenge (10 mg) inside cheek 4 times daily     Continuous Blood Gluc Sensor (DEXCOM G6 SENSOR) MISC Use as  directed for continuous glucose monitoring.  Change sensor every 10 days.     Continuous Blood Gluc Transmit (DEXCOM G6 TRANSMITTER) MISC Use as directed for continuous glucose monitoring.  Change every 3 months.     glucose blood VI test strips strip 4 times daily.     Loteprednol Etabonate (LOTEMAX SM) 0.38 % GEL Apply 1 drop to eye 3 times daily Left eye     magnesium oxide (MAG-OX) 400 MG tablet Take 1 tablet (400 mg) by mouth daily     mycophenolic acid (GENERIC EQUIVALENT) 360 MG EC tablet Take 2 tablets (720 mg) by mouth 2 times daily     ondansetron (ZOFRAN-ODT) 4 MG ODT tab Take 1 tablet (4 mg) by mouth every 6 hours as needed for nausea or vomiting     oxyCODONE (ROXICODONE) 5 MG tablet Take 1 tablet (5 mg) by mouth every 6 hours as needed for moderate to severe pain     pantoprazole (PROTONIX) 40 MG EC tablet Take 1 tablet (40 mg) by mouth every morning (before breakfast)     predniSONE (DELTASONE) 10 MG tablet Take 2 tablets (20 mg) by mouth daily for 7 days, THEN 1.5 tablets (15 mg) daily for 7 days, THEN 1 tablet (10 mg) daily for 7 days, THEN 0.5 tablets (5 mg) daily for 7 days.     Prucalopride Succinate (MOTEGRITY) 2 MG TABS Take one tablet by mouth daily     senna-docusate (SENOKOT-S/PERICOLACE) 8.6-50 MG tablet Take 2 tablets by mouth daily as needed for constipation     sulfamethoxazole-trimethoprim (BACTRIM) 400-80 MG tablet Take 1 tablet by mouth daily     tacrolimus (GENERIC EQUIVALENT) 0.5 MG capsule Take 1 capsule by mouth twice daily when directed by transplant team     tacrolimus (GENERIC EQUIVALENT) 1 MG capsule Take 1 capsule (1 mg) by mouth 2 times daily     valGANciclovir (VALCYTE) 450 MG tablet Take 2 tablets (900 mg) by mouth daily     No current facility-administered medications for this visit.     There are no discontinued medications.    Physical Exam   Vital Signs: LMP  (LMP Unknown)     GENERAL APPEARANCE: alert and no distress  HENT: mouth without ulcers or lesions  LYMPHATICS:  no cervical or supraclavicular nodes  RESP: lungs clear to auscultation - no rales, rhonchi or wheezes  CV: regular rhythm, normal rate, no rub, no murmur  EDEMA: no LE edema bilaterally  ABDOMEN: soft, nondistended, nontender, bowel sounds normal.Has tables and enteric drain  MS: extremities normal - no gross deformities noted, no evidence of inflammation in joints, no muscle tenderness  SKIN: no rash    Data     Renal Latest Ref Rng & Units 11/24/2021 11/23/2021 11/22/2021   Na 133 - 144 mmol/L 144 142 -   K 3.4 - 5.3 mmol/L 3.9 3.8 -   Cl 94 - 109 mmol/L 108 106 -   CO2 20 - 32 mmol/L 29 29 -   BUN 7 - 30 mg/dL 26 32(H) -   Cr 0.52 - 1.04 mg/dL 1.33(H) 1.43(H) -   Glucose 70 - 99 mg/dL 74 80 136(H)   Ca  8.5 - 10.1 mg/dL 8.9 8.9 -   Mg 1.6 - 2.3 mg/dL 1.6 1.8 -     Bone Health Latest Ref Rng & Units 11/24/2021 11/23/2021 11/22/2021   Phos 2.5 - 4.5 mg/dL 2.8 2.8 3.0   PTHi 12 - 72 pg/mL - - -     Heme Latest Ref Rng & Units 11/24/2021 11/23/2021 11/22/2021   WBC 4.0 - 11.0 10e3/uL 14.4(H) 17.1(H) 12.9(H)   Hgb 11.7 - 15.7 g/dL 9.7(L) 10.0(L) 9.4(L)   Plt 150 - 450 10e3/uL 291 277 220   ABSOLUTE NEUTROPHIL 1.6 - 8.3 10e9/L - - -   ABSOLUTE LYMPHOCYTES 0.8 - 5.3 10e9/L - - -   ABSOLUTE MONOCYTES 0.0 - 1.3 10e9/L - - -   ABSOLUTE EOSINOPHILS 0.0 - 0.7 10e9/L - - -   ABSOLUTE BASOPHILS 0.0 - 0.2 10e9/L - - -     Liver Latest Ref Rng & Units 11/15/2021 7/21/2021 6/8/2012   AP 40 - 150 U/L 104 94 96   TBili 0.2 - 1.3 mg/dL 0.4 0.4 0.3   ALT 0 - 50 U/L 23 24 13   AST 0 - 45 U/L 16 19 33   Tot Protein 6.8 - 8.8 g/dL 8.4 9.4(H) 7.8   Albumin 3.4 - 5.0 g/dL 4.0 4.7 4.2     Pancreas Latest Ref Rng & Units 11/24/2021 11/23/2021 11/22/2021   A1C 0.0 - 5.6 % - - -   Amylase 30 - 110 U/L 111(H) 127(H) 93   Lipase 73 - 393 U/L 497(H) 602(H) 386        [facility] Txp Virology Latest Ref Rng & Units 7/21/2021   EBV CAPSID ANTIBODY IGG No detectable antibody. Positive(A)        Recent Labs   Lab Test 11/19/21  0604 11/20/21  0717  11/21/21  0725 11/22/21  0601 11/23/21  0652   DOSTAC  --   --   --  11/22/2021 11/22/2021   TACROL 5.4   < > 21.9* 11.1 12.5    < > = values in this interval not displayed.           Mason Pack MD

## 2021-11-24 NOTE — PATIENT INSTRUCTIONS
Dear Marilyn Ortega    Thank you for choosing [facility] ([facility]) for your transplant cares.  The following information is a summary of our appointment as well as important reminders.      PLAN    1. You can have tomorrow off. Return to clinic again on Friday at 0645 for labs and 0700 in clinic afterwards.  2. No med changes today. (your coordinator may call you later today if she wants you to change your tacrolimus dosing).  3. Take miralax and senna later today. If concerns come up overnight or tomorrow call you coordinator or coordinator on-call.    Contact Information:    Transplant Coordinator: Yessica Becker  Transplant Office:  [phone]  [facility]:  [phone]  Ask for physician on call for kidney/pancreas transplant.  [address] (Transplant Unit):  [phone]  [facility]:  [phone]  [facility]:  [phone]      We look forward in seeing you on your next appointment here at [facility] ([facility]).  Please don t hesitate to call us at [phone] to reschedule any of your appointments or to speak with one of the [facility] registered nurses.  It was a pleasure taking care of you today.    Sincerely,    [facility]  [address] [place]  [address]  Phone:  [phone]

## 2021-11-24 NOTE — PROGRESS NOTES
TRANSPLANT NEPHROLOGY EARLY POST TRANSPLANT VISIT    Assessment & Plan   # LDKT and DDKT (SPK): Trend down Patient creatinine trended down after holding Lasix and giving fluid she repots gaining 3 pond and feeling little puffy although no edema on exam. Her Tac level was 12.1 and had her dose reduce to 0.5 mg at night and continue with morning dose of 1 mg awaiting on the morning   - Baseline Creatinine: ~0.9-1.1   - Proteinuria: Not checked post transplant   - Date DSA Last Checked: Nov/2021      Latest DSA: No   - BK Viremia: Not checked recently   - Kidney Tx Biopsy: No    # Pancreas Tx (SPK):    - Pancreatic Exocrine Drainage: Enteric drained     - Blood glucose: Euglycemia      On insulin: No   - HbA1c: Last checked at time of transplant      Latest HbA1c: 7.7%   - Pancreatic enzymes: Trend down   - Date DSA Last Checked: Nov/2021  Latest DSA: No   - Pancreas Tx Biopsy: No  -U/s normal and lipase trending down yesterday was 602 and today its 497  # Immunosuppression: Tacrolimus immediate release (goal 8-10), Mycophenolic acid (dose 720 mg every 12 hours) and Prednisone (dose taper)   - Induction with Recent Transplant:  Intermediate Intensity   - Continue with intensive monitoring of immunosuppression for efficacy and toxicity.   - Changes: No    # Infection Prophylaxis:   - PJP: Sulfa/TMP (Bactrim)  - CMV: Valganciclovir (Valcyte) R-ve/D-ve    # Hypertension: Controlled, but low at times;  Goal BP: < 150/90   - Volume status: Euvolemic     - Changes: Not at this time    # Diabetes: Borderline control (HbA1c 7-9%) Last HbA1c: 7.7%   - Management as per primary team.But with the pancrease transplant her diabetes should resolve     # Anemia in Chronic Renal Disease: Hgb: Stable      SYLVAIN: No   - Iron studies: Not checked recently    # Mineral Bone Disorder:   - Calcium; level: Normal        On supplement: No    # Electrolytes:   - Potassium; level: Normal        On supplement: No  - Bicarbonate; level: Normal         On supplement: No    # Constipation: Patient patient is still has not had a bowel movement since 2-1/2 days we will give her enema and also ask her to take to docusate senna twice a day and MiraLAX at night    # Medical Compliance: Yes    # COVID-19 Virus Review: Discussed COVID-19 virus and the potential medical risks.  Reviewed preventative health recommendations, including wearing a mask where appropriate.  Recommended COVID vaccination should be up to date with either an initial vaccination or booster shot when appropriate.  Asked the patient to inform the transplant center if they are exposed or diagnosed with this virus.    # COVID Vaccination Up To Date: Yes    #Leukocytosis(improving): Likely due to steroids  # Transplant History:  Etiology of Kidney Failure: Diabetes mellitus type 1  Tx: DDKT (SPK)  Transplant: 11/15/2021 (Kidney / Pancreas)  Donor Type: Donation after Brain Death Donor Class:   Crossmatch at time of Tx: negative  DSA at time of Tx: No  Significant changes in immunosuppression: None  Significant transplant-related complications: None    Transplant Office Phone Number: [phone]    Assessment and plan was discussed with the patient and she voiced her understanding and agreement.    Return visit: Return in about 1 day (around 2021).    Chaka Lock MD     Attestation:  This patient has been seen and evaluated by me, Mason Pack MD.  I have reviewed the note and agree with plan of care as documented by the fellow.       Chief Complaint   Ms. Ortega is a 36 year old here for kidney transplant, pancreas transplant and immunosuppression management.    History of Present Illness    36-year-old female with a past medical history of type 1 diabetes mellitus and CKD s/p  donor kidney transplant and SPK on 11/15/2021. Patient reports she is complaint with her medication.Jurgen has not had a bowel movement since 2.5  Days. She reports gaining 3 pound and feeling  puffy.    Home BP: systolic 100-120's    Problem List   Patient Active Problem List   Diagnosis     Type 1 diabetes mellitus (H)     Type 1 diabetes, HbA1c goal < 7% (H)     Osteopenia     Chronic constipation     Irritable bowel syndrome     CKD (chronic kidney disease) stage 4, GFR 15-29 ml/min (H)     HTN (hypertension)     Gastroparesis     Heterozygous factor V Leiden mutation (H)     Organ transplant candidate     Status post simultaneous kidney and pancreas transplant (H)     Immunosuppressed status (H)       Allergies   Allergies   Allergen Reactions     Chlorhexidine Hives, Itching and Rash     PN: Patient had swelling/rash that was very itchy with chlorprep.     Ceclor [Cefaclor Monohydrate]      Cefaclor Rash       Medications   Current Outpatient Medications   Medication Sig     acetaminophen (TYLENOL) 325 MG tablet Take 1-2 tablets (325-650 mg) by mouth every 4 hours as needed for other (For optimal non-opioid multimodal pain management to improve pain control.)     aspirin (ASA) 325 MG tablet Take 1 tablet (325 mg) by mouth daily     bromfenac (PROLENSA) 0.07 % ophthalmic solution Place 1 drop Into the left eye daily     calcium carbonate-vitamin D (OS-STEPHANIE WITH D) 500-200 MG-UNIT tablet Take 1 tablet by mouth 2 times daily (with meals)     calcium polycarbophil (FIBERCON) 625 MG tablet Take 2 tablets (1,250 mg) by mouth 2 times daily     cetirizine (ZYRTEC) 10 MG tablet Take 10 mg by mouth daily     cholecalciferol 25 MCG (1000 UT) TABS Take 2,000 Units by mouth every other day      clotrimazole (MYCELEX) 10 MG lozenge Place 1 lozenge (10 mg) inside cheek 4 times daily     Continuous Blood Gluc Sensor (DEXCOM G6 SENSOR) MISC Use as directed for continuous glucose monitoring.  Change sensor every 10 days.     Continuous Blood Gluc Transmit (DEXCOM G6 TRANSMITTER) MISC Use as directed for continuous glucose monitoring.  Change every 3 months.     glucose blood VI test strips strip 4 times daily.      Loteprednol Etabonate (LOTEMAX SM) 0.38 % GEL Apply 1 drop to eye 3 times daily Left eye     magnesium oxide (MAG-OX) 400 MG tablet Take 1 tablet (400 mg) by mouth daily     mycophenolic acid (GENERIC EQUIVALENT) 360 MG EC tablet Take 2 tablets (720 mg) by mouth 2 times daily     ondansetron (ZOFRAN-ODT) 4 MG ODT tab Take 1 tablet (4 mg) by mouth every 6 hours as needed for nausea or vomiting     oxyCODONE (ROXICODONE) 5 MG tablet Take 1 tablet (5 mg) by mouth every 6 hours as needed for moderate to severe pain     pantoprazole (PROTONIX) 40 MG EC tablet Take 1 tablet (40 mg) by mouth every morning (before breakfast)     predniSONE (DELTASONE) 10 MG tablet Take 2 tablets (20 mg) by mouth daily for 7 days, THEN 1.5 tablets (15 mg) daily for 7 days, THEN 1 tablet (10 mg) daily for 7 days, THEN 0.5 tablets (5 mg) daily for 7 days.     Prucalopride Succinate (MOTEGRITY) 2 MG TABS Take one tablet by mouth daily     senna-docusate (SENOKOT-S/PERICOLACE) 8.6-50 MG tablet Take 2 tablets by mouth daily as needed for constipation     sulfamethoxazole-trimethoprim (BACTRIM) 400-80 MG tablet Take 1 tablet by mouth daily     tacrolimus (GENERIC EQUIVALENT) 0.5 MG capsule Take 1 capsule by mouth twice daily when directed by transplant team     tacrolimus (GENERIC EQUIVALENT) 1 MG capsule Take 1 capsule (1 mg) by mouth 2 times daily     valGANciclovir (VALCYTE) 450 MG tablet Take 2 tablets (900 mg) by mouth daily     No current facility-administered medications for this visit.     There are no discontinued medications.    Physical Exam   Vital Signs: LMP  (LMP Unknown)     GENERAL APPEARANCE: alert and no distress  HENT: mouth without ulcers or lesions  LYMPHATICS: no cervical or supraclavicular nodes  RESP: lungs clear to auscultation - no rales, rhonchi or wheezes  CV: regular rhythm, normal rate, no rub, no murmur  EDEMA: no LE edema bilaterally  ABDOMEN: soft, nondistended, nontender, bowel sounds normal.Has tables and enteric  drain  MS: extremities normal - no gross deformities noted, no evidence of inflammation in joints, no muscle tenderness  SKIN: no rash    Data     Renal Latest Ref Rng & Units 11/24/2021 11/23/2021 11/22/2021   Na 133 - 144 mmol/L 144 142 -   K 3.4 - 5.3 mmol/L 3.9 3.8 -   Cl 94 - 109 mmol/L 108 106 -   CO2 20 - 32 mmol/L 29 29 -   BUN 7 - 30 mg/dL 26 32(H) -   Cr 0.52 - 1.04 mg/dL 1.33(H) 1.43(H) -   Glucose 70 - 99 mg/dL 74 80 136(H)   Ca  8.5 - 10.1 mg/dL 8.9 8.9 -   Mg 1.6 - 2.3 mg/dL 1.6 1.8 -     Bone Health Latest Ref Rng & Units 11/24/2021 11/23/2021 11/22/2021   Phos 2.5 - 4.5 mg/dL 2.8 2.8 3.0   PTHi 12 - 72 pg/mL - - -     Heme Latest Ref Rng & Units 11/24/2021 11/23/2021 11/22/2021   WBC 4.0 - 11.0 10e3/uL 14.4(H) 17.1(H) 12.9(H)   Hgb 11.7 - 15.7 g/dL 9.7(L) 10.0(L) 9.4(L)   Plt 150 - 450 10e3/uL 291 277 220   ABSOLUTE NEUTROPHIL 1.6 - 8.3 10e9/L - - -   ABSOLUTE LYMPHOCYTES 0.8 - 5.3 10e9/L - - -   ABSOLUTE MONOCYTES 0.0 - 1.3 10e9/L - - -   ABSOLUTE EOSINOPHILS 0.0 - 0.7 10e9/L - - -   ABSOLUTE BASOPHILS 0.0 - 0.2 10e9/L - - -     Liver Latest Ref Rng & Units 11/15/2021 7/21/2021 6/8/2012   AP 40 - 150 U/L 104 94 96   TBili 0.2 - 1.3 mg/dL 0.4 0.4 0.3   ALT 0 - 50 U/L 23 24 13   AST 0 - 45 U/L 16 19 33   Tot Protein 6.8 - 8.8 g/dL 8.4 9.4(H) 7.8   Albumin 3.4 - 5.0 g/dL 4.0 4.7 4.2     Pancreas Latest Ref Rng & Units 11/24/2021 11/23/2021 11/22/2021   A1C 0.0 - 5.6 % - - -   Amylase 30 - 110 U/L 111(H) 127(H) 93   Lipase 73 - 393 U/L 497(H) 602(H) 386        [facility] Txp Virology Latest Ref Rng & Units 7/21/2021   EBV CAPSID ANTIBODY IGG No detectable antibody. Positive(A)        Recent Labs   Lab Test 11/19/21  0604 11/20/21  0717 11/21/21  0725 11/22/21  0601 11/23/21  0652   DOSTAC  --   --   --  11/22/2021 11/22/2021   TACROL 5.4   < > 21.9* 11.1 12.5    < > = values in this interval not displayed.

## 2021-11-26 ENCOUNTER — INFUSION THERAPY VISIT (OUTPATIENT)
Dept: INFUSION THERAPY | Facility: CLINIC | Age: 36
End: 2021-11-26
Payer: COMMERCIAL

## 2021-11-26 ENCOUNTER — TELEPHONE (OUTPATIENT)
Dept: TRANSPLANT | Facility: CLINIC | Age: 36
End: 2021-11-26

## 2021-11-26 ENCOUNTER — LAB (OUTPATIENT)
Dept: LAB | Facility: CLINIC | Age: 36
End: 2021-11-26
Payer: COMMERCIAL

## 2021-11-26 VITALS
OXYGEN SATURATION: 100 % | BODY MASS INDEX: 19.75 KG/M2 | HEART RATE: 88 BPM | DIASTOLIC BLOOD PRESSURE: 83 MMHG | WEIGHT: 122.3 LBS | TEMPERATURE: 98.3 F | SYSTOLIC BLOOD PRESSURE: 130 MMHG

## 2021-11-26 DIAGNOSIS — Z94.83 PANCREAS REPLACED BY TRANSPLANT (H): Primary | ICD-10-CM

## 2021-11-26 DIAGNOSIS — Z94.83 PANCREAS REPLACED BY TRANSPLANT (H): ICD-10-CM

## 2021-11-26 LAB
ALBUMIN UR-MCNC: 30 MG/DL
AMYLASE SERPL-CCNC: 73 U/L (ref 30–110)
ANION GAP SERPL CALCULATED.3IONS-SCNC: 7 MMOL/L (ref 3–14)
APPEARANCE UR: ABNORMAL
BASOPHILS # BLD AUTO: 0 10E3/UL (ref 0–0.2)
BASOPHILS NFR BLD AUTO: 0 %
BILIRUB UR QL STRIP: NEGATIVE
BUN SERPL-MCNC: 22 MG/DL (ref 7–30)
CALCIUM SERPL-MCNC: 9.3 MG/DL (ref 8.5–10.1)
CAOX CRY #/AREA URNS HPF: ABNORMAL /HPF
CHLORIDE BLD-SCNC: 108 MMOL/L (ref 94–109)
CO2 SERPL-SCNC: 28 MMOL/L (ref 20–32)
COLOR UR AUTO: YELLOW
CREAT SERPL-MCNC: 1.12 MG/DL (ref 0.52–1.04)
EOSINOPHIL # BLD AUTO: 0.4 10E3/UL (ref 0–0.7)
EOSINOPHIL NFR BLD AUTO: 2 %
ERYTHROCYTE [DISTWIDTH] IN BLOOD BY AUTOMATED COUNT: 16.4 % (ref 10–15)
GFR SERPL CREATININE-BSD FRML MDRD: 63 ML/MIN/1.73M2
GLUCOSE BLD-MCNC: 71 MG/DL (ref 70–99)
GLUCOSE UR STRIP-MCNC: NEGATIVE MG/DL
HCT VFR BLD AUTO: 30.4 % (ref 35–47)
HGB BLD-MCNC: 9.7 G/DL (ref 11.7–15.7)
HGB UR QL STRIP: ABNORMAL
IMM GRANULOCYTES # BLD: 0.1 10E3/UL
IMM GRANULOCYTES NFR BLD: 1 %
KETONES UR STRIP-MCNC: NEGATIVE MG/DL
LEUKOCYTE ESTERASE UR QL STRIP: ABNORMAL
LIPASE SERPL-CCNC: 204 U/L (ref 73–393)
LYMPHOCYTES # BLD AUTO: 1.7 10E3/UL (ref 0.8–5.3)
LYMPHOCYTES NFR BLD AUTO: 10 %
MAGNESIUM SERPL-MCNC: 1.8 MG/DL (ref 1.6–2.3)
MCH RBC QN AUTO: 27.9 PG (ref 26.5–33)
MCHC RBC AUTO-ENTMCNC: 31.9 G/DL (ref 31.5–36.5)
MCV RBC AUTO: 87 FL (ref 78–100)
MONOCYTES # BLD AUTO: 0.6 10E3/UL (ref 0–1.3)
MONOCYTES NFR BLD AUTO: 4 %
MUCOUS THREADS #/AREA URNS LPF: PRESENT /LPF
MYCOPHENOLATE SERPL LC/MS/MS-MCNC: 5.38 MG/L (ref 1–3.5)
MYCOPHENOLATE-G SERPL LC/MS/MS-MCNC: 79.4 MG/L (ref 30–95)
NEUTROPHILS # BLD AUTO: 14 10E3/UL (ref 1.6–8.3)
NEUTROPHILS NFR BLD AUTO: 83 %
NITRATE UR QL: NEGATIVE
NRBC # BLD AUTO: 0 10E3/UL
NRBC BLD AUTO-RTO: 0 /100
PH UR STRIP: 6 [PH] (ref 5–7)
PHOSPHATE SERPL-MCNC: 2.1 MG/DL (ref 2.5–4.5)
PLATELET # BLD AUTO: 339 10E3/UL (ref 150–450)
POTASSIUM BLD-SCNC: 3.7 MMOL/L (ref 3.4–5.3)
RBC # BLD AUTO: 3.48 10E6/UL (ref 3.8–5.2)
RBC URINE: 49 /HPF
SODIUM SERPL-SCNC: 143 MMOL/L (ref 133–144)
SP GR UR STRIP: 1.01 (ref 1–1.03)
SQUAMOUS EPITHELIAL: <1 /HPF
TACROLIMUS BLD-MCNC: 4.7 UG/L (ref 5–15)
TME LAST DOSE: ABNORMAL H
UROBILINOGEN UR STRIP-MCNC: NORMAL MG/DL
WBC # BLD AUTO: 16.8 10E3/UL (ref 4–11)
WBC URINE: 8 /HPF

## 2021-11-26 PROCEDURE — 83690 ASSAY OF LIPASE: CPT | Performed by: PATHOLOGY

## 2021-11-26 PROCEDURE — 36415 COLL VENOUS BLD VENIPUNCTURE: CPT | Performed by: PATHOLOGY

## 2021-11-26 PROCEDURE — 82150 ASSAY OF AMYLASE: CPT | Performed by: PATHOLOGY

## 2021-11-26 PROCEDURE — G0463 HOSPITAL OUTPT CLINIC VISIT: HCPCS

## 2021-11-26 PROCEDURE — 99000 SPECIMEN HANDLING OFFICE-LAB: CPT | Performed by: PATHOLOGY

## 2021-11-26 PROCEDURE — 80197 ASSAY OF TACROLIMUS: CPT | Mod: 90 | Performed by: PATHOLOGY

## 2021-11-26 PROCEDURE — 81001 URINALYSIS AUTO W/SCOPE: CPT

## 2021-11-26 PROCEDURE — 80048 BASIC METABOLIC PNL TOTAL CA: CPT | Performed by: PATHOLOGY

## 2021-11-26 PROCEDURE — 84100 ASSAY OF PHOSPHORUS: CPT | Performed by: PATHOLOGY

## 2021-11-26 PROCEDURE — 83735 ASSAY OF MAGNESIUM: CPT | Performed by: PATHOLOGY

## 2021-11-26 PROCEDURE — 85025 COMPLETE CBC W/AUTO DIFF WBC: CPT | Performed by: PATHOLOGY

## 2021-11-26 NOTE — TELEPHONE ENCOUNTER
ISSUE:   tacrolimus  4.7 below goal level  11 days post transplant       PLAN:     OUTCOME:   Spoke with patient, they confirm accurate trough level and current dose 1 mg in am and 0.5 mg  in evening   {Patient confirmed dose change to mg 1.5  BID and to repeat labs in  Am  . Orders sent to preferred pharmacy for dose change and lab for repeat labs. Patient voiced understanding of plan.     11 days post kidney pancreas  -  Appears stopped Mycelex Troches    Restarted today TID  Per directions of Dr Motta

## 2021-11-26 NOTE — PROGRESS NOTES
"Marilyn Ortega came to [facility] today for labs and assessment following a kidney/pancreas transplant on 11/15/2021     Discharge date: 11/22  Transplant coordinator: Yessica Becker    Physical Assessment:  See physical assessment located under \"Document Flowsheets\".  Incision site: c/d/i staples, no s/s infection  Lines: SARAI right abdomen. Approximately 280 mls in past  24 hours  Cage: NA  Urine clarity: clear/yellow, denies burning w/urination  Hydration: 3 liters water intake daily  Nutrition: denies any issues  Last BM: reports constipation is resolved, 'complete' BM yesterday. Taking senna and miralax.   Pain: minimal incisional soreness with increased physical activity. Not taking anything for pain.    Labs drawn by [facility] staff No. Drawn per first floor lab prior to [facility]    Plan of care for today:   Labs on first floor. Vitals and assessment completed. Medications and med card reviewed. Dr. Motta reviewed labs and vitals via telephone. Per Dr. Motta- repeat UA/UC and have pt return tomorrow. Pt reports she was uncertain whether she was to be taking clotrimazole lozenges, confirmed with Dr Motta she should be minimally 3x day after meals; pt updated and verbalized understanding.   Medication changes: none    Medications administered: none     Total nursing time: 40 mins    Patient education:    The following teaching topics were addressed: Importance of drinking 2L of non-caffeinated fluids daily, Incisional care, Medications (purposes, doses and times of administration), Plan of care and Drain care   Patient verbalized understanding and all questions answered.    Drug level:  Current dose 0.5 mg, pt reports taking at 1830 last evening.  Level to be followed by coordinator.       Discharge Plan    Pt will follow up with transplant coordinator and [facility] tomorrow.   Discharge instructions reviewed with patient: YES  Patient/Representative verbalized understanding, all questions answered: YES    Dorinda Gomez RN 11/26/21 "

## 2021-11-27 ENCOUNTER — TELEPHONE (OUTPATIENT)
Dept: TRANSPLANT | Facility: CLINIC | Age: 36
End: 2021-11-27

## 2021-11-27 ENCOUNTER — INFUSION THERAPY VISIT (OUTPATIENT)
Dept: INFUSION THERAPY | Facility: CLINIC | Age: 36
End: 2021-11-27
Attending: INTERNAL MEDICINE
Payer: COMMERCIAL

## 2021-11-27 VITALS
RESPIRATION RATE: 18 BRPM | TEMPERATURE: 98.5 F | BODY MASS INDEX: 19.36 KG/M2 | WEIGHT: 119.9 LBS | OXYGEN SATURATION: 100 %

## 2021-11-27 DIAGNOSIS — Z94.0 KIDNEY REPLACED BY TRANSPLANT: ICD-10-CM

## 2021-11-27 DIAGNOSIS — Z94.83 PANCREAS REPLACED BY TRANSPLANT (H): ICD-10-CM

## 2021-11-27 DIAGNOSIS — E83.39 HYPOPHOSPHATEMIA: Primary | ICD-10-CM

## 2021-11-27 LAB
AMYLASE SERPL-CCNC: 70 U/L (ref 30–110)
ANION GAP SERPL CALCULATED.3IONS-SCNC: 5 MMOL/L (ref 3–14)
BASOPHILS # BLD AUTO: 0.1 10E3/UL (ref 0–0.2)
BASOPHILS NFR BLD AUTO: 0 %
BUN SERPL-MCNC: 23 MG/DL (ref 7–30)
CALCIUM SERPL-MCNC: 9.2 MG/DL (ref 8.5–10.1)
CHLORIDE BLD-SCNC: 108 MMOL/L (ref 94–109)
CO2 SERPL-SCNC: 27 MMOL/L (ref 20–32)
CREAT SERPL-MCNC: 0.95 MG/DL (ref 0.52–1.04)
EOSINOPHIL # BLD AUTO: 0.4 10E3/UL (ref 0–0.7)
EOSINOPHIL NFR BLD AUTO: 2 %
ERYTHROCYTE [DISTWIDTH] IN BLOOD BY AUTOMATED COUNT: 16.5 % (ref 10–15)
GFR SERPL CREATININE-BSD FRML MDRD: 77 ML/MIN/1.73M2
GLUCOSE BLD-MCNC: 79 MG/DL (ref 70–99)
HCT VFR BLD AUTO: 30.2 % (ref 35–47)
HGB BLD-MCNC: 9.4 G/DL (ref 11.7–15.7)
IMM GRANULOCYTES # BLD: 0.2 10E3/UL
IMM GRANULOCYTES NFR BLD: 1 %
LIPASE SERPL-CCNC: 176 U/L (ref 73–393)
LYMPHOCYTES # BLD AUTO: 1.7 10E3/UL (ref 0.8–5.3)
LYMPHOCYTES NFR BLD AUTO: 8 %
MAGNESIUM SERPL-MCNC: 1.5 MG/DL (ref 1.6–2.3)
MCH RBC QN AUTO: 27.6 PG (ref 26.5–33)
MCHC RBC AUTO-ENTMCNC: 31.1 G/DL (ref 31.5–36.5)
MCV RBC AUTO: 89 FL (ref 78–100)
MONOCYTES # BLD AUTO: 0.5 10E3/UL (ref 0–1.3)
MONOCYTES NFR BLD AUTO: 3 %
NEUTROPHILS # BLD AUTO: 17.6 10E3/UL (ref 1.6–8.3)
NEUTROPHILS NFR BLD AUTO: 86 %
NRBC # BLD AUTO: 0 10E3/UL
NRBC BLD AUTO-RTO: 0 /100
PHOSPHATE SERPL-MCNC: 1.8 MG/DL (ref 2.5–4.5)
PLATELET # BLD AUTO: 361 10E3/UL (ref 150–450)
POTASSIUM BLD-SCNC: 3.8 MMOL/L (ref 3.4–5.3)
RBC # BLD AUTO: 3.41 10E6/UL (ref 3.8–5.2)
SODIUM SERPL-SCNC: 140 MMOL/L (ref 133–144)
TACROLIMUS BLD-MCNC: 12 UG/L (ref 5–15)
TME LAST DOSE: NORMAL H
TME LAST DOSE: NORMAL H
WBC # BLD AUTO: 20.4 10E3/UL (ref 4–11)

## 2021-11-27 PROCEDURE — 85025 COMPLETE CBC W/AUTO DIFF WBC: CPT

## 2021-11-27 PROCEDURE — 83690 ASSAY OF LIPASE: CPT

## 2021-11-27 PROCEDURE — 82374 ASSAY BLOOD CARBON DIOXIDE: CPT

## 2021-11-27 PROCEDURE — 36415 COLL VENOUS BLD VENIPUNCTURE: CPT

## 2021-11-27 PROCEDURE — 83735 ASSAY OF MAGNESIUM: CPT

## 2021-11-27 PROCEDURE — 80197 ASSAY OF TACROLIMUS: CPT

## 2021-11-27 PROCEDURE — 82150 ASSAY OF AMYLASE: CPT

## 2021-11-27 PROCEDURE — 84100 ASSAY OF PHOSPHORUS: CPT

## 2021-11-27 RX ORDER — MAGNESIUM OXIDE 400 MG/1
400 TABLET ORAL 2 TIMES DAILY
Qty: 30 TABLET | Refills: 3 | COMMUNITY
Start: 2021-11-27 | End: 2021-12-09

## 2021-11-27 NOTE — LETTER
"    11/27/2021         RE: Marilyn Ortega  [address]  [facility] [address]        Dear Colleague,    Thank you for referring your patient, Marilyn Ortega, to the [facility]. Please see a copy of my visit note below.    Chief Complaint   Patient presents with     Eval/Assessment     LBA     Marilyn Ortega came to [facility] today for labs and assessment following a kidney/pancreas transplant on 11/15/2021      Discharge date: 11/22  Transplant coordinator: Yessica Becker     Physical Assessment:  See physical assessment located under \"Document Flowsheets\".  Incision site: midline abdominal w/ staples. CDI. 1 staple loose, removed and steri strip applied.   Lines: SARAI light serosanguinous, 200 ml output last 24 hours.   Cage: n/a  Urine clarity: yellow clear, denies foul odor or irritation  Hydration: 3 L water daily  Nutrition: decreased appetite. Trying to increase protein intake.    Last BM: regular  Pain: mild \"achiness\" at stent site with increased activity   Sitting 131/88  94, standing 127/89  100  Weight down 1 kg    Labs drawn by [facility] staff Yes    Plan of care for today:   -Labs and assessment reviewed with Dr. Motta.  -Care completed in [facility].  -Prescriptions sent to [facility] pharmacy by Dr. El.  -Staff message sent to transplant coordinator updating on patient status.    Medication changes:   -Increase magnesium to 400 mg BID.  -Start phosphorus 500 mg BID.     Medications administered: n/a      Patient education:  The following teaching topics were addressed: Importance of drinking 2L of non-caffeinated fluids daily, Incisional care, Signs/symptoms of infection, Good handwashing, Medications (purposes, doses and times of administration), Phone numbers to call with concenrs (Transplant coordinator, [address] and [facility]), Plan of care and Drain care   Patient verbalized understanding and all questions answered.    Drug level:  Patient took 1.5 mg " "of Tac last evening at 6:30.  Care coordinator to follow up with the result.    Face to face time: 25 min    Discharge Plan  Pt will follow up with: Monday follow up with transplant surgeon as scheduled with labs prior.   Discharge instructions reviewed with patient: YES  Patient/Representative verbalized understanding, all questions answered: YES    Discharged from unit at 0900 with whom: self to home.    Vital signs:  Temp: 98.5  F (36.9  C) Temp src: Oral       Resp: 18 SpO2: 100 % O2 Device: None (Room air)     Weight: 54.4 kg (119 lb 14.4 oz)  Estimated body mass index is 19.36 kg/m  as calculated from the following:    Height as of 11/15/21: 1.676 m (5' 5.98\").    Weight as of this encounter: 54.4 kg (119 lb 14.4 oz).                                  Again, thank you for allowing me to participate in the care of your patient.        Sincerely,        [facility]    "

## 2021-11-27 NOTE — PROGRESS NOTES
"Chief Complaint   Patient presents with     Eval/Assessment     SUNNY Ortega came to [facility] today for labs and assessment following a kidney/pancreas transplant on 11/15/2021      Discharge date: 11/22  Transplant coordinator: Yessica Becker     Physical Assessment:  See physical assessment located under \"Document Flowsheets\".  Incision site: midline abdominal w/ staples. CDI. 1 staple loose, removed and steri strip applied.   Lines: SARAI light serosanguinous, 200 ml output last 24 hours.   Cage: n/a  Urine clarity: yellow clear, denies foul odor or irritation  Hydration: 3 L water daily  Nutrition: decreased appetite. Trying to increase protein intake.    Last BM: regular  Pain: mild \"achiness\" at stent site with increased activity   Sitting 131/88  94, standing 127/89  100  Weight down 1 kg    Labs drawn by [facility] staff Yes    Plan of care for today:   -Labs and assessment reviewed with Dr. Motta.  -Care completed in [facility].  -Prescriptions [facility] pharmacy by Dr. El.  -Staff message sent to transplant coordinator updating on patient status.    Medication changes:   -Increase magnesium to 400 mg BID.  -Start phosphorus 500 mg BID.     Medications administered: n/a      Patient education:  The following teaching topics were addressed: Importance of drinking 2L of non-caffeinated fluids daily, Incisional care, Signs/symptoms of infection, Good handwashing, Medications (purposes, doses and times of administration), Phone numbers to call with concenrs (Transplant coordinator, [facility] and [facility]), Plan of care and Drain care   Patient verbalized understanding and all questions answered.    Drug level:  Patient took 1.5 mg of Tac last evening at 6:30.  Care coordinator to follow up with the result.    Face to face time: 25 min    Discharge Plan  Pt will follow up with: Monday follow up with transplant surgeon as scheduled with labs prior.   Discharge instructions reviewed with patient: " "YES  Patient/Representative verbalized understanding, all questions answered: YES    Discharged from unit at 0900 with whom: self to home.    Vital signs:  Temp: 98.5  F (36.9  C) Temp src: Oral       Resp: 18 SpO2: 100 % O2 Device: None (Room air)     Weight: 54.4 kg (119 lb 14.4 oz)  Estimated body mass index is 19.36 kg/m  as calculated from the following:    Height as of 11/15/21: 1.676 m (5' 5.98\").    Weight as of this encounter: 54.4 kg (119 lb 14.4 oz).                              "

## 2021-11-27 NOTE — PATIENT INSTRUCTIONS
Dear Marilyn Ortega    Thank you for choosing [facility] ([facility]) for your transplant cares.  The following information is a summary of our appointment as well as important reminders.      Please make sure your phone is available today because your coordinator will call to update you with your anti-rejection drug levels and possibly make changes to your anti-rejection dosages.    Increase magnesium to 400 mg twice daily (separate from anti-rejection medications)  Start phosphorus 500 mg twice daily.   Follow up with surgery team on Monday with lab draw prior.     Congratulations!    We look forward in seeing you on your next appointment here at [facility]).  Please don t hesitate to call us at [phone] to reschedule any of your appointments or to speak with one of the [facility] registered nurses.  It was a pleasure taking care of you today.    Sincerely,    [facility]  [address] [place] [address]  [id number]  Phone:  [phone]

## 2021-11-29 ENCOUNTER — TELEPHONE (OUTPATIENT)
Dept: TRANSPLANT | Facility: CLINIC | Age: 36
End: 2021-11-29

## 2021-11-29 ENCOUNTER — LAB (OUTPATIENT)
Dept: LAB | Facility: CLINIC | Age: 36
End: 2021-11-29
Payer: COMMERCIAL

## 2021-11-29 ENCOUNTER — OFFICE VISIT (OUTPATIENT)
Dept: TRANSPLANT | Facility: CLINIC | Age: 36
End: 2021-11-29
Attending: SURGERY
Payer: COMMERCIAL

## 2021-11-29 VITALS
SYSTOLIC BLOOD PRESSURE: 134 MMHG | BODY MASS INDEX: 19.72 KG/M2 | OXYGEN SATURATION: 100 % | HEART RATE: 94 BPM | DIASTOLIC BLOOD PRESSURE: 90 MMHG | WEIGHT: 122.1 LBS

## 2021-11-29 DIAGNOSIS — Z48.298 AFTERCARE FOLLOWING ORGAN TRANSPLANT: ICD-10-CM

## 2021-11-29 DIAGNOSIS — Z94.83 PANCREAS REPLACED BY TRANSPLANT (H): Primary | ICD-10-CM

## 2021-11-29 DIAGNOSIS — Z94.0 KIDNEY REPLACED BY TRANSPLANT: ICD-10-CM

## 2021-11-29 DIAGNOSIS — Z94.83 PANCREAS REPLACED BY TRANSPLANT (H): ICD-10-CM

## 2021-11-29 DIAGNOSIS — Z79.899 ENCOUNTER FOR LONG-TERM CURRENT USE OF MEDICATION: ICD-10-CM

## 2021-11-29 LAB
AMYLASE SERPL-CCNC: 57 U/L (ref 30–110)
ANION GAP SERPL CALCULATED.3IONS-SCNC: 6 MMOL/L (ref 3–14)
BUN SERPL-MCNC: 23 MG/DL (ref 7–30)
CALCIUM SERPL-MCNC: 9.2 MG/DL (ref 8.5–10.1)
CHLORIDE BLD-SCNC: 108 MMOL/L (ref 94–109)
CO2 SERPL-SCNC: 26 MMOL/L (ref 20–32)
CREAT SERPL-MCNC: 1.05 MG/DL (ref 0.52–1.04)
DEPRECATED CALCIDIOL+CALCIFEROL SERPL-MC: 52 UG/L (ref 20–75)
ERYTHROCYTE [DISTWIDTH] IN BLOOD BY AUTOMATED COUNT: 17.1 % (ref 10–15)
GFR SERPL CREATININE-BSD FRML MDRD: 68 ML/MIN/1.73M2
GLUCOSE BLD-MCNC: 79 MG/DL (ref 70–99)
HCT VFR BLD AUTO: 27.2 % (ref 35–47)
HGB BLD-MCNC: 8.4 G/DL (ref 11.7–15.7)
LIPASE SERPL-CCNC: 142 U/L (ref 73–393)
MAGNESIUM SERPL-MCNC: 1.9 MG/DL (ref 1.6–2.3)
MCH RBC QN AUTO: 27.8 PG (ref 26.5–33)
MCHC RBC AUTO-ENTMCNC: 30.9 G/DL (ref 31.5–36.5)
MCV RBC AUTO: 90 FL (ref 78–100)
PHOSPHATE SERPL-MCNC: 2.1 MG/DL (ref 2.5–4.5)
PLATELET # BLD AUTO: 365 10E3/UL (ref 150–450)
POTASSIUM BLD-SCNC: 3.5 MMOL/L (ref 3.4–5.3)
PTH-INTACT SERPL-MCNC: 30 PG/ML
RBC # BLD AUTO: 3.02 10E6/UL (ref 3.8–5.2)
SODIUM SERPL-SCNC: 140 MMOL/L (ref 133–144)
TACROLIMUS BLD-MCNC: 5.6 UG/L (ref 5–15)
TME LAST DOSE: NORMAL H
TME LAST DOSE: NORMAL H
WBC # BLD AUTO: 18.4 10E3/UL (ref 4–11)

## 2021-11-29 PROCEDURE — 82150 ASSAY OF AMYLASE: CPT

## 2021-11-29 PROCEDURE — 83970 ASSAY OF PARATHORMONE: CPT

## 2021-11-29 PROCEDURE — 83690 ASSAY OF LIPASE: CPT

## 2021-11-29 PROCEDURE — 80197 ASSAY OF TACROLIMUS: CPT

## 2021-11-29 PROCEDURE — 85027 COMPLETE CBC AUTOMATED: CPT

## 2021-11-29 PROCEDURE — 36415 COLL VENOUS BLD VENIPUNCTURE: CPT

## 2021-11-29 PROCEDURE — 82306 VITAMIN D 25 HYDROXY: CPT

## 2021-11-29 PROCEDURE — 99024 POSTOP FOLLOW-UP VISIT: CPT | Performed by: SURGERY

## 2021-11-29 PROCEDURE — 80048 BASIC METABOLIC PNL TOTAL CA: CPT

## 2021-11-29 PROCEDURE — 84100 ASSAY OF PHOSPHORUS: CPT

## 2021-11-29 PROCEDURE — 83735 ASSAY OF MAGNESIUM: CPT

## 2021-11-29 RX ORDER — TACROLIMUS 1 MG/1
3 CAPSULE ORAL 2 TIMES DAILY
Qty: 60 CAPSULE | Refills: 11 | Status: SHIPPED | OUTPATIENT
Start: 2021-11-29 | End: 2021-11-30

## 2021-11-29 NOTE — TELEPHONE ENCOUNTER
Incoming call from pt advising the tacrolimus dose recommended by  Dr Winn on her office visit today is different from what is being reccommended by Transplant Coordinator, Brandi today. Pt would like to know which regiment to follow.  I called Dr Aradna and he advised pt should follow the coordinator's regiment. Pt notified and she verbalized understanding.  Pt's Coordinator Yessica Becker RN notified.     Pablo Terrell RN on 11/29/2021 at 6:03 PM

## 2021-11-29 NOTE — TELEPHONE ENCOUNTER
ISSUE:   Tacrolimus IR level 5.7 on 11/29, goal 8-10, dose 1 mg BID.    PLAN:   Please call patient and confirm this was an accurate 12-hour trough. Verify Tacrolimus IR dose 1 mg BID. Confirm no new medications or illness. Confirm no missed doses. If accurate trough and accurate dose, increase Tacrolimus IR dose to 3 mg BID and repeat labs in 3 days    Called patient, no answer. Left detailed message to increase Tacrolimus to 3mg BID and repeat labs this week. Asked patient to call or message back to make sure she got the message.

## 2021-11-29 NOTE — RESULT ENCOUNTER NOTE
Sally Brito Transplant Surgery NP    Follow up  in [facility] Saturday  -    Lowered tacrolimus  1.0 mg twice per day

## 2021-11-29 NOTE — LETTER
11/29/2021     RE: Marilyn Ortega  [address]  [facility] [address]    Dear Colleague,    Thank you for referring your patient, Marilyn Ortega, to the [facility]. Please see a copy of my visit note below.    Healing well overall. Diet/appetite improving, moving bowels, urinating well. Up and active. Having some side effects from steroids. Some soreness with activity but no pain. Also having irritation in urination/bladder possibly from the stent.     SARAI serous, ~200mL daily. Staples c/d/i, no drainage. abd soft, flat, no hernia.     Healing well. Can call for SARAI and staple removal when drain below 100/day or next Monday if not done yet. Otherwise RTC at 6 wks postop. DIscussed activity restrictions.  Tac lvl low- dose increased by coordinator.     Transplant Surgery Progress Note    Transplants:  11/15/2021 (Kidney / Pancreas); Postoperative day:  15  S: Recovering very well overall. Appetite improving but feels like she is forcing down high amounts of protein. Bowel movements normalizing. She is somewhat sore with increased activity but not having pain. Doing well at home and returning to normalcy. Having some side effects from steroids- reduced sleep, jitteriness, but improving as dose decreases. No issues with wound. SARAI serous, ~250mL/daily.   Transplant History:    Transplant Type:  DDKT (SPK)  Donor Type: Donation after Brain Death   Transplant Date:  11/15/2021 (Kidney / Pancreas)   Ureteral Stent:  Yes   Crossmatch:  negative   DSA at Tx:  No  Baseline Cr: 0.9-1.1, though some elevations with increased tac levels   DeNovo DSA: No    Acute Rejection Hx:  No    Present Maintenance Immunosuppression:  Tacrolimus, MPA, and Prednisone- pred taper with intermediate intensity induction    CMV IgG Ab Discordance:  No D-/R-  EBV IgG Ab Discordance:  No R+    BK Viremia:  No  EBV Viremia:  No    Transplant Coordinator: Yessica Becker     Transplant Office Phone Number:  [phone]     Immunosuppressant Medications     Immunosuppressive Agents Disp Start End     mycophenolic acid (GENERIC EQUIVALENT) 360 MG EC tablet    120 tablet 11/22/2021     Sig - Route: Take 2 tablets (720 mg) by mouth 2 times daily - Oral    Class: E-Prescribe     tacrolimus (GENERIC EQUIVALENT) 0.5 MG capsule    60 capsule 11/22/2021     Sig: Take 1 capsule by mouth twice daily when directed by transplant team    Class: E-Prescribe     tacrolimus (GENERIC EQUIVALENT) 1 MG capsule    60 capsule 11/29/2021     Sig - Route: Take 3 capsules (3 mg) by mouth 2 times daily - Oral    Class: E-Prescribe    Notes to Pharmacy: TXP DT 11/15/2021 (Kidney / Pancreas) TXP Dischg DT 11/22/2021 DX Kidney replaced by transplant Z94.0 [facility] ([place], [place])          Possible Immunosuppression-related side effects:   []             headache  []             vivid dreams  []             irritability  []             cognitive difficuties  []             fine tremor  []             nausea  []             diarrhea  []             neuropathy      []             edema  []             renal calcineurin toxicity  []             hyperkalemia  []             post-transplant diabetes  []             decreased appetite  []             increased appetite  []             other:  []             none    Prescription Medications as of 11/30/2021       Rx Number Disp Refills Start End Last Dispensed Date Next Fill Date Owning Pharmacy    acetaminophen (TYLENOL) 325 MG tablet  100 tablet 0 11/22/2021    [facility] [place], [place] - [address] [facility]    Sig: Take 1-2 tablets (325-650 mg) by mouth every 4 hours as needed for other (For optimal non-opioid multimodal pain management to improve pain control.)    Class: E-Prescribe    Route: Oral    aspirin (ASA) 325 MG tablet  30 tablet 5 11/23/2021    [place], [place] - [address]     Sig: Take 1 tablet (325 mg) by mouth daily    Class: E-Prescribe    Route: Oral    bromfenac (PROLENSA) 0.07 % ophthalmic solution            Sig: Place 1 drop Into the left eye daily    Class: Historical    Route: Left Eye    calcium carbonate-vitamin D (OS-STEPHANIE WITH D) 500-200 MG-UNIT tablet  60 tablet 11 2021    [address]    Sig: Take 1 tablet by mouth 2 times daily (with meals)    Class: E-Prescribe    Route: Oral    calcium polycarbophil (FIBERCON) 625 MG tablet  120 tablet 1 2021    [address]    Sig: Take 2 tablets (1,250 mg) by mouth 2 times daily    Class: E-Prescribe    Route: Oral    cetirizine (ZYRTEC) 10 MG tablet            Sig: Take 10 mg by mouth daily    Class: Historical    Route: Oral    cholecalciferol 25 MCG (1000 UT) TABS    2021    [id number] [place] [address] [address]    Sig: Take 2,000 Units by mouth every other day     Class: Historical    Route: Oral    clotrimazole (MYCELEX) 10 MG lozenge  360 lozenge 0 2021   [address]    Sig: Place 1 lozenge (10 mg) inside cheek 4 times daily    Class: E-Prescribe    Route: Buccal    Continuous Blood Gluc Sensor (DEXCOM G6 SENSOR) [facility]    2020    [id number] [place] [address] [address]    Sig: Use as directed for continuous glucose monitoring.  Change sensor every 10 days.    Class: Historical    Continuous Blood Gluc Transmit (DEXCOM G6 TRANSMITTER) [facility]    2020    [id number] [place] [address] [address]    Sig: Use as directed for continuous glucose monitoring.  Change every 3 months.    Class: Historical    glucose blood VI test strips strip            Si times daily.    Class: Historical    Route: As instructed    Loteprednol Etabonate (LOTEMAX SM) 0.38 % GEL             Sig: Apply 1 drop to eye 3 times daily Left eye    Class: Historical    Route: Ophthalmic    magnesium oxide (MAG-OX) 400 MG tablet  30 tablet 3 11/27/2021    [address]    Sig: Take 1 tablet (400 mg) by mouth 2 times daily    Class: Historical    Route: Oral    mycophenolic acid (GENERIC EQUIVALENT) 360 MG EC tablet  120 tablet 11 11/22/2021    [address]    Sig: Take 2 tablets (720 mg) by mouth 2 times daily    Class: E-Prescribe    Route: Oral    ondansetron (ZOFRAN-ODT) 4 MG ODT tab  20 tablet 0 11/22/2021    [address]    Sig: Take 1 tablet (4 mg) by mouth every 6 hours as needed for nausea or vomiting    Class: E-Prescribe    Route: Oral    oxyCODONE (ROXICODONE) 5 MG tablet  10 tablet 0 11/22/2021    [address]    Sig: Take 1 tablet (5 mg) by mouth every 6 hours as needed for moderate to severe pain    Class: E-Prescribe    Earliest Fill Date: 11/22/2021    Route: Oral    pantoprazole (PROTONIX) 40 MG EC tablet  30 tablet 1 11/23/2021    [address]    Sig: Take 1 tablet (40 mg) by mouth every morning (before breakfast)    Class: E-Prescribe    Route: Oral    phosphorus tablet 250 mg (PHOSPHA 250 NEUTRAL) 250 MG per tablet  120 tablet 3 11/27/2021    [address]    Sig: Take 2 tablets (500 mg) by mouth 2 times daily    Class: E-Prescribe    Route: Oral    predniSONE (DELTASONE) 10 MG tablet  35 tablet 0 11/22/2021 12/20/2021   [address]    Sig: Take 2 tablets (20 mg) by mouth daily for 7 days, THEN 1.5 tablets (15 mg) daily for 7 days, THEN 1 tablet (10 mg) daily for 7 days, THEN 0.5 tablets (5 mg)  daily for 7 days.    Class: E-Prescribe    Route: Oral    Prucalopride Succinate (MOTEGRITY) 2 MG TABS    5/6/2021    [id number] [place] [facility] [address] [address]    Sig: Take one tablet by mouth daily    Class: Historical    senna-docusate (SENOKOT-S/PERICOLACE) 8.6-50 MG tablet  30 tablet 0 11/22/2021    [facility] [address]    Sig: Take 2 tablets by mouth daily as needed for constipation    Class: E-Prescribe    Route: Oral    sulfamethoxazole-trimethoprim (BACTRIM) 400-80 MG tablet  30 tablet 11 11/23/2021    [address]    Sig: Take 1 tablet by mouth daily    Class: E-Prescribe    Route: Oral    tacrolimus (GENERIC EQUIVALENT) 0.5 MG capsule  60 capsule 1 11/22/2021    [address]    Sig: Take 1 capsule by mouth twice daily when directed by transplant team    Class: E-Prescribe    tacrolimus (GENERIC EQUIVALENT) 1 MG capsule  60 capsule 11 11/29/2021    [facility] [place], [place] - [address]    Sig: Take 3 capsules (3 mg) by mouth 2 times daily    Class: E-Prescribe    Notes to Pharmacy: TXP DT 11/15/2021 (Kidney / Pancreas) TXP Dischg DT 11/22/2021 DX Kidney replaced by transplant Z94.0 TX [facility] ([place], [place])    Route: Oral    valGANciclovir (VALCYTE) 450 MG tablet  60 tablet 2 11/23/2021    [address]    Sig: Take 2 tablets (900 mg) by mouth daily    Class: E-Prescribe    Route: Oral          O:   Pulse:  [94] 94  BP: (134)/(90) 134/90  SpO2:  [100 %] 100 %  General Appearance: in no apparent distress.   Skin: Normal, no rashes or jaundice  Heart: regular rate and rhythm, normal S1 and S2  Lungs: easy respirations, no audible wheezing.  Abdomen: abdomen soft, flat. Nontender. Incision  c/d/i, staples intact. One staple removed with steri strip on site. SARAI serous, no erythema at insertion site.   Extremities: Tremor absent.   Edema: absent.     Transplant Immunosuppression Labs Latest Ref Rng & Units 11/29/2021 11/27/2021 11/26/2021 11/24/2021 11/23/2021   Creat 0.52 - 1.04 mg/dL 1.05(H) 0.95 1.12(H) 1.33(H) 1.43(H)   BUN 7 - 30 mg/dL 23 23 22 26 32(H)   WBC 4.0 - 11.0 10e3/uL 18.4(H) 20.4(H) 16.8(H) 14.4(H) 17.1(H)   Neutrophil % - 86 83 74 87   ANEU 1.6 - 8.3 10e9/L - - - - -       Chemistries:   Recent Labs   Lab Test 11/29/21  0805   BUN 23   CR 1.05*   GFRESTIMATED 68   GLC 79     Lab Results   Component Value Date    A1C 7.7 11/15/2021    A1C 7.5 10/16/2012    CPEPT <0.1 07/21/2021     Recent Labs   Lab Test 11/15/21  1550   ALBUMIN 4.0   BILITOTAL 0.4   ALKPHOS 104   AST 16   ALT 23     Urine Studies:  Recent Labs   Lab Test 11/26/21  0828   COLOR Yellow   APPEARANCE Slightly Cloudy*   URINEGLC Negative   URINEBILI Negative   URINEKETONE Negative   SG 1.015   UBLD Moderate*   URINEPH 6.0   PROTEIN 30 *   NITRITE Negative   LEUKEST Trace*   RBCU 49*   WBCU 8*     Recent Labs   Lab Test 11/15/21  1530   UTPG 4.77*     Hematology:   Recent Labs   Lab Test 11/29/21  0805 11/27/21  0732 11/26/21  0706   HGB 8.4* 9.4* 9.7*    361 339   WBC 18.4* 20.4* 16.8*     Coags:   Recent Labs   Lab Test 11/16/21  0402 11/15/21  1550   INR 1.11 0.96     HLA antibodies:   SA1 HI RISK CHRISTAL   Date Value Ref Range Status   11/15/2021 None  Final     SA1 MOD RISK CHRISTAL   Date Value Ref Range Status   11/15/2021 B:13  Final     SA2 HI RISK CHRISTAL   Date Value Ref Range Status   11/15/2021 None  Final     SA2 MOD RISK CHRISTAL   Date Value Ref Range Status   11/15/2021 DRw:51  Final       Assessment: Marilyn Ortega is doing well s/p DDKT (SPK):  Issues we addressed during her visit include:    Drain mgmt: call back when 100mL/daily or less and will remove, or will plan for removal next Monday when staples  removed  Staples: remove next Monday or when drain removed  F/u: RTC at 6 wks postop  Tac lvl: sudden drop from 12 to 5.6. Dose adjusted to 3 BID by coordinator, but will need close f/u as has tendency to overcorrect    Plan:    1. Graft function: good, stable. Will monitor kidney function with tac adjustments  2. Immunosuppression Management: Changed tac increased to 3 BID by coordinator, MPA and pred taper unchanged.  Complexity of management:Medium.  Contributing factors: anemia  3. Wound/drain mgmt: as above  Followup: as above    Total Time: 20 min,   Counselling Time: 15 min.      Branden Aranda MD

## 2021-11-30 DIAGNOSIS — Z94.83 PANCREAS REPLACED BY TRANSPLANT (H): ICD-10-CM

## 2021-11-30 DIAGNOSIS — Z94.0 KIDNEY REPLACED BY TRANSPLANT: Primary | ICD-10-CM

## 2021-11-30 RX ORDER — TACROLIMUS 1 MG/1
2 CAPSULE ORAL 2 TIMES DAILY
Qty: 120 CAPSULE | Refills: 11 | Status: SHIPPED | OUTPATIENT
Start: 2021-11-30 | End: 2021-12-03

## 2021-11-30 RX ORDER — TACROLIMUS 0.5 MG/1
CAPSULE ORAL
Qty: 60 CAPSULE | Refills: 1
Start: 2021-11-30 | End: 2021-12-03

## 2021-11-30 NOTE — PROGRESS NOTES
Healing well overall. Diet/appetite improving, moving bowels, urinating well. Up and active. Having some side effects from steroids. Some soreness with activity but no pain. Also having irritation in urination/bladder possibly from the stent.     SARAI serous, ~200mL daily. Staples c/d/i, no drainage. abd soft, flat, no hernia.     Healing well. Can call for SARAI and staple removal when drain below 100/day or next Monday if not done yet. Otherwise RTC at 6 wks postop. DIscussed activity restrictions.  Tac lvl low- dose increased by coordinator.     Transplant Surgery Progress Note    Transplants:  11/15/2021 (Kidney / Pancreas); Postoperative day:  15  S: Recovering very well overall. Appetite improving but feels like she is forcing down high amounts of protein. Bowel movements normalizing. She is somewhat sore with increased activity but not having pain. Doing well at home and returning to normalcy. Having some side effects from steroids- reduced sleep, jitteriness, but improving as dose decreases. No issues with wound. SARAI serous, ~250mL/daily.   Transplant History:    Transplant Type:  DDKT (SPK)  Donor Type: Donation after Brain Death   Transplant Date:  11/15/2021 (Kidney / Pancreas)   Ureteral Stent:  Yes   Crossmatch:  negative   DSA at Tx:  No  Baseline Cr: 0.9-1.1, though some elevations with increased tac levels   DeNovo DSA: No    Acute Rejection Hx:  No    Present Maintenance Immunosuppression:  Tacrolimus, MPA, and Prednisone- pred taper with intermediate intensity induction    CMV IgG Ab Discordance:  No D-/R-  EBV IgG Ab Discordance:  No R+    BK Viremia:  No  EBV Viremia:  No    Transplant Coordinator: Yessica Becker     Transplant Office Phone Number: [phone]     Immunosuppressant Medications     Immunosuppressive Agents Disp Start End     mycophenolic acid (GENERIC EQUIVALENT) 360 MG EC tablet    120 tablet 11/22/2021     Sig - Route: Take 2 tablets (720 mg) by mouth 2 times daily - Oral    Class:  E-Prescribe     tacrolimus (GENERIC EQUIVALENT) 0.5 MG capsule    60 capsule 11/22/2021     Sig: Take 1 capsule by mouth twice daily when directed by transplant team    Class: E-Prescribe     tacrolimus (GENERIC EQUIVALENT) 1 MG capsule    60 capsule 11/29/2021     Sig - Route: Take 3 capsules (3 mg) by mouth 2 times daily - Oral    Class: E-Prescribe    Notes to Pharmacy: TXP DT 11/15/2021 (Kidney / Pancreas) TXP Dischg DT 11/22/2021 DX Kidney replaced by transplant Z94.0 TX [facility] ([place], [place])          Possible Immunosuppression-related side effects:   []             headache  []             vivid dreams  []             irritability  []             cognitive difficuties  []             fine tremor  []             nausea  []             diarrhea  []             neuropathy      []             edema  []             renal calcineurin toxicity  []             hyperkalemia  []             post-transplant diabetes  []             decreased appetite  []             increased appetite  []             other:  []             none    Prescription Medications as of 11/30/2021       Rx Number Disp Refills Start End Last Dispensed Date Next Fill Date Owning Pharmacy    acetaminophen (TYLENOL) 325 MG tablet  100 tablet 0 11/22/2021    [facility] [place], [place] - [address] [place] [address]    Sig: Take 1-2 tablets (325-650 mg) by mouth every 4 hours as needed for other (For optimal non-opioid multimodal pain management to improve pain control.)    Class: E-Prescribe    Route: Oral    aspirin (ASA) 325 MG tablet  30 tablet 5 11/23/2021    [place], [place] - [address] [place] [address]    Sig: Take 1 tablet (325 mg) by mouth daily    Class: E-Prescribe    Route: Oral    bromfenac (PROLENSA) 0.07 % ophthalmic solution            Sig: Place 1 drop Into the left eye daily    Class: Historical    Route: Left Eye    calcium carbonate-vitamin D  (OS-STEPHANIE WITH D) 500-200 MG-UNIT tablet  60 tablet 11 2021    [address]    Sig: Take 1 tablet by mouth 2 times daily (with meals)    Class: E-Prescribe    Route: Oral    calcium polycarbophil (FIBERCON) 625 MG tablet  120 tablet 1 2021    [address]    Sig: Take 2 tablets (1,250 mg) by mouth 2 times daily    Class: E-Prescribe    Route: Oral    cetirizine (ZYRTEC) 10 MG tablet            Sig: Take 10 mg by mouth daily    Class: Historical    Route: Oral    cholecalciferol 25 MCG (1000 UT) TABS    2021    [facility] [place] [address] [address]    Sig: Take 2,000 Units by mouth every other day     Class: Historical    Route: Oral    clotrimazole (MYCELEX) 10 MG lozenge  360 lozenge 0 2021   [address]    Sig: Place 1 lozenge (10 mg) inside cheek 4 times daily    Class: E-Prescribe    Route: Buccal    Continuous Blood Gluc Sensor (DEXCOM G6 SENSOR) [facility]    2020    [facility] [place] [address] [address]    Sig: Use as directed for continuous glucose monitoring.  Change sensor every 10 days.    Class: Historical    Continuous Blood Gluc Transmit (DEXCOM G6 TRANSMITTER) [facility]    2020    [facility] [place] [address] [address]    Sig: Use as directed for continuous glucose monitoring.  Change every 3 months.    Class: Historical    glucose blood VI test strips strip            Si times daily.    Class: Historical    Route: As instructed    Loteprednol Etabonate (LOTEMAX SM) 0.38 % GEL            Sig: Apply 1 drop to eye 3 times daily Left eye    Class: Historical    Route: Ophthalmic    magnesium oxide (MAG-OX) 400 MG tablet  30 tablet 3 2021    [address]    Sig: Take 1 tablet (400  mg) by mouth 2 times daily    Class: Historical    Route: Oral    mycophenolic acid (GENERIC EQUIVALENT) 360 MG EC tablet  120 tablet 11 11/22/2021    [address]    Sig: Take 2 tablets (720 mg) by mouth 2 times daily    Class: E-Prescribe    Route: Oral    ondansetron (ZOFRAN-ODT) 4 MG ODT tab  20 tablet 0 11/22/2021    [address]    Sig: Take 1 tablet (4 mg) by mouth every 6 hours as needed for nausea or vomiting    Class: E-Prescribe    Route: Oral    oxyCODONE (ROXICODONE) 5 MG tablet  10 tablet 0 11/22/2021    [address]    Sig: Take 1 tablet (5 mg) by mouth every 6 hours as needed for moderate to severe pain    Class: E-Prescribe    Earliest Fill Date: 11/22/2021    Route: Oral    pantoprazole (PROTONIX) 40 MG EC tablet  30 tablet 1 11/23/2021    [address]    Sig: Take 1 tablet (40 mg) by mouth every morning (before breakfast)    Class: E-Prescribe    Route: Oral    phosphorus tablet 250 mg (PHOSPHA 250 NEUTRAL) 250 MG per tablet  120 tablet 3 11/27/2021    [address]    Sig: Take 2 tablets (500 mg) by mouth 2 times daily    Class: E-Prescribe    Route: Oral    predniSONE (DELTASONE) 10 MG tablet  35 tablet 0 11/22/2021 12/20/2021   [address]    Sig: Take 2 tablets (20 mg) by mouth daily for 7 days, THEN 1.5 tablets (15 mg) daily for 7 days, THEN 1 tablet (10 mg) daily for 7 days, THEN 0.5 tablets (5 mg) daily for 7 days.    Class: E-Prescribe    Route: Oral    Prucalopride Succinate (MOTEGRITY) 2 MG TABS    5/6/2021    [id number] [place] [facility], [place] - [address] [address]    Sig: Take one tablet by mouth daily    Class: Historical    senna-docusate  (SENOKOT-S/PERICOLACE) 8.6-50 MG tablet  30 tablet 0 11/22/2021    [facility] - [address]    Sig: Take 2 tablets by mouth daily as needed for constipation    Class: E-Prescribe    Route: Oral    sulfamethoxazole-trimethoprim (BACTRIM) 400-80 MG tablet  30 tablet 11 11/23/2021    [address]    Sig: Take 1 tablet by mouth daily    Class: E-Prescribe    Route: Oral    tacrolimus (GENERIC EQUIVALENT) 0.5 MG capsule  60 capsule 1 11/22/2021    [facility] - [address]    Sig: Take 1 capsule by mouth twice daily when directed by transplant team    Class: E-Prescribe    tacrolimus (GENERIC EQUIVALENT) 1 MG capsule  60 capsule 11 11/29/2021    [facility] - [address]    Sig: Take 3 capsules (3 mg) by mouth 2 times daily    Class: E-Prescribe    Notes to Pharmacy: TXP DT 11/15/2021 (Kidney / Pancreas) TXP Dischg DT 11/22/2021 DX Kidney replaced by transplant Z94.0 [facility] ([place], [place])    Route: Oral    valGANciclovir (VALCYTE) 450 MG tablet  60 tablet 2 11/23/2021    [address]    Sig: Take 2 tablets (900 mg) by mouth daily    Class: E-Prescribe    Route: Oral          O:   Pulse:  [94] 94  BP: (134)/(90) 134/90  SpO2:  [100 %] 100 %  General Appearance: in no apparent distress.   Skin: Normal, no rashes or jaundice  Heart: regular rate and rhythm, normal S1 and S2  Lungs: easy respirations, no audible wheezing.  Abdomen: abdomen soft, flat. Nontender. Incision c/d/i, staples intact. One staple removed with steri strip on site. SARAI serous, no erythema at insertion site.   Extremities: Tremor absent.   Edema: absent.     Transplant Immunosuppression Labs Latest Ref Rng & Units 11/29/2021 11/27/2021 11/26/2021  11/24/2021 11/23/2021   Creat 0.52 - 1.04 mg/dL 1.05(H) 0.95 1.12(H) 1.33(H) 1.43(H)   BUN 7 - 30 mg/dL 23 23 22 26 32(H)   WBC 4.0 - 11.0 10e3/uL 18.4(H) 20.4(H) 16.8(H) 14.4(H) 17.1(H)   Neutrophil % - 86 83 74 87   ANEU 1.6 - 8.3 10e9/L - - - - -       Chemistries:   Recent Labs   Lab Test 11/29/21  0805   BUN 23   CR 1.05*   GFRESTIMATED 68   GLC 79     Lab Results   Component Value Date    A1C 7.7 11/15/2021    A1C 7.5 10/16/2012    CPEPT <0.1 07/21/2021     Recent Labs   Lab Test 11/15/21  1550   ALBUMIN 4.0   BILITOTAL 0.4   ALKPHOS 104   AST 16   ALT 23     Urine Studies:  Recent Labs   Lab Test 11/26/21  0828   COLOR Yellow   APPEARANCE Slightly Cloudy*   URINEGLC Negative   URINEBILI Negative   URINEKETONE Negative   SG 1.015   UBLD Moderate*   URINEPH 6.0   PROTEIN 30 *   NITRITE Negative   LEUKEST Trace*   RBCU 49*   WBCU 8*     Recent Labs   Lab Test 11/15/21  1530   UTPG 4.77*     Hematology:   Recent Labs   Lab Test 11/29/21  0805 11/27/21  0732 11/26/21  0706   HGB 8.4* 9.4* 9.7*    361 339   WBC 18.4* 20.4* 16.8*     Coags:   Recent Labs   Lab Test 11/16/21  0402 11/15/21  1550   INR 1.11 0.96     HLA antibodies:   SA1 HI RISK CHRISTAL   Date Value Ref Range Status   11/15/2021 None  Final     SA1 MOD RISK CHRISTAL   Date Value Ref Range Status   11/15/2021 B:13  Final     SA2 HI RISK CHRISTAL   Date Value Ref Range Status   11/15/2021 None  Final     SA2 MOD RISK CHRISTAL   Date Value Ref Range Status   11/15/2021 DRw:51  Final       Assessment: Marilyn Ortega is doing well s/p DDKT (SPK):  Issues we addressed during her visit include:    Drain mgmt: call back when 100mL/daily or less and will remove, or will plan for removal next Monday when staples removed  Staples: remove next Monday or when drain removed  F/u: RTC at 6 wks postop  Tac lvl: sudden drop from 12 to 5.6. Dose adjusted to 3 BID by coordinator, but will need close f/u as has tendency to overcorrect    Plan:    1. Graft function: good, stable.  Will monitor kidney function with tac adjustments  2. Immunosuppression Management: Changed tac increased to 3 BID by coordinator, MPA and pred taper unchanged.  Complexity of management:Medium.  Contributing factors: anemia  3. Wound/drain mgmt: as above  Followup: as above    Total Time: 20 min,   Counselling Time: 15 min.      Branden Aranda MD

## 2021-12-01 ENCOUNTER — LAB (OUTPATIENT)
Dept: LAB | Facility: CLINIC | Age: 36
End: 2021-12-01
Payer: COMMERCIAL

## 2021-12-01 ENCOUNTER — TEAM CONFERENCE (OUTPATIENT)
Dept: TRANSPLANT | Facility: CLINIC | Age: 36
End: 2021-12-01

## 2021-12-01 ENCOUNTER — VIRTUAL VISIT (OUTPATIENT)
Dept: PHARMACY | Facility: CLINIC | Age: 36
End: 2021-12-01
Payer: COMMERCIAL

## 2021-12-01 DIAGNOSIS — N18.9 ANEMIA DUE TO CHRONIC KIDNEY DISEASE, UNSPECIFIED CKD STAGE: ICD-10-CM

## 2021-12-01 DIAGNOSIS — Z94.83 PANCREAS REPLACED BY TRANSPLANT (H): ICD-10-CM

## 2021-12-01 DIAGNOSIS — Z94.0 KIDNEY REPLACED BY TRANSPLANT: ICD-10-CM

## 2021-12-01 DIAGNOSIS — D63.1 ANEMIA DUE TO CHRONIC KIDNEY DISEASE, UNSPECIFIED CKD STAGE: ICD-10-CM

## 2021-12-01 DIAGNOSIS — Z94.0 KIDNEY REPLACED BY TRANSPLANT: Primary | ICD-10-CM

## 2021-12-01 DIAGNOSIS — G89.18 ACUTE POST-OPERATIVE PAIN: ICD-10-CM

## 2021-12-01 DIAGNOSIS — K31.84 GASTROPARESIS: ICD-10-CM

## 2021-12-01 LAB
AMYLASE SERPL-CCNC: 52 U/L (ref 30–110)
ANION GAP SERPL CALCULATED.3IONS-SCNC: 3 MMOL/L (ref 3–14)
BUN SERPL-MCNC: 28 MG/DL (ref 7–30)
CALCIUM SERPL-MCNC: 9 MG/DL (ref 8.5–10.1)
CHLORIDE BLD-SCNC: 109 MMOL/L (ref 94–109)
CO2 SERPL-SCNC: 28 MMOL/L (ref 20–32)
CREAT SERPL-MCNC: 1.03 MG/DL (ref 0.52–1.04)
ERYTHROCYTE [DISTWIDTH] IN BLOOD BY AUTOMATED COUNT: 17.6 % (ref 10–15)
GFR SERPL CREATININE-BSD FRML MDRD: 70 ML/MIN/1.73M2
GLUCOSE BLD-MCNC: 85 MG/DL (ref 70–99)
HCT VFR BLD AUTO: 27.2 % (ref 35–47)
HGB BLD-MCNC: 8.7 G/DL (ref 11.7–15.7)
LIPASE SERPL-CCNC: 125 U/L (ref 73–393)
MCH RBC QN AUTO: 28.5 PG (ref 26.5–33)
MCHC RBC AUTO-ENTMCNC: 32 G/DL (ref 31.5–36.5)
MCV RBC AUTO: 89 FL (ref 78–100)
PLATELET # BLD AUTO: 350 10E3/UL (ref 150–450)
POTASSIUM BLD-SCNC: 4.2 MMOL/L (ref 3.4–5.3)
RBC # BLD AUTO: 3.05 10E6/UL (ref 3.8–5.2)
SODIUM SERPL-SCNC: 140 MMOL/L (ref 133–144)
TACROLIMUS BLD-MCNC: 7.7 UG/L (ref 5–15)
TME LAST DOSE: NORMAL H
TME LAST DOSE: NORMAL H
WBC # BLD AUTO: 17.5 10E3/UL (ref 4–11)

## 2021-12-01 PROCEDURE — 83690 ASSAY OF LIPASE: CPT

## 2021-12-01 PROCEDURE — 80048 BASIC METABOLIC PNL TOTAL CA: CPT

## 2021-12-01 PROCEDURE — 85027 COMPLETE CBC AUTOMATED: CPT

## 2021-12-01 PROCEDURE — 80197 ASSAY OF TACROLIMUS: CPT

## 2021-12-01 PROCEDURE — 82150 ASSAY OF AMYLASE: CPT

## 2021-12-01 PROCEDURE — 36415 COLL VENOUS BLD VENIPUNCTURE: CPT

## 2021-12-01 PROCEDURE — 83550 IRON BINDING TEST: CPT

## 2021-12-01 PROCEDURE — 99607 MTMS BY PHARM ADDL 15 MIN: CPT | Performed by: PHARMACIST

## 2021-12-01 PROCEDURE — 82607 VITAMIN B-12: CPT

## 2021-12-01 PROCEDURE — 82728 ASSAY OF FERRITIN: CPT

## 2021-12-01 PROCEDURE — 99605 MTMS BY PHARM NP 15 MIN: CPT | Performed by: PHARMACIST

## 2021-12-01 NOTE — PROGRESS NOTES
Medication Therapy Management (MTM) Encounter    ASSESSMENT:                            Medication Adherence/Access: No issues identified.  Patient is a bit overwhelmed taking meds 5 times a day including pantoprazole before her other meds.  She does not have any GERD symptoms and she can combine pantoprazole with her 8 AM meds for simplicity sake.     Renal/Pancreas Transplant: Patient reports swelling in her legs as well as her face.  Denies symptoms of allergic reaction.  If caused by medications I am thinking prednisone is the most likely cause.  She will be off of this in about 3 weeks.  Continue to monitor symptoms.  She has not been taking clotrimazole consistently, this may be contributing to her variable tacrolimus levels.  Discussed the importance of getting this in 4 times every day she will take it for 3 months.  She is also using some herbal teas, although I think the risk for drug interaction is low with the use I would recommend she hold off on these for the full first few months to avoid complications and drug interactions.  Patient told to restart her vitamin D at past dose 2000 units every other day.  We also discussed the importance of 2 forms of birth control while on mycophenolate acid.  She is currently using birth control tablets (Capri brand name) and I recommended barrier method addition to this.  Answered some of patient's questions as well as outlined in the plan.    Pain: Stable.  Patient uninterested in using pain medications at this time.  Pain is manageable.    Appetite/ Gastroparesis: Patient is forcing down food although she is not enjoying it.  No changes today    Anemia: Patient is anemic due to long CKD history.  Is reasonable to check for iron folate and vitamin B12 deficiencies as well.    PLAN:                            1.Take Protonix with your 8am medications for simplicity.   2. Stop drinking herbal teas for now. Once you are more stable 3+ months out we can discuss teas on  a case by case basis.   3. It is okay to take multivitamin, just make sure there are no herbal supplements in there.   4. Take 4 Clotrimazole lozenges daily consistently for 3 months.  If you are inconsistent with this medication it will make your tacrolimus fluctuate and hard to get a stable dose.  5. Take Vit D 25mcg every day or 50mcg every other day. You can check Vitamin D levels yearly.   7. Birth control and barrier method are required while on Myfortic due to high rates of birth defects.  8. We will draw an iron level from your existing labs to evaluate your anemia.   9. [facility] mail order will call you three weeks post discharge. If you are running low on any medications before then, call the number on the back of the pill box. ([phone])    Txp team...  1. FYI patient having swelling in face and feet (not lips /throat etc). I believe this is from prednisone. Something to monitor.      Follow-up: 2 months      SUBJECTIVE/OBJECTIVE:                          Marilyn Ortega is a 36 year old female called for a transitions of care visit. She was discharged from [facility] on 11/22/2021 for kindney/pancreas transplant. Patient wonders if she can consolidate some of her medications for convenience.      Reason for visit: Transplant follow up. Patient reports lots of side effects from prednisone, she is currently on 15mg daily, she reports discomfort in her eyes (feeling like there is always a flashlight in the eye). Patient reports fluid retention in her legs. Patient is wondering if chamomile, licorice, clove, orange peel, anice are safe to ingest while on her medications. She is aware of avoiding grapefruit juice. She wonders if she can take a multivitamin.  Patient has a history of cataracts.    Allergies/ADRs: reviewwed per chart  Past Medical History: Reviewed in chart  Tobacco: She reports that she has never smoked. She has never used smokeless tobacco.  Alcohol: not  "currently using    Medication Adherence/Access: Patient uses pill box(es).  Patient takes medications 5 time(s) per day. 7:30am, 8am, 12, 6, 8pm  Per patient, misses medication 0 times per week.   Medication barriers: none.   The patient fills medications at [place]: YES.    Renal/Pancreas Transplant:  Current immunosuppressants include TAC 2mg BID. No side effects currently. Mycophenolic acid 720mg BID.  Pt reports no side effects.  Prednisone 15mg daily.  Transplant date: 11/15/21  Estimated Creatinine Clearance: 66 mL/min (based on SCr of 1.03 mg/dL).  CMV prophylaxis: CrCl >/=60 mL/minute: Valcyte 900mg daily Treat 3 months post tx  PCP prophylaxis: Bactrim S S daily.  Antifungal Prophylaxis: Clotrimazole 10mg lozenges 4 times daily  Aspirin 325mg once daily.  PPI use: Pantoprazole 40mg daily denies GERD sx.   Supplements: Phosphorous 500mg twice daily, Calcium (OsCal) every day, Magnesium Oxide 400mg twice daily.  ( 2 hours from MMF) .  Tx Coordinator: Melina Marshall MD: Dr. Pack, Using Med Card: Yes  Immunizations: annual flu shot unknown; Pneumovax 23:  2005; Prevnar 13: unknown; TDaP:  2013; Shingrix: unknown, HBV: immunity per last titers, COVID: [email or web address] 3x2021  Results for NORY CAMARGO (MRN [id number]) as of 12/1/2021 13:59   Ref. Range 11/26/2021 07:06 11/27/2021 07:32 11/29/2021 08:05   Phosphorus Latest Ref Range: 2.5 - 4.5 mg/dL 2.1 (L) 1.8 (L) 2.1 (L)     Pain: \"Some days are better than others\" not sure where the pain is coming from. Describes as \"stomach pain\". Patient doesn't like medicating for pain, not using Oxycodone or Acetaminophen. She is content with her current level of pain control.     Appetite/ Gastroparesis: \"eating is a chore right now\". She reports doing her best eating lots of protein, but she is not enjoying it. For gastroparesis she is taking Motegrity 2mg daily. Which works well for her.     Anemia: Pt is not taking any supplements at this point. No " iron studies completed.   Hemoglobin   Date Value Ref Range Status   12/01/2021 8.7 (L) 11.7 - 15.7 g/dL Final   05/24/2012 11.8 11.7 - 15.7 g/dL Final     Today's Vitals: LMP  (LMP Unknown)   ----------------  Post Discharge Medication Reconciliation Status: discharge medications reconciled and changed, per note/orders.    I spent 56 minutes with this patient today. All changes were made via collaborative practice agreement with Dr. Pack. A copy of the visit note was provided to the patient's referring provider.    The patient was sent via [employer] a summary of these recommendations.     Bulmaro Weiner PharmD  [facility] Pharmacist    Phone: [phone]     Telemedicine Visit Details  Type of service:  Telephone visit  Start Time: 2:17 PM  End Time: 3:13 PM  Originating Location (patient location): [place]  Distant Location (provider location):  [facility]     Medication Therapy Recommendations  Kidney replaced by transplant    Current Medication: clotrimazole (MYCELEX) 10 MG lozenge   Rationale: Medication interaction - Adverse medication event - Safety   Recommendation: Provide Education   Status: Patient Agreed - Adherence/Education          Current Medication: mycophenolic acid (GENERIC EQUIVALENT) 360 MG EC tablet   Rationale: Undesirable effect - Adverse medication event - Safety   Recommendation: Provide Education   Status: Patient Agreed - Adherence/Education          Rationale: Synergistic therapy - Needs additional medication therapy - Indication   Recommendation: Start Medication - Vitamin D (Cholecalciferol) 25 MCG (1000 UT) Caps   Status: Accepted - no CPA Needed

## 2021-12-01 NOTE — Clinical Note
Very inquisitive lady! She has been takign clotrimazole 3-4 times daily, may be contributing to some of her Tacrolimus fluctuations. Having side effects (swelling face and feet) likely from prednisone.

## 2021-12-01 NOTE — TELEPHONE ENCOUNTER
Post Kidney and Pancreas Transplant Team Conference  Date: 12/1/2021  Transplant Coordinator: Yessica Becker     Attendees:  []  Dr. Pack [] Brandi Hernandez, RN [] Tamiko Cárdenas LPN     []  Dr. Motta [] Lizette Park RN [] Radhika Arnett LPN   []  Dr. Lee [] Yessica Becker RN    []  Dr. Corona [] Tasha Cartwright RN [] Bulmaro Weiner, PharmD   [] Dr. El [] Hamida Guevara RN    [] Dr. Chavarria [] Roderick Messer RN    [] Dr. Ocampo [] Lynette De RN [] Nena Wheeler RN   [] Dr. De León [] Naz Glaser RN    []  Dr. Aranda [] Radha Hurt RN    [] Dr. Wright [] Ashely Tse RN    [] Sally Brito NP [] Jovana Lemos RN        Verbal Plan Read Back:   No changes to lab or medication schedule  Will continue to monitor    Routed to RN Coordinator   Radhika Arnett LPN

## 2021-12-02 ENCOUNTER — TELEPHONE (OUTPATIENT)
Dept: TRANSPLANT | Facility: CLINIC | Age: 36
End: 2021-12-02
Payer: COMMERCIAL

## 2021-12-02 DIAGNOSIS — Z94.83 PANCREAS REPLACED BY TRANSPLANT (H): ICD-10-CM

## 2021-12-02 DIAGNOSIS — D64.9 ANEMIA: Primary | ICD-10-CM

## 2021-12-02 DIAGNOSIS — Z94.0 KIDNEY REPLACED BY TRANSPLANT: ICD-10-CM

## 2021-12-02 LAB
FERRITIN SERPL-MCNC: 49 NG/ML (ref 12–150)
IRON SATN MFR SERPL: 10 % (ref 15–46)
IRON SERPL-MCNC: 29 UG/DL (ref 35–180)
TIBC SERPL-MCNC: 280 UG/DL (ref 240–430)
VIT B12 SERPL-MCNC: 384 PG/ML (ref 193–986)

## 2021-12-02 RX ORDER — NORGESTIMATE AND ETHINYL ESTRADIOL 0.25-0.035
1 KIT ORAL DAILY
COMMUNITY
End: 2022-05-20

## 2021-12-02 NOTE — PATIENT INSTRUCTIONS
Recommendations from today's MTM visit:                                                       1.Take Protonix with your 8am medications for simplicity.   2. Stop drinking herbal teas for now. Once you are more stable 3+ months out we can discuss teas on a case by case basis.   3. It is okay to take multivitamin, just make sure there are no herbal supplements in there.   4. Take 4 Clotrimazole lozenges daily consistently for 3 months.  If you are inconsistent with this medication it will make your tacrolimus fluctuate and hard to get a stable dose.  5. Take Vit D 25mcg every day or 50mcg every other day. You can check Vitamin D levels yearly.   7. Birth control and barrier method are required while on Myfortic due to high rates of birth defects.  8. We will draw an iron level from your existing labs to evaluate your anemia.   9. [facility] mail order will call you three weeks post discharge. If you are running low on any medications before then, call the number on the back of the pill box. ([phone])    Follow-up: 2 months    It was great to speak with you today.  I value your experience and would be very thankful for your time with providing feedback on our clinic survey. You may receive a survey via email or text message in the next few days.     To schedule another MTM appointment, please call the clinic directly or you may call the MTM scheduling line at [phone] or toll-free at [phone].     My Clinical Pharmacist's contact information:                                                      Please feel free to contact me with any questions or concerns you have.      Bulmaro Weiner, PharmD  MTM Pharmacist    Phone: [phone]

## 2021-12-02 NOTE — TELEPHONE ENCOUNTER
ISSUE:  Low iron    PLAN:  Kamran Corona MD Schindelholz, Alanna, RN  she needs IV iron please. Would do IV iron sucrose 300mg weekly x3. Thanks       OUTCOME:  Orders placed.   Marilyn made aware ATC scheduling will reach out to her.

## 2021-12-02 NOTE — TELEPHONE ENCOUNTER
----- Message from Reanna Lee MD sent at 12/2/2021  1:03 PM CST -----  Could arraneg for iv iron venofer 200 mg iv x5 or   Start iron po 325 mg po bid with meals which would be slower to improve Hb/iron stores  ----- Message -----  From: Yessica Becker RN  Sent: 12/2/2021  11:22 AM CST  To: Reanna Lee MD    Sent to MD for review. Not on iron supplement. Any changes?

## 2021-12-02 NOTE — TELEPHONE ENCOUNTER
ISSUE:   Tacrolimus IR level 7.7 on 12/1, goal 8-10, dose 2 mg BID.    PLAN:   Please call patient and confirm this was an accurate 12-hour trough. Verify Tacrolimus IR dose 2 mg BID. Confirm no new medications or illness. Confirm no missed doses. If accurate trough and accurate dose, increase Tacrolimus IR dose to 2.5 mg BID and repeat labs in 1 days.    Yessica Becker RN BSN  Transplant Care Coordinator    OUTCOME:   Spoke with patient, they confirm accurate trough level and current dose 2 mg BID. Patient states that she was not consistently taking yodit and was informed by Peter/pharmacist that this can fluctuate her Tac level.  Patient would like to wait until tomorrow's lab draw before increasing her Tac dose due to the fact that she has been taking her troches correctly for the past few days and would like to see if this helps increase her Tac level.

## 2021-12-03 ENCOUNTER — TELEPHONE (OUTPATIENT)
Dept: TRANSPLANT | Facility: CLINIC | Age: 36
End: 2021-12-03

## 2021-12-03 ENCOUNTER — LAB (OUTPATIENT)
Dept: LAB | Facility: CLINIC | Age: 36
End: 2021-12-03
Payer: COMMERCIAL

## 2021-12-03 DIAGNOSIS — Z94.83 PANCREAS REPLACED BY TRANSPLANT (H): ICD-10-CM

## 2021-12-03 DIAGNOSIS — Z94.0 KIDNEY REPLACED BY TRANSPLANT: ICD-10-CM

## 2021-12-03 DIAGNOSIS — Z79.899 ENCOUNTER FOR LONG-TERM CURRENT USE OF MEDICATION: ICD-10-CM

## 2021-12-03 DIAGNOSIS — Z48.298 AFTERCARE FOLLOWING ORGAN TRANSPLANT: ICD-10-CM

## 2021-12-03 LAB
AMYLASE SERPL-CCNC: 46 U/L (ref 30–110)
ANION GAP SERPL CALCULATED.3IONS-SCNC: 4 MMOL/L (ref 3–14)
BUN SERPL-MCNC: 31 MG/DL (ref 7–30)
CALCIUM SERPL-MCNC: 8.9 MG/DL (ref 8.5–10.1)
CHLORIDE BLD-SCNC: 111 MMOL/L (ref 94–109)
CO2 SERPL-SCNC: 28 MMOL/L (ref 20–32)
CREAT SERPL-MCNC: 0.93 MG/DL (ref 0.52–1.04)
ERYTHROCYTE [DISTWIDTH] IN BLOOD BY AUTOMATED COUNT: 17.8 % (ref 10–15)
GFR SERPL CREATININE-BSD FRML MDRD: 79 ML/MIN/1.73M2
GLUCOSE BLD-MCNC: 88 MG/DL (ref 70–99)
HCT VFR BLD AUTO: 26.7 % (ref 35–47)
HGB BLD-MCNC: 8.3 G/DL (ref 11.7–15.7)
LIPASE SERPL-CCNC: 112 U/L (ref 73–393)
MAGNESIUM SERPL-MCNC: 1.6 MG/DL (ref 1.6–2.3)
MCH RBC QN AUTO: 28.1 PG (ref 26.5–33)
MCHC RBC AUTO-ENTMCNC: 31.1 G/DL (ref 31.5–36.5)
MCV RBC AUTO: 91 FL (ref 78–100)
PHOSPHATE SERPL-MCNC: 2.1 MG/DL (ref 2.5–4.5)
PLATELET # BLD AUTO: 357 10E3/UL (ref 150–450)
POTASSIUM BLD-SCNC: 4.3 MMOL/L (ref 3.4–5.3)
RBC # BLD AUTO: 2.95 10E6/UL (ref 3.8–5.2)
SODIUM SERPL-SCNC: 143 MMOL/L (ref 133–144)
TACROLIMUS BLD-MCNC: 12.1 UG/L (ref 5–15)
TME LAST DOSE: NORMAL H
TME LAST DOSE: NORMAL H
WBC # BLD AUTO: 18.5 10E3/UL (ref 4–11)

## 2021-12-03 PROCEDURE — 83735 ASSAY OF MAGNESIUM: CPT

## 2021-12-03 PROCEDURE — 36415 COLL VENOUS BLD VENIPUNCTURE: CPT

## 2021-12-03 PROCEDURE — 80197 ASSAY OF TACROLIMUS: CPT

## 2021-12-03 PROCEDURE — 82150 ASSAY OF AMYLASE: CPT

## 2021-12-03 PROCEDURE — 83690 ASSAY OF LIPASE: CPT

## 2021-12-03 PROCEDURE — 84100 ASSAY OF PHOSPHORUS: CPT

## 2021-12-03 PROCEDURE — 85027 COMPLETE CBC AUTOMATED: CPT

## 2021-12-03 PROCEDURE — 80048 BASIC METABOLIC PNL TOTAL CA: CPT

## 2021-12-03 PROCEDURE — 80180 DRUG SCRN QUAN MYCOPHENOLATE: CPT

## 2021-12-03 RX ORDER — TACROLIMUS 1 MG/1
CAPSULE ORAL
Qty: 90 CAPSULE | Refills: 11 | Status: SHIPPED | OUTPATIENT
Start: 2021-12-03 | End: 2021-12-09

## 2021-12-03 RX ORDER — TACROLIMUS 0.5 MG/1
0.5 CAPSULE ORAL EVERY EVENING
Qty: 30 CAPSULE | Refills: 11 | Status: SHIPPED | OUTPATIENT
Start: 2021-12-03 | End: 2021-12-09

## 2021-12-03 NOTE — TELEPHONE ENCOUNTER
ISSUE:   Tacrolimus IR level 12.1 on 12/3, goal 8-10, dose 2 mg BID.    PLAN:   Please call patient and confirm this was an accurate 12-hour trough. Verify Tacrolimus IR dose 2 mg BID. Confirm no new medications or illness. Confirm no missed doses. If accurate trough and accurate dose, decrease Tacrolimus IR dose to 2/1.5 mg and repeat labs in 3 days    OUTCOME:   Spoke with patient, they confirm accurate trough level and current dose 2 mg BID. Patient confirmed dose change to 2/1.5 mg and to repeat labs in 3 days. Orders sent to preferred pharmacy for dose change and lab for repeat labs. Patient voiced understanding of plan.

## 2021-12-04 ENCOUNTER — TELEPHONE (OUTPATIENT)
Dept: TRANSPLANT | Facility: CLINIC | Age: 36
End: 2021-12-04
Payer: COMMERCIAL

## 2021-12-04 NOTE — TELEPHONE ENCOUNTER
Marilyn paged that she went to take her morning meds and realized she did not take evening medications last night. With the prednisone she has been on an abnormal sleep cycle. Marilyn is aware to ani down as missed dose and get back on track today. Marilyn will set an extra alarm to help and call back with any changes or concerns.

## 2021-12-06 ENCOUNTER — LAB (OUTPATIENT)
Dept: LAB | Facility: CLINIC | Age: 36
End: 2021-12-06
Payer: COMMERCIAL

## 2021-12-06 DIAGNOSIS — Z94.0 KIDNEY REPLACED BY TRANSPLANT: ICD-10-CM

## 2021-12-06 DIAGNOSIS — N18.9 ANEMIA DUE TO CHRONIC KIDNEY DISEASE, UNSPECIFIED CKD STAGE: ICD-10-CM

## 2021-12-06 DIAGNOSIS — D63.1 ANEMIA DUE TO CHRONIC KIDNEY DISEASE, UNSPECIFIED CKD STAGE: ICD-10-CM

## 2021-12-06 DIAGNOSIS — Z79.899 ENCOUNTER FOR LONG-TERM CURRENT USE OF MEDICATION: ICD-10-CM

## 2021-12-06 DIAGNOSIS — Z48.298 AFTERCARE FOLLOWING ORGAN TRANSPLANT: ICD-10-CM

## 2021-12-06 DIAGNOSIS — Z94.83 PANCREAS REPLACED BY TRANSPLANT (H): ICD-10-CM

## 2021-12-06 LAB
AMYLASE SERPL-CCNC: 38 U/L (ref 30–110)
ANION GAP SERPL CALCULATED.3IONS-SCNC: 5 MMOL/L (ref 3–14)
BUN SERPL-MCNC: 30 MG/DL (ref 7–30)
CALCIUM SERPL-MCNC: 9.2 MG/DL (ref 8.5–10.1)
CHLORIDE BLD-SCNC: 110 MMOL/L (ref 94–109)
CO2 SERPL-SCNC: 26 MMOL/L (ref 20–32)
CREAT SERPL-MCNC: 1.08 MG/DL (ref 0.52–1.04)
ERYTHROCYTE [DISTWIDTH] IN BLOOD BY AUTOMATED COUNT: 17.8 % (ref 10–15)
FOLATE SERPL-MCNC: 21.3 NG/ML
GFR SERPL CREATININE-BSD FRML MDRD: 66 ML/MIN/1.73M2
GLUCOSE BLD-MCNC: 94 MG/DL (ref 70–99)
HCT VFR BLD AUTO: 26.6 % (ref 35–47)
HGB BLD-MCNC: 8.1 G/DL (ref 11.7–15.7)
LIPASE SERPL-CCNC: 82 U/L (ref 73–393)
MAGNESIUM SERPL-MCNC: 1.6 MG/DL (ref 1.6–2.3)
MCH RBC QN AUTO: 27.6 PG (ref 26.5–33)
MCHC RBC AUTO-ENTMCNC: 30.5 G/DL (ref 31.5–36.5)
MCV RBC AUTO: 91 FL (ref 78–100)
PHOSPHATE SERPL-MCNC: 2.5 MG/DL (ref 2.5–4.5)
PLATELET # BLD AUTO: 375 10E3/UL (ref 150–450)
POTASSIUM BLD-SCNC: 3.8 MMOL/L (ref 3.4–5.3)
RBC # BLD AUTO: 2.93 10E6/UL (ref 3.8–5.2)
SODIUM SERPL-SCNC: 141 MMOL/L (ref 133–144)
TACROLIMUS BLD-MCNC: 9.1 UG/L (ref 5–15)
TME LAST DOSE: NORMAL H
TME LAST DOSE: NORMAL H
WBC # BLD AUTO: 14.9 10E3/UL (ref 4–11)

## 2021-12-06 PROCEDURE — 36415 COLL VENOUS BLD VENIPUNCTURE: CPT

## 2021-12-06 PROCEDURE — 85027 COMPLETE CBC AUTOMATED: CPT

## 2021-12-06 PROCEDURE — 82150 ASSAY OF AMYLASE: CPT

## 2021-12-06 PROCEDURE — 80048 BASIC METABOLIC PNL TOTAL CA: CPT

## 2021-12-06 PROCEDURE — 84100 ASSAY OF PHOSPHORUS: CPT

## 2021-12-06 PROCEDURE — 83735 ASSAY OF MAGNESIUM: CPT

## 2021-12-06 PROCEDURE — 82746 ASSAY OF FOLIC ACID SERUM: CPT

## 2021-12-06 PROCEDURE — 83690 ASSAY OF LIPASE: CPT

## 2021-12-06 PROCEDURE — 80197 ASSAY OF TACROLIMUS: CPT

## 2021-12-07 PROBLEM — Z76.82 ORGAN TRANSPLANT CANDIDATE: Status: RESOLVED | Noted: 2021-11-15 | Resolved: 2021-12-07

## 2021-12-07 PROBLEM — Z94.0 KIDNEY REPLACED BY TRANSPLANT: Status: ACTIVE | Noted: 2021-12-07

## 2021-12-07 PROBLEM — Z29.89 NEED FOR PNEUMOCYSTIS PROPHYLAXIS: Status: ACTIVE | Noted: 2021-12-07

## 2021-12-07 PROBLEM — Z94.83 PANCREAS REPLACED BY TRANSPLANT (H): Status: ACTIVE | Noted: 2021-12-07

## 2021-12-07 LAB
MYCOPHENOLATE SERPL LC/MS/MS-MCNC: 4.94 MG/L (ref 1–3.5)
MYCOPHENOLATE-G SERPL LC/MS/MS-MCNC: 66.8 MG/L (ref 30–95)
TME LAST DOSE: ABNORMAL H
TME LAST DOSE: ABNORMAL H

## 2021-12-07 NOTE — PROGRESS NOTES
TRANSPLANT NEPHROLOGY EARLY POST TRANSPLANT VISIT    Assessment & Plan   # DDKT (SPK): Trend up patient creatinine today is 1.4 however recent tacrolimus was 11.1 which indicates she has CNA toxicity and 2 days back it was in the 21.9.  She is also on Lasix at home reports she was started on 40 mg she has at her baseline weight and therefore will discontinue the Lasix and will give her 1 L of Ringer lactate as she requires some fluid  -We will get a repeat tacrolimus level today and based on that we will adjust the evening dose   - Baseline Creatinine: ~ 0.9-1.1   - Proteinuria: Not checked post transplant   - Date DSA Last Checked: Nov/2021      Latest DSA: No   - BK Viremia: Not checked recently   - Kidney Tx Biopsy: No    # Pancreas Tx (SPK):    - Pancreatic Exocrine Drainage: Enteric drained     - Blood glucose: Euglycemia      On insulin: No   - HbA1c: Last checked at time of transplant      Latest HbA1c: 7.7%   - Pancreatic enzymes: Trend up   - Date DSA Last Checked: Nov/2021  Latest DSA: No   - Pancreas Tx Biopsy: No   -Will get U/S abdomen as she had elevated lipase to 602.   -We will continue to monitor lipase level    # Immunosuppression: Tacrolimus immediate release (goal 8-10), Mycophenolic acid (dose 720 mg every 12 hours) and Prednisone (dose taper)   - Induction with Recent Transplant:  Intermediate Intensity   - Continue with intensive monitoring of immunosuppression for efficacy and toxicity.   - Changes: Not at this time  -Follow-up on the tacrolimus level as she was recently at 11.1 and did develop an BERNIE    # Infection Prophylaxis:   - PJP: Sulfa/TMP (Bactrim)  - CMV: Valganciclovir (Valcyte) R-/D- will stop in 3 months    # Hypertension: Controlled, but low at times;  Goal BP: < 150/90   - Volume status: Euvolemic  EDW ~ 121 pound   - Changes: Yes - discontinue the home dose Lasix. She was at 20 mg and at discharge was increased to 40 mg her blood pressure is in early 100's and does not have  any swelling in the legs.    # Diabetes: Borderline control (HbA1c 7-9%) Last HbA1c: 7.7%   - Management as per primary care.But with the pancrease transplant her diabetes should resolve    # Anemia in Chronic Renal Disease: Hgb: Stable      SYLVAIN: No   - Iron studies: Not checked recently    # Mineral Bone Disorder:   - Calcium; level: Normal        On supplement: No    # Electrolytes:   - Potassium; level: Normal        On supplement: No  - Bicarbonate; level: Normal        On supplement: No   - Magnesium:Low                          On supplement:Yes    # Constipation: Patient was having diarrhea before but now has not had bowel movement since yesterday afternoon. All her Laxative are on hold.   -Will restart Docusate senna twice a day and if that does not work than start Miralax tonight    # Leukocytosis:  Patient is on a steroid taper which can cause her leukocytes to go up but she also has elevated lipase.  For concern of pancreatitis and will get ultrasound abdomen.  Monitor her lipase.  And order a UA with culture  Trend WBC we will see her tomorrow in the infusion center    # Medical Compliance: Yes    # COVID-19 Virus Review: Discussed COVID-19 virus and the potential medical risks.  Reviewed preventative health recommendations, including wearing a mask where appropriate.  Recommended COVID vaccination should be up to date with either an initial vaccination or booster shot when appropriate.  Asked the patient to inform the transplant center if they are exposed or diagnosed with this virus.    # COVID Vaccination Up To Date: Yes    # Transplant History:  Etiology of Kidney Failure: Diabetes mellitus type 1  Tx: DDKT (SPK)  Transplant: 11/15/2021 (Kidney / Pancreas)  Donor Type: Donation after Brain Death Donor Class:   Crossmatch at time of Tx: negative  DSA at time of Tx: No  Significant changes in immunosuppression: None  Significant transplant-related complications: None    Transplant Office Phone Number:  [phone]    Assessment and plan was discussed with the patient and she voiced her understanding and agreement.    Return visit: Return in about 1 day (around 2021).    Chaka Lock MD    Attestation:  This patient has been seen and evaluated by me, Mason Pack MD.  I have reviewed the note and agree with plan of care as documented by the fellow.       Chief Complaint   Ms. Ortega is a 36 year old here for hospital follow up after kidney/pancreas transplant.    History of Present Illness   36-year-old female with a past medical history of type 1 diabetes mellitus and CKD s/p  donor kidney transplant and SPK on 11/15/2021.  Patient reports that she was feeling jittery today but yesterday but it went away after 1-1/2 hours denies any tremors headache nausea vomiting or swelling in the legs.  Patient was found to have elevated tacrolimus at discharge it was 11.1 and before that 21.9.  She reports she was having diarrhea in the hospital as she was on Laxative and MMF which were changed to  mycophenolic acid and all her laxatives were on hold and since yesterday afternoon she did not have a bowel movement reports she is usually constipated before the surgery.  Does not drink plenty of water.  4 days before the surgery she had 140 pounds was her weight and her dry weight was 121 which was on the day of her surgery and today it is 123 pounds.  She has been put on Lasix 40 mg at home and before the surgery she was taking 20 mg.  Currently is running the blood pressure on the lower side.  She also has elevated lipase but does not complain of any abdominal pain.    Home BP: Not checked    Problem List   Patient Active Problem List   Diagnosis     Type 1 diabetes mellitus (H)     Type 1 diabetes, HbA1c goal < 7% (H)     Osteopenia     Chronic constipation     Irritable bowel syndrome     CKD (chronic kidney disease) stage 4, GFR 15-29 ml/min (H)     HTN, kidney transplant related     Gastroparesis      Heterozygous factor V Leiden mutation (H)     Status post simultaneous kidney and pancreas transplant (H)     Immunosuppression (H)     Kidney replaced by transplant     Pancreas replaced by transplant (H)     Need for pneumocystis prophylaxis       Allergies   Allergies   Allergen Reactions     Chlorhexidine Hives, Itching and Rash     PN: Patient had swelling/rash that was very itchy with chlorprep.     Ceclor [Cefaclor Monohydrate]      Cefaclor Rash       Medications   Current Outpatient Medications   Medication Sig     acetaminophen (TYLENOL) 325 MG tablet Take 1-2 tablets (325-650 mg) by mouth every 4 hours as needed for other (For optimal non-opioid multimodal pain management to improve pain control.) (Patient not taking: Reported on 12/1/2021)     aspirin (ASA) 325 MG tablet Take 1 tablet (325 mg) by mouth daily     bromfenac (PROLENSA) 0.07 % ophthalmic solution Place 1 drop Into the left eye daily     calcium carbonate-vitamin D (OS-STEPHANIE WITH D) 500-200 MG-UNIT tablet Take 1 tablet by mouth 2 times daily (with meals)     cetirizine (ZYRTEC) 10 MG tablet Take 10 mg by mouth daily     cholecalciferol 25 MCG (1000 UT) TABS Take 2,000 Units by mouth every other day      clotrimazole (MYCELEX) 10 MG lozenge Place 1 lozenge (10 mg) inside cheek 4 times daily     Continuous Blood Gluc Sensor (DEXCOM G6 SENSOR) MISC Use as directed for continuous glucose monitoring.  Change sensor every 10 days.     Continuous Blood Gluc Transmit (DEXCOM G6 TRANSMITTER) MISC Use as directed for continuous glucose monitoring.  Change every 3 months.     glucose blood VI test strips strip 4 times daily.     Loteprednol Etabonate (LOTEMAX SM) 0.38 % GEL Apply 1 drop to eye 3 times daily Left eye     magnesium oxide (MAG-OX) 400 MG tablet Take 1 tablet (400 mg) by mouth 2 times daily     mycophenolic acid (GENERIC EQUIVALENT) 360 MG EC tablet Take 2 tablets (720 mg) by mouth 2 times daily     norgestimate-ethinyl estradiol  (ORTHO-CYCLEN) 0.25-35 MG-MCG tablet Take 1 tablet by mouth daily     ondansetron (ZOFRAN-ODT) 4 MG ODT tab Take 1 tablet (4 mg) by mouth every 6 hours as needed for nausea or vomiting (Patient not taking: Reported on 12/1/2021)     pantoprazole (PROTONIX) 40 MG EC tablet Take 1 tablet (40 mg) by mouth every morning (before breakfast)     phosphorus tablet 250 mg (PHOSPHA 250 NEUTRAL) 250 MG per tablet Take 2 tablets (500 mg) by mouth 2 times daily     predniSONE (DELTASONE) 10 MG tablet Take 2 tablets (20 mg) by mouth daily for 7 days, THEN 1.5 tablets (15 mg) daily for 7 days, THEN 1 tablet (10 mg) daily for 7 days, THEN 0.5 tablets (5 mg) daily for 7 days.     Prucalopride Succinate (MOTEGRITY) 2 MG TABS Take one tablet by mouth daily     senna-docusate (SENOKOT-S/PERICOLACE) 8.6-50 MG tablet Take 2 tablets by mouth daily as needed for constipation     sulfamethoxazole-trimethoprim (BACTRIM) 400-80 MG tablet Take 1 tablet by mouth daily     tacrolimus (GENERIC EQUIVALENT) 0.5 MG capsule Take 1 capsule (0.5 mg) by mouth every evening Total PM dose=1.5mg     tacrolimus (GENERIC EQUIVALENT) 1 MG capsule Take 2 capsules (2 mg) by mouth every morning AND 1 capsule (1 mg) every evening. Total PM dose=1.5mg     valGANciclovir (VALCYTE) 450 MG tablet Take 2 tablets (900 mg) by mouth daily     No current facility-administered medications for this visit.     Medications Discontinued During This Encounter   Medication Reason     furosemide (LASIX) 20 MG tablet        Physical Exam   Vital Signs: Reviewed    GENERAL APPEARANCE: alert and no distress  HENT: mouth without ulcers or lesions  LYMPHATICS: no cervical or supraclavicular nodes  RESP: lungs clear to auscultation - no rales, rhonchi or wheezes  CV: regular rhythm, normal rate, no rub, no murmur  EDEMA: no LE edema bilaterally  ABDOMEN: soft, nondistended, nontender, bowel sounds normal  MS: extremities normal - no gross deformities noted, no evidence of inflammation  in joints, no muscle tenderness  SKIN: no rash  TX KIDNEY: mild TTP  DIALYSIS ACCESS: none    Data     Renal Latest Ref Rng & Units 12/6/2021 12/3/2021 12/1/2021   Na 133 - 144 mmol/L 141 143 140   K 3.4 - 5.3 mmol/L 3.8 4.3 4.2   Cl 94 - 109 mmol/L 110(H) 111(H) 109   CO2 20 - 32 mmol/L 26 28 28   BUN 7 - 30 mg/dL 30 31(H) 28   Cr 0.52 - 1.04 mg/dL 1.08(H) 0.93 1.03   Glucose 70 - 99 mg/dL 94 88 85   Ca  8.5 - 10.1 mg/dL 9.2 8.9 9.0   Mg 1.6 - 2.3 mg/dL 1.6 1.6 -     Bone Health Latest Ref Rng & Units 12/6/2021 12/3/2021 11/29/2021   Phos 2.5 - 4.5 mg/dL 2.5 2.1(L) 2.1(L)   PTHi pg/mL - - 30   Vit D Def 20 - 75 ug/L - - 52     Heme Latest Ref Rng & Units 12/6/2021 12/3/2021 12/1/2021   WBC 4.0 - 11.0 10e3/uL 14.9(H) 18.5(H) 17.5(H)   Hgb 11.7 - 15.7 g/dL 8.1(L) 8.3(L) 8.7(L)   Plt 150 - 450 10e3/uL 375 357 350   ABSOLUTE NEUTROPHIL 1.6 - 8.3 10e9/L - - -   ABSOLUTE LYMPHOCYTES 0.8 - 5.3 10e9/L - - -   ABSOLUTE MONOCYTES 0.0 - 1.3 10e9/L - - -   ABSOLUTE EOSINOPHILS 0.0 - 0.7 10e9/L - - -   ABSOLUTE BASOPHILS 0.0 - 0.2 10e9/L - - -     Liver Latest Ref Rng & Units 11/15/2021 7/21/2021 6/8/2012   AP 40 - 150 U/L 104 94 96   TBili 0.2 - 1.3 mg/dL 0.4 0.4 0.3   ALT 0 - 50 U/L 23 24 13   AST 0 - 45 U/L 16 19 33   Tot Protein 6.8 - 8.8 g/dL 8.4 9.4(H) 7.8   Albumin 3.4 - 5.0 g/dL 4.0 4.7 4.2     Pancreas Latest Ref Rng & Units 12/6/2021 12/3/2021 12/1/2021   A1C 0.0 - 5.6 % - - -   Amylase 30 - 110 U/L 38 46 52   Lipase 73 - 393 U/L 82 112 125     Iron studies Latest Ref Rng & Units 12/1/2021   Iron 35 - 180 ug/dL 29(L)   Iron sat 15 - 46 % 10(L)   Ferritin 12 - 150 ng/mL 49     [facility] Txp Virology Latest Ref Rng & Units 7/21/2021   EBV CAPSID ANTIBODY IGG No detectable antibody. Positive(A)        Recent Labs   Lab Test 12/01/21  0715 12/03/21  0724 12/06/21  0728   DOSTAC 11/30/2021 12/2/2021 12/5/2021   TACROL 7.7 12.1 9.1     Recent Labs   Lab Test 11/24/21  0628   DOSMPA 11/23/2021   6:30 PM   MPACID 5.38*   MPAG 79.4

## 2021-12-08 ENCOUNTER — TELEPHONE (OUTPATIENT)
Dept: TRANSPLANT | Facility: CLINIC | Age: 36
End: 2021-12-08

## 2021-12-08 ENCOUNTER — TEAM CONFERENCE (OUTPATIENT)
Dept: TRANSPLANT | Facility: CLINIC | Age: 36
End: 2021-12-08

## 2021-12-08 ENCOUNTER — ANCILLARY PROCEDURE (OUTPATIENT)
Dept: CT IMAGING | Facility: CLINIC | Age: 36
End: 2021-12-08
Attending: SURGERY
Payer: COMMERCIAL

## 2021-12-08 DIAGNOSIS — Z94.83 PANCREAS REPLACED BY TRANSPLANT (H): ICD-10-CM

## 2021-12-08 DIAGNOSIS — Z94.0 KIDNEY REPLACED BY TRANSPLANT: ICD-10-CM

## 2021-12-08 DIAGNOSIS — R50.9 FEVER: ICD-10-CM

## 2021-12-08 DIAGNOSIS — Z94.0 KIDNEY REPLACED BY TRANSPLANT: Primary | ICD-10-CM

## 2021-12-08 DIAGNOSIS — R50.9 FEVER: Primary | ICD-10-CM

## 2021-12-08 PROCEDURE — 74176 CT ABD & PELVIS W/O CONTRAST: CPT | Mod: GC | Performed by: RADIOLOGY

## 2021-12-08 RX ORDER — FERROUS GLUCONATE 324(38)MG
324 TABLET ORAL
Qty: 30 TABLET | Refills: 2 | Status: SHIPPED | OUTPATIENT
Start: 2021-12-08 | End: 2021-12-22

## 2021-12-08 NOTE — PROGRESS NOTES
Message  Received: Today  Mason Pack MD [place], Peter Alberto [facility]  Yes, with lunch.             Previous Messages       ----- Message -----   From: Bulmaro Weiner SONYA   Sent: 12/7/2021   2:34 PM CST   To: Mason Pack MD     Thoughts on starting Ferrous sulfate 325mg daily for patient? Iron sats low, ferritin on low end of normal.         Hemoglobin        Date                     Value               Ref Range           Status                12/06/2021               8.1 (L)             11.7 - 15.7 g/*     Final                  05/24/2012               11.8                11.7 - 15.7 g/*     Final             ----------]     Results for TIANADONNA NORY D (MRN [id number]) as of 12/7/2021 14:32     12/1/2021 07:15   Ferritin: 49   Results for NORY CAMARGO (MRN [id number]) as of 12/7/2021 14:32     12/1/2021 07:15   Iron Saturation Index: 10 (L)       Talked with patient about this.  She says that she does not tolerate iron tablets historically.  Likely she was taking ferrous sulfate which is hard on the stomach and a lot of patients cannot tolerate.  We will do a trial of ferrous gluconate which is much better tolerated as he has less iron and it.

## 2021-12-09 ENCOUNTER — TELEPHONE (OUTPATIENT)
Dept: NEPHROLOGY | Facility: CLINIC | Age: 36
End: 2021-12-09

## 2021-12-09 ENCOUNTER — OFFICE VISIT (OUTPATIENT)
Dept: NEPHROLOGY | Facility: CLINIC | Age: 36
End: 2021-12-09
Attending: INTERNAL MEDICINE
Payer: COMMERCIAL

## 2021-12-09 ENCOUNTER — LAB (OUTPATIENT)
Dept: LAB | Facility: CLINIC | Age: 36
End: 2021-12-09
Payer: COMMERCIAL

## 2021-12-09 ENCOUNTER — TELEPHONE (OUTPATIENT)
Dept: TRANSPLANT | Facility: CLINIC | Age: 36
End: 2021-12-09

## 2021-12-09 VITALS
TEMPERATURE: 98.5 F | BODY MASS INDEX: 20.23 KG/M2 | HEART RATE: 99 BPM | DIASTOLIC BLOOD PRESSURE: 80 MMHG | WEIGHT: 125.9 LBS | HEIGHT: 66 IN | SYSTOLIC BLOOD PRESSURE: 134 MMHG

## 2021-12-09 DIAGNOSIS — E83.39 HYPOPHOSPHATEMIA: ICD-10-CM

## 2021-12-09 DIAGNOSIS — Z48.298 AFTERCARE FOLLOWING ORGAN TRANSPLANT: ICD-10-CM

## 2021-12-09 DIAGNOSIS — Z94.83 PANCREAS REPLACED BY TRANSPLANT (H): ICD-10-CM

## 2021-12-09 DIAGNOSIS — Z94.0 KIDNEY REPLACED BY TRANSPLANT: Primary | ICD-10-CM

## 2021-12-09 DIAGNOSIS — R50.9 FEVER: ICD-10-CM

## 2021-12-09 DIAGNOSIS — R23.8 SKIN SENSITIVITY: ICD-10-CM

## 2021-12-09 DIAGNOSIS — Z79.899 ENCOUNTER FOR LONG-TERM CURRENT USE OF MEDICATION: ICD-10-CM

## 2021-12-09 DIAGNOSIS — G44.52 NEW DAILY PERSISTENT HEADACHE: ICD-10-CM

## 2021-12-09 DIAGNOSIS — Z94.0 KIDNEY REPLACED BY TRANSPLANT: ICD-10-CM

## 2021-12-09 DIAGNOSIS — D84.9 IMMUNOSUPPRESSION (H): ICD-10-CM

## 2021-12-09 PROBLEM — D64.9 ANEMIA: Status: ACTIVE | Noted: 2021-12-09

## 2021-12-09 LAB
ALBUMIN UR-MCNC: 30 MG/DL
AMYLASE SERPL-CCNC: 29 U/L (ref 30–110)
ANION GAP SERPL CALCULATED.3IONS-SCNC: 10 MMOL/L (ref 3–14)
APPEARANCE UR: ABNORMAL
BACTERIA #/AREA URNS HPF: ABNORMAL /HPF
BILIRUB UR QL STRIP: NEGATIVE
BUN SERPL-MCNC: 24 MG/DL (ref 7–30)
CALCIUM SERPL-MCNC: 9.1 MG/DL (ref 8.5–10.1)
CHLORIDE BLD-SCNC: 106 MMOL/L (ref 94–109)
CO2 SERPL-SCNC: 25 MMOL/L (ref 20–32)
COLOR UR AUTO: YELLOW
CREAT SERPL-MCNC: 1.03 MG/DL (ref 0.52–1.04)
ERYTHROCYTE [DISTWIDTH] IN BLOOD BY AUTOMATED COUNT: 17.2 % (ref 10–15)
GFR SERPL CREATININE-BSD FRML MDRD: 70 ML/MIN/1.73M2
GLUCOSE BLD-MCNC: 105 MG/DL (ref 70–99)
GLUCOSE UR STRIP-MCNC: NEGATIVE MG/DL
HCT VFR BLD AUTO: 26.9 % (ref 35–47)
HGB BLD-MCNC: 8.3 G/DL (ref 11.7–15.7)
HGB UR QL STRIP: ABNORMAL
HYALINE CASTS: 3 /LPF
KETONES UR STRIP-MCNC: NEGATIVE MG/DL
LEUKOCYTE ESTERASE UR QL STRIP: ABNORMAL
LIPASE SERPL-CCNC: 52 U/L (ref 73–393)
MCH RBC QN AUTO: 27.9 PG (ref 26.5–33)
MCHC RBC AUTO-ENTMCNC: 30.9 G/DL (ref 31.5–36.5)
MCV RBC AUTO: 90 FL (ref 78–100)
MUCOUS THREADS #/AREA URNS LPF: PRESENT /LPF
NITRATE UR QL: NEGATIVE
PH UR STRIP: 6 [PH] (ref 5–7)
PLATELET # BLD AUTO: 343 10E3/UL (ref 150–450)
POTASSIUM BLD-SCNC: 3.7 MMOL/L (ref 3.4–5.3)
RBC # BLD AUTO: 2.98 10E6/UL (ref 3.8–5.2)
RBC URINE: >182 /HPF
SODIUM SERPL-SCNC: 141 MMOL/L (ref 133–144)
SP GR UR STRIP: 1.02 (ref 1–1.03)
SQUAMOUS EPITHELIAL: 1 /HPF
TACROLIMUS BLD-MCNC: 11.8 UG/L (ref 5–15)
TME LAST DOSE: NORMAL H
TME LAST DOSE: NORMAL H
UROBILINOGEN UR STRIP-MCNC: 4 MG/DL
WBC # BLD AUTO: 10.7 10E3/UL (ref 4–11)
WBC URINE: 11 /HPF

## 2021-12-09 PROCEDURE — G0463 HOSPITAL OUTPT CLINIC VISIT: HCPCS

## 2021-12-09 PROCEDURE — 86833 HLA CLASS II HIGH DEFIN QUAL: CPT | Performed by: PATHOLOGY

## 2021-12-09 PROCEDURE — 99215 OFFICE O/P EST HI 40 MIN: CPT | Mod: 24 | Performed by: INTERNAL MEDICINE

## 2021-12-09 PROCEDURE — 83690 ASSAY OF LIPASE: CPT | Performed by: PATHOLOGY

## 2021-12-09 PROCEDURE — 85027 COMPLETE CBC AUTOMATED: CPT | Performed by: PATHOLOGY

## 2021-12-09 PROCEDURE — 80197 ASSAY OF TACROLIMUS: CPT | Mod: 90 | Performed by: PATHOLOGY

## 2021-12-09 PROCEDURE — 82150 ASSAY OF AMYLASE: CPT | Performed by: PATHOLOGY

## 2021-12-09 PROCEDURE — 86832 HLA CLASS I HIGH DEFIN QUAL: CPT | Performed by: PATHOLOGY

## 2021-12-09 PROCEDURE — 81001 URINALYSIS AUTO W/SCOPE: CPT | Performed by: PATHOLOGY

## 2021-12-09 PROCEDURE — 99000 SPECIMEN HANDLING OFFICE-LAB: CPT | Performed by: PATHOLOGY

## 2021-12-09 PROCEDURE — 87086 URINE CULTURE/COLONY COUNT: CPT | Mod: 90 | Performed by: PATHOLOGY

## 2021-12-09 PROCEDURE — 86828 HLA CLASS I&II ANTIBODY QUAL: CPT | Mod: 59 | Performed by: PATHOLOGY

## 2021-12-09 PROCEDURE — 36415 COLL VENOUS BLD VENIPUNCTURE: CPT | Performed by: PATHOLOGY

## 2021-12-09 PROCEDURE — 80048 BASIC METABOLIC PNL TOTAL CA: CPT | Performed by: PATHOLOGY

## 2021-12-09 RX ORDER — METHYLPREDNISOLONE SODIUM SUCCINATE 125 MG/2ML
125 INJECTION, POWDER, LYOPHILIZED, FOR SOLUTION INTRAMUSCULAR; INTRAVENOUS
Status: CANCELLED
Start: 2021-12-09

## 2021-12-09 RX ORDER — TACROLIMUS 1 MG/1
CAPSULE ORAL
Qty: 90 CAPSULE | Refills: 11 | COMMUNITY
Start: 2021-12-09 | End: 2021-12-14

## 2021-12-09 RX ORDER — MEPERIDINE HYDROCHLORIDE 25 MG/ML
25 INJECTION INTRAMUSCULAR; INTRAVENOUS; SUBCUTANEOUS EVERY 30 MIN PRN
Status: CANCELLED | OUTPATIENT
Start: 2021-12-09

## 2021-12-09 RX ORDER — HEPARIN SODIUM (PORCINE) LOCK FLUSH IV SOLN 100 UNIT/ML 100 UNIT/ML
5 SOLUTION INTRAVENOUS
Status: CANCELLED | OUTPATIENT
Start: 2021-12-09

## 2021-12-09 RX ORDER — DIPHENHYDRAMINE HYDROCHLORIDE 50 MG/ML
50 INJECTION INTRAMUSCULAR; INTRAVENOUS
Status: CANCELLED
Start: 2021-12-09

## 2021-12-09 RX ORDER — MAGNESIUM OXIDE 400 MG/1
400 TABLET ORAL DAILY
Qty: 30 TABLET | Refills: 3 | COMMUNITY
Start: 2021-12-09 | End: 2022-01-12

## 2021-12-09 RX ORDER — NALOXONE HYDROCHLORIDE 0.4 MG/ML
0.2 INJECTION, SOLUTION INTRAMUSCULAR; INTRAVENOUS; SUBCUTANEOUS
Status: CANCELLED | OUTPATIENT
Start: 2021-12-09

## 2021-12-09 RX ORDER — ALBUTEROL SULFATE 0.83 MG/ML
2.5 SOLUTION RESPIRATORY (INHALATION)
Status: CANCELLED | OUTPATIENT
Start: 2021-12-09

## 2021-12-09 RX ORDER — HEPARIN SODIUM,PORCINE 10 UNIT/ML
5 VIAL (ML) INTRAVENOUS
Status: CANCELLED | OUTPATIENT
Start: 2021-12-09

## 2021-12-09 RX ORDER — TACROLIMUS 0.5 MG/1
0.5 CAPSULE ORAL 2 TIMES DAILY
Qty: 30 CAPSULE | Refills: 11 | COMMUNITY
Start: 2021-12-09 | End: 2021-12-14

## 2021-12-09 RX ORDER — TACROLIMUS 0.75 MG/1
0.75 TABLET, EXTENDED RELEASE ORAL
Qty: 90 TABLET | Refills: 1 | Status: SHIPPED | OUTPATIENT
Start: 2021-12-09 | End: 2021-12-16

## 2021-12-09 RX ORDER — EPINEPHRINE 1 MG/ML
0.3 INJECTION, SOLUTION, CONCENTRATE INTRAVENOUS EVERY 5 MIN PRN
Status: CANCELLED | OUTPATIENT
Start: 2021-12-09

## 2021-12-09 RX ORDER — TACROLIMUS 1 MG/1
2 TABLET, EXTENDED RELEASE ORAL
Qty: 180 TABLET | Refills: 3 | Status: SHIPPED | OUTPATIENT
Start: 2021-12-09 | End: 2021-12-16

## 2021-12-09 RX ORDER — ALBUTEROL SULFATE 90 UG/1
1-2 AEROSOL, METERED RESPIRATORY (INHALATION)
Status: CANCELLED
Start: 2021-12-09

## 2021-12-09 ASSESSMENT — PAIN SCALES - GENERAL: PAINLEVEL: NO PAIN (0)

## 2021-12-09 ASSESSMENT — MIFFLIN-ST. JEOR: SCORE: 1277.83

## 2021-12-09 NOTE — TELEPHONE ENCOUNTER
[facility] Prior Authorization Team Request    Medication: Envarsus 1 mg   Dosin tablets every morning  Qty: 180  Day Supply: 90  NDC (required for Medicaid members): [address]-22    Insurance   BIN: [id number]  PCN: [id number]  Grp: [id number]  ID: [id number]    CoverMyMeds Key (if applicable):     Additional documentation:       Filling Pharmacy: [facility] [place]  Phone Number: [phone]  Contact:    Pharmacy NPI (required for Medicaid members): [id number]

## 2021-12-09 NOTE — TELEPHONE ENCOUNTER
Post Kidney and Pancreas Transplant Team Conference  Date: 12/8/2021  Transplant Coordinator: Yessica Becker     Attendees:  []  Dr. Pack [] Brandi Hernandez, RN [] Tamiko Cárdenas LPN     []  Dr. Motta [] Lizette Park RN [] Radhika Arnett LPN   []  Dr. Lee [] Yessica Becker RN    []  Dr. Corona [] Tasha Cartwright RN [] Bulmaro Weiner, PharmD   [] Dr. El [] Hamida Guevara RN    [] Dr. Chavarria [] Roderick Messer RN    [] Dr. Ocampo [] Lynette De RN [] Nena Wheeler RN   [] Dr. De León [] Naz Glaser RN    []  Dr. Aranda [] Radha Hurt RN    [] Dr. Wright [] Ashely Tse RN    [] Sally Brito NP [] Jovana Lemos RN        Verbal Plan Read Back:   CT abdomin and pelvis w/ contrast r\t low grade fever  CMV, PRA DSA, and  UA/UC   Appointment Thursday 12/9 at 0900 with Dr. Corona    Routed to RN Coordinator   Radhika Arnett LPN    Orders placed for labs.  Unable to secure IV hydration in ATC for CT scan tonight. No scan availability for 12/9 AM.  As dicussed with Dr. Aranda, ok for CT non-con.    Orders for non-con CT abd/pelvis placed. Scheduled for 12/8/2021 at [address] [place] [address].    Orders for follow up with Dr. Corona placed.    RNCC spoke with Marilyn, who voiced understanding of appointments and ongoing work up.

## 2021-12-09 NOTE — LETTER
12/9/2021    RE: Marilyn Ortega  [address]  [facility] [address]     Dear Colleague,    Thank you for referring your patient, Marilyn Ortega, to the [facility] [place] at [facility]. Please see a copy of my visit note below.    TRANSPLANT NEPHROLOGY EARLY POST TRANSPLANT VISIT    Assessment & Plan   # DDKT (SPK): Stable   - Baseline Creatinine: ~ 1-1.3   - Proteinuria: Not checked post transplant   - Date DSA Last Checked: Nov/2021      Latest DSA: No DSA at time of transplant   - BK Viremia: Not checked post transplant   - Kidney Tx Biopsy: No    # Pancreas Tx (SPK):    - Pancreatic Exocrine Drainage: Enteric drained     - Blood glucose: Euglycemia      On insulin: No   - HbA1c: Last checked at time of transplant      Latest HbA1c: 7.7%   - Pancreatic enzymes: Trend down   - Date DSA Last Checked: Nov/2021  Latest DSA: No DSA at time of transplant   - Pancreas Tx Biopsy: No    # Immunosuppression: Tacrolimus immediate release (goal 8-10), Mycophenolic acid (dose 720 mg every 12 hours) and Prednisone (dose taper)   - Induction with Recent Transplant:  Intermediate Intensity   - Continue with intensive monitoring of immunosuppression for efficacy and toxicity.   - Changes: Yes - change to envarsus 2.75mg daily as I think that her symptoms of skin sensitivity and headaches are due to tacrolimus.    -If switch to envarsus is not effective would discuss switching to CsA. Would not want to switch her at this point to Belatacept given that she is <1 month post SPK    # Infection Prophylaxis:   - PJP: Sulfa/TMP (Bactrim)  - CMV: Valganciclovir (Valcyte)  - Thrush: Clotrimazole yodit (Mycelex)    # Blood Pressure: Controlled;  Goal BP: < 140/90   - Volume status: Euvolemic to slightly volume overloaded EDW ~ 122lbs   - Changes: Not at this time. If weight increases to >129lbs would give PRN lasix     # Anemia in Chronic Renal  Disease: Hgb: Stable, low      SYLVAIN: No   - Iron studies: Low iron saturation, started oral iron daily on 12/8/21    -Recommend IV iron.     # Mineral Bone Disorder:   - Secondary renal hyperparathyroidism; PTH level: Normal (18-80 pg/ml)        On treatment: None  - Vitamin D; level: High normal        On supplement: Yes  - Calcium; level: Normal        On supplement: No  - Phosphorus; level: Normal        On supplement: Yes, decrease to 250mg bid     # Electrolytes:   - Potassium; level: Normal        On supplement: No  - Magnesium; level: Normal        On supplement: Yes, decrease to 400mg daily   - Bicarbonate; level: Normal        On supplement: No    # Dark urine:   -U/A w/ >182 RBCs, not concerned for infection. Likely stent    # Abdominal distension:   -2/2 constipation. Increase laxatives    # Headache, ear pain:    -concern that this is 2/2 tac. There is no evidence of ear infection on exam    # Skin sensitivity:   -Likely 2/2 tac. No rashes, not concerning at this point for VZV    # Edema:   -Likely 2/2 prednisone. Recommend decreasing sodium intake. Will consider diuretic if weight continues to increase. Weight now 125lbs which is fairly stable.     # Medical Compliance: Yes    # COVID-19 Virus Review: Discussed COVID-19 virus and the potential medical risks.  Reviewed preventative health recommendations, including wearing a mask where appropriate.  Recommended COVID vaccination should be up to date with either an initial vaccination or booster shot when appropriate.  Asked the patient to inform the transplant center if they are exposed or diagnosed with this virus.    # COVID Vaccination Up To Date: Yes    # Transplant History:  Etiology of Kidney Failure: Diabetes mellitus type 1  Tx: SPK  Transplant: 11/15/2021 (Kidney / Pancreas)  Donor Type: Donation after Brain Death Donor Class:   Crossmatch at time of Tx: negative  DSA at time of Tx: No  Significant changes in immunosuppression: None  CMV IgG Ab  High Risk Discordance (D+/R-): No  EBV IgG Ab High Risk Discordance (D+/R-): No  Significant transplant-related complications: None    Transplant Office Phone Number: [phone]    Assessment and plan was discussed with the patient and she voiced her understanding and agreement.    I spent >60 minutes reviewing notes, seeing patient, writing note, and discussing patient with other providers today.     Return visit: Return in 13 days (on 12/22/2021) for 1 month post transplant visit.    Kamran Corona MD    Chief Complaint   Ms. Ortega is a 36 year old here for kidney transplant, pancreas transplant, immunosuppression management and new medical concerns.     History of Present Illness   The patient is still on prednisone taper and is having puffiness, up 5lbs from last week, states face and eyelids are swollen. Her L eye that she had cataract surgery on is now blurry. She has been having headaches on the crown of her head and forehead, retroorbital. Occasionally R ear hurts and has stabbing pain. She also has pain with eye movements when her headache is occurring. She has been taking tylenol. She states that she has pain and discomfort all over her body on her skin. She states that it     Home BP: 110s systolic    Problem List   Patient Active Problem List   Diagnosis     Type 1 diabetes mellitus (H)     Type 1 diabetes, HbA1c goal < 7% (H)     Osteopenia     Chronic constipation     Irritable bowel syndrome     CKD (chronic kidney disease) stage 4, GFR 15-29 ml/min (H)     HTN, kidney transplant related     Gastroparesis     Heterozygous factor V Leiden mutation (H)     Status post simultaneous kidney and pancreas transplant (H)     Immunosuppression (H)     Kidney replaced by transplant     Pancreas replaced by transplant (H)     Need for pneumocystis prophylaxis       Allergies   Allergies   Allergen Reactions     Chlorhexidine Hives, Itching and Rash     PN: Patient had swelling/rash that was very itchy with  chlorprep.     Ceclor [Cefaclor Monohydrate]      Cefaclor Rash       Medications   Current Outpatient Medications   Medication Sig     cetirizine (ZYRTEC) 10 MG tablet Take 10 mg by mouth daily     cholecalciferol 25 MCG (1000 UT) TABS Take 2,000 Units by mouth every other day      clotrimazole (MYCELEX) 10 MG lozenge Place 1 lozenge (10 mg) inside cheek 4 times daily     Continuous Blood Gluc Sensor (DEXCOM G6 SENSOR) MISC Use as directed for continuous glucose monitoring.  Change sensor every 10 days.     Continuous Blood Gluc Transmit (DEXCOM G6 TRANSMITTER) MISC Use as directed for continuous glucose monitoring.  Change every 3 months.     ferrous gluconate (FERGON) 324 (38 Fe) MG tablet Take 1 tablet (324 mg) by mouth daily (with lunch)     glucose blood VI test strips strip 4 times daily.     Loteprednol Etabonate (LOTEMAX SM) 0.38 % GEL Apply 1 drop to eye 3 times daily Left eye     magnesium oxide (MAG-OX) 400 MG tablet Take 1 tablet (400 mg) by mouth daily     mycophenolic acid (GENERIC EQUIVALENT) 360 MG EC tablet Take 2 tablets (720 mg) by mouth 2 times daily     norgestimate-ethinyl estradiol (ORTHO-CYCLEN) 0.25-35 MG-MCG tablet Take 1 tablet by mouth daily     pantoprazole (PROTONIX) 40 MG EC tablet Take 1 tablet (40 mg) by mouth every morning (before breakfast)     phosphorus tablet 250 mg (PHOSPHA 250 NEUTRAL) 250 MG per tablet Take 1 tablet (250 mg) by mouth 2 times daily     predniSONE (DELTASONE) 10 MG tablet Take 2 tablets (20 mg) by mouth daily for 7 days, THEN 1.5 tablets (15 mg) daily for 7 days, THEN 1 tablet (10 mg) daily for 7 days, THEN 0.5 tablets (5 mg) daily for 7 days.     Prucalopride Succinate (MOTEGRITY) 2 MG TABS Take one tablet by mouth daily     senna-docusate (SENOKOT-S/PERICOLACE) 8.6-50 MG tablet Take 2 tablets by mouth daily as needed for constipation     sulfamethoxazole-trimethoprim (BACTRIM) 400-80 MG tablet Take 1 tablet by mouth daily     tacrolimus (ENVARSUS XR) 0.75 MG  "24 hr tablet Take 1 tablet (0.75 mg) by mouth every morning (before breakfast) Total dose 2.75mg in AM     tacrolimus (ENVARSUS XR) 1 MG 24 hr tablet Take 2 tablets (2 mg) by mouth every morning (before breakfast) Total dose 2.75mg in AM     tacrolimus (GENERIC EQUIVALENT) 0.5 MG capsule Take 1 capsule (0.5 mg) by mouth every evening Total dose 2mg in AM and 1.5mg in PM     tacrolimus (GENERIC EQUIVALENT) 1 MG capsule Total dose 2mg in AM and 1.5mg in PM     valGANciclovir (VALCYTE) 450 MG tablet Take 2 tablets (900 mg) by mouth daily     acetaminophen (TYLENOL) 325 MG tablet Take 1-2 tablets (325-650 mg) by mouth every 4 hours as needed for other (For optimal non-opioid multimodal pain management to improve pain control.) (Patient not taking: Reported on 12/1/2021)     aspirin (ASA) 325 MG tablet Take 1 tablet (325 mg) by mouth daily     bromfenac (PROLENSA) 0.07 % ophthalmic solution Place 1 drop Into the left eye daily     calcium carbonate-vitamin D (OS-STEPHANIE WITH D) 500-200 MG-UNIT tablet Take 1 tablet by mouth 2 times daily (with meals)     ondansetron (ZOFRAN-ODT) 4 MG ODT tab Take 1 tablet (4 mg) by mouth every 6 hours as needed for nausea or vomiting (Patient not taking: Reported on 12/1/2021)     No current facility-administered medications for this visit.     Medications Discontinued During This Encounter   Medication Reason     tacrolimus (GENERIC EQUIVALENT) 0.5 MG capsule      tacrolimus (GENERIC EQUIVALENT) 1 MG capsule      magnesium oxide (MAG-OX) 400 MG tablet      phosphorus tablet 250 mg (PHOSPHA 250 NEUTRAL) 250 MG per tablet        Physical Exam   Vital Signs: /80   Pulse 99   Temp 98.5  F (36.9  C) (Oral)   Ht 1.676 m (5' 6\")   Wt 57.1 kg (125 lb 14.4 oz)   LMP  (LMP Unknown)   BMI 20.32 kg/m      GENERAL APPEARANCE: alert and no distress  HENT: mouth without ulcers or lesions  HENT: ear canals and TM's normal and TM's pearly grey, normal light reflex bilateral  LYMPHATICS: no " cervical or supraclavicular nodes  RESP: lungs clear to auscultation - no rales, rhonchi or wheezes  CV: regular rhythm, normal rate, no rub, no murmur  EDEMA: no LE edema bilaterally  ABDOMEN: soft, nondistended, nontender, bowel sounds normal  MS: extremities normal - no gross deformities noted, no evidence of inflammation in joints, no muscle tenderness  SKIN: no rash  NEURO: normal strength and tone, sensory exam grossly normal, mentation intact and speech normal  PSYCH: mentation appears normal and affect normal/bright  TX KIDNEY: normal    Data     Renal Latest Ref Rng & Units 12/9/2021 12/6/2021 12/3/2021   Na 133 - 144 mmol/L 141 141 143   K 3.4 - 5.3 mmol/L 3.7 3.8 4.3   Cl 94 - 109 mmol/L 106 110(H) 111(H)   CO2 20 - 32 mmol/L 25 26 28   BUN 7 - 30 mg/dL 24 30 31(H)   Cr 0.52 - 1.04 mg/dL 1.03 1.08(H) 0.93   Glucose 70 - 99 mg/dL 105(H) 94 88   Ca  8.5 - 10.1 mg/dL 9.1 9.2 8.9   Mg 1.6 - 2.3 mg/dL - 1.6 1.6     Bone Health Latest Ref Rng & Units 12/6/2021 12/3/2021 11/29/2021   Phos 2.5 - 4.5 mg/dL 2.5 2.1(L) 2.1(L)   PTHi pg/mL - - 30   Vit D Def 20 - 75 ug/L - - 52     Heme Latest Ref Rng & Units 12/9/2021 12/6/2021 12/3/2021   WBC 4.0 - 11.0 10e3/uL 10.7 14.9(H) 18.5(H)   Hgb 11.7 - 15.7 g/dL 8.3(L) 8.1(L) 8.3(L)   Plt 150 - 450 10e3/uL 343 375 357   ABSOLUTE NEUTROPHIL 1.6 - 8.3 10e9/L - - -   ABSOLUTE LYMPHOCYTES 0.8 - 5.3 10e9/L - - -   ABSOLUTE MONOCYTES 0.0 - 1.3 10e9/L - - -   ABSOLUTE EOSINOPHILS 0.0 - 0.7 10e9/L - - -   ABSOLUTE BASOPHILS 0.0 - 0.2 10e9/L - - -     Liver Latest Ref Rng & Units 11/15/2021 7/21/2021 6/8/2012   AP 40 - 150 U/L 104 94 96   TBili 0.2 - 1.3 mg/dL 0.4 0.4 0.3   ALT 0 - 50 U/L 23 24 13   AST 0 - 45 U/L 16 19 33   Tot Protein 6.8 - 8.8 g/dL 8.4 9.4(H) 7.8   Albumin 3.4 - 5.0 g/dL 4.0 4.7 4.2     Pancreas Latest Ref Rng & Units 12/9/2021 12/6/2021 12/3/2021   A1C 0.0 - 5.6 % - - -   Amylase 30 - 110 U/L 29(L) 38 46   Lipase 73 - 393 U/L 52(L) 82 112     Iron studies  Latest Ref Rng & Units 12/1/2021   Iron 35 - 180 ug/dL 29(L)   Iron sat 15 - 46 % 10(L)   Ferritin 12 - 150 ng/mL 49     [facility] Txp Virology Latest Ref Rng & Units 7/21/2021   EBV CAPSID ANTIBODY IGG No detectable antibody. Positive(A)        Recent Labs   Lab Test 12/01/21  0715 12/03/21  0724 12/06/21  0728   DOSTAC 11/30/2021 12/2/2021 12/5/2021   TACROL 7.7 12.1 9.1     Recent Labs   Lab Test 11/24/21  0628 12/03/21  0724   DOSMPA 11/23/2021   6:30 PM 12/2/2021   8:19 PM   MPACID 5.38* 4.94*   MPAG 79.4 66.8     Again, thank you for allowing me to participate in the care of your patient.      Sincerely,    Kamran Corona MD

## 2021-12-09 NOTE — PROGRESS NOTES
TRANSPLANT NEPHROLOGY EARLY POST TRANSPLANT VISIT    Assessment & Plan   # DDKT (SPK): Stable   - Baseline Creatinine: ~ 1-1.3   - Proteinuria: Not checked post transplant   - Date DSA Last Checked: Nov/2021      Latest DSA: No DSA at time of transplant   - BK Viremia: Not checked post transplant   - Kidney Tx Biopsy: No    # Pancreas Tx (SPK):    - Pancreatic Exocrine Drainage: Enteric drained     - Blood glucose: Euglycemia      On insulin: No   - HbA1c: Last checked at time of transplant      Latest HbA1c: 7.7%   - Pancreatic enzymes: Trend down   - Date DSA Last Checked: Nov/2021  Latest DSA: No DSA at time of transplant   - Pancreas Tx Biopsy: No    # Immunosuppression: Tacrolimus immediate release (goal 8-10), Mycophenolic acid (dose 720 mg every 12 hours) and Prednisone (dose taper)   - Induction with Recent Transplant:  Intermediate Intensity   - Continue with intensive monitoring of immunosuppression for efficacy and toxicity.   - Changes: Yes - change to envarsus 2.75mg daily as I think that her symptoms of skin sensitivity and headaches are due to tacrolimus.    -If switch to envarsus is not effective would discuss switching to CsA. Would not want to switch her at this point to Belatacept given that she is <1 month post SPK    # Infection Prophylaxis:   - PJP: Sulfa/TMP (Bactrim)  - CMV: Valganciclovir (Valcyte)  - Thrush: Clotrimazole yodit (Mycelex)    # Blood Pressure: Controlled;  Goal BP: < 140/90   - Volume status: Euvolemic to slightly volume overloaded EDW ~ 122lbs   - Changes: Not at this time. If weight increases to >129lbs would give PRN lasix     # Anemia in Chronic Renal Disease: Hgb: Stable, low      SYLVAIN: No   - Iron studies: Low iron saturation, started oral iron daily on 12/8/21    -Recommend IV iron.     # Mineral Bone Disorder:   - Secondary renal hyperparathyroidism; PTH level: Normal (18-80 pg/ml)        On treatment: None  - Vitamin D; level: High normal        On supplement: Yes  -  Calcium; level: Normal        On supplement: No  - Phosphorus; level: Normal        On supplement: Yes, decrease to 250mg bid     # Electrolytes:   - Potassium; level: Normal        On supplement: No  - Magnesium; level: Normal        On supplement: Yes, decrease to 400mg daily   - Bicarbonate; level: Normal        On supplement: No    # Dark urine:   -U/A w/ >182 RBCs, not concerned for infection. Likely stent    # Abdominal distension:   -2/2 constipation. Increase laxatives    # Headache, ear pain:    -concern that this is 2/2 tac. There is no evidence of ear infection on exam    # Skin sensitivity:   -Likely 2/2 tac. No rashes, not concerning at this point for VZV    # Edema:   -Likely 2/2 prednisone. Recommend decreasing sodium intake. Will consider diuretic if weight continues to increase. Weight now 125lbs which is fairly stable.     # Medical Compliance: Yes    # COVID-19 Virus Review: Discussed COVID-19 virus and the potential medical risks.  Reviewed preventative health recommendations, including wearing a mask where appropriate.  Recommended COVID vaccination should be up to date with either an initial vaccination or booster shot when appropriate.  Asked the patient to inform the transplant center if they are exposed or diagnosed with this virus.    # COVID Vaccination Up To Date: Yes    # Transplant History:  Etiology of Kidney Failure: Diabetes mellitus type 1  Tx: SPK  Transplant: 11/15/2021 (Kidney / Pancreas)  Donor Type: Donation after Brain Death Donor Class:   Crossmatch at time of Tx: negative  DSA at time of Tx: No  Significant changes in immunosuppression: None  CMV IgG Ab High Risk Discordance (D+/R-): No  EBV IgG Ab High Risk Discordance (D+/R-): No  Significant transplant-related complications: None    Transplant Office Phone Number: [phone]    Assessment and plan was discussed with the patient and she voiced her understanding and agreement.    I spent >60 minutes reviewing notes,  seeing patient, writing note, and discussing patient with other providers today.     Return visit: Return in 13 days (on 12/22/2021) for 1 month post transplant visit.    Kamran Corona MD    Chief Complaint   Ms. Ortega is a 36 year old here for kidney transplant, pancreas transplant, immunosuppression management and new medical concerns.     History of Present Illness   The patient is still on prednisone taper and is having puffiness, up 5lbs from last week, states face and eyelids are swollen. Her L eye that she had cataract surgery on is now blurry. She has been having headaches on the crown of her head and forehead, retroorbital. Occasionally R ear hurts and has stabbing pain. She also has pain with eye movements when her headache is occurring. She has been taking tylenol. She states that she has pain and discomfort all over her body on her skin. She states that it     Home BP: 110s systolic    Problem List   Patient Active Problem List   Diagnosis     Type 1 diabetes mellitus (H)     Type 1 diabetes, HbA1c goal < 7% (H)     Osteopenia     Chronic constipation     Irritable bowel syndrome     CKD (chronic kidney disease) stage 4, GFR 15-29 ml/min (H)     HTN, kidney transplant related     Gastroparesis     Heterozygous factor V Leiden mutation (H)     Status post simultaneous kidney and pancreas transplant (H)     Immunosuppression (H)     Kidney replaced by transplant     Pancreas replaced by transplant (H)     Need for pneumocystis prophylaxis       Allergies   Allergies   Allergen Reactions     Chlorhexidine Hives, Itching and Rash     PN: Patient had swelling/rash that was very itchy with chlorprep.     Ceclor [Cefaclor Monohydrate]      Cefaclor Rash       Medications   Current Outpatient Medications   Medication Sig     cetirizine (ZYRTEC) 10 MG tablet Take 10 mg by mouth daily     cholecalciferol 25 MCG (1000 UT) TABS Take 2,000 Units by mouth every other day      clotrimazole (MYCELEX) 10 MG lozenge  Place 1 lozenge (10 mg) inside cheek 4 times daily     Continuous Blood Gluc Sensor (DEXCOM G6 SENSOR) MISC Use as directed for continuous glucose monitoring.  Change sensor every 10 days.     Continuous Blood Gluc Transmit (DEXCOM G6 TRANSMITTER) MISC Use as directed for continuous glucose monitoring.  Change every 3 months.     ferrous gluconate (FERGON) 324 (38 Fe) MG tablet Take 1 tablet (324 mg) by mouth daily (with lunch)     glucose blood VI test strips strip 4 times daily.     Loteprednol Etabonate (LOTEMAX SM) 0.38 % GEL Apply 1 drop to eye 3 times daily Left eye     magnesium oxide (MAG-OX) 400 MG tablet Take 1 tablet (400 mg) by mouth daily     mycophenolic acid (GENERIC EQUIVALENT) 360 MG EC tablet Take 2 tablets (720 mg) by mouth 2 times daily     norgestimate-ethinyl estradiol (ORTHO-CYCLEN) 0.25-35 MG-MCG tablet Take 1 tablet by mouth daily     pantoprazole (PROTONIX) 40 MG EC tablet Take 1 tablet (40 mg) by mouth every morning (before breakfast)     phosphorus tablet 250 mg (PHOSPHA 250 NEUTRAL) 250 MG per tablet Take 1 tablet (250 mg) by mouth 2 times daily     predniSONE (DELTASONE) 10 MG tablet Take 2 tablets (20 mg) by mouth daily for 7 days, THEN 1.5 tablets (15 mg) daily for 7 days, THEN 1 tablet (10 mg) daily for 7 days, THEN 0.5 tablets (5 mg) daily for 7 days.     Prucalopride Succinate (MOTEGRITY) 2 MG TABS Take one tablet by mouth daily     senna-docusate (SENOKOT-S/PERICOLACE) 8.6-50 MG tablet Take 2 tablets by mouth daily as needed for constipation     sulfamethoxazole-trimethoprim (BACTRIM) 400-80 MG tablet Take 1 tablet by mouth daily     tacrolimus (ENVARSUS XR) 0.75 MG 24 hr tablet Take 1 tablet (0.75 mg) by mouth every morning (before breakfast) Total dose 2.75mg in AM     tacrolimus (ENVARSUS XR) 1 MG 24 hr tablet Take 2 tablets (2 mg) by mouth every morning (before breakfast) Total dose 2.75mg in AM     tacrolimus (GENERIC EQUIVALENT) 0.5 MG capsule Take 1 capsule (0.5 mg) by  "mouth every evening Total dose 2mg in AM and 1.5mg in PM     tacrolimus (GENERIC EQUIVALENT) 1 MG capsule Total dose 2mg in AM and 1.5mg in PM     valGANciclovir (VALCYTE) 450 MG tablet Take 2 tablets (900 mg) by mouth daily     acetaminophen (TYLENOL) 325 MG tablet Take 1-2 tablets (325-650 mg) by mouth every 4 hours as needed for other (For optimal non-opioid multimodal pain management to improve pain control.) (Patient not taking: Reported on 12/1/2021)     aspirin (ASA) 325 MG tablet Take 1 tablet (325 mg) by mouth daily     bromfenac (PROLENSA) 0.07 % ophthalmic solution Place 1 drop Into the left eye daily     calcium carbonate-vitamin D (OS-STEPHANIE WITH D) 500-200 MG-UNIT tablet Take 1 tablet by mouth 2 times daily (with meals)     ondansetron (ZOFRAN-ODT) 4 MG ODT tab Take 1 tablet (4 mg) by mouth every 6 hours as needed for nausea or vomiting (Patient not taking: Reported on 12/1/2021)     No current facility-administered medications for this visit.     Medications Discontinued During This Encounter   Medication Reason     tacrolimus (GENERIC EQUIVALENT) 0.5 MG capsule      tacrolimus (GENERIC EQUIVALENT) 1 MG capsule      magnesium oxide (MAG-OX) 400 MG tablet      phosphorus tablet 250 mg (PHOSPHA 250 NEUTRAL) 250 MG per tablet        Physical Exam   Vital Signs: /80   Pulse 99   Temp 98.5  F (36.9  C) (Oral)   Ht 1.676 m (5' 6\")   Wt 57.1 kg (125 lb 14.4 oz)   LMP  (LMP Unknown)   BMI 20.32 kg/m      GENERAL APPEARANCE: alert and no distress  HENT: mouth without ulcers or lesions  HENT: ear canals and TM's normal and TM's pearly grey, normal light reflex bilateral  LYMPHATICS: no cervical or supraclavicular nodes  RESP: lungs clear to auscultation - no rales, rhonchi or wheezes  CV: regular rhythm, normal rate, no rub, no murmur  EDEMA: no LE edema bilaterally  ABDOMEN: soft, nondistended, nontender, bowel sounds normal  MS: extremities normal - no gross deformities noted, no evidence of " inflammation in joints, no muscle tenderness  SKIN: no rash  NEURO: normal strength and tone, sensory exam grossly normal, mentation intact and speech normal  PSYCH: mentation appears normal and affect normal/bright  TX KIDNEY: normal    Data     Renal Latest Ref Rng & Units 12/9/2021 12/6/2021 12/3/2021   Na 133 - 144 mmol/L 141 141 143   K 3.4 - 5.3 mmol/L 3.7 3.8 4.3   Cl 94 - 109 mmol/L 106 110(H) 111(H)   CO2 20 - 32 mmol/L 25 26 28   BUN 7 - 30 mg/dL 24 30 31(H)   Cr 0.52 - 1.04 mg/dL 1.03 1.08(H) 0.93   Glucose 70 - 99 mg/dL 105(H) 94 88   Ca  8.5 - 10.1 mg/dL 9.1 9.2 8.9   Mg 1.6 - 2.3 mg/dL - 1.6 1.6     Bone Health Latest Ref Rng & Units 12/6/2021 12/3/2021 11/29/2021   Phos 2.5 - 4.5 mg/dL 2.5 2.1(L) 2.1(L)   PTHi pg/mL - - 30   Vit D Def 20 - 75 ug/L - - 52     Heme Latest Ref Rng & Units 12/9/2021 12/6/2021 12/3/2021   WBC 4.0 - 11.0 10e3/uL 10.7 14.9(H) 18.5(H)   Hgb 11.7 - 15.7 g/dL 8.3(L) 8.1(L) 8.3(L)   Plt 150 - 450 10e3/uL 343 375 357   ABSOLUTE NEUTROPHIL 1.6 - 8.3 10e9/L - - -   ABSOLUTE LYMPHOCYTES 0.8 - 5.3 10e9/L - - -   ABSOLUTE MONOCYTES 0.0 - 1.3 10e9/L - - -   ABSOLUTE EOSINOPHILS 0.0 - 0.7 10e9/L - - -   ABSOLUTE BASOPHILS 0.0 - 0.2 10e9/L - - -     Liver Latest Ref Rng & Units 11/15/2021 7/21/2021 6/8/2012   AP 40 - 150 U/L 104 94 96   TBili 0.2 - 1.3 mg/dL 0.4 0.4 0.3   ALT 0 - 50 U/L 23 24 13   AST 0 - 45 U/L 16 19 33   Tot Protein 6.8 - 8.8 g/dL 8.4 9.4(H) 7.8   Albumin 3.4 - 5.0 g/dL 4.0 4.7 4.2     Pancreas Latest Ref Rng & Units 12/9/2021 12/6/2021 12/3/2021   A1C 0.0 - 5.6 % - - -   Amylase 30 - 110 U/L 29(L) 38 46   Lipase 73 - 393 U/L 52(L) 82 112     Iron studies Latest Ref Rng & Units 12/1/2021   Iron 35 - 180 ug/dL 29(L)   Iron sat 15 - 46 % 10(L)   Ferritin 12 - 150 ng/mL 49     [facility] Txp Virology Latest Ref Rng & Units 7/21/2021   EBV CAPSID ANTIBODY IGG No detectable antibody. Positive(A)        Recent Labs   Lab Test 12/01/21  0715 12/03/21  0724 12/06/21  0728   [facility]  [facility] 12/2/2021 12/5/2021   TACROL 7.7 12.1 9.1     Recent Labs   Lab Test 11/24/21  0628 12/03/21  0724   DOSMPA 11/23/2021   6:30 PM 12/2/2021   8:19 PM   MPACID 5.38* 4.94*   MPAG 79.4 66.8

## 2021-12-09 NOTE — TELEPHONE ENCOUNTER
[facility] Prior Authorization Team Request    Medication: Envarsus 0.75 mg   Dosing:  One tablet daily.   Qty: *90  Day Supply: 90  NDC (required for Medicaid members): [id number]    Insurance   BIN: [id number]  PCN: [id number]  Grp: [id number]  ID: [id number]    CoverMyMeds Key (if applicable):     Additional documentation:       [place] [facility] [place]: [phone]  Contact:    Pharmacy NPI (required for Medicaid members): [id number]

## 2021-12-09 NOTE — NURSING NOTE
"Chief Complaint   Patient presents with     RECHECK     follow up with kidney transplant     /80   Pulse 99   Temp 98.5  F (36.9  C) (Oral)   Ht 1.676 m (5' 6\")   Wt 57.1 kg (125 lb 14.4 oz)   LMP  (LMP Unknown)   BMI 20.32 kg/m    Patricia Ramsey CMA on 12/9/2021 at 9:12 AM    "

## 2021-12-09 NOTE — TELEPHONE ENCOUNTER
RNCC spoke with Marilyn, who voiced the same concerns presented via MyChart.    Water retention, AEB swelling throughout the day, mostly in her thighs, mild weight gain.  BPs stable 115 / 70-80.  Ongoing headaches, most significant unilaterally, skin sensitivity and some visual changes.  Ongoing fluid output from sunni-pancreatic drain.  Ongoing low-grade fevers, potentially masked by tylenol taken for headaches.  Mild nausea, relieved with zofran.

## 2021-12-09 NOTE — PATIENT INSTRUCTIONS
Start envarsus 2.75mg in AM (if you get it today would start tmrw AM)  Decrease magnesium to 400mg daily   Decrease phosphorous to 250mg twice daily

## 2021-12-10 ENCOUNTER — TELEPHONE (OUTPATIENT)
Dept: TRANSPLANT | Facility: CLINIC | Age: 36
End: 2021-12-10
Payer: COMMERCIAL

## 2021-12-10 LAB
BACTERIA UR CULT: NO GROWTH
DONOR IDENTIFICATION: NORMAL
DSA COMMENTS: NORMAL
DSA PRESENT: NO
DSA TEST METHOD: NORMAL
INT SUB RESULT: NORMAL
INTERF SUBSTANCE: NORMAL
INTSUB TEST METHOD: NORMAL
ORGAN: NORMAL
SA 1 CELL: NORMAL
SA 1 TEST METHOD: NORMAL
SA 2 CELL: NORMAL
SA 2 TEST METHOD: NORMAL
SA1 HI RISK ABY: NORMAL
SA1 MOD RISK ABY: NORMAL
SA2 HI RISK ABY: NORMAL
SA2 MOD RISK ABY: NORMAL
UNACCEPTABLE ANTIGENS: NORMAL
UNOS CPRA: 32
ZZZINT SUB COMMENTS: NORMAL
ZZZSA 1  COMMENTS: NORMAL
ZZZSA 2 COMMENTS: NORMAL

## 2021-12-10 NOTE — TELEPHONE ENCOUNTER
Kamilla paged on-call triage RN to see if she should adjust her tacrolimus dose. Level today 11.8, goal 8-10 and her level earlier this week was within goal. I informed her I would send message to the transplant coordinator to review and call her in the morning to determine if dose should be adjusted.  She requested a call before 9am if possible so she could adjust her AM dose if needed.

## 2021-12-10 NOTE — TELEPHONE ENCOUNTER
PA Initiation    Medication: Envarsus 0.75 mg- pa pending  Insurance Company:    Pharmacy Filling the Rx: [facility] [place], [place] - [phone]  Filling Pharmacy Phone:    Filling Pharmacy Fax:    Start Date: 12/10/2021

## 2021-12-10 NOTE — TELEPHONE ENCOUNTER
ISSUE:   Tacrolimus IR level 11.8 on 12/9, goal 8-10, dose 2/1.5 mg .    PLAN:   Please call patient and confirm this was an accurate 12-hour trough. Verify Tacrolimus IR dose 2/1.5 mg. Confirm no new medications or illness. Confirm no missed doses. If accurate trough and accurate dose, decrease Tacrolimus IR dose to 1.5 mg BID and repeat labs in 4 days    OUTCOME:   Spoke with patient, they confirm accurate trough level and current dose 2/1.5 mg. Patient confirmed dose change to 1.5 mg BID and to repeat labs in 4 days. Orders sent to preferred pharmacy for dose change and lab for repeat labs. Patient voiced understanding of plan.

## 2021-12-10 NOTE — TELEPHONE ENCOUNTER
PA Initiation    Medication: Envarsus xr 1 mg and Envarsus 0.75 mg   Insurance Company:    Pharmacy Filling the Rx: [place], [place] - [address] [place] [address] [address]-  Filling Pharmacy Phone:  [phone]  Filling Pharmacy Fax:  [phone]  Start Date: 12/10/2021

## 2021-12-10 NOTE — TELEPHONE ENCOUNTER
Patient Call Marilyn is holding her med's till she discuss with care team her tacrolimus dose  12/10/2021     Call back needed? Yes    Return Call Needed  Same as documented in contacts section  When to return call?: Same day: [facility]

## 2021-12-13 ENCOUNTER — LAB (OUTPATIENT)
Dept: LAB | Facility: CLINIC | Age: 36
End: 2021-12-13
Payer: COMMERCIAL

## 2021-12-13 DIAGNOSIS — Z94.0 KIDNEY REPLACED BY TRANSPLANT: ICD-10-CM

## 2021-12-13 DIAGNOSIS — Z94.83 PANCREAS REPLACED BY TRANSPLANT (H): ICD-10-CM

## 2021-12-13 DIAGNOSIS — Z48.298 AFTERCARE FOLLOWING ORGAN TRANSPLANT: ICD-10-CM

## 2021-12-13 DIAGNOSIS — D63.1 ANEMIA DUE TO CHRONIC KIDNEY DISEASE, UNSPECIFIED CKD STAGE: Primary | ICD-10-CM

## 2021-12-13 DIAGNOSIS — Z79.899 ENCOUNTER FOR LONG-TERM CURRENT USE OF MEDICATION: ICD-10-CM

## 2021-12-13 DIAGNOSIS — N18.9 ANEMIA DUE TO CHRONIC KIDNEY DISEASE, UNSPECIFIED CKD STAGE: Primary | ICD-10-CM

## 2021-12-13 LAB
AMYLASE SERPL-CCNC: 26 U/L (ref 30–110)
ANION GAP SERPL CALCULATED.3IONS-SCNC: 6 MMOL/L (ref 3–14)
BUN SERPL-MCNC: 21 MG/DL (ref 7–30)
CALCIUM SERPL-MCNC: 8.7 MG/DL (ref 8.5–10.1)
CHLORIDE BLD-SCNC: 109 MMOL/L (ref 94–109)
CMV DNA SPEC NAA+PROBE-ACNC: NOT DETECTED IU/ML
CO2 SERPL-SCNC: 22 MMOL/L (ref 20–32)
CREAT SERPL-MCNC: 1.05 MG/DL (ref 0.52–1.04)
ERYTHROCYTE [DISTWIDTH] IN BLOOD BY AUTOMATED COUNT: 16.8 % (ref 10–15)
GFR SERPL CREATININE-BSD FRML MDRD: 68 ML/MIN/1.73M2
GLUCOSE BLD-MCNC: 100 MG/DL (ref 70–99)
HCT VFR BLD AUTO: 24.4 % (ref 35–47)
HGB BLD-MCNC: 7.4 G/DL (ref 11.7–15.7)
LIPASE SERPL-CCNC: 50 U/L (ref 73–393)
MAGNESIUM SERPL-MCNC: 1.5 MG/DL (ref 1.6–2.3)
MCH RBC QN AUTO: 27.1 PG (ref 26.5–33)
MCHC RBC AUTO-ENTMCNC: 30.3 G/DL (ref 31.5–36.5)
MCV RBC AUTO: 89 FL (ref 78–100)
PHOSPHATE SERPL-MCNC: 1.9 MG/DL (ref 2.5–4.5)
PLATELET # BLD AUTO: 450 10E3/UL (ref 150–450)
POTASSIUM BLD-SCNC: 3.8 MMOL/L (ref 3.4–5.3)
RBC # BLD AUTO: 2.73 10E6/UL (ref 3.8–5.2)
SODIUM SERPL-SCNC: 137 MMOL/L (ref 133–144)
TACROLIMUS BLD-MCNC: 7.9 UG/L (ref 5–15)
TME LAST DOSE: NORMAL H
TME LAST DOSE: NORMAL H
WBC # BLD AUTO: 8.9 10E3/UL (ref 4–11)

## 2021-12-13 PROCEDURE — 82150 ASSAY OF AMYLASE: CPT

## 2021-12-13 PROCEDURE — 80180 DRUG SCRN QUAN MYCOPHENOLATE: CPT

## 2021-12-13 PROCEDURE — 83690 ASSAY OF LIPASE: CPT

## 2021-12-13 PROCEDURE — 84100 ASSAY OF PHOSPHORUS: CPT

## 2021-12-13 PROCEDURE — 83735 ASSAY OF MAGNESIUM: CPT

## 2021-12-13 PROCEDURE — 36415 COLL VENOUS BLD VENIPUNCTURE: CPT

## 2021-12-13 PROCEDURE — 80197 ASSAY OF TACROLIMUS: CPT

## 2021-12-13 PROCEDURE — 85027 COMPLETE CBC AUTOMATED: CPT

## 2021-12-13 PROCEDURE — 80048 BASIC METABOLIC PNL TOTAL CA: CPT

## 2021-12-13 NOTE — TELEPHONE ENCOUNTER
Prior Authorization Approval    Authorization Effective Date: 11/11/2021  Authorization Expiration Date: 12/11/2022  Medication: Envarsus 0.75 mg--APPROVED  Approved Dose/Quantity: UD  Reference #:     Insurance Company:    Expected CoPay:       CoPay Card Available:      Foundation Assistance Needed:    Which Pharmacy is filling the prescription (Not needed for infusion/clinic administered): [facility] [place], [place] - [address] [place] [address] [phone]  Pharmacy Notified:    Patient Notified:

## 2021-12-13 NOTE — TELEPHONE ENCOUNTER
Prior Authorization Approval    Authorization Effective Date:  11/11/2021  Authorization Expiration Date:  12/11/2022  Medication: Envarsus xr 1 mg   Approved Dose/Quantity:   Reference #:     Insurance Company:    Expected CoPay:       CoPay Card Available:      Foundation Assistance Needed:    Which Pharmacy is filling the prescription (Not needed for infusion/clinic administered): [facility] [place], [place] - [address] [place] [address] [phone]  Pharmacy Notified: Yes  Patient Notified: Yes

## 2021-12-14 ENCOUNTER — TELEPHONE (OUTPATIENT)
Dept: PHARMACY | Facility: CLINIC | Age: 36
End: 2021-12-14
Payer: COMMERCIAL

## 2021-12-14 ENCOUNTER — TELEPHONE (OUTPATIENT)
Dept: TRANSPLANT | Facility: CLINIC | Age: 36
End: 2021-12-14
Payer: COMMERCIAL

## 2021-12-14 DIAGNOSIS — Z94.0 KIDNEY REPLACED BY TRANSPLANT: ICD-10-CM

## 2021-12-14 DIAGNOSIS — Z94.83 PANCREAS REPLACED BY TRANSPLANT (H): ICD-10-CM

## 2021-12-14 DIAGNOSIS — N18.9 ANEMIA DUE TO CHRONIC KIDNEY DISEASE, UNSPECIFIED CKD STAGE: ICD-10-CM

## 2021-12-14 DIAGNOSIS — D63.1 ANEMIA DUE TO CHRONIC KIDNEY DISEASE, UNSPECIFIED CKD STAGE: ICD-10-CM

## 2021-12-14 DIAGNOSIS — Z94.83 PANCREAS REPLACED BY TRANSPLANT (H): Primary | ICD-10-CM

## 2021-12-14 LAB
MYCOPHENOLATE SERPL LC/MS/MS-MCNC: 3.7 MG/L (ref 1–3.5)
MYCOPHENOLATE-G SERPL LC/MS/MS-MCNC: 37.5 MG/L (ref 30–95)
TME LAST DOSE: ABNORMAL H
TME LAST DOSE: ABNORMAL H

## 2021-12-14 NOTE — TELEPHONE ENCOUNTER
Patient started Envarsus today, no changes to dose. Recheck w/ labs on Thursday. Patient notified.

## 2021-12-14 NOTE — TELEPHONE ENCOUNTER
Left message and sent [employer] message to patient regarding:  Tacrolimus IR level 7.9 on 12/13, goal 8-10, dose 1.5 mg BID.     PLAN:   Please call patient and confirm this was an accurate 12-hour trough. Verify Tacrolimus IR dose 1.5 mg BID. Confirm no new medications or illness. Confirm no missed doses. If accurate trough and accurate dose, increase Tacrolimus IR dose to 2/1.5 mg and repeat labs in 3 days.

## 2021-12-14 NOTE — TELEPHONE ENCOUNTER
Anemia Management Note - Enrollment  SUBJECTIVE/OBJECTIVE:    Referred by Dr. 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   Iron regimen:  Does not tolerate Oral Iron. Will stop Oral.  Venofer has already been ordered.  Labs : 2022  Recent SYLVAIN use, transfusion, IV iron: Starting Venofer, Aranesp   RX/TX plans : TBD    No history of stroke, MI and blood clots or cancers.    Contact:  No Consent to Communicate on File other than for Ratna Ortega to  medications.     Anemia Latest Ref Rng & Units 2021 2021 2021 12/3/2021 2021 2021 2021   Hemoglobin 11.7 - 15.7 g/dL 9.4(L) 8.4(L) 8.7(L) 8.3(L) 8.1(L) 8.3(L) 7.4(L)   TSAT 15 - 46 % - - 10(L) - - - -   Ferritin 12 - 150 ng/mL - - 49 - - - -       BP Readings from Last 3 Encounters:   21 134/80   21 (!) 134/90   21 130/83     Wt Readings from Last 2 Encounters:   21 125 lb 14.4 oz (57.1 kg)   21 122 lb 1.6 oz (55.4 kg)     Current Outpatient Medications   Medication Sig Dispense Refill     acetaminophen (TYLENOL) 325 MG tablet Take 1-2 tablets (325-650 mg) by mouth every 4 hours as needed for other (For optimal non-opioid multimodal pain management to improve pain control.) (Patient not taking: Reported on 2021) 100 tablet 0     aspirin (ASA) 325 MG tablet Take 1 tablet (325 mg) by mouth daily 30 tablet 5     bromfenac (PROLENSA) 0.07 % ophthalmic solution Place 1 drop Into the left eye daily       calcium carbonate-vitamin D (OS-STEPHANIE WITH D) 500-200 MG-UNIT tablet Take 1 tablet by mouth 2 times daily (with meals) 60 tablet 11     cetirizine (ZYRTEC) 10 MG tablet Take 10 mg by mouth daily       cholecalciferol 25 MCG (1000 UT) TABS  Take 2,000 Units by mouth every other day        clotrimazole (MYCELEX) 10 MG lozenge Place 1 lozenge (10 mg) inside cheek 4 times daily 360 lozenge 0     Continuous Blood Gluc Sensor (DEXCOM G6 SENSOR) MISC Use as directed for continuous glucose monitoring.  Change sensor every 10 days.       Continuous Blood Gluc Transmit (DEXCOM G6 TRANSMITTER) MISC Use as directed for continuous glucose monitoring.  Change every 3 months.       ferrous gluconate (FERGON) 324 (38 Fe) MG tablet Take 1 tablet (324 mg) by mouth daily (with lunch) 30 tablet 2     glucose blood VI test strips strip 4 times daily.       Loteprednol Etabonate (LOTEMAX SM) 0.38 % GEL Apply 1 drop to eye 3 times daily Left eye       magnesium oxide (MAG-OX) 400 MG tablet Take 1 tablet (400 mg) by mouth daily 30 tablet 3     mycophenolic acid (GENERIC EQUIVALENT) 360 MG EC tablet Take 2 tablets (720 mg) by mouth 2 times daily 120 tablet 11     norgestimate-ethinyl estradiol (ORTHO-CYCLEN) 0.25-35 MG-MCG tablet Take 1 tablet by mouth daily       ondansetron (ZOFRAN-ODT) 4 MG ODT tab Take 1 tablet (4 mg) by mouth every 6 hours as needed for nausea or vomiting (Patient not taking: Reported on 12/1/2021) 20 tablet 0     pantoprazole (PROTONIX) 40 MG EC tablet Take 1 tablet (40 mg) by mouth every morning (before breakfast) 30 tablet 1     phosphorus tablet 250 mg (PHOSPHA 250 NEUTRAL) 250 MG per tablet Take 1 tablet (250 mg) by mouth 2 times daily 120 tablet 3     predniSONE (DELTASONE) 10 MG tablet Take 2 tablets (20 mg) by mouth daily for 7 days, THEN 1.5 tablets (15 mg) daily for 7 days, THEN 1 tablet (10 mg) daily for 7 days, THEN 0.5 tablets (5 mg) daily for 7 days. 35 tablet 0     Prucalopride Succinate (MOTEGRITY) 2 MG TABS Take one tablet by mouth daily       senna-docusate (SENOKOT-S/PERICOLACE) 8.6-50 MG tablet Take 2 tablets by mouth daily as needed for constipation 30 tablet 0     sulfamethoxazole-trimethoprim (BACTRIM) 400-80 MG tablet Take 1  tablet by mouth daily 30 tablet 11     tacrolimus (ENVARSUS XR) 0.75 MG 24 hr tablet Take 1 tablet (0.75 mg) by mouth every morning (before breakfast) Total dose 2.75mg in AM 90 tablet 1     tacrolimus (ENVARSUS XR) 1 MG 24 hr tablet Take 2 tablets (2 mg) by mouth every morning (before breakfast) Total dose 2.75mg in  tablet 3     valGANciclovir (VALCYTE) 450 MG tablet Take 2 tablets (900 mg) by mouth daily 60 tablet 2     ASSESSMENT:  Hgb Not at goal/Initiation of therapy   Ferritin: Not at goal of (>100ng/mL)  TSat: not at goal of >30%  Iron regimen recommended: Ferrous gluconate 1 tab daily. Venofer ordered, will start 12/22/21.  Recommended SYLVAIN regimen: TBD: TSAT needs to be >20% before insurance will approve SYLVAIN.  Blood Pressure: Stable    PLAN:  1. Patient called today for enrollment in Anemia Management Service.  2. Discussed:  anemia overview, monitoring service and goal hemoglobin range and rationale and risks of SYLVAIN blood clots, stroke and increase in blood pressure  3. Dose location: [place]  4. Labs:  [place]  5. Pharmacy: NA      TSAT needs to be >20% before insurance will approve SYLVAIN.   Iron regimen:  Ferrous Gluconate  once daily, started approx 12/8/21. Venofer has already been ordered.   Scheduled for 12/22, 12/24, and 12/28.  Will recheck Iron levels towards the end of Jan 2022.   After speaking with Marilyn; she is not tolerating the Ferrous Gluconate. She will stop taking the oral iron, and start Venofer next week.     Next call date: 12/27 Document Iron from 12/22 & 12/24     Patricia Gonzales RN   Anemia Services  [facility]  [address] [place] [address]   [email or web address]   Office : [phone]  Fax: [phone]

## 2021-12-14 NOTE — TELEPHONE ENCOUNTER
Patient replied via [employer] with the following message:  Hi-     I actually started Envarsus this morning.  I took 2.75 mg per Dr. Thorpe.  I've already taken my morning meds so if you'd like me to adjust that level I will start tomorrow.  Should I do labs Thursday as planned or Wednesday?     I noticed LIpase and Amylase were both low - is that normal? good? bad?

## 2021-12-15 ENCOUNTER — TELEPHONE (OUTPATIENT)
Dept: TRANSPLANT | Facility: CLINIC | Age: 36
End: 2021-12-15
Payer: COMMERCIAL

## 2021-12-15 ENCOUNTER — TEAM CONFERENCE (OUTPATIENT)
Dept: TRANSPLANT | Facility: CLINIC | Age: 36
End: 2021-12-15
Payer: COMMERCIAL

## 2021-12-15 DIAGNOSIS — Z94.83 PANCREAS REPLACED BY TRANSPLANT (H): Primary | ICD-10-CM

## 2021-12-15 DIAGNOSIS — Z94.0 KIDNEY REPLACED BY TRANSPLANT: ICD-10-CM

## 2021-12-15 NOTE — TELEPHONE ENCOUNTER
----- Message from Joseph Motta MD sent at 12/15/2021 12:26 AM CST -----  Please check iron profile and if low will need to consider replacement   In the meantime, please check for hemolysis ldh, hapto, mali and smear

## 2021-12-16 ENCOUNTER — LAB (OUTPATIENT)
Dept: LAB | Facility: CLINIC | Age: 36
End: 2021-12-16
Payer: COMMERCIAL

## 2021-12-16 ENCOUNTER — OFFICE VISIT (OUTPATIENT)
Dept: TRANSPLANT | Facility: CLINIC | Age: 36
End: 2021-12-16
Attending: INTERNAL MEDICINE
Payer: COMMERCIAL

## 2021-12-16 VITALS
WEIGHT: 125.1 LBS | HEART RATE: 96 BPM | BODY MASS INDEX: 20.19 KG/M2 | OXYGEN SATURATION: 98 % | SYSTOLIC BLOOD PRESSURE: 134 MMHG | DIASTOLIC BLOOD PRESSURE: 85 MMHG

## 2021-12-16 DIAGNOSIS — Z94.0 KIDNEY REPLACED BY TRANSPLANT: ICD-10-CM

## 2021-12-16 DIAGNOSIS — Z94.83 PANCREAS REPLACED BY TRANSPLANT (H): ICD-10-CM

## 2021-12-16 DIAGNOSIS — Z94.0 KIDNEY REPLACED BY TRANSPLANT: Primary | ICD-10-CM

## 2021-12-16 DIAGNOSIS — Z79.899 ENCOUNTER FOR LONG-TERM CURRENT USE OF MEDICATION: ICD-10-CM

## 2021-12-16 DIAGNOSIS — Z48.298 AFTERCARE FOLLOWING ORGAN TRANSPLANT: ICD-10-CM

## 2021-12-16 DIAGNOSIS — Z46.6 ENCOUNTER FOR REMOVAL OF URETERAL STENT: Primary | ICD-10-CM

## 2021-12-16 LAB
AMYLASE SERPL-CCNC: 24 U/L (ref 30–110)
ANION GAP SERPL CALCULATED.3IONS-SCNC: 5 MMOL/L (ref 3–14)
BASOPHILS # BLD AUTO: 0.1 10E3/UL (ref 0–0.2)
BASOPHILS NFR BLD AUTO: 1 %
BUN SERPL-MCNC: 15 MG/DL (ref 7–30)
CALCIUM SERPL-MCNC: 9.1 MG/DL (ref 8.5–10.1)
CHLORIDE BLD-SCNC: 109 MMOL/L (ref 94–109)
CO2 SERPL-SCNC: 25 MMOL/L (ref 20–32)
CREAT SERPL-MCNC: 1 MG/DL (ref 0.52–1.04)
DAT, ANTI-IGG, C3: NORMAL
EOSINOPHIL # BLD AUTO: 0.1 10E3/UL (ref 0–0.7)
EOSINOPHIL NFR BLD AUTO: 1 %
ERYTHROCYTE [DISTWIDTH] IN BLOOD BY AUTOMATED COUNT: 16.4 % (ref 10–15)
GFR SERPL CREATININE-BSD FRML MDRD: 73 ML/MIN/1.73M2
GLUCOSE BLD-MCNC: 100 MG/DL (ref 70–99)
HCT VFR BLD AUTO: 25.9 % (ref 35–47)
HGB BLD-MCNC: 7.6 G/DL (ref 11.7–15.7)
IMM GRANULOCYTES # BLD: 0.1 10E3/UL
IMM GRANULOCYTES NFR BLD: 1 %
LDH SERPL L TO P-CCNC: 152 U/L (ref 81–234)
LIPASE FLD-CCNC: 145 U/L
LIPASE SERPL-CCNC: 57 U/L (ref 73–393)
LYMPHOCYTES # BLD AUTO: 0.7 10E3/UL (ref 0.8–5.3)
LYMPHOCYTES NFR BLD AUTO: 8 %
MCH RBC QN AUTO: 27 PG (ref 26.5–33)
MCHC RBC AUTO-ENTMCNC: 29.3 G/DL (ref 31.5–36.5)
MCV RBC AUTO: 92 FL (ref 78–100)
MONOCYTES # BLD AUTO: 0.3 10E3/UL (ref 0–1.3)
MONOCYTES NFR BLD AUTO: 3 %
NEUTROPHILS # BLD AUTO: 7.3 10E3/UL (ref 1.6–8.3)
NEUTROPHILS NFR BLD AUTO: 86 %
NRBC # BLD AUTO: 0 10E3/UL
NRBC BLD AUTO-RTO: 0 /100
PATH REPORT.COMMENTS IMP SPEC: NORMAL
PATH REPORT.COMMENTS IMP SPEC: NORMAL
PATH REPORT.FINAL DX SPEC: NORMAL
PATH REPORT.MICROSCOPIC SPEC OTHER STN: NORMAL
PATH REPORT.MICROSCOPIC SPEC OTHER STN: NORMAL
PLATELET # BLD AUTO: 532 10E3/UL (ref 150–450)
POTASSIUM BLD-SCNC: 4 MMOL/L (ref 3.4–5.3)
RBC # BLD AUTO: 2.82 10E6/UL (ref 3.8–5.2)
RETICS # AUTO: 0.06 10E6/UL (ref 0.03–0.1)
RETICS/RBC NFR AUTO: 2.2 % (ref 0.5–2)
SODIUM SERPL-SCNC: 139 MMOL/L (ref 133–144)
SPECIMEN EXPIRATION DATE: NORMAL
TACROLIMUS BLD-MCNC: 3.6 UG/L (ref 5–15)
TME LAST DOSE: ABNORMAL H
TME LAST DOSE: ABNORMAL H
WBC # BLD AUTO: 8.5 10E3/UL (ref 4–11)

## 2021-12-16 PROCEDURE — 80197 ASSAY OF TACROLIMUS: CPT

## 2021-12-16 PROCEDURE — 83615 LACTATE (LD) (LDH) ENZYME: CPT

## 2021-12-16 PROCEDURE — 82150 ASSAY OF AMYLASE: CPT

## 2021-12-16 PROCEDURE — 250N000009 HC RX 250: Performed by: NURSE PRACTITIONER

## 2021-12-16 PROCEDURE — 85060 BLOOD SMEAR INTERPRETATION: CPT | Performed by: PATHOLOGY

## 2021-12-16 PROCEDURE — 83010 ASSAY OF HAPTOGLOBIN QUANT: CPT

## 2021-12-16 PROCEDURE — 80048 BASIC METABOLIC PNL TOTAL CA: CPT

## 2021-12-16 PROCEDURE — 83690 ASSAY OF LIPASE: CPT | Performed by: NURSE PRACTITIONER

## 2021-12-16 PROCEDURE — 36415 COLL VENOUS BLD VENIPUNCTURE: CPT

## 2021-12-16 PROCEDURE — 83690 ASSAY OF LIPASE: CPT

## 2021-12-16 PROCEDURE — 52310 CYSTOSCOPY AND TREATMENT: CPT | Performed by: NURSE PRACTITIONER

## 2021-12-16 PROCEDURE — 85025 COMPLETE CBC W/AUTO DIFF WBC: CPT

## 2021-12-16 PROCEDURE — 86880 COOMBS TEST DIRECT: CPT

## 2021-12-16 PROCEDURE — 85045 AUTOMATED RETICULOCYTE COUNT: CPT

## 2021-12-16 RX ORDER — TACROLIMUS 1 MG/1
4 TABLET, EXTENDED RELEASE ORAL
Qty: 360 TABLET | Refills: 3 | Status: SHIPPED | OUTPATIENT
Start: 2021-12-16 | End: 2021-12-20

## 2021-12-16 RX ORDER — LEVOFLOXACIN 250 MG/1
500 TABLET, FILM COATED ORAL ONCE
Status: DISCONTINUED | OUTPATIENT
Start: 2021-12-16 | End: 2021-12-16

## 2021-12-16 RX ORDER — LIDOCAINE HYDROCHLORIDE 20 MG/ML
JELLY TOPICAL ONCE
Status: COMPLETED | OUTPATIENT
Start: 2021-12-16 | End: 2021-12-16

## 2021-12-16 RX ORDER — AMOXICILLIN 875 MG
875 TABLET ORAL 2 TIMES DAILY
Qty: 2 TABLET | Refills: 0 | Status: SHIPPED | OUTPATIENT
Start: 2021-12-16 | End: 2021-12-17

## 2021-12-16 RX ORDER — TACROLIMUS 0.75 MG/1
TABLET, EXTENDED RELEASE ORAL
Qty: 90 TABLET | Refills: 1
Start: 2021-12-16 | End: 2022-01-11

## 2021-12-16 RX ADMIN — LIDOCAINE HYDROCHLORIDE: 20 JELLY TOPICAL at 15:36

## 2021-12-16 ASSESSMENT — PAIN SCALES - GENERAL: PAINLEVEL: NO PAIN (0)

## 2021-12-16 NOTE — TELEPHONE ENCOUNTER
Left message and sent mychart message to patient regarding:  Tacrolimus XR level 3.9 on 12/16, goal 8-10, dose 2.75 mg daily.

## 2021-12-16 NOTE — TELEPHONE ENCOUNTER
ISSUE:   Tacrolimus XR level 3.9 on 12/16, goal 8-10, dose 2.75 mg daily.    PLAN:   Please call patient and confirm this was an accurate 24-hour trough. Verify Tacrolimus XR dose 2.75 mg daily. Confirm no new medications or illness. Confirm no missed doses. If accurate trough and accurate dose, increase Envarsus dose to 4 mg daily and repeat labs in 4 days.    Yessica Becker RN BSN  Transplant Care Coordinator    OUTCOME:   Spoke with patient, they confirm accurate trough level and current dose 2.75 mg daily. Patient confirmed dose change to 4 mg daily and to repeat labs in 4 days. Orders sent to preferred pharmacy for dose change and lab for repeat labs. Patient voiced understanding of plan.

## 2021-12-16 NOTE — LETTER
12/16/2021     RE: Marilyn Ortega  [address]  [facility] [address]    Dear Colleague,    Thank you for referring your patient, Marilyn Ortega, to the [facility]. Please see a copy of my visit note below.    See procedure note      Again, thank you for allowing me to participate in the care of your patient.        Sincerely,        Sally Brito NP

## 2021-12-16 NOTE — NURSING NOTE
Chief Complaint   Patient presents with     Cystoscopy     Stent removal     Blood pressure 134/85, pulse 96, weight 56.7 kg (125 lb 1.6 oz), SpO2 98 %, not currently breastfeeding.    Prepped patient for procedure with no complications.  Assisted with stent removal.  Administered Levaquin after procedure.  Patient was discharged in stable condition.    Mamie Munson, CMA

## 2021-12-17 LAB — HAPTOGLOB SERPL-MCNC: 354 MG/DL (ref 32–197)

## 2021-12-17 NOTE — PROCEDURES
Transplant Surgery  Operative Note    Preop dx: Status post  Donor kidney transplant.  Post op dx: same   Procedure: Flexible cystoscopy and ureteral stent removal   Surgeon: ludwin andres CNP  Assistant: ludy viera MA  Anesthesia: local  EBL: 0   Specimens: none.  Findings: none abnormal. Stent inspected and noted to be intact.   Indication: The patient is status post kidney transplant and the ureteral stent is no longer needed.    Procedure: The patient was positioned supine on the table.  The groin was sterilely prepped and draped in the usual fashion. Time out was done. Urojet was applied to the urethra. A flexible cystoscope was inserted and advanced thru the urethra into the bladder. The stent was visualized and grasped. The cystoscope, grasper and stent were removed en-mass. The stent was visualized to be intact.  The bladder mucosa was normal in appearance. Antibiotics were administered. The patient tolerated the procedure well and was asked to void before leaving the clinic.

## 2021-12-17 NOTE — TELEPHONE ENCOUNTER
Post Kidney and Pancreas Transplant Team Conference  Date: 12/17/2021  Transplant Coordinator: Yessica Becker     Attendees:  []  Dr. Pack [] Brandi Heranndez, RN [] Tamiko Cárdenas LPN     []  Dr. Motta [] Lizette Park, EDWARD [] Radhika Arnett LPN   []  Dr. Lee [] Yessica Becker RN    []  Dr. Corona [] Tasha Cartwright RN [] Bulmaro Weiner, PharmD   [] Dr. El [] Hamida Guevara RN    [] Dr. Chavarria [] Roderick Messer RN    [] Dr. Ocampo [] Lynette De RN [] Nena Wheeler RN   [] Dr. De León [] Naz Glaser RN    []  Dr. Aranda [] Radha Hurt RN    [] Dr. Wright [] Ashely Tse RN    [] Sally Brito NP [] Jovana Lemos RN        Verbal Plan Read Back:   CT with contrast r\t low grade fever  CMV, DSA UAUC  Hydrate  appt 12/17    Routed to RN Coordinator   Radhika Arnett LPN

## 2021-12-19 PROBLEM — Z48.298 AFTERCARE FOLLOWING ORGAN TRANSPLANT: Status: ACTIVE | Noted: 2021-12-19

## 2021-12-20 ENCOUNTER — OFFICE VISIT (OUTPATIENT)
Dept: TRANSPLANT | Facility: CLINIC | Age: 36
End: 2021-12-20
Attending: SURGERY
Payer: COMMERCIAL

## 2021-12-20 ENCOUNTER — LAB (OUTPATIENT)
Dept: LAB | Facility: CLINIC | Age: 36
End: 2021-12-20
Payer: COMMERCIAL

## 2021-12-20 VITALS
OXYGEN SATURATION: 99 % | WEIGHT: 127 LBS | BODY MASS INDEX: 20.5 KG/M2 | SYSTOLIC BLOOD PRESSURE: 124 MMHG | DIASTOLIC BLOOD PRESSURE: 77 MMHG | HEART RATE: 85 BPM

## 2021-12-20 DIAGNOSIS — Z94.0 KIDNEY REPLACED BY TRANSPLANT: ICD-10-CM

## 2021-12-20 DIAGNOSIS — Z94.0 KIDNEY TRANSPLANTED: Primary | ICD-10-CM

## 2021-12-20 DIAGNOSIS — Z48.298 AFTERCARE FOLLOWING ORGAN TRANSPLANT: ICD-10-CM

## 2021-12-20 DIAGNOSIS — Z94.83 PANCREAS REPLACED BY TRANSPLANT (H): ICD-10-CM

## 2021-12-20 DIAGNOSIS — Z79.899 ENCOUNTER FOR LONG-TERM CURRENT USE OF MEDICATION: ICD-10-CM

## 2021-12-20 DIAGNOSIS — Z48.298 AFTERCARE FOLLOWING ORGAN TRANSPLANT: Primary | ICD-10-CM

## 2021-12-20 LAB
AMYLASE SERPL-CCNC: 36 U/L (ref 30–110)
ANION GAP SERPL CALCULATED.3IONS-SCNC: 5 MMOL/L (ref 3–14)
BUN SERPL-MCNC: 23 MG/DL (ref 7–30)
CALCIUM SERPL-MCNC: 9.3 MG/DL (ref 8.5–10.1)
CHLORIDE BLD-SCNC: 108 MMOL/L (ref 94–109)
CO2 SERPL-SCNC: 24 MMOL/L (ref 20–32)
CREAT SERPL-MCNC: 0.96 MG/DL (ref 0.52–1.04)
ERYTHROCYTE [DISTWIDTH] IN BLOOD BY AUTOMATED COUNT: 16.2 % (ref 10–15)
GFR SERPL CREATININE-BSD FRML MDRD: 76 ML/MIN/1.73M2
GLUCOSE BLD-MCNC: 101 MG/DL (ref 70–99)
HCT VFR BLD AUTO: 25.3 % (ref 35–47)
HGB BLD-MCNC: 7.4 G/DL (ref 11.7–15.7)
LIPASE SERPL-CCNC: 110 U/L (ref 73–393)
MAGNESIUM SERPL-MCNC: 1.9 MG/DL (ref 1.6–2.3)
MCH RBC QN AUTO: 26.5 PG (ref 26.5–33)
MCHC RBC AUTO-ENTMCNC: 29.2 G/DL (ref 31.5–36.5)
MCV RBC AUTO: 91 FL (ref 78–100)
PHOSPHATE SERPL-MCNC: 2.5 MG/DL (ref 2.5–4.5)
PLATELET # BLD AUTO: 635 10E3/UL (ref 150–450)
POTASSIUM BLD-SCNC: 4.2 MMOL/L (ref 3.4–5.3)
RBC # BLD AUTO: 2.79 10E6/UL (ref 3.8–5.2)
SODIUM SERPL-SCNC: 137 MMOL/L (ref 133–144)
TACROLIMUS BLD-MCNC: 5.4 UG/L (ref 5–15)
TME LAST DOSE: NORMAL H
TME LAST DOSE: NORMAL H
WBC # BLD AUTO: 10.2 10E3/UL (ref 4–11)

## 2021-12-20 PROCEDURE — 80180 DRUG SCRN QUAN MYCOPHENOLATE: CPT

## 2021-12-20 PROCEDURE — 99213 OFFICE O/P EST LOW 20 MIN: CPT | Mod: 24 | Performed by: SURGERY

## 2021-12-20 PROCEDURE — 85027 COMPLETE CBC AUTOMATED: CPT

## 2021-12-20 PROCEDURE — 80048 BASIC METABOLIC PNL TOTAL CA: CPT

## 2021-12-20 PROCEDURE — 84100 ASSAY OF PHOSPHORUS: CPT

## 2021-12-20 PROCEDURE — 83735 ASSAY OF MAGNESIUM: CPT

## 2021-12-20 PROCEDURE — 82150 ASSAY OF AMYLASE: CPT

## 2021-12-20 PROCEDURE — 36415 COLL VENOUS BLD VENIPUNCTURE: CPT

## 2021-12-20 PROCEDURE — G0463 HOSPITAL OUTPT CLINIC VISIT: HCPCS

## 2021-12-20 PROCEDURE — 83690 ASSAY OF LIPASE: CPT

## 2021-12-20 PROCEDURE — 80197 ASSAY OF TACROLIMUS: CPT

## 2021-12-20 RX ORDER — TACROLIMUS 1 MG/1
6 TABLET, EXTENDED RELEASE ORAL
Qty: 180 TABLET | Refills: 11 | Status: SHIPPED | OUTPATIENT
Start: 2021-12-20 | End: 2022-01-11

## 2021-12-20 ASSESSMENT — PAIN SCALES - GENERAL: PAINLEVEL: NO PAIN (0)

## 2021-12-20 NOTE — TELEPHONE ENCOUNTER
ISSUE:   Tacrolimus XR level 5.4 on 12/20, goal 8-10, dose 4 mg daily.    PLAN:   Please call patient and confirm this was an accurate 24-hour trough. Verify Tacrolimus XR dose 4 mg daily. Confirm no new medications or illness. Confirm no missed doses. If accurate trough and accurate dose, increase Tacrolimus XR dose to 6 mg daily and repeat labs in 3 days.    Yessica Becker RN BSN  Transplant Care Coordinator    OUTCOME:   Spoke with patient, they confirm accurate trough level and current dose 4 mg daily. Patient confirmed dose change to 6 mg daily and to repeat labs in 3 days. Orders sent to preferred pharmacy for dose change and lab for repeat labs. Patient voiced understanding of plan.

## 2021-12-20 NOTE — TELEPHONE ENCOUNTER
Left message and sent [employer] message regarding:  Tacrolimus XR level 5.4 on 12/20, goal 8-10, dose 4 mg daily.     PLAN:   Please call patient and confirm this was an accurate 24-hour trough. Verify Tacrolimus XR dose 4 mg daily. Confirm no new medications or illness. Confirm no missed doses. If accurate trough and accurate dose, increase Tacrolimus XR dose to 6 mg daily and repeat labs in 3 days.

## 2021-12-20 NOTE — NURSING NOTE
Chief Complaint   Patient presents with     RECHECK     follow up kidney tx       /77   Pulse 85   Wt 57.6 kg (127 lb)   LMP  (LMP Unknown)   SpO2 99%   BMI 20.50 kg/m        Lalitha MI CMA

## 2021-12-20 NOTE — LETTER
12/20/2021     RE: Marilyn Ortega  [address]  [facility] [address]    Dear Colleague,    Thank you for referring your patient, Marilyn Ortega, to the [facility]PL[facility]. Please see a copy of my visit note below.    Doing well overall. Issues:    -fluid retention. Stopping steroids today, OK to have 20 lasix today and tomorrow and reassess Wednesday. Should continue to drink liberally but will de-escalate with time  -diet. Does not need high protein diet- OK to return to prior to transplant diet given bloating and constipation symptoms. Is healing well  -envarsus. Levels have been low, increasing. Monitor headaches  -wound. Healing well. SAARI removed, site dressed  -activity. Advised to slowly increase. OK for walking/running/jogging, should start core and weight training very gently at 8-10 weeks from surgery and gradually increase  -RTC. PRN    Again, thank you for allowing me to participate in the care of your patient.      Sincerely,    Branden Aranda MD

## 2021-12-21 NOTE — PROGRESS NOTES
Transplant Surgery Progress Note    Transplants:  11/15/2021 (Kidney / Pancreas); Postoperative day:  170  S: no acute events- doing well overall. Still having some swelling, on low dose lasix daily. Is religiously following the low protein diet but is struggling with satiety and constipation due to high protein load. Otherwise without complaints.    Transplant History:    Transplant Type:  DDKT (SPK)  Donor Type: Donation after Brain Death   Transplant Date:  11/15/2021 (Kidney / Pancreas)   Ureteral Stent:  Yes   Crossmatch:  negative   DSA at Tx:  No  Baseline Cr: 1.2-15   DeNovo DSA: No    Acute Rejection Hx:  No    Present Maintenance Immunosuppression:  Tacrolimus - extended release    CMV IgG Ab Discordance:  No  EBV IgG Ab Discordance:  No    BK Viremia:  No  EBV Viremia:  No    Transplant Coordinator: Yessica Becker     Transplant Office Phone Number: [phone]     Immunosuppressant Medications     Immunosuppressive Agents Disp Start End     mycophenolic acid (GENERIC EQUIVALENT) 180 MG EC tablet    60 tablet 1/31/2022     Sig - Route: Take 1 tablet (180 mg) by mouth 2 times daily Total dose=540mg BID - Oral    Class: E-Prescribe    Notes to Pharmacy: TXP DT 11/15/2021 (Kidney / Pancreas) TXP Dischg DT 11/22/2021 DX Kidney replaced by transplant Z94.0 TX [facility] ([place], [place])     mycophenolic acid (GENERIC EQUIVALENT) 360 MG EC tablet    60 tablet 1/31/2022     Sig - Route: Take 1 tablet (360 mg) by mouth 2 times daily Total dose=540mg BID - Oral    Class: E-Prescribe    Notes to Pharmacy: TXP DT 11/15/2021 (Kidney / Pancreas) TXP Dischg DT 11/22/2021 DX Kidney replaced by transplant Z94.0 TX [facility] ([place], [place])     tacrolimus (ENVARSUS XR) 0.75 MG 24 hr tablet    60 tablet 4/19/2022     Sig - Route: Take 2 tablets (1.5 mg) by mouth every morning (before breakfast) Total dose=12.5mg daily - Oral     Class: E-Prescribe    Notes to Pharmacy: TXP DT 11/15/2021 (Kidney / Pancreas) TXP Dischg DT 11/22/2021 DX Kidney replaced by transplant Z94.0 TX [facility] ([place], [place])     tacrolimus (ENVARSUS XR) 1 MG 24 hr tablet    90 tablet 4/19/2022     Sig - Route: Take 3 tablets (3 mg) by mouth every morning (before breakfast) Total dose=12.5mg daily - Oral    Class: E-Prescribe    Notes to Pharmacy: TXP DT 11/15/2021 (Kidney / Pancreas) TXP Dischg DT 11/22/2021 DX Kidney replaced by transplant Z94.0 TX [facility] ([place], [place])     tacrolimus (ENVARSUS XR) 4 MG 24 hr tablet    60 tablet 4/19/2022     Sig - Route: Take 2 tablets (8 mg) by mouth every morning (before breakfast) Total dose=12.5mg daily - Oral    Class: E-Prescribe    Notes to Pharmacy: TXP DT 11/15/2021 (Kidney / Pancreas) TXP Dischg DT 11/22/2021 DX Kidney replaced by transplant Z94.0 [facility] ([place], [place])          Possible Immunosuppression-related side effects:   []             headache  []             vivid dreams  []             irritability  []             cognitive difficuties  []             fine tremor  []             nausea  []             diarrhea  []             neuropathy      []             edema  []             renal calcineurin toxicity  []             hyperkalemia  []             post-transplant diabetes  []             decreased appetite  []             increased appetite  []             other:  []             none    Prescription Medications as of 5/4/2022       Rx Number Disp Refills Start End Last Dispensed Date Next Fill Date Owning Pharmacy    apixaban ANTICOAGULANT (ELIQUIS) 2.5 MG tablet  60 tablet 2 4/11/2022    [facility] [address] [place] [facility] [address] [address]    Sig: Take 1 tablet (2.5 mg) by mouth in the morning and 1 tablet (2.5 mg) in the evening.    Class: E-Prescribe     Route: Oral    aspirin (ASA) 325 MG tablet  30 tablet 5 2021    [facility] [place], [place] - [address]    Sig: Take 1 tablet (325 mg) by mouth daily    Class: E-Prescribe    Route: Oral    calcium carbonate-vitamin D (OS-STEPHANIE WITH D) 500-200 MG-UNIT tablet  60 tablet 11 2021    [facility] [place], [place] - [address]    Sig: Take 1 tablet by mouth 2 times daily (with meals)    Class: E-Prescribe    Route: Oral    cetirizine (ZYRTEC) 10 MG tablet    3/17/2022    [place], [place] - [phone]    Sig: Take 1 tablet (10 mg) by mouth every other day    Class: Historical    Route: Oral    cholecalciferol 25 MCG (1000 UT) TABS    2021    [facility] [address] [place] [address] [address]    Sig: Take 2,000 Units by mouth every other day     Class: Historical    Route: Oral    Continuous Blood Gluc Sensor (DEXCOM G6 SENSOR) [facility]    2020    [facility] [address] [place] [address] [address]    Sig: Use as directed for continuous glucose monitoring.  Change sensor every 10 days.    Class: Historical    Continuous Blood Gluc Transmit (DEXCOM G6 TRANSMITTER) [facility]    2020    [facility] [address] [place] [address] [address]    Sig: Use as directed for continuous glucose monitoring.  Change every 3 months.    Class: Historical    docusate sodium (COLACE) 100 MG capsule        [facility] - [place], [place] - [address]    Sig: Take 200 mg by mouth daily before breakfast    Class: Historical    Route: Oral    famotidine (PEPCID) 20 MG tablet            Sig: Take 20 mg by mouth 2 times daily    Class: Historical    Route: Oral    glucose blood VI test strips strip            Si times daily.    Class: Historical    Route: As instructed    mycophenolic acid (GENERIC EQUIVALENT) 180 MG EC tablet  60 tablet 11 2022    [facility]  [address]    Sig: Take 1 tablet (180 mg) by mouth 2 times daily Total dose=540mg BID    Class: E-Prescribe    Notes to Pharmacy: TXP DT 11/15/2021 (Kidney / Pancreas) TXP Dischg DT 11/22/2021 DX Kidney replaced by transplant Z94.0 TX [facility] ([place], [place])    Route: Oral    mycophenolic acid (GENERIC EQUIVALENT) 360 MG EC tablet  60 tablet 11 1/31/2022    [address]    Sig: Take 1 tablet (360 mg) by mouth 2 times daily Total dose=540mg BID    Class: E-Prescribe    Notes to Pharmacy: TXP DT 11/15/2021 (Kidney / Pancreas) TXP Dischg DT 11/22/2021 DX Kidney replaced by transplant Z94.0 TX [facility] ([place], [place])    Route: Oral    norgestimate-ethinyl estradiol (ORTHO-CYCLEN) 0.25-35 MG-MCG tablet            Sig: Take 1 tablet by mouth daily    Class: Historical    Route: Oral    ondansetron (ZOFRAN) 4 MG tablet  30 tablet 0 3/29/2022    [phone]    Sig: Take 1 tablet (4 mg) by mouth every 8 hours as needed for nausea    Class: E-Prescribe    Route: Oral    Prucalopride Succinate (MOTEGRITY) 2 MG TABS  90 tablet 0 4/25/2022    [facility] [address] [place] [facility] [place], [place] - [address] [address]    Sig: Take 1 tablet (2 mg) by mouth daily    Class: E-Prescribe    Route: Oral    sulfamethoxazole-trimethoprim (BACTRIM) 400-80 MG tablet  30 tablet 11 11/23/2021    [facility] - [place], [place] - [address]    Sig: Take 1 tablet by mouth daily    Class: E-Prescribe    Route: Oral    tacrolimus (ENVARSUS XR) 0.75 MG 24 hr tablet  60 tablet 11 4/19/2022    [facility] - [place], [place] - [address]    Sig: Take 2 tablets (1.5 mg) by mouth every morning (before breakfast) Total  dose=12.5mg daily    Class: E-Prescribe    Notes to Pharmacy: TXP DT 11/15/2021 (Kidney / Pancreas) TXP Dischg DT 11/22/2021 DX Kidney replaced by transplant Z94.0 [facility] ([place], [place])    Route: Oral    tacrolimus (ENVARSUS XR) 1 MG 24 hr tablet  90 tablet 11 4/19/2022    [facility] [place] [address] [address]    Sig: Take 3 tablets (3 mg) by mouth every morning (before breakfast) Total dose=12.5mg daily    Class: E-Prescribe    Notes to Pharmacy: TXP DT 11/15/2021 (Kidney / Pancreas) TXP Dischg DT 11/22/2021 DX Kidney replaced by transplant Z94.0 [facility] ([place], [place])    Route: Oral    tacrolimus (ENVARSUS XR) 4 MG 24 hr tablet  60 tablet 11 4/19/2022    [facility] - [place], [place] - [facility] [address]    Sig: Take 2 tablets (8 mg) by mouth every morning (before breakfast) Total dose=12.5mg daily    Class: E-Prescribe    Notes to Pharmacy: TXP DT 11/15/2021 (Kidney / Pancreas) TXP Dischg DT 11/22/2021 DX Kidney replaced by transplant Z94.0 [facility] ([place], [place])    Route: Oral      Clinic-Administered Medications as of 5/4/2022       Dose Frequency Start End    bevacizumab (AVASTIN) intravitreal inj 1.25 mg 1.25 mg EVERY 28 DAYS 3/21/2022     Admin Instructions: PRN Q3-52 weeks  LE    Route: Intravitreal    bevacizumab (AVASTIN) intravitreal inj 1.25 mg 1.25 mg EVERY 28 DAYS 3/21/2022     Admin Instructions: PRN Q3-52 weeks  RE    Route: Intravitreal          O:      General Appearance: in no apparent distress.   Skin: Normal, no rashes or jaundice  Heart: regular rate and rhythm, normal S1 and S2  Lungs: easy respirations, no audible wheezing.  Abdomen: abdomen soft, flat, nontender. Incision well-healed. SARAI with minimal output- removed  Extremities: Tremor absent.   Edema: present bilaterally.  1+    Transplant Immunosuppression Labs Latest Ref Rng & Units 5/2/2022 4/27/2022 4/25/2022 4/22/2022 4/19/2022   Creat 0.52 - 1.04 mg/dL 1.72(H) 1.69(H) 1.80(H) 1.30(H) 1.35(H)   BUN 7 - 30 mg/dL 39(H) 31(H) 40(H) 41(H) 31(H)   WBC 4.0 - 11.0 10e3/uL 3.4(L) - 4.8 5.9 6.6   Neutrophil % - - - - -   ANEU 1.6 - 8.3 10e3/uL - - - - -       Chemistries:   Recent Labs   Lab Test 05/02/22  0803   BUN 39*   CR 1.72*   GFRESTIMATED 39*   GLC 86     Lab Results   Component Value Date    A1C 5.0 03/17/2022    A1C 7.5 10/16/2012    CPEPT <0.1 07/21/2021     Recent Labs   Lab Test 03/17/22  0835 11/15/21  1550   ALBUMIN 3.9 4.0   BILITOTAL  --  0.4   ALKPHOS  --  104   AST  --  16   ALT  --  23     Urine Studies:  Recent Labs   Lab Test 04/27/22  1751   COLOR Colorless   APPEARANCE Clear   URINEGLC Negative   URINEBILI Negative   URINEKETONE Negative   SG 1.005   UBLD Moderate*   URINEPH 6.0   PROTEIN Negative   NITRITE Negative   LEUKEST Negative   RBCU <1   WBCU 0     Recent Labs   Lab Test 04/18/22  0810 03/17/22  0815   UTPG 0.09 0.16     Hematology:   Recent Labs   Lab Test 05/02/22  0803 04/25/22  0900 04/22/22  0932   HGB 9.5* 9.5* 10.2*    271 296   WBC 3.4* 4.8 5.9     Coags:   Recent Labs   Lab Test 05/02/22  0803 11/16/21  0402   INR 0.96 1.11     HLA antibodies:   SA1 HI RISK CHRISTAL   Date Value Ref Range Status   05/02/2022 None  Final     SA1 MOD RISK CHRISTAL   Date Value Ref Range Status   05/02/2022 None  Final     SA2 HI RISK CHRISTAL   Date Value Ref Range Status   05/02/2022 None  Final     SA2 MOD RISK CHRISTAL   Date Value Ref Range Status   05/02/2022 None  Final       Assessment: Marilyn Ortega is doing well s/p DDKT (SPK):  Issues we addressed during her visit include:    -fluid retention. Stopping steroids today, OK to have 20 lasix today and tomorrow and reassess Wednesday. Should continue to drink liberally but will de-escalate with time    -diet. Does not need high protein diet- OK to return to prior to  transplant diet given bloating and constipation symptoms. Is healing well    -envarsus. Levels have been low, increasing. Monitor headaches    -wound. Healing well. SARAI removed, site dressed    -activity. Advised to slowly increase. OK for walking/running/jogging, should start core and weight training very gently at 8-10 weeks from surgery and gradually increase      Plan:    1. Graft function: good, stable  2. Immunosuppression Management: on envarsus- levels low as above, increasing. continue cellcept pred taper stopping today.  Complexity of management:Medium.  Contributing factors: anemia  3. Other issues as above  Followup: PRN    Total Time: 15 min,   Counselling Time: 10 min.      Branden Aranda MD    Doing well overall. Issues:    -fluid retention. Stopping steroids today, OK to have 20 lasix today and tomorrow and reassess Wednesday. Should continue to drink liberally but will de-escalate with time  -diet. Does not need high protein diet- OK to return to prior to transplant diet given bloating and constipation symptoms. Is healing well  -envarsus. Levels have been low, increasing. Monitor headaches  -wound. Healing well. SARAI removed, site dressed  -activity. Advised to slowly increase. OK for walking/running/jogging, should start core and weight training very gently at 8-10 weeks from surgery and gradually increase  -RTC. PRN

## 2021-12-22 ENCOUNTER — TELEPHONE (OUTPATIENT)
Dept: TRANSPLANT | Facility: CLINIC | Age: 36
End: 2021-12-22

## 2021-12-22 ENCOUNTER — INFUSION THERAPY VISIT (OUTPATIENT)
Dept: INFUSION THERAPY | Facility: CLINIC | Age: 36
End: 2021-12-22
Attending: INTERNAL MEDICINE
Payer: COMMERCIAL

## 2021-12-22 ENCOUNTER — LAB (OUTPATIENT)
Dept: LAB | Facility: CLINIC | Age: 36
End: 2021-12-22
Attending: INTERNAL MEDICINE
Payer: COMMERCIAL

## 2021-12-22 ENCOUNTER — OFFICE VISIT (OUTPATIENT)
Dept: NEPHROLOGY | Facility: CLINIC | Age: 36
End: 2021-12-22
Attending: INTERNAL MEDICINE
Payer: COMMERCIAL

## 2021-12-22 VITALS
HEART RATE: 90 BPM | OXYGEN SATURATION: 100 % | SYSTOLIC BLOOD PRESSURE: 115 MMHG | DIASTOLIC BLOOD PRESSURE: 77 MMHG | TEMPERATURE: 98.4 F | RESPIRATION RATE: 16 BRPM

## 2021-12-22 VITALS
WEIGHT: 121.3 LBS | HEART RATE: 92 BPM | SYSTOLIC BLOOD PRESSURE: 117 MMHG | DIASTOLIC BLOOD PRESSURE: 68 MMHG | OXYGEN SATURATION: 99 % | HEIGHT: 66 IN | BODY MASS INDEX: 19.49 KG/M2

## 2021-12-22 DIAGNOSIS — D64.9 ANEMIA: ICD-10-CM

## 2021-12-22 DIAGNOSIS — Z94.83 PANCREAS REPLACED BY TRANSPLANT (H): ICD-10-CM

## 2021-12-22 DIAGNOSIS — Z48.298 AFTERCARE FOLLOWING ORGAN TRANSPLANT: Primary | ICD-10-CM

## 2021-12-22 DIAGNOSIS — Z94.0 KIDNEY REPLACED BY TRANSPLANT: ICD-10-CM

## 2021-12-22 DIAGNOSIS — N18.9 ANEMIA DUE TO CHRONIC KIDNEY DISEASE, UNSPECIFIED CKD STAGE: ICD-10-CM

## 2021-12-22 DIAGNOSIS — Z76.82 AWAITING ORGAN TRANSPLANT: ICD-10-CM

## 2021-12-22 DIAGNOSIS — Z48.298 AFTERCARE FOLLOWING ORGAN TRANSPLANT: ICD-10-CM

## 2021-12-22 DIAGNOSIS — D84.9 IMMUNOSUPPRESSION (H): ICD-10-CM

## 2021-12-22 DIAGNOSIS — N18.4 CKD (CHRONIC KIDNEY DISEASE) STAGE 4, GFR 15-29 ML/MIN (H): ICD-10-CM

## 2021-12-22 DIAGNOSIS — Z94.0 KIDNEY REPLACED BY TRANSPLANT: Primary | ICD-10-CM

## 2021-12-22 DIAGNOSIS — Z79.899 ENCOUNTER FOR LONG-TERM CURRENT USE OF MEDICATION: ICD-10-CM

## 2021-12-22 DIAGNOSIS — R77.8 ELEVATED TOTAL PROTEIN: ICD-10-CM

## 2021-12-22 DIAGNOSIS — D63.1 ANEMIA DUE TO CHRONIC KIDNEY DISEASE, UNSPECIFIED CKD STAGE: ICD-10-CM

## 2021-12-22 LAB
CREAT UR-MCNC: 130 MG/DL
FERRITIN SERPL-MCNC: 87 NG/ML (ref 12–150)
HCT VFR BLD AUTO: 24.3 % (ref 35–47)
HGB BLD-MCNC: 7.3 G/DL (ref 11.7–15.7)
IRON SATN MFR SERPL: 7 % (ref 15–46)
IRON SERPL-MCNC: 20 UG/DL (ref 35–180)
MYCOPHENOLATE SERPL LC/MS/MS-MCNC: 3.29 MG/L (ref 1–3.5)
MYCOPHENOLATE-G SERPL LC/MS/MS-MCNC: 71 MG/L (ref 30–95)
PROT UR-MCNC: 0.28 G/L
PROT/CREAT 24H UR: 0.22 G/G CR (ref 0–0.2)
TIBC SERPL-MCNC: 294 UG/DL (ref 240–430)
TME LAST DOSE: NORMAL H
TME LAST DOSE: NORMAL H

## 2021-12-22 PROCEDURE — 87521 HEPATITIS C PROBE&RVRS TRNSC: CPT | Mod: 90 | Performed by: PATHOLOGY

## 2021-12-22 PROCEDURE — 36415 COLL VENOUS BLD VENIPUNCTURE: CPT | Performed by: PATHOLOGY

## 2021-12-22 PROCEDURE — 86335 IMMUNFIX E-PHORSIS/URINE/CSF: CPT | Performed by: PATHOLOGY

## 2021-12-22 PROCEDURE — 82150 ASSAY OF AMYLASE: CPT | Mod: 90 | Performed by: PATHOLOGY

## 2021-12-22 PROCEDURE — 84156 ASSAY OF PROTEIN URINE: CPT | Performed by: PATHOLOGY

## 2021-12-22 PROCEDURE — 83690 ASSAY OF LIPASE: CPT | Mod: 90 | Performed by: PATHOLOGY

## 2021-12-22 PROCEDURE — 80180 DRUG SCRN QUAN MYCOPHENOLATE: CPT | Mod: 90 | Performed by: PATHOLOGY

## 2021-12-22 PROCEDURE — 84165 PROTEIN E-PHORESIS SERUM: CPT | Performed by: PATHOLOGY

## 2021-12-22 PROCEDURE — 83550 IRON BINDING TEST: CPT | Performed by: PATHOLOGY

## 2021-12-22 PROCEDURE — 86828 HLA CLASS I&II ANTIBODY QUAL: CPT | Mod: 59 | Performed by: PATHOLOGY

## 2021-12-22 PROCEDURE — 86833 HLA CLASS II HIGH DEFIN QUAL: CPT | Performed by: PATHOLOGY

## 2021-12-22 PROCEDURE — 86832 HLA CLASS I HIGH DEFIN QUAL: CPT | Performed by: PATHOLOGY

## 2021-12-22 PROCEDURE — 87516 HEPATITIS B DNA AMP PROBE: CPT | Mod: 90 | Performed by: PATHOLOGY

## 2021-12-22 PROCEDURE — 87535 HIV-1 PROBE&REVERSE TRNSCRPJ: CPT | Mod: 90 | Performed by: PATHOLOGY

## 2021-12-22 PROCEDURE — G0463 HOSPITAL OUTPT CLINIC VISIT: HCPCS | Mod: 25

## 2021-12-22 PROCEDURE — 96365 THER/PROPH/DIAG IV INF INIT: CPT

## 2021-12-22 PROCEDURE — 82728 ASSAY OF FERRITIN: CPT | Performed by: PATHOLOGY

## 2021-12-22 PROCEDURE — 83883 ASSAY NEPHELOMETRY NOT SPEC: CPT | Mod: 90 | Performed by: PATHOLOGY

## 2021-12-22 PROCEDURE — 85027 COMPLETE CBC AUTOMATED: CPT | Performed by: PATHOLOGY

## 2021-12-22 PROCEDURE — 258N000003 HC RX IP 258 OP 636: Performed by: INTERNAL MEDICINE

## 2021-12-22 PROCEDURE — 80048 BASIC METABOLIC PNL TOTAL CA: CPT | Mod: 90 | Performed by: PATHOLOGY

## 2021-12-22 PROCEDURE — 99214 OFFICE O/P EST MOD 30 MIN: CPT | Mod: 24

## 2021-12-22 PROCEDURE — 99000 SPECIMEN HANDLING OFFICE-LAB: CPT | Performed by: PATHOLOGY

## 2021-12-22 PROCEDURE — 87799 DETECT AGENT NOS DNA QUANT: CPT | Mod: 90 | Performed by: PATHOLOGY

## 2021-12-22 PROCEDURE — 80197 ASSAY OF TACROLIMUS: CPT | Mod: 90 | Performed by: PATHOLOGY

## 2021-12-22 PROCEDURE — 250N000011 HC RX IP 250 OP 636: Performed by: INTERNAL MEDICINE

## 2021-12-22 RX ORDER — ALBUTEROL SULFATE 0.83 MG/ML
2.5 SOLUTION RESPIRATORY (INHALATION)
Status: CANCELLED | OUTPATIENT
Start: 2021-12-29

## 2021-12-22 RX ORDER — HEPARIN SODIUM,PORCINE 10 UNIT/ML
5 VIAL (ML) INTRAVENOUS
Status: CANCELLED | OUTPATIENT
Start: 2021-12-29

## 2021-12-22 RX ORDER — HEPARIN SODIUM (PORCINE) LOCK FLUSH IV SOLN 100 UNIT/ML 100 UNIT/ML
5 SOLUTION INTRAVENOUS
Status: CANCELLED | OUTPATIENT
Start: 2021-12-29

## 2021-12-22 RX ORDER — EPINEPHRINE 1 MG/ML
0.3 INJECTION, SOLUTION INTRAMUSCULAR; SUBCUTANEOUS EVERY 5 MIN PRN
Status: CANCELLED | OUTPATIENT
Start: 2021-12-29

## 2021-12-22 RX ORDER — ALBUTEROL SULFATE 90 UG/1
1-2 AEROSOL, METERED RESPIRATORY (INHALATION)
Status: CANCELLED
Start: 2021-12-29

## 2021-12-22 RX ORDER — METHYLPREDNISOLONE SODIUM SUCCINATE 125 MG/2ML
125 INJECTION, POWDER, LYOPHILIZED, FOR SOLUTION INTRAMUSCULAR; INTRAVENOUS
Status: CANCELLED
Start: 2021-12-29

## 2021-12-22 RX ORDER — NALOXONE HYDROCHLORIDE 0.4 MG/ML
0.2 INJECTION, SOLUTION INTRAMUSCULAR; INTRAVENOUS; SUBCUTANEOUS
Status: CANCELLED | OUTPATIENT
Start: 2021-12-29

## 2021-12-22 RX ORDER — MEPERIDINE HYDROCHLORIDE 25 MG/ML
25 INJECTION INTRAMUSCULAR; INTRAVENOUS; SUBCUTANEOUS EVERY 30 MIN PRN
Status: CANCELLED | OUTPATIENT
Start: 2021-12-29

## 2021-12-22 RX ORDER — DIPHENHYDRAMINE HYDROCHLORIDE 50 MG/ML
50 INJECTION INTRAMUSCULAR; INTRAVENOUS
Status: CANCELLED
Start: 2021-12-29

## 2021-12-22 RX ADMIN — IRON SUCROSE 300 MG: 20 INJECTION, SOLUTION INTRAVENOUS at 11:21

## 2021-12-22 ASSESSMENT — MIFFLIN-ST. JEOR: SCORE: 1256.96

## 2021-12-22 ASSESSMENT — PAIN SCALES - GENERAL
PAINLEVEL: NO PAIN (0)
PAINLEVEL: NO PAIN (0)

## 2021-12-22 NOTE — PROGRESS NOTES
Infusion Nursing Note:  Marilyn Ortega presents today for venofer.    Patient seen by provider today: Yes   present during visit today: Not Applicable.    Note: first dose, 1/3.     Intravenous Access:  Peripheral IV placed.    Treatment Conditions:  Not Applicable.    Post Infusion Assessment:  Patient tolerated infusion without incident.  Blood return noted pre and post infusion.  Site patent and intact, free from redness, edema or discomfort.  No evidence of extravasations.  Access discontinued per protocol.     Discharge Plan:   Discharge instructions reviewed with: Patient.  Patient and/or family verbalized understanding of discharge instructions and all questions answered.  AVS to patient.  Patient will return 12/24/21 for next appointment.   Patient discharged in stable condition accompanied by: self.  Departure Mode: Ambulatory.    Administrations This Visit     iron sucrose (VENOFER) 300 mg in sodium chloride 0.9 % 250 mL intermittent infusion     Admin Date  12/22/2021 Action  New Bag Dose  300 mg Rate  193.3 mL/hr Route  Intravenous Administered By  Carolyn Ramirez RN

## 2021-12-22 NOTE — PATIENT INSTRUCTIONS
Hold off on taking any additional lasix unless significant weight gain or swelling and call transplant office if questions

## 2021-12-22 NOTE — LETTER
12/22/2021         RE: Marilyn Ortega  [address]  [facility] [address]        Dear Colleague,    Thank you for referring your patient, Marilyn Ortega, to the [facility]. Please see a copy of my visit note below.    Infusion Nursing Note:  Marilyn Ortega presents today for venofer.    Patient seen by provider today: Yes   present during visit today: Not Applicable.    Note: first dose, 1/3.     Intravenous Access:  Peripheral IV placed.    Treatment Conditions:  Not Applicable.    Post Infusion Assessment:  Patient tolerated infusion without incident.  Blood return noted pre and post infusion.  Site patent and intact, free from redness, edema or discomfort.  No evidence of extravasations.  Access discontinued per protocol.     Discharge Plan:   Discharge instructions reviewed with: Patient.  Patient and/or family verbalized understanding of discharge instructions and all questions answered.  AVS to patient.  Patient will return 12/24/21 for next appointment.   Patient discharged in stable condition accompanied by: self.  Departure Mode: Ambulatory.    Administrations This Visit     iron sucrose (VENOFER) 300 mg in sodium chloride 0.9 % 250 mL intermittent infusion     Admin Date  12/22/2021 Action  New Bag Dose  300 mg Rate  193.3 mL/hr Route  Intravenous Administered By  Carolyn Ramirez RN                                  Again, thank you for allowing me to participate in the care of your patient.        Sincerely,        [facility]

## 2021-12-22 NOTE — LETTER
Date:December 26, 2021      Patient was self referred, no letter generated. Do not send.        [facility] Health Information

## 2021-12-22 NOTE — LETTER
12/22/2021       RE: Marilyn Ortega  [address]  [facility] [address]     Dear Colleague,    Thank you for referring your patient, Marilyn Ortega, to the [facility] [place] [facility]. Please see a copy of my visit note below.    TRANSPLANT NEPHROLOGY EARLY POST TRANSPLANT VISIT    Assessment & Plan   # DDKT (SPK): Stable   - Baseline Creatinine: ~ 1-1.3   - Proteinuria: Not checked post transplant   - Date DSA Last Checked: Nov/2021      Latest DSA: No DSA at time of transplant   - BK Viremia: Not checked post transplant   - Kidney Tx Biopsy: No    # Pancreas Tx (SPK):    - Pancreatic Exocrine Drainage: Enteric drained     - Blood glucose: Euglycemia      On insulin: No   - HbA1c: Last checked at time of transplant      Latest HbA1c: 7.7%   - Pancreatic enzymes: Trend down   - Date DSA Last Checked: Nov/2021  Latest DSA: No DSA at time of transplant   - Pancreas Tx Biopsy: No    # Immunosuppression: Tacrolimus immediate release (goal 8-10), Mycophenolic acid (dose 720 mg every 12 hours) and Prednisone (dose taper)   - Induction with Recent Transplant:  Intermediate Intensity   - Continue with intensive monitoring of immunosuppression for efficacy and toxicity.   - Changes: yes envarsus increased from 4 to 6 mg po every day for sub-therpeutic level, awaiting repeat today    # Infection Prophylaxis: due for BK screen -pending from today  - PJP: Sulfa/TMP (Bactrim)  - CMV: Valganciclovir (Valcyte) 900 mg po qd Estimated Creatinine Clearance: 70.3 mL/min (based on SCr of 0.96 mg/dL).  - Thrush: Clotrimazole yodit (Mycelex) qid-counseled about interaction with CNI and to take consistently    # Blood Pressure: Controlled;  Goal BP: < 140/90   - Volume status: Euvolemic  EDW ~ 122lbs   - Changes: she used lasix 20 mg for 2 days and weight is down to 121 lbs. Advised to stop lasix and use only if needed-suspect may not need now  that she is off prednisone  # Anemia in Chronic Renal Disease: Hgb: low     SYLVAIN: No   - Iron studies: Low iron saturation, started oral iron daily on 12/8/21    -starting iv iron (no melena or BRBPR and no heavy menses)    # Mineral Bone Disorder:   - Secondary renal hyperparathyroidism; PTH level: Normal (18-80 pg/ml)        On treatment: None  - Vitamin D; level: High normal        On supplement: Yes  - Calcium; level: Normal        On supplement: No  - Phosphorus; level: Normal        On supplement: Yes, 250mg bid if next phos>2 could reduce to 250 mg po qd    # Electrolytes:   - Potassium; level: Normal        On supplement: No  - Magnesium; level: Normal        On supplement: Yes, 400mg daily   - Bicarbonate; level: Normal        On supplement: No    # Skin sensitivity:   -Likely 2/2 tac. No rashes, not concerning at this point for VZV    # Medical Compliance: Yes    # COVID-19 Virus Review: Discussed COVID-19 virus and the potential medical risks.  Reviewed preventative health recommendations, including wearing a mask where appropriate.  Recommended COVID vaccination should be up to date with either an initial vaccination or booster shot when appropriate.  Asked the patient to inform the transplant center if they are exposed or diagnosed with this virus.    # COVID Vaccination Up To Date: Yes 2 doses +booster-will be due for 2nd booster in April    # Transplant History:  Etiology of Kidney Failure: Diabetes mellitus type 1  Tx: SPK  Transplant: 11/15/2021 (Kidney / Pancreas)  Donor Type: Donation after Brain Death Donor Class:   Crossmatch at time of Tx: negative  DSA at time of Tx: No  Significant changes in immunosuppression: None  CMV IgG Ab High Risk Discordance (D+/R-): No  EBV IgG Ab High Risk Discordance (D+/R-): No  Significant transplant-related complications: None    Transplant Office Phone Number: [phone]    Assessment and plan was discussed with the patient and she voiced her understanding and  "agreement.      Return visit: 1 month    Reanna Lee MD    Chief Complaint   Ms. Ortega is a 36 year old here for kidney transplant, pancreas transplant, immunosuppression management and new medical concerns.     History of Present Illness      Feels ok but still notes some issues probably related to med tolerance and possible side effects  She came off prednisone-she feels swelling has significantly improved after along with few days of lasix 20 mg po every day x2 days 121 lbs  Constipation last week which improved past 2 days and had regular BM's  Persistent headaches \"stabbing pain\" in the back of the head, no fevers or chills, not relieved by tylenol  Some nausea past couple days, takes zofran   She has been drinking about 3 liters/day  Working full time virtually in sales    IS Envarsus 6 mg every day, /720  COVID vaccine x2 & booster Oct-will be due for 2nd booster in April    Home BP: 110-130s/70s    Problem List   Patient Active Problem List   Diagnosis     Type 1 diabetes mellitus (H)     Type 1 diabetes, HbA1c goal < 7% (H)     Osteopenia     Chronic constipation     Irritable bowel syndrome     CKD (chronic kidney disease) stage 4, GFR 15-29 ml/min (H)     HTN, kidney transplant related     Gastroparesis     Heterozygous factor V Leiden mutation (H)     Status post simultaneous kidney and pancreas transplant (H)     Immunosuppression (H)     Kidney replaced by transplant     Pancreas replaced by transplant (H)     Need for pneumocystis prophylaxis     Anemia     Aftercare following organ transplant       Allergies   Allergies   Allergen Reactions     Chlorhexidine Hives, Itching and Rash     PN: Patient had swelling/rash that was very itchy with chlorprep.     Ceclor [Cefaclor Monohydrate]      Cefaclor Rash       Medications   Current Outpatient Medications   Medication Sig     aspirin (ASA) 325 MG tablet Take 1 tablet (325 mg) by mouth daily     bromfenac (PROLENSA) 0.07 % ophthalmic " solution Place 1 drop Into the left eye daily     calcium carbonate-vitamin D (OS-STEPHANIE WITH D) 500-200 MG-UNIT tablet Take 1 tablet by mouth 2 times daily (with meals)     cetirizine (ZYRTEC) 10 MG tablet Take 10 mg by mouth daily     cholecalciferol 25 MCG (1000 UT) TABS Take 2,000 Units by mouth every other day      clotrimazole (MYCELEX) 10 MG lozenge Place 1 lozenge (10 mg) inside cheek 4 times daily     Continuous Blood Gluc Sensor (DEXCOM G6 SENSOR) MISC Use as directed for continuous glucose monitoring.  Change sensor every 10 days.     Continuous Blood Gluc Transmit (DEXCOM G6 TRANSMITTER) MISC Use as directed for continuous glucose monitoring.  Change every 3 months.     ferrous gluconate (FERGON) 324 (38 Fe) MG tablet Take 1 tablet (324 mg) by mouth daily (with lunch)     glucose blood VI test strips strip 4 times daily.     Loteprednol Etabonate (LOTEMAX SM) 0.38 % GEL Apply 1 drop to eye 3 times daily Left eye     magnesium oxide (MAG-OX) 400 MG tablet Take 1 tablet (400 mg) by mouth daily     mycophenolic acid (GENERIC EQUIVALENT) 360 MG EC tablet Take 2 tablets (720 mg) by mouth 2 times daily     norgestimate-ethinyl estradiol (ORTHO-CYCLEN) 0.25-35 MG-MCG tablet Take 1 tablet by mouth daily     ondansetron (ZOFRAN-ODT) 4 MG ODT tab Take 1 tablet (4 mg) by mouth every 6 hours as needed for nausea or vomiting     pantoprazole (PROTONIX) 40 MG EC tablet Take 1 tablet (40 mg) by mouth every morning (before breakfast)     phosphorus tablet 250 mg (PHOSPHA 250 NEUTRAL) 250 MG per tablet Take 1 tablet (250 mg) by mouth 2 times daily     Prucalopride Succinate (MOTEGRITY) 2 MG TABS Take one tablet by mouth daily     senna-docusate (SENOKOT-S/PERICOLACE) 8.6-50 MG tablet Take 2 tablets by mouth daily as needed for constipation     sulfamethoxazole-trimethoprim (BACTRIM) 400-80 MG tablet Take 1 tablet by mouth daily     tacrolimus (ENVARSUS XR) 0.75 MG 24 hr tablet HOLD     tacrolimus (ENVARSUS XR) 1 MG 24 hr  "tablet Take 6 tablets (6 mg) by mouth every morning (before breakfast)     valGANciclovir (VALCYTE) 450 MG tablet Take 2 tablets (900 mg) by mouth daily     acetaminophen (TYLENOL) 325 MG tablet Take 1-2 tablets (325-650 mg) by mouth every 4 hours as needed for other (For optimal non-opioid multimodal pain management to improve pain control.) (Patient not taking: Reported on 12/1/2021)     No current facility-administered medications for this visit.     There are no discontinued medications.    Physical Exam   Vital Signs: /68   Pulse 92   Ht 1.676 m (5' 6\")   Wt 55 kg (121 lb 4.8 oz)   LMP  (LMP Unknown)   SpO2 99%   BMI 19.58 kg/m      GENERAL APPEARANCE: alert and no distress  HENT: mouth without ulcers or lesions  HENT: ear canals and TM's normal and TM's pearly grey, normal light reflex bilateral  LYMPHATICS: no cervical or supraclavicular nodes  RESP: lungs clear to auscultation - no rales, rhonchi or wheezes  CV: regular rhythm, normal rate, no rub, no murmur  EDEMA: no LE edema bilaterally  ABDOMEN: soft, nondistended, nontender, bowel sounds normal  MS: extremities normal - no gross deformities noted, no evidence of inflammation in joints, no muscle tenderness  SKIN: no rash  NEURO: normal strength and tone, sensory exam grossly normal, mentation intact and speech normal  PSYCH: mentation appears normal and affect normal/bright  TX KIDNEY: normal    Data     Renal Latest Ref Rng & Units 12/20/2021 12/16/2021 12/13/2021   Na 133 - 144 mmol/L 137 139 137   K 3.4 - 5.3 mmol/L 4.2 4.0 3.8   Cl 94 - 109 mmol/L 108 109 109   CO2 20 - 32 mmol/L 24 25 22   BUN 7 - 30 mg/dL 23 15 21   Cr 0.52 - 1.04 mg/dL 0.96 1.00 1.05(H)   Glucose 70 - 99 mg/dL 101(H) 100(H) 100(H)   Ca  8.5 - 10.1 mg/dL 9.3 9.1 8.7   Mg 1.6 - 2.3 mg/dL 1.9 - 1.5(L)     Bone Health Latest Ref Rng & Units 12/20/2021 12/13/2021 12/6/2021   Phos 2.5 - 4.5 mg/dL 2.5 1.9(L) 2.5   PTHi pg/mL - - -   Vit D Def 20 - 75 ug/L - - -     Heme " Latest Ref Rng & Units 12/22/2021 12/20/2021 12/16/2021   WBC 4.0 - 11.0 10e3/uL - 10.2 8.5   Hgb 11.7 - 15.7 g/dL 7.3(L) 7.4(L) 7.6(L)   Plt 150 - 450 10e3/uL - 635(H) 532(H)   ABSOLUTE NEUTROPHIL 1.6 - 8.3 10e9/L - - -   ABSOLUTE LYMPHOCYTES 0.8 - 5.3 10e9/L - - -   ABSOLUTE MONOCYTES 0.0 - 1.3 10e9/L - - -   ABSOLUTE EOSINOPHILS 0.0 - 0.7 10e9/L - - -   ABSOLUTE BASOPHILS 0.0 - 0.2 10e9/L - - -     Liver Latest Ref Rng & Units 11/15/2021 7/21/2021 6/8/2012   AP 40 - 150 U/L 104 94 96   TBili 0.2 - 1.3 mg/dL 0.4 0.4 0.3   ALT 0 - 50 U/L 23 24 13   AST 0 - 45 U/L 16 19 33   Tot Protein 6.8 - 8.8 g/dL 8.4 9.4(H) 7.8   Albumin 3.4 - 5.0 g/dL 4.0 4.7 4.2     Pancreas Latest Ref Rng & Units 12/20/2021 12/16/2021 12/13/2021   A1C 0.0 - 5.6 % - - -   Amylase 30 - 110 U/L 36 24(L) 26(L)   Lipase 73 - 393 U/L 110 57(L) 50(L)     Iron studies Latest Ref Rng & Units 12/1/2021   Iron 35 - 180 ug/dL 29(L)   Iron sat 15 - 46 % 10(L)   Ferritin 12 - 150 ng/mL 49     [facility] Txp Virology Latest Ref Rng & Units 12/13/2021 7/21/2021   CMV QUANT IU/ML Not Detected IU/mL Not Detected -   EBV CAPSID ANTIBODY IGG No detectable antibody. - Positive(A)        Recent Labs   Lab Test 12/06/21  0728 12/09/21  0901 12/13/21  0807 12/16/21  0905 12/20/21  0835   DOSTAC 12/5/2021 12/8/2021  --  12/15/2021  --    TACROL 9.1 11.8 7.9 3.6* 5.4     Recent Labs   Lab Test 12/03/21  0724 12/13/21  0807 12/20/21  0835   DOSMPA 12/2/2021   8:19 PM  --  12/19/2021 2015   MPACID 4.94* 3.70* 11.33*   MPAG 66.8 37.5 97.0*       Again, thank you for allowing me to participate in the care of your patient.      Sincerely,    [facility] Transplant

## 2021-12-22 NOTE — PROGRESS NOTES
TRANSPLANT NEPHROLOGY EARLY POST TRANSPLANT VISIT    Assessment & Plan   # DDKT (SPK): Stable   - Baseline Creatinine: ~ 1-1.3   - Proteinuria: Not checked post transplant   - Date DSA Last Checked: Nov/2021      Latest DSA: No DSA at time of transplant   - BK Viremia: Not checked post transplant   - Kidney Tx Biopsy: No    # Pancreas Tx (SPK):    - Pancreatic Exocrine Drainage: Enteric drained     - Blood glucose: Euglycemia      On insulin: No   - HbA1c: Last checked at time of transplant      Latest HbA1c: 7.7%   - Pancreatic enzymes: Trend down   - Date DSA Last Checked: Nov/2021  Latest DSA: No DSA at time of transplant   - Pancreas Tx Biopsy: No    # Immunosuppression: Tacrolimus immediate release (goal 8-10), Mycophenolic acid (dose 720 mg every 12 hours) and Prednisone (dose taper)   - Induction with Recent Transplant:  Intermediate Intensity   - Continue with intensive monitoring of immunosuppression for efficacy and toxicity.   - Changes: yes envarsus increased from 4 to 6 mg po every day for sub-therpeutic level, awaiting repeat today    # Infection Prophylaxis: due for BK screen -pending from today  - PJP: Sulfa/TMP (Bactrim)  - CMV: Valganciclovir (Valcyte) 900 mg po qd Estimated Creatinine Clearance: 70.3 mL/min (based on SCr of 0.96 mg/dL).  - Thrush: Clotrimazole yodit (Mycelex) qid-counseled about interaction with CNI and to take consistently    # Blood Pressure: Controlled;  Goal BP: < 140/90   - Volume status: Euvolemic  EDW ~ 122lbs   - Changes: she used lasix 20 mg for 2 days and weight is down to 121 lbs. Advised to stop lasix and use only if needed-suspect may not need now that she is off prednisone  # Anemia in Chronic Renal Disease: Hgb: low     SYLVAIN: No   - Iron studies: Low iron saturation, started oral iron daily on 12/8/21    -starting iv iron (no melena or BRBPR and no heavy menses)    # Mineral Bone Disorder:   - Secondary renal hyperparathyroidism; PTH level: Normal (18-80 pg/ml)         On treatment: None  - Vitamin D; level: High normal        On supplement: Yes  - Calcium; level: Normal        On supplement: No  - Phosphorus; level: Normal        On supplement: Yes, 250mg bid if next phos>2 could reduce to 250 mg po qd    # Electrolytes:   - Potassium; level: Normal        On supplement: No  - Magnesium; level: Normal        On supplement: Yes, 400mg daily   - Bicarbonate; level: Normal        On supplement: No    # Skin sensitivity:   -Likely 2/2 tac. No rashes, not concerning at this point for VZV    # Medical Compliance: Yes    # COVID-19 Virus Review: Discussed COVID-19 virus and the potential medical risks.  Reviewed preventative health recommendations, including wearing a mask where appropriate.  Recommended COVID vaccination should be up to date with either an initial vaccination or booster shot when appropriate.  Asked the patient to inform the transplant center if they are exposed or diagnosed with this virus.    # COVID Vaccination Up To Date: Yes 2 doses +booster-will be due for 2nd booster in April    # Transplant History:  Etiology of Kidney Failure: Diabetes mellitus type 1  Tx: SPK  Transplant: 11/15/2021 (Kidney / Pancreas)  Donor Type: Donation after Brain Death Donor Class:   Crossmatch at time of Tx: negative  DSA at time of Tx: No  Significant changes in immunosuppression: None  CMV IgG Ab High Risk Discordance (D+/R-): No  EBV IgG Ab High Risk Discordance (D+/R-): No  Significant transplant-related complications: None    Transplant Office Phone Number: [phone]    Assessment and plan was discussed with the patient and she voiced her understanding and agreement.      Return visit: 1 month    Reanna Lee MD    Chief Complaint   Ms. Ortega is a 36 year old here for kidney transplant, pancreas transplant, immunosuppression management and new medical concerns.     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, /720  COVID vaccine x2 & booster Oct-will be due for 2nd booster in April    Home BP: 110-130s/70s    Problem List   Patient Active Problem List   Diagnosis     Type 1 diabetes mellitus (H)     Type 1 diabetes, HbA1c goal < 7% (H)     Osteopenia     Chronic constipation     Irritable bowel syndrome     CKD (chronic kidney disease) stage 4, GFR 15-29 ml/min (H)     HTN, kidney transplant related     Gastroparesis     Heterozygous factor V Leiden mutation (H)     Status post simultaneous kidney and pancreas transplant (H)     Immunosuppression (H)     Kidney replaced by transplant     Pancreas replaced by transplant (H)     Need for pneumocystis prophylaxis     Anemia     Aftercare following organ transplant       Allergies   Allergies   Allergen Reactions     Chlorhexidine Hives, Itching and Rash     PN: Patient had swelling/rash that was very itchy with chlorprep.     Ceclor [Cefaclor Monohydrate]      Cefaclor Rash       Medications   Current Outpatient Medications   Medication Sig     aspirin (ASA) 325 MG tablet Take 1 tablet (325 mg) by mouth daily     bromfenac (PROLENSA) 0.07 % ophthalmic solution Place 1 drop Into the left eye daily     calcium carbonate-vitamin D (OS-STEPHANIE WITH D) 500-200 MG-UNIT tablet Take 1 tablet by mouth 2 times daily (with meals)     cetirizine (ZYRTEC) 10 MG tablet Take 10 mg by mouth daily     cholecalciferol 25 MCG (1000 UT) TABS Take 2,000 Units by mouth every other day      clotrimazole " (MYCELEX) 10 MG lozenge Place 1 lozenge (10 mg) inside cheek 4 times daily     Continuous Blood Gluc Sensor (DEXCOM G6 SENSOR) MISC Use as directed for continuous glucose monitoring.  Change sensor every 10 days.     Continuous Blood Gluc Transmit (DEXCOM G6 TRANSMITTER) MISC Use as directed for continuous glucose monitoring.  Change every 3 months.     ferrous gluconate (FERGON) 324 (38 Fe) MG tablet Take 1 tablet (324 mg) by mouth daily (with lunch)     glucose blood VI test strips strip 4 times daily.     Loteprednol Etabonate (LOTEMAX SM) 0.38 % GEL Apply 1 drop to eye 3 times daily Left eye     magnesium oxide (MAG-OX) 400 MG tablet Take 1 tablet (400 mg) by mouth daily     mycophenolic acid (GENERIC EQUIVALENT) 360 MG EC tablet Take 2 tablets (720 mg) by mouth 2 times daily     norgestimate-ethinyl estradiol (ORTHO-CYCLEN) 0.25-35 MG-MCG tablet Take 1 tablet by mouth daily     ondansetron (ZOFRAN-ODT) 4 MG ODT tab Take 1 tablet (4 mg) by mouth every 6 hours as needed for nausea or vomiting     pantoprazole (PROTONIX) 40 MG EC tablet Take 1 tablet (40 mg) by mouth every morning (before breakfast)     phosphorus tablet 250 mg (PHOSPHA 250 NEUTRAL) 250 MG per tablet Take 1 tablet (250 mg) by mouth 2 times daily     Prucalopride Succinate (MOTEGRITY) 2 MG TABS Take one tablet by mouth daily     senna-docusate (SENOKOT-S/PERICOLACE) 8.6-50 MG tablet Take 2 tablets by mouth daily as needed for constipation     sulfamethoxazole-trimethoprim (BACTRIM) 400-80 MG tablet Take 1 tablet by mouth daily     tacrolimus (ENVARSUS XR) 0.75 MG 24 hr tablet HOLD     tacrolimus (ENVARSUS XR) 1 MG 24 hr tablet Take 6 tablets (6 mg) by mouth every morning (before breakfast)     valGANciclovir (VALCYTE) 450 MG tablet Take 2 tablets (900 mg) by mouth daily     acetaminophen (TYLENOL) 325 MG tablet Take 1-2 tablets (325-650 mg) by mouth every 4 hours as needed for other (For optimal non-opioid multimodal pain management to improve pain  "control.) (Patient not taking: Reported on 12/1/2021)     No current facility-administered medications for this visit.     There are no discontinued medications.    Physical Exam   Vital Signs: /68   Pulse 92   Ht 1.676 m (5' 6\")   Wt 55 kg (121 lb 4.8 oz)   LMP  (LMP Unknown)   SpO2 99%   BMI 19.58 kg/m      GENERAL APPEARANCE: alert and no distress  HENT: mouth without ulcers or lesions  HENT: ear canals and TM's normal and TM's pearly grey, normal light reflex bilateral  LYMPHATICS: no cervical or supraclavicular nodes  RESP: lungs clear to auscultation - no rales, rhonchi or wheezes  CV: regular rhythm, normal rate, no rub, no murmur  EDEMA: no LE edema bilaterally  ABDOMEN: soft, nondistended, nontender, bowel sounds normal  MS: extremities normal - no gross deformities noted, no evidence of inflammation in joints, no muscle tenderness  SKIN: no rash  NEURO: normal strength and tone, sensory exam grossly normal, mentation intact and speech normal  PSYCH: mentation appears normal and affect normal/bright  TX KIDNEY: normal    Data     Renal Latest Ref Rng & Units 12/20/2021 12/16/2021 12/13/2021   Na 133 - 144 mmol/L 137 139 137   K 3.4 - 5.3 mmol/L 4.2 4.0 3.8   Cl 94 - 109 mmol/L 108 109 109   CO2 20 - 32 mmol/L 24 25 22   BUN 7 - 30 mg/dL 23 15 21   Cr 0.52 - 1.04 mg/dL 0.96 1.00 1.05(H)   Glucose 70 - 99 mg/dL 101(H) 100(H) 100(H)   Ca  8.5 - 10.1 mg/dL 9.3 9.1 8.7   Mg 1.6 - 2.3 mg/dL 1.9 - 1.5(L)     Bone Health Latest Ref Rng & Units 12/20/2021 12/13/2021 12/6/2021   Phos 2.5 - 4.5 mg/dL 2.5 1.9(L) 2.5   PTHi pg/mL - - -   Vit D Def 20 - 75 ug/L - - -     Heme Latest Ref Rng & Units 12/22/2021 12/20/2021 12/16/2021   WBC 4.0 - 11.0 10e3/uL - 10.2 8.5   Hgb 11.7 - 15.7 g/dL 7.3(L) 7.4(L) 7.6(L)   Plt 150 - 450 10e3/uL - 635(H) 532(H)   ABSOLUTE NEUTROPHIL 1.6 - 8.3 10e9/L - - -   ABSOLUTE LYMPHOCYTES 0.8 - 5.3 10e9/L - - -   ABSOLUTE MONOCYTES 0.0 - 1.3 10e9/L - - -   ABSOLUTE EOSINOPHILS 0.0 - " 0.7 10e9/L - - -   ABSOLUTE BASOPHILS 0.0 - 0.2 10e9/L - - -     Liver Latest Ref Rng & Units 11/15/2021 7/21/2021 6/8/2012   AP 40 - 150 U/L 104 94 96   TBili 0.2 - 1.3 mg/dL 0.4 0.4 0.3   ALT 0 - 50 U/L 23 24 13   AST 0 - 45 U/L 16 19 33   Tot Protein 6.8 - 8.8 g/dL 8.4 9.4(H) 7.8   Albumin 3.4 - 5.0 g/dL 4.0 4.7 4.2     Pancreas Latest Ref Rng & Units 12/20/2021 12/16/2021 12/13/2021   A1C 0.0 - 5.6 % - - -   Amylase 30 - 110 U/L 36 24(L) 26(L)   Lipase 73 - 393 U/L 110 57(L) 50(L)     Iron studies Latest Ref Rng & Units 12/1/2021   Iron 35 - 180 ug/dL 29(L)   Iron sat 15 - 46 % 10(L)   Ferritin 12 - 150 ng/mL 49     [facility] Txp Virology Latest Ref Rng & Units 12/13/2021 7/21/2021   CMV QUANT IU/ML Not Detected IU/mL Not Detected -   EBV CAPSID ANTIBODY IGG No detectable antibody. - Positive(A)        Recent Labs   Lab Test 12/06/21  0728 12/09/21  0901 12/13/21  0807 12/16/21  0905 12/20/21  0835   DOSTAC 12/5/2021 12/8/2021  --  12/15/2021  --    TACROL 9.1 11.8 7.9 3.6* 5.4     Recent Labs   Lab Test 12/03/21  0724 12/13/21  0807 12/20/21  0835   DOSMPA 12/2/2021   8:19 PM  --  12/19/2021 2015   MPACID 4.94* 3.70* 11.33*   MPAG 66.8 37.5 97.0*

## 2021-12-22 NOTE — PATIENT INSTRUCTIONS
Dear Marilyn Ortega    Thank you for choosing [facility] for your infusion.  The following information is a summary of our appointment as well as important reminders.      Patient Education     Patient Education    Iron Sucrose Chewable tablet    Iron Sucrose Solution for injection  Iron Sucrose Solution for injection  What is this medicine?  IRON SUCROSE (SHYANN dobbsohs) is an iron complex. Iron is used to make healthy red blood cells, which carry oxygen and nutrients throughout the body. This medicine is used to treat iron deficiency anemia in people with chronic kidney disease.  This medicine may be used for other purposes; ask your health care provider or pharmacist if you have questions.  What should I tell my health care provider before I take this medicine?  They need to know if you have any of these conditions:    anemia not caused by low iron levels    heart disease    high levels of iron in the blood    kidney disease    liver disease    an unusual or allergic reaction to iron, other medicines, foods, dyes, or preservatives    pregnant or trying to get pregnant    breast-feeding  How should I use this medicine?  This medicine is for infusion into a vein. It is given by a health care professional in a hospital or clinic setting.  Talk to your pediatrician regarding the use of this medicine in children. While this drug may be prescribed for children as young as 2 years for selected conditions, precautions do apply.  Overdosage: If you think you have taken too much of this medicine contact a poison control center or emergency room at once.  NOTE: This medicine is only for you. Do not share this medicine with others.  What if I miss a dose?  It is important not to miss your dose. Call your doctor or health care professional if you are unable to keep an appointment.  What may interact with this medicine?  Do not take this medicine with  any of the following medications:    deferoxamine    dimercaprol    other iron products  This medicine may also interact with the following medications:    chloramphenicol    deferasirox  This list may not describe all possible interactions. Give your health care provider a list of all the medicines, herbs, non-prescription drugs, or dietary supplements you use. Also tell them if you smoke, drink alcohol, or use illegal drugs. Some items may interact with your medicine.  What should I watch for while using this medicine?  Visit your doctor or healthcare professional regularly. Tell your doctor or healthcare professional if your symptoms do not start to get better or if they get worse. You may need blood work done while you are taking this medicine.  You may need to follow a special diet. Talk to your doctor. Foods that contain iron include: whole grains/cereals, dried fruits, beans, or peas, leafy green vegetables, and organ meats (liver, kidney).  What side effects may I notice from receiving this medicine?  Side effects that you should report to your doctor or health care professional as soon as possible:    allergic reactions like skin rash, itching or hives, swelling of the face, lips, or tongue    breathing problems    changes in blood pressure    cough    fast, irregular heartbeat    feeling faint or lightheaded, falls    fever or chills    flushing, sweating, or hot feelings    joint or muscle aches/pains    seizures    swelling of the ankles or feet    unusually weak or tired  Side effects that usually do not require medical attention (report to your doctor or health care professional if they continue or are bothersome):    diarrhea    feeling achy    headache    irritation at site where injected    nausea, vomiting    stomach upset    tiredness  This list may not describe all possible side effects. Call your doctor for medical advice about side effects. You may report side effects to FDA at  1-800-FDA-1088.  Where should I keep my medicine?  This drug is given in a hospital or clinic and will not be stored at home.  NOTE:This sheet is a summary. It may not cover all possible information. If you have questions about this medicine, talk to your doctor, pharmacist, or health care provider. Copyright  2016 Gold Standard             We look forward in seeing you on your next appointment here at [facility] ([facility]).  Please don t hesitate to call us at [phone] to reschedule any of your appointments or to speak with one of the [facility] registered nurses.  It was a pleasure taking care of you today.    Sincerely,    [facility]  [facility]  [address] [place] [address]  [address]  Phone:  [phone]

## 2021-12-23 ENCOUNTER — TELEPHONE (OUTPATIENT)
Dept: TRANSPLANT | Facility: CLINIC | Age: 36
End: 2021-12-23
Payer: COMMERCIAL

## 2021-12-23 DIAGNOSIS — Z94.83 PANCREAS REPLACED BY TRANSPLANT (H): Primary | ICD-10-CM

## 2021-12-23 DIAGNOSIS — Z94.0 KIDNEY REPLACED BY TRANSPLANT: ICD-10-CM

## 2021-12-23 LAB
AMYLASE SERPL-CCNC: 33 U/L (ref 30–110)
ANION GAP SERPL CALCULATED.3IONS-SCNC: 10 MMOL/L (ref 3–14)
BKV DNA # SPEC NAA+PROBE: NOT DETECTED COPIES/ML
BUN SERPL-MCNC: 21 MG/DL (ref 7–30)
CALCIUM SERPL-MCNC: 8.8 MG/DL (ref 8.5–10.1)
CHLORIDE BLD-SCNC: 107 MMOL/L (ref 94–109)
CO2 SERPL-SCNC: 23 MMOL/L (ref 20–32)
CREAT SERPL-MCNC: 1.13 MG/DL (ref 0.52–1.04)
DONOR IDENTIFICATION: NORMAL
DSA COMMENTS: NORMAL
DSA PRESENT: NO
DSA TEST METHOD: NORMAL
ERYTHROCYTE [DISTWIDTH] IN BLOOD BY AUTOMATED COUNT: 16.3 % (ref 10–15)
GFR SERPL CREATININE-BSD FRML MDRD: 64 ML/MIN/1.73M2
GLUCOSE BLD-MCNC: 85 MG/DL (ref 70–99)
HCT VFR BLD AUTO: 24.3 % (ref 35–47)
HGB BLD-MCNC: 7.3 G/DL (ref 11.7–15.7)
INT SUB RESULT: NORMAL
INTERF SUBSTANCE: NORMAL
INTSUB TEST METHOD: NORMAL
KAPPA LC FREE SER-MCNC: 2.4 MG/DL (ref 0.33–1.94)
KAPPA LC FREE/LAMBDA FREE SER NEPH: 0.81 {RATIO} (ref 0.26–1.65)
LAMBDA LC FREE SERPL-MCNC: 2.95 MG/DL (ref 0.57–2.63)
LIPASE SERPL-CCNC: 76 U/L (ref 73–393)
MCH RBC QN AUTO: 26.6 PG (ref 26.5–33)
MCHC RBC AUTO-ENTMCNC: 30 G/DL (ref 31.5–36.5)
MCV RBC AUTO: 92 FL (ref 78–100)
MYCOPHENOLATE SERPL LC/MS/MS-MCNC: 8.21 MG/L (ref 1–3.5)
MYCOPHENOLATE-G SERPL LC/MS/MS-MCNC: 64.9 MG/L (ref 30–95)
ORGAN: NORMAL
PLATELET # BLD AUTO: 622 10E3/UL (ref 150–450)
POTASSIUM BLD-SCNC: 4.5 MMOL/L (ref 3.4–5.3)
PROT ELPH PNL UR ELPH: NORMAL
RBC # BLD AUTO: 2.78 10E6/UL (ref 3.8–5.2)
SA 1 CELL: NORMAL
SA 1 TEST METHOD: NORMAL
SA 2 CELL: NORMAL
SA 2 TEST METHOD: NORMAL
SA1 HI RISK ABY: NORMAL
SA1 MOD RISK ABY: NORMAL
SA2 HI RISK ABY: NORMAL
SA2 MOD RISK ABY: NORMAL
SODIUM SERPL-SCNC: 140 MMOL/L (ref 133–144)
TACROLIMUS BLD-MCNC: 6.3 UG/L (ref 5–15)
TME LAST DOSE: 2015 H
TME LAST DOSE: ABNORMAL H
TME LAST DOSE: NORMAL H
TME LAST DOSE: NORMAL H
UNACCEPTABLE ANTIGENS: NORMAL
UNOS CPRA: 32
WBC # BLD AUTO: 10.2 10E3/UL (ref 4–11)
ZZZINT SUB COMMENTS: NORMAL
ZZZSA 1  COMMENTS: NORMAL
ZZZSA 2 COMMENTS: NORMAL

## 2021-12-23 NOTE — TELEPHONE ENCOUNTER
Threw up, tooks meds 1.5 hours ago.  Does not need to repeat meds tonight.    Not feeling well, wondering if her labs are back.  Reviewed chart, no labs resulted yet.  Will have [facility] f/u tomorrow.

## 2021-12-23 NOTE — LETTER
OUTPATIENT LABORATORY TEST ORDER    Patient Name: Marilyn Ortega                  Transplant Date: 11/15/2021   YOB: 1985                                   Issue Date & Time: 11/21/2021  6:12 PM  [facility] MR: [id number]                                 Exp. Date (1 year after date issued)    Diagnoses: Kidney Transplant (ICD-10  Z94.0)    Pancreas Transplant (ICD-10  Z94.83)   Long term use of medications (ICD-10  Z79.899)    ?Lab results to be available on the same day drawn.  ?Please release results to patient upon their request   ?Please fax to the Transplant Center at [phone].    2x/week, (M, TH) through first 3 months post-transplant Due: 11/15/2021 - 2/15/2022  1x/week, (M or T) 4 - 6 months post-transplant Due: 2/16/2022 - 5/16/2022   Every other week (M or T) months 7 - 13 post-transplant Due: 5/17/2022 - 11/17/2022          ?Complete Blood Count with Platelets & Differentials with Absolutes        ?Basic Metabolic Panel (Sodium, Potassium, Chloride, CO2, Creatinine, Urea Nitrogen, Glucose, Calcium)        ?Amylase, Lipase        ?/Tacrolimus/Prograf drug level                   Weekly through first 4 months post-transplant, then  Monthly       ?Phosphorous, Magnesium    Every other week through the 2 months post-transplant       ?Mycophenolic Acid (MPA level)    Monthly   ?BK PCR QT    At 1 month post-transplant  Due: 12/15/2021   ?Random urine protein/creatinine ratio   ?PRA/DSA (Donor Specific Antibodies)     At 2 months post-transplant  Due: 1/15/2022   ?PRA/DSA (Donor Specific Antibodies)        At 4 months post-transplant  Due: 3/15/2022   ?Random urine protein/creatinine ratio   ?PTH   ?PRA/DSA (Donor Specific Antibodies)    At 6 months post-transplant  Due: (Fasting labs) 5/15/2022   ?LFT's   ?Hgb A1c   ?Uric acid   ?Lipid panel    At 7 months post-transplant  Due: 6/15/2022   ?PRA/DSA (Donor Specific Antibodies)    At 9 months post-transplant  Due: 8/15/2022   ?Random urine  protein/creatinine ratio   ?PRA/DSA (Donor Specific Antibodies)    At 12 months post-transplant  Due: (Fasting lab) 11/15/2022   ?PRA/DSA (Donor Specific Antibodies)   ?LFT's   ?Lipid panel   ?Hgb A1c     At 13 months post-transplant  Due: 12/15/2022   ?PRA/DSA (Donor Specific Antibodies)        If you have any questions, please call The [facility] at [phone] or [phone].    Please fax all results to [phone].      Branden Aranda MD

## 2021-12-24 ENCOUNTER — INFUSION THERAPY VISIT (OUTPATIENT)
Dept: INFUSION THERAPY | Facility: CLINIC | Age: 36
End: 2021-12-24
Attending: INTERNAL MEDICINE
Payer: COMMERCIAL

## 2021-12-24 ENCOUNTER — TELEPHONE (OUTPATIENT)
Dept: TRANSPLANT | Facility: CLINIC | Age: 36
End: 2021-12-24

## 2021-12-24 ENCOUNTER — LAB (OUTPATIENT)
Dept: LAB | Facility: CLINIC | Age: 36
End: 2021-12-24
Payer: COMMERCIAL

## 2021-12-24 VITALS
SYSTOLIC BLOOD PRESSURE: 125 MMHG | HEART RATE: 85 BPM | OXYGEN SATURATION: 97 % | RESPIRATION RATE: 16 BRPM | TEMPERATURE: 98.3 F | DIASTOLIC BLOOD PRESSURE: 85 MMHG

## 2021-12-24 DIAGNOSIS — Z94.0 KIDNEY REPLACED BY TRANSPLANT: ICD-10-CM

## 2021-12-24 DIAGNOSIS — D64.9 ANEMIA, UNSPECIFIED TYPE: Primary | ICD-10-CM

## 2021-12-24 DIAGNOSIS — Z94.83 PANCREAS REPLACED BY TRANSPLANT (H): ICD-10-CM

## 2021-12-24 DIAGNOSIS — Z48.298 AFTERCARE FOLLOWING ORGAN TRANSPLANT: ICD-10-CM

## 2021-12-24 DIAGNOSIS — Z94.0 KIDNEY TRANSPLANTED: ICD-10-CM

## 2021-12-24 DIAGNOSIS — D64.9 ANEMIA: ICD-10-CM

## 2021-12-24 DIAGNOSIS — Z94.0 KIDNEY REPLACED BY TRANSPLANT: Primary | ICD-10-CM

## 2021-12-24 DIAGNOSIS — Z94.83 PANCREAS REPLACED BY TRANSPLANT (H): Primary | ICD-10-CM

## 2021-12-24 DIAGNOSIS — Z79.899 ENCOUNTER FOR LONG-TERM CURRENT USE OF MEDICATION: ICD-10-CM

## 2021-12-24 LAB
ABO/RH(D): NORMAL
AMYLASE SERPL-CCNC: 30 U/L (ref 30–110)
ANION GAP SERPL CALCULATED.3IONS-SCNC: 10 MMOL/L (ref 3–14)
ANTIBODY SCREEN: NEGATIVE
BASO+EOS+MONOS # BLD AUTO: 0 10E3/UL (ref 0–2.2)
BASOPHILS # BLD AUTO: 0.1 10E3/UL (ref 0–0.2)
BASOPHILS NFR BLD AUTO: 1 %
BLD PROD TYP BPU: NORMAL
BLOOD COMPONENT TYPE: NORMAL
BUN SERPL-MCNC: 21 MG/DL (ref 7–30)
CALCIUM SERPL-MCNC: 8.8 MG/DL (ref 8.5–10.1)
CHLORIDE BLD-SCNC: 110 MMOL/L (ref 94–109)
CO2 SERPL-SCNC: 22 MMOL/L (ref 20–32)
CODING SYSTEM: NORMAL
CREAT SERPL-MCNC: 1.33 MG/DL (ref 0.52–1.04)
CROSSMATCH: NORMAL
EOSINOPHIL # BLD AUTO: 0.1 10E3/UL (ref 0–0.7)
EOSINOPHIL NFR BLD AUTO: 1 %
ERYTHROCYTE [DISTWIDTH] IN BLOOD BY AUTOMATED COUNT: 16.2 % (ref 10–15)
ERYTHROCYTE [DISTWIDTH] IN BLOOD BY AUTOMATED COUNT: 16.4 % (ref 10–15)
GFR SERPL CREATININE-BSD FRML MDRD: 53 ML/MIN/1.73M2
GLUCOSE BLD-MCNC: 114 MG/DL (ref 70–99)
HCT VFR BLD AUTO: 22.2 % (ref 35–47)
HCT VFR BLD AUTO: 22.5 % (ref 35–47)
HGB BLD-MCNC: 6.7 G/DL (ref 11.7–15.7)
HGB BLD-MCNC: 6.9 G/DL (ref 11.7–15.7)
IMM GRANULOCYTES # BLD: 0 10E3/UL
IMM GRANULOCYTES NFR BLD: 0 %
ISSUE DATE AND TIME: NORMAL
LIPASE SERPL-CCNC: 82 U/L (ref 73–393)
LYMPHOCYTES # BLD AUTO: 0.7 10E3/UL (ref 0.8–5.3)
LYMPHOCYTES NFR BLD AUTO: 9 %
MAGNESIUM SERPL-MCNC: 2 MG/DL (ref 1.6–2.3)
MCH RBC QN AUTO: 26.4 PG (ref 26.5–33)
MCH RBC QN AUTO: 26.6 PG (ref 26.5–33)
MCHC RBC AUTO-ENTMCNC: 29.8 G/DL (ref 31.5–36.5)
MCHC RBC AUTO-ENTMCNC: 31.1 G/DL (ref 31.5–36.5)
MCV RBC AUTO: 86 FL (ref 78–100)
MCV RBC AUTO: 89 FL (ref 78–100)
MONOCYTES # BLD AUTO: 0.2 10E3/UL (ref 0–1.3)
MONOCYTES NFR BLD AUTO: 3 %
NEUTROPHILS # BLD AUTO: 5.9 10E3/UL (ref 1.6–8.3)
NEUTROPHILS NFR BLD AUTO: 86 %
NRBC # BLD AUTO: 0 10E3/UL
NRBC BLD AUTO-RTO: 0 /100
PATH REPORT.COMMENTS IMP SPEC: NORMAL
PATH REPORT.FINAL DX SPEC: NORMAL
PATH REPORT.MICROSCOPIC SPEC OTHER STN: NORMAL
PATH REPORT.MICROSCOPIC SPEC OTHER STN: NORMAL
PATH REPORT.RELEVANT HX SPEC: NORMAL
PHOSPHATE SERPL-MCNC: 2.4 MG/DL (ref 2.5–4.5)
PLATELET # BLD AUTO: 501 10E3/UL (ref 150–450)
PLATELET # BLD AUTO: 517 10E3/UL (ref 150–450)
POTASSIUM BLD-SCNC: 3.9 MMOL/L (ref 3.4–5.3)
RBC # BLD AUTO: 2.54 10E6/UL (ref 3.8–5.2)
RBC # BLD AUTO: 2.59 10E6/UL (ref 3.8–5.2)
RETICS # AUTO: 0.07 10E6/UL (ref 0.03–0.1)
RETICS/RBC NFR AUTO: 2.8 % (ref 0.5–2)
SODIUM SERPL-SCNC: 142 MMOL/L (ref 133–144)
SPECIMEN EXPIRATION DATE: NORMAL
TACROLIMUS BLD-MCNC: 7.9 UG/L (ref 5–15)
TME LAST DOSE: NORMAL H
TME LAST DOSE: NORMAL H
UNIT ABO/RH: NORMAL
UNIT NUMBER: NORMAL
UNIT STATUS: NORMAL
UNIT TYPE ISBT: 5100
WBC # BLD AUTO: 6.7 10E3/UL (ref 4–11)
WBC # BLD AUTO: 7 10E3/UL (ref 4–11)

## 2021-12-24 PROCEDURE — 82150 ASSAY OF AMYLASE: CPT | Performed by: PATHOLOGY

## 2021-12-24 PROCEDURE — 80048 BASIC METABOLIC PNL TOTAL CA: CPT | Performed by: PATHOLOGY

## 2021-12-24 PROCEDURE — 80197 ASSAY OF TACROLIMUS: CPT | Mod: 90 | Performed by: PATHOLOGY

## 2021-12-24 PROCEDURE — 96366 THER/PROPH/DIAG IV INF ADDON: CPT

## 2021-12-24 PROCEDURE — 85025 COMPLETE CBC W/AUTO DIFF WBC: CPT | Performed by: PATHOLOGY

## 2021-12-24 PROCEDURE — 85027 COMPLETE CBC AUTOMATED: CPT

## 2021-12-24 PROCEDURE — 86923 COMPATIBILITY TEST ELECTRIC: CPT | Performed by: INTERNAL MEDICINE

## 2021-12-24 PROCEDURE — 36415 COLL VENOUS BLD VENIPUNCTURE: CPT | Performed by: PATHOLOGY

## 2021-12-24 PROCEDURE — 85045 AUTOMATED RETICULOCYTE COUNT: CPT

## 2021-12-24 PROCEDURE — 250N000011 HC RX IP 250 OP 636: Performed by: INTERNAL MEDICINE

## 2021-12-24 PROCEDURE — 86900 BLOOD TYPING SEROLOGIC ABO: CPT

## 2021-12-24 PROCEDURE — 258N000003 HC RX IP 258 OP 636: Performed by: INTERNAL MEDICINE

## 2021-12-24 PROCEDURE — 85060 BLOOD SMEAR INTERPRETATION: CPT | Performed by: PATHOLOGY

## 2021-12-24 PROCEDURE — 83690 ASSAY OF LIPASE: CPT | Performed by: PATHOLOGY

## 2021-12-24 PROCEDURE — 99000 SPECIMEN HANDLING OFFICE-LAB: CPT | Performed by: PATHOLOGY

## 2021-12-24 PROCEDURE — 96365 THER/PROPH/DIAG IV INF INIT: CPT

## 2021-12-24 PROCEDURE — 83735 ASSAY OF MAGNESIUM: CPT | Performed by: PATHOLOGY

## 2021-12-24 PROCEDURE — 84100 ASSAY OF PHOSPHORUS: CPT | Performed by: PATHOLOGY

## 2021-12-24 RX ORDER — ALBUTEROL SULFATE 0.83 MG/ML
2.5 SOLUTION RESPIRATORY (INHALATION)
Status: CANCELLED | OUTPATIENT
Start: 2021-12-29

## 2021-12-24 RX ORDER — EPINEPHRINE 1 MG/ML
0.3 INJECTION, SOLUTION INTRAMUSCULAR; SUBCUTANEOUS EVERY 5 MIN PRN
Status: CANCELLED | OUTPATIENT
Start: 2021-12-29

## 2021-12-24 RX ORDER — ALBUTEROL SULFATE 90 UG/1
1-2 AEROSOL, METERED RESPIRATORY (INHALATION)
Status: CANCELLED
Start: 2021-12-29

## 2021-12-24 RX ORDER — DIPHENHYDRAMINE HYDROCHLORIDE 50 MG/ML
50 INJECTION INTRAMUSCULAR; INTRAVENOUS
Status: CANCELLED
Start: 2021-12-29

## 2021-12-24 RX ORDER — HEPARIN SODIUM,PORCINE 10 UNIT/ML
5 VIAL (ML) INTRAVENOUS
Status: CANCELLED | OUTPATIENT
Start: 2021-12-24

## 2021-12-24 RX ORDER — HEPARIN SODIUM,PORCINE 10 UNIT/ML
5 VIAL (ML) INTRAVENOUS
Status: CANCELLED | OUTPATIENT
Start: 2021-12-29

## 2021-12-24 RX ORDER — HEPARIN SODIUM (PORCINE) LOCK FLUSH IV SOLN 100 UNIT/ML 100 UNIT/ML
5 SOLUTION INTRAVENOUS
Status: CANCELLED | OUTPATIENT
Start: 2021-12-24

## 2021-12-24 RX ORDER — METHYLPREDNISOLONE SODIUM SUCCINATE 125 MG/2ML
125 INJECTION, POWDER, LYOPHILIZED, FOR SOLUTION INTRAMUSCULAR; INTRAVENOUS
Status: CANCELLED
Start: 2021-12-29

## 2021-12-24 RX ORDER — MEPERIDINE HYDROCHLORIDE 25 MG/ML
25 INJECTION INTRAMUSCULAR; INTRAVENOUS; SUBCUTANEOUS EVERY 30 MIN PRN
Status: CANCELLED | OUTPATIENT
Start: 2021-12-29

## 2021-12-24 RX ORDER — NALOXONE HYDROCHLORIDE 0.4 MG/ML
0.2 INJECTION, SOLUTION INTRAMUSCULAR; INTRAVENOUS; SUBCUTANEOUS
Status: CANCELLED | OUTPATIENT
Start: 2021-12-29

## 2021-12-24 RX ORDER — HEPARIN SODIUM (PORCINE) LOCK FLUSH IV SOLN 100 UNIT/ML 100 UNIT/ML
5 SOLUTION INTRAVENOUS
Status: CANCELLED | OUTPATIENT
Start: 2021-12-29

## 2021-12-24 RX ADMIN — IRON SUCROSE 300 MG: 20 INJECTION, SOLUTION INTRAVENOUS at 09:21

## 2021-12-24 NOTE — PROGRESS NOTES
Nursing Note  Marilyn Ortega [facility] for:   Chief Complaint   Patient presents with     Infusion     venofer     During today's [facility] appointment, orders from Dr Corona were completed.  Frequency: today is dose 2 of 3 total    Progress note:  Patient identification verified by name and date of birth.  Assessment completed.  Vitals recorded in Doc Flowsheets.  Patient was provided with education regarding medication/procedure and possible side effects.  Patient verbalized understanding.     present during visit today: Not Applicable.    Treatment Conditions: Non-applicable for venofer. Proceeded with venofer infusion per Dr Rosa Lee paged because patient's hemoglobin is 6.7 this morning. She would like to proceed with transfusion. The infusion center closes at 1 pm today and the patient has a [place] gathering tonight so we will proceed with transfusion on 12/25/21. Consent for transfusion was obtained over the phone. Marilyn is feeling winded when going up the stairs and very fatigued but is able to complete her normal daily activities. She was instructed to go to the emergency room if she feels more faint, dizzy, or unable to do normal activities. Orders placed per Dr Lee for type and screen, blood morphology exam, and haptoglobin.    Discussed plan with patient's transplant coordinator, Yessica Becker. Yessica will enter the transfusion plan and repeat labs (CBC and BMP) for after the transfusion per Dr Lee.     Premedications: were not ordered.    Drug Waste Record: No    Infusion length and rate:  infusion given over approximately 90 minutes    Labs: were drawn per orders.     Vascular access: peripheral IV placed today.    Is the next appt scheduled? yes  Asymptomatic COVID test completed? No      Post Infusion Assessment:  Patient tolerated infusion without incident.  Site patent and intact,  free from redness, edema or discomfort.  Access discontinued per protocol.     Discharge Plan:   Follow up plan of care with: ongoing infusions at [facility]., transplant coordinator. and ordering provider as scheduled.  Discharge instructions were reviewed with patient.  Patient/representative verbalized understanding of discharge instructions and all questions answered.  Patient discharged from [facility] in stable condition.    Dara Cagle RN    Administrations This Visit     iron sucrose (VENOFER) 300 mg in sodium chloride 0.9 % 250 mL intermittent infusion     Admin Date  12/24/2021 Action  New Bag Dose  300 mg Rate  193.3 mL/hr Route  Intravenous Administered By  Radha Yeboah RN                /78 (BP Location: Left arm)   Pulse 85   Temp 98.3  F (36.8  C) (Oral)   Resp 16

## 2021-12-24 NOTE — LETTER
12/24/2021         RE: Marilyn Ortega  [address]  [place] [facility] [address]        Dear Colleague,    Thank you for referring your patient, Marilyn Ortega, to the [facility]. Please see a copy of my visit note below.    Nursing Note  Marilyn Ortega [facility] for:   Chief Complaint   Patient presents with     Infusion     venofer     During [facility]'s [facility] appointment, orders from Dr Corona were completed.  Frequency: today is dose 2 of 3 total    Progress note:  Patient identification verified by name and date of birth.  Assessment completed.  Vitals recorded in Doc Flowsheets.  Patient was provided with education regarding medication/procedure and possible side effects.  Patient verbalized understanding.     present during visit today: Not Applicable.    Treatment Conditions: Non-applicable for venofer. Proceeded with venofer infusion per Dr Rosa Lee paged because patient's hemoglobin is 6.7 this morning. She would like to proceed with transfusion. The infusion center closes at 1 pm today and the patient has a [facility] tonight so we will proceed with transfusion on 12/25/21. Consent for transfusion was obtained over the phone. Marilyn is feeling winded when going up the stairs and very fatigued but is able to complete her normal daily activities. She was instructed to go to the emergency room if she feels more faint, dizzy, or unable to do normal activities. Orders placed per Dr Lee for type and screen, blood morphology exam, and haptoglobin.    Discussed plan with patient's transplant coordinator, Yessica Becker. Yessica will enter the transfusion plan and repeat labs (CBC and BMP) for after the transfusion per Dr Lee.     Premedications: were not ordered.    Drug Waste Record: No    Infusion length and rate:  infusion given over  approximately 90 minutes    Labs: were drawn per orders.     Vascular access: peripheral IV placed today.    Is the next appt scheduled? yes  Asymptomatic COVID test completed? No      Post Infusion Assessment:  Patient tolerated infusion without incident.  Site patent and intact, free from redness, edema or discomfort.  Access discontinued per protocol.     Discharge Plan:   Follow up plan of care with: ongoing infusions [facility]., transplant coordinator. and ordering provider as scheduled.  Discharge instructions were reviewed with patient.  Patient/representative verbalized understanding of discharge instructions and all questions answered.  Patient discharged from [facility] in stable condition.    Dara Cagle RN    Administrations This Visit     iron sucrose (VENOFER) 300 mg in sodium chloride 0.9 % 250 mL intermittent infusion     Admin Date  12/24/2021 Action  New Bag Dose  300 mg Rate  193.3 mL/hr Route  Intravenous Administered By  Radha Yeboah RN                /78 (BP Location: Left arm)   Pulse 85   Temp 98.3  F (36.8  C) (Oral)   Resp 16         Again, thank you for allowing me to participate in the care of your patient.        Sincerely,        [facility]

## 2021-12-24 NOTE — TELEPHONE ENCOUNTER
ISSUE:  Hgb=6.7    PLAN:  1unit PRBCs   Recheck tomorrow    OUTCOME  Patient scheduled for 1 unit prbcs tomorrow in [facility].   MD aware  Patient v/u of plan  Repeat CBC after infusion.     Patient also received iron infusion today.

## 2021-12-24 NOTE — PATIENT INSTRUCTIONS
Dear Marilyn Ortega    Thank you for choosing [facility] ([facility]) for your infusion.  The following information is a summary of our appointment as well as important reminders.      We look forward in seeing you on your next appointment here at [facility] ([facility]).  Please don t hesitate to call us at [phone] to reschedule any of your appointments or to speak with one of the [facility] registered nurses.  It was a pleasure taking care of you today.    Sincerely,    [facility]  [facility]  [address] [place] [address]  [address]  Phone:  [phone]    Patient Education     Iron Sucrose Solution for injection  What is this medicine?  IRON SUCROSE (DAVIDSONWALLY higuera ALEXYS dobbsohs) is an iron complex. Iron is used to make healthy red blood cells, which carry oxygen and nutrients throughout the body. This medicine is used to treat iron deficiency anemia in people with chronic kidney disease.  This medicine may be used for other purposes; ask your health care provider or pharmacist if you have questions.  What should I tell my health care provider before I take this medicine?  They need to know if you have any of these conditions:    anemia not caused by low iron levels    heart disease    high levels of iron in the blood    kidney disease    liver disease    an unusual or allergic reaction to iron, other medicines, foods, dyes, or preservatives    pregnant or trying to get pregnant    breast-feeding  How should I use this medicine?  This medicine is for infusion into a vein. It is given by a health care professional in a hospital or clinic setting.  Talk to your pediatrician regarding the use of this medicine in children. While this drug may be prescribed for children as young as 2 years for selected conditions, precautions do apply.  Overdosage: If you think you have taken too much  of this medicine contact a poison control center or emergency room at once.  NOTE: This medicine is only for you. Do not share this medicine with others.  What if I miss a dose?  It is important not to miss your dose. Call your doctor or health care professional if you are unable to keep an appointment.  What may interact with this medicine?  Do not take this medicine with any of the following medications:    deferoxamine    dimercaprol    other iron products  This medicine may also interact with the following medications:    chloramphenicol    deferasirox  This list may not describe all possible interactions. Give your health care provider a list of all the medicines, herbs, non-prescription drugs, or dietary supplements you use. Also tell them if you smoke, drink alcohol, or use illegal drugs. Some items may interact with your medicine.  What should I watch for while using this medicine?  Visit your doctor or healthcare professional regularly. Tell your doctor or healthcare professional if your symptoms do not start to get better or if they get worse. You may need blood work done while you are taking this medicine.  You may need to follow a special diet. Talk to your doctor. Foods that contain iron include: whole grains/cereals, dried fruits, beans, or peas, leafy green vegetables, and organ meats (liver, kidney).  What side effects may I notice from receiving this medicine?  Side effects that you should report to your doctor or health care professional as soon as possible:    allergic reactions like skin rash, itching or hives, swelling of the face, lips, or tongue    breathing problems    changes in blood pressure    cough    fast, irregular heartbeat    feeling faint or lightheaded, falls    fever or chills    flushing, sweating, or hot feelings    joint or muscle aches/pains    seizures    swelling of the ankles or feet    unusually weak or tired  Side effects that usually do not require medical attention  (report to your doctor or health care professional if they continue or are bothersome):    diarrhea    feeling achy    headache    irritation at site where injected    nausea, vomiting    stomach upset    tiredness  This list may not describe all possible side effects. Call your doctor for medical advice about side effects. You may report side effects to FDA at 3-361-FDA-3943.  Where should I keep my medicine?  This drug is given in a hospital or clinic and will not be stored at home.  NOTE:This sheet is a summary. It may not cover all possible information. If you have questions about this medicine, talk to your doctor, pharmacist, or health care provider. Copyright  2016 Gold Standard

## 2021-12-24 NOTE — TELEPHONE ENCOUNTER
ISSUE:  Rise in creatinine    PLAN:  Assess hydration  N/V/D?  Urinary sx's?  BP, weight    No IV fluids d/t low hgb, per Dr Lee  Repeat labs tomorrow.     OUTCOME:  Drinking 2.5-3L daily  BP stable 125/80s  Weight stable  Denies urinary sx's, will obtain UA/UC tomorrow

## 2021-12-25 ENCOUNTER — INFUSION THERAPY VISIT (OUTPATIENT)
Dept: INFUSION THERAPY | Facility: CLINIC | Age: 36
End: 2021-12-25
Attending: INTERNAL MEDICINE
Payer: COMMERCIAL

## 2021-12-25 VITALS
DIASTOLIC BLOOD PRESSURE: 84 MMHG | SYSTOLIC BLOOD PRESSURE: 120 MMHG | OXYGEN SATURATION: 98 % | RESPIRATION RATE: 15 BRPM | HEART RATE: 75 BPM | TEMPERATURE: 98.1 F

## 2021-12-25 DIAGNOSIS — D64.9 ANEMIA, UNSPECIFIED TYPE: Primary | ICD-10-CM

## 2021-12-25 DIAGNOSIS — Z48.298 AFTERCARE FOLLOWING ORGAN TRANSPLANT: ICD-10-CM

## 2021-12-25 DIAGNOSIS — Z94.0 KIDNEY REPLACED BY TRANSPLANT: ICD-10-CM

## 2021-12-25 LAB
ALBUMIN UR-MCNC: NEGATIVE MG/DL
ANION GAP SERPL CALCULATED.3IONS-SCNC: 10 MMOL/L (ref 3–14)
APPEARANCE UR: CLEAR
BACTERIA #/AREA URNS HPF: ABNORMAL /HPF
BILIRUB UR QL STRIP: NEGATIVE
BUN SERPL-MCNC: 23 MG/DL (ref 7–30)
CALCIUM SERPL-MCNC: 8.7 MG/DL (ref 8.5–10.1)
CHLORIDE BLD-SCNC: 112 MMOL/L (ref 94–109)
CO2 SERPL-SCNC: 18 MMOL/L (ref 20–32)
COLOR UR AUTO: ABNORMAL
CREAT SERPL-MCNC: 1.33 MG/DL (ref 0.52–1.04)
GFR SERPL CREATININE-BSD FRML MDRD: 53 ML/MIN/1.73M2
GLUCOSE BLD-MCNC: 81 MG/DL (ref 70–99)
GLUCOSE UR STRIP-MCNC: NEGATIVE MG/DL
HGB UR QL STRIP: NEGATIVE
KETONES UR STRIP-MCNC: NEGATIVE MG/DL
LEUKOCYTE ESTERASE UR QL STRIP: NEGATIVE
NITRATE UR QL: NEGATIVE
PH UR STRIP: 7 [PH] (ref 5–7)
POTASSIUM BLD-SCNC: 4.5 MMOL/L (ref 3.4–5.3)
RBC URINE: 0 /HPF
SODIUM SERPL-SCNC: 140 MMOL/L (ref 133–144)
SP GR UR STRIP: 1 (ref 1–1.03)
SQUAMOUS EPITHELIAL: 2 /HPF
UROBILINOGEN UR STRIP-MCNC: NORMAL MG/DL
WBC URINE: 0 /HPF

## 2021-12-25 PROCEDURE — 36430 TRANSFUSION BLD/BLD COMPNT: CPT

## 2021-12-25 PROCEDURE — P9016 RBC LEUKOCYTES REDUCED: HCPCS

## 2021-12-25 PROCEDURE — 81001 URINALYSIS AUTO W/SCOPE: CPT

## 2021-12-25 PROCEDURE — 36415 COLL VENOUS BLD VENIPUNCTURE: CPT

## 2021-12-25 PROCEDURE — 80048 BASIC METABOLIC PNL TOTAL CA: CPT

## 2021-12-25 PROCEDURE — 83010 ASSAY OF HAPTOGLOBIN QUANT: CPT

## 2021-12-25 RX ORDER — HEPARIN SODIUM (PORCINE) LOCK FLUSH IV SOLN 100 UNIT/ML 100 UNIT/ML
5 SOLUTION INTRAVENOUS
Status: DISCONTINUED | OUTPATIENT
Start: 2021-12-25 | End: 2021-12-25 | Stop reason: HOSPADM

## 2021-12-25 RX ORDER — HEPARIN SODIUM,PORCINE 10 UNIT/ML
5 VIAL (ML) INTRAVENOUS
Status: DISCONTINUED | OUTPATIENT
Start: 2021-12-25 | End: 2021-12-25 | Stop reason: HOSPADM

## 2021-12-25 NOTE — LETTER
2021         RE: Marilyn Ortega  [address]  [place] [address]        Dear Colleague,    Thank you for referring your patient, Marilyn Ortega, to the [facility]. Please see a copy of my visit note below.    Blood Product Transfusion Nursing Note:    Marilyn Ortega presents today to [facility] for 1 unit PRBC transfusion.  During today's [facility] appointment orders from  were completed.    Progress note:  ID verified by name and .  Assessment completed.  Vitals were stable throughout time in [facility].    printed education given to patient/representative regarding transfusion and possible side effects.  Patient/representative verbalized understanding.     present during visit today: Not Applicable.    All pertinent labs reviewed prior to infusion: YES    Date of consent or authorization: 2021    Patient tolerated the procedure well    Transfusion given over approximately  2 hours, patient states that this is her first transfusion.    Discharge Plan:    Discharge instructions were reviewed with patient Yes  Patient/representative verbalized understanding of discharge instructions and all questions answered Yes.        Millie Salter RN    /82   Pulse 83   Temp 98.1  F (36.7  C) (Oral)   Resp 15   SpO2 100%             Again, thank you for allowing me to participate in the care of your patient.        Sincerely,        [facility]

## 2021-12-25 NOTE — PATIENT INSTRUCTIONS
Dear Marilyn Ortega    Thank you for choosing [facility] ([facility]) for your infusion.  The following information is a summary of our appointment as well as important reminders.      We look forward in seeing you on your next appointment here at [facility] ([facility]).  Please don t hesitate to call us at [phone] to reschedule any of your appointments or to speak with one of the [facility] registered nurses.  It was a pleasure taking care of you today.    Sincerely,    [facility]  [facility]  [address] [place] [address]  [address]  Phone:  [phone]  Patient Education     After Your Blood Transfusion  Discharge Instructions  After you leave  After a blood transfusion (received red blood cells, platelets, plasma, cryo or granulocytes), you will need to watch for signs of a reaction for the next 48 hours.  Call your clinic or 911 (or go to the Emergency room) if you have any signs of a reaction:    Shaking or chills    Fever (temperature above 100.0 F)    Headache    Nausea    Hives    Itching    Swelling of the face or feeling flushed    Ongoing dry cough (nothing is coughed up)    Trouble breathing or wheezing  Some signs of a reaction won't show up for a few [id number].  These may include:    Fatigue    Dizziness    Pink or red urine  Your clinic is:   ________________________________________  ________________________________________  For informational purposes only. Not to replace the advice of your health care provider.   Copyright   2015 [employer]. All rights reserved. [employer] 979076 - Rev 07/16.

## 2021-12-25 NOTE — PROGRESS NOTES
Blood Product Transfusion Nursing Note:    Marilyn Ortega presents today to [facility] for 1 unit PRBC transfusion.  During today's [facility] appointment orders from  were completed.    Progress note:  ID verified by name and .  Assessment completed.  Vitals were stable throughout time in [facility].    printed education given to patient/representative regarding transfusion and possible side effects.  Patient/representative verbalized understanding.     present during visit today: Not Applicable.    All pertinent labs reviewed prior to infusion: YES    Date of consent or authorization: 2021    Patient tolerated the procedure well    Transfusion given over approximately  2 hours, patient states that this is her first transfusion.    Discharge Plan:    Discharge instructions were reviewed with patient Yes  Patient/representative verbalized understanding of discharge instructions and all questions answered Yes.        Millie Salter RN    /82   Pulse 83   Temp 98.1  F (36.7  C) (Oral)   Resp 15   SpO2 100%

## 2021-12-27 ENCOUNTER — TELEPHONE (OUTPATIENT)
Dept: TRANSPLANT | Facility: CLINIC | Age: 36
End: 2021-12-27

## 2021-12-27 ENCOUNTER — TELEPHONE (OUTPATIENT)
Dept: PHARMACY | Facility: CLINIC | Age: 36
End: 2021-12-27

## 2021-12-27 ENCOUNTER — LAB (OUTPATIENT)
Dept: LAB | Facility: CLINIC | Age: 36
End: 2021-12-27
Payer: COMMERCIAL

## 2021-12-27 DIAGNOSIS — Z48.298 AFTERCARE FOLLOWING ORGAN TRANSPLANT: ICD-10-CM

## 2021-12-27 DIAGNOSIS — Z94.83 PANCREAS REPLACED BY TRANSPLANT (H): ICD-10-CM

## 2021-12-27 DIAGNOSIS — Z94.0 KIDNEY TRANSPLANTED: Primary | ICD-10-CM

## 2021-12-27 DIAGNOSIS — Z94.0 KIDNEY REPLACED BY TRANSPLANT: ICD-10-CM

## 2021-12-27 DIAGNOSIS — Z79.899 ENCOUNTER FOR LONG-TERM CURRENT USE OF MEDICATION: ICD-10-CM

## 2021-12-27 LAB
AMYLASE SERPL-CCNC: 37 U/L (ref 30–110)
ANION GAP SERPL CALCULATED.3IONS-SCNC: 5 MMOL/L (ref 3–14)
BUN SERPL-MCNC: 20 MG/DL (ref 7–30)
CALCIUM SERPL-MCNC: 8.9 MG/DL (ref 8.5–10.1)
CHLORIDE BLD-SCNC: 112 MMOL/L (ref 94–109)
CO2 SERPL-SCNC: 21 MMOL/L (ref 20–32)
CREAT SERPL-MCNC: 1.42 MG/DL (ref 0.52–1.04)
ERYTHROCYTE [DISTWIDTH] IN BLOOD BY AUTOMATED COUNT: 17.1 % (ref 10–15)
GFR SERPL CREATININE-BSD FRML MDRD: 49 ML/MIN/1.73M2
GLUCOSE BLD-MCNC: 80 MG/DL (ref 70–99)
HAPTOGLOB SERPL-MCNC: 288 MG/DL (ref 32–197)
HBV DNA SERPL QL NAA+PROBE: NORMAL
HCT VFR BLD AUTO: 29.9 % (ref 35–47)
HCV RNA SERPL QL NAA+PROBE: NORMAL
HGB BLD-MCNC: 8.9 G/DL (ref 11.7–15.7)
HIV1+2 RNA SERPL QL NAA+PROBE: NORMAL
LIPASE SERPL-CCNC: 92 U/L (ref 73–393)
MCH RBC QN AUTO: 27.3 PG (ref 26.5–33)
MCHC RBC AUTO-ENTMCNC: 29.8 G/DL (ref 31.5–36.5)
MCV RBC AUTO: 92 FL (ref 78–100)
PLATELET # BLD AUTO: 498 10E3/UL (ref 150–450)
POTASSIUM BLD-SCNC: 4.4 MMOL/L (ref 3.4–5.3)
RBC # BLD AUTO: 3.26 10E6/UL (ref 3.8–5.2)
SODIUM SERPL-SCNC: 138 MMOL/L (ref 133–144)
TACROLIMUS BLD-MCNC: 8.8 UG/L (ref 5–15)
TME LAST DOSE: NORMAL H
TME LAST DOSE: NORMAL H
WBC # BLD AUTO: 5.6 10E3/UL (ref 4–11)

## 2021-12-27 PROCEDURE — 85027 COMPLETE CBC AUTOMATED: CPT

## 2021-12-27 PROCEDURE — 82150 ASSAY OF AMYLASE: CPT

## 2021-12-27 PROCEDURE — 80048 BASIC METABOLIC PNL TOTAL CA: CPT

## 2021-12-27 PROCEDURE — 83690 ASSAY OF LIPASE: CPT

## 2021-12-27 PROCEDURE — 36415 COLL VENOUS BLD VENIPUNCTURE: CPT

## 2021-12-27 PROCEDURE — 80197 ASSAY OF TACROLIMUS: CPT

## 2021-12-27 RX ORDER — SODIUM BICARBONATE 650 MG/1
650 TABLET ORAL 2 TIMES DAILY
Qty: 60 TABLET | Refills: 2 | Status: SHIPPED | OUTPATIENT
Start: 2021-12-27 | End: 2022-01-12

## 2021-12-27 NOTE — TELEPHONE ENCOUNTER
Patient wanted to know if anyone can call her back today regarding medical message sent to Yessica.     Patient is concerned about lab results.

## 2021-12-27 NOTE — TELEPHONE ENCOUNTER
[employer] message sent to patient with the following:  Start Nabicarb 650 mg po bid per Dr. Lee's instructions.

## 2021-12-27 NOTE — TELEPHONE ENCOUNTER
Anemia Management Note  SUBJECTIVE/OBJECTIVE:  Referred by Dr. 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   Iron regimen:  Does not tolerate Oral Iron. Will stop Oral.  Venofer has already been ordered.  Labs : 2022  Recent SYLVAIN use, transfusion, IV iron: Starting Venofer, Aranesp   RX/TX plans : TBD     No history of stroke, MI and blood clots or cancers.     Contact:            No Consent to Communicate on File other than for Ratna Ortega to  medications.     Anemia Latest Ref Rng & Units 2021   Hemoglobin 11.7 - 15.7 g/dL 7.3(L) - 6.7(LL) 6.9(LL) - - 8.9(L)   IV Iron Dose - - - - - Other - -   TSAT 15 - 46 % - 7(L) - - - - -   Ferritin 12 - 150 ng/mL - 87 - - - - -   PRBCs - - - - - - 1 unit -     BP Readings from Last 3 Encounters:   21 120/84   21 125/85   21 115/77     Wt Readings from Last 2 Encounters:   21 121 lb 4.8 oz (55 kg)   21 127 lb (57.6 kg)           ASSESSMENT:  Hgb:Not at goal/Known  TSat: Due for iron studies 4 weeks after last IV iron infusion Ferritin: Due for iron studies 4 weeks after last IV iron infusion    PLAN:  3rd Venofer scheduled for 21    Orders needed to be renewed (for next follow-up date) in EPIC: None    Iron labs due:  4 weeks after last IV iron infusion    Plan discussed with:  NO call, chart review  Plan provided by:  dc    NEXT FOLLOW-UP DATE:  21    Patricia Gonzales RN   Anemia Services  [address] [address]   [email or web address]   Office : [phone]  Fax: [phone]

## 2021-12-27 NOTE — TELEPHONE ENCOUNTER
ISSUE:  Creat 1.4 (baseline 1-1.3)  Tacrolimus pending     Marilyn is drinking between 2.5-3 liters of water per day. Denies UTI symptoms, UA clear last week. Had loose stools over the weekend after taking a laxative but states she drank extra water this day to make up for it. Weights have been stable, BPs 120s-130s/80s.     Sent to Dr. Lee for review.     Reanna Lee MD Poucher, Jessica, RN  Fk therapeutic and she got a unit of prbc,  recheck post hydration . My only concern she has been subtherapeutic during envarsus switch, will see if it comes down

## 2021-12-27 NOTE — TELEPHONE ENCOUNTER
Patient confirmed new med via [employer].  Please see additional questions/concerns from patient via [employer] message.  Forwarded to coordinator.

## 2021-12-27 NOTE — TELEPHONE ENCOUNTER
Clinical Pharmacy Consult:                                                      Transplant Specific: 1 Month Post Transplant Call  Date of Transplant: 11/15/2021  Type of Transplant: kidney and pancreas  First Transplant: yes  History of rejection: no    Immunosuppression Regimen   TAC XR 7mg qAM and Myforitic 720mg qAM & 720mg qPM  Patient specific goal: 8-10  Most recent level: 5.4, date 12/20/21, dose increased and level pending today  Immunosuppressant Levels:  Subtherapeutic  Pt adherent to lab draws: yes  Scr:   Lab Results   Component Value Date    CR 1.42 12/27/2021    CR 2.69 05/05/2021     Side effects: Insomnia, Nausea and Edema    Prophylactic Medications  Antibacterial:  Bactrim SS daily  Scheduled Discontinue Date: Lifelong    Antifungal: Not needed thus far  Scheduled Discontinue Date: N/A    Antiviral: CrCl >/=60 mL/minute: Valcyte 900mg daily   Scheduled Discontinue Date: 3 months    Acid Reducer: Protonix (pantoprazole)  Scheduled Reviewed Date: MD to determine    Thrombosis Prevention: Aspirin 325 mg PO daily  Scheduled Discontinue Date: MD to determine    Blood Pressure Management  Frequency of home Blood Pressure checks: once daily  Most recent home BP: 110-130/70-80  Patient Blood pressure goal: <140/90  Patient blood pressure at goal:  yes  Hospitalizations/ER visits since last assessment: 0  Medication adherence flowsheet 12/27/2021   Patient medication administration: Responsible for own medications   Patient estimated adherence level: %   Pharmacist assessment of adherence: Excellent   Patient reported doses missed per week: 0-1   Facilitators to medication adherence  Cell phone;Medication dosing chart;Pill box;Schedule/routine   Patient reported barriers to medication adherence  No issues identified   Adherence intervention(s): -     Medication access flowsheet 12/27/2021   Number of pharmacies used: 1   Pharmacy: [facility]   Enrolled in [facility] pharmacy? Yes    Patient reported barriers to accessing medications: No issues reported by patient   Medication access interventions: -      Med rec/DUR performed: yes  Med Rec Discrepancies: no    Marilyn is starting to feel better.  Initially the medications made her feel terrible.  She just finished the prednisone last week, which she said was causing fluid retention, insomnia, and vision changes.  She started Envarsus 2 weeks ago due to daily headaches with stabbing pain.  She had a couple of headaches last week with stabbing pain, but this week so far has been good.  The nausea she was having seems to be getting better as well.  Only 2 instances of vomiting in the last week.      Marilyn sets up her own med box using her med card and uses cell phone alarms as reminders.  She has only missed one dose due to oversleeping and missing the alarm.      She is checking her blood pressure daily and this is under control.     No other questions or concerns today.  Has follow up with MTM on 1/20/22.    Millie Sexton [facility]

## 2021-12-28 ENCOUNTER — INFUSION THERAPY VISIT (OUTPATIENT)
Dept: INFUSION THERAPY | Facility: CLINIC | Age: 36
End: 2021-12-28
Attending: INTERNAL MEDICINE
Payer: COMMERCIAL

## 2021-12-28 VITALS
SYSTOLIC BLOOD PRESSURE: 121 MMHG | DIASTOLIC BLOOD PRESSURE: 81 MMHG | TEMPERATURE: 98.2 F | HEART RATE: 85 BPM | RESPIRATION RATE: 16 BRPM

## 2021-12-28 DIAGNOSIS — D64.9 ANEMIA: ICD-10-CM

## 2021-12-28 DIAGNOSIS — Z94.83 PANCREAS REPLACED BY TRANSPLANT (H): ICD-10-CM

## 2021-12-28 DIAGNOSIS — Z94.0 KIDNEY REPLACED BY TRANSPLANT: Primary | ICD-10-CM

## 2021-12-28 DIAGNOSIS — D64.9 ANEMIA, UNSPECIFIED TYPE: ICD-10-CM

## 2021-12-28 PROCEDURE — 250N000011 HC RX IP 250 OP 636: Performed by: INTERNAL MEDICINE

## 2021-12-28 PROCEDURE — 96365 THER/PROPH/DIAG IV INF INIT: CPT

## 2021-12-28 PROCEDURE — 258N000003 HC RX IP 258 OP 636: Performed by: INTERNAL MEDICINE

## 2021-12-28 PROCEDURE — 96366 THER/PROPH/DIAG IV INF ADDON: CPT

## 2021-12-28 RX ORDER — NALOXONE HYDROCHLORIDE 0.4 MG/ML
0.2 INJECTION, SOLUTION INTRAMUSCULAR; INTRAVENOUS; SUBCUTANEOUS
Status: CANCELLED | OUTPATIENT
Start: 2021-12-29

## 2021-12-28 RX ORDER — METHYLPREDNISOLONE SODIUM SUCCINATE 125 MG/2ML
125 INJECTION, POWDER, LYOPHILIZED, FOR SOLUTION INTRAMUSCULAR; INTRAVENOUS
Status: CANCELLED
Start: 2021-12-29

## 2021-12-28 RX ORDER — DIPHENHYDRAMINE HYDROCHLORIDE 50 MG/ML
50 INJECTION INTRAMUSCULAR; INTRAVENOUS
Status: CANCELLED
Start: 2021-12-29

## 2021-12-28 RX ORDER — HEPARIN SODIUM (PORCINE) LOCK FLUSH IV SOLN 100 UNIT/ML 100 UNIT/ML
5 SOLUTION INTRAVENOUS
Status: CANCELLED | OUTPATIENT
Start: 2021-12-29

## 2021-12-28 RX ORDER — HEPARIN SODIUM,PORCINE 10 UNIT/ML
5 VIAL (ML) INTRAVENOUS
Status: CANCELLED | OUTPATIENT
Start: 2021-12-29

## 2021-12-28 RX ORDER — ALBUTEROL SULFATE 0.83 MG/ML
2.5 SOLUTION RESPIRATORY (INHALATION)
Status: CANCELLED | OUTPATIENT
Start: 2021-12-29

## 2021-12-28 RX ORDER — ALBUTEROL SULFATE 90 UG/1
1-2 AEROSOL, METERED RESPIRATORY (INHALATION)
Status: CANCELLED
Start: 2021-12-29

## 2021-12-28 RX ORDER — MEPERIDINE HYDROCHLORIDE 25 MG/ML
25 INJECTION INTRAMUSCULAR; INTRAVENOUS; SUBCUTANEOUS EVERY 30 MIN PRN
Status: CANCELLED | OUTPATIENT
Start: 2021-12-29

## 2021-12-28 RX ORDER — EPINEPHRINE 1 MG/ML
0.3 INJECTION, SOLUTION INTRAMUSCULAR; SUBCUTANEOUS EVERY 5 MIN PRN
Status: CANCELLED | OUTPATIENT
Start: 2021-12-29

## 2021-12-28 RX ADMIN — IRON SUCROSE 300 MG: 20 INJECTION, SOLUTION INTRAVENOUS at 15:46

## 2021-12-28 NOTE — LETTER
"    12/28/2021         RE: Marilyn Ortega  [address]  [place] [address]        Dear Colleague,    Thank you for referring your patient, Marilyn Ortega, to the [facility]. Please see a copy of my visit note below.    Chief Complaint   Patient presents with     Infusion     IV Venofer     Infusion Nursing Note:  Marilyn Ortega presents today for IV Venofer. Dose 3 of 3.   Patient seen by provider today: No   present during visit today: Not Applicable.    Note: Venofer given over 90 minutes.      Intravenous Access:  Peripheral IV placed.    Treatment Conditions:  Not Applicable.      Post Infusion Assessment:  Patient tolerated infusion without incident.  Site patent and intact, free from redness, edema or discomfort.  No evidence of extravasations.  Access discontinued per protocol.       Discharge Plan:   AVS to patient via MYCHART.  Patient will follow up with transplant team.   Patient discharged in stable condition accompanied by: self.  Departure Mode: Ambulatory.      Administrations This Visit     iron sucrose (VENOFER) 300 mg in sodium chloride 0.9 % 250 mL intermittent infusion     Admin Date  12/28/2021 Action  New Bag Dose  300 mg Rate  193.3 mL/hr Route  Intravenous Administered By  Fany Cano RN                Vital signs:    Temp src: Oral       Resp: 18   O2 Device: None (Room air)        Estimated body mass index is 19.58 kg/m  as calculated from the following:    Height as of 12/22/21: 1.676 m (5' 6\").    Weight as of 12/22/21: 55 kg (121 lb 4.8 oz).                          Again, thank you for allowing me to participate in the care of your patient.        Sincerely,        [facility]"

## 2021-12-28 NOTE — PROGRESS NOTES
"Chief Complaint   Patient presents with     Infusion     IV Venofer     Infusion Nursing Note:  Marilyn Ortega presents today for IV Venofer. Dose 3 of 3.   Patient seen by provider today: No   present during visit today: Not Applicable.    Note: Venofer given over 90 minutes.      Intravenous Access:  Peripheral IV placed.    Treatment Conditions:  Not Applicable.      Post Infusion Assessment:  Patient tolerated infusion without incident.  Site patent and intact, free from redness, edema or discomfort.  No evidence of extravasations.  Access discontinued per protocol.       Discharge Plan:   [facility]  Patient will follow up with transplant team.   Patient discharged in stable condition accompanied by: self.  Departure Mode: Ambulatory.      Administrations This Visit     iron sucrose (VENOFER) 300 mg in sodium chloride 0.9 % 250 mL intermittent infusion     Admin Date  12/28/2021 Action  New Bag Dose  300 mg Rate  193.3 mL/hr Route  Intravenous Administered By  Fany Cano RN                Vital signs:    Temp src: Oral       Resp: 18   O2 Device: None (Room air)        Estimated body mass index is 19.58 kg/m  as calculated from the following:    Height as of 12/22/21: 1.676 m (5' 6\").    Weight as of 12/22/21: 55 kg (121 lb 4.8 oz).                      "

## 2021-12-29 ENCOUNTER — TELEPHONE (OUTPATIENT)
Dept: PHARMACY | Facility: CLINIC | Age: 36
End: 2021-12-29
Payer: COMMERCIAL

## 2021-12-29 NOTE — TELEPHONE ENCOUNTER
Anemia Management Note  SUBJECTIVE/OBJECTIVE:  Referred by Dr. 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   Iron regimen:  Does not tolerate Oral Iron. Will stop Oral.  Venofer has already been ordered.  Labs : 2022  Recent SYLVAIN use, transfusion, IV iron: Starting Venofer, Aranesp   RX/TX plans : TBD      No history of stroke, MI and blood clots or cancers.     Contact:            No Consent to Communicate on File other than for Ratna Ortega to  medications.        Anemia Latest Ref Rng & Units 2021   Hemoglobin 11.7 - 15.7 g/dL - 6.7(LL) 6.9(LL) - - 8.9(L) -   IV Iron Dose - - - - Other - - Other   TSAT 15 - 46 % 7(L) - - - - - -   Ferritin 12 - 150 ng/mL 87 - - - - - -   PRBCs - - - - - 1 unit - -     BP Readings from Last 3 Encounters:   21 121/81   21 120/84   21 125/85     Wt Readings from Last 2 Encounters:   21 121 lb 4.8 oz (55 kg)   21 127 lb (57.6 kg)           ASSESSMENT:  Hgb:Not at goal/Intentional hold  TSat: Due for iron studies 4 weeks after last IV iron infusion Ferritin: Due for iron studies 4 weeks after last IV iron infusion    PLAN:  Check iron studies in 4 week(s). Ig Hgb remains <9, start SYLVAIN.     Orders needed to be renewed (for next follow-up date) in EPIC: None    Iron labs due:  4 weeks    Plan discussed with:  No call  Plan provided by:  dc    NEXT FOLLOW-UP DATE:  22  8:45a lab     Patricia Gonzales RN   Anemia Services  [address] [address]   [email or web address]   Office : [phone]  Fax: [phone]

## 2021-12-30 ENCOUNTER — LAB (OUTPATIENT)
Dept: LAB | Facility: CLINIC | Age: 36
End: 2021-12-30
Payer: COMMERCIAL

## 2021-12-30 DIAGNOSIS — Z94.0 KIDNEY REPLACED BY TRANSPLANT: ICD-10-CM

## 2021-12-30 DIAGNOSIS — Z79.899 ENCOUNTER FOR LONG-TERM CURRENT USE OF MEDICATION: ICD-10-CM

## 2021-12-30 DIAGNOSIS — Z94.83 PANCREAS REPLACED BY TRANSPLANT (H): ICD-10-CM

## 2021-12-30 DIAGNOSIS — Z48.298 AFTERCARE FOLLOWING ORGAN TRANSPLANT: ICD-10-CM

## 2021-12-30 LAB
AMYLASE SERPL-CCNC: 46 U/L (ref 30–110)
ANION GAP SERPL CALCULATED.3IONS-SCNC: 5 MMOL/L (ref 3–14)
BUN SERPL-MCNC: 19 MG/DL (ref 7–30)
CALCIUM SERPL-MCNC: 8.8 MG/DL (ref 8.5–10.1)
CHLORIDE BLD-SCNC: 111 MMOL/L (ref 94–109)
CO2 SERPL-SCNC: 21 MMOL/L (ref 20–32)
CREAT SERPL-MCNC: 1.05 MG/DL (ref 0.52–1.04)
ERYTHROCYTE [DISTWIDTH] IN BLOOD BY AUTOMATED COUNT: 17.8 % (ref 10–15)
GFR SERPL CREATININE-BSD FRML MDRD: 70 ML/MIN/1.73M2
GLUCOSE BLD-MCNC: 109 MG/DL (ref 70–99)
HCT VFR BLD AUTO: 29.5 % (ref 35–47)
HGB BLD-MCNC: 9.3 G/DL (ref 11.7–15.7)
LIPASE SERPL-CCNC: 91 U/L (ref 73–393)
MAGNESIUM SERPL-MCNC: 1.7 MG/DL (ref 1.6–2.3)
MCH RBC QN AUTO: 27.8 PG (ref 26.5–33)
MCHC RBC AUTO-ENTMCNC: 31.5 G/DL (ref 31.5–36.5)
MCV RBC AUTO: 88 FL (ref 78–100)
PHOSPHATE SERPL-MCNC: 1.9 MG/DL (ref 2.5–4.5)
PLATELET # BLD AUTO: 473 10E3/UL (ref 150–450)
POTASSIUM BLD-SCNC: 4.8 MMOL/L (ref 3.4–5.3)
RBC # BLD AUTO: 3.35 10E6/UL (ref 3.8–5.2)
SODIUM SERPL-SCNC: 137 MMOL/L (ref 133–144)
TACROLIMUS BLD-MCNC: 9.4 UG/L (ref 5–15)
TME LAST DOSE: NORMAL H
TME LAST DOSE: NORMAL H
WBC # BLD AUTO: 5.3 10E3/UL (ref 4–11)

## 2021-12-30 PROCEDURE — 85027 COMPLETE CBC AUTOMATED: CPT

## 2021-12-30 PROCEDURE — 80048 BASIC METABOLIC PNL TOTAL CA: CPT

## 2021-12-30 PROCEDURE — 80197 ASSAY OF TACROLIMUS: CPT

## 2021-12-30 PROCEDURE — 36415 COLL VENOUS BLD VENIPUNCTURE: CPT

## 2021-12-30 PROCEDURE — 84100 ASSAY OF PHOSPHORUS: CPT

## 2021-12-30 PROCEDURE — 83690 ASSAY OF LIPASE: CPT

## 2021-12-30 PROCEDURE — 82150 ASSAY OF AMYLASE: CPT

## 2021-12-30 PROCEDURE — 83735 ASSAY OF MAGNESIUM: CPT

## 2022-01-03 ENCOUNTER — LAB (OUTPATIENT)
Dept: LAB | Facility: CLINIC | Age: 37
End: 2022-01-03
Payer: COMMERCIAL

## 2022-01-03 ENCOUNTER — TELEPHONE (OUTPATIENT)
Dept: TRANSPLANT | Facility: CLINIC | Age: 37
End: 2022-01-03

## 2022-01-03 DIAGNOSIS — Z79.899 ENCOUNTER FOR LONG-TERM CURRENT USE OF MEDICATION: ICD-10-CM

## 2022-01-03 DIAGNOSIS — Z94.0 KIDNEY REPLACED BY TRANSPLANT: ICD-10-CM

## 2022-01-03 DIAGNOSIS — Z94.0 KIDNEY TRANSPLANTED: ICD-10-CM

## 2022-01-03 DIAGNOSIS — Z48.298 AFTERCARE FOLLOWING ORGAN TRANSPLANT: ICD-10-CM

## 2022-01-03 DIAGNOSIS — Z94.83 PANCREAS REPLACED BY TRANSPLANT (H): ICD-10-CM

## 2022-01-03 LAB
AMYLASE SERPL-CCNC: 65 U/L (ref 30–110)
ANION GAP SERPL CALCULATED.3IONS-SCNC: 4 MMOL/L (ref 3–14)
BUN SERPL-MCNC: 28 MG/DL (ref 7–30)
CALCIUM SERPL-MCNC: 9.2 MG/DL (ref 8.5–10.1)
CHLORIDE BLD-SCNC: 111 MMOL/L (ref 94–109)
CO2 SERPL-SCNC: 24 MMOL/L (ref 20–32)
CREAT SERPL-MCNC: 1.02 MG/DL (ref 0.52–1.04)
ERYTHROCYTE [DISTWIDTH] IN BLOOD BY AUTOMATED COUNT: 18.7 % (ref 10–15)
GFR SERPL CREATININE-BSD FRML MDRD: 73 ML/MIN/1.73M2
GLUCOSE BLD-MCNC: 86 MG/DL (ref 70–99)
HCT VFR BLD AUTO: 32.1 % (ref 35–47)
HGB BLD-MCNC: 9.6 G/DL (ref 11.7–15.7)
LIPASE SERPL-CCNC: 131 U/L (ref 73–393)
MCH RBC QN AUTO: 27.7 PG (ref 26.5–33)
MCHC RBC AUTO-ENTMCNC: 29.9 G/DL (ref 31.5–36.5)
MCV RBC AUTO: 93 FL (ref 78–100)
PLATELET # BLD AUTO: 449 10E3/UL (ref 150–450)
POTASSIUM BLD-SCNC: 4.8 MMOL/L (ref 3.4–5.3)
RBC # BLD AUTO: 3.46 10E6/UL (ref 3.8–5.2)
SODIUM SERPL-SCNC: 139 MMOL/L (ref 133–144)
WBC # BLD AUTO: 6 10E3/UL (ref 4–11)

## 2022-01-03 PROCEDURE — 85027 COMPLETE CBC AUTOMATED: CPT

## 2022-01-03 PROCEDURE — 83735 ASSAY OF MAGNESIUM: CPT

## 2022-01-03 PROCEDURE — 82150 ASSAY OF AMYLASE: CPT

## 2022-01-03 PROCEDURE — 80180 DRUG SCRN QUAN MYCOPHENOLATE: CPT

## 2022-01-03 PROCEDURE — 84100 ASSAY OF PHOSPHORUS: CPT

## 2022-01-03 PROCEDURE — 80048 BASIC METABOLIC PNL TOTAL CA: CPT

## 2022-01-03 PROCEDURE — 83690 ASSAY OF LIPASE: CPT

## 2022-01-03 PROCEDURE — 80197 ASSAY OF TACROLIMUS: CPT

## 2022-01-03 PROCEDURE — 36415 COLL VENOUS BLD VENIPUNCTURE: CPT

## 2022-01-03 NOTE — TELEPHONE ENCOUNTER
ISSUE:  Rise in lipase    PLAN  Ensure adequate BMs, soft and easy to pass, 1-2x daily.  Ensure no intake of alcohol or greasy / spicy / fatty foods  Notify RNCC if patient has fever.      Repeat labs in 3 days.    Yessica Becker RN BSN  Transplant Care Coordinator

## 2022-01-03 NOTE — TELEPHONE ENCOUNTER
Left message and sent mychart message to patient regarding:  Rise in lipase     PLAN  Ensure adequate BMs, soft and easy to pass, 1-2x daily.  Ensure no intake of alcohol or greasy / spicy / fatty foods  Notify RNCC if patient has fever.      Repeat labs in 3 days.

## 2022-01-04 DIAGNOSIS — Z94.0 KIDNEY TRANSPLANTED: Primary | ICD-10-CM

## 2022-01-04 DIAGNOSIS — Z94.83 PANCREAS REPLACED BY TRANSPLANT (H): ICD-10-CM

## 2022-01-04 LAB
MAGNESIUM SERPL-MCNC: 1.9 MG/DL (ref 1.6–2.3)
PHOSPHATE SERPL-MCNC: 2.1 MG/DL (ref 2.5–4.5)

## 2022-01-05 LAB
MYCOPHENOLATE SERPL LC/MS/MS-MCNC: 2.5 MG/L (ref 1–3.5)
MYCOPHENOLATE-G SERPL LC/MS/MS-MCNC: 41.7 MG/L (ref 30–95)
TACROLIMUS BLD-MCNC: 9.1 UG/L (ref 5–15)
TME LAST DOSE: NORMAL H

## 2022-01-06 ENCOUNTER — LAB (OUTPATIENT)
Dept: LAB | Facility: CLINIC | Age: 37
End: 2022-01-06
Payer: COMMERCIAL

## 2022-01-06 DIAGNOSIS — Z94.83 PANCREAS REPLACED BY TRANSPLANT (H): ICD-10-CM

## 2022-01-06 DIAGNOSIS — Z79.899 ENCOUNTER FOR LONG-TERM CURRENT USE OF MEDICATION: ICD-10-CM

## 2022-01-06 DIAGNOSIS — Z94.0 KIDNEY REPLACED BY TRANSPLANT: ICD-10-CM

## 2022-01-06 DIAGNOSIS — Z48.298 AFTERCARE FOLLOWING ORGAN TRANSPLANT: ICD-10-CM

## 2022-01-06 LAB
AMYLASE SERPL-CCNC: 66 U/L (ref 30–110)
ANION GAP SERPL CALCULATED.3IONS-SCNC: 4 MMOL/L (ref 3–14)
BUN SERPL-MCNC: 28 MG/DL (ref 7–30)
CALCIUM SERPL-MCNC: 9 MG/DL (ref 8.5–10.1)
CHLORIDE BLD-SCNC: 111 MMOL/L (ref 94–109)
CO2 SERPL-SCNC: 24 MMOL/L (ref 20–32)
CREAT SERPL-MCNC: 0.94 MG/DL (ref 0.52–1.04)
ERYTHROCYTE [DISTWIDTH] IN BLOOD BY AUTOMATED COUNT: 19.2 % (ref 10–15)
GFR SERPL CREATININE-BSD FRML MDRD: 80 ML/MIN/1.73M2
GLUCOSE BLD-MCNC: 104 MG/DL (ref 70–99)
HCT VFR BLD AUTO: 32.6 % (ref 35–47)
HGB BLD-MCNC: 9.6 G/DL (ref 11.7–15.7)
LIPASE SERPL-CCNC: 128 U/L (ref 73–393)
MAGNESIUM SERPL-MCNC: 1.7 MG/DL (ref 1.6–2.3)
MCH RBC QN AUTO: 28.2 PG (ref 26.5–33)
MCHC RBC AUTO-ENTMCNC: 29.4 G/DL (ref 31.5–36.5)
MCV RBC AUTO: 96 FL (ref 78–100)
PHOSPHATE SERPL-MCNC: 1.8 MG/DL (ref 2.5–4.5)
PLATELET # BLD AUTO: 400 10E3/UL (ref 150–450)
POTASSIUM BLD-SCNC: 4.3 MMOL/L (ref 3.4–5.3)
RBC # BLD AUTO: 3.41 10E6/UL (ref 3.8–5.2)
SODIUM SERPL-SCNC: 139 MMOL/L (ref 133–144)
TACROLIMUS BLD-MCNC: 7.1 UG/L (ref 5–15)
TME LAST DOSE: NORMAL H
TME LAST DOSE: NORMAL H
WBC # BLD AUTO: 5.6 10E3/UL (ref 4–11)

## 2022-01-06 PROCEDURE — 85014 HEMATOCRIT: CPT

## 2022-01-06 PROCEDURE — 36415 COLL VENOUS BLD VENIPUNCTURE: CPT

## 2022-01-06 PROCEDURE — 80048 BASIC METABOLIC PNL TOTAL CA: CPT

## 2022-01-06 PROCEDURE — 83690 ASSAY OF LIPASE: CPT

## 2022-01-06 PROCEDURE — 82150 ASSAY OF AMYLASE: CPT

## 2022-01-06 PROCEDURE — 83735 ASSAY OF MAGNESIUM: CPT

## 2022-01-06 PROCEDURE — 84100 ASSAY OF PHOSPHORUS: CPT

## 2022-01-06 PROCEDURE — 80197 ASSAY OF TACROLIMUS: CPT

## 2022-01-09 ENCOUNTER — HEALTH MAINTENANCE LETTER (OUTPATIENT)
Age: 37
End: 2022-01-09

## 2022-01-10 ENCOUNTER — LAB (OUTPATIENT)
Dept: LAB | Facility: CLINIC | Age: 37
End: 2022-01-10
Payer: COMMERCIAL

## 2022-01-10 DIAGNOSIS — Z94.0 KIDNEY REPLACED BY TRANSPLANT: ICD-10-CM

## 2022-01-10 DIAGNOSIS — Z48.298 AFTERCARE FOLLOWING ORGAN TRANSPLANT: ICD-10-CM

## 2022-01-10 DIAGNOSIS — Z94.83 PANCREAS REPLACED BY TRANSPLANT (H): ICD-10-CM

## 2022-01-10 DIAGNOSIS — Z94.83 PANCREAS REPLACED BY TRANSPLANT (H): Primary | ICD-10-CM

## 2022-01-10 DIAGNOSIS — Z79.899 ENCOUNTER FOR LONG-TERM CURRENT USE OF MEDICATION: ICD-10-CM

## 2022-01-10 LAB
AMYLASE SERPL-CCNC: 60 U/L (ref 30–110)
ANION GAP SERPL CALCULATED.3IONS-SCNC: 4 MMOL/L (ref 3–14)
BUN SERPL-MCNC: 16 MG/DL (ref 7–30)
CALCIUM SERPL-MCNC: 9.1 MG/DL (ref 8.5–10.1)
CHLORIDE BLD-SCNC: 111 MMOL/L (ref 94–109)
CO2 SERPL-SCNC: 24 MMOL/L (ref 20–32)
CREAT SERPL-MCNC: 0.89 MG/DL (ref 0.52–1.04)
ERYTHROCYTE [DISTWIDTH] IN BLOOD BY AUTOMATED COUNT: 19.9 % (ref 10–15)
GFR SERPL CREATININE-BSD FRML MDRD: 86 ML/MIN/1.73M2
GLUCOSE BLD-MCNC: 95 MG/DL (ref 70–99)
HCT VFR BLD AUTO: 33.1 % (ref 35–47)
HGB BLD-MCNC: 10.3 G/DL (ref 11.7–15.7)
LIPASE SERPL-CCNC: 104 U/L (ref 73–393)
MAGNESIUM SERPL-MCNC: 1.7 MG/DL (ref 1.6–2.3)
MCH RBC QN AUTO: 28.8 PG (ref 26.5–33)
MCHC RBC AUTO-ENTMCNC: 31.1 G/DL (ref 31.5–36.5)
MCV RBC AUTO: 93 FL (ref 78–100)
PHOSPHATE SERPL-MCNC: 1.8 MG/DL (ref 2.5–4.5)
PLATELET # BLD AUTO: 355 10E3/UL (ref 150–450)
POTASSIUM BLD-SCNC: 4 MMOL/L (ref 3.4–5.3)
RBC # BLD AUTO: 3.58 10E6/UL (ref 3.8–5.2)
SODIUM SERPL-SCNC: 139 MMOL/L (ref 133–144)
TACROLIMUS BLD-MCNC: 7.9 UG/L (ref 5–15)
TME LAST DOSE: NORMAL H
TME LAST DOSE: NORMAL H
WBC # BLD AUTO: 5.9 10E3/UL (ref 4–11)

## 2022-01-10 PROCEDURE — 80197 ASSAY OF TACROLIMUS: CPT

## 2022-01-10 PROCEDURE — 84100 ASSAY OF PHOSPHORUS: CPT

## 2022-01-10 PROCEDURE — 85027 COMPLETE CBC AUTOMATED: CPT

## 2022-01-10 PROCEDURE — 80048 BASIC METABOLIC PNL TOTAL CA: CPT

## 2022-01-10 PROCEDURE — 36415 COLL VENOUS BLD VENIPUNCTURE: CPT

## 2022-01-10 PROCEDURE — 82150 ASSAY OF AMYLASE: CPT

## 2022-01-10 PROCEDURE — 83735 ASSAY OF MAGNESIUM: CPT

## 2022-01-10 PROCEDURE — 83690 ASSAY OF LIPASE: CPT

## 2022-01-11 ENCOUNTER — TELEPHONE (OUTPATIENT)
Dept: TRANSPLANT | Facility: CLINIC | Age: 37
End: 2022-01-11
Payer: COMMERCIAL

## 2022-01-11 DIAGNOSIS — Z94.0 KIDNEY REPLACED BY TRANSPLANT: ICD-10-CM

## 2022-01-11 DIAGNOSIS — Z94.0 KIDNEY TRANSPLANTED: ICD-10-CM

## 2022-01-11 RX ORDER — TACROLIMUS 0.75 MG/1
0.75 TABLET, EXTENDED RELEASE ORAL
Qty: 90 TABLET | Refills: 1 | Status: SHIPPED | OUTPATIENT
Start: 2022-01-11 | End: 2022-04-19

## 2022-01-11 RX ORDER — PANTOPRAZOLE SODIUM 40 MG/1
40 TABLET, DELAYED RELEASE ORAL
Qty: 30 TABLET | Refills: 1 | Status: SHIPPED | OUTPATIENT
Start: 2022-01-11 | End: 2022-01-12

## 2022-01-11 RX ORDER — TACROLIMUS 1 MG/1
8 TABLET, EXTENDED RELEASE ORAL
Qty: 240 TABLET | Refills: 11 | Status: SHIPPED | OUTPATIENT
Start: 2022-01-11 | End: 2022-01-13

## 2022-01-11 RX ORDER — ONDANSETRON 4 MG/1
4 TABLET, ORALLY DISINTEGRATING ORAL EVERY 6 HOURS PRN
Qty: 20 TABLET | Refills: 0 | Status: SHIPPED | OUTPATIENT
Start: 2022-01-11 | End: 2022-01-12

## 2022-01-11 NOTE — TELEPHONE ENCOUNTER
ISSUE:   Tacrolimus IR level 7.9 on 1/10, goal 8-10, dose 7 mg BID.    PLAN:   Please call patient and confirm this was an accurate 12-hour trough. Verify Tacrolimus IR dose 7 mg BID. Confirm no new medications or illness. Confirm no missed doses. If accurate trough and accurate dose, increase Tacrolimus IR dose to 8 mg BID and repeat labs in 3 days    OUTCOME:   Spoke with patient, they confirm accurate trough level and current dose 7 mg BID. Patient confirmed dose change to 8 mg BID and to repeat labs in 3 days. Orders sent to preferred pharmacy for dose change and lab for repeat labs. Patient voiced understanding of plan.

## 2022-01-12 ENCOUNTER — OFFICE VISIT (OUTPATIENT)
Dept: NEPHROLOGY | Facility: CLINIC | Age: 37
End: 2022-01-12
Attending: INTERNAL MEDICINE
Payer: COMMERCIAL

## 2022-01-12 VITALS
BODY MASS INDEX: 20.23 KG/M2 | HEART RATE: 92 BPM | OXYGEN SATURATION: 99 % | WEIGHT: 125.9 LBS | DIASTOLIC BLOOD PRESSURE: 77 MMHG | HEIGHT: 66 IN | SYSTOLIC BLOOD PRESSURE: 126 MMHG

## 2022-01-12 DIAGNOSIS — Z94.0 KIDNEY REPLACED BY TRANSPLANT: Primary | ICD-10-CM

## 2022-01-12 DIAGNOSIS — E83.39 HYPOPHOSPHATEMIA: ICD-10-CM

## 2022-01-12 DIAGNOSIS — Z94.83 PANCREAS REPLACED BY TRANSPLANT (H): ICD-10-CM

## 2022-01-12 DIAGNOSIS — R60.1 GENERALIZED EDEMA: ICD-10-CM

## 2022-01-12 PROCEDURE — 99215 OFFICE O/P EST HI 40 MIN: CPT | Mod: 24

## 2022-01-12 PROCEDURE — G0463 HOSPITAL OUTPT CLINIC VISIT: HCPCS

## 2022-01-12 RX ORDER — TORSEMIDE 10 MG/1
10 TABLET ORAL DAILY
Qty: 90 TABLET | Refills: 1 | Status: SHIPPED | OUTPATIENT
Start: 2022-01-12 | End: 2022-01-31

## 2022-01-12 RX ORDER — PANTOPRAZOLE SODIUM 40 MG/1
TABLET, DELAYED RELEASE ORAL
Qty: 30 TABLET | Refills: 1 | COMMUNITY
Start: 2022-01-12 | End: 2022-02-18

## 2022-01-12 ASSESSMENT — PAIN SCALES - GENERAL: PAINLEVEL: NO PAIN (0)

## 2022-01-12 ASSESSMENT — MIFFLIN-ST. JEOR: SCORE: 1277.83

## 2022-01-12 NOTE — LETTER
1/12/2022    RE: Marilyn Ortega  [address]  [place] [address]     TRANSPLANT NEPHROLOGY EARLY POST TRANSPLANT VISIT    Assessment & Plan   # DDKT (SPK): Stable   - Baseline Creatinine: ~ 0.9-1.2   - Proteinuria: Minimal (0.2-0.5 grams)   - Date DSA Last Checked: Dec/2021      Latest DSA: No   - BK Viremia: No   - Kidney Tx Biopsy: No    # Pancreas Tx (SPK):    - Pancreatic Exocrine Drainage: Enteric drained     - Blood glucose: Euglycemia      On insulin: No   - HbA1c: Last checked at time of transplant      Latest HbA1c: 7.7%   - Pancreatic enzymes: Stable   - Date DSA Last Checked: Dec/2021  Latest DSA: No   - Pancreas Tx Biopsy: No    # Immunosuppression: Tacrolimus extended release (goal 8-10) and Mycophenolic acid (dose 720 mg every 12 hours)   - Induction with Recent Transplant:  Intermediate Intensity   - Continue with intensive monitoring of immunosuppression for efficacy and toxicity.   - Changes: Not at this time    # Infection Prophylaxis:   - PJP: Sulfa/TMP (Bactrim)  - CMV: Valganciclovir (Valcyte) 900 mg po qd Estimated Creatinine Clearance: 78.8 mL/min (based on SCr of 0.89 mg/dL).  - Thrush: Clotrimazole yodit (Mycelex) qid-counseled about interaction with CNI and to take consistently    # Blood Pressure: Controlled;  Goal BP: < 140/90   - Volume status: Euvolemic  EDW ~ 122lbs   - Changes: Not at this time    # Anemia in Chronic Renal Disease: Hgb: Trend up      SYLVAIN: No   - Iron studies: Low iron saturation    -PRBC on 12/25/21, received IV venofer as well in 12/2021   -Followed by anemia services    # Mineral Bone Disorder:   - Secondary renal hyperparathyroidism; PTH level: Normal (18-80 pg/ml)        On treatment: None  - Vitamin D; level: High normal        On supplement: Yes  - Calcium; level: Normal        On supplement: Yes  - Phosphorus; level: Normal        On supplement: Yes, decrease to 250mg daily     # Electrolytes:   - Potassium; level: Normal        On supplement:  No  - Magnesium; level: Normal        On supplement: Yes, 400mg daily, stop  - Bicarbonate; level: Normal        On supplement: Yes, bicarb 650mg bid, stop due to edema and starting torsemide    # GERD:   -Decrease protonix to 40mg every other day x2 weeks and then stop    # Skin sensitivity:   -Likely 2/2 tac. No rashes, not concerning at this point for VZV   -Resolved    # Pruritis:   -Developed after starting IV iron infusions.    -Improving. Recommend PRN benadryl 12.5mg     # Edema:   -Stop sodium bicarb and start torsemide 10mg daily. Will monitor weight and Scr   -Continue 3L fluid intake   -Her weight fluctuates about 5 lbs during the day.       # Medical Compliance: Yes    # COVID-19 Virus Review: Discussed COVID-19 virus and the potential medical risks.  Reviewed preventative health recommendations, including wearing a mask where appropriate.  Recommended COVID vaccination should be up to date with either an initial vaccination or booster shot when appropriate.  Asked the patient to inform the transplant center if they are exposed or diagnosed with this virus.    # COVID Vaccination Up To Date: Yes. Due for 4th dose on or after 4/8/22    # Transplant History:  Etiology of Kidney Failure: Diabetes mellitus type 1  Tx: SPK  Transplant: 11/15/2021 (Kidney / Pancreas)  Donor Type: Donation after Brain Death Donor Class:   Crossmatch at time of Tx: negative  DSA at time of Tx: No  Significant changes in immunosuppression: None  CMV IgG Ab High Risk Discordance (D+/R-): No  EBV IgG Ab High Risk Discordance (D+/R-): No  Significant transplant-related complications: None    Transplant Office Phone Number: [phone]    Assessment and plan was discussed with the patient and she voiced her understanding and agreement.      Return visit: Return in 2 months (on 3/17/2022) for 4 month post transplant visit.      Kamran Corona MD    Chief Complaint   Ms. Ortega is a 36 year old here for kidney transplant, pancreas  transplant, immunosuppression management and new medical concerns.     History of Present Illness    Marilyn feels well. She denies N/V, abdominal pain. She states that her abdomen is swollen. She took lasix two days last week, 20mg each time. She has diffuse swelling everyday, worse in the afternoon.     Home BP: 120s systolic    Problem List   Patient Active Problem List   Diagnosis     Type 1 diabetes mellitus (H)     Type 1 diabetes, HbA1c goal < 7% (H)     Osteopenia     Chronic constipation     Irritable bowel syndrome     CKD (chronic kidney disease) stage 4, GFR 15-29 ml/min (H)     HTN, kidney transplant related     Gastroparesis     Heterozygous factor V Leiden mutation (H)     Status post simultaneous kidney and pancreas transplant (H)     Immunosuppression (H)     Kidney replaced by transplant     Pancreas replaced by transplant (H)     Need for pneumocystis prophylaxis     Anemia     Aftercare following organ transplant       Allergies   Allergies   Allergen Reactions     Chlorhexidine Hives, Itching and Rash     PN: Patient had swelling/rash that was very itchy with chlorprep.     Ceclor [Cefaclor Monohydrate]      Cefaclor Rash       Medications   Current Outpatient Medications   Medication Sig     acetaminophen (TYLENOL) 325 MG tablet Take 1-2 tablets (325-650 mg) by mouth every 4 hours as needed for other (For optimal non-opioid multimodal pain management to improve pain control.)     aspirin (ASA) 325 MG tablet Take 1 tablet (325 mg) by mouth daily     bromfenac (PROLENSA) 0.07 % ophthalmic solution Place 1 drop Into the left eye daily     calcium carbonate-vitamin D (OS-STEPHANIE WITH D) 500-200 MG-UNIT tablet Take 1 tablet by mouth 2 times daily (with meals)     cetirizine (ZYRTEC) 10 MG tablet Take 10 mg by mouth daily     cholecalciferol 25 MCG (1000 UT) TABS Take 2,000 Units by mouth every other day      clotrimazole (MYCELEX) 10 MG lozenge Place 1 lozenge (10 mg) inside cheek 4 times daily      "Continuous Blood Gluc Sensor (DEXCOM G6 SENSOR) MISC Use as directed for continuous glucose monitoring.  Change sensor every 10 days.     Continuous Blood Gluc Transmit (DEXCOM G6 TRANSMITTER) MISC Use as directed for continuous glucose monitoring.  Change every 3 months.     glucose blood VI test strips strip 4 times daily.     Loteprednol Etabonate (LOTEMAX SM) 0.38 % GEL Apply 1 drop to eye 3 times daily Left eye     magnesium oxide (MAG-OX) 400 MG tablet Take 1 tablet (400 mg) by mouth daily     mycophenolic acid (GENERIC EQUIVALENT) 360 MG EC tablet Take 2 tablets (720 mg) by mouth 2 times daily     norgestimate-ethinyl estradiol (ORTHO-CYCLEN) 0.25-35 MG-MCG tablet Take 1 tablet by mouth daily     ondansetron (ZOFRAN-ODT) 4 MG ODT tab Take 1 tablet (4 mg) by mouth every 6 hours as needed for nausea or vomiting     pantoprazole (PROTONIX) 40 MG EC tablet Take 1 tablet (40 mg) by mouth every morning (before breakfast)     phosphorus tablet 250 mg (PHOSPHA 250 NEUTRAL) 250 MG per tablet Take 1 tablet (250 mg) by mouth 2 times daily     Prucalopride Succinate (MOTEGRITY) 2 MG TABS Take one tablet by mouth daily     senna-docusate (SENOKOT-S/PERICOLACE) 8.6-50 MG tablet Take 2 tablets by mouth daily as needed for constipation     sodium bicarbonate 650 MG tablet Take 1 tablet (650 mg) by mouth 2 times daily     sulfamethoxazole-trimethoprim (BACTRIM) 400-80 MG tablet Take 1 tablet by mouth daily     tacrolimus (ENVARSUS XR) 0.75 MG 24 hr tablet Take 1 tablet (0.75 mg) by mouth every morning (before breakfast) On hold for dose change.     tacrolimus (ENVARSUS XR) 1 MG 24 hr tablet Take 8 tablets (8 mg) by mouth every morning (before breakfast)     valGANciclovir (VALCYTE) 450 MG tablet Take 2 tablets (900 mg) by mouth daily     No current facility-administered medications for this visit.     There are no discontinued medications.    Physical Exam   Vital Signs: /77   Pulse 92   Ht 1.676 m (5' 6\")   Wt 57.1 " kg (125 lb 14.4 oz)   SpO2 99%   BMI 20.32 kg/m      GENERAL APPEARANCE: alert and no distress  HENT: mouth without ulcers or lesions  HENT: ear canals and TM's normal and TM's pearly grey, normal light reflex bilateral  LYMPHATICS: no cervical or supraclavicular nodes  RESP: lungs clear to auscultation - no rales, rhonchi or wheezes  CV: regular rhythm, normal rate, no rub, no murmur  EDEMA: no LE edema bilaterally  ABDOMEN: soft, nondistended, nontender, bowel sounds normal  MS: extremities normal - no gross deformities noted, no evidence of inflammation in joints, no muscle tenderness  SKIN: no rash  NEURO: normal strength and tone, sensory exam grossly normal, mentation intact and speech normal  PSYCH: mentation appears normal and affect normal/bright  TX KIDNEY: normal    Data     Renal Latest Ref Rng & Units 1/10/2022 1/6/2022 1/3/2022   Na 133 - 144 mmol/L 139 139 139   K 3.4 - 5.3 mmol/L 4.0 4.3 4.8   Cl 94 - 109 mmol/L 111(H) 111(H) 111(H)   CO2 20 - 32 mmol/L 24 24 24   BUN 7 - 30 mg/dL 16 28 28   Cr 0.52 - 1.04 mg/dL 0.89 0.94 1.02   Glucose 70 - 99 mg/dL 95 104(H) 86   Ca  8.5 - 10.1 mg/dL 9.1 9.0 9.2   Mg 1.6 - 2.3 mg/dL 1.7 1.7 1.9     Bone Health Latest Ref Rng & Units 1/10/2022 1/6/2022 1/3/2022   Phos 2.5 - 4.5 mg/dL 1.8(L) 1.8(L) 2.1(L)   PTHi pg/mL - - -   Vit D Def 20 - 75 ug/L - - -     Heme Latest Ref Rng & Units 1/10/2022 1/6/2022 1/3/2022   WBC 4.0 - 11.0 10e3/uL 5.9 5.6 6.0   Hgb 11.7 - 15.7 g/dL 10.3(L) 9.6(L) 9.6(L)   Plt 150 - 450 10e3/uL 355 400 449   ABSOLUTE NEUTROPHIL 1.6 - 8.3 10e9/L - - -   ABSOLUTE LYMPHOCYTES 0.8 - 5.3 10e9/L - - -   ABSOLUTE MONOCYTES 0.0 - 1.3 10e9/L - - -   ABSOLUTE EOSINOPHILS 0.0 - 0.7 10e9/L - - -   ABSOLUTE BASOPHILS 0.0 - 0.2 10e9/L - - -     Liver Latest Ref Rng & Units 11/15/2021 7/21/2021 6/8/2012   AP 40 - 150 U/L 104 94 96   TBili 0.2 - 1.3 mg/dL 0.4 0.4 0.3   ALT 0 - 50 U/L 23 24 13   AST 0 - 45 U/L 16 19 33   Tot Protein 6.8 - 8.8 g/dL 8.4 9.4(H)  7.8   Albumin 3.4 - 5.0 g/dL 4.0 4.7 4.2     Pancreas Latest Ref Rng & Units 1/10/2022 1/6/2022 1/3/2022   A1C 0.0 - 5.6 % - - -   Amylase 30 - 110 U/L 60 66 65   Lipase 73 - 393 U/L 104 128 131     Iron studies Latest Ref Rng & Units 12/22/2021 12/1/2021   Iron 35 - 180 ug/dL 20(L) 29(L)   Iron sat 15 - 46 % 7(L) 10(L)   Ferritin 12 - 150 ng/mL 87 49     [facility] Txp Virology Latest Ref Rng & Units 12/13/2021 7/21/2021   CMV QUANT IU/ML Not Detected IU/mL Not Detected -   EBV CAPSID ANTIBODY IGG No detectable antibody. - Positive(A)        Recent Labs   Lab Test 12/24/21  0817 12/27/21  0850 01/03/22  0837 01/06/22  0910 01/10/22  0832   DOSTAC 12/23/2021  --  1/2/2022 1/5/2022  --    TACROL 7.9   < > 9.1 7.1 7.9    < > = values in this interval not displayed.     Recent Labs   Lab Test 12/20/21  0835 12/22/21  0907 01/03/22  0837   DOSMPA 12/19/2021 2015  --  1/2/2022   7:30 PM   MPACID 8.21* 3.29 2.50   MPAG 64.9 71.0 41.7       Early Post Transplant

## 2022-01-12 NOTE — NURSING NOTE
"Chief Complaint   Patient presents with     RECHECK     Kidney TX     /77   Pulse 92   Ht 1.676 m (5' 6\")   Wt 57.1 kg (125 lb 14.4 oz)   SpO2 99%   BMI 20.32 kg/m      Francisco Beltrán MA  "

## 2022-01-12 NOTE — PROGRESS NOTES
TRANSPLANT NEPHROLOGY EARLY POST TRANSPLANT VISIT    Assessment & Plan   # DDKT (SPK): Stable   - Baseline Creatinine: ~ 0.9-1.2   - Proteinuria: Minimal (0.2-0.5 grams)   - Date DSA Last Checked: Dec/2021      Latest DSA: No   - BK Viremia: No   - Kidney Tx Biopsy: No    # Pancreas Tx (SPK):    - Pancreatic Exocrine Drainage: Enteric drained     - Blood glucose: Euglycemia      On insulin: No   - HbA1c: Last checked at time of transplant      Latest HbA1c: 7.7%   - Pancreatic enzymes: Stable   - Date DSA Last Checked: Dec/2021  Latest DSA: No   - Pancreas Tx Biopsy: No    # Immunosuppression: Tacrolimus extended release (goal 8-10) and Mycophenolic acid (dose 720 mg every 12 hours)   - Induction with Recent Transplant:  Intermediate Intensity   - Continue with intensive monitoring of immunosuppression for efficacy and toxicity.   - Changes: Not at this time    # Infection Prophylaxis:   - PJP: Sulfa/TMP (Bactrim)  - CMV: Valganciclovir (Valcyte) 900 mg po qd Estimated Creatinine Clearance: 78.8 mL/min (based on SCr of 0.89 mg/dL).  - Thrush: Clotrimazole yodit (Mycelex) qid-counseled about interaction with CNI and to take consistently    # Blood Pressure: Controlled;  Goal BP: < 140/90   - Volume status: Euvolemic  EDW ~ 122lbs   - Changes: Not at this time    # Anemia in Chronic Renal Disease: Hgb: Trend up      SYLVAIN: No   - Iron studies: Low iron saturation    -PRBC on 12/25/21, received IV venofer as well in 12/2021   -Followed by anemia services    # Mineral Bone Disorder:   - Secondary renal hyperparathyroidism; PTH level: Normal (18-80 pg/ml)        On treatment: None  - Vitamin D; level: High normal        On supplement: Yes  - Calcium; level: Normal        On supplement: Yes  - Phosphorus; level: Normal        On supplement: Yes, decrease to 250mg daily     # Electrolytes:   - Potassium; level: Normal        On supplement: No  - Magnesium; level: Normal        On supplement: Yes, 400mg daily, stop  -  Bicarbonate; level: Normal        On supplement: Yes, bicarb 650mg bid, stop due to edema and starting torsemide    # GERD:   -Decrease protonix to 40mg every other day x2 weeks and then stop    # Skin sensitivity:   -Likely 2/2 tac. No rashes, not concerning at this point for VZV   -Resolved    # Pruritis:   -Developed after starting IV iron infusions.    -Improving. Recommend PRN benadryl 12.5mg     # Edema:   -Stop sodium bicarb and start torsemide 10mg daily. Will monitor weight and Scr   -Continue 3L fluid intake   -Her weight fluctuates about 5 lbs during the day.       # Medical Compliance: Yes    # COVID-19 Virus Review: Discussed COVID-19 virus and the potential medical risks.  Reviewed preventative health recommendations, including wearing a mask where appropriate.  Recommended COVID vaccination should be up to date with either an initial vaccination or booster shot when appropriate.  Asked the patient to inform the transplant center if they are exposed or diagnosed with this virus.    # COVID Vaccination Up To Date: Yes. Due for 4th dose on or after 4/8/22    # Transplant History:  Etiology of Kidney Failure: Diabetes mellitus type 1  Tx: SPK  Transplant: 11/15/2021 (Kidney / Pancreas)  Donor Type: Donation after Brain Death Donor Class:   Crossmatch at time of Tx: negative  DSA at time of Tx: No  Significant changes in immunosuppression: None  CMV IgG Ab High Risk Discordance (D+/R-): No  EBV IgG Ab High Risk Discordance (D+/R-): No  Significant transplant-related complications: None    Transplant Office Phone Number: [phone]    Assessment and plan was discussed with the patient and she voiced her understanding and agreement.      Return visit: Return in 2 months (on 3/17/2022) for 4 month post transplant visit.      Kamran Corona MD    Chief Complaint   Ms. Ortega is a 36 year old here for kidney transplant, pancreas transplant, immunosuppression management and new medical concerns.     History of  Present Illness    Marilyn feels well. She denies N/V, abdominal pain. She states that her abdomen is swollen. She took lasix two days last week, 20mg each time. She has diffuse swelling everyday, worse in the afternoon.     Home BP: 120s systolic    Problem List   Patient Active Problem List   Diagnosis     Type 1 diabetes mellitus (H)     Type 1 diabetes, HbA1c goal < 7% (H)     Osteopenia     Chronic constipation     Irritable bowel syndrome     CKD (chronic kidney disease) stage 4, GFR 15-29 ml/min (H)     HTN, kidney transplant related     Gastroparesis     Heterozygous factor V Leiden mutation (H)     Status post simultaneous kidney and pancreas transplant (H)     Immunosuppression (H)     Kidney replaced by transplant     Pancreas replaced by transplant (H)     Need for pneumocystis prophylaxis     Anemia     Aftercare following organ transplant       Allergies   Allergies   Allergen Reactions     Chlorhexidine Hives, Itching and Rash     PN: Patient had swelling/rash that was very itchy with chlorprep.     Ceclor [Cefaclor Monohydrate]      Cefaclor Rash       Medications   Current Outpatient Medications   Medication Sig     acetaminophen (TYLENOL) 325 MG tablet Take 1-2 tablets (325-650 mg) by mouth every 4 hours as needed for other (For optimal non-opioid multimodal pain management to improve pain control.)     aspirin (ASA) 325 MG tablet Take 1 tablet (325 mg) by mouth daily     bromfenac (PROLENSA) 0.07 % ophthalmic solution Place 1 drop Into the left eye daily     calcium carbonate-vitamin D (OS-STEPHANIE WITH D) 500-200 MG-UNIT tablet Take 1 tablet by mouth 2 times daily (with meals)     cetirizine (ZYRTEC) 10 MG tablet Take 10 mg by mouth daily     cholecalciferol 25 MCG (1000 UT) TABS Take 2,000 Units by mouth every other day      clotrimazole (MYCELEX) 10 MG lozenge Place 1 lozenge (10 mg) inside cheek 4 times daily     Continuous Blood Gluc Sensor (DEXCOM G6 SENSOR) MISC Use as directed for continuous  "glucose monitoring.  Change sensor every 10 days.     Continuous Blood Gluc Transmit (DEXCOM G6 TRANSMITTER) MISC Use as directed for continuous glucose monitoring.  Change every 3 months.     glucose blood VI test strips strip 4 times daily.     Loteprednol Etabonate (LOTEMAX SM) 0.38 % GEL Apply 1 drop to eye 3 times daily Left eye     magnesium oxide (MAG-OX) 400 MG tablet Take 1 tablet (400 mg) by mouth daily     mycophenolic acid (GENERIC EQUIVALENT) 360 MG EC tablet Take 2 tablets (720 mg) by mouth 2 times daily     norgestimate-ethinyl estradiol (ORTHO-CYCLEN) 0.25-35 MG-MCG tablet Take 1 tablet by mouth daily     ondansetron (ZOFRAN-ODT) 4 MG ODT tab Take 1 tablet (4 mg) by mouth every 6 hours as needed for nausea or vomiting     pantoprazole (PROTONIX) 40 MG EC tablet Take 1 tablet (40 mg) by mouth every morning (before breakfast)     phosphorus tablet 250 mg (PHOSPHA 250 NEUTRAL) 250 MG per tablet Take 1 tablet (250 mg) by mouth 2 times daily     Prucalopride Succinate (MOTEGRITY) 2 MG TABS Take one tablet by mouth daily     senna-docusate (SENOKOT-S/PERICOLACE) 8.6-50 MG tablet Take 2 tablets by mouth daily as needed for constipation     sodium bicarbonate 650 MG tablet Take 1 tablet (650 mg) by mouth 2 times daily     sulfamethoxazole-trimethoprim (BACTRIM) 400-80 MG tablet Take 1 tablet by mouth daily     tacrolimus (ENVARSUS XR) 0.75 MG 24 hr tablet Take 1 tablet (0.75 mg) by mouth every morning (before breakfast) On hold for dose change.     tacrolimus (ENVARSUS XR) 1 MG 24 hr tablet Take 8 tablets (8 mg) by mouth every morning (before breakfast)     valGANciclovir (VALCYTE) 450 MG tablet Take 2 tablets (900 mg) by mouth daily     No current facility-administered medications for this visit.     There are no discontinued medications.    Physical Exam   Vital Signs: /77   Pulse 92   Ht 1.676 m (5' 6\")   Wt 57.1 kg (125 lb 14.4 oz)   SpO2 99%   BMI 20.32 kg/m      GENERAL APPEARANCE: alert and " no distress  HENT: mouth without ulcers or lesions  HENT: ear canals and TM's normal and TM's pearly grey, normal light reflex bilateral  LYMPHATICS: no cervical or supraclavicular nodes  RESP: lungs clear to auscultation - no rales, rhonchi or wheezes  CV: regular rhythm, normal rate, no rub, no murmur  EDEMA: no LE edema bilaterally  ABDOMEN: soft, nondistended, nontender, bowel sounds normal  MS: extremities normal - no gross deformities noted, no evidence of inflammation in joints, no muscle tenderness  SKIN: no rash  NEURO: normal strength and tone, sensory exam grossly normal, mentation intact and speech normal  PSYCH: mentation appears normal and affect normal/bright  TX KIDNEY: normal    Data     Renal Latest Ref Rng & Units 1/10/2022 1/6/2022 1/3/2022   Na 133 - 144 mmol/L 139 139 139   K 3.4 - 5.3 mmol/L 4.0 4.3 4.8   Cl 94 - 109 mmol/L 111(H) 111(H) 111(H)   CO2 20 - 32 mmol/L 24 24 24   BUN 7 - 30 mg/dL 16 28 28   Cr 0.52 - 1.04 mg/dL 0.89 0.94 1.02   Glucose 70 - 99 mg/dL 95 104(H) 86   Ca  8.5 - 10.1 mg/dL 9.1 9.0 9.2   Mg 1.6 - 2.3 mg/dL 1.7 1.7 1.9     Bone Health Latest Ref Rng & Units 1/10/2022 1/6/2022 1/3/2022   Phos 2.5 - 4.5 mg/dL 1.8(L) 1.8(L) 2.1(L)   PTHi pg/mL - - -   Vit D Def 20 - 75 ug/L - - -     Heme Latest Ref Rng & Units 1/10/2022 1/6/2022 1/3/2022   WBC 4.0 - 11.0 10e3/uL 5.9 5.6 6.0   Hgb 11.7 - 15.7 g/dL 10.3(L) 9.6(L) 9.6(L)   Plt 150 - 450 10e3/uL 355 400 449   ABSOLUTE NEUTROPHIL 1.6 - 8.3 10e9/L - - -   ABSOLUTE LYMPHOCYTES 0.8 - 5.3 10e9/L - - -   ABSOLUTE MONOCYTES 0.0 - 1.3 10e9/L - - -   ABSOLUTE EOSINOPHILS 0.0 - 0.7 10e9/L - - -   ABSOLUTE BASOPHILS 0.0 - 0.2 10e9/L - - -     Liver Latest Ref Rng & Units 11/15/2021 7/21/2021 6/8/2012   AP 40 - 150 U/L 104 94 96   TBili 0.2 - 1.3 mg/dL 0.4 0.4 0.3   ALT 0 - 50 U/L 23 24 13   AST 0 - 45 U/L 16 19 33   Tot Protein 6.8 - 8.8 g/dL 8.4 9.4(H) 7.8   Albumin 3.4 - 5.0 g/dL 4.0 4.7 4.2     Pancreas Latest Ref Rng & Units  1/10/2022 1/6/2022 1/3/2022   A1C 0.0 - 5.6 % - - -   Amylase 30 - 110 U/L 60 66 65   Lipase 73 - 393 U/L 104 128 131     Iron studies Latest Ref Rng & Units 12/22/2021 12/1/2021   Iron 35 - 180 ug/dL 20(L) 29(L)   Iron sat 15 - 46 % 7(L) 10(L)   Ferritin 12 - 150 ng/mL 87 49     [facility] Txp Virology Latest Ref Rng & Units 12/13/2021 7/21/2021   CMV QUANT IU/ML Not Detected IU/mL Not Detected -   EBV CAPSID ANTIBODY IGG No detectable antibody. - Positive(A)        Recent Labs   Lab Test 12/24/21  0817 12/27/21  0850 01/03/22  0837 01/06/22  0910 01/10/22  0832   DOSTAC 12/23/2021  --  1/2/2022 1/5/2022  --    TACROL 7.9   < > 9.1 7.1 7.9    < > = values in this interval not displayed.     Recent Labs   Lab Test 12/20/21  0835 12/22/21  0907 01/03/22  0837   DOSMPA 12/19/2021 2015  --  1/2/2022   7:30 PM   MPACID 8.21* 3.29 2.50   MPAG 64.9 71.0 41.7

## 2022-01-12 NOTE — PATIENT INSTRUCTIONS
Stop magnesium  Decrease protonix to 40mg every other day for 2 weeks and then stop  Stop sodium bicarb  Start torsemide 10mg daily (take in the morning)  Decrease phosphorous to 250mg daily   For itchiness you could use benadryl 12.5mg or 25mg oral as needed

## 2022-01-13 ENCOUNTER — TELEPHONE (OUTPATIENT)
Dept: TRANSPLANT | Facility: CLINIC | Age: 37
End: 2022-01-13

## 2022-01-13 ENCOUNTER — LAB (OUTPATIENT)
Dept: LAB | Facility: CLINIC | Age: 37
End: 2022-01-13
Attending: INTERNAL MEDICINE
Payer: COMMERCIAL

## 2022-01-13 DIAGNOSIS — Z94.0 KIDNEY TRANSPLANTED: ICD-10-CM

## 2022-01-13 DIAGNOSIS — Z94.83 PANCREAS REPLACED BY TRANSPLANT (H): ICD-10-CM

## 2022-01-13 DIAGNOSIS — Z48.298 AFTERCARE FOLLOWING ORGAN TRANSPLANT: ICD-10-CM

## 2022-01-13 DIAGNOSIS — Z94.0 KIDNEY REPLACED BY TRANSPLANT: ICD-10-CM

## 2022-01-13 DIAGNOSIS — Z79.899 ENCOUNTER FOR LONG-TERM CURRENT USE OF MEDICATION: ICD-10-CM

## 2022-01-13 LAB
AMYLASE SERPL-CCNC: 60 U/L (ref 30–110)
ANION GAP SERPL CALCULATED.3IONS-SCNC: 5 MMOL/L (ref 3–14)
BUN SERPL-MCNC: 35 MG/DL (ref 7–30)
CALCIUM SERPL-MCNC: 9.6 MG/DL (ref 8.5–10.1)
CHLORIDE BLD-SCNC: 108 MMOL/L (ref 94–109)
CO2 SERPL-SCNC: 26 MMOL/L (ref 20–32)
CREAT SERPL-MCNC: 1.25 MG/DL (ref 0.52–1.04)
ERYTHROCYTE [DISTWIDTH] IN BLOOD BY AUTOMATED COUNT: 19.9 % (ref 10–15)
GFR SERPL CREATININE-BSD FRML MDRD: 57 ML/MIN/1.73M2
GLUCOSE BLD-MCNC: 96 MG/DL (ref 70–99)
HCT VFR BLD AUTO: 34.3 % (ref 35–47)
HGB BLD-MCNC: 10.2 G/DL (ref 11.7–15.7)
LIPASE SERPL-CCNC: 104 U/L (ref 73–393)
MCH RBC QN AUTO: 28.1 PG (ref 26.5–33)
MCHC RBC AUTO-ENTMCNC: 29.7 G/DL (ref 31.5–36.5)
MCV RBC AUTO: 95 FL (ref 78–100)
PLATELET # BLD AUTO: 346 10E3/UL (ref 150–450)
POTASSIUM BLD-SCNC: 4.8 MMOL/L (ref 3.4–5.3)
RBC # BLD AUTO: 3.63 10E6/UL (ref 3.8–5.2)
SODIUM SERPL-SCNC: 139 MMOL/L (ref 133–144)
TACROLIMUS BLD-MCNC: 14.3 UG/L (ref 5–15)
TME LAST DOSE: NORMAL H
TME LAST DOSE: NORMAL H
WBC # BLD AUTO: 5.5 10E3/UL (ref 4–11)

## 2022-01-13 PROCEDURE — 36415 COLL VENOUS BLD VENIPUNCTURE: CPT

## 2022-01-13 PROCEDURE — 85027 COMPLETE CBC AUTOMATED: CPT

## 2022-01-13 PROCEDURE — 83690 ASSAY OF LIPASE: CPT

## 2022-01-13 PROCEDURE — 80197 ASSAY OF TACROLIMUS: CPT

## 2022-01-13 PROCEDURE — 82150 ASSAY OF AMYLASE: CPT

## 2022-01-13 PROCEDURE — 80048 BASIC METABOLIC PNL TOTAL CA: CPT

## 2022-01-13 RX ORDER — TACROLIMUS 1 MG/1
7 TABLET, EXTENDED RELEASE ORAL
Qty: 240 TABLET | Refills: 11 | Status: SHIPPED | OUTPATIENT
Start: 2022-01-13 | End: 2022-01-14

## 2022-01-13 NOTE — TELEPHONE ENCOUNTER
ISSUE:   Tacrolimus XR level 14.3 on 1/13, goal 8-10, dose 8 mg daily.    PLAN:   Please call patient and confirm this was an accurate 24-hour trough. Verify Tacrolimus XR dose 8 mg daily. Confirm no new medications or illness. Confirm no missed doses. If accurate trough and accurate dose, decrease Tacrolimus XR dose to 7 mg daily and repeat labs in 4 days    OUTCOME:   Spoke with patient, they confirm accurate trough level and current dose 8 mg daily. Patient confirmed dose change to 7 mg daily and to repeat labs in 4 days. Orders sent to [facility] pharmacy for dose change and lab for repeat labs. Patient voiced understanding of plan.

## 2022-01-13 NOTE — TELEPHONE ENCOUNTER
ISSUE:  Rise in creatinine  New med torsemide started yesterday    PLAN:  Decrease to 5mg daily  Recheck labs on Monday    ----- Message from Kamran Corona MD sent at 1/13/2022 12:21 PM CST -----  Have her decrease to 5mg of torsemide daily. If Scr still elevated next week will have to stop. She just really has edema that she complains about    OUTCOME  [employer] message sent to patient to decrease torsemide dose.     Patient stated she had not started torsemide prior to labs.   Reviewed w/ Dr Corona, OK to take decreased torsemide dose.   Push fluids  Recheck labs on Monday

## 2022-01-14 ENCOUNTER — TELEPHONE (OUTPATIENT)
Dept: TRANSPLANT | Facility: CLINIC | Age: 37
End: 2022-01-14
Payer: COMMERCIAL

## 2022-01-14 DIAGNOSIS — Z94.0 KIDNEY TRANSPLANTED: ICD-10-CM

## 2022-01-14 DIAGNOSIS — Z94.0 KIDNEY REPLACED BY TRANSPLANT: ICD-10-CM

## 2022-01-14 RX ORDER — TACROLIMUS 1 MG/1
7 TABLET, EXTENDED RELEASE ORAL
Qty: 210 TABLET | Refills: 11 | Status: SHIPPED | OUTPATIENT
Start: 2022-01-14 | End: 2022-01-21

## 2022-01-14 NOTE — TELEPHONE ENCOUNTER
Patient states she is almost out of tacrolimus.  pharmacy states prescription is for 2mg daily, patient is up to 7mg daily. Message sent to pharmacist at [facility].

## 2022-01-14 NOTE — TELEPHONE ENCOUNTER
Patient Call: General  Route to LPN    Reason for call: Pharmacy is giving push back on medication refill. They only have the first prescription of MOTEGRITY of 2mg but patient says they should be on 8mg.     Call back needed? Yes    Return Call Needed  Same as documented in contacts section  When to return call?: Same day: Route High Priority

## 2022-01-17 ENCOUNTER — LAB (OUTPATIENT)
Dept: LAB | Facility: CLINIC | Age: 37
End: 2022-01-17
Attending: INTERNAL MEDICINE
Payer: COMMERCIAL

## 2022-01-17 DIAGNOSIS — Z94.83 PANCREAS REPLACED BY TRANSPLANT (H): ICD-10-CM

## 2022-01-17 DIAGNOSIS — D63.1 ANEMIA DUE TO CHRONIC KIDNEY DISEASE, UNSPECIFIED CKD STAGE: ICD-10-CM

## 2022-01-17 DIAGNOSIS — Z94.0 KIDNEY REPLACED BY TRANSPLANT: ICD-10-CM

## 2022-01-17 DIAGNOSIS — Z48.298 AFTERCARE FOLLOWING ORGAN TRANSPLANT: ICD-10-CM

## 2022-01-17 DIAGNOSIS — Z79.899 ENCOUNTER FOR LONG-TERM CURRENT USE OF MEDICATION: ICD-10-CM

## 2022-01-17 DIAGNOSIS — N18.9 ANEMIA DUE TO CHRONIC KIDNEY DISEASE, UNSPECIFIED CKD STAGE: ICD-10-CM

## 2022-01-17 LAB
AMYLASE SERPL-CCNC: 56 U/L (ref 30–110)
ANION GAP SERPL CALCULATED.3IONS-SCNC: 8 MMOL/L (ref 3–14)
BUN SERPL-MCNC: 34 MG/DL (ref 7–30)
CALCIUM SERPL-MCNC: 9.3 MG/DL (ref 8.5–10.1)
CHLORIDE BLD-SCNC: 106 MMOL/L (ref 94–109)
CO2 SERPL-SCNC: 24 MMOL/L (ref 20–32)
CREAT SERPL-MCNC: 1.14 MG/DL (ref 0.52–1.04)
ERYTHROCYTE [DISTWIDTH] IN BLOOD BY AUTOMATED COUNT: 19.6 % (ref 10–15)
GFR SERPL CREATININE-BSD FRML MDRD: 64 ML/MIN/1.73M2
GLUCOSE BLD-MCNC: 85 MG/DL (ref 70–99)
HCT VFR BLD AUTO: 36 % (ref 35–47)
HGB BLD-MCNC: 10.7 G/DL (ref 11.7–15.7)
LIPASE SERPL-CCNC: 104 U/L (ref 73–393)
MAGNESIUM SERPL-MCNC: 1.7 MG/DL (ref 1.6–2.3)
MCH RBC QN AUTO: 28.3 PG (ref 26.5–33)
MCHC RBC AUTO-ENTMCNC: 29.7 G/DL (ref 31.5–36.5)
MCV RBC AUTO: 95 FL (ref 78–100)
MYCOPHENOLATE SERPL LC/MS/MS-MCNC: 4.2 MG/L (ref 1–3.5)
MYCOPHENOLATE-G SERPL LC/MS/MS-MCNC: 61.7 MG/L (ref 30–95)
PHOSPHATE SERPL-MCNC: 2.3 MG/DL (ref 2.5–4.5)
PLATELET # BLD AUTO: 310 10E3/UL (ref 150–450)
POTASSIUM BLD-SCNC: 4.3 MMOL/L (ref 3.4–5.3)
RBC # BLD AUTO: 3.78 10E6/UL (ref 3.8–5.2)
SODIUM SERPL-SCNC: 138 MMOL/L (ref 133–144)
TACROLIMUS BLD-MCNC: 9.9 UG/L (ref 5–15)
TME LAST DOSE: ABNORMAL H
TME LAST DOSE: ABNORMAL H
TME LAST DOSE: NORMAL H
TME LAST DOSE: NORMAL H
WBC # BLD AUTO: 4.6 10E3/UL (ref 4–11)

## 2022-01-17 PROCEDURE — 82150 ASSAY OF AMYLASE: CPT

## 2022-01-17 PROCEDURE — 36415 COLL VENOUS BLD VENIPUNCTURE: CPT

## 2022-01-17 PROCEDURE — 80048 BASIC METABOLIC PNL TOTAL CA: CPT

## 2022-01-17 PROCEDURE — 85014 HEMATOCRIT: CPT

## 2022-01-17 PROCEDURE — 83690 ASSAY OF LIPASE: CPT

## 2022-01-17 PROCEDURE — 80197 ASSAY OF TACROLIMUS: CPT

## 2022-01-17 PROCEDURE — 83735 ASSAY OF MAGNESIUM: CPT

## 2022-01-17 PROCEDURE — 84100 ASSAY OF PHOSPHORUS: CPT

## 2022-01-17 PROCEDURE — 80180 DRUG SCRN QUAN MYCOPHENOLATE: CPT

## 2022-01-18 ENCOUNTER — TELEPHONE (OUTPATIENT)
Dept: TRANSPLANT | Facility: CLINIC | Age: 37
End: 2022-01-18
Payer: COMMERCIAL

## 2022-01-18 NOTE — TELEPHONE ENCOUNTER
Patient notified to postpone routine dental procedures/cleanings until >6 months post txp. Then she will not require any abx prior to appt. Patient verbalizes understanding.

## 2022-01-18 NOTE — TELEPHONE ENCOUNTER
Patient called to touch base with coordinator regarding an antibiotic for her dental cleaning patient would like a return call.

## 2022-01-20 ENCOUNTER — TELEPHONE (OUTPATIENT)
Dept: PHARMACY | Facility: CLINIC | Age: 37
End: 2022-01-20

## 2022-01-20 ENCOUNTER — LAB (OUTPATIENT)
Dept: LAB | Facility: CLINIC | Age: 37
End: 2022-01-20
Attending: INTERNAL MEDICINE
Payer: COMMERCIAL

## 2022-01-20 DIAGNOSIS — Z94.0 KIDNEY REPLACED BY TRANSPLANT: ICD-10-CM

## 2022-01-20 DIAGNOSIS — Z94.83 PANCREAS REPLACED BY TRANSPLANT (H): ICD-10-CM

## 2022-01-20 DIAGNOSIS — D63.1 ANEMIA DUE TO CHRONIC KIDNEY DISEASE, UNSPECIFIED CKD STAGE: ICD-10-CM

## 2022-01-20 DIAGNOSIS — Z48.298 AFTERCARE FOLLOWING ORGAN TRANSPLANT: ICD-10-CM

## 2022-01-20 DIAGNOSIS — Z79.899 ENCOUNTER FOR LONG-TERM CURRENT USE OF MEDICATION: ICD-10-CM

## 2022-01-20 DIAGNOSIS — N18.9 ANEMIA DUE TO CHRONIC KIDNEY DISEASE, UNSPECIFIED CKD STAGE: ICD-10-CM

## 2022-01-20 LAB
AMYLASE SERPL-CCNC: 60 U/L (ref 30–110)
ANION GAP SERPL CALCULATED.3IONS-SCNC: 5 MMOL/L (ref 3–14)
BUN SERPL-MCNC: 33 MG/DL (ref 7–30)
CALCIUM SERPL-MCNC: 9.6 MG/DL (ref 8.5–10.1)
CHLORIDE BLD-SCNC: 108 MMOL/L (ref 94–109)
CO2 SERPL-SCNC: 24 MMOL/L (ref 20–32)
CREAT SERPL-MCNC: 1.1 MG/DL (ref 0.52–1.04)
CREAT UR-MCNC: 73 MG/DL
ERYTHROCYTE [DISTWIDTH] IN BLOOD BY AUTOMATED COUNT: 18.8 % (ref 10–15)
GFR SERPL CREATININE-BSD FRML MDRD: 66 ML/MIN/1.73M2
GLUCOSE BLD-MCNC: 78 MG/DL (ref 70–99)
HCT VFR BLD AUTO: 36.1 % (ref 35–47)
HGB BLD-MCNC: 11 G/DL (ref 11.7–15.7)
LIPASE SERPL-CCNC: 122 U/L (ref 73–393)
MCH RBC QN AUTO: 28.1 PG (ref 26.5–33)
MCHC RBC AUTO-ENTMCNC: 30.5 G/DL (ref 31.5–36.5)
MCV RBC AUTO: 92 FL (ref 78–100)
PLATELET # BLD AUTO: 263 10E3/UL (ref 150–450)
POTASSIUM BLD-SCNC: 4.2 MMOL/L (ref 3.4–5.3)
PROT UR-MCNC: 0.1 G/L
PROT/CREAT 24H UR: 0.14 G/G CR (ref 0–0.2)
RBC # BLD AUTO: 3.91 10E6/UL (ref 3.8–5.2)
SODIUM SERPL-SCNC: 137 MMOL/L (ref 133–144)
TACROLIMUS BLD-MCNC: 11.4 UG/L (ref 5–15)
TME LAST DOSE: NORMAL H
TME LAST DOSE: NORMAL H
WBC # BLD AUTO: 4.7 10E3/UL (ref 4–11)

## 2022-01-20 PROCEDURE — 87799 DETECT AGENT NOS DNA QUANT: CPT

## 2022-01-20 PROCEDURE — 85041 AUTOMATED RBC COUNT: CPT

## 2022-01-20 PROCEDURE — 80197 ASSAY OF TACROLIMUS: CPT

## 2022-01-20 PROCEDURE — 83690 ASSAY OF LIPASE: CPT

## 2022-01-20 PROCEDURE — 82150 ASSAY OF AMYLASE: CPT

## 2022-01-20 PROCEDURE — 36415 COLL VENOUS BLD VENIPUNCTURE: CPT

## 2022-01-20 PROCEDURE — 80048 BASIC METABOLIC PNL TOTAL CA: CPT

## 2022-01-20 PROCEDURE — 84156 ASSAY OF PROTEIN URINE: CPT

## 2022-01-20 NOTE — TELEPHONE ENCOUNTER
Anemia Management Note  SUBJECTIVE/OBJECTIVE:  Referred by Dr. 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   Iron regimen:  Does not tolerate Oral Iron. Will stop Oral.  Venofer has already been ordered.  Labs : 2022  Recent SYLVAIN use, transfusion, IV iron: Starting Venofer, Aranesp   RX/TX plans : TBD      No history of stroke, MI and blood clots or cancers.     Contact:            No Consent to Communicate on File other than for Ratna Ortega to  medications.        Anemia Latest Ref Rng & Units 2021 1/3/2022 2022 1/10/2022 2022 2022 2022   Hemoglobin 11.7 - 15.7 g/dL 9.3(L) 9.6(L) 9.6(L) 10.3(L) 10.2(L) 10.7(L) 11.0(L)   IV Iron Dose - - - - - - - -   TSAT 15 - 46 % - - - - - - -   Ferritin 12 - 150 ng/mL - - - - - - -   PRBCs - - - - - - - -     BP Readings from Last 3 Encounters:   22 126/77   21 121/81   21 120/84     Wt Readings from Last 2 Encounters:   22 125 lb 14.4 oz (57.1 kg)   21 121 lb 4.8 oz (55 kg)           ASSESSMENT:  Hgb:at goal - continue to monitor  TSat: pending Ferritin: Pending    PLAN:  RTC for Anemia labs in 4 week(s)    Orders needed to be renewed (for next follow-up date) in EPIC: None    Iron labs due:  Pending.     Plan discussed with:  No call, lab review      NEXT FOLLOW-UP DATE:  22    Patricia Gonzales RN   Anemia Services  [address] [address]   [email or web address]   Office : [phone]  Fax: [phone]

## 2022-01-21 ENCOUNTER — TELEPHONE (OUTPATIENT)
Dept: TRANSPLANT | Facility: CLINIC | Age: 37
End: 2022-01-21
Payer: COMMERCIAL

## 2022-01-21 ENCOUNTER — VIRTUAL VISIT (OUTPATIENT)
Dept: PHARMACY | Facility: CLINIC | Age: 37
End: 2022-01-21
Payer: COMMERCIAL

## 2022-01-21 DIAGNOSIS — R60.9 EDEMA, UNSPECIFIED TYPE: ICD-10-CM

## 2022-01-21 DIAGNOSIS — K21.9 GASTROESOPHAGEAL REFLUX DISEASE, UNSPECIFIED WHETHER ESOPHAGITIS PRESENT: ICD-10-CM

## 2022-01-21 DIAGNOSIS — D63.1 ANEMIA DUE TO CHRONIC KIDNEY DISEASE, UNSPECIFIED CKD STAGE: ICD-10-CM

## 2022-01-21 DIAGNOSIS — N18.9 ANEMIA DUE TO CHRONIC KIDNEY DISEASE, UNSPECIFIED CKD STAGE: ICD-10-CM

## 2022-01-21 DIAGNOSIS — K31.84 GASTROPARESIS: ICD-10-CM

## 2022-01-21 DIAGNOSIS — Z94.0 HISTORY OF SIMULTANEOUS KIDNEY AND PANCREAS TRANSPLANT (H): Primary | ICD-10-CM

## 2022-01-21 DIAGNOSIS — Z94.83 HISTORY OF SIMULTANEOUS KIDNEY AND PANCREAS TRANSPLANT (H): Primary | ICD-10-CM

## 2022-01-21 LAB — BKV DNA # SPEC NAA+PROBE: NOT DETECTED COPIES/ML

## 2022-01-21 PROCEDURE — 99607 MTMS BY PHARM ADDL 15 MIN: CPT | Performed by: PHARMACIST

## 2022-01-21 PROCEDURE — 99605 MTMS BY PHARM NP 15 MIN: CPT | Performed by: PHARMACIST

## 2022-01-21 RX ORDER — DOCUSATE SODIUM 100 MG/1
200 CAPSULE, LIQUID FILLED ORAL
COMMUNITY

## 2022-01-21 RX ORDER — TACROLIMUS 4 MG/1
4 TABLET, EXTENDED RELEASE ORAL
Qty: 30 TABLET | Refills: 0 | Status: SHIPPED | OUTPATIENT
Start: 2022-01-21 | End: 2022-02-08

## 2022-01-21 RX ORDER — TACROLIMUS 1 MG/1
3 TABLET, EXTENDED RELEASE ORAL
Qty: 90 TABLET | Refills: 11 | Status: SHIPPED | OUTPATIENT
Start: 2022-01-21 | End: 2022-02-08

## 2022-01-21 NOTE — PATIENT INSTRUCTIONS
Recommendations from today's MTM visit:                                                       1. Stop Valcyte and Clotrimazole on 2/15, but let Yessica know before you stop them. Will need a dose increase for Envarsus.     Follow-up: 2 months    It was great to speak with you today.  I value your experience and would be very thankful for your time with providing feedback on our clinic survey. You may receive a survey via email or text message in the next few days.     To schedule another MTM appointment, please call the clinic directly or you may call the MTM scheduling line at [phone] or toll-free at [phone].     My Clinical Pharmacist's contact information:                                                      Please feel free to contact me with any questions or concerns you have.      Bulmaro Weiner, PharmD  MTM Pharmacist    Phone: [phone]

## 2022-01-21 NOTE — PROGRESS NOTES
Medication Therapy Management (MTM) Encounter    ASSESSMENT:                            Medication Adherence/Access: Patient would like 4mg tablets of Envarsus so she does not have to take as many pills.  Send these over to specialty pharmacy.    Renal/Pancreas Transplant:  Valcyte scheduled to stop on 2/15 patient will have completed 3 months.  Clotrimazole also will stop at this time, but patient should discuss stopping with coordinator as her tacrolimus levels could drop by 25 to 50% after stopping.  Likely will need a dose increase to maintain therapeutic levels.    GERD: Stable.     Edema: Improving.     Gastroparesis: Stable.     Anemia: Stable.     PLAN:                            Pt to...  1. Stop Valcyte and Clotrimazole on 2/15, but let Yessica know before you stop them. Will need a dose increase for Envarsus.   2. Sent over Envarsus 4mg tablets to take with your 1 mg tablets for 7mg once daily total.     Follow-up: 2 months    SUBJECTIVE/OBJECTIVE:                          Marilyn Ortega is a 36 year old female called for a follow-up visit.  Today's visit is a follow-up MTM visit from 12/1     Reason for visit: 2 months post txp..    Allergies/ADRs: Reviewed in chart  Past Medical History: Reviewed in chart  Tobacco: She reports that she has never smoked. She has never used smokeless tobacco.  Alcohol: not currently using    Medication Adherence/Access: Per patient, misses medication 0 times per week.     Renal/Pancreas Transplant:  Current immunosuppressants include Envarsus 2mg twice daily, Myfortic 720mg twice daily. Pt reports headaches improved, vision have improved somewhat, still some issues in the morning. May be correlated with Tacrolimus.   Vascular prophylaxis: Aspirin 325mg daily. Denies bleed sx.   Transplant date: 11/15/21  Estimated Creatinine Clearance: 63.7 mL/min (A) (based on SCr of 1.1 mg/dL (H)).  CMV prophylaxis: CrCl >/=60 mL/minute: Valcyte 900mg daily Treat 3 months post tx  PCP  prophylaxis: Bactrim S S daily.  Antifungal Prophylaxis: Clotrimazole 10mg lozenges 4x times daily consistently.   Supplements: Phosphorous 250mg daily, Calcium (OsCal) twice daily.  Tx Coordinator: Melina Marshall MD: Dr. Pack, Using Med Card: Yes  Immunizations: annual flu shot unknown; Pneumovax 23:  2005; Prevnar 13: unknown; TDaP:  2013; Shingrix: unknown, HBV: immunity per last titers, COVID: Vanu 3x2021    GERD: Current medications include: Protonix (pantoprazole) 40mg every other day. Pt reports no current symptoms.  Patient feels that current regimen is effective.    Edema: Pt is taking Torsemide 5mg once daily. Her edema is definitely better. Fluid retains mostly below the waist. Weight the last few days has been stable at 119-121lbs. Weight when she started Torsemide last Thursday was 125 lbs. Edema in legs also visually improved.     Gastroparesis: Pt is taking Motegrity 2mg daily, taking Docusate 200mg every morning.     Anemia: Pt had 3 iron transfusions. Had severe itching with these.   Hemoglobin   Date Value Ref Range Status   01/20/2022 11.0 (L) 11.7 - 15.7 g/dL Final   05/24/2012 11.8 11.7 - 15.7 g/dL Final     Today's Vitals: There were no vitals taken for this visit.  ----------------    I spent 30 minutes with this patient today. All changes were made via collaborative practice agreement with Chloe. A copy of the visit note was provided to the patient's provider(s).    The patient was sent via "[employer] a summary of these recommendations.     Bulmaro Weiner, PharmD  [facility] Pharmacist    Phone: [phone]     Telemedicine Visit Details  Type of service:  Telephone visit  Start Time: 1:20 PM  End Time: 1:50 PM  Originating Location (patient location): Home  Distant Location (provider location):  [facility]     Medication Therapy Recommendations  No medication therapy recommendations to display

## 2022-01-22 NOTE — TELEPHONE ENCOUNTER
Marilyn reports watery diarrhea since about 1:30 this afternoon.  She had some nausea this AM and admits that even yesterday she was not eating her normal diet.    She reports that the stool dark, denies any exposure to ill persons.     She is not on a fiber supplement, due to gastroparesis.    BP was soft today, 90s / 60s.  She reports that she was feeling dizzy / lightheaded, but is no longer.    Weight this AM = 120.4lbs, baseline weight between 119 - 121.  On 5mg torsemide daily x1 week for swelling.     RNCC advised:  Use comfort measures over the weekend. Try to match intake to output. Okay for Pedialyte, Boost / Ensure.  If BPs are soft, as they were today, hold torsemide.   Eat a BRAT diet.    Marilyn voiced understanding. If lightheadedness / dizziness resumes despite best hydration efforts, will call triage back.   If diarrhea persists through the weekend, will call coordinator for further work up on Monday.

## 2022-01-24 ENCOUNTER — TELEPHONE (OUTPATIENT)
Dept: TRANSPLANT | Facility: CLINIC | Age: 37
End: 2022-01-24

## 2022-01-24 ENCOUNTER — LAB (OUTPATIENT)
Dept: LAB | Facility: CLINIC | Age: 37
End: 2022-01-24
Payer: COMMERCIAL

## 2022-01-24 DIAGNOSIS — Z94.83 PANCREAS REPLACED BY TRANSPLANT (H): ICD-10-CM

## 2022-01-24 DIAGNOSIS — Z94.0 KIDNEY REPLACED BY TRANSPLANT: ICD-10-CM

## 2022-01-24 DIAGNOSIS — Z48.298 AFTERCARE FOLLOWING ORGAN TRANSPLANT: ICD-10-CM

## 2022-01-24 DIAGNOSIS — Z79.899 ENCOUNTER FOR LONG-TERM CURRENT USE OF MEDICATION: ICD-10-CM

## 2022-01-24 LAB
AMYLASE SERPL-CCNC: 46 U/L (ref 30–110)
ANION GAP SERPL CALCULATED.3IONS-SCNC: 4 MMOL/L (ref 3–14)
BUN SERPL-MCNC: 28 MG/DL (ref 7–30)
CALCIUM SERPL-MCNC: 9.1 MG/DL (ref 8.5–10.1)
CHLORIDE BLD-SCNC: 107 MMOL/L (ref 94–109)
CO2 SERPL-SCNC: 24 MMOL/L (ref 20–32)
CREAT SERPL-MCNC: 1.26 MG/DL (ref 0.52–1.04)
ERYTHROCYTE [DISTWIDTH] IN BLOOD BY AUTOMATED COUNT: 18.4 % (ref 10–15)
GFR SERPL CREATININE-BSD FRML MDRD: 56 ML/MIN/1.73M2
GLUCOSE BLD-MCNC: 88 MG/DL (ref 70–99)
HCT VFR BLD AUTO: 34.4 % (ref 35–47)
HGB BLD-MCNC: 10.9 G/DL (ref 11.7–15.7)
LIPASE SERPL-CCNC: 92 U/L (ref 73–393)
MAGNESIUM SERPL-MCNC: 1.5 MG/DL (ref 1.6–2.3)
MCH RBC QN AUTO: 29 PG (ref 26.5–33)
MCHC RBC AUTO-ENTMCNC: 31.7 G/DL (ref 31.5–36.5)
MCV RBC AUTO: 92 FL (ref 78–100)
PHOSPHATE SERPL-MCNC: 2 MG/DL (ref 2.5–4.5)
PLATELET # BLD AUTO: 253 10E3/UL (ref 150–450)
POTASSIUM BLD-SCNC: 4.4 MMOL/L (ref 3.4–5.3)
RBC # BLD AUTO: 3.76 10E6/UL (ref 3.8–5.2)
SODIUM SERPL-SCNC: 135 MMOL/L (ref 133–144)
TACROLIMUS BLD-MCNC: 11.3 UG/L (ref 5–15)
TME LAST DOSE: NORMAL H
TME LAST DOSE: NORMAL H
WBC # BLD AUTO: 2.9 10E3/UL (ref 4–11)

## 2022-01-24 PROCEDURE — 84100 ASSAY OF PHOSPHORUS: CPT

## 2022-01-24 PROCEDURE — 83735 ASSAY OF MAGNESIUM: CPT

## 2022-01-24 PROCEDURE — 82310 ASSAY OF CALCIUM: CPT

## 2022-01-24 PROCEDURE — 83690 ASSAY OF LIPASE: CPT

## 2022-01-24 PROCEDURE — 82150 ASSAY OF AMYLASE: CPT

## 2022-01-24 PROCEDURE — 80197 ASSAY OF TACROLIMUS: CPT

## 2022-01-24 PROCEDURE — 36415 COLL VENOUS BLD VENIPUNCTURE: CPT

## 2022-01-24 PROCEDURE — 85027 COMPLETE CBC AUTOMATED: CPT

## 2022-01-24 NOTE — TELEPHONE ENCOUNTER
Patient Call: General  Route to LPN    Reason for call: Patient wanted to speak with coordinator. No details provided.    Call back needed? Yes    Return Call Needed  Same as documented in contacts section  When to return call?: Greater than one day: Route standard priority

## 2022-01-24 NOTE — TELEPHONE ENCOUNTER
Patient called to discuss labs and diarrhea over the weekend w/ RNCC.     Creatinine=1.26, baseline = 0.9-1.2.     Patient states she had diarrhea on Friday night, ever since then it has been slowly improving. Patient drinking 3.5L daily. This morning stool was very soft but formed. Will continue to monitor, no need for infectious workup at this time.     Patient v/unit(s) to notify RNCC if symptoms worsen, and may require more testing.     Continue to push fluids, and repeat labs on Thursday. BP stable, denies dizziness/lightheadedness

## 2022-01-26 ENCOUNTER — TELEPHONE (OUTPATIENT)
Dept: TRANSPLANT | Facility: CLINIC | Age: 37
End: 2022-01-26
Payer: COMMERCIAL

## 2022-01-26 NOTE — TELEPHONE ENCOUNTER
Post Kidney and Pancreas Transplant Team Conference  Date: 1/26/2022  Transplant Coordinator: Yessica Becker     Attendees:  [x]  Dr. Pack [x] Brandi Hernandez, RN [] Tamiko Cárdenas LPN     [x]  Dr. Motta [] Lizette Park RN [] Radhika Arnett LPN   []  Dr. Lee [x] Yessica Becker RN    [x]  Dr. Corona [x] Tasha Cartwright RN [] Bulmaro Weiner, PharmD   [] Dr. El [x] Hamida Guevara RN    [] Dr. Chavarria [] Roderick Messer RN    [] Dr. Ocampo [x] Lynette De RN [x] Nena Wheeler RN   [] Dr. De León [] Naz Glaser RN    [x]  Dr. Aranda [x] Radha Hurt RN    [] Dr. Wright [x] Ashely Tse RN    [] Sally Brito, CAITLIN [x] Jovana Lemos RN        Verbal Plan Read Back:   Continue to monitor hydration status and elevated serum creatinine.     Routed to RN Coordinator   Kaity Wheeler RN

## 2022-01-27 ENCOUNTER — TELEPHONE (OUTPATIENT)
Dept: TRANSPLANT | Facility: CLINIC | Age: 37
End: 2022-01-27

## 2022-01-27 ENCOUNTER — LAB (OUTPATIENT)
Dept: LAB | Facility: CLINIC | Age: 37
End: 2022-01-27
Attending: INTERNAL MEDICINE
Payer: COMMERCIAL

## 2022-01-27 DIAGNOSIS — Z94.0 KIDNEY REPLACED BY TRANSPLANT: Primary | ICD-10-CM

## 2022-01-27 DIAGNOSIS — Z94.83 PANCREAS REPLACED BY TRANSPLANT (H): ICD-10-CM

## 2022-01-27 DIAGNOSIS — Z48.298 AFTERCARE FOLLOWING ORGAN TRANSPLANT: ICD-10-CM

## 2022-01-27 DIAGNOSIS — Z94.0 KIDNEY REPLACED BY TRANSPLANT: ICD-10-CM

## 2022-01-27 DIAGNOSIS — R60.1 GENERALIZED EDEMA: ICD-10-CM

## 2022-01-27 DIAGNOSIS — Z79.899 ENCOUNTER FOR LONG-TERM CURRENT USE OF MEDICATION: ICD-10-CM

## 2022-01-27 LAB
AMYLASE SERPL-CCNC: 54 U/L (ref 30–110)
ANION GAP SERPL CALCULATED.3IONS-SCNC: 5 MMOL/L (ref 3–14)
BUN SERPL-MCNC: 30 MG/DL (ref 7–30)
CALCIUM SERPL-MCNC: 8.7 MG/DL (ref 8.5–10.1)
CHLORIDE BLD-SCNC: 108 MMOL/L (ref 94–109)
CO2 SERPL-SCNC: 22 MMOL/L (ref 20–32)
CREAT SERPL-MCNC: 1.41 MG/DL (ref 0.52–1.04)
ERYTHROCYTE [DISTWIDTH] IN BLOOD BY AUTOMATED COUNT: 17.9 % (ref 10–15)
GFR SERPL CREATININE-BSD FRML MDRD: 49 ML/MIN/1.73M2
GLUCOSE BLD-MCNC: 86 MG/DL (ref 70–99)
HCT VFR BLD AUTO: 33.6 % (ref 35–47)
HGB BLD-MCNC: 10.5 G/DL (ref 11.7–15.7)
LIPASE SERPL-CCNC: 84 U/L (ref 73–393)
MCH RBC QN AUTO: 28.5 PG (ref 26.5–33)
MCHC RBC AUTO-ENTMCNC: 31.3 G/DL (ref 31.5–36.5)
MCV RBC AUTO: 91 FL (ref 78–100)
PLATELET # BLD AUTO: 263 10E3/UL (ref 150–450)
POTASSIUM BLD-SCNC: 4.2 MMOL/L (ref 3.4–5.3)
RBC # BLD AUTO: 3.69 10E6/UL (ref 3.8–5.2)
SODIUM SERPL-SCNC: 135 MMOL/L (ref 133–144)
TACROLIMUS BLD-MCNC: 8.6 UG/L (ref 5–15)
TME LAST DOSE: NORMAL H
TME LAST DOSE: NORMAL H
WBC # BLD AUTO: 5.3 10E3/UL (ref 4–11)

## 2022-01-27 PROCEDURE — 83690 ASSAY OF LIPASE: CPT

## 2022-01-27 PROCEDURE — 36415 COLL VENOUS BLD VENIPUNCTURE: CPT

## 2022-01-27 PROCEDURE — 80197 ASSAY OF TACROLIMUS: CPT

## 2022-01-27 PROCEDURE — 82310 ASSAY OF CALCIUM: CPT

## 2022-01-27 PROCEDURE — 82150 ASSAY OF AMYLASE: CPT

## 2022-01-27 PROCEDURE — 85014 HEMATOCRIT: CPT

## 2022-01-27 RX ORDER — HEPARIN SODIUM,PORCINE 10 UNIT/ML
5 VIAL (ML) INTRAVENOUS
Status: CANCELLED | OUTPATIENT
Start: 2022-01-27

## 2022-01-27 RX ORDER — MEPERIDINE HYDROCHLORIDE 25 MG/ML
25 INJECTION INTRAMUSCULAR; INTRAVENOUS; SUBCUTANEOUS EVERY 30 MIN PRN
Status: CANCELLED | OUTPATIENT
Start: 2022-01-27

## 2022-01-27 RX ORDER — ALBUTEROL SULFATE 90 UG/1
1-2 AEROSOL, METERED RESPIRATORY (INHALATION)
Status: CANCELLED
Start: 2022-01-27

## 2022-01-27 RX ORDER — EPINEPHRINE 1 MG/ML
0.3 INJECTION, SOLUTION, CONCENTRATE INTRAVENOUS EVERY 5 MIN PRN
Status: CANCELLED | OUTPATIENT
Start: 2022-01-27

## 2022-01-27 RX ORDER — HEPARIN SODIUM (PORCINE) LOCK FLUSH IV SOLN 100 UNIT/ML 100 UNIT/ML
5 SOLUTION INTRAVENOUS
Status: CANCELLED | OUTPATIENT
Start: 2022-01-27

## 2022-01-27 RX ORDER — METHYLPREDNISOLONE SODIUM SUCCINATE 125 MG/2ML
125 INJECTION, POWDER, LYOPHILIZED, FOR SOLUTION INTRAMUSCULAR; INTRAVENOUS
Status: CANCELLED
Start: 2022-01-27

## 2022-01-27 RX ORDER — ALBUTEROL SULFATE 0.83 MG/ML
2.5 SOLUTION RESPIRATORY (INHALATION)
Status: CANCELLED | OUTPATIENT
Start: 2022-01-27

## 2022-01-27 RX ORDER — NALOXONE HYDROCHLORIDE 0.4 MG/ML
0.2 INJECTION, SOLUTION INTRAMUSCULAR; INTRAVENOUS; SUBCUTANEOUS
Status: CANCELLED | OUTPATIENT
Start: 2022-01-27

## 2022-01-27 RX ORDER — DIPHENHYDRAMINE HYDROCHLORIDE 50 MG/ML
50 INJECTION INTRAMUSCULAR; INTRAVENOUS
Status: CANCELLED
Start: 2022-01-27

## 2022-01-27 NOTE — TELEPHONE ENCOUNTER
ISSUE:  Creatinine up  Diarrhea    PLAN:  Infectious workup  CMV  IV fluids  Fiber/probiotics?     OUTCOME:  Patient states she is drinking 3L daily. BP was a little bit low this morning, 96/49, no symptoms. Patient will try to push more PO fluids tonight.   [facility]     CMV and cdiff/shaunna testing ordered, to be checked tomorrow.   Recheck BMP after IV fluids, orders entered.

## 2022-01-28 ENCOUNTER — INFUSION THERAPY VISIT (OUTPATIENT)
Dept: INFUSION THERAPY | Facility: CLINIC | Age: 37
End: 2022-01-28
Attending: INTERNAL MEDICINE
Payer: COMMERCIAL

## 2022-01-28 VITALS
SYSTOLIC BLOOD PRESSURE: 113 MMHG | TEMPERATURE: 97.9 F | RESPIRATION RATE: 16 BRPM | HEART RATE: 89 BPM | DIASTOLIC BLOOD PRESSURE: 78 MMHG

## 2022-01-28 DIAGNOSIS — Z94.83 PANCREAS REPLACED BY TRANSPLANT (H): ICD-10-CM

## 2022-01-28 DIAGNOSIS — Z94.0 KIDNEY REPLACED BY TRANSPLANT: ICD-10-CM

## 2022-01-28 DIAGNOSIS — D64.9 ANEMIA, UNSPECIFIED TYPE: Primary | ICD-10-CM

## 2022-01-28 LAB
ANION GAP SERPL CALCULATED.3IONS-SCNC: 8 MMOL/L (ref 3–14)
BUN SERPL-MCNC: 27 MG/DL (ref 7–30)
CALCIUM SERPL-MCNC: 7.9 MG/DL (ref 8.5–10.1)
CHLORIDE BLD-SCNC: 112 MMOL/L (ref 94–109)
CO2 SERPL-SCNC: 21 MMOL/L (ref 20–32)
CREAT SERPL-MCNC: 1.19 MG/DL (ref 0.52–1.04)
ERYTHROCYTE [DISTWIDTH] IN BLOOD BY AUTOMATED COUNT: 17.7 % (ref 10–15)
GFR SERPL CREATININE-BSD FRML MDRD: 60 ML/MIN/1.73M2
GLUCOSE BLD-MCNC: 79 MG/DL (ref 70–99)
HCT VFR BLD AUTO: 29.5 % (ref 35–47)
HGB BLD-MCNC: 9.5 G/DL (ref 11.7–15.7)
MCH RBC QN AUTO: 29 PG (ref 26.5–33)
MCHC RBC AUTO-ENTMCNC: 32.2 G/DL (ref 31.5–36.5)
MCV RBC AUTO: 90 FL (ref 78–100)
PLATELET # BLD AUTO: 241 10E3/UL (ref 150–450)
POTASSIUM BLD-SCNC: 3.9 MMOL/L (ref 3.4–5.3)
RBC # BLD AUTO: 3.28 10E6/UL (ref 3.8–5.2)
SODIUM SERPL-SCNC: 141 MMOL/L (ref 133–144)
WBC # BLD AUTO: 4.8 10E3/UL (ref 4–11)

## 2022-01-28 PROCEDURE — 96360 HYDRATION IV INFUSION INIT: CPT

## 2022-01-28 PROCEDURE — 36415 COLL VENOUS BLD VENIPUNCTURE: CPT | Performed by: INTERNAL MEDICINE

## 2022-01-28 PROCEDURE — 80048 BASIC METABOLIC PNL TOTAL CA: CPT

## 2022-01-28 PROCEDURE — 36415 COLL VENOUS BLD VENIPUNCTURE: CPT

## 2022-01-28 PROCEDURE — 258N000003 HC RX IP 258 OP 636: Performed by: INTERNAL MEDICINE

## 2022-01-28 PROCEDURE — 999N000127 HC STATISTIC PERIPHERAL IV START W US GUIDANCE

## 2022-01-28 PROCEDURE — 85014 HEMATOCRIT: CPT

## 2022-01-28 RX ORDER — ALBUTEROL SULFATE 90 UG/1
1-2 AEROSOL, METERED RESPIRATORY (INHALATION)
Status: CANCELLED
Start: 2022-01-28

## 2022-01-28 RX ORDER — ALBUTEROL SULFATE 0.83 MG/ML
2.5 SOLUTION RESPIRATORY (INHALATION)
Status: CANCELLED | OUTPATIENT
Start: 2022-01-28

## 2022-01-28 RX ORDER — HEPARIN SODIUM,PORCINE 10 UNIT/ML
5 VIAL (ML) INTRAVENOUS
Status: DISCONTINUED | OUTPATIENT
Start: 2022-01-28 | End: 2022-01-28 | Stop reason: HOSPADM

## 2022-01-28 RX ORDER — HEPARIN SODIUM (PORCINE) LOCK FLUSH IV SOLN 100 UNIT/ML 100 UNIT/ML
5 SOLUTION INTRAVENOUS
Status: CANCELLED | OUTPATIENT
Start: 2022-01-28

## 2022-01-28 RX ORDER — NALOXONE HYDROCHLORIDE 0.4 MG/ML
0.2 INJECTION, SOLUTION INTRAMUSCULAR; INTRAVENOUS; SUBCUTANEOUS
Status: CANCELLED | OUTPATIENT
Start: 2022-01-28

## 2022-01-28 RX ORDER — HEPARIN SODIUM,PORCINE 10 UNIT/ML
5 VIAL (ML) INTRAVENOUS
Status: CANCELLED | OUTPATIENT
Start: 2022-01-28

## 2022-01-28 RX ORDER — EPINEPHRINE 1 MG/ML
0.3 INJECTION, SOLUTION INTRAMUSCULAR; SUBCUTANEOUS EVERY 5 MIN PRN
Status: CANCELLED | OUTPATIENT
Start: 2022-01-28

## 2022-01-28 RX ORDER — DIPHENHYDRAMINE HYDROCHLORIDE 50 MG/ML
50 INJECTION INTRAMUSCULAR; INTRAVENOUS
Status: CANCELLED
Start: 2022-01-28

## 2022-01-28 RX ORDER — METHYLPREDNISOLONE SODIUM SUCCINATE 125 MG/2ML
125 INJECTION, POWDER, LYOPHILIZED, FOR SOLUTION INTRAMUSCULAR; INTRAVENOUS
Status: CANCELLED
Start: 2022-01-28

## 2022-01-28 RX ORDER — HEPARIN SODIUM (PORCINE) LOCK FLUSH IV SOLN 100 UNIT/ML 100 UNIT/ML
5 SOLUTION INTRAVENOUS
Status: DISCONTINUED | OUTPATIENT
Start: 2022-01-28 | End: 2022-01-28 | Stop reason: HOSPADM

## 2022-01-28 RX ORDER — MEPERIDINE HYDROCHLORIDE 25 MG/ML
25 INJECTION INTRAMUSCULAR; INTRAVENOUS; SUBCUTANEOUS EVERY 30 MIN PRN
Status: CANCELLED | OUTPATIENT
Start: 2022-01-28

## 2022-01-28 RX ADMIN — SODIUM CHLORIDE 1000 ML: 9 INJECTION, SOLUTION INTRAVENOUS at 15:45

## 2022-01-28 NOTE — LETTER
1/28/2022         RE: Marilyn Ortega  [address]  [facility] [address]        Dear Colleague,    Thank you for referring your patient, Marilyn Ortega, to the [facility] TREATMENT [facility]. Please see a copy of my visit note below.    Nursing Note  Marilyn Ortega [facility] for:   Chief Complaint   Patient presents with     Infusion     IVF     During today's [facility] appointment, orders from Dr. Pack were completed.  Frequency: as needed    Progress note:  Patient identification verified by name and date of birth.  Assessment completed.  Vitals recorded in Doc Flowsheets.  Patient was provided with education regarding medication/procedure and possible side effects.  Patient verbalized understanding.     present during visit today: Not Applicable.    Treatment Conditions: Non-applicable.    Premedications: were not ordered.    Drug Waste Record: No    Infusion length and rate:  infusion given over approximately 1 hours    Labs: were drawn per orders.     Vascular access: peripheral IV was placed by vascular access nurse.    Is the next appt scheduled? prn  Asymptomatic COVID test completed? no    Post Infusion Assessment:  Patient tolerated infusion without incident.     Discharge Plan:   Follow up plan of care with: ordering provider as scheduled.  Discharge instructions were reviewed with patient.  Patient/representative verbalized understanding of discharge instructions and all questions answered.  Patient discharged from [facility] in stable condition.    Kelley Wang RN       Administrations This Visit     0.9% sodium chloride BOLUS     Admin Date  01/28/2022 Action  New Bag Dose  1,000 mL Route  Intravenous Administered By  Kelley Wang RN                  /78 (BP Location: Right arm)   Pulse 89   Temp 97.9  F (36.6  C) (Oral)   Resp  16           Again, thank you for allowing me to participate in the care of your patient.      Sincerely,    [facility]

## 2022-01-29 ENCOUNTER — LAB (OUTPATIENT)
Dept: LAB | Facility: CLINIC | Age: 37
End: 2022-01-29
Attending: INTERNAL MEDICINE
Payer: COMMERCIAL

## 2022-01-29 DIAGNOSIS — Z94.0 KIDNEY REPLACED BY TRANSPLANT: Primary | ICD-10-CM

## 2022-01-29 LAB
C COLI+JEJUNI+LARI FUSA STL QL NAA+PROBE: NOT DETECTED
C DIFF TOX B STL QL: NEGATIVE
CMV DNA SPEC NAA+PROBE-ACNC: NOT DETECTED IU/ML
EC STX1 GENE STL QL NAA+PROBE: NOT DETECTED
EC STX2 GENE STL QL NAA+PROBE: NOT DETECTED
NOROV GI+II ORF1-ORF2 JNC STL QL NAA+PR: NOT DETECTED
RVA NSP5 STL QL NAA+PROBE: NOT DETECTED
SALMONELLA SP RPOD STL QL NAA+PROBE: NOT DETECTED
SHIGELLA SP+EIEC IPAH STL QL NAA+PROBE: NOT DETECTED
V CHOL+PARA RFBL+TRKH+TNAA STL QL NAA+PR: NOT DETECTED
Y ENTERO RECN STL QL NAA+PROBE: NOT DETECTED

## 2022-01-29 PROCEDURE — 87506 IADNA-DNA/RNA PROBE TQ 6-11: CPT

## 2022-01-29 PROCEDURE — 87493 C DIFF AMPLIFIED PROBE: CPT

## 2022-01-31 ENCOUNTER — ANCILLARY PROCEDURE (OUTPATIENT)
Dept: ULTRASOUND IMAGING | Facility: CLINIC | Age: 37
End: 2022-01-31
Attending: NURSE PRACTITIONER
Payer: COMMERCIAL

## 2022-01-31 ENCOUNTER — INFUSION THERAPY VISIT (OUTPATIENT)
Dept: INFUSION THERAPY | Facility: CLINIC | Age: 37
End: 2022-01-31
Attending: INTERNAL MEDICINE
Payer: COMMERCIAL

## 2022-01-31 ENCOUNTER — LAB (OUTPATIENT)
Dept: LAB | Facility: CLINIC | Age: 37
End: 2022-01-31
Attending: INTERNAL MEDICINE
Payer: COMMERCIAL

## 2022-01-31 VITALS
SYSTOLIC BLOOD PRESSURE: 132 MMHG | HEART RATE: 83 BPM | RESPIRATION RATE: 16 BRPM | DIASTOLIC BLOOD PRESSURE: 75 MMHG | OXYGEN SATURATION: 95 % | TEMPERATURE: 97.9 F

## 2022-01-31 DIAGNOSIS — Z94.83 PANCREAS REPLACED BY TRANSPLANT (H): ICD-10-CM

## 2022-01-31 DIAGNOSIS — D64.9 ANEMIA, UNSPECIFIED TYPE: ICD-10-CM

## 2022-01-31 DIAGNOSIS — Z94.0 KIDNEY REPLACED BY TRANSPLANT: Primary | ICD-10-CM

## 2022-01-31 DIAGNOSIS — Z94.0 KIDNEY REPLACED BY TRANSPLANT: ICD-10-CM

## 2022-01-31 DIAGNOSIS — Z48.298 AFTERCARE FOLLOWING ORGAN TRANSPLANT: ICD-10-CM

## 2022-01-31 DIAGNOSIS — Z79.899 ENCOUNTER FOR LONG-TERM CURRENT USE OF MEDICATION: ICD-10-CM

## 2022-01-31 LAB
ALBUMIN UR-MCNC: NEGATIVE MG/DL
AMYLASE SERPL-CCNC: 57 U/L (ref 30–110)
ANION GAP SERPL CALCULATED.3IONS-SCNC: 6 MMOL/L (ref 3–14)
ANION GAP SERPL CALCULATED.3IONS-SCNC: 8 MMOL/L (ref 3–14)
APPEARANCE UR: CLEAR
BACTERIA #/AREA URNS HPF: ABNORMAL /HPF
BILIRUB UR QL STRIP: NEGATIVE
BUN SERPL-MCNC: 22 MG/DL (ref 7–30)
BUN SERPL-MCNC: 22 MG/DL (ref 7–30)
CALCIUM SERPL-MCNC: 9.1 MG/DL (ref 8.5–10.1)
CALCIUM SERPL-MCNC: 9.6 MG/DL (ref 8.5–10.1)
CHLORIDE BLD-SCNC: 110 MMOL/L (ref 94–109)
CHLORIDE BLD-SCNC: 110 MMOL/L (ref 94–109)
CO2 SERPL-SCNC: 22 MMOL/L (ref 20–32)
CO2 SERPL-SCNC: 22 MMOL/L (ref 20–32)
COLOR UR AUTO: YELLOW
CREAT SERPL-MCNC: 1.27 MG/DL (ref 0.52–1.04)
CREAT SERPL-MCNC: 1.4 MG/DL (ref 0.52–1.04)
ERYTHROCYTE [DISTWIDTH] IN BLOOD BY AUTOMATED COUNT: 17.2 % (ref 10–15)
ERYTHROCYTE [DISTWIDTH] IN BLOOD BY AUTOMATED COUNT: 17.3 % (ref 10–15)
GFR SERPL CREATININE-BSD FRML MDRD: 50 ML/MIN/1.73M2
GFR SERPL CREATININE-BSD FRML MDRD: 56 ML/MIN/1.73M2
GLUCOSE BLD-MCNC: 100 MG/DL (ref 70–99)
GLUCOSE BLD-MCNC: 75 MG/DL (ref 70–99)
GLUCOSE UR STRIP-MCNC: NEGATIVE MG/DL
HCT VFR BLD AUTO: 35.1 % (ref 35–47)
HCT VFR BLD AUTO: 37.4 % (ref 35–47)
HGB BLD-MCNC: 11.1 G/DL (ref 11.7–15.7)
HGB BLD-MCNC: 11.3 G/DL (ref 11.7–15.7)
HGB UR QL STRIP: NEGATIVE
HYALINE CASTS: 4 /LPF
KETONES UR STRIP-MCNC: NEGATIVE MG/DL
LEUKOCYTE ESTERASE UR QL STRIP: NEGATIVE
LIPASE SERPL-CCNC: 89 U/L (ref 73–393)
MAGNESIUM SERPL-MCNC: 1.7 MG/DL (ref 1.6–2.3)
MCH RBC QN AUTO: 28.2 PG (ref 26.5–33)
MCH RBC QN AUTO: 28.3 PG (ref 26.5–33)
MCHC RBC AUTO-ENTMCNC: 30.2 G/DL (ref 31.5–36.5)
MCHC RBC AUTO-ENTMCNC: 31.6 G/DL (ref 31.5–36.5)
MCV RBC AUTO: 90 FL (ref 78–100)
MCV RBC AUTO: 93 FL (ref 78–100)
MUCOUS THREADS #/AREA URNS LPF: PRESENT /LPF
MYCOPHENOLATE SERPL LC/MS/MS-MCNC: 2.88 MG/L (ref 1–3.5)
MYCOPHENOLATE-G SERPL LC/MS/MS-MCNC: 101.6 MG/L (ref 30–95)
NITRATE UR QL: NEGATIVE
PH UR STRIP: 5 [PH] (ref 5–7)
PHOSPHATE SERPL-MCNC: 2.2 MG/DL (ref 2.5–4.5)
PLATELET # BLD AUTO: 277 10E3/UL (ref 150–450)
PLATELET # BLD AUTO: 300 10E3/UL (ref 150–450)
POTASSIUM BLD-SCNC: 3.9 MMOL/L (ref 3.4–5.3)
POTASSIUM BLD-SCNC: 4.4 MMOL/L (ref 3.4–5.3)
RBC # BLD AUTO: 3.92 10E6/UL (ref 3.8–5.2)
RBC # BLD AUTO: 4.01 10E6/UL (ref 3.8–5.2)
RBC URINE: 1 /HPF
SODIUM SERPL-SCNC: 138 MMOL/L (ref 133–144)
SODIUM SERPL-SCNC: 140 MMOL/L (ref 133–144)
SP GR UR STRIP: 1.01 (ref 1–1.03)
SQUAMOUS EPITHELIAL: 1 /HPF
TACROLIMUS BLD-MCNC: 7.6 UG/L (ref 5–15)
TME LAST DOSE: ABNORMAL H
TME LAST DOSE: ABNORMAL H
TME LAST DOSE: NORMAL H
TME LAST DOSE: NORMAL H
UROBILINOGEN UR STRIP-MCNC: NORMAL MG/DL
WBC # BLD AUTO: 3.7 10E3/UL (ref 4–11)
WBC # BLD AUTO: 5.1 10E3/UL (ref 4–11)
WBC URINE: 1 /HPF

## 2022-01-31 PROCEDURE — 999N000127 HC STATISTIC PERIPHERAL IV START W US GUIDANCE

## 2022-01-31 PROCEDURE — 96360 HYDRATION IV INFUSION INIT: CPT

## 2022-01-31 PROCEDURE — 80197 ASSAY OF TACROLIMUS: CPT

## 2022-01-31 PROCEDURE — 258N000003 HC RX IP 258 OP 636: Performed by: INTERNAL MEDICINE

## 2022-01-31 PROCEDURE — 82310 ASSAY OF CALCIUM: CPT

## 2022-01-31 PROCEDURE — 36415 COLL VENOUS BLD VENIPUNCTURE: CPT

## 2022-01-31 PROCEDURE — 80180 DRUG SCRN QUAN MYCOPHENOLATE: CPT

## 2022-01-31 PROCEDURE — 83735 ASSAY OF MAGNESIUM: CPT

## 2022-01-31 PROCEDURE — 84100 ASSAY OF PHOSPHORUS: CPT

## 2022-01-31 PROCEDURE — 85014 HEMATOCRIT: CPT

## 2022-01-31 PROCEDURE — 81001 URINALYSIS AUTO W/SCOPE: CPT

## 2022-01-31 PROCEDURE — 85027 COMPLETE CBC AUTOMATED: CPT

## 2022-01-31 PROCEDURE — 76776 US EXAM K TRANSPL W/DOPPLER: CPT | Mod: GC

## 2022-01-31 PROCEDURE — 80048 BASIC METABOLIC PNL TOTAL CA: CPT

## 2022-01-31 PROCEDURE — 82150 ASSAY OF AMYLASE: CPT

## 2022-01-31 PROCEDURE — 83690 ASSAY OF LIPASE: CPT

## 2022-01-31 RX ORDER — NALOXONE HYDROCHLORIDE 0.4 MG/ML
0.2 INJECTION, SOLUTION INTRAMUSCULAR; INTRAVENOUS; SUBCUTANEOUS
Status: CANCELLED | OUTPATIENT
Start: 2022-01-31

## 2022-01-31 RX ORDER — HEPARIN SODIUM,PORCINE 10 UNIT/ML
5 VIAL (ML) INTRAVENOUS
Status: CANCELLED | OUTPATIENT
Start: 2022-01-31

## 2022-01-31 RX ORDER — ALBUTEROL SULFATE 0.83 MG/ML
2.5 SOLUTION RESPIRATORY (INHALATION)
Status: CANCELLED | OUTPATIENT
Start: 2022-01-31

## 2022-01-31 RX ORDER — HEPARIN SODIUM (PORCINE) LOCK FLUSH IV SOLN 100 UNIT/ML 100 UNIT/ML
5 SOLUTION INTRAVENOUS
Status: CANCELLED | OUTPATIENT
Start: 2022-01-31

## 2022-01-31 RX ORDER — MYCOPHENOLIC ACID 180 MG/1
180 TABLET, DELAYED RELEASE ORAL 2 TIMES DAILY
Qty: 60 TABLET | Refills: 11 | Status: SHIPPED | OUTPATIENT
Start: 2022-01-31 | End: 2022-08-24

## 2022-01-31 RX ORDER — DIPHENHYDRAMINE HYDROCHLORIDE 50 MG/ML
50 INJECTION INTRAMUSCULAR; INTRAVENOUS
Status: CANCELLED
Start: 2022-01-31

## 2022-01-31 RX ORDER — MEPERIDINE HYDROCHLORIDE 25 MG/ML
25 INJECTION INTRAMUSCULAR; INTRAVENOUS; SUBCUTANEOUS EVERY 30 MIN PRN
Status: CANCELLED | OUTPATIENT
Start: 2022-01-31

## 2022-01-31 RX ORDER — METHYLPREDNISOLONE SODIUM SUCCINATE 125 MG/2ML
125 INJECTION, POWDER, LYOPHILIZED, FOR SOLUTION INTRAMUSCULAR; INTRAVENOUS
Status: CANCELLED
Start: 2022-01-31

## 2022-01-31 RX ORDER — TORSEMIDE 10 MG/1
10 TABLET ORAL DAILY
Qty: 90 TABLET | Refills: 1
Start: 2022-01-31 | End: 2022-03-17

## 2022-01-31 RX ORDER — MYCOPHENOLIC ACID 360 MG/1
360 TABLET, DELAYED RELEASE ORAL 2 TIMES DAILY
Qty: 60 TABLET | Refills: 11 | Status: SHIPPED | OUTPATIENT
Start: 2022-01-31 | End: 2022-08-24

## 2022-01-31 RX ORDER — ALBUTEROL SULFATE 90 UG/1
1-2 AEROSOL, METERED RESPIRATORY (INHALATION)
Status: CANCELLED
Start: 2022-01-31

## 2022-01-31 RX ORDER — EPINEPHRINE 1 MG/ML
0.3 INJECTION, SOLUTION INTRAMUSCULAR; SUBCUTANEOUS EVERY 5 MIN PRN
Status: CANCELLED | OUTPATIENT
Start: 2022-01-31

## 2022-01-31 RX ADMIN — SODIUM CHLORIDE 1000 ML: 9 INJECTION, SOLUTION INTRAVENOUS at 15:39

## 2022-01-31 NOTE — TELEPHONE ENCOUNTER
Patient Call: General  Route to LPN    Reason for call: pt wants to review some test results that came back over the weekend     Call back needed? Yes    Return Call Needed  Same as documented in contacts section  When to return call?: Greater than one day: Route standard priority

## 2022-01-31 NOTE — TELEPHONE ENCOUNTER
ISSUE:  Patient called stating she still has diarrhea.   Yesterday had 17 episodes of diarrhea  Today weight is down to 114lbs, has trended down over the past few days.   Patient is drinking 3+Liters daily. BP OK, today is 111/82.  Creatinine is up today to 1.4, patient feeling like she could use IV fluids, scheduled for IV fluids.     Patient also c/o some tenderness around incision. Feels slightly puffy, no redness, no fever.     PLAN:  Kamran Corona MD Rockers, Emily S, RN  Hold torsemide for now, use fiber, immodium PRN, decrease MPA to 540mg bid. Agree with IV fluids.     Reviewed incisional tenderness w/ Sally Brito. Patient needs UA and US, appt w/ LUIS E tomorrow    OUTCOME:  Patient scheduled for IV fluids today @ 3pm. Patient notified.   Patient notified to decrease MPA to 540mg BID, prescription for 180mg tabs sent to [facility] pharmacy.   Patient instructed to hold torsemide. And used fiber daily.   Imodium PRN.     Per LUIS E, Patient scheduled for US today @ 4pm. UA/DARRYL ordered and to be obtained in [facility]. Requested appt tomorrow w/ Sally Brito @ 1045am.

## 2022-01-31 NOTE — LETTER
1/31/2022         RE: Marilyn Ortega  [address]  [facility] [address]        Dear Colleague,    Thank you for referring your patient, Marilyn Ortega, to the [facility]. Please see a copy of my visit note below.    Infusion Nursing Note:  Marilyn Ortega presents today for IVF.    Patient seen by provider today: No   present during visit today: Not Applicable.    Note: IVF given over 30 min, patient was only able to receive approximately 425 ml due to US appointment at 1600. Discussed with US tech who reported patient could arrive at 1615 at the latest. IVF given up until 1610.      Intravenous Access:  Labs drawn without difficulty post infusion.  Peripheral IV placed by vascular access RN    Treatment Conditions:  Not Applicable.      Post Infusion Assessment:  Patient tolerated infusion without incident.  Site patent and intact, free from redness, edema or discomfort.  No evidence of extravasations.  Access discontinued per protocol.       Discharge Plan:   Discharge instructions reviewed with: Patient.  Patient and/or family verbalized understanding of discharge instructions and all questions answered.  Patient discharged in stable condition accompanied by: self.  Departure Mode: Ambulatory.    Administrations This Visit     0.9% sodium chloride BOLUS     Admin Date  01/31/2022 Action  New Bag Dose  1,000 mL Route  Intravenous Administered By  Veronica Quintanilla, RN                Veronica Quintanilla RN            Again, thank you for allowing me to participate in the care of your patient.      Sincerely,    [facility]     activity

## 2022-01-31 NOTE — PROGRESS NOTES
Infusion Nursing Note:  Marilyn GERA Fishmanelio presents today for IVF.    Patient seen by provider today: No   present during visit today: Not Applicable.    Note: IVF given over 30 min, patient was only able to receive approximately 425 ml due to US appointment at 1600. Discussed with US tech who reported patient could arrive at 1615 at the latest. IVF given up until 1610.      Intravenous Access:  Labs drawn without difficulty post infusion.  Peripheral IV placed by vascular access RN    Treatment Conditions:  Not Applicable.      Post Infusion Assessment:  Patient tolerated infusion without incident.  Site patent and intact, free from redness, edema or discomfort.  No evidence of extravasations.  Access discontinued per protocol.       Discharge Plan:   Discharge instructions reviewed with: Patient.  Patient and/or family verbalized understanding of discharge instructions and all questions answered.  Patient discharged in stable condition accompanied by: self.  Departure Mode: Ambulatory.    Administrations This Visit     0.9% sodium chloride BOLUS     Admin Date  01/31/2022 Action  New Bag Dose  1,000 mL Route  Intravenous Administered By  Veronica Quintanilla RN Hope Balcos, RN

## 2022-02-01 ENCOUNTER — ANCILLARY PROCEDURE (OUTPATIENT)
Dept: CT IMAGING | Facility: CLINIC | Age: 37
End: 2022-02-01
Attending: NURSE PRACTITIONER
Payer: COMMERCIAL

## 2022-02-01 ENCOUNTER — OFFICE VISIT (OUTPATIENT)
Dept: TRANSPLANT | Facility: CLINIC | Age: 37
End: 2022-02-01
Attending: NURSE PRACTITIONER
Payer: COMMERCIAL

## 2022-02-01 VITALS
OXYGEN SATURATION: 100 % | DIASTOLIC BLOOD PRESSURE: 79 MMHG | BODY MASS INDEX: 18.71 KG/M2 | WEIGHT: 115.9 LBS | SYSTOLIC BLOOD PRESSURE: 110 MMHG | HEART RATE: 90 BPM

## 2022-02-01 DIAGNOSIS — R10.9 ABDOMINAL PAIN, UNSPECIFIED ABDOMINAL LOCATION: ICD-10-CM

## 2022-02-01 DIAGNOSIS — R10.9 ABDOMINAL PAIN, UNSPECIFIED ABDOMINAL LOCATION: Primary | ICD-10-CM

## 2022-02-01 DIAGNOSIS — Z94.0 KIDNEY REPLACED BY TRANSPLANT: ICD-10-CM

## 2022-02-01 PROCEDURE — 99213 OFFICE O/P EST LOW 20 MIN: CPT | Mod: 24 | Performed by: NURSE PRACTITIONER

## 2022-02-01 PROCEDURE — 74176 CT ABD & PELVIS W/O CONTRAST: CPT | Mod: GC

## 2022-02-01 NOTE — LETTER
Date:February 20, 2022      Patient was self referred, no letter generated. Do not send.        [facility] Health Information

## 2022-02-01 NOTE — LETTER
2/1/2022         RE: Marilyn Ortega  [address]  [facility] [address]        Dear Colleague,    Thank you for referring your patient, Marilyn Ortega, to the [facility]. Please see a copy of my visit note below.    Transplant Surgery Progress Note    Transplants:  11/15/2021 (Kidney / Pancreas); Postoperative day:  92  S: Marilyn Ortega is a 36 year old female with PMH significant for DMI complicated by gastroparesis and CKD due to diabetic nephropathy, HTN, gluten intolerance and Factor V Leiden mutation, heterozygous. Admitted for DD SPK 11/16/21    Patient overall has been doing very well from an organ standpoint. Patient states over the weekend she developed severe diarrhea and was having many loose stools she states she was unable to go anywhere and she was having multiple stools a day    She was tested for Covid which was negative she had a C. difficile and enteric panel which were all negative.    Patient states that she has been losing weight and she feels as if she is unable to take in the needed water amount. She states that she is currently miserable.    She denies any fevers, chills hematuria dysuria or frequency    Transplant History:    Transplant Type:  DDKT (SPK)  Donor Type: Donation after Brain Death   Transplant Date:  11/15/2021 (Kidney / Pancreas)   Ureteral Stent:  No   Crossmatch:  negative   DSA at Tx:  No  Baseline Cr: 0.9-1.2  DeNovo DSA: No    Acute Rejection Hx:  No    Present Maintenance Immunosuppression:  Tacrolimus and Mycophenolic acid    CMV IgG Ab Discordance:  No  EBV IgG Ab Discordance:  No    BK Viremia:  No  EBV Viremia:  No    Transplant Coordinator: Yessica Becker     Transplant Office Phone Number: [phone]     Immunosuppressant Medications     Immunosuppressive Agents Disp Start End     mycophenolic acid (GENERIC EQUIVALENT) 180 MG EC tablet    60 tablet 1/31/2022     Sig - Route: Take 1 tablet (180 mg) by mouth 2 times  daily Total dose=540mg BID - Oral    Class: E-Prescribe    Notes to Pharmacy: TXP DT 11/15/2021 (Kidney / Pancreas) TXP Dischg DT 11/22/2021 DX Kidney replaced by transplant Z94.0 [facility] ([place], [place])     mycophenolic acid (GENERIC EQUIVALENT) 360 MG EC tablet    60 tablet 1/31/2022     Sig - Route: Take 1 tablet (360 mg) by mouth 2 times daily Total dose=540mg BID - Oral    Class: E-Prescribe    Notes to Pharmacy: TXP DT 11/15/2021 (Kidney / Pancreas) TXP Dischg DT 11/22/2021 DX Kidney replaced by transplant Z94.0 [facility] ([place], [place])     tacrolimus (ENVARSUS XR) 0.75 MG 24 hr tablet    90 tablet 1/11/2022     Sig - Route: Take 1 tablet (0.75 mg) by mouth every morning (before breakfast) On hold for dose change. - Oral    Patient not taking: Reported on 1/21/2022       Class: E-Prescribe    Notes to Pharmacy: TXP DT 11/15/2021 (Kidney / Pancreas) TXP Dischg DT 11/22/2021 DX Kidney replaced by transplant Z94.0 [facility] ([place], [place])     tacrolimus (ENVARSUS XR) 1 MG 24 hr tablet    90 tablet 2/8/2022     Sig - Route: Take 2 tablets (2 mg) by mouth every morning (before breakfast) With 4mg tablets for a total dose of 6mg daily - Oral    Class: E-Prescribe    Notes to Pharmacy: TXP DT 11/15/2021 (Kidney / Pancreas) TXP Dischg DT 11/22/2021 DX Kidney replaced by transplant Z94.0 [facility] ([place], [place])     tacrolimus (ENVARSUS XR) 4 MG 24 hr tablet    30 tablet 2/8/2022     Sig - Route: Take 1 tablet (4 mg) by mouth every morning (before breakfast) With 1 mg tablets. For a total dose of 6mg daily. - Oral    Class: E-Prescribe    Notes to Pharmacy: TXP DT 11/15/2021 (Kidney / Pancreas) TXP Dischg DT 11/22/2021 DX Kidney replaced by transplant Z94.0 [facility] ([place], [place])          Possible Immunosuppression-related side effects:   []             headache  []             vivid dreams  []             irritability  []             cognitive difficuties  []             fine tremor  []             nausea  []             diarrhea  []             neuropathy      []             edema  []             renal calcineurin toxicity  []             hyperkalemia  []             post-transplant diabetes  []             decreased appetite  []             increased appetite  []             other:  []             none    Prescription Medications as of 2/15/2022       Rx Number Disp Refills Start End Last Dispensed Date Next Fill Date Owning Pharmacy    aspirin (ASA) 325 MG tablet  30 tablet 5 11/23/2021    [address]    Sig: Take 1 tablet (325 mg) by mouth daily    Class: E-Prescribe    Route: Oral    calcium carbonate-vitamin D (OS-STEPHANIE WITH D) 500-200 MG-UNIT tablet  60 tablet 11 11/22/2021    [address]    Sig: Take 1 tablet by mouth 2 times daily (with meals)    Class: E-Prescribe    Route: Oral    cetirizine (ZYRTEC) 10 MG tablet            Sig: Take 10 mg by mouth daily    Class: Historical    Route: Oral    cholecalciferol 25 MCG (1000 UT) TABS    5/11/2021    [facility] [place] [address] [address]    Sig: Take 2,000 Units by mouth every other day     Class: Historical    Route: Oral    clotrimazole (MYCELEX) 10 MG lozenge  360 lozenge 0 11/22/2021 2/20/2022   [address]    Sig: Place 1 lozenge (10 mg) inside cheek 4 times daily    Class: E-Prescribe    Route: Buccal    Continuous Blood Gluc Sensor (DEXCOM G6 SENSOR) [facility]    8/6/2020    [facility] [place] [address] [address]    Sig: Use as directed for continuous glucose monitoring.  Change sensor every 10  days.    Class: Historical    Continuous Blood Gluc Transmit (DEXCOM G6 TRANSMITTER) [facility]    2020    [facility] [place] [address] [address]    Sig: Use as directed for continuous glucose monitoring.  Change every 3 months.    Class: Historical    docusate sodium (COLACE) 100 MG capsule        [facility] - [address]    Sig: Take 200 mg by mouth daily before breakfast    Class: Historical    Route: Oral    glucose blood VI test strips strip            Si times daily.    Class: Historical    Route: As instructed    magnesium oxide (MAG-OX) 400 (241.3 Mg) MG tablet  30 tablet 2 2022    [facility] - [address]    Sig: Take 1 tablet (400 mg) by mouth daily    Class: E-Prescribe    Route: Oral    mycophenolic acid (GENERIC EQUIVALENT) 180 MG EC tablet  60 tablet 11 2022    [address]    Sig: Take 1 tablet (180 mg) by mouth 2 times daily Total dose=540mg BID    Class: E-Prescribe    Notes to Pharmacy: TXP DT 11/15/2021 (Kidney / Pancreas) TXP Dischg DT 2021 DX Kidney replaced by transplant Z94.0 [facility] ([place], [place])    Route: Oral    mycophenolic acid (GENERIC EQUIVALENT) 360 MG EC tablet  60 tablet 11 2022    [address]    Sig: Take 1 tablet (360 mg) by mouth 2 times daily Total dose=540mg BID    Class: E-Prescribe    Notes to Pharmacy: TXP DT 11/15/2021 (Kidney / Pancreas) TXP Dischg DT 2021 DX Kidney replaced by transplant Z94.0 [facility] ([place], [place])    Route: Oral    norgestimate-ethinyl estradiol (ORTHO-CYCLEN) 0.25-35 MG-MCG tablet            Sig: Take 1 tablet by mouth daily    Class: Historical    Route: Oral     pantoprazole (PROTONIX) 40 MG EC tablet  30 tablet 1 2022    [facility] [place], [place] - [facility] [address]    Simg every other day for 2 weeks and then stop    Class: Historical    phosphorus tablet 250 mg (PHOSPHA 250 NEUTRAL) 250 MG per tablet  120 tablet 3 2022    [facility] [place] [address] [address]    Sig: Take 1 tablet (250 mg) by mouth daily    Class: Historical    Route: Oral    Prucalopride Succinate (MOTEGRITY) 2 MG TABS    2021    [facility] [place], [place] - [address] [address]    Sig: Take one tablet by mouth daily    Class: Historical    sulfamethoxazole-trimethoprim (BACTRIM) 400-80 MG tablet  30 tablet 11 2021    [facility] [place], [place] - [address]    Sig: Take 1 tablet by mouth daily    Class: E-Prescribe    Route: Oral    tacrolimus (ENVARSUS XR) 0.75 MG 24 hr tablet  90 tablet 1 2022    [facility] [place] [address] [address]    Sig: Take 1 tablet (0.75 mg) by mouth every morning (before breakfast) On hold for dose change.    Class: E-Prescribe    Notes to Pharmacy: TXP DT 11/15/2021 (Kidney / Pancreas) TXP Dischg DT 2021 DX Kidney replaced by transplant Z94.0 [facility] ([place], [place])    Route: Oral    tacrolimus (ENVARSUS XR) 1 MG 24 hr tablet  90 tablet 11 2022    [facility] [place] [address] [address]    Sig: Take 2 tablets (2 mg) by mouth every morning (before breakfast) With 4mg tablets for a total dose of 6mg daily    Class: E-Prescribe    Notes to Pharmacy: TXP DT 11/15/2021 (Kidney / Pancreas) TXP Dischg DT 2021 DX Kidney replaced by transplant Z94.0 [place] [facility] ([place], [place])    Route: Oral    tacrolimus (ENVARSUS XR) 4 MG 24 hr tablet  30 tablet 0 2022    [facility] [place], [place] - [address]    Sig: Take 1 tablet (4 mg) by mouth every morning (before breakfast) With 1 mg tablets. For a total dose of 6mg daily.    Class: E-Prescribe    Notes to Pharmacy: TXP DT 11/15/2021 (Kidney / Pancreas) TXP Dischg DT 11/22/2021 DX Kidney replaced by transplant Z94.0 [facility] ([place], [place])    Route: Oral    torsemide (DEMADEX) 10 MG tablet  90 tablet 1 1/31/2022    [place], [place] - [address] [facility] [address]    Sig: Take 1 tablet (10 mg) by mouth daily HOLD per Dr Corona.    Class: No Print Out    Route: Oral    valGANciclovir (VALCYTE) 450 MG tablet  60 tablet 2 11/23/2021    [facility] [place] - [address]    Sig: Take 2 tablets (900 mg) by mouth daily    Class: E-Prescribe    Route: Oral    Wheat Dextrin (BENEFIBER FOR CHILDREN) POWD  155 g 0 2/4/2022    [facility] [place], [place] - [id number] [place], [place] [address]    Sig: Take 1 capful by mouth daily    Class: E-Prescribe    Route: Oral          O:      General Appearance: in no apparent distress.   Skin: Normal, no rashes or jaundice  Heart: regular rate and rhythm, normal S1 and S2  Lungs: easy respirations, no audible wheezing.  Abdomen: flat, The wound is dry and intact, without hernia. The abdomen is mildly tender. The kidney and pancreas graft is not tender.  There is no ascites.  Extremities: Tremor absent.   Edema: absent.     Transplant Immunosuppression Labs Latest Ref Rng & Units 2/14/2022 2/10/2022 2/7/2022 2/4/2022 2/3/2022   Creat 0.52 - 1.04 mg/dL 1.12(H) 1.22(H) 1.28(H) 1.20(H) 1.34(H)   BUN 7 - 30 mg/dL 24 23 22 22 30   WBC 4.0 - 11.0 10e3/uL 4.1 4.4 9.1 5.9 3.6(L)   Neutrophil % 82 73 91 - -   ANEU 1.6 - 8.3 10e3/uL - 3.2 - - -       Chemistries:   Recent Labs   Lab Test 02/14/22  0843   BUN 24   CR 1.12*   GFRESTIMATED 65   GLC 82     Lab Results    Component Value Date    A1C 7.7 11/15/2021    A1C 7.5 10/16/2012    CPEPT <0.1 07/21/2021     Recent Labs   Lab Test 11/15/21  1550   ALBUMIN 4.0   BILITOTAL 0.4   ALKPHOS 104   AST 16   ALT 23     Urine Studies:  Recent Labs   Lab Test 01/31/22  1519   COLOR Yellow   APPEARANCE Clear   URINEGLC Negative   URINEBILI Negative   URINEKETONE Negative   SG 1.011   UBLD Negative   URINEPH 5.0   PROTEIN Negative   NITRITE Negative   LEUKEST Negative   RBCU 1   WBCU 1     Recent Labs   Lab Test 01/20/22  0800 12/22/21  0910   UTPG 0.14 0.22*     Hematology:   Recent Labs   Lab Test 02/14/22  0843 02/10/22  0817 02/07/22  0915   HGB 11.1* 10.9* 11.2*    300 303   WBC 4.1 4.4 9.1     Coags:   Recent Labs   Lab Test 11/16/21  0402 11/15/21  1550   INR 1.11 0.96     HLA antibodies:   SA1 HI RISK CHRISTAL   Date Value Ref Range Status   12/22/2021 None  Final     SA1 MOD RISK CHRISTAL   Date Value Ref Range Status   12/22/2021 None  Final     SA2 HI RISK CHRISTAL   Date Value Ref Range Status   12/22/2021 None  Final     SA2 MOD RISK CHRISTAL   Date Value Ref Range Status   12/22/2021 None  Final       Assessment: Marilyn Ortega is doing fairly well s/p DDKT (SPK):  Issues we addressed during his visit include:    Plan:    1. Graft function: stable  2. Immunosuppression Management: No change continue myfortic and tacro .  Complexity of management:Medium.  Contributing factors: diarrhea  3. Diarrhea:  Likely self limited.  C diff and shaunna negative, covid -, should start stool bulking and softeners are directed.  Will send to GI for gastroparesis due to weight loss, nausea and loose stools, CT scan of abdomen pending  Followup: as directed    Total Time: 25 min,   Counselling Time: 15 min.            Again, thank you for allowing me to participate in the care of your patient.        Sincerely,        Sally Brito NP

## 2022-02-02 ENCOUNTER — TELEPHONE (OUTPATIENT)
Dept: TRANSPLANT | Facility: CLINIC | Age: 37
End: 2022-02-02
Payer: COMMERCIAL

## 2022-02-02 DIAGNOSIS — Z94.0 KIDNEY REPLACED BY TRANSPLANT: ICD-10-CM

## 2022-02-02 DIAGNOSIS — Z94.83 PANCREAS REPLACED BY TRANSPLANT (H): Primary | ICD-10-CM

## 2022-02-02 NOTE — TELEPHONE ENCOUNTER
"Post Kidney and Pancreas Transplant Team Conference  Date: 2/2/2022  Transplant Coordinator: Yessica Becker     Attendees:  [x]  Dr. Pack [x] Brandi Hernandez, RN [x] Tamiko Cárdenas LPN     []  Dr. Motta [] Lizette Park, EDWARD [] Radhika Arnett LPN   [x]  Dr. Lee [x] Yessica Becker RN    [x]  Dr. Corona [] Tasha Cartwright RN [] Bulmaro Weiner, PharmD   [x] Dr. El [] Hamida Guevara, EDWARD    [] Dr. Chavarria [] Roderick Messer RN    [x] Dr. Ocampo [x] Lynette De, EDWARD [x] Nena Wheeler RN   [] Dr. De León [] Naz Glaser RN    []  Dr. Aranda [x] Radha Hurt, EDWARD    [] Dr. Wright [x] Ashely Tse RN    [] Sally Brito NP [x] Jovana Lemos RN        Verbal Plan Read Back:   Patient needs bm \"clean out\"  Recommend starting half tsp of fiber daily      Routed to RN Coordinator   Tamiko Cárdenas LPN    Clarified that patient does take citrucel and senna.  Recommended she increase senna to BID and add BID miralax, per LUIS E instructions.   If this does not work, patient needs enema.     Patient v/u. She will notify RNCC if no progress made.    "

## 2022-02-03 ENCOUNTER — TELEPHONE (OUTPATIENT)
Dept: TRANSPLANT | Facility: CLINIC | Age: 37
End: 2022-02-03

## 2022-02-03 ENCOUNTER — LAB (OUTPATIENT)
Dept: LAB | Facility: CLINIC | Age: 37
End: 2022-02-03
Attending: INTERNAL MEDICINE
Payer: COMMERCIAL

## 2022-02-03 DIAGNOSIS — Z48.298 AFTERCARE FOLLOWING ORGAN TRANSPLANT: ICD-10-CM

## 2022-02-03 DIAGNOSIS — N18.9 ANEMIA DUE TO CHRONIC KIDNEY DISEASE, UNSPECIFIED CKD STAGE: ICD-10-CM

## 2022-02-03 DIAGNOSIS — D63.1 ANEMIA DUE TO CHRONIC KIDNEY DISEASE, UNSPECIFIED CKD STAGE: ICD-10-CM

## 2022-02-03 DIAGNOSIS — Z79.899 ENCOUNTER FOR LONG-TERM CURRENT USE OF MEDICATION: ICD-10-CM

## 2022-02-03 DIAGNOSIS — Z94.83 PANCREAS REPLACED BY TRANSPLANT (H): ICD-10-CM

## 2022-02-03 DIAGNOSIS — Z94.0 KIDNEY REPLACED BY TRANSPLANT: ICD-10-CM

## 2022-02-03 LAB
AMYLASE SERPL-CCNC: 64 U/L (ref 30–110)
ANION GAP SERPL CALCULATED.3IONS-SCNC: 9 MMOL/L (ref 3–14)
BUN SERPL-MCNC: 30 MG/DL (ref 7–30)
CALCIUM SERPL-MCNC: 9.5 MG/DL (ref 8.5–10.1)
CHLORIDE BLD-SCNC: 110 MMOL/L (ref 94–109)
CO2 SERPL-SCNC: 18 MMOL/L (ref 20–32)
CREAT SERPL-MCNC: 1.34 MG/DL (ref 0.52–1.04)
ERYTHROCYTE [DISTWIDTH] IN BLOOD BY AUTOMATED COUNT: 17.4 % (ref 10–15)
FERRITIN SERPL-MCNC: 185 NG/ML (ref 12–150)
GFR SERPL CREATININE-BSD FRML MDRD: 52 ML/MIN/1.73M2
GLUCOSE BLD-MCNC: 85 MG/DL (ref 70–99)
HCT VFR BLD AUTO: 54.3 % (ref 35–47)
HGB BLD-MCNC: 17.2 G/DL (ref 11.7–15.7)
IRON SATN MFR SERPL: 37 % (ref 15–46)
IRON SERPL-MCNC: 123 UG/DL (ref 35–180)
LIPASE SERPL-CCNC: 101 U/L (ref 73–393)
MCH RBC QN AUTO: 28.8 PG (ref 26.5–33)
MCHC RBC AUTO-ENTMCNC: 31.7 G/DL (ref 31.5–36.5)
MCV RBC AUTO: 91 FL (ref 78–100)
PLATELET # BLD AUTO: 144 10E3/UL (ref 150–450)
POTASSIUM BLD-SCNC: 4.1 MMOL/L (ref 3.4–5.3)
RBC # BLD AUTO: 5.97 10E6/UL (ref 3.8–5.2)
SODIUM SERPL-SCNC: 137 MMOL/L (ref 133–144)
TACROLIMUS BLD-MCNC: 8.5 UG/L (ref 5–15)
TIBC SERPL-MCNC: 336 UG/DL (ref 240–430)
TME LAST DOSE: NORMAL H
TME LAST DOSE: NORMAL H
WBC # BLD AUTO: 3.6 10E3/UL (ref 4–11)

## 2022-02-03 PROCEDURE — 83690 ASSAY OF LIPASE: CPT

## 2022-02-03 PROCEDURE — 82728 ASSAY OF FERRITIN: CPT

## 2022-02-03 PROCEDURE — 83550 IRON BINDING TEST: CPT

## 2022-02-03 PROCEDURE — 80197 ASSAY OF TACROLIMUS: CPT

## 2022-02-03 PROCEDURE — 36415 COLL VENOUS BLD VENIPUNCTURE: CPT

## 2022-02-03 PROCEDURE — 82310 ASSAY OF CALCIUM: CPT

## 2022-02-03 PROCEDURE — 82150 ASSAY OF AMYLASE: CPT

## 2022-02-03 PROCEDURE — 82374 ASSAY BLOOD CARBON DIOXIDE: CPT

## 2022-02-03 PROCEDURE — 85027 COMPLETE CBC AUTOMATED: CPT

## 2022-02-03 NOTE — TELEPHONE ENCOUNTER
ISSUE:  Patient called bc she has been trying to pass stools, but is only getting more liquid.   Took 2 additional laxatives last night. No significant stool passed, only more liquid.     Patient needs enema, reviewed w/ LUIS E. Recommended pink lady. Patient states she has had this in the past and would like to proceed.    Creatinine up slightly, will also try and get her scheduled for IV fluids along w/ enema. Message left for [facility] charge about how to order pink lady.

## 2022-02-03 NOTE — TELEPHONE ENCOUNTER
Patient scheduled for 1L IV fluids tomorrow morning at 7am, w/ pink lady. Patient notified of timing.

## 2022-02-03 NOTE — TELEPHONE ENCOUNTER
Patient returning coordinator's call patient would like the return call after 11:30 am as she will be on a call from 11:00 to 11:30 .

## 2022-02-04 ENCOUNTER — INFUSION THERAPY VISIT (OUTPATIENT)
Dept: INFUSION THERAPY | Facility: CLINIC | Age: 37
End: 2022-02-04
Payer: COMMERCIAL

## 2022-02-04 ENCOUNTER — ANCILLARY PROCEDURE (OUTPATIENT)
Dept: GENERAL RADIOLOGY | Facility: CLINIC | Age: 37
End: 2022-02-04
Attending: INTERNAL MEDICINE
Payer: COMMERCIAL

## 2022-02-04 VITALS
OXYGEN SATURATION: 100 % | HEART RATE: 85 BPM | RESPIRATION RATE: 16 BRPM | DIASTOLIC BLOOD PRESSURE: 84 MMHG | TEMPERATURE: 98 F | SYSTOLIC BLOOD PRESSURE: 120 MMHG

## 2022-02-04 DIAGNOSIS — Z94.83 PANCREAS REPLACED BY TRANSPLANT (H): Primary | ICD-10-CM

## 2022-02-04 DIAGNOSIS — R10.9 ABDOMINAL PAIN, UNSPECIFIED ABDOMINAL LOCATION: ICD-10-CM

## 2022-02-04 DIAGNOSIS — D64.9 ANEMIA, UNSPECIFIED TYPE: Primary | ICD-10-CM

## 2022-02-04 DIAGNOSIS — Z48.298 AFTERCARE FOLLOWING ORGAN TRANSPLANT: Primary | ICD-10-CM

## 2022-02-04 DIAGNOSIS — Z94.83 PANCREAS REPLACED BY TRANSPLANT (H): ICD-10-CM

## 2022-02-04 DIAGNOSIS — K59.09 CHRONIC CONSTIPATION: Primary | ICD-10-CM

## 2022-02-04 DIAGNOSIS — Z94.0 KIDNEY REPLACED BY TRANSPLANT: ICD-10-CM

## 2022-02-04 LAB
ANION GAP SERPL CALCULATED.3IONS-SCNC: 7 MMOL/L (ref 3–14)
BUN SERPL-MCNC: 22 MG/DL (ref 7–30)
CALCIUM SERPL-MCNC: 8.1 MG/DL (ref 8.5–10.1)
CHLORIDE BLD-SCNC: 116 MMOL/L (ref 94–109)
CO2 SERPL-SCNC: 17 MMOL/L (ref 20–32)
CREAT SERPL-MCNC: 1.2 MG/DL (ref 0.52–1.04)
ERYTHROCYTE [DISTWIDTH] IN BLOOD BY AUTOMATED COUNT: 16.8 % (ref 10–15)
GFR SERPL CREATININE-BSD FRML MDRD: 60 ML/MIN/1.73M2
GLUCOSE BLD-MCNC: 89 MG/DL (ref 70–99)
HCT VFR BLD AUTO: 35.3 % (ref 35–47)
HGB BLD-MCNC: 10.9 G/DL (ref 11.7–15.7)
MCH RBC QN AUTO: 29 PG (ref 26.5–33)
MCHC RBC AUTO-ENTMCNC: 30.9 G/DL (ref 31.5–36.5)
MCV RBC AUTO: 94 FL (ref 78–100)
PLATELET # BLD AUTO: 265 10E3/UL (ref 150–450)
POTASSIUM BLD-SCNC: 4 MMOL/L (ref 3.4–5.3)
RBC # BLD AUTO: 3.76 10E6/UL (ref 3.8–5.2)
SODIUM SERPL-SCNC: 140 MMOL/L (ref 133–144)
WBC # BLD AUTO: 5.9 10E3/UL (ref 4–11)

## 2022-02-04 PROCEDURE — 250N000013 HC RX MED GY IP 250 OP 250 PS 637: Performed by: NURSE PRACTITIONER

## 2022-02-04 PROCEDURE — 85014 HEMATOCRIT: CPT

## 2022-02-04 PROCEDURE — 36415 COLL VENOUS BLD VENIPUNCTURE: CPT

## 2022-02-04 PROCEDURE — 258N000003 HC RX IP 258 OP 636: Performed by: INTERNAL MEDICINE

## 2022-02-04 PROCEDURE — 96360 HYDRATION IV INFUSION INIT: CPT

## 2022-02-04 PROCEDURE — 74018 RADEX ABDOMEN 1 VIEW: CPT | Mod: GC | Performed by: RADIOLOGY

## 2022-02-04 PROCEDURE — 82310 ASSAY OF CALCIUM: CPT

## 2022-02-04 RX ORDER — MEPERIDINE HYDROCHLORIDE 25 MG/ML
25 INJECTION INTRAMUSCULAR; INTRAVENOUS; SUBCUTANEOUS EVERY 30 MIN PRN
Status: CANCELLED | OUTPATIENT
Start: 2022-02-04

## 2022-02-04 RX ORDER — HEPARIN SODIUM (PORCINE) LOCK FLUSH IV SOLN 100 UNIT/ML 100 UNIT/ML
5 SOLUTION INTRAVENOUS
Status: CANCELLED | OUTPATIENT
Start: 2022-02-04

## 2022-02-04 RX ORDER — ALBUTEROL SULFATE 90 UG/1
1-2 AEROSOL, METERED RESPIRATORY (INHALATION)
Status: CANCELLED
Start: 2022-02-04

## 2022-02-04 RX ORDER — NALOXONE HYDROCHLORIDE 0.4 MG/ML
0.2 INJECTION, SOLUTION INTRAMUSCULAR; INTRAVENOUS; SUBCUTANEOUS
Status: CANCELLED | OUTPATIENT
Start: 2022-02-04

## 2022-02-04 RX ORDER — WHEAT DEXTRIN 3 G/3.5 G
1 POWDER (GRAM) ORAL DAILY
Qty: 155 G | Refills: 0 | Status: SHIPPED | OUTPATIENT
Start: 2022-02-04 | End: 2022-02-18

## 2022-02-04 RX ORDER — METHYLPREDNISOLONE SODIUM SUCCINATE 125 MG/2ML
125 INJECTION, POWDER, LYOPHILIZED, FOR SOLUTION INTRAMUSCULAR; INTRAVENOUS
Status: CANCELLED
Start: 2022-02-04

## 2022-02-04 RX ORDER — ALBUTEROL SULFATE 0.83 MG/ML
2.5 SOLUTION RESPIRATORY (INHALATION)
Status: CANCELLED | OUTPATIENT
Start: 2022-02-04

## 2022-02-04 RX ORDER — DIPHENHYDRAMINE HYDROCHLORIDE 50 MG/ML
50 INJECTION INTRAMUSCULAR; INTRAVENOUS
Status: CANCELLED
Start: 2022-02-04

## 2022-02-04 RX ORDER — EPINEPHRINE 1 MG/ML
0.3 INJECTION, SOLUTION INTRAMUSCULAR; SUBCUTANEOUS EVERY 5 MIN PRN
Status: CANCELLED | OUTPATIENT
Start: 2022-02-04

## 2022-02-04 RX ORDER — HEPARIN SODIUM,PORCINE 10 UNIT/ML
5 VIAL (ML) INTRAVENOUS
Status: CANCELLED | OUTPATIENT
Start: 2022-02-04

## 2022-02-04 RX ADMIN — SODIUM CHLORIDE 1000 ML: 9 INJECTION, SOLUTION INTRAVENOUS at 07:38

## 2022-02-04 RX ADMIN — DOCUSATE SODIUM 286 ML: 50 LIQUID ORAL at 07:43

## 2022-02-04 NOTE — PROGRESS NOTES
Infusion Nursing Note:  Marilyn Ortega presents today for IVF/Pink Lady enema.    Patient seen by provider today: No   present during visit today: Not Applicable.    Note:   -1L of NS infused over 1 hr  -Pink Lady enema given. Pt had BM ~5 min after with some results. Pt contacted care coordinator who ordered an X-ray after infusion. Pt will discharge to home after X-ray and follow up with care coordinator for further instruction.     Intravenous Access:  Peripheral IV placed.  Labs drawn post infusion    Treatment Conditions:  Not Applicable.    Post Infusion Assessment:  Patient tolerated infusion without incident.  Site patent and intact, free from redness, edema or discomfort.  No evidence of extravasations.  Access discontinued per protocol.     Discharge Plan:   AVS to patient via MYCHART.  Patient will return per transplant team for next appointment.   Patient discharged in stable condition accompanied by: self.  Departure Mode: Ambulatory.    Lalitha Whitmore RN    /84 (BP Location: Left arm)   Pulse 85   Temp 98  F (36.7  C) (Oral)   Resp 16   SpO2 100%     Administrations This Visit     0.9% sodium chloride BOLUS     Admin Date  02/04/2022 Action  New Bag Dose  1,000 mL Rate  999 mL/hr Route  Intravenous Administered By  Lalitha Whitmore, RN          enema compound (docusate/mag cit/mineral oil/NaPhos) mixture     Admin Date  02/04/2022 Action  Given Dose  286 mL Route  Rectal Administered By  Lalitha Whitmore RN

## 2022-02-04 NOTE — PATIENT INSTRUCTIONS
Dear Marilyn Ortega    Thank you for choosing [facility] ([facility]) for your infusion.  The following information is a summary of our appointment as well as important reminders.      We look forward in seeing you on your next appointment here at [facility] ([facility]).  Please don t hesitate to call us at [phone] to reschedule any of your appointments or to speak with one of the [facility] registered nurses.  It was a pleasure taking care of you today.    Sincerely,    [facility]  [facility]  [address] [place] [address]  [address]  Phone:  [phone]

## 2022-02-06 DIAGNOSIS — K31.84 GASTROPARESIS: Primary | ICD-10-CM

## 2022-02-07 ENCOUNTER — LAB (OUTPATIENT)
Dept: LAB | Facility: CLINIC | Age: 37
End: 2022-02-07
Payer: COMMERCIAL

## 2022-02-07 ENCOUNTER — TELEPHONE (OUTPATIENT)
Dept: TRANSPLANT | Facility: CLINIC | Age: 37
End: 2022-02-07

## 2022-02-07 DIAGNOSIS — Z48.298 AFTERCARE FOLLOWING ORGAN TRANSPLANT: ICD-10-CM

## 2022-02-07 DIAGNOSIS — Z94.0 KIDNEY REPLACED BY TRANSPLANT: ICD-10-CM

## 2022-02-07 DIAGNOSIS — Z79.899 ENCOUNTER FOR LONG-TERM CURRENT USE OF MEDICATION: ICD-10-CM

## 2022-02-07 DIAGNOSIS — Z94.83 PANCREAS REPLACED BY TRANSPLANT (H): ICD-10-CM

## 2022-02-07 LAB
AMYLASE SERPL-CCNC: 65 U/L (ref 30–110)
ANION GAP SERPL CALCULATED.3IONS-SCNC: 7 MMOL/L (ref 3–14)
BASOPHILS # BLD AUTO: 0.1 10E3/UL (ref 0–0.2)
BASOPHILS NFR BLD AUTO: 1 %
BUN SERPL-MCNC: 22 MG/DL (ref 7–30)
CALCIUM SERPL-MCNC: 9.6 MG/DL (ref 8.5–10.1)
CHLORIDE BLD-SCNC: 107 MMOL/L (ref 94–109)
CO2 SERPL-SCNC: 22 MMOL/L (ref 20–32)
CREAT SERPL-MCNC: 1.28 MG/DL (ref 0.52–1.04)
EOSINOPHIL # BLD AUTO: 0 10E3/UL (ref 0–0.7)
EOSINOPHIL NFR BLD AUTO: 0 %
ERYTHROCYTE [DISTWIDTH] IN BLOOD BY AUTOMATED COUNT: 16.5 % (ref 10–15)
GFR SERPL CREATININE-BSD FRML MDRD: 55 ML/MIN/1.73M2
GLUCOSE BLD-MCNC: 84 MG/DL (ref 70–99)
HCT VFR BLD AUTO: 34.9 % (ref 35–47)
HGB BLD-MCNC: 11.2 G/DL (ref 11.7–15.7)
IMM GRANULOCYTES # BLD: 0 10E3/UL
IMM GRANULOCYTES NFR BLD: 0 %
LIPASE SERPL-CCNC: 102 U/L (ref 73–393)
LYMPHOCYTES # BLD AUTO: 0.6 10E3/UL (ref 0.8–5.3)
LYMPHOCYTES NFR BLD AUTO: 7 %
MAGNESIUM SERPL-MCNC: 1.2 MG/DL (ref 1.8–2.6)
MCH RBC QN AUTO: 29.2 PG (ref 26.5–33)
MCHC RBC AUTO-ENTMCNC: 32.1 G/DL (ref 31.5–36.5)
MCV RBC AUTO: 91 FL (ref 78–100)
MONOCYTES # BLD AUTO: 0.1 10E3/UL (ref 0–1.3)
MONOCYTES NFR BLD AUTO: 1 %
MYCOPHENOLATE SERPL LC/MS/MS-MCNC: 5.16 MG/L (ref 1–3.5)
MYCOPHENOLATE-G SERPL LC/MS/MS-MCNC: 45.6 MG/L (ref 30–95)
NEUTROPHILS # BLD AUTO: 8.3 10E3/UL (ref 1.6–8.3)
NEUTROPHILS NFR BLD AUTO: 91 %
NRBC # BLD AUTO: 0 10E3/UL
NRBC BLD AUTO-RTO: 0 /100
PHOSPHATE SERPL-MCNC: 2 MG/DL (ref 2.5–4.5)
PLATELET # BLD AUTO: 303 10E3/UL (ref 150–450)
POTASSIUM BLD-SCNC: 4 MMOL/L (ref 3.4–5.3)
RBC # BLD AUTO: 3.84 10E6/UL (ref 3.8–5.2)
SODIUM SERPL-SCNC: 136 MMOL/L (ref 133–144)
TACROLIMUS BLD-MCNC: 13.5 UG/L (ref 5–15)
TME LAST DOSE: ABNORMAL H
TME LAST DOSE: ABNORMAL H
TME LAST DOSE: NORMAL H
TME LAST DOSE: NORMAL H
WBC # BLD AUTO: 9.1 10E3/UL (ref 4–11)

## 2022-02-07 PROCEDURE — 36415 COLL VENOUS BLD VENIPUNCTURE: CPT

## 2022-02-07 PROCEDURE — 83690 ASSAY OF LIPASE: CPT

## 2022-02-07 PROCEDURE — 80197 ASSAY OF TACROLIMUS: CPT

## 2022-02-07 PROCEDURE — 85025 COMPLETE CBC W/AUTO DIFF WBC: CPT

## 2022-02-07 PROCEDURE — 82150 ASSAY OF AMYLASE: CPT

## 2022-02-07 PROCEDURE — 84100 ASSAY OF PHOSPHORUS: CPT

## 2022-02-07 PROCEDURE — 83735 ASSAY OF MAGNESIUM: CPT

## 2022-02-07 PROCEDURE — 80048 BASIC METABOLIC PNL TOTAL CA: CPT

## 2022-02-07 PROCEDURE — 80180 DRUG SCRN QUAN MYCOPHENOLATE: CPT

## 2022-02-07 NOTE — TELEPHONE ENCOUNTER
Left message and sent mychart message to patient regarding:  Low bicarb     PLAN:  Start bicarb 650mg BID

## 2022-02-07 NOTE — TELEPHONE ENCOUNTER
ISSUE:  Low bicarb    PLAN:  Start bicarb 650mg BID    Yessica Becker RN BSN  Transplant Care Coordinator    ----- Message from Mason Pack MD sent at 2/7/2022 10:29 AM CST -----  Stable kidney function, but low bicarbonate level and recommend starting sodium bicarbonate 650 mg twice daily.

## 2022-02-08 ENCOUNTER — TELEPHONE (OUTPATIENT)
Dept: TRANSPLANT | Facility: CLINIC | Age: 37
End: 2022-02-08
Payer: COMMERCIAL

## 2022-02-08 ENCOUNTER — TELEPHONE (OUTPATIENT)
Dept: GASTROENTEROLOGY | Facility: CLINIC | Age: 37
End: 2022-02-08
Payer: COMMERCIAL

## 2022-02-08 DIAGNOSIS — Z94.0 KIDNEY REPLACED BY TRANSPLANT: ICD-10-CM

## 2022-02-08 DIAGNOSIS — Z94.83 PANCREAS REPLACED BY TRANSPLANT (H): Primary | ICD-10-CM

## 2022-02-08 RX ORDER — TACROLIMUS 4 MG/1
4 TABLET, EXTENDED RELEASE ORAL
Qty: 30 TABLET | Refills: 0 | Status: SHIPPED | OUTPATIENT
Start: 2022-02-08 | End: 2022-02-18

## 2022-02-08 RX ORDER — TACROLIMUS 1 MG/1
2 TABLET, EXTENDED RELEASE ORAL
Qty: 90 TABLET | Refills: 11 | Status: SHIPPED | OUTPATIENT
Start: 2022-02-08 | End: 2022-02-18

## 2022-02-08 NOTE — TELEPHONE ENCOUNTER
[facility] Call Center    Phone Message    May a detailed message be left on voicemail: yes     Reason for Call: Other: Patient is being referred for Gastroparesis and is experiencing weight loss, 10lbs within 2 weeks! Please review per scheduling guidelines. Thanks!      Action Taken: Message routed to:  [facility] ([facility] GI    Travel Screening: Not Applicable

## 2022-02-08 NOTE — TELEPHONE ENCOUNTER
REFERRAL INFORMATION:    Referring Provider:  Sally Brito NP     Referring Clinic:  [facility]     Reason for Visit/Diagnosis: Gastroparesis      FUTURE VISIT INFORMATION:    Appointment Date: 2022    Appointment Time: 8:40 AM      NOTES STATUS DETAILS   OFFICE NOTE from Referring Provider Internal 2022 Office visit with Sally Brito NP      OFFICE NOTE from Other Specialist N/A    HOSPITAL DISCHARGE SUMMARY/  ED VISITS N/A    OPERATIVE REPORT N/A    MEDICATION LIST Internal         ENDOSCOPY  Care Everywhere EGD: 18 ()   EGD: 11/23/15 ([facility])      COLONOSCOPY Care Everywhere 11/23/15 ([facility])    ERCP N/A    EUS N/A    STOOL TESTING Internal 2022   PERTINENT LABS Internal    PATHOLOGY REPORTS (RELATED) N/A    IMAGING (CT, MRI, EGD, MRCP, Small Bowel Follow Through/SBT, MR/CT Enterography) Internal/ Care Everywhere CT Abdomen Pelvis: 2022, 2021    [facility]:  - NM Gastric Emptyin18 3:41pm Fax request sent to [facility] for image. -Rafi

## 2022-02-08 NOTE — TELEPHONE ENCOUNTER
ISSUE:   Tacrolimus XR level 13.5 on 2/7, goal 8-10, dose 7 mg daily.    PLAN:   Please call patient and confirm this was an accurate 24-hour trough. Verify Tacrolimus XR dose 7 mg daily. Confirm no new medications or illness. Confirm no missed doses. If accurate trough and accurate dose, decrease Tacrolimus XR dose to 6 mg daily and repeat labs in 2 days.    OUTCOME:   Spoke with patient, they confirm accurate trough level and current dose 7 mg daily. Patient confirmed dose change to 6 mg daily and to repeat labs in 2 days. Orders sent to [facility] pharmacy for dose change and lab for repeat labs. Patient voiced understanding of plan.

## 2022-02-09 ENCOUNTER — TEAM CONFERENCE (OUTPATIENT)
Dept: TRANSPLANT | Facility: CLINIC | Age: 37
End: 2022-02-09
Payer: COMMERCIAL

## 2022-02-09 DIAGNOSIS — Z94.0 KIDNEY TRANSPLANTED: Primary | ICD-10-CM

## 2022-02-09 NOTE — TELEPHONE ENCOUNTER
Post Kidney and Pancreas Transplant Team Conference  Date: 2/9/2022  Transplant Coordinator: Yessica Becker     Attendees:  []  Dr. Pack [] Brandi Hernandez, RN [] Tamiko Cárdenas LPN     []  Dr. Motta [] Lizette Park, EDWARD [] Radhika Arnett LPN   []  Dr. Lee [] Yessica Becker RN    []  Dr. Corona [] Tasha Cartwright RN [] Bulmaro Weiner, PharmD   [] Dr. El [] Hamida Guevara RN    [] Dr. Chavarria [] Roderick Messer RN    [] Dr. Ocampo [] Lynette De RN [] Nena Wheeler RN   [] Dr. De León [] Naz Glaser RN    []  Dr. Aranda [] Radha Hurt RN    [] Dr. Wright [] Ashely Tse RN    [] Sally Brito, NP [] Jovana Lemos RN        Verbal Plan Read Back:   Magnesium supplement    Routed to RN Coordinator   Radhika Arnett LPN    Please notify patient to start mag oxide 400mg daily.     Yessica Becker RN BSN  Transplant Care Coordinator

## 2022-02-10 ENCOUNTER — LAB (OUTPATIENT)
Dept: LAB | Facility: CLINIC | Age: 37
End: 2022-02-10
Attending: INTERNAL MEDICINE
Payer: COMMERCIAL

## 2022-02-10 DIAGNOSIS — Z94.83 PANCREAS REPLACED BY TRANSPLANT (H): ICD-10-CM

## 2022-02-10 DIAGNOSIS — Z94.0 KIDNEY REPLACED BY TRANSPLANT: ICD-10-CM

## 2022-02-10 DIAGNOSIS — Z48.298 AFTERCARE FOLLOWING ORGAN TRANSPLANT: ICD-10-CM

## 2022-02-10 LAB
AMYLASE SERPL-CCNC: 59 U/L (ref 30–110)
ANION GAP SERPL CALCULATED.3IONS-SCNC: 6 MMOL/L (ref 3–14)
BASOPHILS # BLD MANUAL: 0.1 10E3/UL (ref 0–0.2)
BASOPHILS NFR BLD MANUAL: 2 %
BUN SERPL-MCNC: 23 MG/DL (ref 7–30)
CALCIUM SERPL-MCNC: 9.2 MG/DL (ref 8.5–10.1)
CHLORIDE BLD-SCNC: 107 MMOL/L (ref 94–109)
CO2 SERPL-SCNC: 23 MMOL/L (ref 20–32)
CREAT SERPL-MCNC: 1.22 MG/DL (ref 0.52–1.04)
EOSINOPHIL # BLD MANUAL: 0.1 10E3/UL (ref 0–0.7)
EOSINOPHIL NFR BLD MANUAL: 2 %
ERYTHROCYTE [DISTWIDTH] IN BLOOD BY AUTOMATED COUNT: 16.6 % (ref 10–15)
GFR SERPL CREATININE-BSD FRML MDRD: 59 ML/MIN/1.73M2
GLUCOSE BLD-MCNC: 83 MG/DL (ref 70–99)
HCT VFR BLD AUTO: 34.8 % (ref 35–47)
HGB BLD-MCNC: 10.9 G/DL (ref 11.7–15.7)
LIPASE SERPL-CCNC: 79 U/L (ref 73–393)
LYMPHOCYTES # BLD MANUAL: 0.9 10E3/UL (ref 0.8–5.3)
LYMPHOCYTES NFR BLD MANUAL: 20 %
MCH RBC QN AUTO: 28.8 PG (ref 26.5–33)
MCHC RBC AUTO-ENTMCNC: 31.3 G/DL (ref 31.5–36.5)
MCV RBC AUTO: 92 FL (ref 78–100)
MONOCYTES # BLD MANUAL: 0.1 10E3/UL (ref 0–1.3)
MONOCYTES NFR BLD MANUAL: 3 %
NEUTROPHILS # BLD MANUAL: 3.2 10E3/UL (ref 1.6–8.3)
NEUTROPHILS NFR BLD MANUAL: 73 %
PLAT MORPH BLD: NORMAL
PLATELET # BLD AUTO: 300 10E3/UL (ref 150–450)
POTASSIUM BLD-SCNC: 4.2 MMOL/L (ref 3.4–5.3)
RBC # BLD AUTO: 3.78 10E6/UL (ref 3.8–5.2)
RBC MORPH BLD: NORMAL
SODIUM SERPL-SCNC: 136 MMOL/L (ref 133–144)
TACROLIMUS BLD-MCNC: 8.7 UG/L (ref 5–15)
TME LAST DOSE: NORMAL H
TME LAST DOSE: NORMAL H
WBC # BLD AUTO: 4.4 10E3/UL (ref 4–11)

## 2022-02-10 PROCEDURE — 83690 ASSAY OF LIPASE: CPT

## 2022-02-10 PROCEDURE — 82310 ASSAY OF CALCIUM: CPT

## 2022-02-10 PROCEDURE — 82150 ASSAY OF AMYLASE: CPT

## 2022-02-10 PROCEDURE — 36415 COLL VENOUS BLD VENIPUNCTURE: CPT

## 2022-02-10 PROCEDURE — 85027 COMPLETE CBC AUTOMATED: CPT

## 2022-02-10 PROCEDURE — 80197 ASSAY OF TACROLIMUS: CPT

## 2022-02-14 ENCOUNTER — TELEPHONE (OUTPATIENT)
Dept: TRANSPLANT | Facility: CLINIC | Age: 37
End: 2022-02-14

## 2022-02-14 ENCOUNTER — LAB (OUTPATIENT)
Dept: LAB | Facility: CLINIC | Age: 37
End: 2022-02-14
Attending: INTERNAL MEDICINE
Payer: COMMERCIAL

## 2022-02-14 DIAGNOSIS — H53.9 VISION CHANGES: Primary | ICD-10-CM

## 2022-02-14 DIAGNOSIS — Z94.83 PANCREAS REPLACED BY TRANSPLANT (H): ICD-10-CM

## 2022-02-14 DIAGNOSIS — Z94.0 KIDNEY REPLACED BY TRANSPLANT: ICD-10-CM

## 2022-02-14 DIAGNOSIS — Z48.298 AFTERCARE FOLLOWING ORGAN TRANSPLANT: ICD-10-CM

## 2022-02-14 DIAGNOSIS — T45.1X5A CALCINEURIN INHIBITOR CAUSING TOXICITY IN THERAPEUTIC USE: ICD-10-CM

## 2022-02-14 DIAGNOSIS — H53.9 VISUAL DISTURBANCE: ICD-10-CM

## 2022-02-14 LAB
AMYLASE SERPL-CCNC: 54 U/L (ref 30–110)
ANION GAP SERPL CALCULATED.3IONS-SCNC: 6 MMOL/L (ref 3–14)
BASOPHILS # BLD AUTO: 0.1 10E3/UL (ref 0–0.2)
BASOPHILS NFR BLD AUTO: 2 %
BUN SERPL-MCNC: 24 MG/DL (ref 7–30)
CALCIUM SERPL-MCNC: 9.4 MG/DL (ref 8.5–10.1)
CHLORIDE BLD-SCNC: 105 MMOL/L (ref 94–109)
CO2 SERPL-SCNC: 25 MMOL/L (ref 20–32)
CREAT SERPL-MCNC: 1.12 MG/DL (ref 0.52–1.04)
EOSINOPHIL # BLD AUTO: 0 10E3/UL (ref 0–0.7)
EOSINOPHIL NFR BLD AUTO: 1 %
ERYTHROCYTE [DISTWIDTH] IN BLOOD BY AUTOMATED COUNT: 16.6 % (ref 10–15)
GFR SERPL CREATININE-BSD FRML MDRD: 65 ML/MIN/1.73M2
GLUCOSE BLD-MCNC: 82 MG/DL (ref 70–99)
HCT VFR BLD AUTO: 34.4 % (ref 35–47)
HGB BLD-MCNC: 11.1 G/DL (ref 11.7–15.7)
IMM GRANULOCYTES # BLD: 0 10E3/UL
IMM GRANULOCYTES NFR BLD: 0 %
LIPASE SERPL-CCNC: 90 U/L (ref 73–393)
LYMPHOCYTES # BLD AUTO: 0.6 10E3/UL (ref 0.8–5.3)
LYMPHOCYTES NFR BLD AUTO: 13 %
MCH RBC QN AUTO: 29.7 PG (ref 26.5–33)
MCHC RBC AUTO-ENTMCNC: 32.3 G/DL (ref 31.5–36.5)
MCV RBC AUTO: 92 FL (ref 78–100)
MONOCYTES # BLD AUTO: 0.1 10E3/UL (ref 0–1.3)
MONOCYTES NFR BLD AUTO: 2 %
NEUTROPHILS # BLD AUTO: 3.3 10E3/UL (ref 1.6–8.3)
NEUTROPHILS NFR BLD AUTO: 82 %
NRBC # BLD AUTO: 0 10E3/UL
NRBC BLD AUTO-RTO: 0 /100
PLATELET # BLD AUTO: 320 10E3/UL (ref 150–450)
POTASSIUM BLD-SCNC: 4.5 MMOL/L (ref 3.4–5.3)
RBC # BLD AUTO: 3.74 10E6/UL (ref 3.8–5.2)
SODIUM SERPL-SCNC: 136 MMOL/L (ref 133–144)
TACROLIMUS BLD-MCNC: 11.8 UG/L (ref 5–15)
TME LAST DOSE: NORMAL H
TME LAST DOSE: NORMAL H
WBC # BLD AUTO: 4.1 10E3/UL (ref 4–11)

## 2022-02-14 PROCEDURE — 85025 COMPLETE CBC W/AUTO DIFF WBC: CPT

## 2022-02-14 PROCEDURE — 80048 BASIC METABOLIC PNL TOTAL CA: CPT

## 2022-02-14 PROCEDURE — 36415 COLL VENOUS BLD VENIPUNCTURE: CPT

## 2022-02-14 PROCEDURE — 83690 ASSAY OF LIPASE: CPT

## 2022-02-14 PROCEDURE — 80197 ASSAY OF TACROLIMUS: CPT

## 2022-02-14 PROCEDURE — 82150 ASSAY OF AMYLASE: CPT

## 2022-02-14 NOTE — TELEPHONE ENCOUNTER
See EzLike message for description of symptoms. Patient states she has vision changes for the past month, worsening in the past week.     Message sent to Dr Corona and Bulmaro Weiner, Pharmacist.    Patient has appt w/ ophthalmologist next Friday.     Kamran Corona MD Rockers, Emily S, EDWARD; Bulmaro Weiner [facility], [facility]  I would get an MRI brain without contrast to make sure this isn't PRES. If it is we would have to discuss switch to CsA.     Patient needs MRI brain w/o contrast. Ordered and scheduled for tomorrow 2/16 @ 1130 @ [facility] [place]

## 2022-02-15 NOTE — PROGRESS NOTES
Transplant Surgery Progress Note    Transplants:  11/15/2021 (Kidney / Pancreas); Postoperative day:  92  S: Marilyn Ortega is a 36 year old female with PMH significant for DMI complicated by gastroparesis and CKD due to diabetic nephropathy, HTN, gluten intolerance and Factor V Leiden mutation, heterozygous. Admitted for DD SPK 11/16/21    Patient overall has been doing very well from an organ standpoint. Patient states over the weekend she developed severe diarrhea and was having many loose stools she states she was unable to go anywhere and she was having multiple stools a day    She was tested for Covid which was negative she had a C. difficile and enteric panel which were all negative.    Patient states that she has been losing weight and she feels as if she is unable to take in the needed water amount. She states that she is currently miserable.    She denies any fevers, chills hematuria dysuria or frequency    Transplant History:    Transplant Type:  DDKT (SPK)  Donor Type: Donation after Brain Death   Transplant Date:  11/15/2021 (Kidney / Pancreas)   Ureteral Stent:  No   Crossmatch:  negative   DSA at Tx:  No  Baseline Cr: 0.9-1.2  DeNovo DSA: No    Acute Rejection Hx:  No    Present Maintenance Immunosuppression:  Tacrolimus and Mycophenolic acid    CMV IgG Ab Discordance:  No  EBV IgG Ab Discordance:  No    BK Viremia:  No  EBV Viremia:  No    Transplant Coordinator: Yessica Becker     Transplant Office Phone Number: [phone]     Immunosuppressant Medications     Immunosuppressive Agents Disp Start End     mycophenolic acid (GENERIC EQUIVALENT) 180 MG EC tablet    60 tablet 1/31/2022     Sig - Route: Take 1 tablet (180 mg) by mouth 2 times daily Total dose=540mg BID - Oral    Class: E-Prescribe    Notes to Pharmacy: TXP DT 11/15/2021 (Kidney / Pancreas) TXP Dischg DT 11/22/2021 DX Kidney replaced by transplant Z94.0 TX [facility] ([place], [place])      mycophenolic acid (GENERIC EQUIVALENT) 360 MG EC tablet    60 tablet 1/31/2022     Sig - Route: Take 1 tablet (360 mg) by mouth 2 times daily Total dose=540mg BID - Oral    Class: E-Prescribe    Notes to Pharmacy: TXP DT 11/15/2021 (Kidney / Pancreas) TXP Dischg DT 11/22/2021 DX Kidney replaced by transplant Z94.0 [facility] ([place], [place])     tacrolimus (ENVARSUS XR) 0.75 MG 24 hr tablet    90 tablet 1/11/2022     Sig - Route: Take 1 tablet (0.75 mg) by mouth every morning (before breakfast) On hold for dose change. - Oral    Patient not taking: Reported on 1/21/2022       Class: E-Prescribe    Notes to Pharmacy: TXP DT 11/15/2021 (Kidney / Pancreas) TXP Dischg DT 11/22/2021 DX Kidney replaced by transplant Z94.0 [facility] ([place], [place])     tacrolimus (ENVARSUS XR) 1 MG 24 hr tablet    90 tablet 2/8/2022     Sig - Route: Take 2 tablets (2 mg) by mouth every morning (before breakfast) With 4mg tablets for a total dose of 6mg daily - Oral    Class: E-Prescribe    Notes to Pharmacy: TXP DT 11/15/2021 (Kidney / Pancreas) TXP Dischg DT 11/22/2021 DX Kidney replaced by transplant Z94.0 [facility] ([place], [place])     tacrolimus (ENVARSUS XR) 4 MG 24 hr tablet    30 tablet 2/8/2022     Sig - Route: Take 1 tablet (4 mg) by mouth every morning (before breakfast) With 1 mg tablets. For a total dose of 6mg daily. - Oral    Class: E-Prescribe    Notes to Pharmacy: TXP DT 11/15/2021 (Kidney / Pancreas) TXP Dischg DT 11/22/2021 DX Kidney replaced by transplant Z94.0 [facility] ([place], [place])          Possible Immunosuppression-related side effects:   []             headache  []             vivid dreams  []             irritability  []             cognitive difficuties  []             fine tremor  []             nausea  []              diarrhea  []             neuropathy      []             edema  []             renal calcineurin toxicity  []             hyperkalemia  []             post-transplant diabetes  []             decreased appetite  []             increased appetite  []             other:  []             none    Prescription Medications as of 2/15/2022       Rx Number Disp Refills Start End Last Dispensed Date Next Fill Date Owning Pharmacy    aspirin (ASA) 325 MG tablet  30 tablet 5 11/23/2021    [address]    Sig: Take 1 tablet (325 mg) by mouth daily    Class: E-Prescribe    Route: Oral    calcium carbonate-vitamin D (OS-STEPHANIE WITH D) 500-200 MG-UNIT tablet  60 tablet 11 11/22/2021    [address]    Sig: Take 1 tablet by mouth 2 times daily (with meals)    Class: E-Prescribe    Route: Oral    cetirizine (ZYRTEC) 10 MG tablet            Sig: Take 10 mg by mouth daily    Class: Historical    Route: Oral    cholecalciferol 25 MCG (1000 UT) TABS    5/11/2021    [facility] [place] [address] [address]    Sig: Take 2,000 Units by mouth every other day     Class: Historical    Route: Oral    clotrimazole (MYCELEX) 10 MG lozenge  360 lozenge 0 11/22/2021 2/20/2022   [address]    Sig: Place 1 lozenge (10 mg) inside cheek 4 times daily    Class: E-Prescribe    Route: Buccal    Continuous Blood Gluc Sensor (DEXCOM G6 SENSOR) [facility]    8/6/2020    [facility] [place] [address] [address]    Sig: Use as directed for continuous glucose monitoring.  Change sensor every 10 days.    Class: Historical    Continuous Blood Gluc Transmit (DEXCOM G6 TRANSMITTER) [facility]    8/6/2020    [facility] [place] [address] [address]    Sig: Use as directed for continuous glucose monitoring.  Change every 3 months.    Class: Historical     docusate sodium (COLACE) 100 MG capsule        [facility] [place] [address] [address]     Sig: Take 200 mg by mouth daily before breakfast    Class: Historical    Route: Oral    glucose blood VI test strips strip            Si times daily.    Class: Historical    Route: As instructed    magnesium oxide (MAG-OX) 400 (241.3 Mg) MG tablet  30 tablet 2 2022    [facility] [place] [address] [address] [facility]    Sig: Take 1 tablet (400 mg) by mouth daily    Class: E-Prescribe    Route: Oral    mycophenolic acid (GENERIC EQUIVALENT) 180 MG EC tablet  60 tablet 11 2022    [address]    Sig: Take 1 tablet (180 mg) by mouth 2 times daily Total dose=540mg BID    Class: E-Prescribe    Notes to Pharmacy: TXP DT 11/15/2021 (Kidney / Pancreas) TXP Dischg DT 2021 DX Kidney replaced by transplant Z94.0 TX [facility] ([place], [place])    Route: Oral    mycophenolic acid (GENERIC EQUIVALENT) 360 MG EC tablet  60 tablet 11 2022    [address]    Sig: Take 1 tablet (360 mg) by mouth 2 times daily Total dose=540mg BID    Class: E-Prescribe    Notes to Pharmacy: TXP DT 11/15/2021 (Kidney / Pancreas) TXP Dischg DT 2021 DX Kidney replaced by transplant Z94.0 TX [facility] ([place], [place])    Route: Oral    norgestimate-ethinyl estradiol (ORTHO-CYCLEN) 0.25-35 MG-MCG tablet            Sig: Take 1 tablet by mouth daily    Class: Historical    Route: Oral    pantoprazole (PROTONIX) 40 MG EC tablet  30 tablet 1 2022    [facility] [place] [address] [address]    Simg every other day for 2 weeks and then stop    Class: Historical    phosphorus tablet 250 mg (PHOSPHA 250 NEUTRAL) 250 MG per  tablet  120 tablet 3 1/12/2022    [facility] [place], [place] - [address]    Sig: Take 1 tablet (250 mg) by mouth daily    Class: Historical    Route: Oral    Prucalopride Succinate (MOTEGRITY) 2 MG TABS    5/6/2021    [facility] [place], [place] - [address] [address]    Sig: Take one tablet by mouth daily    Class: Historical    sulfamethoxazole-trimethoprim (BACTRIM) 400-80 MG tablet  30 tablet 11 11/23/2021    [facility] - [place], [place] - [address]    Sig: Take 1 tablet by mouth daily    Class: E-Prescribe    Route: Oral    tacrolimus (ENVARSUS XR) 0.75 MG 24 hr tablet  90 tablet 1 1/11/2022    [facility] [place], [place] - [facility] [address]    Sig: Take 1 tablet (0.75 mg) by mouth every morning (before breakfast) On hold for dose change.    Class: E-Prescribe    Notes to Pharmacy: TXP DT 11/15/2021 (Kidney / Pancreas) TXP Dischg DT 11/22/2021 DX Kidney replaced by transplant Z94.0 TX [facility] ([place], [place])    Route: Oral    tacrolimus (ENVARSUS XR) 1 MG 24 hr tablet  90 tablet 11 2/8/2022    [facility] [place] - [facility] [address]    Sig: Take 2 tablets (2 mg) by mouth every morning (before breakfast) With 4mg tablets for a total dose of 6mg daily    Class: E-Prescribe    Notes to Pharmacy: TXP DT 11/15/2021 (Kidney / Pancreas) TXP Dischg DT 11/22/2021 DX Kidney replaced by transplant Z94.0 TX [facility] ([place], [place])    Route: Oral    tacrolimus (ENVARSUS XR) 4 MG 24 hr tablet  30 tablet 0 2/8/2022    [facility] [place] - [facility] [address]    Sig: Take 1 tablet (4 mg) by mouth every morning (before breakfast) With 1 mg tablets. For a total dose of 6mg daily.    Class: E-Prescribe    Notes to Pharmacy: TXP DT 11/15/2021 (Kidney / Pancreas) TXP Dischg DT  11/22/2021 DX Kidney replaced by transplant Z94.0 [facility] ([place], [place])    Route: Oral    torsemide (DEMADEX) 10 MG tablet  90 tablet 1 1/31/2022    [place], [place] - [address]    Sig: Take 1 tablet (10 mg) by mouth daily HOLD per Dr Corona.    Class: No Print Out    Route: Oral    valGANciclovir (VALCYTE) 450 MG tablet  60 tablet 2 11/23/2021    [facility] [place], [place] - [address] [facility]    Sig: Take 2 tablets (900 mg) by mouth daily    Class: E-Prescribe    Route: Oral    Wheat Dextrin (BENEFIBER FOR CHILDREN) POWD  155 g 0 2/4/2022    [facility] [place] [facility] [place], [place] - [id number] [place] [address]     Sig: Take 1 capful by mouth daily    Class: E-Prescribe    Route: Oral          O:      General Appearance: in no apparent distress.   Skin: Normal, no rashes or jaundice  Heart: regular rate and rhythm, normal S1 and S2  Lungs: easy respirations, no audible wheezing.  Abdomen: flat, The wound is dry and intact, without hernia. The abdomen is mildly tender. The kidney and pancreas graft is not tender.  There is no ascites.  Extremities: Tremor absent.   Edema: absent.     Transplant Immunosuppression Labs Latest Ref Rng & Units 2/14/2022 2/10/2022 2/7/2022 2/4/2022 2/3/2022   Creat 0.52 - 1.04 mg/dL 1.12(H) 1.22(H) 1.28(H) 1.20(H) 1.34(H)   BUN 7 - 30 mg/dL 24 23 22 22 30   WBC 4.0 - 11.0 10e3/uL 4.1 4.4 9.1 5.9 3.6(L)   Neutrophil % 82 73 91 - -   ANEU 1.6 - 8.3 10e3/uL - 3.2 - - -       Chemistries:   Recent Labs   Lab Test 02/14/22  0843   BUN 24   CR 1.12*   GFRESTIMATED 65   GLC 82     Lab Results   Component Value Date    A1C 7.7 11/15/2021    A1C 7.5 10/16/2012    CPEPT <0.1 07/21/2021     Recent Labs   Lab Test 11/15/21  1550   ALBUMIN 4.0   BILITOTAL 0.4   ALKPHOS 104   AST 16   ALT 23     Urine Studies:  Recent Labs   Lab Test 01/31/22  1519   COLOR Yellow   APPEARANCE Clear    URINEGLC Negative   URINEBILI Negative   URINEKETONE Negative   SG 1.011   UBLD Negative   URINEPH 5.0   PROTEIN Negative   NITRITE Negative   LEUKEST Negative   RBCU 1   WBCU 1     Recent Labs   Lab Test 01/20/22  0800 12/22/21  0910   UTPG 0.14 0.22*     Hematology:   Recent Labs   Lab Test 02/14/22  0843 02/10/22  0817 02/07/22  0915   HGB 11.1* 10.9* 11.2*    300 303   WBC 4.1 4.4 9.1     Coags:   Recent Labs   Lab Test 11/16/21  0402 11/15/21  1550   INR 1.11 0.96     HLA antibodies:   SA1 HI RISK CHRISTAL   Date Value Ref Range Status   12/22/2021 None  Final     SA1 MOD RISK CHRISTAL   Date Value Ref Range Status   12/22/2021 None  Final     SA2 HI RISK CHRISTAL   Date Value Ref Range Status   12/22/2021 None  Final     SA2 MOD RISK CHRISTAL   Date Value Ref Range Status   12/22/2021 None  Final       Assessment: Marilyn Ortega is doing fairly well s/p DDKT (SPK):  Issues we addressed during his visit include:    Plan:    1. Graft function: stable  2. Immunosuppression Management: No change continue myfortic and tacro .  Complexity of management:Medium.  Contributing factors: diarrhea  3. Diarrhea:  Likely self limited.  C diff and shaunna negative, covid -, should start stool bulking and softeners are directed.  Will send to GI for gastroparesis due to weight loss, nausea and loose stools, CT scan of abdomen pending  Followup: as directed    Total Time: 25 min,   Counselling Time: 15 min.

## 2022-02-16 ENCOUNTER — ANCILLARY PROCEDURE (OUTPATIENT)
Dept: MRI IMAGING | Facility: CLINIC | Age: 37
End: 2022-02-16
Attending: INTERNAL MEDICINE
Payer: COMMERCIAL

## 2022-02-16 DIAGNOSIS — T45.1X5A CALCINEURIN INHIBITOR CAUSING TOXICITY IN THERAPEUTIC USE: ICD-10-CM

## 2022-02-16 DIAGNOSIS — H53.9 VISION CHANGES: ICD-10-CM

## 2022-02-16 DIAGNOSIS — H53.9 VISUAL DISTURBANCE: ICD-10-CM

## 2022-02-16 PROCEDURE — 70551 MRI BRAIN STEM W/O DYE: CPT | Performed by: RADIOLOGY

## 2022-02-17 ENCOUNTER — TELEPHONE (OUTPATIENT)
Dept: TRANSPLANT | Facility: CLINIC | Age: 37
End: 2022-02-17

## 2022-02-17 ENCOUNTER — LAB (OUTPATIENT)
Dept: LAB | Facility: CLINIC | Age: 37
End: 2022-02-17
Attending: INTERNAL MEDICINE
Payer: COMMERCIAL

## 2022-02-17 ENCOUNTER — TELEPHONE (OUTPATIENT)
Dept: NEPHROLOGY | Facility: CLINIC | Age: 37
End: 2022-02-17

## 2022-02-17 DIAGNOSIS — Z94.0 KIDNEY REPLACED BY TRANSPLANT: Primary | ICD-10-CM

## 2022-02-17 DIAGNOSIS — H53.9 VISUAL DISTURBANCE: Primary | ICD-10-CM

## 2022-02-17 DIAGNOSIS — Z94.0 KIDNEY REPLACED BY TRANSPLANT: ICD-10-CM

## 2022-02-17 DIAGNOSIS — Z48.298 AFTERCARE FOLLOWING ORGAN TRANSPLANT: ICD-10-CM

## 2022-02-17 DIAGNOSIS — Z94.83 PANCREAS REPLACED BY TRANSPLANT (H): ICD-10-CM

## 2022-02-17 LAB
AMYLASE SERPL-CCNC: 55 U/L (ref 30–110)
ANION GAP SERPL CALCULATED.3IONS-SCNC: 6 MMOL/L (ref 3–14)
BASOPHILS # BLD AUTO: 0.1 10E3/UL (ref 0–0.2)
BASOPHILS NFR BLD AUTO: 1 %
BUN SERPL-MCNC: 19 MG/DL (ref 7–30)
CALCIUM SERPL-MCNC: 8.8 MG/DL (ref 8.5–10.1)
CHLORIDE BLD-SCNC: 107 MMOL/L (ref 94–109)
CO2 SERPL-SCNC: 24 MMOL/L (ref 20–32)
CREAT SERPL-MCNC: 1.12 MG/DL (ref 0.52–1.04)
EOSINOPHIL # BLD AUTO: 0 10E3/UL (ref 0–0.7)
EOSINOPHIL NFR BLD AUTO: 1 %
ERYTHROCYTE [DISTWIDTH] IN BLOOD BY AUTOMATED COUNT: 16.4 % (ref 10–15)
GFR SERPL CREATININE-BSD FRML MDRD: 65 ML/MIN/1.73M2
GLUCOSE BLD-MCNC: 95 MG/DL (ref 70–99)
HCT VFR BLD AUTO: 32.9 % (ref 35–47)
HGB BLD-MCNC: 10.3 G/DL (ref 11.7–15.7)
IMM GRANULOCYTES # BLD: 0 10E3/UL
IMM GRANULOCYTES NFR BLD: 1 %
LIPASE SERPL-CCNC: 84 U/L (ref 73–393)
LYMPHOCYTES # BLD AUTO: 0.7 10E3/UL (ref 0.8–5.3)
LYMPHOCYTES NFR BLD AUTO: 16 %
MCH RBC QN AUTO: 29.2 PG (ref 26.5–33)
MCHC RBC AUTO-ENTMCNC: 31.3 G/DL (ref 31.5–36.5)
MCV RBC AUTO: 93 FL (ref 78–100)
MONOCYTES # BLD AUTO: 0.1 10E3/UL (ref 0–1.3)
MONOCYTES NFR BLD AUTO: 2 %
NEUTROPHILS # BLD AUTO: 3.3 10E3/UL (ref 1.6–8.3)
NEUTROPHILS NFR BLD AUTO: 79 %
NRBC # BLD AUTO: 0 10E3/UL
NRBC BLD AUTO-RTO: 0 /100
PLATELET # BLD AUTO: 337 10E3/UL (ref 150–450)
POTASSIUM BLD-SCNC: 4.3 MMOL/L (ref 3.4–5.3)
RBC # BLD AUTO: 3.53 10E6/UL (ref 3.8–5.2)
SODIUM SERPL-SCNC: 137 MMOL/L (ref 133–144)
TACROLIMUS BLD-MCNC: 4.8 UG/L (ref 5–15)
TME LAST DOSE: ABNORMAL H
TME LAST DOSE: ABNORMAL H
WBC # BLD AUTO: 4.1 10E3/UL (ref 4–11)

## 2022-02-17 PROCEDURE — 85025 COMPLETE CBC W/AUTO DIFF WBC: CPT

## 2022-02-17 PROCEDURE — 82150 ASSAY OF AMYLASE: CPT

## 2022-02-17 PROCEDURE — 80197 ASSAY OF TACROLIMUS: CPT

## 2022-02-17 PROCEDURE — 80048 BASIC METABOLIC PNL TOTAL CA: CPT

## 2022-02-17 PROCEDURE — 36415 COLL VENOUS BLD VENIPUNCTURE: CPT

## 2022-02-17 PROCEDURE — 83690 ASSAY OF LIPASE: CPT

## 2022-02-17 NOTE — TELEPHONE ENCOUNTER
ISSUE:  MRI T2 hypersensitivities in cortical matter in L occipital lobe.     PLAN:  ----- Message from Kamran Corona MD sent at 2/16/2022  2:51 PM CST -----  Needs to see neurology and neuro-ophthalmology     OUTCOME:  Referral entered for neurology/neuro-ophthalmology  Patient has appt w/ her ophthalmologist next week.     Patient to notify RNCC if she does not hear about scheduling neurology appt.

## 2022-02-17 NOTE — TELEPHONE ENCOUNTER
Patient is requesting a refill for Ondansetron 4mg ODT to be sent to [facility] ([phone])    **Not on current med list

## 2022-02-18 ENCOUNTER — TELEPHONE (OUTPATIENT)
Dept: OPHTHALMOLOGY | Facility: CLINIC | Age: 37
End: 2022-02-18

## 2022-02-18 ENCOUNTER — TELEPHONE (OUTPATIENT)
Dept: TRANSPLANT | Facility: CLINIC | Age: 37
End: 2022-02-18

## 2022-02-18 ENCOUNTER — PRE VISIT (OUTPATIENT)
Dept: GASTROENTEROLOGY | Facility: CLINIC | Age: 37
End: 2022-02-18

## 2022-02-18 ENCOUNTER — VIRTUAL VISIT (OUTPATIENT)
Dept: GASTROENTEROLOGY | Facility: CLINIC | Age: 37
End: 2022-02-18
Attending: NURSE PRACTITIONER
Payer: COMMERCIAL

## 2022-02-18 DIAGNOSIS — K59.09 CHRONIC CONSTIPATION: Primary | ICD-10-CM

## 2022-02-18 DIAGNOSIS — Z94.83 PANCREAS REPLACED BY TRANSPLANT (H): ICD-10-CM

## 2022-02-18 DIAGNOSIS — K58.2 IRRITABLE BOWEL SYNDROME WITH BOTH CONSTIPATION AND DIARRHEA: ICD-10-CM

## 2022-02-18 DIAGNOSIS — K31.84 GASTROPARESIS: ICD-10-CM

## 2022-02-18 DIAGNOSIS — D84.9 IMMUNOSUPPRESSION (H): ICD-10-CM

## 2022-02-18 DIAGNOSIS — Z94.0 KIDNEY REPLACED BY TRANSPLANT: ICD-10-CM

## 2022-02-18 PROCEDURE — 99204 OFFICE O/P NEW MOD 45 MIN: CPT | Mod: GT | Performed by: INTERNAL MEDICINE

## 2022-02-18 RX ORDER — TACROLIMUS 4 MG/1
8 TABLET, EXTENDED RELEASE ORAL
Qty: 60 TABLET | Refills: 11 | Status: SHIPPED | OUTPATIENT
Start: 2022-02-18 | End: 2022-02-22

## 2022-02-18 RX ORDER — TACROLIMUS 1 MG/1
2 TABLET, EXTENDED RELEASE ORAL
Qty: 90 TABLET | Refills: 11
Start: 2022-02-18 | End: 2022-02-22

## 2022-02-18 NOTE — PROGRESS NOTES
Referring provider: Sally Brito NP    CC: 36 year old female referred by Sally Brito NP for evaluation of gastroparesis, change in bowel habits.    HPI:   37 yo woman with type 1 DM (not previously on dialysis) s/p panc-kidney transplant Nov 2021, gastroparesis but not retinopathy, neuropathy who presents for gastroparesis and change in bowel habits.     Not great GI related. Years of GI issues, starting in college. Tried gluten free diet which improved symptoms 100%. Gluten free since 2002. Varies between diarrhea and constipation.     Had kidney/pancreas transplant in Nov 2021. Had severe diarrhea for 2 weeks about 6 weeks ago, lost 10 pounds. Needed infusions. Enteric pathogens were negative. All of a sudden it stopped and she became very constipated. Needed laxatives, enemas and everything became straight liquid. Drug levels have varied, which her transplant team has been worried has been related to dysmotility.     After transplant and prednisone course she was fairly regular for about 1 month. Didn't have other infections around that time. Before transplant things were ok, more constipation than diarrhea but was overall ok.    Started prucalopride approximately 2 years ago, 2mg daily. This was helpful but now hasn't been. Tried Benefiber which caused a lot of bloating. Takes docusate daily in the morning. No opiates. She has tried gastroparesis diet. She is taking [employer] brand magnesium. She doesn't notice improvement with prunes but she can increase the number she eats. Senna doesn't work as well as dulcolax.     A 10 point ROS is otherwise negative.    Past Medical History:   Diagnosis Date     Anemia 12/9/2021     C. difficile diarrhea 2013     CKD (chronic kidney disease) stage 4, GFR 15-29 ml/min (H)      Gastroparesis      Gluten intolerance      Heterozygous factor V Leiden mutation (H)      HTN (hypertension)      Infection due to 2019 novel coronavirus 11/2020     Osteomyelitis (H) 2013      Shingles      Type 1 diabetes mellitus (H) 2000     Past Surgical History:   Procedure Laterality Date     BENCH KIDNEY N/A 11/15/2021    Procedure: Bench kidney;  Surgeon: Branden Aranda MD;  Location: [facility] OR     BENCH PANCREAS N/A 11/15/2021    Procedure: Bench pancreas;  Surgeon: Branden Aranda MD;  Location: [facility] OR     CATARACT EXTRACTION  2021     ORIF HIP FRACTURE Left      ORTHOPEDIC SURGERY Bilateral     Repair of labral tear     TRANSPLANT PANCREAS, KIDNEY  DONOR, COMBINED Left 11/15/2021    Procedure: TRANSPLANT, KIDNEY AND PANCREAS,  DONOR;  Surgeon: Branden Aranda MD;  Location: [facility] OR   No pelvic floor surgeries.     Family History   Problem Relation Age of Onset     Diabetes Paternal Grandfather      Arthritis Maternal Grandmother      Hypertension Maternal Grandmother      Cancer - colorectal Maternal Grandfather      Social History     Tobacco Use     Smoking status: Never Smoker     Smokeless tobacco: Never Used   Substance Use Topics     Alcohol use: Yes     Comment: socially   No kids.    Current Outpatient Medications   Medication     aspirin (ASA) 325 MG tablet     calcium carbonate-vitamin D (OS-STEPHANIE WITH D) 500-200 MG-UNIT tablet     cetirizine (ZYRTEC) 10 MG tablet     cholecalciferol 25 MCG (1000 UT) TABS     Continuous Blood Gluc Sensor (DEXCOM G6 SENSOR) MISC     Continuous Blood Gluc Transmit (DEXCOM G6 TRANSMITTER) MISC     docusate sodium (COLACE) 100 MG capsule     glucose blood VI test strips strip     magnesium oxide (MAG-OX) 400 (241.3 Mg) MG tablet     mycophenolic acid (GENERIC EQUIVALENT) 180 MG EC tablet     mycophenolic acid (GENERIC EQUIVALENT) 360 MG EC tablet     norgestimate-ethinyl estradiol (ORTHO-CYCLEN) 0.25-35 MG-MCG tablet     pantoprazole (PROTONIX) 40 MG EC tablet     phosphorus tablet 250 mg (PHOSPHA 250 NEUTRAL) 250 MG per tablet     Prucalopride Succinate (MOTEGRITY) 2 MG TABS      sulfamethoxazole-trimethoprim (BACTRIM) 400-80 MG tablet     tacrolimus (ENVARSUS XR) 0.75 MG 24 hr tablet     tacrolimus (ENVARSUS XR) 1 MG 24 hr tablet     tacrolimus (ENVARSUS XR) 4 MG 24 hr tablet     torsemide (DEMADEX) 10 MG tablet     Wheat Dextrin (BENEFIBER FOR CHILDREN) POWD     No current facility-administered medications for this visit.      Physical exam:  GEN: NAD  Eyes: EOMI  Ears: hearing intact  Mouth: MMM  Neck: full ROM  Cardiopulmonary: non labored  Skin: no jaundice  Neuro: awake and oriented  MSK: moves arms equally  Psych: normal affect     Labs:   Cr 1.12  Hgb 10.3 MCV 93  C diff, enteric pathogen panel negative 1/29/22    Imaging:   NM GES 8/21/18  FINDINGS:   Percent remaining activity within the stomach:   Immediate: 100% (normal greater than 70%)   1 hour: 94% (normal 30-90%)   2 hours: 93% (normal less than 60%)   3 hours: 81% (normal less than 30%)   4 hours: 61% (normal less than 10%)   (Delayed gastric emptying is graded at mild=11-20% retention at 4 hour versus moderate=21-35% retention at 4 hour versus severe=>35% retention at 4 hour).    Endoscopy:  EGD 12/18/18  Findings:        The examined esophagus was normal.        A large amount of food (residue) was found in the        gastric body.        The examined duodenum was normal.     Assessment and recommendations:   35 yo woman with type 1 DM (not previously on dialysis) s/p panc-kidney transplant Nov 2021, gastroparesis but not retinopathy, neuropathy who presents for gastroparesis and change in bowel habits.     Noted to have delayed gastric emptying in 2018 without gastric outlet obstruction, which was prior to starting prucalopride. It is not clear if her symptoms are more related to delayed gastric emptying or irregular bowel patterns/constipation.     We will empirically try metoclopramide if her transplant team is in agreement. Given persistence of symptoms while on prucalopride, it is reasonable to rule out pelvic floor  dysfunction while increasing laxatives.    - talk about metoclopramide (Reglan) 10 mg three times per day with meals  - schedule referral to [facility] for evaluation of pelvic floor dysfunction  - increase prunes and magnesium oxide    RTC 3 months

## 2022-02-18 NOTE — TELEPHONE ENCOUNTER
ISSUE:   Tacrolimus XR level 4.8 on 2/17, goal 8-10, dose 6 mg daily.    PLAN:   Please call patient and confirm this was an accurate 24-hour trough. Verify Tacrolimus XR dose 6 mg daily. Confirm no new medications or illness. Confirm no missed doses. If accurate trough and accurate dose, increase Tacrolimus XR dose to 8 mg daily and repeat labs in 3 days    OUTCOME:   Spoke with patient, they confirm accurate trough level and current dose 6 mg daily. Patient confirmed dose change to 8 mg daily and to repeat labs in 3 days. Orders sent to [facility] pharmacy for dose change and lab for repeat labs. Patient voiced understanding of plan.

## 2022-02-18 NOTE — TELEPHONE ENCOUNTER
[facility] Call Center    Phone Message    May a detailed message be left on voicemail: yes     Reason for Call: Other: Pt has urgent dx to be seen 3-5 days. Writer only has end of March available. Please call Pt to schedule, thank you.      Action Taken: Message routed to:  [facility]    Travel Screening: Not Applicable

## 2022-02-18 NOTE — PROGRESS NOTES
Marilyn is a 36 year old who is being evaluated via a billable video visit.      How would you like to obtain your AVS? MyChart  If the video visit is dropped, the invitation should be resent by: Send to e-mail at: [email or web address]  Will anyone else be joining your video visit? No      Video Start Time: 902  Video-Visit Details    Type of service:  Video Visit    Video End Time:9:43 AM    Originating Location (pt. Location): Home    Distant Location (provider location):  [facility] [place]     Platform used for Video Visit: [employer]

## 2022-02-18 NOTE — Clinical Note
Matteo NAVARRETE did I ever ask you to send a referral to pelvic floor center? I think I didn't. Could you please? Thanks! t

## 2022-02-18 NOTE — PATIENT INSTRUCTIONS
It was a pleasure taking care of you today.  I've included a brief summary of our discussion and care plan from today's visit below.  Please review this information with your primary care provider.  _______________________________________________________________________    My recommendations are summarized as follows:  - talk about metoclopramide (Reglan) 10 mg three times per day with meals  - schedule referral to Pelvic Floor Center for evaluation of pelvic floor dysfunction  - increase prunes and magnesium oxide    Please call our nurse Randa with any questions or concerns- [phone].  --    Return to GI Clinic in 3 months to review your progress.    _______________________________________________________________________    Who do I call with any questions after my visit?  Please be in touch if there are any further questions that arise following today's visit.  There are multiple ways to contact your gastroenterology care team.        During business hours, you may reach a Gastroenterology nurse at [phone] and choose option 3.         To schedule or reschedule an appointment, please call [phone].       You can always send a secure message through [employer].  [employer] messages are answered by your nurse or doctor typically within 24 hours.  Please allow extra time on weekends and holidays.        For urgent/emergent questions after business hours, you may reach the on-call GI Fellow by contacting the [facility] at [phone].     How will I get the results of any tests ordered?    You will receive all of your results.  If you have signed up for [employer], any tests ordered at your visit will be available to you after your physician reviews them.  Typically this takes 1-2 weeks.  If there are urgent results that require a change in your care plan, your physician or nurse will call you to discuss the next steps.      What is [employer]?  [employer] is a secure way for you to access all of  your healthcare records from the [facility].  It is a web based computer program, so you can sign on to it from any location.  It also allows you to send secure messages to your care team.  I recommend signing up for [employer] access if you have not already done so and are comfortable with using a computer.      How to I schedule a follow-up visit?  If you did not schedule a follow-up visit today, please call [phone] to schedule a follow-up office visit.        Sincerely,    Flex Bocanegra MD     [facility]  Division of Gastroenterology

## 2022-02-18 NOTE — TELEPHONE ENCOUNTER
Spoke to pt at 1550    Bilateral vision loss in past months    Sees sunspots constantly even with eyes closed    Cataract surgery in each eye recently--second eye in January and vision was good.    Vision progressively worsening even in past 2 weeks-- difficult seeing words    MRI done last Wednesday and in epic.    Scheduled Tuesday in Acute clinic when neuro-ophthalmology available    Pt aware of date/time/location/duration at [facility]    Meet Hough RN 4:47 PM 02/18/22            [facility] Call Center    Phone Message    May a detailed message be left on voicemail: yes     Reason for Call: Other: pt calling back about trying to get into Neuro Appt as Urgent, Please call pt back to discuss    Thank you,    Action Taken: Message routed to:  [facility] eye    Travel Screening: Not Applicable

## 2022-02-18 NOTE — LETTER
2/18/2022         RE: Marilyn Ortega  [address]  [place] [address]        Dear Colleague,    Thank you for referring your patient, Marilyn Ortega, to the [facility] [place]. Please see a copy of my visit note below.    Referring provider: Sally Brito NP    CC: 36 year old female referred by Sally Brito NP for evaluation of gastroparesis, change in bowel habits.    HPI:   37 yo woman with type 1 DM (not previously on dialysis) s/p panc-kidney transplant Nov 2021, gastroparesis but not retinopathy, neuropathy who presents for gastroparesis and change in bowel habits.     Not great GI related. Years of GI issues, starting in college. Tried gluten free diet which improved symptoms 100%. Gluten free since 2002. Varies between diarrhea and constipation.     Had kidney/pancreas transplant in Nov 2021. Had severe diarrhea for 2 weeks about 6 weeks ago, lost 10 pounds. Needed infusions. Enteric pathogens were negative. All of a sudden it stopped and she became very constipated. Needed laxatives, enemas and everything became straight liquid. Drug levels have varied, which her transplant team has been worried has been related to dysmotility.     After transplant and prednisone course she was fairly regular for about 1 month. Didn't have other infections around that time. Before transplant things were ok, more constipation than diarrhea but was overall ok.    Started prucalopride approximately 2 years ago, 2mg daily. This was helpful but now hasn't been. Tried Benefiber which caused a lot of bloating. Takes docusate daily in the morning. No opiates. She has tried gastroparesis diet. She is taking [employer] brand magnesium. She doesn't notice improvement with prunes but she can increase the number she eats. Senna doesn't work as well as dulcolax.     A 10 point ROS is otherwise negative.    Past Medical History:   Diagnosis Date     Anemia 12/9/2021     C. difficile  diarrhea      CKD (chronic kidney disease) stage 4, GFR 15-29 ml/min (H)      Gastroparesis      Gluten intolerance      Heterozygous factor V Leiden mutation (H)      HTN (hypertension)      Infection due to 2019 novel coronavirus 2020     Osteomyelitis (H) 2013     Shingles 2013     Type 1 diabetes mellitus (H)      Past Surgical History:   Procedure Laterality Date     BENCH KIDNEY N/A 11/15/2021    Procedure: Bench kidney;  Surgeon: Branden Aranda MD;  Location: [facility] OR     BENCH PANCREAS N/A 11/15/2021    Procedure: Bench pancreas;  Surgeon: Branden Aranda MD;  Location: [facility] OR     CATARACT EXTRACTION  2021     ORIF HIP FRACTURE Left      ORTHOPEDIC SURGERY Bilateral     Repair of labral tear     TRANSPLANT PANCREAS, KIDNEY  DONOR, COMBINED Left 11/15/2021    Procedure: TRANSPLANT, KIDNEY AND PANCREAS,  DONOR;  Surgeon: Branden Aranda MD;  Location: [facility] OR   No pelvic floor surgeries.     Family History   Problem Relation Age of Onset     Diabetes Paternal Grandfather      Arthritis Maternal Grandmother      Hypertension Maternal Grandmother      Cancer - colorectal Maternal Grandfather      Social History     Tobacco Use     Smoking status: Never Smoker     Smokeless tobacco: Never Used   Substance Use Topics     Alcohol use: Yes     Comment: socially   No kids.    Current Outpatient Medications   Medication     aspirin (ASA) 325 MG tablet     calcium carbonate-vitamin D (OS-STEPHANIE WITH D) 500-200 MG-UNIT tablet     cetirizine (ZYRTEC) 10 MG tablet     cholecalciferol 25 MCG (1000 UT) TABS     Continuous Blood Gluc Sensor (DEXCOM G6 SENSOR) MISC     Continuous Blood Gluc Transmit (DEXCOM G6 TRANSMITTER) MISC     docusate sodium (COLACE) 100 MG capsule     glucose blood VI test strips strip     magnesium oxide (MAG-OX) 400 (241.3 Mg) MG tablet     mycophenolic acid (GENERIC EQUIVALENT) 180 MG EC tablet     mycophenolic acid (GENERIC  EQUIVALENT) 360 MG EC tablet     norgestimate-ethinyl estradiol (ORTHO-CYCLEN) 0.25-35 MG-MCG tablet     pantoprazole (PROTONIX) 40 MG EC tablet     phosphorus tablet 250 mg (PHOSPHA 250 NEUTRAL) 250 MG per tablet     Prucalopride Succinate (MOTEGRITY) 2 MG TABS     sulfamethoxazole-trimethoprim (BACTRIM) 400-80 MG tablet     tacrolimus (ENVARSUS XR) 0.75 MG 24 hr tablet     tacrolimus (ENVARSUS XR) 1 MG 24 hr tablet     tacrolimus (ENVARSUS XR) 4 MG 24 hr tablet     torsemide (DEMADEX) 10 MG tablet     Wheat Dextrin (BENEFIBER FOR CHILDREN) POWD     No current facility-administered medications for this visit.      Physical exam:  GEN: NAD  Eyes: EOMI  Ears: hearing intact  Mouth: MMM  Neck: full ROM  Cardiopulmonary: non labored  Skin: no jaundice  Neuro: awake and oriented  MSK: moves arms equally  Psych: normal affect     Labs:   Cr 1.12  Hgb 10.3 MCV 93  C diff, enteric pathogen panel negative 1/29/22    Imaging:   NM GES 8/21/18  FINDINGS:   Percent remaining activity within the stomach:   Immediate: 100% (normal greater than 70%)   1 hour: 94% (normal 30-90%)   2 hours: 93% (normal less than 60%)   3 hours: 81% (normal less than 30%)   4 hours: 61% (normal less than 10%)   (Delayed gastric emptying is graded at mild=11-20% retention at 4 hour versus moderate=21-35% retention at 4 hour versus severe=>35% retention at 4 hour).    Endoscopy:  EGD 12/18/18  Findings:        The examined esophagus was normal.        A large amount of food (residue) was found in the        gastric body.        The examined duodenum was normal.     Assessment and recommendations:   35 yo woman with type 1 DM (not previously on dialysis) s/p panc-kidney transplant Nov 2021, gastroparesis but not retinopathy, neuropathy who presents for gastroparesis and change in bowel habits.     Noted to have delayed gastric emptying in 2018 without gastric outlet obstruction, which was prior to starting prucalopride. It is not clear if her symptoms  are more related to delayed gastric emptying or irregular bowel patterns/constipation.     We will empirically try metoclopramide if her transplant team is in agreement. Given persistence of symptoms while on prucalopride, it is reasonable to rule out pelvic floor dysfunction while increasing laxatives.    - talk about metoclopramide (Reglan) 10 mg three times per day with meals  - schedule referral to [facility] for evaluation of pelvic floor dysfunction  - increase prunes and magnesium oxide    RTC 3 months        Flex Bocanegra MD

## 2022-02-21 ENCOUNTER — TELEPHONE (OUTPATIENT)
Dept: PHARMACY | Facility: CLINIC | Age: 37
End: 2022-02-21
Payer: COMMERCIAL

## 2022-02-21 ENCOUNTER — LAB (OUTPATIENT)
Dept: LAB | Facility: CLINIC | Age: 37
End: 2022-02-21
Attending: INTERNAL MEDICINE
Payer: COMMERCIAL

## 2022-02-21 ENCOUNTER — TELEPHONE (OUTPATIENT)
Dept: TRANSPLANT | Facility: CLINIC | Age: 37
End: 2022-02-21

## 2022-02-21 ENCOUNTER — TELEPHONE (OUTPATIENT)
Dept: OPHTHALMOLOGY | Facility: CLINIC | Age: 37
End: 2022-02-21

## 2022-02-21 DIAGNOSIS — Z94.0 KIDNEY REPLACED BY TRANSPLANT: Primary | ICD-10-CM

## 2022-02-21 DIAGNOSIS — Z94.83 PANCREAS REPLACED BY TRANSPLANT (H): ICD-10-CM

## 2022-02-21 DIAGNOSIS — Z48.298 AFTERCARE FOLLOWING ORGAN TRANSPLANT: ICD-10-CM

## 2022-02-21 DIAGNOSIS — Z79.899 ENCOUNTER FOR LONG-TERM CURRENT USE OF MEDICATION: ICD-10-CM

## 2022-02-21 DIAGNOSIS — Z94.0 KIDNEY REPLACED BY TRANSPLANT: ICD-10-CM

## 2022-02-21 LAB
AMYLASE SERPL-CCNC: 56 U/L (ref 30–110)
ANION GAP SERPL CALCULATED.3IONS-SCNC: 6 MMOL/L (ref 3–14)
BASOPHILS # BLD AUTO: 0.1 10E3/UL (ref 0–0.2)
BASOPHILS NFR BLD AUTO: 2 %
BUN SERPL-MCNC: 16 MG/DL (ref 7–30)
CALCIUM SERPL-MCNC: 9.3 MG/DL (ref 8.5–10.1)
CHLORIDE BLD-SCNC: 107 MMOL/L (ref 94–109)
CO2 SERPL-SCNC: 24 MMOL/L (ref 20–32)
CREAT SERPL-MCNC: 1.12 MG/DL (ref 0.52–1.04)
EOSINOPHIL # BLD AUTO: 0 10E3/UL (ref 0–0.7)
EOSINOPHIL NFR BLD AUTO: 1 %
ERYTHROCYTE [DISTWIDTH] IN BLOOD BY AUTOMATED COUNT: 15.6 % (ref 10–15)
GFR SERPL CREATININE-BSD FRML MDRD: 65 ML/MIN/1.73M2
GLUCOSE BLD-MCNC: 89 MG/DL (ref 70–99)
HCT VFR BLD AUTO: 32.4 % (ref 35–47)
HGB BLD-MCNC: 10.3 G/DL (ref 11.7–15.7)
IMM GRANULOCYTES # BLD: 0 10E3/UL
IMM GRANULOCYTES NFR BLD: 0 %
LIPASE SERPL-CCNC: 88 U/L (ref 73–393)
LYMPHOCYTES # BLD AUTO: 0.6 10E3/UL (ref 0.8–5.3)
LYMPHOCYTES NFR BLD AUTO: 15 %
MAGNESIUM SERPL-MCNC: 1.8 MG/DL (ref 1.6–2.3)
MCH RBC QN AUTO: 29.7 PG (ref 26.5–33)
MCHC RBC AUTO-ENTMCNC: 31.8 G/DL (ref 31.5–36.5)
MCV RBC AUTO: 93 FL (ref 78–100)
MONOCYTES # BLD AUTO: 0.2 10E3/UL (ref 0–1.3)
MONOCYTES NFR BLD AUTO: 6 %
MYCOPHENOLATE SERPL LC/MS/MS-MCNC: 2.23 MG/L (ref 1–3.5)
MYCOPHENOLATE-G SERPL LC/MS/MS-MCNC: 32.8 MG/L (ref 30–95)
NEUTROPHILS # BLD AUTO: 3.3 10E3/UL (ref 1.6–8.3)
NEUTROPHILS NFR BLD AUTO: 76 %
NRBC # BLD AUTO: 0 10E3/UL
NRBC BLD AUTO-RTO: 0 /100
PHOSPHATE SERPL-MCNC: 2.4 MG/DL (ref 2.5–4.5)
PLATELET # BLD AUTO: 309 10E3/UL (ref 150–450)
POTASSIUM BLD-SCNC: 4.2 MMOL/L (ref 3.4–5.3)
RBC # BLD AUTO: 3.47 10E6/UL (ref 3.8–5.2)
SODIUM SERPL-SCNC: 137 MMOL/L (ref 133–144)
TACROLIMUS BLD-MCNC: 7.4 UG/L (ref 5–15)
TME LAST DOSE: NORMAL H
WBC # BLD AUTO: 4.2 10E3/UL (ref 4–11)

## 2022-02-21 PROCEDURE — 80048 BASIC METABOLIC PNL TOTAL CA: CPT

## 2022-02-21 PROCEDURE — 83690 ASSAY OF LIPASE: CPT

## 2022-02-21 PROCEDURE — 84100 ASSAY OF PHOSPHORUS: CPT

## 2022-02-21 PROCEDURE — 36415 COLL VENOUS BLD VENIPUNCTURE: CPT

## 2022-02-21 PROCEDURE — 83735 ASSAY OF MAGNESIUM: CPT

## 2022-02-21 PROCEDURE — 82150 ASSAY OF AMYLASE: CPT

## 2022-02-21 PROCEDURE — 80180 DRUG SCRN QUAN MYCOPHENOLATE: CPT

## 2022-02-21 PROCEDURE — 80197 ASSAY OF TACROLIMUS: CPT

## 2022-02-21 PROCEDURE — 85004 AUTOMATED DIFF WBC COUNT: CPT

## 2022-02-21 NOTE — TELEPHONE ENCOUNTER
Called and spoke to Marilyn     Marilyn was worried that she was not going to be seen by Neuro     Per Meet Hough Notes       Scheduled Tuesday in Acute clinic when neuro-ophthalmology available     Pt aware of date/time/location/duration at [facility]    I explained that neuro team will be available when she comes to her appt, but neuro is scheduling in April and neuro wanted her to be seen sooner.

## 2022-02-21 NOTE — TELEPHONE ENCOUNTER
ISSUE:   Tacrolimus XR level 7.6 on 2/21, goal 8-10, dose 8 mg daily.    PLAN:   Please call patient and confirm this was an accurate 24-hour trough. Verify Tacrolimus XR dose 8 mg daily. Confirm no new medications or illness. Confirm no missed doses. If accurate trough and accurate dose, increase Tacrolimus XR dose to 9 mg daily and repeat labs in 3 days.    Yessica Becker RN BSN  Transplant Care Coordinator    OUTCOME:   Spoke with patient, they confirm accurate trough level and current dose 8 mg daily. Patient confirmed dose change to 9 mg daily and to repeat labs in 3 days. Orders sent to preferred pharmacy for dose change and lab for repeat labs. Patient voiced understanding of plan.

## 2022-02-21 NOTE — TELEPHONE ENCOUNTER
Anemia Management Note  SUBJECTIVE/OBJECTIVE:  Referred by Dr. 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   Iron regimen:  Does not tolerate Oral Iron.  Completed Venofer course on 21.  Labs : 2022  Recent SYLVAIN use, transfusion, IV iron: Starting Venofer, Aranesp   RX/TX plans : TBD      No history of stroke, MI and blood clots or cancers.     Contact:            No Consent to Communicate on File other than for Ratna Ortega to  medications.     Anemia Latest Ref Rng & Units 2022 2/3/2022 2022 2022 2/10/2022 2022 2022   Hemoglobin 11.7 - 15.7 g/dL 11.1(L) 17.2(H) 10.9(L) 11.2(L) 10.9(L) 11.1(L) 10.3(L)   IV Iron Dose - - - - - - - -   TSAT 15 - 46 % - 37 - - - - -   Ferritin 12 - 150 ng/mL - 185(H) - - - - -   PRBCs - - - - - - - -     BP Readings from Last 3 Encounters:   22 120/84   22 110/79   22 132/75     Wt Readings from Last 2 Encounters:   22 115 lb 14.4 oz (52.6 kg)   22 125 lb 14.4 oz (57.1 kg)           ASSESSMENT:  Hgb:at goal - continue to monitor  TSat: at goal >30% Ferritin: At goal (>100ng/mL)    PLAN:  RTC for Anemia labs  in 2-4 week(s)    Orders needed to be renewed (for next follow-up date) in EPIC: None    Iron labs due:  approx 2 weeks    Plan discussed with:  No call, lab review      NEXT FOLLOW-UP DATE:  3/3/22    Patricia Gonzales RN   Anemia Services  [address] [address]   [email or web address]   Office : [phone]  Fax: [phone]

## 2022-02-21 NOTE — TELEPHONE ENCOUNTER
[facility] Call Center    Phone Message    May a detailed message be left on voicemail: yes     Reason for Call: Other: pt calling to make sure she has the correct Appt she is saying she should be seeing Neuro Ophthalmology, Please call pt back to discuss    Thank you,    Action Taken: Message routed to:  [facility]    Travel Screening: Not Applicable

## 2022-02-21 NOTE — TELEPHONE ENCOUNTER
Patient Call: General  Route to LPN    Reason for call: Patient would like to speak to Yessica about a referral that was sent to another facility. She spoke with facility and she states there is confusion with referral.    Call back needed? Yes  Return Call Needed  Same as documented in contacts section

## 2022-02-22 ENCOUNTER — OFFICE VISIT (OUTPATIENT)
Dept: OPHTHALMOLOGY | Facility: CLINIC | Age: 37
End: 2022-02-22
Attending: INTERNAL MEDICINE
Payer: COMMERCIAL

## 2022-02-22 DIAGNOSIS — H35.81 MACULAR EDEMA: ICD-10-CM

## 2022-02-22 DIAGNOSIS — E10.3413 SEVERE NONPROLIFERATIVE DIABETIC RETINOPATHY OF BOTH EYES WITH MACULAR EDEMA ASSOCIATED WITH TYPE 1 DIABETES MELLITUS (H): Primary | ICD-10-CM

## 2022-02-22 DIAGNOSIS — H34.8192: ICD-10-CM

## 2022-02-22 DIAGNOSIS — H47.323 DRUSEN OF BOTH OPTIC DISCS: ICD-10-CM

## 2022-02-22 DIAGNOSIS — Z94.0 STATUS POST SIMULTANEOUS KIDNEY AND PANCREAS TRANSPLANT (H): ICD-10-CM

## 2022-02-22 DIAGNOSIS — Z96.1 PSEUDOPHAKIA OF BOTH EYES: ICD-10-CM

## 2022-02-22 DIAGNOSIS — Z94.83 STATUS POST SIMULTANEOUS KIDNEY AND PANCREAS TRANSPLANT (H): ICD-10-CM

## 2022-02-22 PROCEDURE — 92133 CPTRZD OPH DX IMG PST SGM ON: CPT | Performed by: STUDENT IN AN ORGANIZED HEALTH CARE EDUCATION/TRAINING PROGRAM

## 2022-02-22 PROCEDURE — 250N000011 HC RX IP 250 OP 636: Performed by: STUDENT IN AN ORGANIZED HEALTH CARE EDUCATION/TRAINING PROGRAM

## 2022-02-22 PROCEDURE — 92235 FLUORESCEIN ANGRPH MLTIFRAME: CPT | Performed by: STUDENT IN AN ORGANIZED HEALTH CARE EDUCATION/TRAINING PROGRAM

## 2022-02-22 PROCEDURE — 250N000011 HC RX IP 250 OP 636: Performed by: OPHTHALMOLOGY

## 2022-02-22 PROCEDURE — 92083 EXTENDED VISUAL FIELD XM: CPT | Performed by: STUDENT IN AN ORGANIZED HEALTH CARE EDUCATION/TRAINING PROGRAM

## 2022-02-22 PROCEDURE — 92250 FUNDUS PHOTOGRAPHY W/I&R: CPT | Performed by: STUDENT IN AN ORGANIZED HEALTH CARE EDUCATION/TRAINING PROGRAM

## 2022-02-22 PROCEDURE — G0463 HOSPITAL OUTPT CLINIC VISIT: HCPCS

## 2022-02-22 PROCEDURE — 99205 OFFICE O/P NEW HI 60 MIN: CPT | Mod: GC | Performed by: STUDENT IN AN ORGANIZED HEALTH CARE EDUCATION/TRAINING PROGRAM

## 2022-02-22 RX ORDER — TACROLIMUS 4 MG/1
8 TABLET, EXTENDED RELEASE ORAL
Qty: 60 TABLET | Refills: 11 | Status: SHIPPED | OUTPATIENT
Start: 2022-02-22 | End: 2022-03-01

## 2022-02-22 RX ORDER — TACROLIMUS 1 MG/1
1 TABLET, EXTENDED RELEASE ORAL
Qty: 30 TABLET | Refills: 11 | Status: SHIPPED | OUTPATIENT
Start: 2022-02-22 | End: 2022-02-25

## 2022-02-22 RX ORDER — ONDANSETRON 4 MG/1
4 TABLET, FILM COATED ORAL EVERY 8 HOURS PRN
Qty: 30 TABLET | Refills: 0 | Status: SHIPPED | OUTPATIENT
Start: 2022-02-22 | End: 2022-03-29

## 2022-02-22 RX ADMIN — Medication 1.25 MG: at 15:32

## 2022-02-22 RX ADMIN — Medication 1.25 MG: at 14:52

## 2022-02-22 ASSESSMENT — VISUAL ACUITY
OS_SC+: SLOW
METHOD: SNELLEN - LINEAR
OD_SC: 20/50
OS_SC: 20/40

## 2022-02-22 ASSESSMENT — TONOMETRY
OS_IOP_MMHG: 22
IOP_METHOD: TONOPEN
OD_IOP_MMHG: 23

## 2022-02-22 ASSESSMENT — CUP TO DISC RATIO
OS_RATIO: 0.2
OD_RATIO: 0.2

## 2022-02-22 ASSESSMENT — EXTERNAL EXAM - LEFT EYE: OS_EXAM: NORMAL

## 2022-02-22 ASSESSMENT — SLIT LAMP EXAM - LIDS
COMMENTS: NORMAL
COMMENTS: NORMAL

## 2022-02-22 ASSESSMENT — EXTERNAL EXAM - RIGHT EYE: OD_EXAM: NORMAL

## 2022-02-22 ASSESSMENT — CONF VISUAL FIELD: OS_INFERIOR_TEMPORAL_RESTRICTION: 3

## 2022-02-22 NOTE — NURSING NOTE
"Chief Complaints and History of Present Illnesses   Patient presents with     Consult For     Chief Complaint(s) and History of Present Illness(es)     Consult For     Laterality: both eyes    Associated symptoms: Negative for eye pain, headache, photophobia and double vision              Comments     Pt here for vision changes. She was referred by DR Jamil. Pt had an MRI that came back abnormal. 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 Pt had a double organ transplant in between eye surgeries. Changes started over a period of 3 months. Pt says after cataract surgery she felt she could see very well. Now she is having trouble with lights and after images. Pt notes it is not like photophobia. She cannot look into mirrors and struggles with computer use. \"I can't see any details.\" Pt on anti- rejection medications due to double transplant.  Pt not using drops.     Maame Newell, COA 8:26 AM February 22, 2022                     "

## 2022-02-22 NOTE — PROGRESS NOTES
"Ophthalmology Acute Clinic     Chief Complaint(s) and History of Present Illness(es)     Consult For     In both eyes.  Associated symptoms include Negative for eye pain, photophobia and headache.              Comments     Pt here for vision changes. She was referred by DR Jamil. Pt had an MRI that came back abnormal. Pt is pseudophakic each eye. Pt had a double organ transplant in between eye surgeries. Changes started over a period of 3 months. Pt says after cataract surgery she felt she could see very well. Now she is having trouble with lights and after images. Pt notes it is not like photophobia. She cannot look into mirrors and struggles with computer use.                   HPI:   Marilyn Ortega is a 36 year old female who presents for evaluation of visual disturbances and abnormal MRI.  Referred by Dr. Jamil at  for Neuro-ophth eval.  Not available, so patient scheduled in acute clinic. Noting \"sun spots\" in her vision that have progressed over the past 2 weeks.  Difficulty with contrast and blur both eyes     History of early cataracts in family. Got to the point even with Rx change. Left eye first then transplant then right eye.     First couple of days could see 'like an eagle'. Things were crisp and clear. Second eye done on 12/23/21. Before that she was experiencing some 'wonky' vision. Attributing to one good eye one bad. The right eye had a little light behind the right eye that was making things blurry. Noticing it more in the right eye. Seocnd eye was done, could see better. No issues with the lights.     Couple weeks after, went down hill - fixed with yag cap left eye. Thought it went surgically. 1 month ago now - could not see well at all. Main complaint is light - always - whether eyes are shut or open, it is always as though she is staring at the sun and has an after imprint. When she looks in the mirror its like there is a sun spot over her face. acn't read computer screen, had to " "increase font size on phone etc. Google search screen shot zooms in. Hard time with light printing. Things that she could read or see 1 week ago she could not see today. Things are going down very quickly. She sees floaters. If she looks left to right 'it's like my eyelashes are being dragged across\" always the sun spots. Eyes don't hurt, no pain with eye movments. No discharge or redness.     Fills medication box on sundays - last Sunday took it out - \"can't read it. \"    Called eye doctor - 1.5 weeks ago - couldn't get in until Friday. Wasn't sure if it was med related. Envarsus (tacrolimus XR).  Reported to transplant team - MRI brain ordered and found to have showed abnormalities.     Per report: A few T2 hyperintensities in the subcortical white matter in the left occipital lobe that are nonspecific. Differential could include demyelination versus sequela of infectious, inflammatory, or vasculopathic process, or possibly toxic exposure.      Cataracts - 10 years ago they started to notice - T1DM - never related. Her mom had hers out when she was 50s, grandma were done when she was legally blind.     Reports h/o \"brittle diabetes\" for 25 years - spike and crash. Impacting kidneys. No signs of rejection, organs are doing wel. CKD and gastroparesis.  Terrible GI thing. Really bad diarrhea - CMV, COVID, bacteria, parasites, no biopsy. Saw GI doctor this past Friday. Lost 10 pounds.     POH:  - Glasses: mp  - Contact lens wear: no    Ocular Surgical History:   PCIOL each eye with synergy (multifocal) toric IOLs in right eye (12/23/21) and left eye (11/9/21) (Dr. Jamil)  YAG cap left eye     Hip surgeries (from a running accident)    Current Eye drops: none (was on the post cataract drops).     Medications:     qday, mycophenolic acid, bactrim, tacrolimus (extended release 24 hour version), torsemide, Magnesium, phosphorous, procalipride (for gut motility)     PMH:   s/p kidney transplant and pancreatic " transplant (11/15/21)  history of T1DM (last A1c 7.7%) with no retinopathy; last dilated exam was prior to transplant surgery   Sugars 80s; has not been not insulin for the past 4 months  Gastroparesis  factor V leiden mutation (one copy)    FH: no glaucoma or + AMD (mom).     Review of systems for the eyes was negative other than the pertinent positives/negatives listed in the HPI.        Assessment & Plan      Marilyn Ortega is a 36 year old female with the following diagnoses:   1. Severe nonproliferative diabetic retinopathy of both eyes with macular edema associated with type 1 diabetes mellitus (H)    2. Drusen of both optic discs    3. Macular edema - Both Eyes    4. Venous stasis of retina - Both Eyes    5. Status post simultaneous kidney and pancreas transplant (H)    6. Pseudophakia of both eyes         Imaging today:  Optos photos: consistent with exam and sufficient for monitoring  OCT mac: Cystoid macular edema with subretinal fluid involving the fovea both eyes  OCT Nerve fiber layer: Resolved disc edema compared to previous in 2011 both eyes    FAF: Optic disc drusen both eyes   Fluorescein angiography transit right:  Delayed AV transit, severe diffuse peripheral ischemia in both eyes with perivascular leakage.  Venous beading.  No neovascularization.  Maculae with nonspecific diffuse leakage both eyes     Discussed results with patient and mother today.  Etiology likely secondary to combination of chronic ischemic and inflammatory risk factors: Diabetes mellitus, anemia, HTN, recent kidney-pancreas transplant and recent cataract extraction.  No evidence of tacrolimus associated optic neuropathy.  Previous diabetic papillopathy resolved.      History of Factor V Liden trait identified during transplant work-up.  Unlikely to cause degree of venous stasis seen today on Fluorescein angiography.  Note sent to transplant coordinator to help schedule for updated complete blood count (CBC), prothrombin  time and aPTT.  Recommend review with transplant team tomorrow to consider further work-up for coagulopathy.  Importance of continued glycemic and blood pressure control reviewed.    RBA to intravitreal avastin both eyes discussed, consent obtained, will proceed today.   Return precautions reviewed   Artificial tears recommended as needed     Unclear etiology or visual significance of Central nervous system lesions found on MRI  Will schedule for neuro-oph clinic when available      Patient disposition:   Return in about 4 weeks (around 3/22/2022) for Exam & OCT Mac OU, T/Th Acute Clinic Molly.    Patient seen with Dr. Molly Zamorano MD  Resident Physician, PGY-2  Department of Ophthalmology  02/22/22 8:25 AM    Attending Physician Attestation:  Complete documentation of historical and exam elements from today's encounter can be found in the full encounter summary report (not reduplicated in this progress note).  I personally obtained the chief complaint(s) and history of present illness.  I confirmed and edited as necessary the review of systems, past medical/surgical history, family history, social history, and examination findings as documented by others; and I examined the patient myself.  I personally reviewed the relevant tests, images, and reports as documented above.  I formulated and edited as necessary the assessment and plan and discussed the findings and management plan with the patient and family. Attending Physician Image/Tesing Attestation: I personally reviewed the ophthalmic test(s) associated with this encounter, agree with the interpretation(s) as documented by the resident/fellow, and have edited the corresponding report(s) as necessary.  Attending Physician Procedure Attestation: I was present for the entire procedure . I spent a total of 45 minutes face to face with the patient and 15 minutes documenting.  Over 50% of this time was spent counseling and coordinating care regarding  their diagnosis and management.   - Ochoa Barnes MD

## 2022-02-22 NOTE — TELEPHONE ENCOUNTER
Sent refill per Dr. Chloe El, LPN  Nephrology  [phone]    Ok to refill that q8h prn x 30 day rx

## 2022-02-22 NOTE — TELEPHONE ENCOUNTER
Patient has appt w/ neuro-ophthalmology today. They are going to inject fluorescein sodium and wanted to make sure it was OK w/ nephrology. Reviewed w/ Dr Corona and this is OK. Patient notified. She will notify [facility] about reccs after appt today.

## 2022-02-22 NOTE — TELEPHONE ENCOUNTER
Left message and sent [facility] EDI message to patient regarding:  Tacrolimus XR level 7.6 on 2/21, goal 8-10, dose 8 mg daily.     PLAN:   Please call patient and confirm this was an accurate 24-hour trough. Verify Tacrolimus XR dose 8 mg daily. Confirm no new medications or illness. Confirm no missed doses. If accurate trough and accurate dose, increase Tacrolimus XR dose to 9 mg daily and repeat labs in 3 days.

## 2022-02-22 NOTE — LETTER
"2/22/2022       RE: Marilyn Ortega  [address]  [facility] [id number]     Dear Aj,    Thank you for referring your very interesting patient, Marilyn Ortega, to the [facility] [place] at [facility]. Please see a copy of my visit note below.    Ophthalmology Acute Clinic     Chief Complaint(s) and History of Present Illness(es)     Consult For     In both eyes.  Associated symptoms include Negative for eye pain, photophobia and headache.              Comments     Pt here for vision changes. She was referred by DR Jamil. Pt had an MRI that came back abnormal. Pt is pseudophakic each eye. Pt had a double organ transplant in between eye surgeries. Changes started over a period of 3 months. Pt says after cataract surgery she felt she could see very well. Now she is having trouble with lights and after images. Pt notes it is not like photophobia. She cannot look into mirrors and struggles with computer use.                   HPI:   Marilyn Ortega is a 36 year old female who presents for evaluation of visual disturbances and abnormal MRI.  Referred by Dr. Jamil at  for Neuro-ophth eval.  Not available, so patient scheduled in acute clinic. Noting \"sun spots\" in her vision that have progressed over the past 2 weeks.  Difficulty with contrast and blur both eyes     History of early cataracts in family. Got to the point even with Rx change. Left eye first then transplant then right eye.     First couple of days could see 'like an eagle'. Things were crisp and clear. Second eye done on 12/23/21. Before that she was experiencing some 'wonky' vision. Attributing to one good eye one bad. The right eye had a little light behind the right eye that was making things blurry. Noticing it more in the right eye. Seocnd eye was done, could see better. No issues with the lights.     Couple weeks after, went down hill - fixed with yag cap left eye. Thought it went surgically. 1 month " "ago now - could not see well at all. Main complaint is light - always - whether eyes are shut or open, it is always as though she is staring at the sun and has an after imprint. When she looks in the mirror its like there is a sun spot over her face. acn't read computer screen, had to increase font size on phone etc. Google search screen shot zooms in. Hard time with light printing. Things that she could read or see 1 week ago she could not see today. Things are going down very quickly. She sees floaters. If she looks left to right 'it's like my eyelashes are being dragged across\" always the sun spots. Eyes don't hurt, no pain with eye movments. No discharge or redness.     Fills medication box on sundays - last Sunday took it out - \"can't read it. \"    Called eye doctor - 1.5 weeks ago - couldn't get in until Friday. Wasn't sure if it was med related. Envarsus (tacrolimus XR).  Reported to transplant team - MRI brain ordered and found to have showed abnormalities.     Per report: A few T2 hyperintensities in the subcortical white matter in the left occipital lobe that are nonspecific. Differential could include demyelination versus sequela of infectious, inflammatory, or vasculopathic process, or possibly toxic exposure.      Cataracts - 10 years ago they started to notice - T1DM - never related. Her mom had hers out when she was 50s, grandma were done when she was legally blind.     Reports h/o \"brittle diabetes\" for 25 years - spike and crash. Impacting kidneys. No signs of rejection, organs are doing wel. CKD and gastroparesis.  Terrible GI thing. Really bad diarrhea - CMV, COVID, bacteria, parasites, no biopsy. Saw GI doctor this past Friday. Lost 10 pounds.     POH:  - Glasses: mp  - Contact lens wear: no    Ocular Surgical History:   PCIOL each eye with synergy (multifocal) toric IOLs in right eye (12/23/21) and left eye (11/9/21) (Dr. Jamil)  YAG cap left eye     Hip surgeries (from a running " accident)    Current Eye drops: none (was on the post cataract drops).     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     Review of systems for the eyes was negative other than the pertinent positives/negatives listed in the HPI.        Assessment & Plan      Marilyn Ortega is a 36 year old female with the following diagnoses:   1. Severe nonproliferative diabetic retinopathy of both eyes with macular edema associated with type 1 diabetes mellitus (H)    2. Drusen of both optic discs    3. Macular edema - Both Eyes    4. Venous stasis of retina - Both Eyes    5. Status post simultaneous kidney and pancreas transplant (H)    6. Pseudophakia of both eyes         Imaging today:  Optos photos: consistent with exam and sufficient for monitoring  OCT mac: Cystoid macular edema with subretinal fluid involving the fovea both eyes  OCT Nerve fiber layer: Resolved disc edema compared to previous in 2011 both eyes    FAF: Optic disc drusen both eyes   Fluorescein angiography transit right:  Delayed AV transit, severe diffuse peripheral ischemia in both eyes with perivascular leakage.  Venous beading.  No neovascularization.  Maculae with nonspecific diffuse leakage both eyes     Discussed results with patient and mother today.  Etiology likely secondary to combination of chronic ischemic and inflammatory risk factors: Diabetes mellitus, anemia, HTN, recent kidney-pancreas transplant and recent cataract extraction.  No evidence of tacrolimus associated optic neuropathy.  Previous  diabetic papillopathy resolved.      History of Factor V Liden trait identified during transplant work-up.  Unlikely to cause degree of venous stasis seen today on Fluorescein angiography.  Note sent to transplant coordinator to help schedule for updated complete blood count (CBC), prothrombin time and aPTT.  Recommend review with transplant team tomorrow to consider further work-up for coagulopathy.  Importance of continued glycemic and blood pressure control reviewed.    RBA to intravitreal avastin both eyes discussed, consent obtained, will proceed today.   Return precautions reviewed   Artificial tears recommended as needed     Unclear etiology or visual significance of Central nervous system lesions found on MRI  Will schedule for neuro-oph clinic when available      Patient disposition:   Return in about 4 weeks (around 3/22/2022) for Exam & OCT Mac OU, T/Th Acute Clinic Molly.    Patient seen with Dr. Molly Zamorano MD  Resident Physician, PGY-2  Department of Ophthalmology  02/22/22 8:25 AM    Attending Physician Attestation:  Complete documentation of historical and exam elements from today's encounter can be found in the full encounter summary report (not reduplicated in this progress note).  I personally obtained the chief complaint(s) and history of present illness.  I confirmed and edited as necessary the review of systems, past medical/surgical history, family history, social history, and examination findings as documented by others; and I examined the patient myself.  I personally reviewed the relevant tests, images, and reports as documented above.  I formulated and edited as necessary the assessment and plan and discussed the findings and management plan with the patient and family. Attending Physician Image/Tesing Attestation: I personally reviewed the ophthalmic test(s) associated with this encounter, agree with the interpretation(s) as documented by the resident/fellow, and have edited  the corresponding report(s) as necessary.  Attending Physician Procedure Attestation: I was present for the entire procedure . I spent a total of 45 minutes face to face with the patient and 15 minutes documenting.  Over 50% of this time was spent counseling and coordinating care regarding their diagnosis and management.   - Ochoa Barnes MD           Main Ophthalmology Exam     External Exam       Right Left    External Normal Normal          Slit Lamp Exam       Right Left    Lids/Lashes Normal Normal    Conjunctiva/Sclera White and quiet White and quiet    Cornea Clear Clear    Anterior Chamber Deep and quiet Deep and quiet    Iris Dilated Dilated    Lens multifocal lens, well centered multifocal lens well centered    Vitreous No cell No cell          Fundus Exam       Right Left    Disc disc drusen, crisp, no NVD disc drusen, crisp, no NVD    C/D Ratio 0.2 0.2    Macula macular edema macular edema    Vessels Venous beading, no sheathing Venous beading, no sheathing    Periphery DBH in all quads DBH in all quads              Base Eye Exam     Visual Acuity (Snellen - Linear)       Right Left    Dist sc 20/50 20/40 Slow    Dist ph sc NI NI          Tonometry (Tonopen, 8:35 AM)       Right Left    Pressure 23 22          Pupils       Dark Light Shape React APD    Right 4 3 Round Slow None    Left 4.5 3 Round Slow None          Visual Fields       Left Right    Restrictions Partial outer inferior temporal deficiency           Neuro/Psych     Oriented x3: Yes    Mood/Affect: Normal          Dilation     Both eyes: 1.0% Mydriacyl, 2.5% Elvin Synephrine @ 8:58 AM            Additional Tests     Color       Right Left    Ishihara 15/15 15/15   Slow             Slit Lamp and Fundus Exam     External Exam       Right Left    External Normal Normal          Slit Lamp Exam       Right Left    Lids/Lashes Normal Normal    Conjunctiva/Sclera White and quiet White and quiet    Cornea Clear Clear    Anterior Chamber Deep and  quiet Deep and quiet    Iris Dilated Dilated    Lens multifocal lens, well centered multifocal lens well centered    Vitreous No cell No cell          Fundus Exam       Right Left    Disc disc drusen, crisp, no NVD disc drusen, crisp, no NVD    C/D Ratio 0.2 0.2    Macula macular edema macular edema    Vessels Venous beading, no sheathing Venous beading, no sheathing    Periphery DBH in all quads DBH in all quads                Again, thank you for allowing me to participate in the care of your patient.      Sincerely,        Ochoa Barnes MD  , Comprehensive Ophthalmology  Department of Ophthalmology and Visual Neurosciences  [facility]

## 2022-02-23 ENCOUNTER — TELEPHONE (OUTPATIENT)
Dept: OPHTHALMOLOGY | Facility: CLINIC | Age: 37
End: 2022-02-23
Payer: COMMERCIAL

## 2022-02-23 NOTE — TELEPHONE ENCOUNTER
S/p bilateral injection of avastin yesterday.    Pt doing ok last night before bed, then around 2 AM woke up with right eye pain, mattering.  After removing mattering increase tearing, light sensitive and more pain with eye movement.    Pt using frequent PF tears and symptoms persisting    Scheduled at 10 AM with Dr. Zamorano    Pt aware of date/time/location at [facility]    Meet Hough RN 7:37 AM 02/23/22        [facility] Call Center    Phone Message    May a detailed message be left on voicemail: yes     Reason for Call: Symptoms or Concerns     If patient has red-flag symptoms, warm transfer to triage line    Current symptom or concern: Tearing up and not able to open eye after injection yesterday    Symptoms have been present for: ? hour(s)    Has patient previously been seen for this? Yes    By : Dr Zamorano    Date: 2/22/22    Are there any new or worsening symptoms? Yes        Action Taken: Message routed to:  [facility] eye    Travel Screening: Not Applicable

## 2022-02-24 ENCOUNTER — TELEPHONE (OUTPATIENT)
Dept: TRANSPLANT | Facility: CLINIC | Age: 37
End: 2022-02-24

## 2022-02-24 ENCOUNTER — LAB (OUTPATIENT)
Dept: LAB | Facility: CLINIC | Age: 37
End: 2022-02-24
Attending: INTERNAL MEDICINE
Payer: COMMERCIAL

## 2022-02-24 ENCOUNTER — TELEPHONE (OUTPATIENT)
Dept: PHARMACY | Facility: CLINIC | Age: 37
End: 2022-02-24

## 2022-02-24 DIAGNOSIS — Z94.83 PANCREAS REPLACED BY TRANSPLANT (H): ICD-10-CM

## 2022-02-24 DIAGNOSIS — Z94.0 KIDNEY REPLACED BY TRANSPLANT: ICD-10-CM

## 2022-02-24 DIAGNOSIS — Z48.298 AFTERCARE FOLLOWING ORGAN TRANSPLANT: ICD-10-CM

## 2022-02-24 LAB
AMYLASE SERPL-CCNC: 53 U/L (ref 30–110)
ANION GAP SERPL CALCULATED.3IONS-SCNC: 4 MMOL/L (ref 3–14)
BASOPHILS # BLD AUTO: 0.1 10E3/UL (ref 0–0.2)
BASOPHILS NFR BLD AUTO: 2 %
BUN SERPL-MCNC: 18 MG/DL (ref 7–30)
CALCIUM SERPL-MCNC: 8.9 MG/DL (ref 8.5–10.1)
CHLORIDE BLD-SCNC: 109 MMOL/L (ref 94–109)
CO2 SERPL-SCNC: 24 MMOL/L (ref 20–32)
CREAT SERPL-MCNC: 1.11 MG/DL (ref 0.52–1.04)
EOSINOPHIL # BLD AUTO: 0 10E3/UL (ref 0–0.7)
EOSINOPHIL NFR BLD AUTO: 0 %
ERYTHROCYTE [DISTWIDTH] IN BLOOD BY AUTOMATED COUNT: 15.5 % (ref 10–15)
GFR SERPL CREATININE-BSD FRML MDRD: 66 ML/MIN/1.73M2
GLUCOSE BLD-MCNC: 81 MG/DL (ref 70–99)
HCT VFR BLD AUTO: 32.7 % (ref 35–47)
HGB BLD-MCNC: 10 G/DL (ref 11.7–15.7)
IMM GRANULOCYTES # BLD: 0 10E3/UL
IMM GRANULOCYTES NFR BLD: 0 %
LIPASE SERPL-CCNC: 84 U/L (ref 73–393)
LYMPHOCYTES # BLD AUTO: 0.7 10E3/UL (ref 0.8–5.3)
LYMPHOCYTES NFR BLD AUTO: 15 %
MCH RBC QN AUTO: 28.8 PG (ref 26.5–33)
MCHC RBC AUTO-ENTMCNC: 30.6 G/DL (ref 31.5–36.5)
MCV RBC AUTO: 94 FL (ref 78–100)
MONOCYTES # BLD AUTO: 0.5 10E3/UL (ref 0–1.3)
MONOCYTES NFR BLD AUTO: 10 %
NEUTROPHILS # BLD AUTO: 3.3 10E3/UL (ref 1.6–8.3)
NEUTROPHILS NFR BLD AUTO: 73 %
NRBC # BLD AUTO: 0 10E3/UL
NRBC BLD AUTO-RTO: 0 /100
PLATELET # BLD AUTO: 269 10E3/UL (ref 150–450)
POTASSIUM BLD-SCNC: 4.2 MMOL/L (ref 3.4–5.3)
RBC # BLD AUTO: 3.47 10E6/UL (ref 3.8–5.2)
SODIUM SERPL-SCNC: 137 MMOL/L (ref 133–144)
TACROLIMUS BLD-MCNC: 4.4 UG/L (ref 5–15)
TME LAST DOSE: ABNORMAL H
TME LAST DOSE: ABNORMAL H
WBC # BLD AUTO: 4.6 10E3/UL (ref 4–11)

## 2022-02-24 PROCEDURE — 83690 ASSAY OF LIPASE: CPT

## 2022-02-24 PROCEDURE — 82310 ASSAY OF CALCIUM: CPT

## 2022-02-24 PROCEDURE — 82150 ASSAY OF AMYLASE: CPT

## 2022-02-24 PROCEDURE — 80197 ASSAY OF TACROLIMUS: CPT

## 2022-02-24 PROCEDURE — 36415 COLL VENOUS BLD VENIPUNCTURE: CPT

## 2022-02-24 PROCEDURE — 85025 COMPLETE CBC W/AUTO DIFF WBC: CPT

## 2022-02-24 NOTE — TELEPHONE ENCOUNTER
Clinical Pharmacy Consult:                                                      Transplant Specific: 2 Month Post Transplant Call  Date of Transplant: 11/15/2021  Type of Transplant: kidney and pancreas  First Transplant: yes  History of rejection: no    Immunosuppression Regimen   TAC XR 9mg qAM and Myforitic 540mg qAM & 540mg qPM  Patient specific goal: 8-10  Most recent level: 7.4, date 2/21/22, dose increased   Immunosuppressant Levels:  Subtherapeutic  Pt adherent to lab draws: yes  Scr:   Lab Results   Component Value Date    CR 1.42 12/27/2021    CR 2.69 05/05/2021     Side effects: most side effects have cleared up but has eye problems, swelling of optic nerve and light sensitivity.  Is working with ophthalmology.     Prophylactic Medications  Antibacterial:  Bactrim SS daily  Scheduled Discontinue Date: Lifelong    Antifungal: Not needed thus far  Scheduled Discontinue Date: N/A    Antiviral: CrCl >/=60 mL/minute: Valcyte 900mg daily   Scheduled Discontinue Date: completed    Acid Reducer: Protonix (pantoprazole)  Scheduled Reviewed Date: stopped    Thrombosis Prevention: Aspirin 325 mg PO daily  Scheduled Discontinue Date: MD to determine    Blood Pressure Management  Frequency of home Blood Pressure checks: once daily  Most recent home BP: 110-130/70-80  Patient Blood pressure goal: <140/90  Patient blood pressure at goal:  yes  Hospitalizations/ER visits since last assessment: 0    Med rec/DUR performed: yes  Med Rec Discrepancies: no    Medication adherence flowsheet 2/24/2022   Patient medication administration: Responsible for own medications   Patient estimated adherence level: %   Pharmacist assessment of adherence: Good   Patient reported doses missed per week: 0-1   Facilitators to medication adherence  Cell phone;Medication dosing chart;Pill box;Schedule/routine   Patient reported barriers to medication adherence  -   Adherence intervention(s): -      Medication access flowsheet 2/24/2022    Number of pharmacies used: 2   Pharmacy: [facility];Other   Other pharmacy: local retail   Enrolled in [facility] pharmacy? Yes   Patient reported barriers to accessing medications: -   Medication access interventions: -        Marilyn reports feeling good.  Most side effects have cleared up but has eye problems, swelling of optic nerve and light sensitivity.  Is working with ophthalmology.     Marilyn sets up her own med box using her med card and uses cell phone alarms as reminders.  She only takes her meds twice a day and has not missed any doses     She is checking her blood pressure daily and this is under control.     Follow up in 3 months    Hector Puckett [facility]  Specialty Pharmacist [phone]

## 2022-02-24 NOTE — TELEPHONE ENCOUNTER
ISSUE:   Tacrolimus IR level 4.4 on 2/24, goal 8-10, dose 9 mg daily.    PLAN:   Please call patient and confirm this was an accurate 24-hour trough. Verify Tacrolimus XR dose 9 mg daily. Confirm no new medications or illness. Confirm no missed doses. If accurate trough and accurate dose, increase Tacrolimus XR dose to 11 mg daily and repeat labs in 4 days    OUTCOME:   [facility] message sent to patient.   Spoke with patient, they confirm accurate trough level and current dose 9 mg daily. Patient confirmed dose change to 11 mg daily and to repeat labs in 4 days. Orders sent to preferred pharmacy for dose change and lab for repeat labs. Patient voiced understanding of plan.

## 2022-02-25 RX ORDER — TACROLIMUS 1 MG/1
3 TABLET, EXTENDED RELEASE ORAL
Qty: 90 TABLET | Refills: 11 | Status: SHIPPED | OUTPATIENT
Start: 2022-02-25 | End: 2022-03-01

## 2022-02-26 ENCOUNTER — PRE VISIT (OUTPATIENT)
Dept: NEUROLOGY | Facility: CLINIC | Age: 37
End: 2022-02-26
Payer: COMMERCIAL

## 2022-02-26 NOTE — TELEPHONE ENCOUNTER
FUTURE VISIT INFORMATION      FUTURE VISIT INFORMATION:    Date: 3/3/2022    Time: 830am    [place] [facility] [place]FORMATION:    Referring provider:  Dr. Corona     Referring providers clinic:  [facility]     Reason for visit/diagnosis  Visual Disturbance     RECORDS REQUESTED FROM:       Clinic name Comments Records Status Imaging Status   Internal Dr. Corona-1/12/2022    MR Brain-2/16/2022 [facility] Dr. Jamil-1/14/2022 Care Everywhere No Images

## 2022-02-28 ENCOUNTER — LAB (OUTPATIENT)
Dept: LAB | Facility: CLINIC | Age: 37
End: 2022-02-28
Attending: INTERNAL MEDICINE
Payer: COMMERCIAL

## 2022-02-28 DIAGNOSIS — Z94.83 PANCREAS REPLACED BY TRANSPLANT (H): ICD-10-CM

## 2022-02-28 DIAGNOSIS — Z48.298 AFTERCARE FOLLOWING ORGAN TRANSPLANT: ICD-10-CM

## 2022-02-28 DIAGNOSIS — D68.51 FACTOR V LEIDEN (H): Primary | ICD-10-CM

## 2022-02-28 DIAGNOSIS — Z94.0 KIDNEY REPLACED BY TRANSPLANT: ICD-10-CM

## 2022-02-28 DIAGNOSIS — Z94.0 KIDNEY REPLACED BY TRANSPLANT: Primary | ICD-10-CM

## 2022-02-28 LAB
AMYLASE SERPL-CCNC: 43 U/L (ref 30–110)
ANION GAP SERPL CALCULATED.3IONS-SCNC: 5 MMOL/L (ref 3–14)
BASOPHILS # BLD AUTO: 0.1 10E3/UL (ref 0–0.2)
BASOPHILS NFR BLD AUTO: 1 %
BUN SERPL-MCNC: 18 MG/DL (ref 7–30)
CALCIUM SERPL-MCNC: 8.8 MG/DL (ref 8.5–10.1)
CHLORIDE BLD-SCNC: 110 MMOL/L (ref 94–109)
CO2 SERPL-SCNC: 21 MMOL/L (ref 20–32)
CREAT SERPL-MCNC: 1.11 MG/DL (ref 0.52–1.04)
EOSINOPHIL # BLD AUTO: 0 10E3/UL (ref 0–0.7)
EOSINOPHIL NFR BLD AUTO: 1 %
ERYTHROCYTE [DISTWIDTH] IN BLOOD BY AUTOMATED COUNT: 15.4 % (ref 10–15)
GFR SERPL CREATININE-BSD FRML MDRD: 66 ML/MIN/1.73M2
GLUCOSE BLD-MCNC: 120 MG/DL (ref 70–99)
HCT VFR BLD AUTO: 30.6 % (ref 35–47)
HGB BLD-MCNC: 9.5 G/DL (ref 11.7–15.7)
IMM GRANULOCYTES # BLD: 0 10E3/UL
IMM GRANULOCYTES NFR BLD: 0 %
LIPASE SERPL-CCNC: 71 U/L (ref 73–393)
LYMPHOCYTES # BLD AUTO: 0.7 10E3/UL (ref 0.8–5.3)
LYMPHOCYTES NFR BLD AUTO: 14 %
MCH RBC QN AUTO: 29.1 PG (ref 26.5–33)
MCHC RBC AUTO-ENTMCNC: 31 G/DL (ref 31.5–36.5)
MCV RBC AUTO: 94 FL (ref 78–100)
MONOCYTES # BLD AUTO: 0.5 10E3/UL (ref 0–1.3)
MONOCYTES NFR BLD AUTO: 11 %
NEUTROPHILS # BLD AUTO: 3.6 10E3/UL (ref 1.6–8.3)
NEUTROPHILS NFR BLD AUTO: 73 %
NRBC # BLD AUTO: 0 10E3/UL
NRBC BLD AUTO-RTO: 0 /100
PLATELET # BLD AUTO: 289 10E3/UL (ref 150–450)
POTASSIUM BLD-SCNC: 4.3 MMOL/L (ref 3.4–5.3)
RBC # BLD AUTO: 3.27 10E6/UL (ref 3.8–5.2)
SODIUM SERPL-SCNC: 136 MMOL/L (ref 133–144)
TACROLIMUS BLD-MCNC: 7.4 UG/L (ref 5–15)
TME LAST DOSE: NORMAL H
TME LAST DOSE: NORMAL H
WBC # BLD AUTO: 4.9 10E3/UL (ref 4–11)

## 2022-02-28 PROCEDURE — 82150 ASSAY OF AMYLASE: CPT

## 2022-02-28 PROCEDURE — 36415 COLL VENOUS BLD VENIPUNCTURE: CPT

## 2022-02-28 PROCEDURE — 80197 ASSAY OF TACROLIMUS: CPT

## 2022-02-28 PROCEDURE — 83690 ASSAY OF LIPASE: CPT

## 2022-02-28 PROCEDURE — 85025 COMPLETE CBC W/AUTO DIFF WBC: CPT

## 2022-02-28 PROCEDURE — 80048 BASIC METABOLIC PNL TOTAL CA: CPT

## 2022-02-28 NOTE — TELEPHONE ENCOUNTER
ISSUE:   Tacrolimus XR level 7.4 on 2/28, goal 8-10, dose 11 mg daily.    PLAN:   Please call patient and confirm this was an accurate 24-hour trough. Verify Tacrolimus XR dose 11 mg daily. Confirm no new medications or illness. Confirm no missed doses. If accurate trough and accurate dose, increase Tacrolimus XR dose to 12 mg daily and repeat labs in 3 days.    Yessica Becker RN BSN  Transplant Care Coordinator      OUTCOME:   Spoke with patient, they confirm accurate trough level and current dose 11 mg daily. Patient confirmed dose change to 12 mg daily and to repeat labs in 3 days. Orders sent to preferred pharmacy for dose change and lab for repeat labs. Patient voiced understanding of plan.

## 2022-02-28 NOTE — TELEPHONE ENCOUNTER
Left message and sent [employer] message to patient regarding:  Tacrolimus XR level 7.4 on 2/28, goal 8-10, dose 11 mg daily.     PLAN:   Please call patient and confirm this was an accurate 24-hour trough. Verify Tacrolimus XR dose 11 mg daily. Confirm no new medications or illness. Confirm no missed doses. If accurate trough and accurate dose, increase Tacrolimus XR dose to 12 mg daily and repeat labs in 3 days.

## 2022-03-01 RX ORDER — TACROLIMUS 4 MG/1
12 TABLET, EXTENDED RELEASE ORAL
Qty: 90 TABLET | Refills: 11 | Status: SHIPPED | OUTPATIENT
Start: 2022-03-01 | End: 2022-03-29

## 2022-03-01 RX ORDER — TACROLIMUS 1 MG/1
TABLET, EXTENDED RELEASE ORAL
Qty: 90 TABLET | Refills: 11
Start: 2022-03-01 | End: 2022-03-29

## 2022-03-03 ENCOUNTER — VIRTUAL VISIT (OUTPATIENT)
Dept: NEUROLOGY | Facility: CLINIC | Age: 37
End: 2022-03-03
Attending: INTERNAL MEDICINE
Payer: COMMERCIAL

## 2022-03-03 ENCOUNTER — LAB (OUTPATIENT)
Dept: LAB | Facility: CLINIC | Age: 37
End: 2022-03-03
Attending: INTERNAL MEDICINE
Payer: COMMERCIAL

## 2022-03-03 ENCOUNTER — TELEPHONE (OUTPATIENT)
Dept: PHARMACY | Facility: CLINIC | Age: 37
End: 2022-03-03

## 2022-03-03 DIAGNOSIS — N18.9 ANEMIA DUE TO CHRONIC KIDNEY DISEASE, UNSPECIFIED CKD STAGE: ICD-10-CM

## 2022-03-03 DIAGNOSIS — Z94.0 KIDNEY REPLACED BY TRANSPLANT: ICD-10-CM

## 2022-03-03 DIAGNOSIS — Z94.83 PANCREAS REPLACED BY TRANSPLANT (H): ICD-10-CM

## 2022-03-03 DIAGNOSIS — Z48.298 AFTERCARE FOLLOWING ORGAN TRANSPLANT: ICD-10-CM

## 2022-03-03 DIAGNOSIS — H53.9 VISUAL DISTURBANCE: ICD-10-CM

## 2022-03-03 DIAGNOSIS — D63.1 ANEMIA DUE TO CHRONIC KIDNEY DISEASE, UNSPECIFIED CKD STAGE: ICD-10-CM

## 2022-03-03 LAB
AMYLASE SERPL-CCNC: 52 U/L (ref 30–110)
ANION GAP SERPL CALCULATED.3IONS-SCNC: 5 MMOL/L (ref 3–14)
BASOPHILS # BLD AUTO: 0.1 10E3/UL (ref 0–0.2)
BASOPHILS NFR BLD AUTO: 1 %
BUN SERPL-MCNC: 23 MG/DL (ref 7–30)
CALCIUM SERPL-MCNC: 9.1 MG/DL (ref 8.5–10.1)
CHLORIDE BLD-SCNC: 108 MMOL/L (ref 94–109)
CO2 SERPL-SCNC: 24 MMOL/L (ref 20–32)
CREAT SERPL-MCNC: 1.21 MG/DL (ref 0.52–1.04)
EOSINOPHIL # BLD AUTO: 0 10E3/UL (ref 0–0.7)
EOSINOPHIL NFR BLD AUTO: 1 %
ERYTHROCYTE [DISTWIDTH] IN BLOOD BY AUTOMATED COUNT: 15.3 % (ref 10–15)
FERRITIN SERPL-MCNC: 106 NG/ML (ref 12–150)
GFR SERPL CREATININE-BSD FRML MDRD: 59 ML/MIN/1.73M2
GLUCOSE BLD-MCNC: 117 MG/DL (ref 70–99)
HCT VFR BLD AUTO: 32.5 % (ref 35–47)
HGB BLD-MCNC: 10.3 G/DL (ref 11.7–15.7)
IMM GRANULOCYTES # BLD: 0 10E3/UL
IMM GRANULOCYTES NFR BLD: 0 %
IRON SATN MFR SERPL: 35 % (ref 15–46)
IRON SERPL-MCNC: 139 UG/DL (ref 35–180)
LIPASE SERPL-CCNC: 75 U/L (ref 73–393)
LYMPHOCYTES # BLD AUTO: 0.6 10E3/UL (ref 0.8–5.3)
LYMPHOCYTES NFR BLD AUTO: 12 %
MCH RBC QN AUTO: 29.5 PG (ref 26.5–33)
MCHC RBC AUTO-ENTMCNC: 31.7 G/DL (ref 31.5–36.5)
MCV RBC AUTO: 93 FL (ref 78–100)
MONOCYTES # BLD AUTO: 0.4 10E3/UL (ref 0–1.3)
MONOCYTES NFR BLD AUTO: 7 %
NEUTROPHILS # BLD AUTO: 4.2 10E3/UL (ref 1.6–8.3)
NEUTROPHILS NFR BLD AUTO: 79 %
NRBC # BLD AUTO: 0 10E3/UL
NRBC BLD AUTO-RTO: 0 /100
PLATELET # BLD AUTO: 297 10E3/UL (ref 150–450)
POTASSIUM BLD-SCNC: 3.9 MMOL/L (ref 3.4–5.3)
RBC # BLD AUTO: 3.49 10E6/UL (ref 3.8–5.2)
SODIUM SERPL-SCNC: 137 MMOL/L (ref 133–144)
TACROLIMUS BLD-MCNC: 8 UG/L (ref 5–15)
TIBC SERPL-MCNC: 398 UG/DL (ref 240–430)
TME LAST DOSE: NORMAL H
TME LAST DOSE: NORMAL H
WBC # BLD AUTO: 5.2 10E3/UL (ref 4–11)

## 2022-03-03 PROCEDURE — 82728 ASSAY OF FERRITIN: CPT

## 2022-03-03 PROCEDURE — 80197 ASSAY OF TACROLIMUS: CPT

## 2022-03-03 PROCEDURE — 82150 ASSAY OF AMYLASE: CPT

## 2022-03-03 PROCEDURE — 83550 IRON BINDING TEST: CPT

## 2022-03-03 PROCEDURE — 82310 ASSAY OF CALCIUM: CPT

## 2022-03-03 PROCEDURE — 99205 OFFICE O/P NEW HI 60 MIN: CPT | Mod: GT | Performed by: PSYCHIATRY & NEUROLOGY

## 2022-03-03 PROCEDURE — 83690 ASSAY OF LIPASE: CPT

## 2022-03-03 PROCEDURE — 85025 COMPLETE CBC W/AUTO DIFF WBC: CPT

## 2022-03-03 PROCEDURE — 36415 COLL VENOUS BLD VENIPUNCTURE: CPT

## 2022-03-03 NOTE — PROGRESS NOTES
Marilyn is a 36 year old who is being evaluated via a billable video visit.      How would you like to obtain your AVS? MyChart  If the video visit is dropped, the invitation should be resent by: Send to e-mail at: [email or web address]  Will anyone else be joining your video visit? No      Video Start Time: 0830  Video-Visit Details    Type of service:  Video Visit    Video End Time:9:20 AM    Originating Location (pt. Location): Home    Distant Location (provider location):  [facility] NEUROLOGY [facility]     Platform used for Video Visit: Herman    Chief Complaint   Patient presents with     Consult     VIDEO VISIT JAMIL Cool

## 2022-03-03 NOTE — TELEPHONE ENCOUNTER
Anemia Management Note  SUBJECTIVE/OBJECTIVE:  Referred by Dr. Joseph Motta on 2021  Primary Diagnosis: Anemia of Other Chronic Illness (D63.8)     Secondary Diagnosis:  Organ or tissue replaced by transplant, kidney (Z94.0)  Kidney/Pancreas Tx: 11/15/2021  Hgb goal range:  9-10  Epo/Darbo: TBD; Hgb >10.0  Iron regimen:  Does not tolerate Oral Iron.  Completed Venofer course on 21.  Labs : 2022  Recent SYLVAIN use, transfusion, IV iron: Starting Venofer, Aranesp   RX/TX plans : TBD      No history of stroke, MI and blood clots or cancers.     Contact:            No Consent to Communicate on File other than for Ratna Ortega to  medications.     Anemia Latest Ref Rng & Units 2/10/2022 2022 2022 2022 2022 2022 3/3/2022   Hemoglobin 11.7 - 15.7 g/dL 10.9(L) 11.1(L) 10.3(L) 10.3(L) 10.0(L) 9.5(L) 10.3(L)   IV Iron Dose - - - - - - - -   TSAT 15 - 46 % - - - - - - 35   Ferritin 12 - 150 ng/mL - - - - - - 106   PRBCs - - - - - - - -     BP Readings from Last 3 Encounters:   22 120/84   22 110/79   22 132/75     Wt Readings from Last 2 Encounters:   22 115 lb 14.4 oz (52.6 kg)   22 125 lb 14.4 oz (57.1 kg)           ASSESSMENT:  Hgb:at goal - continue to monitor  TSat: at goal >30% Ferritin: At goal (>100ng/mL)    PLAN:  RTC for Hgb in 2 week(s)    Orders needed to be renewed (for next follow-up date) in EPIC: None    Iron labs due:  4 weeks    Plan discussed with:  No call, lab review      NEXT FOLLOW-UP DATE:  3/17/22    Patricia Gonzales RN   Anemia Services  [address] [address]   [email or web address]   Office : [phone]  Fax: [phone]

## 2022-03-03 NOTE — LETTER
Date:March 3, 2022      Patient was self referred, no letter generated. Do not send.        [facility] Health Information

## 2022-03-03 NOTE — LETTER
"3/3/2022       RE: Marilyn Ortega  [address]  [place] [facility] [address]     Dear Colleague,    Thank you for referring your patient, Marilyn Ortega, to the [facility] NEUROLOGY CLINIC [place] at [facility]. Please see a copy of my visit note below.    [facility] Neurology Consultation    Marilyn Ortega MRN# [id number]   Age: 36 year old YOB: 1985     Requesting physician: Referred Self  No Ref-Primary, Physician     Reason for Consultation: visual disturbance      History of Presenting Symptoms:   Marilyn Ortega is a 36 year old female who presents today for evaluation of visual disturbance.  The patient has a pertinent medical history of DMI, factor V Leiden mutation, HTN, s/p kidney and pancrease transplantation (Tacrolimus, Mycophenolic acid Tx).    The patient was seen in the ophthalmology clinic 2/22/2022 for \"sun spots\" in her vision as well as blurring in both eyes that was worsening for 2 weeks in the setting of cataracts known for 10 years.   She had left eye cataract surgery (11/2021), then her transplant (11/15/2021), then her right eye had a cataract surgery (12/23/2021).  After exam, the overall impression was for macular edema of both eyes, venous stasis of retina b/l, and severe non-proliferative diabetic retinopathy b/l.  There was no specific finding of optic neuropathy associated with tacrolimus.  She was to trial intravitreal avastin b/l.    She had an MRI done a few weeks prior to the visit given her vision changes (2/16/2022), and after review there are a few scattered white matter changes, two of which are in the left posterior occipital white matter, but no signs of infarction/sunni-ventricular lesions/or optic nerve insult.      Today, the patient confirms the above mentioned story.  She developed b/l visual blurring around late 01/2022, but indicates her main issues was developing an \"after image\" similar to " what is experienced when looking at the sun and then closing her eyes.  She had difficulty reading words because it felt like flash-light was being shined in her eyes.  She describes sitting in a saloon and looking at a big blue wall with black/white text on it, and she could not read the words easily due to the contrast not being clear.  There was no pain with eye movements, no ongoing migraine or headache, no dizziness/vomiting/nausea, and no changes with her symptoms based on head position.      She does feel her visual symptoms are improved since starting avastin.  She noticed the improvement 1-2 days after her injection, and can now read normal text on her phone and read again.     Medications:   ASA  Mycophenolic acid  Tacrolimus  Torsemide     Physical Exam:   General: Seated comfortably in no acute distress.  Neurologic:     Cranial Nerves: EOMI with normal smooth pursuit. Facial movements symmetric. Hearing not formally tested but intact to conversation.  No dysarthria.     Motor: No tremors or other abnormal movements observed.          Assessment and Plan:   Assessment:  Incidental white matter changes of the brain    The patient's clinical reports do not necessarily match that of her small vessel changes seen in the posterior occipital white matter.   In actuality, her brain is remarkably lacking white matter changes and small vessel disease given her 20+ year history of brittle diabetes.  There is no concerning findings on imaging to indicate periventricular, juxta-cortical, or brainstem lesions consistent with MS, nor is there findings to indicate prior infarction/infection/brain trauma.  While the optic nerves are not with thin cuts or centered on one specific slice, I do not note signs of signal change on T2 and her OCT nerve fiber layer showed resolved disc edema and not findings consistent with prolonged/acute optic neuritis.  I suspect the white matter changes are small vessel disease and possibly  related to her recent transplant surgeries regarding blood pressure changes, and not that of ischemic embolic infarction. I would agree that her Factor V Leiden could be further addressed with hematology, but would want to make note that she has not had a infarction based on her scans and presentation.  Overall, the patient was reassured after we went over her imaging in detail, and in that her symptoms have been improving while on avastin.      I do agree that the patient should continue to follow with ophthalmology, with query based around whether tacrolimus led to delayed blood flow findings or maculopathy noted versus post-cataract surgery changes versus stated non-proliferative diabetic retinopathy.     Plan:  Follow up in Neurology clinic should new concerns arise.    NEPTALI Flores D.O.   of Neurology    Total time today (62 min) in this patient encounter was spent on pre-charting, counseling and/or coordination of care.  The patient is in agreement with this plan and has no further questions.      Marilyn is a 36 year old who is being evaluated via a billable video visit.      How would you like to obtain your AVS? MyChart  If the video visit is dropped, the invitation should be resent by: Send to e-mail at: [email or web address]  Will anyone else be joining your video visit? No      Video Start Time: 0830  Video-Visit Details    Type of service:  Video Visit    Video End Time:9:20 AM    Originating Location (pt. Location): Home    Distant Location (provider location):  [facility] NEUROLOGY CLINIC [place]     [facility] Visit[facility]    Chief Complaint   Patient presents with     Consult     VIDEO VISIT JAMIL Toribio Johannmarquita        Again, thank you for allowing me to participate in the care of your patient.      Sincerely,    Amando Flores, DO

## 2022-03-03 NOTE — PROGRESS NOTES
"[facility] Neurology Consultation    Marilyn Ortega MRN# [id number]   Age: 36 year old YOB: 1985     Requesting physician: Referred Self  No Ref-Primary, Physician     Reason for Consultation: visual disturbance      History of Presenting Symptoms:   Marilyn Ortega is a 36 year old female who presents today for evaluation of visual disturbance.  The patient has a pertinent medical history of DMI, factor V Leiden mutation, HTN, s/p kidney and pancrease transplantation (Tacrolimus, Mycophenolic acid Tx).    The patient was seen in the ophthalmology clinic 2/22/2022 for \"sun spots\" in her vision as well as blurring in both eyes that was worsening for 2 weeks in the setting of cataracts known for 10 years.   She had left eye cataract surgery (11/2021), then her transplant (11/15/2021), then her right eye had a cataract surgery (12/23/2021).  After exam, the overall impression was for macular edema of both eyes, venous stasis of retina b/l, and severe non-proliferative diabetic retinopathy b/l.  There was no specific finding of optic neuropathy associated with tacrolimus.  She was to trial intravitreal avastin b/l.    She had an MRI done a few weeks prior to the visit given her vision changes (2/16/2022), and after review there are a few scattered white matter changes, two of which are in the left posterior occipital white matter, but no signs of infarction/sunni-ventricular lesions/or optic nerve insult.      Today, the patient confirms the above mentioned story.  She developed b/l visual blurring around late 01/2022, but indicates her main issues was developing an \"after image\" similar to what is experienced when looking at the sun and then closing her eyes.  She had difficulty reading words because it felt like flash-light was being shined in her eyes.  She describes sitting in a saloon and looking at a big blue wall with black/white text on it, and she could not read the words easily due to the contrast " not being clear.  There was no pain with eye movements, no ongoing migraine or headache, no dizziness/vomiting/nausea, and no changes with her symptoms based on head position.      She does feel her visual symptoms are improved since starting avastin.  She noticed the improvement 1-2 days after her injection, and can now read normal text on her phone and read again.     Medications:   ASA  Mycophenolic acid  Tacrolimus  Torsemide     Physical Exam:   General: Seated comfortably in no acute distress.  Neurologic:     Cranial Nerves: EOMI with normal smooth pursuit. Facial movements symmetric. Hearing not formally tested but intact to conversation.  No dysarthria.     Motor: No tremors or other abnormal movements observed.          Assessment and Plan:   Assessment:  Incidental white matter changes of the brain    The patient's clinical reports do not necessarily match that of her small vessel changes seen in the posterior occipital white matter.   In actuality, her brain is remarkably lacking white matter changes and small vessel disease given her 20+ year history of brittle diabetes.  There is no concerning findings on imaging to indicate periventricular, juxta-cortical, or brainstem lesions consistent with MS, nor is there findings to indicate prior infarction/infection/brain trauma.  While the optic nerves are not with thin cuts or centered on one specific slice, I do not note signs of signal change on T2 and her OCT nerve fiber layer showed resolved disc edema and not findings consistent with prolonged/acute optic neuritis.  I suspect the white matter changes are small vessel disease and possibly related to her recent transplant surgeries regarding blood pressure changes, and not that of ischemic embolic infarction. I would agree that her Factor V Leiden could be further addressed with hematology, but would want to make note that she has not had a infarction based on her scans and presentation.  Overall, the  patient was reassured after we went over her imaging in detail, and in that her symptoms have been improving while on avastin.      I do agree that the patient should continue to follow with ophthalmology, with query based around whether tacrolimus led to delayed blood flow findings or maculopathy noted versus post-cataract surgery changes versus stated non-proliferative diabetic retinopathy.     Plan:  Follow up in Neurology clinic should new concerns arise.    NEPTALI Flores D.O.   of Neurology    Total time today (62 min) in this patient encounter was spent on pre-charting, counseling and/or coordination of care.  The patient is in agreement with this plan and has no further questions.

## 2022-03-04 ENCOUNTER — TELEPHONE (OUTPATIENT)
Dept: TRANSPLANT | Facility: CLINIC | Age: 37
End: 2022-03-04
Payer: COMMERCIAL

## 2022-03-04 DIAGNOSIS — Z94.83 PANCREAS REPLACED BY TRANSPLANT (H): ICD-10-CM

## 2022-03-04 DIAGNOSIS — Z94.0 KIDNEY REPLACED BY TRANSPLANT: ICD-10-CM

## 2022-03-04 DIAGNOSIS — D68.51 FACTOR V LEIDEN (H): Primary | ICD-10-CM

## 2022-03-04 NOTE — TELEPHONE ENCOUNTER
Post Kidney and Pancreas Transplant Team Conference  Date: 3/4/2022  Transplant Coordinator: Yessica Becker     Attendees:  [x]  Dr. Pack [] Brandi Hernandez, RN [] Tamkio Cárdenas LPN     [x]  Dr. Motta [] Lizette Park, EDWARD [] Radhika Arnett LPN   [x]  Dr. Lee [x] Yessica Becker, EDWARD    [x]  Dr. Corona [] Tasha Cartwright RN [] Bulmaro Weiner, PharmD   [] Dr. El [] Hamida Guevara RN    [] Dr. Chavarria [] Roderick Messer RN    [] Dr. Ocampo [] Lynette De RN [] Nena Wheeler RN   [] Dr. De León [] Naz Glaser RN    []  Dr. Aranda [] Radha Hurt RN    [] Dr. Wright [] Ashely Tse RN    [] Sally Brito NP [] Jovana Lemos RN        Verbal Plan Read Back:   Start Eliquis 5mg daily, prior to patient following up w/ hematology for hypercoagubility    Routed to RN Coordinator   Yessica Becker RN

## 2022-03-06 ENCOUNTER — HEALTH MAINTENANCE LETTER (OUTPATIENT)
Age: 37
End: 2022-03-06

## 2022-03-07 ENCOUNTER — LAB (OUTPATIENT)
Dept: LAB | Facility: CLINIC | Age: 37
End: 2022-03-07
Payer: COMMERCIAL

## 2022-03-07 DIAGNOSIS — Z94.83 PANCREAS REPLACED BY TRANSPLANT (H): ICD-10-CM

## 2022-03-07 DIAGNOSIS — Z94.0 KIDNEY REPLACED BY TRANSPLANT: ICD-10-CM

## 2022-03-07 DIAGNOSIS — Z48.298 AFTERCARE FOLLOWING ORGAN TRANSPLANT: ICD-10-CM

## 2022-03-07 LAB
AMYLASE SERPL-CCNC: 54 U/L (ref 30–110)
ANION GAP SERPL CALCULATED.3IONS-SCNC: 5 MMOL/L (ref 3–14)
BASOPHILS # BLD AUTO: 0.1 10E3/UL (ref 0–0.2)
BASOPHILS NFR BLD AUTO: 1 %
BUN SERPL-MCNC: 24 MG/DL (ref 7–30)
CALCIUM SERPL-MCNC: 9.3 MG/DL (ref 8.5–10.1)
CHLORIDE BLD-SCNC: 111 MMOL/L (ref 94–109)
CO2 SERPL-SCNC: 22 MMOL/L (ref 20–32)
CREAT SERPL-MCNC: 1.17 MG/DL (ref 0.52–1.04)
EOSINOPHIL # BLD AUTO: 0.1 10E3/UL (ref 0–0.7)
EOSINOPHIL NFR BLD AUTO: 1 %
ERYTHROCYTE [DISTWIDTH] IN BLOOD BY AUTOMATED COUNT: 15.2 % (ref 10–15)
GFR SERPL CREATININE-BSD FRML MDRD: 62 ML/MIN/1.73M2
GLUCOSE BLD-MCNC: 89 MG/DL (ref 70–99)
HCT VFR BLD AUTO: 33.2 % (ref 35–47)
HGB BLD-MCNC: 10.3 G/DL (ref 11.7–15.7)
IMM GRANULOCYTES # BLD: 0 10E3/UL
IMM GRANULOCYTES NFR BLD: 1 %
LIPASE SERPL-CCNC: 99 U/L (ref 73–393)
LYMPHOCYTES # BLD AUTO: 0.6 10E3/UL (ref 0.8–5.3)
LYMPHOCYTES NFR BLD AUTO: 10 %
MCH RBC QN AUTO: 29.6 PG (ref 26.5–33)
MCHC RBC AUTO-ENTMCNC: 31 G/DL (ref 31.5–36.5)
MCV RBC AUTO: 95 FL (ref 78–100)
MONOCYTES # BLD AUTO: 0.4 10E3/UL (ref 0–1.3)
MONOCYTES NFR BLD AUTO: 7 %
MYCOPHENOLATE SERPL LC/MS/MS-MCNC: 2.63 MG/L (ref 1–3.5)
MYCOPHENOLATE-G SERPL LC/MS/MS-MCNC: 36.5 MG/L (ref 30–95)
NEUTROPHILS # BLD AUTO: 4.9 10E3/UL (ref 1.6–8.3)
NEUTROPHILS NFR BLD AUTO: 80 %
NRBC # BLD AUTO: 0 10E3/UL
NRBC BLD AUTO-RTO: 0 /100
PLATELET # BLD AUTO: 301 10E3/UL (ref 150–450)
POTASSIUM BLD-SCNC: 4.4 MMOL/L (ref 3.4–5.3)
RBC # BLD AUTO: 3.48 10E6/UL (ref 3.8–5.2)
SODIUM SERPL-SCNC: 138 MMOL/L (ref 133–144)
TACROLIMUS BLD-MCNC: 8.2 UG/L (ref 5–15)
TME LAST DOSE: NORMAL H
WBC # BLD AUTO: 6.2 10E3/UL (ref 4–11)

## 2022-03-07 PROCEDURE — 36415 COLL VENOUS BLD VENIPUNCTURE: CPT

## 2022-03-07 PROCEDURE — 82150 ASSAY OF AMYLASE: CPT

## 2022-03-07 PROCEDURE — 80180 DRUG SCRN QUAN MYCOPHENOLATE: CPT

## 2022-03-07 PROCEDURE — 80048 BASIC METABOLIC PNL TOTAL CA: CPT

## 2022-03-07 PROCEDURE — 83690 ASSAY OF LIPASE: CPT

## 2022-03-07 PROCEDURE — 80197 ASSAY OF TACROLIMUS: CPT

## 2022-03-07 PROCEDURE — 85025 COMPLETE CBC W/AUTO DIFF WBC: CPT

## 2022-03-10 ENCOUNTER — LAB (OUTPATIENT)
Dept: LAB | Facility: CLINIC | Age: 37
End: 2022-03-10
Attending: INTERNAL MEDICINE
Payer: COMMERCIAL

## 2022-03-10 DIAGNOSIS — Z94.83 PANCREAS REPLACED BY TRANSPLANT (H): ICD-10-CM

## 2022-03-10 DIAGNOSIS — Z48.298 AFTERCARE FOLLOWING ORGAN TRANSPLANT: ICD-10-CM

## 2022-03-10 DIAGNOSIS — Z94.0 KIDNEY REPLACED BY TRANSPLANT: ICD-10-CM

## 2022-03-10 DIAGNOSIS — Z79.899 ENCOUNTER FOR LONG-TERM CURRENT USE OF MEDICATION: ICD-10-CM

## 2022-03-10 LAB
AMYLASE SERPL-CCNC: 56 U/L (ref 30–110)
ANION GAP SERPL CALCULATED.3IONS-SCNC: 4 MMOL/L (ref 3–14)
BASOPHILS # BLD AUTO: 0.1 10E3/UL (ref 0–0.2)
BASOPHILS NFR BLD AUTO: 1 %
BUN SERPL-MCNC: 19 MG/DL (ref 7–30)
CALCIUM SERPL-MCNC: 9 MG/DL (ref 8.5–10.1)
CHLORIDE BLD-SCNC: 109 MMOL/L (ref 94–109)
CO2 SERPL-SCNC: 25 MMOL/L (ref 20–32)
CREAT SERPL-MCNC: 1.16 MG/DL (ref 0.52–1.04)
EOSINOPHIL # BLD AUTO: 0.1 10E3/UL (ref 0–0.7)
EOSINOPHIL NFR BLD AUTO: 2 %
ERYTHROCYTE [DISTWIDTH] IN BLOOD BY AUTOMATED COUNT: 14.9 % (ref 10–15)
GFR SERPL CREATININE-BSD FRML MDRD: 62 ML/MIN/1.73M2
GLUCOSE BLD-MCNC: 101 MG/DL (ref 70–99)
HCT VFR BLD AUTO: 33.3 % (ref 35–47)
HGB BLD-MCNC: 10.2 G/DL (ref 11.7–15.7)
IMM GRANULOCYTES # BLD: 0 10E3/UL
IMM GRANULOCYTES NFR BLD: 0 %
LIPASE SERPL-CCNC: 85 U/L (ref 73–393)
LYMPHOCYTES # BLD AUTO: 0.6 10E3/UL (ref 0.8–5.3)
LYMPHOCYTES NFR BLD AUTO: 11 %
MCH RBC QN AUTO: 29.1 PG (ref 26.5–33)
MCHC RBC AUTO-ENTMCNC: 30.6 G/DL (ref 31.5–36.5)
MCV RBC AUTO: 95 FL (ref 78–100)
MONOCYTES # BLD AUTO: 0.4 10E3/UL (ref 0–1.3)
MONOCYTES NFR BLD AUTO: 8 %
NEUTROPHILS # BLD AUTO: 4 10E3/UL (ref 1.6–8.3)
NEUTROPHILS NFR BLD AUTO: 78 %
NRBC # BLD AUTO: 0 10E3/UL
NRBC BLD AUTO-RTO: 0 /100
PLATELET # BLD AUTO: 288 10E3/UL (ref 150–450)
POTASSIUM BLD-SCNC: 4.2 MMOL/L (ref 3.4–5.3)
RBC # BLD AUTO: 3.51 10E6/UL (ref 3.8–5.2)
SODIUM SERPL-SCNC: 138 MMOL/L (ref 133–144)
TACROLIMUS BLD-MCNC: 9.1 UG/L (ref 5–15)
TME LAST DOSE: NORMAL H
TME LAST DOSE: NORMAL H
WBC # BLD AUTO: 5.1 10E3/UL (ref 4–11)

## 2022-03-10 PROCEDURE — 83690 ASSAY OF LIPASE: CPT

## 2022-03-10 PROCEDURE — 80048 BASIC METABOLIC PNL TOTAL CA: CPT

## 2022-03-10 PROCEDURE — 36415 COLL VENOUS BLD VENIPUNCTURE: CPT

## 2022-03-10 PROCEDURE — 82150 ASSAY OF AMYLASE: CPT

## 2022-03-10 PROCEDURE — 87799 DETECT AGENT NOS DNA QUANT: CPT

## 2022-03-10 PROCEDURE — 85025 COMPLETE CBC W/AUTO DIFF WBC: CPT

## 2022-03-10 PROCEDURE — 80197 ASSAY OF TACROLIMUS: CPT

## 2022-03-11 LAB — BKV DNA # SPEC NAA+PROBE: NOT DETECTED COPIES/ML

## 2022-03-14 ENCOUNTER — LAB (OUTPATIENT)
Dept: LAB | Facility: CLINIC | Age: 37
End: 2022-03-14
Attending: INTERNAL MEDICINE
Payer: COMMERCIAL

## 2022-03-14 ENCOUNTER — TELEPHONE (OUTPATIENT)
Dept: OPHTHALMOLOGY | Facility: CLINIC | Age: 37
End: 2022-03-14

## 2022-03-14 DIAGNOSIS — Z79.899 ENCOUNTER FOR LONG-TERM CURRENT USE OF MEDICATION: ICD-10-CM

## 2022-03-14 DIAGNOSIS — Z94.83 PANCREAS REPLACED BY TRANSPLANT (H): ICD-10-CM

## 2022-03-14 DIAGNOSIS — Z94.0 KIDNEY REPLACED BY TRANSPLANT: ICD-10-CM

## 2022-03-14 DIAGNOSIS — Z48.298 AFTERCARE FOLLOWING ORGAN TRANSPLANT: ICD-10-CM

## 2022-03-14 LAB
AMYLASE SERPL-CCNC: 50 U/L (ref 30–110)
ANION GAP SERPL CALCULATED.3IONS-SCNC: 5 MMOL/L (ref 3–14)
BUN SERPL-MCNC: 18 MG/DL (ref 7–30)
CALCIUM SERPL-MCNC: 9.9 MG/DL (ref 8.5–10.1)
CHLORIDE BLD-SCNC: 103 MMOL/L (ref 94–109)
CO2 SERPL-SCNC: 25 MMOL/L (ref 20–32)
CREAT SERPL-MCNC: 1.16 MG/DL (ref 0.52–1.04)
ERYTHROCYTE [DISTWIDTH] IN BLOOD BY AUTOMATED COUNT: 14.6 % (ref 10–15)
GFR SERPL CREATININE-BSD FRML MDRD: 62 ML/MIN/1.73M2
GLUCOSE BLD-MCNC: 102 MG/DL (ref 70–99)
HCT VFR BLD AUTO: 38 % (ref 35–47)
HGB BLD-MCNC: 11.6 G/DL (ref 11.7–15.7)
LIPASE SERPL-CCNC: 69 U/L (ref 73–393)
MAGNESIUM SERPL-MCNC: 1.7 MG/DL (ref 1.6–2.3)
MCH RBC QN AUTO: 29.1 PG (ref 26.5–33)
MCHC RBC AUTO-ENTMCNC: 30.5 G/DL (ref 31.5–36.5)
MCV RBC AUTO: 96 FL (ref 78–100)
PHOSPHATE SERPL-MCNC: 2.6 MG/DL (ref 2.5–4.5)
PLATELET # BLD AUTO: 269 10E3/UL (ref 150–450)
POTASSIUM BLD-SCNC: 4.5 MMOL/L (ref 3.4–5.3)
RBC # BLD AUTO: 3.98 10E6/UL (ref 3.8–5.2)
SODIUM SERPL-SCNC: 133 MMOL/L (ref 133–144)
TACROLIMUS BLD-MCNC: 9.3 UG/L (ref 5–15)
TME LAST DOSE: NORMAL H
TME LAST DOSE: NORMAL H
WBC # BLD AUTO: 7.6 10E3/UL (ref 4–11)

## 2022-03-14 PROCEDURE — 84100 ASSAY OF PHOSPHORUS: CPT

## 2022-03-14 PROCEDURE — 83690 ASSAY OF LIPASE: CPT

## 2022-03-14 PROCEDURE — 36415 COLL VENOUS BLD VENIPUNCTURE: CPT

## 2022-03-14 PROCEDURE — 80048 BASIC METABOLIC PNL TOTAL CA: CPT

## 2022-03-14 PROCEDURE — 83735 ASSAY OF MAGNESIUM: CPT

## 2022-03-14 PROCEDURE — 80197 ASSAY OF TACROLIMUS: CPT

## 2022-03-14 PROCEDURE — 82150 ASSAY OF AMYLASE: CPT

## 2022-03-14 PROCEDURE — 85027 COMPLETE CBC AUTOMATED: CPT

## 2022-03-14 NOTE — TELEPHONE ENCOUNTER
I spoke to the patient who is noticing her vision changing with the her original symptoms of after images and trouble with light.  I scheduled her for a follow up this week.

## 2022-03-14 NOTE — TELEPHONE ENCOUNTER
[facility] Call Center    Phone Message    May a detailed message be left on voicemail: yes     Reason for Call: Symptoms or Concerns     If patient has red-flag symptoms, warm transfer to triage line    Current symptom or concern: Can't read, not seeing well, worse since last Dr visit, per Pt    Symptoms have been present for:  1 day    Has patient previously been seen for this? Yes    By : Dr Zamorano  Date: 3/14/22  Are there any new or worsening symptoms? Yes      Action Taken: Message routed to:  [facility]    Travel Screening: Not Applicable

## 2022-03-16 NOTE — PROGRESS NOTES
"TRANSPLANT NEPHROLOGY EARLY POST TRANSPLANT VISIT    Assessment & Plan   # DDKT (SPK): Trend up to 1.32 today likely 2/2 torsemide which she wants to continue due to edema thoughout. I think she is very sodium avid and \"leaky\". I would like her to stop torsemide and start myra 25mg daily   - Baseline Creatinine: ~ 1.1-1.2   - Proteinuria: Normal (<0.2 grams)   - Date DSA Last Checked: Dec/2021      Latest DSA: No   - BK Viremia: No   - Kidney Tx Biopsy: No    # Pancreas Tx (SPK):    - Pancreatic Exocrine Drainage: Enteric drained     - Blood glucose: Euglycemia      On insulin: No   - HbA1c: Last checked at time of transplant      Latest HbA1c: 7.7%, recheck now   - Pancreatic enzymes: Stable   - Date DSA Last Checked: Dec/2021  Latest DSA: No   - Pancreas Tx Biopsy: No     # Immunosuppression: Tacrolimus extended release (goal 8-10) and Mycophenolic acid (dose 540 mg every 12 hours)   - Induction with Recent Transplant:  Intermediate Intensity   - Continue with intensive monitoring of immunosuppression for efficacy and toxicity.   - Changes: Not at this time    # Infection Prophylaxis:   - PJP: Sulfa/TMP (Bactrim)   - CMV: None, prophylaxis completed     # Blood Pressure: Controlled;  Goal BP: < 130/80   - Volume status: Euvolemic  EDW ~ 120-122lbs   - Changes: Yes, start myra 25mg daily, stop torsemide     # Anemia in Chronic Renal Disease: Hgb: Stable      SYLVAIN: No   - Iron studies: Low iron saturation    -Followed by anemia services    # Mineral Bone Disorder:   - Secondary renal hyperparathyroidism; PTH level: Normal (18-80 pg/ml)        On treatment: None  - Vitamin D; level: High normal        On supplement: Yes  - Calcium; level: Normal        On supplement: Yes  - Phosphorus; level: Normal        On supplement: No    # Electrolytes:   - Potassium; level: Normal        On supplement: No  - Magnesium; level: Normal        On supplement: Yes, 400mg daily, stop   - Bicarbonate; level: Normal        On " supplement: No    # GERD:   -Off protonix. Improved    # Pruritis:   -resolved    # Concern for diabetic gastroparesis:   -GI doc talked about starting reglan. Recommend considering starting this    # Edema:   -Still present, stop torsemide, start spironolactone 25mg daily    # Vision changes:   -Follows with optho. Had MRI brain that showed T2 hyperintensities in the subcortical white matter in the L occipital lobe that are non specific. Differential could include demyelination versus sequela of infectious, inflammatory, or vasculopathic process, or possibly toxic exposure.   -Optho diagnosed macular leakage bilaterally, received Avastin injection in prior appointment   -due to concern for optho of low flow and concern regarding Factor V Leiden heterozygosity she was started on eliquis 5mg BID    # Medical Compliance: Yes    # COVID-19 Virus Review: Discussed COVID-19 virus and the potential medical risks.  Reviewed preventative health recommendations, including wearing a mask where appropriate.  Recommended COVID vaccination should be up to date with either an initial vaccination or booster shot when appropriate.  Asked the patient to inform the transplant center if they are exposed or diagnosed with this virus.    # COVID Vaccination Up To Date: Yes. Due for 4th dose on or after 4/8/22    # Transplant History:  Etiology of Kidney Failure: Diabetes mellitus type 1  Tx: SPK  Transplant: 11/15/2021 (Kidney / Pancreas)  Donor Type: Donation after Brain Death Donor Class:   Crossmatch at time of Tx: negative  DSA at time of Tx: No  Significant changes in immunosuppression: None  CMV IgG Ab High Risk Discordance (D+/R-): No  EBV IgG Ab High Risk Discordance (D+/R-): No  Significant transplant-related complications: None    Transplant Office Phone Number: [phone]    Assessment and plan was discussed with the patient and she voiced her understanding and agreement.      Return visit: Return in 8 weeks (on 5/13/2022)  for 6 month post transplant visit.      Kamran Corona MD    Chief Complaint   Ms. Ortega is a 36 year old here for kidney transplant, pancreas transplant, immunosuppression management and new medical concerns.     History of Present Illness    Marilyn states that she continues to have swelling all over her body, worse as the day goes on and that her weight increases about 5lbs during the day. She continues to drink 3L of water. She has been taking torsemide 10mg daily. She denies SOB, N/V/D, fever, chills. She states that she doesn't eat high sodium foods, canned foods, processed foods, fast foods. She feels that her stomach is full even the morning after eating dinner. She does have a history of gastroparesis.     She says that her vision did initially improve after the avastin injection but now has gotten worse again. She has an upcoming appt with [facility].       Home BP: 120s systolic      Problem List   Patient Active Problem List   Diagnosis     Type 1 diabetes mellitus (H)     Type 1 diabetes, HbA1c goal < 7% (H)     Osteopenia     Chronic constipation     Irritable bowel syndrome     CKD (chronic kidney disease) stage 4, GFR 15-29 ml/min (H)     HTN, kidney transplant related     Gastroparesis     Heterozygous factor V Leiden mutation (H)     Status post simultaneous kidney and pancreas transplant (H)     Immunosuppression (H)     Kidney replaced by transplant     Pancreas replaced by transplant (H)     Need for pneumocystis prophylaxis     Anemia     Aftercare following organ transplant       Allergies   Allergies   Allergen Reactions     Chlorhexidine Hives, Itching and Rash     PN: Patient had swelling/rash that was very itchy with chlorprep.     Ceclor [Cefaclor Monohydrate]      Cefaclor Rash       Medications   Current Outpatient Medications   Medication Sig     apixaban ANTICOAGULANT (ELIQUIS) 5 MG tablet Take 1 tablet (5 mg) by mouth 2 times daily     aspirin (ASA) 325 MG tablet Take 1 tablet (325 mg)  by mouth daily     calcium carbonate-vitamin D (OS-STEPHANIE WITH D) 500-200 MG-UNIT tablet Take 1 tablet by mouth 2 times daily (with meals)     cetirizine (ZYRTEC) 10 MG tablet Take 10 mg by mouth daily     cholecalciferol 25 MCG (1000 UT) TABS Take 2,000 Units by mouth every other day      Continuous Blood Gluc Sensor (DEXCOM G6 SENSOR) MISC Use as directed for continuous glucose monitoring.  Change sensor every 10 days.     Continuous Blood Gluc Transmit (DEXCOM G6 TRANSMITTER) MISC Use as directed for continuous glucose monitoring.  Change every 3 months.     docusate sodium (COLACE) 100 MG capsule Take 200 mg by mouth daily before breakfast     glucose blood VI test strips strip 4 times daily.     mycophenolic acid (GENERIC EQUIVALENT) 180 MG EC tablet Take 1 tablet (180 mg) by mouth 2 times daily Total dose=540mg BID     mycophenolic acid (GENERIC EQUIVALENT) 360 MG EC tablet Take 1 tablet (360 mg) by mouth 2 times daily Total dose=540mg BID     norgestimate-ethinyl estradiol (ORTHO-CYCLEN) 0.25-35 MG-MCG tablet Take 1 tablet by mouth daily     ondansetron (ZOFRAN) 4 MG tablet Take 1 tablet (4 mg) by mouth every 8 hours as needed for nausea     Prucalopride Succinate (MOTEGRITY) 2 MG TABS Take one tablet by mouth daily     sulfamethoxazole-trimethoprim (BACTRIM) 400-80 MG tablet Take 1 tablet by mouth daily     tacrolimus (ENVARSUS XR) 0.75 MG 24 hr tablet Take 1 tablet (0.75 mg) by mouth every morning (before breakfast) On hold for dose change.     tacrolimus (ENVARSUS XR) 1 MG 24 hr tablet HOLD     tacrolimus (ENVARSUS XR) 4 MG 24 hr tablet Take 3 tablets (12 mg) by mouth every morning (before breakfast)     torsemide (DEMADEX) 10 MG tablet Take 1 tablet (10 mg) by mouth daily HOLD per Dr Corona.     No current facility-administered medications for this visit.     There are no discontinued medications.    Physical Exam   Vital Signs: /82 (BP Location: Right arm, Patient Position: Sitting, Cuff Size: Adult  "Regular)   Pulse 84   Temp 97.8  F (36.6  C) (Oral)   Resp 16   Ht 1.676 m (5' 5.98\")   Wt 58 kg (127 lb 14.4 oz)   SpO2 99%   BMI 20.65 kg/m      GENERAL APPEARANCE: alert and no distress  HENT: mouth without ulcers or lesions  HENT: ear canals and TM's normal and TM's pearly grey, normal light reflex bilateral  LYMPHATICS: no cervical or supraclavicular nodes  RESP: lungs clear to auscultation - no rales, rhonchi or wheezes  CV: regular rhythm, normal rate, no rub, no murmur  EDEMA: no LE edema bilaterally  ABDOMEN: soft, nondistended, nontender, bowel sounds normal  MS: extremities normal - no gross deformities noted, no evidence of inflammation in joints, no muscle tenderness  SKIN: no rash  NEURO: normal strength and tone, sensory exam grossly normal, mentation intact and speech normal  PSYCH: mentation appears normal and affect normal/bright  TX KIDNEY: normal    Data     Renal Latest Ref Rng & Units 3/14/2022 3/10/2022 3/7/2022   Na 133 - 144 mmol/L 133 138 138   K 3.4 - 5.3 mmol/L 4.5 4.2 4.4   Cl 94 - 109 mmol/L 103 109 111(H)   CO2 20 - 32 mmol/L 25 25 22   BUN 7 - 30 mg/dL 18 19 24   Cr 0.52 - 1.04 mg/dL 1.16(H) 1.16(H) 1.17(H)   Glucose 70 - 99 mg/dL 102(H) 101(H) 89   Ca  8.5 - 10.1 mg/dL 9.9 9.0 9.3   Mg 1.6 - 2.3 mg/dL 1.7 - -     Bone Health Latest Ref Rng & Units 3/14/2022 2/21/2022 2/7/2022   Phos 2.5 - 4.5 mg/dL 2.6 2.4(L) 2.0(L)   PTHi pg/mL - - -   Vit D Def 20 - 75 ug/L - - -     Heme Latest Ref Rng & Units 3/14/2022 3/10/2022 3/7/2022   WBC 4.0 - 11.0 10e3/uL 7.6 5.1 6.2   Hgb 11.7 - 15.7 g/dL 11.6(L) 10.2(L) 10.3(L)   Plt 150 - 450 10e3/uL 269 288 301   ABSOLUTE NEUTROPHIL 1.6 - 8.3 10e3/uL - - -   ABSOLUTE LYMPHOCYTES 0.8 - 5.3 10e3/uL - - -   ABSOLUTE MONOCYTES 0.0 - 1.3 10e3/uL - - -   ABSOLUTE EOSINOPHILS 0.0 - 0.7 10e3/uL - - -   ABSOLUTE BASOPHILS 0.0 - 0.2 10e9/L - - -     Liver Latest Ref Rng & Units 11/15/2021 7/21/2021 6/8/2012   AP 40 - 150 U/L 104 94 96   TBili 0.2 - 1.3 " mg/dL 0.4 0.4 0.3   ALT 0 - 50 U/L 23 24 13   AST 0 - 45 U/L 16 19 33   Tot Protein 6.8 - 8.8 g/dL 8.4 9.4(H) 7.8   Albumin 3.4 - 5.0 g/dL 4.0 4.7 4.2     Pancreas Latest Ref Rng & Units 3/14/2022 3/10/2022 3/7/2022   A1C 0.0 - 5.6 % - - -   Amylase 30 - 110 U/L 50 56 54   Lipase 73 - 393 U/L 69(L) 85 99     Iron studies Latest Ref Rng & Units 3/3/2022 2/3/2022 12/22/2021   Iron 35 - 180 ug/dL 139 123 20(L)   Iron sat 15 - 46 % 35 37 7(L)   Ferritin 12 - 150 ng/mL 106 185(H) 87     [facility] Txp Virology Latest Ref Rng & Units 1/28/2022 12/13/2021 7/21/2021   CMV QUANT IU/ML Not Detected IU/mL Not Detected Not Detected -   EBV CAPSID ANTIBODY IGG No detectable antibody. - - Positive(A)        Recent Labs   Lab Test 03/07/22  0815 03/10/22  0810 03/14/22  0827   DOSTAC 3/6/2022 3/9/2022 3/13/2022   TACROL 8.2 9.1 9.3     Recent Labs   Lab Test 02/07/22  0915 02/21/22  0822 03/07/22  0815   DOSMPA  --  2/20/2022   8:00 PM 3/6/2022   8:00 PM   MPACID 5.16* 2.23 2.63   MPAG 45.6 32.8 36.5

## 2022-03-17 ENCOUNTER — OFFICE VISIT (OUTPATIENT)
Dept: NEPHROLOGY | Facility: CLINIC | Age: 37
End: 2022-03-17
Attending: SURGERY
Payer: COMMERCIAL

## 2022-03-17 ENCOUNTER — TELEPHONE (OUTPATIENT)
Dept: PHARMACY | Facility: CLINIC | Age: 37
End: 2022-03-17
Payer: COMMERCIAL

## 2022-03-17 ENCOUNTER — LAB (OUTPATIENT)
Dept: LAB | Facility: CLINIC | Age: 37
End: 2022-03-17
Attending: INTERNAL MEDICINE
Payer: COMMERCIAL

## 2022-03-17 ENCOUNTER — OFFICE VISIT (OUTPATIENT)
Dept: OPHTHALMOLOGY | Facility: CLINIC | Age: 37
End: 2022-03-17
Attending: OPHTHALMOLOGY
Payer: COMMERCIAL

## 2022-03-17 VITALS
HEIGHT: 66 IN | DIASTOLIC BLOOD PRESSURE: 82 MMHG | WEIGHT: 127.9 LBS | SYSTOLIC BLOOD PRESSURE: 127 MMHG | TEMPERATURE: 97.8 F | BODY MASS INDEX: 20.55 KG/M2 | RESPIRATION RATE: 16 BRPM | OXYGEN SATURATION: 99 % | HEART RATE: 84 BPM

## 2022-03-17 DIAGNOSIS — Z94.0 STATUS POST SIMULTANEOUS KIDNEY AND PANCREAS TRANSPLANT (H): ICD-10-CM

## 2022-03-17 DIAGNOSIS — R60.1 GENERALIZED EDEMA: ICD-10-CM

## 2022-03-17 DIAGNOSIS — E10.3413 SEVERE NONPROLIFERATIVE DIABETIC RETINOPATHY OF BOTH EYES WITH MACULAR EDEMA ASSOCIATED WITH TYPE 1 DIABETES MELLITUS (H): ICD-10-CM

## 2022-03-17 DIAGNOSIS — R60.1 GENERALIZED EDEMA: Primary | ICD-10-CM

## 2022-03-17 DIAGNOSIS — D84.9 IMMUNOSUPPRESSION (H): ICD-10-CM

## 2022-03-17 DIAGNOSIS — Z94.83 PANCREAS REPLACED BY TRANSPLANT (H): ICD-10-CM

## 2022-03-17 DIAGNOSIS — Z94.0 KIDNEY REPLACED BY TRANSPLANT: ICD-10-CM

## 2022-03-17 DIAGNOSIS — H35.81 MACULAR EDEMA: Primary | ICD-10-CM

## 2022-03-17 DIAGNOSIS — H34.8192: ICD-10-CM

## 2022-03-17 DIAGNOSIS — Z94.83 STATUS POST SIMULTANEOUS KIDNEY AND PANCREAS TRANSPLANT (H): ICD-10-CM

## 2022-03-17 DIAGNOSIS — Z79.899 ENCOUNTER FOR LONG-TERM CURRENT USE OF MEDICATION: ICD-10-CM

## 2022-03-17 DIAGNOSIS — Z48.298 AFTERCARE FOLLOWING ORGAN TRANSPLANT: ICD-10-CM

## 2022-03-17 PROBLEM — Z29.89 NEED FOR PNEUMOCYSTIS PROPHYLAXIS: Status: RESOLVED | Noted: 2021-12-07 | Resolved: 2022-03-17

## 2022-03-17 PROBLEM — N18.31 ANEMIA DUE TO STAGE 3A CHRONIC KIDNEY DISEASE (H): Status: ACTIVE | Noted: 2021-12-09

## 2022-03-17 PROBLEM — D63.1 ANEMIA DUE TO STAGE 3A CHRONIC KIDNEY DISEASE (H): Status: ACTIVE | Noted: 2021-12-09

## 2022-03-17 LAB
ALBUMIN SERPL-MCNC: 3.9 G/DL (ref 3.4–5)
AMYLASE SERPL-CCNC: 49 U/L (ref 30–110)
ANION GAP SERPL CALCULATED.3IONS-SCNC: 9 MMOL/L (ref 3–14)
BUN SERPL-MCNC: 31 MG/DL (ref 7–30)
CALCIUM SERPL-MCNC: 9.2 MG/DL (ref 8.5–10.1)
CHLORIDE BLD-SCNC: 106 MMOL/L (ref 94–109)
CO2 SERPL-SCNC: 23 MMOL/L (ref 20–32)
CREAT SERPL-MCNC: 1.32 MG/DL (ref 0.52–1.04)
CREAT UR-MCNC: 64 MG/DL
ERYTHROCYTE [DISTWIDTH] IN BLOOD BY AUTOMATED COUNT: 14.2 % (ref 10–15)
GFR SERPL CREATININE-BSD FRML MDRD: 53 ML/MIN/1.73M2
GLUCOSE BLD-MCNC: 92 MG/DL (ref 70–99)
HBA1C MFR BLD: 5 % (ref 0–5.6)
HCT VFR BLD AUTO: 36.6 % (ref 35–47)
HGB BLD-MCNC: 11.2 G/DL (ref 11.7–15.7)
LIPASE SERPL-CCNC: 95 U/L (ref 73–393)
MCH RBC QN AUTO: 29.6 PG (ref 26.5–33)
MCHC RBC AUTO-ENTMCNC: 30.6 G/DL (ref 31.5–36.5)
MCV RBC AUTO: 97 FL (ref 78–100)
PLATELET # BLD AUTO: 256 10E3/UL (ref 150–450)
POTASSIUM BLD-SCNC: 4 MMOL/L (ref 3.4–5.3)
PROT UR-MCNC: 0.1 G/L
PROT/CREAT 24H UR: 0.16 G/G CR (ref 0–0.2)
RBC # BLD AUTO: 3.78 10E6/UL (ref 3.8–5.2)
SODIUM SERPL-SCNC: 138 MMOL/L (ref 133–144)
TACROLIMUS BLD-MCNC: 8.2 UG/L (ref 5–15)
TME LAST DOSE: NORMAL H
TME LAST DOSE: NORMAL H
WBC # BLD AUTO: 5.5 10E3/UL (ref 4–11)

## 2022-03-17 PROCEDURE — 92134 CPTRZ OPH DX IMG PST SGM RTA: CPT | Performed by: OPHTHALMOLOGY

## 2022-03-17 PROCEDURE — G0463 HOSPITAL OUTPT CLINIC VISIT: HCPCS

## 2022-03-17 PROCEDURE — 80048 BASIC METABOLIC PNL TOTAL CA: CPT

## 2022-03-17 PROCEDURE — 99213 OFFICE O/P EST LOW 20 MIN: CPT | Performed by: OPHTHALMOLOGY

## 2022-03-17 PROCEDURE — G0463 HOSPITAL OUTPT CLINIC VISIT: HCPCS | Mod: 25

## 2022-03-17 PROCEDURE — 84156 ASSAY OF PROTEIN URINE: CPT

## 2022-03-17 PROCEDURE — 82150 ASSAY OF AMYLASE: CPT

## 2022-03-17 PROCEDURE — 83690 ASSAY OF LIPASE: CPT

## 2022-03-17 PROCEDURE — 85027 COMPLETE CBC AUTOMATED: CPT

## 2022-03-17 PROCEDURE — 36415 COLL VENOUS BLD VENIPUNCTURE: CPT

## 2022-03-17 PROCEDURE — 82040 ASSAY OF SERUM ALBUMIN: CPT

## 2022-03-17 PROCEDURE — 83036 HEMOGLOBIN GLYCOSYLATED A1C: CPT

## 2022-03-17 PROCEDURE — 80197 ASSAY OF TACROLIMUS: CPT

## 2022-03-17 PROCEDURE — 99215 OFFICE O/P EST HI 40 MIN: CPT

## 2022-03-17 RX ORDER — CETIRIZINE HYDROCHLORIDE 10 MG/1
10 TABLET ORAL EVERY OTHER DAY
COMMUNITY
Start: 2022-03-17

## 2022-03-17 RX ORDER — SPIRONOLACTONE 25 MG/1
25 TABLET ORAL DAILY
Qty: 90 TABLET | Refills: 1 | Status: SHIPPED | OUTPATIENT
Start: 2022-03-17 | End: 2022-04-15

## 2022-03-17 RX ORDER — TORSEMIDE 10 MG/1
10 TABLET ORAL 2 TIMES DAILY
Qty: 90 TABLET | Refills: 1 | Status: SHIPPED | OUTPATIENT
Start: 2022-03-17 | End: 2022-03-17

## 2022-03-17 ASSESSMENT — VISUAL ACUITY
OD_SC: 20/30
OS_SC: 20/30
OS_SC+: +2
METHOD: SNELLEN - LINEAR

## 2022-03-17 ASSESSMENT — SLIT LAMP EXAM - LIDS
COMMENTS: NORMAL
COMMENTS: NORMAL

## 2022-03-17 ASSESSMENT — EXTERNAL EXAM - LEFT EYE: OS_EXAM: NORMAL

## 2022-03-17 ASSESSMENT — TONOMETRY
OD_IOP_MMHG: 12
IOP_METHOD: TONOPEN
OS_IOP_MMHG: 15

## 2022-03-17 ASSESSMENT — CONF VISUAL FIELD
METHOD: COUNTING FINGERS
OD_NORMAL: 1
OS_NORMAL: 1

## 2022-03-17 ASSESSMENT — EXTERNAL EXAM - RIGHT EYE: OD_EXAM: NORMAL

## 2022-03-17 ASSESSMENT — CUP TO DISC RATIO
OS_RATIO: 0.2
OD_RATIO: 0.2

## 2022-03-17 ASSESSMENT — PAIN SCALES - GENERAL: PAINLEVEL: NO PAIN (0)

## 2022-03-17 NOTE — TELEPHONE ENCOUNTER
Referred by Dr. Joseph Motta on 12/14/2021    Hgb has been stable since 1/10/22 with no intervention needed since 12/28/2021.    Patient meets criteria for discharge from Anemia Clinic with at least 12 weeks without iron or SYLVAIN therapy.    Anemia Latest Ref Rng & Units 2/21/2022 2/24/2022 2/28/2022 3/3/2022 3/7/2022 3/10/2022 3/14/2022   Hemoglobin 11.7 - 15.7 g/dL 10.3(L) 10.0(L) 9.5(L) 10.3(L) 10.3(L) 10.2(L) 11.6(L)   IV Iron Dose - - - - - - - -   TSAT 15 - 46 % - - - 35 - - -   Ferritin 12 - 150 ng/mL - - - 106 - - -   PRBCs - - - - - - - -       Anemia labs discontinued, therapy plans cancelled, removed from active patient list, and referring provider notified.    Patricia Gonzales RN   Anemia Services  [address] [address]   [email or web address]   Office : [phone]  Fax: [phone]

## 2022-03-17 NOTE — NURSING NOTE
Chief Complaints and History of Present Illnesses   Patient presents with     Diabetic Retinopathy Follow Up     3 week follow up both eyes     Chief Complaint(s) and History of Present Illness(es)     Diabetic Retinopathy Follow Up     Comments: 3 week follow up both eyes              Comments     Vision appearing more blurry over the past week. Vision cleared after injections and is now blurry again.  No new flashes. Pt still seeing floaters when she turns her head from side to side.  No redness or dryness.  Pt s/p pancreas transplant. BS: 114 currently per pt.  Lab Results       Component                Value               Date                       A1C                      5.0                 03/17/2022                 A1C                      7.7                 11/15/2021                 A1C                      7.1                 07/21/2021                 A1C                      7.5                 10/16/2012                 A1C                      7.4                 07/05/2012                 A1C                      8.3                 03/22/2012              DAMON Pacheco March 17, 2022 3:00 PM

## 2022-03-17 NOTE — LETTER
"3/17/2022       RE: Marilyn Ortega  [address]  [place] [address]     Dear Colleague,    Thank you for referring your patient, Marilyn Ortega, to the [facility]PH[facility]OLOG[facility] [place] at [facility]. Please see a copy of my visit note below.    TRANSPLANT NEPHROLOGY EARLY POST TRANSPLANT VISIT    Assessment & Plan   # DDKT (SPK): Trend up to 1.32 today likely 2/2 torsemide which she wants to continue due to edema thoughout. I think she is very sodium avid and \"leaky\". I would like her to stop torsemide and start myra 25mg daily   - Baseline Creatinine: ~ 1.1-1.2   - Proteinuria: Normal (<0.2 grams)   - Date DSA Last Checked: Dec/2021      Latest DSA: No   - BK Viremia: No   - Kidney Tx Biopsy: No    # Pancreas Tx (SPK):    - Pancreatic Exocrine Drainage: Enteric drained     - Blood glucose: Euglycemia      On insulin: No   - HbA1c: Last checked at time of transplant      Latest HbA1c: 7.7%, recheck now   - Pancreatic enzymes: Stable   - Date DSA Last Checked: Dec/2021  Latest DSA: No   - Pancreas Tx Biopsy: No     # Immunosuppression: Tacrolimus extended release (goal 8-10) and Mycophenolic acid (dose 540 mg every 12 hours)   - Induction with Recent Transplant:  Intermediate Intensity   - Continue with intensive monitoring of immunosuppression for efficacy and toxicity.   - Changes: Not at this time    # Infection Prophylaxis:   - PJP: Sulfa/TMP (Bactrim)   - CMV: None, prophylaxis completed     # Blood Pressure: Controlled;  Goal BP: < 130/80   - Volume status: Euvolemic  EDW ~ 120-122lbs   - Changes: Yes, start myra 25mg daily, stop torsemide     # Anemia in Chronic Renal Disease: Hgb: Stable      SYLVAIN: No   - Iron studies: Low iron saturation    -Followed by anemia services    # Mineral Bone Disorder:   - Secondary renal hyperparathyroidism; PTH level: Normal (18-80 pg/ml)        On treatment: None  - Vitamin D; level: High normal       "  On supplement: Yes  - Calcium; level: Normal        On supplement: Yes  - Phosphorus; level: Normal        On supplement: No    # Electrolytes:   - Potassium; level: Normal        On supplement: No  - Magnesium; level: Normal        On supplement: Yes, 400mg daily, stop   - Bicarbonate; level: Normal        On supplement: No    # GERD:   -Off protonix. Improved    # Pruritis:   -resolved    # Concern for diabetic gastroparesis:   -GI doc talked about starting reglan. Recommend considering starting this    # Edema:   -Still present, stop torsemide, start spironolactone 25mg daily    # Vision changes:   -Follows with optho. Had MRI brain that showed T2 hyperintensities in the subcortical white matter in the L occipital lobe that are non specific. Differential could include demyelination versus sequela of infectious, inflammatory, or vasculopathic process, or possibly toxic exposure.   -Optho diagnosed macular leakage bilaterally, received Avastin injection in prior appointment   -due to concern for optho of low flow and concern regarding Factor V Leiden heterozygosity she was started on eliquis 5mg BID    # Medical Compliance: Yes    # COVID-19 Virus Review: Discussed COVID-19 virus and the potential medical risks.  Reviewed preventative health recommendations, including wearing a mask where appropriate.  Recommended COVID vaccination should be up to date with either an initial vaccination or booster shot when appropriate.  Asked the patient to inform the transplant center if they are exposed or diagnosed with this virus.    # COVID Vaccination Up To Date: Yes. Due for 4th dose on or after 4/8/22    # Transplant History:  Etiology of Kidney Failure: Diabetes mellitus type 1  Tx: SPK  Transplant: 11/15/2021 (Kidney / Pancreas)  Donor Type: Donation after Brain Death Donor Class:   Crossmatch at time of Tx: negative  DSA at time of Tx: No  Significant changes in immunosuppression: None  CMV IgG Ab High Risk Discordance  (D+/R-): No  EBV IgG Ab High Risk Discordance (D+/R-): No  Significant transplant-related complications: None    Transplant Office Phone Number: [phone]    Assessment and plan was discussed with the patient and she voiced her understanding and agreement.      Return visit: Return in 8 weeks (on 5/13/2022) for 6 month post transplant visit.      Kamran Corona MD    Chief Complaint   Ms. Ortega is a 36 year old here for kidney transplant, pancreas transplant, immunosuppression management and new medical concerns.     History of Present Illness    Marilyn states that she continues to have swelling all over her body, worse as the day goes on and that her weight increases about 5lbs during the day. She continues to drink 3L of water. She has been taking torsemide 10mg daily. She denies SOB, N/V/D, fever, chills. She states that she doesn't eat high sodium foods, canned foods, processed foods, fast foods. She feels that her stomach is full even the morning after eating dinner. She does have a history of gastroparesis.     She says that her vision did initially improve after the avastin injection but now has gotten worse again. She has an upcoming appt with [facility].       Home BP: 120s systolic      Problem List   Patient Active Problem List   Diagnosis     Type 1 diabetes mellitus (H)     Type 1 diabetes, HbA1c goal < 7% (H)     Osteopenia     Chronic constipation     Irritable bowel syndrome     CKD (chronic kidney disease) stage 4, GFR 15-29 ml/min (H)     HTN, kidney transplant related     Gastroparesis     Heterozygous factor V Leiden mutation (H)     Status post simultaneous kidney and pancreas transplant (H)     Immunosuppression (H)     Kidney replaced by transplant     Pancreas replaced by transplant (H)     Need for pneumocystis prophylaxis     Anemia     Aftercare following organ transplant       Allergies   Allergies   Allergen Reactions     Chlorhexidine Hives, Itching and Rash     PN: Patient had  swelling/rash that was very itchy with chlorprep.     Ceclor [Cefaclor Monohydrate]      Cefaclor Rash       Medications   Current Outpatient Medications   Medication Sig     apixaban ANTICOAGULANT (ELIQUIS) 5 MG tablet Take 1 tablet (5 mg) by mouth 2 times daily     aspirin (ASA) 325 MG tablet Take 1 tablet (325 mg) by mouth daily     calcium carbonate-vitamin D (OS-STEPHANIE WITH D) 500-200 MG-UNIT tablet Take 1 tablet by mouth 2 times daily (with meals)     cetirizine (ZYRTEC) 10 MG tablet Take 10 mg by mouth daily     cholecalciferol 25 MCG (1000 UT) TABS Take 2,000 Units by mouth every other day      Continuous Blood Gluc Sensor (DEXCOM G6 SENSOR) MISC Use as directed for continuous glucose monitoring.  Change sensor every 10 days.     Continuous Blood Gluc Transmit (DEXCOM G6 TRANSMITTER) MISC Use as directed for continuous glucose monitoring.  Change every 3 months.     docusate sodium (COLACE) 100 MG capsule Take 200 mg by mouth daily before breakfast     glucose blood VI test strips strip 4 times daily.     mycophenolic acid (GENERIC EQUIVALENT) 180 MG EC tablet Take 1 tablet (180 mg) by mouth 2 times daily Total dose=540mg BID     mycophenolic acid (GENERIC EQUIVALENT) 360 MG EC tablet Take 1 tablet (360 mg) by mouth 2 times daily Total dose=540mg BID     norgestimate-ethinyl estradiol (ORTHO-CYCLEN) 0.25-35 MG-MCG tablet Take 1 tablet by mouth daily     ondansetron (ZOFRAN) 4 MG tablet Take 1 tablet (4 mg) by mouth every 8 hours as needed for nausea     Prucalopride Succinate (MOTEGRITY) 2 MG TABS Take one tablet by mouth daily     sulfamethoxazole-trimethoprim (BACTRIM) 400-80 MG tablet Take 1 tablet by mouth daily     tacrolimus (ENVARSUS XR) 0.75 MG 24 hr tablet Take 1 tablet (0.75 mg) by mouth every morning (before breakfast) On hold for dose change.     tacrolimus (ENVARSUS XR) 1 MG 24 hr tablet HOLD     tacrolimus (ENVARSUS XR) 4 MG 24 hr tablet Take 3 tablets (12 mg) by mouth every morning (before  "breakfast)     torsemide (DEMADEX) 10 MG tablet Take 1 tablet (10 mg) by mouth daily HOLD per Dr Corona.     No current facility-administered medications for this visit.     There are no discontinued medications.    Physical Exam   Vital Signs: /82 (BP Location: Right arm, Patient Position: Sitting, Cuff Size: Adult Regular)   Pulse 84   Temp 97.8  F (36.6  C) (Oral)   Resp 16   Ht 1.676 m (5' 5.98\")   Wt 58 kg (127 lb 14.4 oz)   SpO2 99%   BMI 20.65 kg/m      GENERAL APPEARANCE: alert and no distress  HENT: mouth without ulcers or lesions  HENT: ear canals and TM's normal and TM's pearly grey, normal light reflex bilateral  LYMPHATICS: no cervical or supraclavicular nodes  RESP: lungs clear to auscultation - no rales, rhonchi or wheezes  CV: regular rhythm, normal rate, no rub, no murmur  EDEMA: no LE edema bilaterally  ABDOMEN: soft, nondistended, nontender, bowel sounds normal  MS: extremities normal - no gross deformities noted, no evidence of inflammation in joints, no muscle tenderness  SKIN: no rash  NEURO: normal strength and tone, sensory exam grossly normal, mentation intact and speech normal  PSYCH: mentation appears normal and affect normal/bright  TX KIDNEY: normal    Data     Renal Latest Ref Rng & Units 3/14/2022 3/10/2022 3/7/2022   Na 133 - 144 mmol/L 133 138 138   K 3.4 - 5.3 mmol/L 4.5 4.2 4.4   Cl 94 - 109 mmol/L 103 109 111(H)   CO2 20 - 32 mmol/L 25 25 22   BUN 7 - 30 mg/dL 18 19 24   Cr 0.52 - 1.04 mg/dL 1.16(H) 1.16(H) 1.17(H)   Glucose 70 - 99 mg/dL 102(H) 101(H) 89   Ca  8.5 - 10.1 mg/dL 9.9 9.0 9.3   Mg 1.6 - 2.3 mg/dL 1.7 - -     Bone Health Latest Ref Rng & Units 3/14/2022 2/21/2022 2/7/2022   Phos 2.5 - 4.5 mg/dL 2.6 2.4(L) 2.0(L)   PTHi pg/mL - - -   Vit D Def 20 - 75 ug/L - - -     Heme Latest Ref Rng & Units 3/14/2022 3/10/2022 3/7/2022   WBC 4.0 - 11.0 10e3/uL 7.6 5.1 6.2   Hgb 11.7 - 15.7 g/dL 11.6(L) 10.2(L) 10.3(L)   Plt 150 - 450 10e3/uL 269 288 301   ABSOLUTE " NEUTROPHIL 1.6 - 8.3 10e3/uL - - -   ABSOLUTE LYMPHOCYTES 0.8 - 5.3 10e3/uL - - -   ABSOLUTE MONOCYTES 0.0 - 1.3 10e3/uL - - -   ABSOLUTE EOSINOPHILS 0.0 - 0.7 10e3/uL - - -   ABSOLUTE BASOPHILS 0.0 - 0.2 10e9/L - - -     Liver Latest Ref Rng & Units 11/15/2021 7/21/2021 6/8/2012   AP 40 - 150 U/L 104 94 96   TBili 0.2 - 1.3 mg/dL 0.4 0.4 0.3   ALT 0 - 50 U/L 23 24 13   AST 0 - 45 U/L 16 19 33   Tot Protein 6.8 - 8.8 g/dL 8.4 9.4(H) 7.8   Albumin 3.4 - 5.0 g/dL 4.0 4.7 4.2     Pancreas Latest Ref Rng & Units 3/14/2022 3/10/2022 3/7/2022   A1C 0.0 - 5.6 % - - -   Amylase 30 - 110 U/L 50 56 54   Lipase 73 - 393 U/L 69(L) 85 99     Iron studies Latest Ref Rng & Units 3/3/2022 2/3/2022 12/22/2021   Iron 35 - 180 ug/dL 139 123 20(L)   Iron sat 15 - 46 % 35 37 7(L)   Ferritin 12 - 150 ng/mL 106 185(H) 87     [facility] Txp Virology Latest Ref Rng & Units 1/28/2022 12/13/2021 7/21/2021   CMV QUANT IU/ML Not Detected IU/mL Not Detected Not Detected -   EBV CAPSID ANTIBODY IGG No detectable antibody. - - Positive(A)        Recent Labs   Lab Test 03/07/22  0815 03/10/22  0810 03/14/22  0827   DOSTAC 3/6/2022 3/9/2022 3/13/2022   TACROL 8.2 9.1 9.3     Recent Labs   Lab Test 02/07/22  0915 02/21/22  0822 03/07/22  0815   DOSMPA  --  2/20/2022   8:00 PM 3/6/2022   8:00 PM   MPACID 5.16* 2.23 2.63   MPAG 45.6 32.8 36.5       Again, thank you for allowing me to participate in the care of your patient.      Sincerely,    [facility] Transplant

## 2022-03-17 NOTE — PROGRESS NOTES
Chief Complaint(s) and History of Present Illness(es)     Diabetic Retinopathy Follow Up     Comments: 3 week follow up both eyes              Comments     Vision appearing more blurry over the past week. Vision cleared after   injections and is now blurry again.  No new flashes. Pt still seeing floaters when she turns her head from side   to side.  No redness or dryness.  Pt s/p pancreas transplant. BS: 114 currently per pt.  Lab Results       Component                Value               Date                       A1C                      5.0                 03/17/2022                 A1C                      7.7                 11/15/2021                 A1C                      7.1                 07/21/2021                 A1C                      7.5                 10/16/2012                 A1C                      7.4                 07/05/2012                 A1C                      8.3                 03/22/2012              [facility] DAMON Duran March 17, 2022 3:00 PM                Review of systems for the eyes was negative other than the pertinent positives/negatives listed in the HPI.      Assessment & Plan      Marilyn Ortega is a 36 year old female with the following diagnoses:   1. Macular edema - Both Eyes    2. Venous stasis of retina - Both Eyes    3. Severe nonproliferative diabetic retinopathy of both eyes with macular edema associated with type 1 diabetes mellitus (H)    4. Status post simultaneous kidney and pancreas transplant (H)         S/P Avastin both eyes 2/22/22  Improved vision both eyes.  Was better, now feels its worsening again  Significant improvement in macular edema in both eyes today  Could consider repeat injection both eyes, but would like to check with insurance to ensure treatment prior to 28 days will be covered  Return for bilateral injection tomorrow or next week    Return precautions reviewed            Attending Physician Attestation:  Complete documentation of  historical and exam elements from today's encounter can be found in the full encounter summary report (not reduplicated in this progress note).  I personally obtained the chief complaint(s) and history of present illness.  I confirmed and edited as necessary the review of systems, past medical/surgical history, family history, social history, and examination findings as documented by others; and I examined the patient myself.  I personally reviewed the relevant tests, images, and reports as documented above.  I formulated and edited as necessary the assessment and plan and discussed the findings and management plan with the patient and family. . - Ochoa Barnes MD

## 2022-03-17 NOTE — NURSING NOTE
"Chief Complaint   Patient presents with     RECHECK     S/P Kidney/Pancreas TX 11/15/2021     Vital signs:  Temp: 97.8  F (36.6  C) Temp src: Oral BP: 127/82 Pulse: 84   Resp: 16 SpO2: 99 %     Height: 167.6 cm (5' 5.98\") Weight: 58 kg (127 lb 14.4 oz)  Estimated body mass index is 20.65 kg/m  as calculated from the following:    Height as of this encounter: 1.676 m (5' 5.98\").    Weight as of this encounter: 58 kg (127 lb 14.4 oz).        Radha Yanez, [facility]  3/17/2022 9:28 AM      "

## 2022-03-18 ENCOUNTER — TELEPHONE (OUTPATIENT)
Dept: TRANSPLANT | Facility: CLINIC | Age: 37
End: 2022-03-18
Payer: COMMERCIAL

## 2022-03-18 DIAGNOSIS — K31.84 GASTROPARESIS: Primary | ICD-10-CM

## 2022-03-18 RX ORDER — METOCLOPRAMIDE 10 MG/1
10 TABLET ORAL
Qty: 120 TABLET | Refills: 3 | Status: SHIPPED | OUTPATIENT
Start: 2022-03-18 | End: 2022-04-17

## 2022-03-21 ENCOUNTER — DOCUMENTATION ONLY (OUTPATIENT)
Dept: GASTROENTEROLOGY | Facility: CLINIC | Age: 37
End: 2022-03-21

## 2022-03-21 ENCOUNTER — LAB (OUTPATIENT)
Dept: LAB | Facility: CLINIC | Age: 37
End: 2022-03-21
Attending: INTERNAL MEDICINE
Payer: COMMERCIAL

## 2022-03-21 ENCOUNTER — MEDICAL CORRESPONDENCE (OUTPATIENT)
Dept: HEALTH INFORMATION MANAGEMENT | Facility: CLINIC | Age: 37
End: 2022-03-21

## 2022-03-21 ENCOUNTER — OFFICE VISIT (OUTPATIENT)
Dept: OPHTHALMOLOGY | Facility: CLINIC | Age: 37
End: 2022-03-21
Attending: OPHTHALMOLOGY
Payer: COMMERCIAL

## 2022-03-21 DIAGNOSIS — Z94.83 PANCREAS REPLACED BY TRANSPLANT (H): ICD-10-CM

## 2022-03-21 DIAGNOSIS — Z94.0 KIDNEY REPLACED BY TRANSPLANT: ICD-10-CM

## 2022-03-21 DIAGNOSIS — Z79.899 ENCOUNTER FOR LONG-TERM CURRENT USE OF MEDICATION: ICD-10-CM

## 2022-03-21 DIAGNOSIS — H35.81 MACULAR EDEMA: ICD-10-CM

## 2022-03-21 DIAGNOSIS — E10.3413 SEVERE NONPROLIFERATIVE DIABETIC RETINOPATHY OF BOTH EYES WITH MACULAR EDEMA ASSOCIATED WITH TYPE 1 DIABETES MELLITUS (H): Primary | ICD-10-CM

## 2022-03-21 DIAGNOSIS — Z48.298 AFTERCARE FOLLOWING ORGAN TRANSPLANT: ICD-10-CM

## 2022-03-21 LAB
AMYLASE SERPL-CCNC: 51 U/L (ref 30–110)
ANION GAP SERPL CALCULATED.3IONS-SCNC: 5 MMOL/L (ref 3–14)
BUN SERPL-MCNC: 25 MG/DL (ref 7–30)
CALCIUM SERPL-MCNC: 9.4 MG/DL (ref 8.5–10.1)
CHLORIDE BLD-SCNC: 109 MMOL/L (ref 94–109)
CO2 SERPL-SCNC: 24 MMOL/L (ref 20–32)
CREAT SERPL-MCNC: 1.35 MG/DL (ref 0.52–1.04)
ERYTHROCYTE [DISTWIDTH] IN BLOOD BY AUTOMATED COUNT: 13.8 % (ref 10–15)
GFR SERPL CREATININE-BSD FRML MDRD: 52 ML/MIN/1.73M2
GLUCOSE BLD-MCNC: 99 MG/DL (ref 70–99)
HCT VFR BLD AUTO: 33.8 % (ref 35–47)
HGB BLD-MCNC: 10.6 G/DL (ref 11.7–15.7)
LIPASE SERPL-CCNC: 71 U/L (ref 73–393)
MAGNESIUM SERPL-MCNC: 1.8 MG/DL (ref 1.6–2.3)
MCH RBC QN AUTO: 29.9 PG (ref 26.5–33)
MCHC RBC AUTO-ENTMCNC: 31.4 G/DL (ref 31.5–36.5)
MCV RBC AUTO: 96 FL (ref 78–100)
MYCOPHENOLATE SERPL LC/MS/MS-MCNC: 3.21 MG/L (ref 1–3.5)
MYCOPHENOLATE-G SERPL LC/MS/MS-MCNC: 49.2 MG/L (ref 30–95)
PHOSPHATE SERPL-MCNC: 2.5 MG/DL (ref 2.5–4.5)
PLATELET # BLD AUTO: 243 10E3/UL (ref 150–450)
POTASSIUM BLD-SCNC: 4.5 MMOL/L (ref 3.4–5.3)
PTH-INTACT SERPL-MCNC: 54 PG/ML
RBC # BLD AUTO: 3.54 10E6/UL (ref 3.8–5.2)
SODIUM SERPL-SCNC: 138 MMOL/L (ref 133–144)
TACROLIMUS BLD-MCNC: 9.6 UG/L (ref 5–15)
TME LAST DOSE: NORMAL H
WBC # BLD AUTO: 5.7 10E3/UL (ref 4–11)

## 2022-03-21 PROCEDURE — 83690 ASSAY OF LIPASE: CPT

## 2022-03-21 PROCEDURE — 85014 HEMATOCRIT: CPT

## 2022-03-21 PROCEDURE — 67028 INJECTION EYE DRUG: CPT | Mod: 50 | Performed by: STUDENT IN AN ORGANIZED HEALTH CARE EDUCATION/TRAINING PROGRAM

## 2022-03-21 PROCEDURE — 84100 ASSAY OF PHOSPHORUS: CPT

## 2022-03-21 PROCEDURE — 82310 ASSAY OF CALCIUM: CPT

## 2022-03-21 PROCEDURE — 36415 COLL VENOUS BLD VENIPUNCTURE: CPT

## 2022-03-21 PROCEDURE — 250N000011 HC RX IP 250 OP 636: Performed by: STUDENT IN AN ORGANIZED HEALTH CARE EDUCATION/TRAINING PROGRAM

## 2022-03-21 PROCEDURE — 80180 DRUG SCRN QUAN MYCOPHENOLATE: CPT

## 2022-03-21 PROCEDURE — 86833 HLA CLASS II HIGH DEFIN QUAL: CPT

## 2022-03-21 PROCEDURE — 83970 ASSAY OF PARATHORMONE: CPT

## 2022-03-21 PROCEDURE — 999N000103 HC STATISTIC NO CHARGE FACILITY FEE

## 2022-03-21 PROCEDURE — 80197 ASSAY OF TACROLIMUS: CPT

## 2022-03-21 PROCEDURE — 86832 HLA CLASS I HIGH DEFIN QUAL: CPT

## 2022-03-21 PROCEDURE — 67028 INJECTION EYE DRUG: CPT | Mod: RT | Performed by: STUDENT IN AN ORGANIZED HEALTH CARE EDUCATION/TRAINING PROGRAM

## 2022-03-21 PROCEDURE — 83735 ASSAY OF MAGNESIUM: CPT

## 2022-03-21 PROCEDURE — 82150 ASSAY OF AMYLASE: CPT

## 2022-03-21 RX ADMIN — Medication 1.25 MG: at 12:02

## 2022-03-21 ASSESSMENT — VISUAL ACUITY
OS_SC: 20/30
OD_SC+: -1
METHOD: SNELLEN - LINEAR
OS_SC+: +1
OD_SC: 20/40

## 2022-03-21 ASSESSMENT — TONOMETRY
OS_IOP_MMHG: 16
IOP_METHOD: ICARE
OD_IOP_MMHG: 15

## 2022-03-21 NOTE — NURSING NOTE
Chief Complaints and History of Present Illnesses   Patient presents with     Follow Up     Macular edema - Both Eyes      Chief Complaint(s) and History of Present Illness(es)     Follow Up     Laterality: both eyes    Course: stable    Associated symptoms: floaters.  Negative for flashes, eye pain and redness    Pain scale: 0/10    Comments: Macular edema - Both Eyes               Comments     Pt is here for injection only appointment   Pt states no change in VA since last visit    Kimberly HUNT 11:08 AM March 21, 2022

## 2022-03-21 NOTE — PROGRESS NOTES
[facility] order was faxed to the [facility] at [phone]. Order scanned into chart. Notified patient that their order was faxed to [facility].    Margareth Teixeira LPN

## 2022-03-21 NOTE — PROGRESS NOTES
Chief Complaint(s) and History of Present Illness(es)     Follow Up     Laterality: both eyes    Course: stable    Associated symptoms: floaters.  Negative for flashes, eye pain and   redness    Pain scale: 0/10    Comments: Macular edema - Both Eyes               Comments     Pt is here for injection only appointment   Pt states no change in VA since last visit    Kimberly Barnes COT 11:08 AM March 21, 2022         Review of systems for the eyes was negative other than the pertinent positives/negatives listed in the HPI.      Assessment & Plan      Marilyn Ortega is a 36 year old female with the following diagnoses:   1. Severe nonproliferative diabetic retinopathy of both eyes with macular edema associated with type 1 diabetes mellitus (H)    2. Macular edema - Both Eyes         S/P Avastin both eyes 2/22/22 with good response by OCT and subjectively.  Patient developed visual significant symptoms again before 4 week interval. OCT on repeat showed DME, improved though still visually significant    Given recurrent need for Anti-vegf treatment and potential for on going treatment and her recent transplant, discussed her case with her nephrologist and it was agreed that treatment with Anti-vegf therapy intra-vitreal was very low risk for systemic complications.     Plan   - repeat avastin both eyes today (4 week interval)    - RTC 4 weeks repeat OCT both eyes with possible avastin both eyes     Return precautions reviewed     Cecilia Mcadams MD  Ophthalmology Resident PGY3  [facility]       ATTESTATION:  I have seen and examined the patient with Dr. Cecilia Mcadams and agree with the findings in this note, as well as the interpretations of the diagnostic tests. I was present for procedures.     Ravinder Castro MD PhD.  Professor & Chair

## 2022-03-22 LAB
DONOR IDENTIFICATION: NORMAL
DSA COMMENTS: NORMAL
DSA PRESENT: NO
DSA TEST METHOD: NORMAL
ORGAN: NORMAL
SA 1 CELL: NORMAL
SA 1 TEST METHOD: NORMAL
SA 2 CELL: NORMAL
SA 2 TEST METHOD: NORMAL
SA1 HI RISK ABY: NORMAL
SA1 MOD RISK ABY: NORMAL
SA2 HI RISK ABY: NORMAL
SA2 MOD RISK ABY: NORMAL
ZZZSA 1  COMMENTS: NORMAL
ZZZSA 2 COMMENTS: NORMAL

## 2022-03-25 ENCOUNTER — TELEPHONE (OUTPATIENT)
Dept: TRANSPLANT | Facility: CLINIC | Age: 37
End: 2022-03-25
Payer: COMMERCIAL

## 2022-03-25 DIAGNOSIS — Z94.0 KIDNEY REPLACED BY TRANSPLANT: Primary | ICD-10-CM

## 2022-03-25 DIAGNOSIS — Z94.83 PANCREAS REPLACED BY TRANSPLANT (H): ICD-10-CM

## 2022-03-25 DIAGNOSIS — D68.51 FACTOR V LEIDEN (H): ICD-10-CM

## 2022-03-25 LAB
UNACCEPTABLE ANTIGENS: NORMAL
UNOS CPRA: 32

## 2022-03-25 NOTE — TELEPHONE ENCOUNTER
ISSUE:  Heavy menstrual bleeding    PLAN:  Decrease eliquis to 2.5mg BID  Talk to OBGYN about oral birth control options    Kamran Corona MD  You 23 hours ago (3:59 PM)     Please decrease eliquis to 2.5mg bid (needs a new Rx) which is a prophylactic dose because we don't actually know what we are treating. I'm ok with that and that may help.     I would speak with her OB GYN regarding if she should change contraception or come off of it but I will say that the estradiol in her birth control typically decreases menstrual bleeding.         OUTCOME:  Patient notified to decrease eliquis 2.5mg BID. Prescription sent to pharmacy. Patient to discuss contraceptives to w OBGYN.

## 2022-03-25 NOTE — TELEPHONE ENCOUNTER
Patient Call: General  Route to LPN    Reason for call: called looking to speak with coordinator, no additional details provided.    Call back needed? Yes    Return Call Needed  Same as documented in contacts section  When to return call?: Greater than one day: Route standard priority

## 2022-03-25 NOTE — TELEPHONE ENCOUNTER
Patient Call: General  Route to LPN    Reason for call: returning missed call from coordinator    Call back needed? Yes    Return Call Needed  Same as documented in contacts section  When to return call?: Greater than one day: Route standard priority

## 2022-03-25 NOTE — TELEPHONE ENCOUNTER
Post Kidney and Pancreas Transplant Team Conference  Date: 3/25/2022  Transplant Coordinator: Yessica Becker     Attendees:  [x]  Dr. Pack [] Brandi Hernandez, RN [] Tamiko Cárdenas LPN     [x]  Dr. Motta [] Lizette Park RN [] Radhika Arnett LPN   []  Dr. Lee [] Yessica Becker RN    [x]  Dr. Corona [] Tasha Cartwright RN [] Bulmaro Weiner, PharmD   [] Dr. El [] Hamida Guevara RN    [] Dr. Chavarria [] Roderick Messer RN    [] Dr. Ocampo [] Lynette De RN [] Nena Wheeler RN   [] Dr. De León [] Naz Glaser RN    []  Dr. Aranda [] Radha Hurt RN    [] Dr. Wright [] Ashely Tse RN    [] Sally Brito NP [] Jovana Lemos RN        Verbal Plan Read Back:   Repeat kidney US for elevated creat    Routed to RN Coordinator   Yessica Becker RN    Patient is out of town for work 3/28-3/30.   Patient scheduled for kidney txp us on 3/31 @ 1230pm. Patient notified of appointment.

## 2022-03-27 ENCOUNTER — LAB (OUTPATIENT)
Dept: LAB | Facility: CLINIC | Age: 37
End: 2022-03-27
Attending: INTERNAL MEDICINE
Payer: COMMERCIAL

## 2022-03-27 DIAGNOSIS — Z79.899 ENCOUNTER FOR LONG-TERM CURRENT USE OF MEDICATION: ICD-10-CM

## 2022-03-27 DIAGNOSIS — Z94.83 PANCREAS REPLACED BY TRANSPLANT (H): ICD-10-CM

## 2022-03-27 DIAGNOSIS — Z48.298 AFTERCARE FOLLOWING ORGAN TRANSPLANT: ICD-10-CM

## 2022-03-27 DIAGNOSIS — Z94.0 KIDNEY REPLACED BY TRANSPLANT: ICD-10-CM

## 2022-03-27 LAB
AMYLASE SERPL-CCNC: 53 U/L (ref 30–110)
ANION GAP SERPL CALCULATED.3IONS-SCNC: 4 MMOL/L (ref 3–14)
BUN SERPL-MCNC: 26 MG/DL (ref 7–30)
CALCIUM SERPL-MCNC: 9.2 MG/DL (ref 8.5–10.1)
CHLORIDE BLD-SCNC: 111 MMOL/L (ref 94–109)
CO2 SERPL-SCNC: 21 MMOL/L (ref 20–32)
CREAT SERPL-MCNC: 1.19 MG/DL (ref 0.52–1.04)
ERYTHROCYTE [DISTWIDTH] IN BLOOD BY AUTOMATED COUNT: 13.3 % (ref 10–15)
GFR SERPL CREATININE-BSD FRML MDRD: 60 ML/MIN/1.73M2
GLUCOSE BLD-MCNC: 99 MG/DL (ref 70–99)
HCT VFR BLD AUTO: 33.8 % (ref 35–47)
HGB BLD-MCNC: 10.3 G/DL (ref 11.7–15.7)
LIPASE SERPL-CCNC: 93 U/L (ref 73–393)
MCH RBC QN AUTO: 29.4 PG (ref 26.5–33)
MCHC RBC AUTO-ENTMCNC: 30.5 G/DL (ref 31.5–36.5)
MCV RBC AUTO: 97 FL (ref 78–100)
PLATELET # BLD AUTO: 254 10E3/UL (ref 150–450)
POTASSIUM BLD-SCNC: 4.6 MMOL/L (ref 3.4–5.3)
RBC # BLD AUTO: 3.5 10E6/UL (ref 3.8–5.2)
SODIUM SERPL-SCNC: 136 MMOL/L (ref 133–144)
TACROLIMUS BLD-MCNC: 6.7 UG/L (ref 5–15)
TME LAST DOSE: NORMAL H
TME LAST DOSE: NORMAL H
WBC # BLD AUTO: 6.3 10E3/UL (ref 4–11)

## 2022-03-27 PROCEDURE — 80197 ASSAY OF TACROLIMUS: CPT

## 2022-03-27 PROCEDURE — 85027 COMPLETE CBC AUTOMATED: CPT

## 2022-03-27 PROCEDURE — 80048 BASIC METABOLIC PNL TOTAL CA: CPT

## 2022-03-27 PROCEDURE — 36415 COLL VENOUS BLD VENIPUNCTURE: CPT

## 2022-03-27 PROCEDURE — 83690 ASSAY OF LIPASE: CPT

## 2022-03-27 PROCEDURE — 82150 ASSAY OF AMYLASE: CPT

## 2022-03-28 DIAGNOSIS — Z94.83 PANCREAS REPLACED BY TRANSPLANT (H): ICD-10-CM

## 2022-03-28 DIAGNOSIS — Z94.83 PANCREAS TRANSPLANTED (H): Primary | ICD-10-CM

## 2022-03-28 DIAGNOSIS — Z94.0 KIDNEY REPLACED BY TRANSPLANT: ICD-10-CM

## 2022-03-28 NOTE — TELEPHONE ENCOUNTER
ISSUE:   Tacrolimus XR level 6.7 on 3/27, goal 8-10, dose 12 mg daily.    PLAN:   Please call patient and confirm this was an accurate 24-hour trough. Verify Tacrolimus XR dose 12 mg daily. Confirm no new medications or illness. Confirm no missed doses. If accurate trough and accurate dose, increase Tacrolimus XR dose to 13 mg daily and repeat labs in 4 days.    Yessica Becker RN BSN  Transplant Care Coordinator      OUTCOME:   Spoke with patient, they confirm accurate trough level and current dose 12 mg daily. Patient confirmed dose change to 13 mg daily and to repeat labs in 4 days. Orders sent to preferred pharmacy for dose change and lab for repeat labs. Patient voiced understanding of plan.

## 2022-03-28 NOTE — TELEPHONE ENCOUNTER
Left message and sent [employer] message to patient regarding:  Tacrolimus XR level 6.7 on 3/27, goal 8-10, dose 12 mg daily.     PLAN:   Please call patient and confirm this was an accurate 24-hour trough. Verify Tacrolimus XR dose 12 mg daily. Confirm no new medications or illness. Confirm no missed doses. If accurate trough and accurate dose, increase Tacrolimus XR dose to 13 mg daily and repeat labs in 4 days.

## 2022-03-29 DIAGNOSIS — Z94.0 KIDNEY REPLACED BY TRANSPLANT: Primary | ICD-10-CM

## 2022-03-29 RX ORDER — TACROLIMUS 1 MG/1
1 TABLET, EXTENDED RELEASE ORAL
Qty: 30 TABLET | Refills: 11 | Status: SHIPPED | OUTPATIENT
Start: 2022-03-29 | End: 2022-04-12

## 2022-03-29 RX ORDER — TACROLIMUS 4 MG/1
12 TABLET, EXTENDED RELEASE ORAL
Qty: 90 TABLET | Refills: 11 | Status: SHIPPED | OUTPATIENT
Start: 2022-03-29 | End: 2022-04-12

## 2022-03-29 RX ORDER — ONDANSETRON 4 MG/1
4 TABLET, FILM COATED ORAL EVERY 8 HOURS PRN
Qty: 30 TABLET | Refills: 0 | Status: SHIPPED | OUTPATIENT
Start: 2022-03-29 | End: 2023-08-01

## 2022-03-31 ENCOUNTER — TELEPHONE (OUTPATIENT)
Dept: TRANSPLANT | Facility: CLINIC | Age: 37
End: 2022-03-31

## 2022-03-31 ENCOUNTER — ANCILLARY PROCEDURE (OUTPATIENT)
Dept: ULTRASOUND IMAGING | Facility: CLINIC | Age: 37
End: 2022-03-31
Attending: INTERNAL MEDICINE
Payer: COMMERCIAL

## 2022-03-31 DIAGNOSIS — Z94.0 KIDNEY REPLACED BY TRANSPLANT: ICD-10-CM

## 2022-03-31 PROCEDURE — 76776 US EXAM K TRANSPL W/DOPPLER: CPT | Performed by: RADIOLOGY

## 2022-03-31 RX ORDER — FAMOTIDINE 20 MG/1
20 TABLET, FILM COATED ORAL 2 TIMES DAILY
COMMUNITY
End: 2023-02-09

## 2022-03-31 NOTE — TELEPHONE ENCOUNTER
Pt stated Yessica was going to call in Rx for Protonix  She has appt here at 1230 today  Wonders if it can be filled and she will pick it up today

## 2022-03-31 NOTE — TELEPHONE ENCOUNTER
Per Dr Corona, recommend that patient take famotidine 20mg BID for acid reflux symptoms, not PPI. Patient notified, she will get this today OTC.

## 2022-04-04 ENCOUNTER — TELEPHONE (OUTPATIENT)
Dept: TRANSPLANT | Facility: CLINIC | Age: 37
End: 2022-04-04

## 2022-04-04 ENCOUNTER — LAB (OUTPATIENT)
Dept: LAB | Facility: CLINIC | Age: 37
End: 2022-04-04
Payer: COMMERCIAL

## 2022-04-04 DIAGNOSIS — Z48.298 AFTERCARE FOLLOWING ORGAN TRANSPLANT: ICD-10-CM

## 2022-04-04 DIAGNOSIS — Z94.0 KIDNEY REPLACED BY TRANSPLANT: ICD-10-CM

## 2022-04-04 DIAGNOSIS — Z94.83 PANCREAS REPLACED BY TRANSPLANT (H): ICD-10-CM

## 2022-04-04 DIAGNOSIS — Z94.83 PANCREAS TRANSPLANTED (H): ICD-10-CM

## 2022-04-04 DIAGNOSIS — Z79.899 ENCOUNTER FOR LONG-TERM CURRENT USE OF MEDICATION: ICD-10-CM

## 2022-04-04 LAB
AMYLASE SERPL-CCNC: 53 U/L (ref 30–110)
ANION GAP SERPL CALCULATED.3IONS-SCNC: 4 MMOL/L (ref 3–14)
BUN SERPL-MCNC: 33 MG/DL (ref 7–30)
CALCIUM SERPL-MCNC: 9.9 MG/DL (ref 8.5–10.1)
CHLORIDE BLD-SCNC: 109 MMOL/L (ref 94–109)
CO2 SERPL-SCNC: 24 MMOL/L (ref 20–32)
CREAT SERPL-MCNC: 1.39 MG/DL (ref 0.52–1.04)
ERYTHROCYTE [DISTWIDTH] IN BLOOD BY AUTOMATED COUNT: 12.1 % (ref 10–15)
GFR SERPL CREATININE-BSD FRML MDRD: 50 ML/MIN/1.73M2
GLUCOSE BLD-MCNC: 93 MG/DL (ref 70–99)
HCT VFR BLD AUTO: 35.1 % (ref 35–47)
HGB BLD-MCNC: 11.1 G/DL (ref 11.7–15.7)
LIPASE SERPL-CCNC: 110 U/L (ref 73–393)
MCH RBC QN AUTO: 30.4 PG (ref 26.5–33)
MCHC RBC AUTO-ENTMCNC: 31.6 G/DL (ref 31.5–36.5)
MCV RBC AUTO: 96 FL (ref 78–100)
MYCOPHENOLATE SERPL LC/MS/MS-MCNC: 5.98 MG/L (ref 1–3.5)
MYCOPHENOLATE-G SERPL LC/MS/MS-MCNC: 56.9 MG/L (ref 30–95)
PLATELET # BLD AUTO: 292 10E3/UL (ref 150–450)
POTASSIUM BLD-SCNC: 4.6 MMOL/L (ref 3.4–5.3)
RBC # BLD AUTO: 3.65 10E6/UL (ref 3.8–5.2)
SODIUM SERPL-SCNC: 137 MMOL/L (ref 133–144)
TACROLIMUS BLD-MCNC: 9.6 UG/L (ref 5–15)
TME LAST DOSE: ABNORMAL H
TME LAST DOSE: ABNORMAL H
TME LAST DOSE: NORMAL H
TME LAST DOSE: NORMAL H
WBC # BLD AUTO: 4.4 10E3/UL (ref 4–11)

## 2022-04-04 PROCEDURE — 85014 HEMATOCRIT: CPT

## 2022-04-04 PROCEDURE — 80048 BASIC METABOLIC PNL TOTAL CA: CPT

## 2022-04-04 PROCEDURE — 82150 ASSAY OF AMYLASE: CPT

## 2022-04-04 PROCEDURE — 36415 COLL VENOUS BLD VENIPUNCTURE: CPT

## 2022-04-04 PROCEDURE — 80180 DRUG SCRN QUAN MYCOPHENOLATE: CPT

## 2022-04-04 PROCEDURE — 83690 ASSAY OF LIPASE: CPT

## 2022-04-04 PROCEDURE — 80197 ASSAY OF TACROLIMUS: CPT

## 2022-04-05 ENCOUNTER — TELEPHONE (OUTPATIENT)
Dept: TRANSPLANT | Facility: CLINIC | Age: 37
End: 2022-04-05
Payer: COMMERCIAL

## 2022-04-05 NOTE — TELEPHONE ENCOUNTER
Patient confirmed appt, she is scheduled for IV fluids tomorrow afternoon.   Will recheck BMP and lipase after IV infusion, orders entered.

## 2022-04-05 NOTE — TELEPHONE ENCOUNTER
ISSUE:  Increase in creatinine    PLAN:  Assess hydration  N/V/D  Urinary sx's?    OUTCOME:  Patient states she had some diarrhea over the weekend on Friday and Saturday. Resolved as of Monday. Patient to continue to push fluids. Offered IV fluids.     Patient tentatively scheduled tomorrow at 3pm, awaiting patient confirmation.

## 2022-04-06 ENCOUNTER — INFUSION THERAPY VISIT (OUTPATIENT)
Dept: INFUSION THERAPY | Facility: CLINIC | Age: 37
End: 2022-04-06
Attending: INTERNAL MEDICINE
Payer: COMMERCIAL

## 2022-04-06 DIAGNOSIS — Z79.899 ENCOUNTER FOR LONG-TERM CURRENT USE OF MEDICATION: ICD-10-CM

## 2022-04-06 DIAGNOSIS — Z94.0 KIDNEY REPLACED BY TRANSPLANT: ICD-10-CM

## 2022-04-06 DIAGNOSIS — Z48.298 AFTERCARE FOLLOWING ORGAN TRANSPLANT: ICD-10-CM

## 2022-04-06 DIAGNOSIS — N18.31 ANEMIA DUE TO STAGE 3A CHRONIC KIDNEY DISEASE (H): Primary | ICD-10-CM

## 2022-04-06 DIAGNOSIS — Z94.83 PANCREAS REPLACED BY TRANSPLANT (H): ICD-10-CM

## 2022-04-06 DIAGNOSIS — D63.1 ANEMIA DUE TO STAGE 3A CHRONIC KIDNEY DISEASE (H): Primary | ICD-10-CM

## 2022-04-06 LAB
ANION GAP SERPL CALCULATED.3IONS-SCNC: 6 MMOL/L (ref 3–14)
ANION GAP SERPL CALCULATED.3IONS-SCNC: 6 MMOL/L (ref 3–14)
BUN SERPL-MCNC: 31 MG/DL (ref 7–30)
BUN SERPL-MCNC: 33 MG/DL (ref 7–30)
CALCIUM SERPL-MCNC: 8.4 MG/DL (ref 8.5–10.1)
CALCIUM SERPL-MCNC: 8.9 MG/DL (ref 8.5–10.1)
CHLORIDE BLD-SCNC: 106 MMOL/L (ref 94–109)
CHLORIDE BLD-SCNC: 109 MMOL/L (ref 94–109)
CO2 SERPL-SCNC: 26 MMOL/L (ref 20–32)
CO2 SERPL-SCNC: 27 MMOL/L (ref 20–32)
CREAT SERPL-MCNC: 1.32 MG/DL (ref 0.52–1.04)
CREAT SERPL-MCNC: 1.39 MG/DL (ref 0.52–1.04)
ERYTHROCYTE [DISTWIDTH] IN BLOOD BY AUTOMATED COUNT: 12 % (ref 10–15)
GFR SERPL CREATININE-BSD FRML MDRD: 50 ML/MIN/1.73M2
GFR SERPL CREATININE-BSD FRML MDRD: 53 ML/MIN/1.73M2
GLUCOSE BLD-MCNC: 76 MG/DL (ref 70–99)
GLUCOSE BLD-MCNC: 77 MG/DL (ref 70–99)
HCT VFR BLD AUTO: 31.4 % (ref 35–47)
HGB BLD-MCNC: 10 G/DL (ref 11.7–15.7)
LIPASE SERPL-CCNC: 116 U/L (ref 73–393)
MCH RBC QN AUTO: 29.9 PG (ref 26.5–33)
MCHC RBC AUTO-ENTMCNC: 31.8 G/DL (ref 31.5–36.5)
MCV RBC AUTO: 94 FL (ref 78–100)
PLATELET # BLD AUTO: 264 10E3/UL (ref 150–450)
POTASSIUM BLD-SCNC: 4.2 MMOL/L (ref 3.4–5.3)
POTASSIUM BLD-SCNC: 4.6 MMOL/L (ref 3.4–5.3)
RBC # BLD AUTO: 3.34 10E6/UL (ref 3.8–5.2)
SODIUM SERPL-SCNC: 139 MMOL/L (ref 133–144)
SODIUM SERPL-SCNC: 141 MMOL/L (ref 133–144)
WBC # BLD AUTO: 5 10E3/UL (ref 4–11)

## 2022-04-06 PROCEDURE — 80048 BASIC METABOLIC PNL TOTAL CA: CPT

## 2022-04-06 PROCEDURE — 85027 COMPLETE CBC AUTOMATED: CPT

## 2022-04-06 PROCEDURE — 36415 COLL VENOUS BLD VENIPUNCTURE: CPT

## 2022-04-06 PROCEDURE — 999N000128 HC STATISTIC PERIPHERAL IV START W/O US GUIDANCE

## 2022-04-06 PROCEDURE — 258N000003 HC RX IP 258 OP 636: Performed by: INTERNAL MEDICINE

## 2022-04-06 PROCEDURE — 83690 ASSAY OF LIPASE: CPT | Performed by: INTERNAL MEDICINE

## 2022-04-06 PROCEDURE — 96360 HYDRATION IV INFUSION INIT: CPT

## 2022-04-06 RX ORDER — METHYLPREDNISOLONE SODIUM SUCCINATE 125 MG/2ML
125 INJECTION, POWDER, LYOPHILIZED, FOR SOLUTION INTRAMUSCULAR; INTRAVENOUS
Status: CANCELLED
Start: 2022-04-06

## 2022-04-06 RX ORDER — ALBUTEROL SULFATE 90 UG/1
1-2 AEROSOL, METERED RESPIRATORY (INHALATION)
Status: CANCELLED
Start: 2022-04-06

## 2022-04-06 RX ORDER — HEPARIN SODIUM,PORCINE 10 UNIT/ML
5 VIAL (ML) INTRAVENOUS
Status: DISCONTINUED | OUTPATIENT
Start: 2022-04-06 | End: 2022-04-06 | Stop reason: HOSPADM

## 2022-04-06 RX ORDER — DIPHENHYDRAMINE HYDROCHLORIDE 50 MG/ML
50 INJECTION INTRAMUSCULAR; INTRAVENOUS
Status: CANCELLED
Start: 2022-04-06

## 2022-04-06 RX ORDER — HEPARIN SODIUM (PORCINE) LOCK FLUSH IV SOLN 100 UNIT/ML 100 UNIT/ML
5 SOLUTION INTRAVENOUS
Status: CANCELLED | OUTPATIENT
Start: 2022-04-06

## 2022-04-06 RX ORDER — HEPARIN SODIUM (PORCINE) LOCK FLUSH IV SOLN 100 UNIT/ML 100 UNIT/ML
5 SOLUTION INTRAVENOUS
Status: DISCONTINUED | OUTPATIENT
Start: 2022-04-06 | End: 2022-04-06 | Stop reason: HOSPADM

## 2022-04-06 RX ORDER — MEPERIDINE HYDROCHLORIDE 25 MG/ML
25 INJECTION INTRAMUSCULAR; INTRAVENOUS; SUBCUTANEOUS EVERY 30 MIN PRN
Status: CANCELLED | OUTPATIENT
Start: 2022-04-06

## 2022-04-06 RX ORDER — HEPARIN SODIUM,PORCINE 10 UNIT/ML
5 VIAL (ML) INTRAVENOUS
Status: CANCELLED | OUTPATIENT
Start: 2022-04-06

## 2022-04-06 RX ORDER — ALBUTEROL SULFATE 0.83 MG/ML
2.5 SOLUTION RESPIRATORY (INHALATION)
Status: CANCELLED | OUTPATIENT
Start: 2022-04-06

## 2022-04-06 RX ORDER — EPINEPHRINE 1 MG/ML
0.3 INJECTION, SOLUTION INTRAMUSCULAR; SUBCUTANEOUS EVERY 5 MIN PRN
Status: CANCELLED | OUTPATIENT
Start: 2022-04-06

## 2022-04-06 RX ORDER — NALOXONE HYDROCHLORIDE 0.4 MG/ML
0.2 INJECTION, SOLUTION INTRAMUSCULAR; INTRAVENOUS; SUBCUTANEOUS
Status: CANCELLED | OUTPATIENT
Start: 2022-04-06

## 2022-04-06 RX ADMIN — SODIUM CHLORIDE 1000 ML: 9 INJECTION, SOLUTION INTRAVENOUS at 15:59

## 2022-04-06 NOTE — LETTER
4/6/2022         RE: Marilyn Ortega  [address]  [facility] [address]        Dear Colleague,    Thank you for referring your patient, Marilyn Ortega, to the [facility]. Please see a copy of my visit note below.    Nursing Note  Marilyn Ortega [facility]:   Chief Complaint   Patient presents with     Infusion     IV fluids     During today's [facility] appointment, orders from Dr. Pack were completed.  Frequency: as needed    Progress note:  Patient identification verified by name and date of birth.  Assessment completed.  Vitals recorded in Doc Flowsheets.  Patient was provided with education regarding medication/procedure and possible side effects.  Patient verbalized understanding.     present during visit today: Not Applicable.    Treatment Conditions: Non-applicable.  Premedications: were not ordered.  Drug Waste Record: No  Infusion length and rate:  999 ml/hr., over 1 hour  Labs: were drawn per orders.   Vascular access: peripheral IV placed today.    Is the next appt scheduled? no  Asymptomatic COVID test completed? no    Post Infusion Assessment:  Patient tolerated infusion without incident.  Site patent and intact, free from redness, edema or discomfort.  No evidence of extravasations.  Access discontinued per protocol.     Discharge Plan:   Follow up plan of care with: ongoing infusions at [facility].  Discharge instructions were reviewed with patient.  Patient/representative verbalized understanding of discharge instructions and all questions answered.  Patient discharged from [facility] in stable condition.    Maxine Carpenter RN    Administrations This Visit     0.9% sodium chloride BOLUS     Admin Date  04/06/2022 Action  New Bag Dose  1,000 mL Route  Intravenous Administered  By  Maxine Carpenter RN              There were no vitals taken for this visit.        Again, thank you for allowing me to participate in the care of your patient.      Sincerely,    [facility]

## 2022-04-06 NOTE — PROGRESS NOTES
Nursing Note  Marilyn Ortega presents today [facility] for:   Chief Complaint   Patient presents with     Infusion     IV fluids     During today's [facility] appointment, orders from Dr. Pack were completed.  Frequency: as needed    Progress note:  Patient identification verified by name and date of birth.  Assessment completed.  Vitals recorded in Doc Flowsheets.  Patient was provided with education regarding medication/procedure and possible side effects.  Patient verbalized understanding.     present during visit today: Not Applicable.    Treatment Conditions: Non-applicable.  Premedications: were not ordered.  Drug Waste Record: No  Infusion length and rate:  999 ml/hr., over 1 hour  Labs: were drawn per orders.   Vascular access: peripheral IV placed today.    Is the next appt scheduled? no  Asymptomatic COVID test completed? no    Post Infusion Assessment:  Patient tolerated infusion without incident.  Site patent and intact, free from redness, edema or discomfort.  No evidence of extravasations.  Access discontinued per protocol.     Discharge Plan:   Follow up plan of care with: ongoing infusions at [facility].  Discharge instructions were reviewed with patient.  Patient/representative verbalized understanding of discharge instructions and all questions answered.  Patient discharged from [facility] in stable condition.    Maxine Carpenter, EDWARD    Administrations This Visit     0.9% sodium chloride BOLUS     Admin Date  04/06/2022 Action  New Bag Dose  1,000 mL Route  Intravenous Administered By  Maxine Carpenter, RN                There were no vitals taken for this visit.

## 2022-04-06 NOTE — PATIENT INSTRUCTIONS
Dear Marilyn Ortega    Thank you for choosing [facility] ([facility]) for your infusion.  The following information is a summary of our appointment as well as important reminders.      We look forward in seeing you on your next appointment here at [facility] ([facility]).  Please don t hesitate to call us at [phone] to reschedule any of your appointments or to speak with one of the [facility] registered nurses.  It was a pleasure taking care of you today.    Sincerely,    [facility]  [address] [place] [address]  [address]  Phone:  [phone]    
HEADACHE/VOMITING

## 2022-04-07 ENCOUNTER — TELEPHONE (OUTPATIENT)
Dept: TRANSPLANT | Facility: CLINIC | Age: 37
End: 2022-04-07
Payer: COMMERCIAL

## 2022-04-11 ENCOUNTER — TELEPHONE (OUTPATIENT)
Dept: TRANSPLANT | Facility: CLINIC | Age: 37
End: 2022-04-11

## 2022-04-11 ENCOUNTER — LAB (OUTPATIENT)
Dept: LAB | Facility: CLINIC | Age: 37
End: 2022-04-11
Payer: COMMERCIAL

## 2022-04-11 DIAGNOSIS — D68.51 FACTOR V LEIDEN (H): ICD-10-CM

## 2022-04-11 DIAGNOSIS — Z94.0 KIDNEY REPLACED BY TRANSPLANT: Primary | ICD-10-CM

## 2022-04-11 DIAGNOSIS — Z94.83 PANCREAS REPLACED BY TRANSPLANT (H): ICD-10-CM

## 2022-04-11 DIAGNOSIS — Z94.0 KIDNEY REPLACED BY TRANSPLANT: ICD-10-CM

## 2022-04-11 DIAGNOSIS — Z79.899 ENCOUNTER FOR LONG-TERM CURRENT USE OF MEDICATION: ICD-10-CM

## 2022-04-11 DIAGNOSIS — Z94.83 PANCREAS TRANSPLANTED (H): ICD-10-CM

## 2022-04-11 DIAGNOSIS — Z48.298 AFTERCARE FOLLOWING ORGAN TRANSPLANT: ICD-10-CM

## 2022-04-11 LAB
AMYLASE SERPL-CCNC: 47 U/L (ref 30–110)
ANION GAP SERPL CALCULATED.3IONS-SCNC: 5 MMOL/L (ref 3–14)
BUN SERPL-MCNC: 25 MG/DL (ref 7–30)
CALCIUM SERPL-MCNC: 10.1 MG/DL (ref 8.5–10.1)
CHLORIDE BLD-SCNC: 106 MMOL/L (ref 94–109)
CO2 SERPL-SCNC: 26 MMOL/L (ref 20–32)
CREAT SERPL-MCNC: 1.55 MG/DL (ref 0.52–1.04)
ERYTHROCYTE [DISTWIDTH] IN BLOOD BY AUTOMATED COUNT: 11.6 % (ref 10–15)
GFR SERPL CREATININE-BSD FRML MDRD: 44 ML/MIN/1.73M2
GLUCOSE BLD-MCNC: 93 MG/DL (ref 70–99)
HCT VFR BLD AUTO: 35.6 % (ref 35–47)
HGB BLD-MCNC: 11.2 G/DL (ref 11.7–15.7)
LIPASE SERPL-CCNC: 100 U/L (ref 73–393)
MAGNESIUM SERPL-MCNC: 1.7 MG/DL (ref 1.6–2.3)
MCH RBC QN AUTO: 29.6 PG (ref 26.5–33)
MCHC RBC AUTO-ENTMCNC: 31.5 G/DL (ref 31.5–36.5)
MCV RBC AUTO: 94 FL (ref 78–100)
PHOSPHATE SERPL-MCNC: 3.8 MG/DL (ref 2.5–4.5)
PLATELET # BLD AUTO: 291 10E3/UL (ref 150–450)
POTASSIUM BLD-SCNC: 4.4 MMOL/L (ref 3.4–5.3)
RBC # BLD AUTO: 3.79 10E6/UL (ref 3.8–5.2)
SODIUM SERPL-SCNC: 137 MMOL/L (ref 133–144)
TACROLIMUS BLD-MCNC: 12.5 UG/L (ref 5–15)
TME LAST DOSE: NORMAL H
TME LAST DOSE: NORMAL H
WBC # BLD AUTO: 3.9 10E3/UL (ref 4–11)

## 2022-04-11 PROCEDURE — 80197 ASSAY OF TACROLIMUS: CPT

## 2022-04-11 PROCEDURE — 83735 ASSAY OF MAGNESIUM: CPT

## 2022-04-11 PROCEDURE — 82150 ASSAY OF AMYLASE: CPT

## 2022-04-11 PROCEDURE — 36415 COLL VENOUS BLD VENIPUNCTURE: CPT

## 2022-04-11 PROCEDURE — 85027 COMPLETE CBC AUTOMATED: CPT

## 2022-04-11 PROCEDURE — 84100 ASSAY OF PHOSPHORUS: CPT

## 2022-04-11 PROCEDURE — 82310 ASSAY OF CALCIUM: CPT

## 2022-04-11 PROCEDURE — 87799 DETECT AGENT NOS DNA QUANT: CPT

## 2022-04-11 PROCEDURE — 83690 ASSAY OF LIPASE: CPT

## 2022-04-11 NOTE — TELEPHONE ENCOUNTER
ISSUE:  Rise in creatinine    PLAN:  Assess hydration  N/V/D?  Urinary sx's?  Weight? Swelling?   Fever? Pain over kidney?     OUTCOME:  Patient states she is drinking 3-4L daily, consistently.   Denies N/V/D  Denies urinary sx's, she hasn't had a UA in several months, ordered today.     Weight is up a few lbs, she states she has some swelling in her legs, seems to get worse throughout the day. She stopped the spironolactone last week, it doesn't seem to help the swelling.     No fever, no pain over kidney.    Patient states she feel like she drinks far more than her urine output during the day. She feels like fluid builds up in her legs instead of peeing it out.     US done 3/31, reviewed in acute meeting and no concerns.     Message sent to Dr Corona.     Kamran Corona MD Rockers, Yessica S, RN  I don't want her to take any diuretics or myra. I would repeat the labs later this week. If possible please get prospera. Can decrease fluid intake a bit to 2L daily.     UPDATE:  Patient notified to continue to hold diuretics. Repeat labs on Thursday, and decrease PO intake to ~2L daily.    See other note about tacro dose change.

## 2022-04-12 ENCOUNTER — TELEPHONE (OUTPATIENT)
Dept: TRANSPLANT | Facility: CLINIC | Age: 37
End: 2022-04-12
Payer: COMMERCIAL

## 2022-04-12 DIAGNOSIS — Z94.0 KIDNEY REPLACED BY TRANSPLANT: ICD-10-CM

## 2022-04-12 DIAGNOSIS — Z94.83 PANCREAS REPLACED BY TRANSPLANT (H): ICD-10-CM

## 2022-04-12 DIAGNOSIS — Z94.83 PANCREAS TRANSPLANTED (H): ICD-10-CM

## 2022-04-12 LAB
ALBUMIN UR-MCNC: NEGATIVE MG/DL
APPEARANCE UR: CLEAR
BACTERIA #/AREA URNS HPF: ABNORMAL /HPF
BILIRUB UR QL STRIP: NEGATIVE
COLOR UR AUTO: ABNORMAL
GLUCOSE UR STRIP-MCNC: NEGATIVE MG/DL
HGB UR QL STRIP: NEGATIVE
KETONES UR STRIP-MCNC: NEGATIVE MG/DL
LEUKOCYTE ESTERASE UR QL STRIP: NEGATIVE
NITRATE UR QL: NEGATIVE
PH UR STRIP: 6 [PH] (ref 5–7)
RBC URINE: 0 /HPF
SP GR UR STRIP: 1.02 (ref 1–1.03)
SQUAMOUS EPITHELIAL: 4 /HPF
UROBILINOGEN UR STRIP-MCNC: NORMAL MG/DL
WBC URINE: 1 /HPF

## 2022-04-12 PROCEDURE — 81001 URINALYSIS AUTO W/SCOPE: CPT

## 2022-04-12 RX ORDER — TACROLIMUS 4 MG/1
12 TABLET, EXTENDED RELEASE ORAL
Qty: 90 TABLET | Refills: 11 | Status: SHIPPED | OUTPATIENT
Start: 2022-04-12 | End: 2022-04-19

## 2022-04-12 RX ORDER — TACROLIMUS 1 MG/1
1 TABLET, EXTENDED RELEASE ORAL
Qty: 30 TABLET | Refills: 11
Start: 2022-04-12 | End: 2022-04-19

## 2022-04-12 NOTE — TELEPHONE ENCOUNTER
ISSUE:   Tacrolimus XR level 12.5 on 4/11, goal 8-10, dose 13 mg daily.    PLAN:   Please call patient and confirm this was an accurate 24-hour trough. Verify Tacrolimus XR dose 13 mg daily. Confirm no new medications or illness. Confirm no missed doses. If accurate trough and accurate dose, decrease Tacrolimus XR dose to 12 mg daily and repeat labs in 2 days.    OUTCOME:   Spoke with patient, they confirm accurate trough level and current dose 13 mg daily. Patient confirmed dose change to 12 mg daily and to repeat labs in 2 days. Orders sent to preferred pharmacy for dose change and lab for repeat labs. Patient voiced understanding of plan.

## 2022-04-13 DIAGNOSIS — Z94.0 KIDNEY REPLACED BY TRANSPLANT: ICD-10-CM

## 2022-04-13 DIAGNOSIS — Z94.83 PANCREAS TRANSPLANTED (H): Primary | ICD-10-CM

## 2022-04-13 LAB
BKV DNA # SPEC NAA+PROBE: <500 COPIES/ML
BKV DNA SPEC NAA+PROBE-LOG#: <2.7 {LOG_COPIES}/ML

## 2022-04-14 ENCOUNTER — TELEPHONE (OUTPATIENT)
Dept: TRANSPLANT | Facility: CLINIC | Age: 37
End: 2022-04-14

## 2022-04-14 ENCOUNTER — LAB (OUTPATIENT)
Dept: LAB | Facility: CLINIC | Age: 37
End: 2022-04-14
Payer: COMMERCIAL

## 2022-04-14 DIAGNOSIS — Z94.83 PANCREAS REPLACED BY TRANSPLANT (H): Primary | ICD-10-CM

## 2022-04-14 DIAGNOSIS — Z94.0 KIDNEY REPLACED BY TRANSPLANT: ICD-10-CM

## 2022-04-14 DIAGNOSIS — Z94.83 PANCREAS TRANSPLANTED (H): ICD-10-CM

## 2022-04-14 LAB
AMYLASE SERPL-CCNC: 51 U/L (ref 30–110)
ANION GAP SERPL CALCULATED.3IONS-SCNC: 4 MMOL/L (ref 3–14)
BUN SERPL-MCNC: 44 MG/DL (ref 7–30)
CALCIUM SERPL-MCNC: 9.4 MG/DL (ref 8.5–10.1)
CHLORIDE BLD-SCNC: 110 MMOL/L (ref 94–109)
CO2 SERPL-SCNC: 23 MMOL/L (ref 20–32)
CREAT SERPL-MCNC: 1.39 MG/DL (ref 0.52–1.04)
ERYTHROCYTE [DISTWIDTH] IN BLOOD BY AUTOMATED COUNT: 11.8 % (ref 10–15)
GFR SERPL CREATININE-BSD FRML MDRD: 50 ML/MIN/1.73M2
GLUCOSE BLD-MCNC: 85 MG/DL (ref 70–99)
HCT VFR BLD AUTO: 31.7 % (ref 35–47)
HGB BLD-MCNC: 10.1 G/DL (ref 11.7–15.7)
IGG SERPL-MCNC: 894 MG/DL (ref 610–1616)
LIPASE SERPL-CCNC: 119 U/L (ref 73–393)
MCH RBC QN AUTO: 30.1 PG (ref 26.5–33)
MCHC RBC AUTO-ENTMCNC: 31.9 G/DL (ref 31.5–36.5)
MCV RBC AUTO: 95 FL (ref 78–100)
PLATELET # BLD AUTO: 266 10E3/UL (ref 150–450)
POTASSIUM BLD-SCNC: 4.5 MMOL/L (ref 3.4–5.3)
RBC # BLD AUTO: 3.35 10E6/UL (ref 3.8–5.2)
SODIUM SERPL-SCNC: 137 MMOL/L (ref 133–144)
TACROLIMUS BLD-MCNC: 8.2 UG/L (ref 5–15)
TME LAST DOSE: NORMAL H
TME LAST DOSE: NORMAL H
WBC # BLD AUTO: 5.4 10E3/UL (ref 4–11)

## 2022-04-14 PROCEDURE — 80197 ASSAY OF TACROLIMUS: CPT

## 2022-04-14 PROCEDURE — 82150 ASSAY OF AMYLASE: CPT

## 2022-04-14 PROCEDURE — 80048 BASIC METABOLIC PNL TOTAL CA: CPT

## 2022-04-14 PROCEDURE — 36415 COLL VENOUS BLD VENIPUNCTURE: CPT

## 2022-04-14 PROCEDURE — 85027 COMPLETE CBC AUTOMATED: CPT

## 2022-04-14 PROCEDURE — 83690 ASSAY OF LIPASE: CPT

## 2022-04-14 PROCEDURE — 82784 ASSAY IGA/IGD/IGG/IGM EACH: CPT

## 2022-04-14 NOTE — TELEPHONE ENCOUNTER
ISSUE:  Creatinine=1.39  Lipase trending up    Patient has several concerns.     Her ongoing puffiness around her abdomen, legs. Patient does not tolerate diuretics, they don't seem to help and her creatinine bumps up when uses them.    She has concerns about the amount that she is voiding. She states even when she drinks 3-4L daily, she only pees 3-4 times daily. Encouraged patient to drink 2L daily and try low sodium diet, per Dr Corona's recommendations.     Patient has a lot of concerns about her kidney and pancreas, requested virtual visit in acute clinic tomorrow to discuss concerns.

## 2022-04-14 NOTE — TELEPHONE ENCOUNTER
ISSUE:  BK<500    PLAN:  Check IgG  Recheck BK in 2 weeks    ----- Message from Kamran Corona MD sent at 4/13/2022  4:23 PM CDT -----  Yes I would just recheck in 2 weeks before doing anything other than checking IgG. Thanks  ----- Message -----  From: Yessica Becker RN  Sent: 4/13/2022   4:10 PM CDT  To: Kamran Corona MD    Sent to MD for review. New BK.   IgG added to labs. MPA dose 540mg BID.   OK to recheck in 2 weeks?     OUTCOME:  Patient notified. IGG added to labs for 4/14  Standing orders updated to monitor BK Q2 weeks.

## 2022-04-15 ENCOUNTER — VIRTUAL VISIT (OUTPATIENT)
Dept: NEPHROLOGY | Facility: CLINIC | Age: 37
End: 2022-04-15
Attending: INTERNAL MEDICINE
Payer: COMMERCIAL

## 2022-04-15 DIAGNOSIS — Z94.0 KIDNEY REPLACED BY TRANSPLANT: ICD-10-CM

## 2022-04-15 DIAGNOSIS — I15.1 HTN, KIDNEY TRANSPLANT RELATED: ICD-10-CM

## 2022-04-15 DIAGNOSIS — Z94.83 PANCREAS REPLACED BY TRANSPLANT (H): Primary | ICD-10-CM

## 2022-04-15 DIAGNOSIS — Z48.298 AFTERCARE FOLLOWING ORGAN TRANSPLANT: ICD-10-CM

## 2022-04-15 DIAGNOSIS — K31.84 GASTROPARESIS: ICD-10-CM

## 2022-04-15 DIAGNOSIS — Z94.0 HTN, KIDNEY TRANSPLANT RELATED: ICD-10-CM

## 2022-04-15 DIAGNOSIS — D84.9 IMMUNOSUPPRESSION (H): ICD-10-CM

## 2022-04-15 PROCEDURE — 99215 OFFICE O/P EST HI 40 MIN: CPT | Mod: GT | Performed by: INTERNAL MEDICINE

## 2022-04-15 PROCEDURE — 99417 PROLNG OP E/M EACH 15 MIN: CPT | Performed by: INTERNAL MEDICINE

## 2022-04-15 NOTE — LETTER
Date:April 18, 2022      Patient was self referred, no letter generated. Do not send.        [facility] Information

## 2022-04-15 NOTE — PROGRESS NOTES
Marilyn is a 36 year old who is being evaluated via a billable video visit.      How would you like to obtain your AVS? MyChart  If the video visit is dropped, the invitation should be resent by: Send to e-mail at: [email or web address]  Will anyone else be joining your video visit? No      Video Start Time: 1:00  Video-Visit Details    Type of service:  Video Visit    Video End Time:2:05    Originating Location (pt. Location): Home    Distant Location (provider location):  [facility] [place]     Platform used for Video Visit: CromoUp     TRANSPLANT NEPHROLOGY EARLY POST TRANSPLANT VISIT    Add EBV, CMV, TSH, iron studies given ongoing fatigue  Monitor lipase  US pancreas +doppler    Assessment & Plan   # DDKT (SPK): Stable   - Baseline Creatinine: ~ 1-1.3   - Proteinuria: Not checked post transplant   - Date DSA Last Checked: Nov/2021      Latest DSA: No DSA at time of transplant   - BK Viremia: Not checked post transplant   - Kidney Tx Biopsy: No    # Pancreas Tx (SPK):    - Pancreatic Exocrine Drainage: Enteric drained     - Blood glucose: Euglycemia      On insulin: No   - HbA1c: Last checked at time of transplant      Latest HbA1c: 7.7%   - Pancreatic enzymes: Trend down   - Date DSA Last Checked: Nov/2021  Latest DSA: No DSA at time of transplant   - Pancreas Tx Biopsy: No    # Immunosuppression: Tacrolimus immediate release (goal 8-10), Mycophenolic acid (dose 720 mg every 12 hours) and Prednisone (dose taper)   - Induction with Recent Transplant:  Intermediate Intensity   - Continue with intensive monitoring of immunosuppression for efficacy and toxicity.   - Changes: yes envarsus increased from 4 to 6 mg po every day for sub-therpeutic level, awaiting repeat today    # Infection Prophylaxis: due for BK screen -pending from today  - PJP: Sulfa/TMP (Bactrim)  - CMV: Valganciclovir (Valcyte) completed 3 months    # Blood Pressure: Controlled;  Goal BP: < 140/90   - Volume status:  Euvolemic  EDW ~ 122lbs currently ranges 125-130 lbs   e  # Anemia in Chronic Renal Disease: Hgb: stable     SYLVAIN: No   - Iron studies: previously on iron tabs currently off recheck given fatigue and recent menorrhagia    # Mineral Bone Disorder:   - Secondary renal hyperparathyroidism; PTH level: Normal (18-80 pg/ml)        On treatment: None  - Vitamin D; level: High normal        On supplement: Yes  - Calcium; level: Normal        On supplement: No  - Phosphorus; level: Normal        On supplement: Yes, 250mg bid if next phos>2 could reduce to 250 mg po qd    # Electrolytes:   - Potassium; level: Normal        On supplement: No  - Magnesium; level: Normal        On supplement: Yes, 400mg daily   - Bicarbonate; level: Normal        On supplement: No    # Medical Compliance: Yes    # Fatigue: check CMV EBV TSH iron studies    #  LE edema: intermittent mostly upon standing and worse towards the end of the day, symptoms suggestive of dependent edema and  Not intravascular fluid overload, trial of various forms of diuretics led to BERNIE & discontinuation. Responds to compression stockings. Most recent [facility] normal    # COVID-19 Virus Review: Discussed COVID-19 virus and the potential medical risks.  Reviewed preventative health recommendations, including wearing a mask where appropriate.  Recommended COVID vaccination should be up to date with either an initial vaccination or booster shot when appropriate.  Asked the patient to inform the transplant center if they are exposed or diagnosed with this virus.    # COVID Vaccination Up To Date: Yes 3 doses due for 4th dose    # Transplant History:  Etiology of Kidney Failure: Diabetes mellitus type 1  Tx: SPK  Transplant: 11/15/2021 (Kidney / Pancreas)  Donor Type: Donation after Brain Death Donor Class:   Crossmatch at time of Tx: negative  DSA at time of Tx: No  Significant changes in immunosuppression: None  CMV IgG Ab High Risk Discordance (D+/R-): No  EBV IgG Ab High Risk  Discordance (D+/R-): No  Significant transplant-related complications: None    Transplant Office Phone Number: [phone]    Assessment and plan was discussed with the patient and she voiced her understanding and agreement.      Return visit: 1 month    Reanna Lee MD    Chief Complaint   Ms. Ortega is a 36 year old here for kidney transplant, pancreas transplant, immunosuppression management and new medical concerns.     History of Present Illness      Marilyn is frustrated about ongoing intermittent swelling and generalized fatigue. swelling mostly affects lower legs, ankles, and feet; happens upon standing. none of diuretics helped, and all diuretics led to Parth and discontinuation.she wears compression stocking. Symptoms are associated with some foot pain, last 4-5 days. Her weight  Is up from 122 lbs to 125- 130 at night. She follows low salt diet. BP ~115/80s      IS Envarsus 6 mg every day, /720  COVID vaccine x3 due for 4th dose    Home BP: 110-130s/70s    Problem List   Patient Active Problem List   Diagnosis     Osteopenia     Chronic constipation     Irritable bowel syndrome     Stage 3a chronic kidney disease (H)     HTN, kidney transplant related     Gastroparesis     Heterozygous factor V Leiden mutation (H)     Immunosuppression (H)     Kidney replaced by transplant     Pancreas replaced by transplant (H)     Anemia due to stage 3a chronic kidney disease (H)     Aftercare following organ transplant     Generalized edema       Allergies   Allergies   Allergen Reactions     Chlorhexidine Hives, Itching and Rash     PN: Patient had swelling/rash that was very itchy with chlorprep.     Ceclor [Cefaclor Monohydrate]      Cefaclor Rash       Medications   Current Outpatient Medications   Medication Sig     apixaban ANTICOAGULANT (ELIQUIS) 2.5 MG tablet Take 1 tablet (2.5 mg) by mouth in the morning and 1 tablet (2.5 mg) in the evening.     aspirin (ASA) 325 MG tablet Take 1 tablet (325 mg) by  mouth daily     calcium carbonate-vitamin D (OS-STEPHANIE WITH D) 500-200 MG-UNIT tablet Take 1 tablet by mouth 2 times daily (with meals)     cetirizine (ZYRTEC) 10 MG tablet Take 1 tablet (10 mg) by mouth every other day     cholecalciferol 25 MCG (1000 UT) TABS Take 2,000 Units by mouth every other day      Continuous Blood Gluc Sensor (DEXCOM G6 SENSOR) MISC Use as directed for continuous glucose monitoring.  Change sensor every 10 days.     Continuous Blood Gluc Transmit (DEXCOM G6 TRANSMITTER) MISC Use as directed for continuous glucose monitoring.  Change every 3 months.     docusate sodium (COLACE) 100 MG capsule Take 200 mg by mouth daily before breakfast     famotidine (PEPCID) 20 MG tablet Take 20 mg by mouth 2 times daily     glucose blood VI test strips strip 4 times daily.     metoclopramide (REGLAN) 10 MG tablet Take 1 tablet (10 mg) by mouth 4 times daily (before meals and nightly)     mycophenolic acid (GENERIC EQUIVALENT) 180 MG EC tablet Take 1 tablet (180 mg) by mouth 2 times daily Total dose=540mg BID     mycophenolic acid (GENERIC EQUIVALENT) 360 MG EC tablet Take 1 tablet (360 mg) by mouth 2 times daily Total dose=540mg BID     norgestimate-ethinyl estradiol (ORTHO-CYCLEN) 0.25-35 MG-MCG tablet Take 1 tablet by mouth daily     ondansetron (ZOFRAN) 4 MG tablet Take 1 tablet (4 mg) by mouth every 8 hours as needed for nausea     Prucalopride Succinate (MOTEGRITY) 2 MG TABS Take one tablet by mouth daily     spironolactone (ALDACTONE) 25 MG tablet Take 1 tablet (25 mg) by mouth daily     sulfamethoxazole-trimethoprim (BACTRIM) 400-80 MG tablet Take 1 tablet by mouth daily     tacrolimus (ENVARSUS XR) 0.75 MG 24 hr tablet Take 1 tablet (0.75 mg) by mouth every morning (before breakfast) On hold for dose change.     tacrolimus (ENVARSUS XR) 1 MG 24 hr tablet Take 1 tablet (1 mg) by mouth every morning (before breakfast) HOLD for dose change.     tacrolimus (ENVARSUS XR) 4 MG 24 hr tablet Take 3 tablets  (12 mg) by mouth every morning (before breakfast)     Current Facility-Administered Medications   Medication     bevacizumab (AVASTIN) intravitreal inj 1.25 mg     bevacizumab (AVASTIN) intravitreal inj 1.25 mg     There are no discontinued medications.    Physical Exam   Vital Signs: There were no vitals taken for this visit.    GENERAL APPEARANCE: alert and no distress  HENT: mouth without ulcers or lesions  HENT: ear canals and TM's normal and TM's pearly grey, normal light reflex bilateral  LYMPHATICS: no cervical or supraclavicular nodes  RESP: lungs clear to auscultation - no rales, rhonchi or wheezes  CV: regular rhythm, normal rate, no rub, no murmur  EDEMA: no LE edema bilaterally  ABDOMEN: soft, nondistended, nontender, bowel sounds normal  MS: extremities normal - no gross deformities noted, no evidence of inflammation in joints, no muscle tenderness  SKIN: no rash  NEURO: normal strength and tone, sensory exam grossly normal, mentation intact and speech normal  PSYCH: mentation appears normal and affect normal/bright  TX KIDNEY: normal    Data     Renal Latest Ref Rng & Units 4/14/2022 4/11/2022 4/6/2022   Na 133 - 144 mmol/L 137 137 141   K 3.4 - 5.3 mmol/L 4.5 4.4 4.6   Cl 94 - 109 mmol/L 110(H) 106 109   CO2 20 - 32 mmol/L 23 26 26   BUN 7 - 30 mg/dL 44(H) 25 31(H)   Cr 0.52 - 1.04 mg/dL 1.39(H) 1.55(H) 1.32(H)   Glucose 70 - 99 mg/dL 85 93 77   Ca  8.5 - 10.1 mg/dL 9.4 10.1 8.4(L)   Mg 1.6 - 2.3 mg/dL - 1.7 -     Bone Health Latest Ref Rng & Units 4/11/2022 3/21/2022 3/14/2022   Phos 2.5 - 4.5 mg/dL 3.8 2.5 2.6   PTHi pg/mL - 54 -   Vit D Def 20 - 75 ug/L - - -     Heme Latest Ref Rng & Units 4/14/2022 4/11/2022 4/6/2022   WBC 4.0 - 11.0 10e3/uL 5.4 3.9(L) 5.0   Hgb 11.7 - 15.7 g/dL 10.1(L) 11.2(L) 10.0(L)   Plt 150 - 450 10e3/uL 266 291 264   ABSOLUTE NEUTROPHIL 1.6 - 8.3 10e3/uL - - -   ABSOLUTE LYMPHOCYTES 0.8 - 5.3 10e3/uL - - -   ABSOLUTE MONOCYTES 0.0 - 1.3 10e3/uL - - -   ABSOLUTE EOSINOPHILS  0.0 - 0.7 10e3/uL - - -   ABSOLUTE BASOPHILS 0.0 - 0.2 10e9/L - - -     Liver Latest Ref Rng & Units 3/17/2022 11/15/2021 7/21/2021   AP 40 - 150 U/L - 104 94   TBili 0.2 - 1.3 mg/dL - 0.4 0.4   ALT 0 - 50 U/L - 23 24   AST 0 - 45 U/L - 16 19   Tot Protein 6.8 - 8.8 g/dL - 8.4 9.4(H)   Albumin 3.4 - 5.0 g/dL 3.9 4.0 4.7     Pancreas Latest Ref Rng & Units 4/14/2022 4/11/2022 4/6/2022   A1C 0.0 - 5.6 % - - -   Amylase 30 - 110 U/L 51 47 -   Lipase 73 - 393 U/L 119 100 116     Iron studies Latest Ref Rng & Units 3/3/2022 2/3/2022 12/22/2021   Iron 35 - 180 ug/dL 139 123 20(L)   Iron sat 15 - 46 % 35 37 7(L)   Ferritin 12 - 150 ng/mL 106 185(H) 87     [facility] Txp Virology Latest Ref Rng & Units 1/28/2022 12/13/2021 7/21/2021   CMV QUANT IU/ML Not Detected IU/mL Not Detected Not Detected -   EBV CAPSID ANTIBODY IGG No detectable antibody. - - Positive(A)        Recent Labs   Lab Test 04/04/22  0831 04/11/22  0841 04/14/22  0749   DOSTAC 4/3/2022 4/10/2022 4/13/2022   TACROL 9.6 12.5 8.2     Recent Labs   Lab Test 03/07/22  0815 03/21/22  0819 04/04/22  0831   DOSMPA 3/6/2022   8:00 PM 3/20/2022   8:00 AM 4/3/2022   8:30 PM   MPACID 2.63 3.21 5.98*   MPAG 36.5 49.2 56.9     I spent a total of 110 minutes on the date of the encounter doing chart review, performing a history and physical exam, completing documentation and any further activities as noted above.

## 2022-04-15 NOTE — LETTER
4/15/2022       RE: Marilyn Ortgea  [address]  [place] [address]     Dear Colleague,    Thank you for referring your patient, Marilyn Ortega, to the [facility] [place] [facility]. Please see a copy of my visit note below.    Marilyn is a 36 year old who is being evaluated via a billable video visit.      How would you like to obtain your AVS? MyChart  If the video visit is dropped, the invitation should be resent by: Send to e-mail at: [email or web address]  Will anyone else be joining your video visit? No      Video Start Time: 1:00  Video-Visit Details    Type of service:  Video Visit    Video End Time:2:05    Originating Location (pt. Location): Home    Distant Location (provider location):  [facility] [place]     Platform used for Video Visit: [email or web address]     TRANSPLANT NEPHROLOGY EARLY POST TRANSPLANT VISIT    Add EBV, CMV, TSH, iron studies given ongoing fatigue  Monitor lipase  US pancreas +doppler    Assessment & Plan   # DDKT (SPK): Stable   - Baseline Creatinine: ~ 1-1.3   - Proteinuria: Not checked post transplant   - Date DSA Last Checked: Nov/2021      Latest DSA: No DSA at time of transplant   - BK Viremia: Not checked post transplant   - Kidney Tx Biopsy: No    # Pancreas Tx (SPK):    - Pancreatic Exocrine Drainage: Enteric drained     - Blood glucose: Euglycemia      On insulin: No   - HbA1c: Last checked at time of transplant      Latest HbA1c: 7.7%   - Pancreatic enzymes: Trend down   - Date DSA Last Checked: Nov/2021  Latest DSA: No DSA at time of transplant   - Pancreas Tx Biopsy: No    # Immunosuppression: Tacrolimus immediate release (goal 8-10), Mycophenolic acid (dose 720 mg every 12 hours) and Prednisone (dose taper)   - Induction with Recent Transplant:  Intermediate Intensity   - Continue with intensive monitoring of immunosuppression for efficacy and toxicity.   -  Changes: yes envarsus increased from 4 to 6 mg po every day for sub-therpeutic level, awaiting repeat today    # Infection Prophylaxis: due for BK screen -pending from today  - PJP: Sulfa/TMP (Bactrim)  - CMV: Valganciclovir (Valcyte) completed 3 months    # Blood Pressure: Controlled;  Goal BP: < 140/90   - Volume status: Euvolemic  EDW ~ 122lbs currently ranges 125-130 lbs   e  # Anemia in Chronic Renal Disease: Hgb: stable     SYLVAIN: No   - Iron studies: previously on iron tabs currently off recheck given fatigue and recent menorrhagia    # Mineral Bone Disorder:   - Secondary renal hyperparathyroidism; PTH level: Normal (18-80 pg/ml)        On treatment: None  - Vitamin D; level: High normal        On supplement: Yes  - Calcium; level: Normal        On supplement: No  - Phosphorus; level: Normal        On supplement: Yes, 250mg bid if next phos>2 could reduce to 250 mg po qd    # Electrolytes:   - Potassium; level: Normal        On supplement: No  - Magnesium; level: Normal        On supplement: Yes, 400mg daily   - Bicarbonate; level: Normal        On supplement: No    # Medical Compliance: Yes    # Fatigue: check CMV EBV TSH iron studies    #  LE edema: intermittent mostly upon standing and worse towards the end of the day, symptoms suggestive of dependent edema and  Not intravascular fluid overload, trial of various forms of diuretics led to BERNIE & discontinuation. Responds to compression stockings. Most recent [facility] normal    # COVID-19 Virus Review: Discussed COVID-19 virus and the potential medical risks.  Reviewed preventative health recommendations, including wearing a mask where appropriate.  Recommended COVID vaccination should be up to date with either an initial vaccination or booster shot when appropriate.  Asked the patient to inform the transplant center if they are exposed or diagnosed with this virus.    # COVID Vaccination Up To Date: Yes 3 doses due for 4th dose    # Transplant History:  Etiology  of Kidney Failure: Diabetes mellitus type 1  Tx: SPK  Transplant: 11/15/2021 (Kidney / Pancreas)  Donor Type: Donation after Brain Death Donor Class:   Crossmatch at time of Tx: negative  DSA at time of Tx: No  Significant changes in immunosuppression: None  CMV IgG Ab High Risk Discordance (D+/R-): No  EBV IgG Ab High Risk Discordance (D+/R-): No  Significant transplant-related complications: None    Transplant Office Phone Number: [phone]    Assessment and plan was discussed with the patient and she voiced her understanding and agreement.      Return visit: 1 month    Reanna Lee MD    Chief Complaint   Ms. Ortega is a 36 year old here for kidney transplant, pancreas transplant, immunosuppression management and new medical concerns.     History of Present Illness      Marilyn is frustrated about ongoing intermittent swelling and generalized fatigue. swelling mostly affects lower legs, ankles, and feet; happens upon standing. none of diuretics helped, and all diuretics led to Parth and discontinuation.she wears compression stocking. Symptoms are associated with some foot pain, last 4-5 days. Her weight  Is up from 122 lbs to 125- 130 at night. She follows low salt diet. BP ~115/80s      IS Envarsus 6 mg every day, /720  COVID vaccine x3 due for 4th dose    Home BP: 110-130s/70s    Problem List   Patient Active Problem List   Diagnosis     Osteopenia     Chronic constipation     Irritable bowel syndrome     Stage 3a chronic kidney disease (H)     HTN, kidney transplant related     Gastroparesis     Heterozygous factor V Leiden mutation (H)     Immunosuppression (H)     Kidney replaced by transplant     Pancreas replaced by transplant (H)     Anemia due to stage 3a chronic kidney disease (H)     Aftercare following organ transplant     Generalized edema       Allergies   Allergies   Allergen Reactions     Chlorhexidine Hives, Itching and Rash     PN: Patient had swelling/rash that was very itchy with  chlorprep.     Ceclor [Cefaclor Monohydrate]      Cefaclor Rash       Medications   Current Outpatient Medications   Medication Sig     apixaban ANTICOAGULANT (ELIQUIS) 2.5 MG tablet Take 1 tablet (2.5 mg) by mouth in the morning and 1 tablet (2.5 mg) in the evening.     aspirin (ASA) 325 MG tablet Take 1 tablet (325 mg) by mouth daily     calcium carbonate-vitamin D (OS-STEPHANIE WITH D) 500-200 MG-UNIT tablet Take 1 tablet by mouth 2 times daily (with meals)     cetirizine (ZYRTEC) 10 MG tablet Take 1 tablet (10 mg) by mouth every other day     cholecalciferol 25 MCG (1000 UT) TABS Take 2,000 Units by mouth every other day      Continuous Blood Gluc Sensor (DEXCOM G6 SENSOR) MISC Use as directed for continuous glucose monitoring.  Change sensor every 10 days.     Continuous Blood Gluc Transmit (DEXCOM G6 TRANSMITTER) MISC Use as directed for continuous glucose monitoring.  Change every 3 months.     docusate sodium (COLACE) 100 MG capsule Take 200 mg by mouth daily before breakfast     famotidine (PEPCID) 20 MG tablet Take 20 mg by mouth 2 times daily     glucose blood VI test strips strip 4 times daily.     metoclopramide (REGLAN) 10 MG tablet Take 1 tablet (10 mg) by mouth 4 times daily (before meals and nightly)     mycophenolic acid (GENERIC EQUIVALENT) 180 MG EC tablet Take 1 tablet (180 mg) by mouth 2 times daily Total dose=540mg BID     mycophenolic acid (GENERIC EQUIVALENT) 360 MG EC tablet Take 1 tablet (360 mg) by mouth 2 times daily Total dose=540mg BID     norgestimate-ethinyl estradiol (ORTHO-CYCLEN) 0.25-35 MG-MCG tablet Take 1 tablet by mouth daily     ondansetron (ZOFRAN) 4 MG tablet Take 1 tablet (4 mg) by mouth every 8 hours as needed for nausea     Prucalopride Succinate (MOTEGRITY) 2 MG TABS Take one tablet by mouth daily     spironolactone (ALDACTONE) 25 MG tablet Take 1 tablet (25 mg) by mouth daily     sulfamethoxazole-trimethoprim (BACTRIM) 400-80 MG tablet Take 1 tablet by mouth daily      tacrolimus (ENVARSUS XR) 0.75 MG 24 hr tablet Take 1 tablet (0.75 mg) by mouth every morning (before breakfast) On hold for dose change.     tacrolimus (ENVARSUS XR) 1 MG 24 hr tablet Take 1 tablet (1 mg) by mouth every morning (before breakfast) HOLD for dose change.     tacrolimus (ENVARSUS XR) 4 MG 24 hr tablet Take 3 tablets (12 mg) by mouth every morning (before breakfast)     Current Facility-Administered Medications   Medication     bevacizumab (AVASTIN) intravitreal inj 1.25 mg     bevacizumab (AVASTIN) intravitreal inj 1.25 mg     There are no discontinued medications.    Physical Exam   Vital Signs: There were no vitals taken for this visit.    GENERAL APPEARANCE: alert and no distress  HENT: mouth without ulcers or lesions  HENT: ear canals and TM's normal and TM's pearly grey, normal light reflex bilateral  LYMPHATICS: no cervical or supraclavicular nodes  RESP: lungs clear to auscultation - no rales, rhonchi or wheezes  CV: regular rhythm, normal rate, no rub, no murmur  EDEMA: no LE edema bilaterally  ABDOMEN: soft, nondistended, nontender, bowel sounds normal  MS: extremities normal - no gross deformities noted, no evidence of inflammation in joints, no muscle tenderness  SKIN: no rash  NEURO: normal strength and tone, sensory exam grossly normal, mentation intact and speech normal  PSYCH: mentation appears normal and affect normal/bright  TX KIDNEY: normal    Data     Renal Latest Ref Rng & Units 4/14/2022 4/11/2022 4/6/2022   Na 133 - 144 mmol/L 137 137 141   K 3.4 - 5.3 mmol/L 4.5 4.4 4.6   Cl 94 - 109 mmol/L 110(H) 106 109   CO2 20 - 32 mmol/L 23 26 26   BUN 7 - 30 mg/dL 44(H) 25 31(H)   Cr 0.52 - 1.04 mg/dL 1.39(H) 1.55(H) 1.32(H)   Glucose 70 - 99 mg/dL 85 93 77   Ca  8.5 - 10.1 mg/dL 9.4 10.1 8.4(L)   Mg 1.6 - 2.3 mg/dL - 1.7 -     Bone Health Latest Ref Rng & Units 4/11/2022 3/21/2022 3/14/2022   Phos 2.5 - 4.5 mg/dL 3.8 2.5 2.6   PTHi pg/mL - 54 -   Vit D Def 20 - 75 ug/L - - -     Heme  Latest Ref Rng & Units 4/14/2022 4/11/2022 4/6/2022   WBC 4.0 - 11.0 10e3/uL 5.4 3.9(L) 5.0   Hgb 11.7 - 15.7 g/dL 10.1(L) 11.2(L) 10.0(L)   Plt 150 - 450 10e3/uL 266 291 264   ABSOLUTE NEUTROPHIL 1.6 - 8.3 10e3/uL - - -   ABSOLUTE LYMPHOCYTES 0.8 - 5.3 10e3/uL - - -   ABSOLUTE MONOCYTES 0.0 - 1.3 10e3/uL - - -   ABSOLUTE EOSINOPHILS 0.0 - 0.7 10e3/uL - - -   ABSOLUTE BASOPHILS 0.0 - 0.2 10e9/L - - -     Liver Latest Ref Rng & Units 3/17/2022 11/15/2021 7/21/2021   AP 40 - 150 U/L - 104 94   TBili 0.2 - 1.3 mg/dL - 0.4 0.4   ALT 0 - 50 U/L - 23 24   AST 0 - 45 U/L - 16 19   Tot Protein 6.8 - 8.8 g/dL - 8.4 9.4(H)   Albumin 3.4 - 5.0 g/dL 3.9 4.0 4.7     Pancreas Latest Ref Rng & Units 4/14/2022 4/11/2022 4/6/2022   A1C 0.0 - 5.6 % - - -   Amylase 30 - 110 U/L 51 47 -   Lipase 73 - 393 U/L 119 100 116     Iron studies Latest Ref Rng & Units 3/3/2022 2/3/2022 12/22/2021   Iron 35 - 180 ug/dL 139 123 20(L)   Iron sat 15 - 46 % 35 37 7(L)   Ferritin 12 - 150 ng/mL 106 185(H) 87     [facility] Txp Virology Latest Ref Rng & Units 1/28/2022 12/13/2021 7/21/2021   CMV QUANT IU/ML Not Detected IU/mL Not Detected Not Detected -   EBV CAPSID ANTIBODY IGG No detectable antibody. - - Positive(A)        Recent Labs   Lab Test 04/04/22  0831 04/11/22  0841 04/14/22  0749   DOSTAC 4/3/2022 4/10/2022 4/13/2022   TACROL 9.6 12.5 8.2     Recent Labs   Lab Test 03/07/22  0815 03/21/22  0819 04/04/22  0831   DOSMPA 3/6/2022   8:00 PM 3/20/2022   8:00 AM 4/3/2022   8:30 PM   MPACID 2.63 3.21 5.98*   MPAG 36.5 49.2 56.9     I spent a total of 110 minutes on the date of the encounter doing chart review, performing a history and physical exam, completing documentation and any further activities as noted above.      Again, thank you for allowing me to participate in the care of your patient.      Sincerely,    Reanna Lee MD

## 2022-04-18 ENCOUNTER — LAB (OUTPATIENT)
Dept: LAB | Facility: CLINIC | Age: 37
End: 2022-04-18
Payer: COMMERCIAL

## 2022-04-18 ENCOUNTER — TELEPHONE (OUTPATIENT)
Dept: TRANSPLANT | Facility: CLINIC | Age: 37
End: 2022-04-18

## 2022-04-18 DIAGNOSIS — Z94.83 PANCREAS REPLACED BY TRANSPLANT (H): ICD-10-CM

## 2022-04-18 DIAGNOSIS — Z94.0 KIDNEY TRANSPLANTED: Primary | ICD-10-CM

## 2022-04-18 DIAGNOSIS — Z94.0 KIDNEY REPLACED BY TRANSPLANT: ICD-10-CM

## 2022-04-18 DIAGNOSIS — Z48.298 AFTERCARE FOLLOWING ORGAN TRANSPLANT: ICD-10-CM

## 2022-04-18 DIAGNOSIS — Z79.899 ENCOUNTER FOR LONG-TERM CURRENT USE OF MEDICATION: ICD-10-CM

## 2022-04-18 LAB
AMYLASE SERPL-CCNC: 53 U/L (ref 30–110)
ANION GAP SERPL CALCULATED.3IONS-SCNC: 2 MMOL/L (ref 3–14)
BUN SERPL-MCNC: 34 MG/DL (ref 7–30)
CALCIUM SERPL-MCNC: 9.2 MG/DL (ref 8.5–10.1)
CHLORIDE BLD-SCNC: 109 MMOL/L (ref 94–109)
CMV DNA SPEC NAA+PROBE-ACNC: NOT DETECTED IU/ML
CO2 SERPL-SCNC: 26 MMOL/L (ref 20–32)
CREAT SERPL-MCNC: 1.38 MG/DL (ref 0.52–1.04)
CREAT UR-MCNC: 81 MG/DL
ERYTHROCYTE [DISTWIDTH] IN BLOOD BY AUTOMATED COUNT: 11.7 % (ref 10–15)
FERRITIN SERPL-MCNC: 120 NG/ML (ref 12–150)
GFR SERPL CREATININE-BSD FRML MDRD: 51 ML/MIN/1.73M2
GLUCOSE BLD-MCNC: 107 MG/DL (ref 70–99)
HCT VFR BLD AUTO: 30.3 % (ref 35–47)
HGB BLD-MCNC: 9.7 G/DL (ref 11.7–15.7)
IRON SATN MFR SERPL: 35 % (ref 15–46)
IRON SERPL-MCNC: 106 UG/DL (ref 35–180)
LIPASE SERPL-CCNC: 105 U/L (ref 73–393)
MCH RBC QN AUTO: 29.7 PG (ref 26.5–33)
MCHC RBC AUTO-ENTMCNC: 32 G/DL (ref 31.5–36.5)
MCV RBC AUTO: 93 FL (ref 78–100)
MYCOPHENOLATE SERPL LC/MS/MS-MCNC: 7.4 MG/L (ref 1–3.5)
MYCOPHENOLATE-G SERPL LC/MS/MS-MCNC: 55.6 MG/L (ref 30–95)
PLATELET # BLD AUTO: 290 10E3/UL (ref 150–450)
POTASSIUM BLD-SCNC: 4 MMOL/L (ref 3.4–5.3)
PROT UR-MCNC: 0.07 G/L
PROT/CREAT 24H UR: 0.09 G/G CR (ref 0–0.2)
RBC # BLD AUTO: 3.27 10E6/UL (ref 3.8–5.2)
SODIUM SERPL-SCNC: 137 MMOL/L (ref 133–144)
TACROLIMUS BLD-MCNC: 6 UG/L (ref 5–15)
TIBC SERPL-MCNC: 305 UG/DL (ref 240–430)
TME LAST DOSE: ABNORMAL H
TME LAST DOSE: ABNORMAL H
TME LAST DOSE: NORMAL H
TME LAST DOSE: NORMAL H
TSH SERPL DL<=0.005 MIU/L-ACNC: 2.37 MU/L (ref 0.4–4)
WBC # BLD AUTO: 4.4 10E3/UL (ref 4–11)

## 2022-04-18 PROCEDURE — 85027 COMPLETE CBC AUTOMATED: CPT

## 2022-04-18 PROCEDURE — 80180 DRUG SCRN QUAN MYCOPHENOLATE: CPT

## 2022-04-18 PROCEDURE — 36415 COLL VENOUS BLD VENIPUNCTURE: CPT

## 2022-04-18 PROCEDURE — 82728 ASSAY OF FERRITIN: CPT

## 2022-04-18 PROCEDURE — 83690 ASSAY OF LIPASE: CPT

## 2022-04-18 PROCEDURE — 82150 ASSAY OF AMYLASE: CPT

## 2022-04-18 PROCEDURE — 87799 DETECT AGENT NOS DNA QUANT: CPT | Mod: 59

## 2022-04-18 PROCEDURE — 80197 ASSAY OF TACROLIMUS: CPT

## 2022-04-18 PROCEDURE — 80048 BASIC METABOLIC PNL TOTAL CA: CPT

## 2022-04-18 PROCEDURE — 87799 DETECT AGENT NOS DNA QUANT: CPT

## 2022-04-18 PROCEDURE — 84156 ASSAY OF PROTEIN URINE: CPT

## 2022-04-18 PROCEDURE — 84443 ASSAY THYROID STIM HORMONE: CPT

## 2022-04-18 PROCEDURE — 83550 IRON BINDING TEST: CPT

## 2022-04-18 NOTE — TELEPHONE ENCOUNTER
Spoke to patient who confirms this was an accurate 24-hour trough. Verified Tacrolimus XR dose 12 mg BID. Confirmed that patient had diarrhea the day before her lab drawn and would like to repeat level tomorrow, 4/19/2022.  Patient denies john new medications. Confirmed no missed doses. Patient will repeat labs tomorrow before increasing Tacrolimus XR dose to 13 mg daily.  Lab orders updated.

## 2022-04-18 NOTE — TELEPHONE ENCOUNTER
ISSUE:   Tacrolimus IR level 6.0 on 4/18, goal 8-10, dose 12 mg daily.    PLAN:   Please call patient and confirm this was an accurate 24-hour trough. Verify Tacrolimus XR dose 12 mg BID. Confirm no new medications or illness. Confirm no missed doses. If accurate trough and accurate dose, increase Tacrolimus XR dose to 13 mg daily and repeat labs in 1 weeks

## 2022-04-19 ENCOUNTER — TELEPHONE (OUTPATIENT)
Dept: TRANSPLANT | Facility: CLINIC | Age: 37
End: 2022-04-19

## 2022-04-19 ENCOUNTER — LAB (OUTPATIENT)
Dept: LAB | Facility: CLINIC | Age: 37
End: 2022-04-19
Payer: COMMERCIAL

## 2022-04-19 ENCOUNTER — OFFICE VISIT (OUTPATIENT)
Dept: OPHTHALMOLOGY | Facility: CLINIC | Age: 37
End: 2022-04-19
Attending: OPHTHALMOLOGY
Payer: COMMERCIAL

## 2022-04-19 DIAGNOSIS — Z94.0 KIDNEY TRANSPLANTED: ICD-10-CM

## 2022-04-19 DIAGNOSIS — Z94.83 PANCREAS TRANSPLANTED (H): ICD-10-CM

## 2022-04-19 DIAGNOSIS — E10.3413 SEVERE NONPROLIFERATIVE DIABETIC RETINOPATHY OF BOTH EYES WITH MACULAR EDEMA ASSOCIATED WITH TYPE 1 DIABETES MELLITUS (H): ICD-10-CM

## 2022-04-19 DIAGNOSIS — Z94.83 PANCREAS REPLACED BY TRANSPLANT (H): ICD-10-CM

## 2022-04-19 DIAGNOSIS — H35.81 MACULAR EDEMA: Primary | ICD-10-CM

## 2022-04-19 DIAGNOSIS — Z94.0 KIDNEY REPLACED BY TRANSPLANT: Primary | ICD-10-CM

## 2022-04-19 LAB
AMYLASE SERPL-CCNC: 56 U/L (ref 30–110)
ANION GAP SERPL CALCULATED.3IONS-SCNC: 3 MMOL/L (ref 3–14)
BKV DNA # SPEC NAA+PROBE: <500 COPIES/ML
BKV DNA SPEC NAA+PROBE-LOG#: <2.7 {LOG_COPIES}/ML
BUN SERPL-MCNC: 31 MG/DL (ref 7–30)
CALCIUM SERPL-MCNC: 9.5 MG/DL (ref 8.5–10.1)
CHLORIDE BLD-SCNC: 111 MMOL/L (ref 94–109)
CO2 SERPL-SCNC: 23 MMOL/L (ref 20–32)
CREAT SERPL-MCNC: 1.35 MG/DL (ref 0.52–1.04)
EBV DNA # SPEC NAA+PROBE: NOT DETECTED COPIES/ML
ERYTHROCYTE [DISTWIDTH] IN BLOOD BY AUTOMATED COUNT: 11.7 % (ref 10–15)
GFR SERPL CREATININE-BSD FRML MDRD: 52 ML/MIN/1.73M2
GLUCOSE BLD-MCNC: 89 MG/DL (ref 70–99)
HCT VFR BLD AUTO: 34.4 % (ref 35–47)
HGB BLD-MCNC: 10.6 G/DL (ref 11.7–15.7)
LIPASE SERPL-CCNC: 134 U/L (ref 73–393)
MCH RBC QN AUTO: 29.5 PG (ref 26.5–33)
MCHC RBC AUTO-ENTMCNC: 30.8 G/DL (ref 31.5–36.5)
MCV RBC AUTO: 96 FL (ref 78–100)
PLATELET # BLD AUTO: 300 10E3/UL (ref 150–450)
POTASSIUM BLD-SCNC: 4.2 MMOL/L (ref 3.4–5.3)
RBC # BLD AUTO: 3.59 10E6/UL (ref 3.8–5.2)
SODIUM SERPL-SCNC: 137 MMOL/L (ref 133–144)
TACROLIMUS BLD-MCNC: 7.3 UG/L (ref 5–15)
TME LAST DOSE: NORMAL H
TME LAST DOSE: NORMAL H
WBC # BLD AUTO: 6.6 10E3/UL (ref 4–11)

## 2022-04-19 PROCEDURE — 85014 HEMATOCRIT: CPT

## 2022-04-19 PROCEDURE — 92134 CPTRZ OPH DX IMG PST SGM RTA: CPT | Performed by: OPHTHALMOLOGY

## 2022-04-19 PROCEDURE — 80197 ASSAY OF TACROLIMUS: CPT

## 2022-04-19 PROCEDURE — 80048 BASIC METABOLIC PNL TOTAL CA: CPT

## 2022-04-19 PROCEDURE — 82150 ASSAY OF AMYLASE: CPT

## 2022-04-19 PROCEDURE — 83690 ASSAY OF LIPASE: CPT

## 2022-04-19 PROCEDURE — 67028 INJECTION EYE DRUG: CPT | Mod: LT | Performed by: OPHTHALMOLOGY

## 2022-04-19 PROCEDURE — 250N000011 HC RX IP 250 OP 636: Performed by: STUDENT IN AN ORGANIZED HEALTH CARE EDUCATION/TRAINING PROGRAM

## 2022-04-19 PROCEDURE — G0463 HOSPITAL OUTPT CLINIC VISIT: HCPCS | Mod: 25

## 2022-04-19 PROCEDURE — 67028 INJECTION EYE DRUG: CPT | Mod: RT | Performed by: OPHTHALMOLOGY

## 2022-04-19 PROCEDURE — 36415 COLL VENOUS BLD VENIPUNCTURE: CPT

## 2022-04-19 RX ORDER — TACROLIMUS 4 MG/1
8 TABLET, EXTENDED RELEASE ORAL
Qty: 60 TABLET | Refills: 11 | Status: SHIPPED | OUTPATIENT
Start: 2022-04-19 | End: 2022-05-10

## 2022-04-19 RX ORDER — TACROLIMUS 1 MG/1
3 TABLET, EXTENDED RELEASE ORAL
Qty: 90 TABLET | Refills: 11 | Status: SHIPPED | OUTPATIENT
Start: 2022-04-19 | End: 2022-05-10

## 2022-04-19 RX ORDER — TACROLIMUS 0.75 MG/1
1.5 TABLET, EXTENDED RELEASE ORAL
Qty: 60 TABLET | Refills: 11 | Status: SHIPPED | OUTPATIENT
Start: 2022-04-19 | End: 2022-05-10

## 2022-04-19 RX ADMIN — Medication 1.25 MG: at 10:34

## 2022-04-19 ASSESSMENT — TONOMETRY
IOP_METHOD: TONOPEN
OD_IOP_MMHG: 20
OS_IOP_MMHG: 20

## 2022-04-19 ASSESSMENT — VISUAL ACUITY
METHOD: SNELLEN - LINEAR
OD_SC+: +2
OD_SC: 20/25
OS_SC: 20/25

## 2022-04-19 ASSESSMENT — CONF VISUAL FIELD
OS_NORMAL: 1
METHOD: COUNTING FINGERS

## 2022-04-19 NOTE — PROGRESS NOTES
Chief Complaint(s) and History of Present Illness(es)     Diabetic Retinopathy Follow Up     Laterality: both eyes    Frequency: constantly    Timing: throughout the day    Pain scale: 0/10              Comments     Severe bilateral PDR with DME.  Noted [facility] better since last injection. Hoping to 'stay on top of it and   receive another injection'.  'I'm still able to read pretty good.' 'Still note light sensation, but   much less'  Amisha Robison, COT COT 9:37 AM 04/19/2022                  Review of systems for the eyes was negative other than the pertinent positives/negatives listed in the HPI.      Assessment & Plan      Marilyn Ortega is a 36 year old female with the following diagnoses:   1. Macular edema - Both Eyes    2. Severe nonproliferative diabetic retinopathy of both eyes with macular edema associated with type 1 diabetes mellitus (H)             S/P Avastin both eyes 2/22/22,  3/29/2022  Improving both eyes    RBA to repeat Avastin both eyes  discussed, consent obtained, will proceed today.   Return precautions reviewed     Patient disposition:   Return in about 4 weeks (around 5/17/2022) for VT only, OCT Macula.           Attending Physician Attestation:  Complete documentation of historical and exam elements from today's encounter can be found in the full encounter summary report (not reduplicated in this progress note).  I personally obtained the chief complaint(s) and history of present illness.  I confirmed and edited as necessary the review of systems, past medical/surgical history, family history, social history, and examination findings as documented by others; and I examined the patient myself.  I personally reviewed the relevant tests, images, and reports as documented above.  I formulated and edited as necessary the assessment and plan and discussed the findings and management plan with the patient and family. . - Ochoa Barnes MD

## 2022-04-19 NOTE — TELEPHONE ENCOUNTER
ISSUE:   Tacrolimus XR level 7.3 on 4/19, goal 8-10, dose 12 mg daily.    PLAN:   Please call patient and confirm this was an accurate 24-hour trough. Verify Tacrolimus XR dose 12 mg daily. Confirm no new medications or illness. Confirm no missed doses. If accurate trough and accurate dose, increase Tacrolimus XR dose to 12.5 mg daily and repeat labs in 3 days    OUTCOME:   Spoke with patient, they confirm accurate trough level and current dose 12 mg daily. Patient confirmed dose change to 12.5 mg daily and to repeat labs in 3 days. Orders sent to preferred pharmacy for dose change and lab for repeat labs. Patient voiced understanding of plan.

## 2022-04-19 NOTE — NURSING NOTE
Chief Complaints and History of Present Illnesses   Patient presents with     Diabetic Retinopathy Follow Up     Chief Complaint(s) and History of Present Illness(es)     Diabetic Retinopathy Follow Up     Laterality: both eyes    Frequency: constantly    Timing: throughout the day    Pain scale: 0/10              Comments     Severe bilateral PDR with DME.  Noted [facility] better since last injection. Hoping to 'stay on top of it and receive another injection'.  'I'm still able to read pretty good.' 'Still note light sensation, but much less'  Amisha Robison, COT COT 9:37 AM 04/19/2022

## 2022-04-20 ENCOUNTER — ANCILLARY PROCEDURE (OUTPATIENT)
Dept: ULTRASOUND IMAGING | Facility: CLINIC | Age: 37
End: 2022-04-20
Attending: INTERNAL MEDICINE
Payer: COMMERCIAL

## 2022-04-20 ENCOUNTER — TELEPHONE (OUTPATIENT)
Dept: TRANSPLANT | Facility: CLINIC | Age: 37
End: 2022-04-20

## 2022-04-20 DIAGNOSIS — Z94.0 KIDNEY REPLACED BY TRANSPLANT: ICD-10-CM

## 2022-04-20 DIAGNOSIS — Z94.83 PANCREAS REPLACED BY TRANSPLANT (H): ICD-10-CM

## 2022-04-20 PROCEDURE — 93976 VASCULAR STUDY: CPT | Mod: GC | Performed by: RADIOLOGY

## 2022-04-20 NOTE — TELEPHONE ENCOUNTER
Post Kidney and Pancreas Transplant Team Conference  Date: 4/20/2022  Transplant Coordinator: Yessica Becker, RN     Attendees:  []  Dr. Pack [x] Brandi Hernandez, RN [] Tamiko Cárdenas LPN     []  Dr. Motta [] Lizette Park RN [] Radhika Arnett LPN   [x]  Dr. Lee [x] Yessica Becker RN    []  Dr. Corona [x] Tasha Cartwright RN [] Bulmaro Weiner, PharmD   [x] Dr. El [] Hamida Guevara RN    [] Dr. Chavarria [x] Roderick Messer RN    [] Dr. Ocampo [x] Lynette De RN [x] Nena Wheeler RN   [] Dr. De León [x] Naz Glaser RN    [x]  Dr. Aranda []     [] Dr. Wright [x] Ashely Tse RN    [] Sally Brito, CAITLIN [x] Jovana Lemos RN        Verbal Plan Read Back:   Review pancreas US once available.   No other changes in light of ongoing dependent edema.    Routed to RN Coordinator   Kaity Wheeler,

## 2022-04-22 ENCOUNTER — MYC MEDICAL ADVICE (OUTPATIENT)
Dept: GASTROENTEROLOGY | Facility: CLINIC | Age: 37
End: 2022-04-22

## 2022-04-22 ENCOUNTER — LAB (OUTPATIENT)
Dept: LAB | Facility: CLINIC | Age: 37
End: 2022-04-22
Payer: COMMERCIAL

## 2022-04-22 DIAGNOSIS — K59.09 CHRONIC CONSTIPATION: Primary | ICD-10-CM

## 2022-04-22 DIAGNOSIS — Z94.0 KIDNEY REPLACED BY TRANSPLANT: ICD-10-CM

## 2022-04-22 LAB
AMYLASE SERPL-CCNC: 54 U/L (ref 30–110)
ANION GAP SERPL CALCULATED.3IONS-SCNC: 3 MMOL/L (ref 3–14)
BUN SERPL-MCNC: 41 MG/DL (ref 7–30)
CALCIUM SERPL-MCNC: 9.4 MG/DL (ref 8.5–10.1)
CHLORIDE BLD-SCNC: 111 MMOL/L (ref 94–109)
CO2 SERPL-SCNC: 24 MMOL/L (ref 20–32)
CREAT SERPL-MCNC: 1.3 MG/DL (ref 0.52–1.04)
ERYTHROCYTE [DISTWIDTH] IN BLOOD BY AUTOMATED COUNT: 11.9 % (ref 10–15)
GFR SERPL CREATININE-BSD FRML MDRD: 54 ML/MIN/1.73M2
GLUCOSE BLD-MCNC: 107 MG/DL (ref 70–99)
HCT VFR BLD AUTO: 31.3 % (ref 35–47)
HGB BLD-MCNC: 10.2 G/DL (ref 11.7–15.7)
LIPASE SERPL-CCNC: 127 U/L (ref 73–393)
MCH RBC QN AUTO: 30.1 PG (ref 26.5–33)
MCHC RBC AUTO-ENTMCNC: 32.6 G/DL (ref 31.5–36.5)
MCV RBC AUTO: 92 FL (ref 78–100)
PLATELET # BLD AUTO: 296 10E3/UL (ref 150–450)
POTASSIUM BLD-SCNC: 4.7 MMOL/L (ref 3.4–5.3)
RBC # BLD AUTO: 3.39 10E6/UL (ref 3.8–5.2)
SODIUM SERPL-SCNC: 138 MMOL/L (ref 133–144)
TACROLIMUS BLD-MCNC: 8.7 UG/L (ref 5–15)
TME LAST DOSE: NORMAL H
TME LAST DOSE: NORMAL H
WBC # BLD AUTO: 5.9 10E3/UL (ref 4–11)

## 2022-04-22 PROCEDURE — 83690 ASSAY OF LIPASE: CPT

## 2022-04-22 PROCEDURE — 85027 COMPLETE CBC AUTOMATED: CPT

## 2022-04-22 PROCEDURE — 80048 BASIC METABOLIC PNL TOTAL CA: CPT

## 2022-04-22 PROCEDURE — 36415 COLL VENOUS BLD VENIPUNCTURE: CPT

## 2022-04-22 PROCEDURE — 82150 ASSAY OF AMYLASE: CPT

## 2022-04-22 PROCEDURE — 80197 ASSAY OF TACROLIMUS: CPT

## 2022-04-25 ENCOUNTER — TELEPHONE (OUTPATIENT)
Dept: TRANSPLANT | Facility: CLINIC | Age: 37
End: 2022-04-25

## 2022-04-25 ENCOUNTER — LAB (OUTPATIENT)
Dept: LAB | Facility: CLINIC | Age: 37
End: 2022-04-25
Payer: COMMERCIAL

## 2022-04-25 DIAGNOSIS — Z94.0 KIDNEY REPLACED BY TRANSPLANT: Primary | ICD-10-CM

## 2022-04-25 DIAGNOSIS — Z94.83 PANCREAS REPLACED BY TRANSPLANT (H): ICD-10-CM

## 2022-04-25 DIAGNOSIS — Z94.0 KIDNEY REPLACED BY TRANSPLANT: ICD-10-CM

## 2022-04-25 DIAGNOSIS — Z79.899 ENCOUNTER FOR LONG-TERM CURRENT USE OF MEDICATION: ICD-10-CM

## 2022-04-25 DIAGNOSIS — Z48.298 AFTERCARE FOLLOWING ORGAN TRANSPLANT: ICD-10-CM

## 2022-04-25 LAB
AMYLASE SERPL-CCNC: 51 U/L (ref 30–110)
ANION GAP SERPL CALCULATED.3IONS-SCNC: 7 MMOL/L (ref 3–14)
BUN SERPL-MCNC: 40 MG/DL (ref 7–30)
CALCIUM SERPL-MCNC: 9.3 MG/DL (ref 8.5–10.1)
CHLORIDE BLD-SCNC: 110 MMOL/L (ref 94–109)
CO2 SERPL-SCNC: 24 MMOL/L (ref 20–32)
CREAT SERPL-MCNC: 1.8 MG/DL (ref 0.52–1.04)
ERYTHROCYTE [DISTWIDTH] IN BLOOD BY AUTOMATED COUNT: 12 % (ref 10–15)
GFR SERPL CREATININE-BSD FRML MDRD: 37 ML/MIN/1.73M2
GLUCOSE BLD-MCNC: 96 MG/DL (ref 70–99)
HCT VFR BLD AUTO: 30 % (ref 35–47)
HGB BLD-MCNC: 9.5 G/DL (ref 11.7–15.7)
LIPASE SERPL-CCNC: 102 U/L (ref 73–393)
MCH RBC QN AUTO: 29.6 PG (ref 26.5–33)
MCHC RBC AUTO-ENTMCNC: 31.7 G/DL (ref 31.5–36.5)
MCV RBC AUTO: 94 FL (ref 78–100)
PLATELET # BLD AUTO: 271 10E3/UL (ref 150–450)
POTASSIUM BLD-SCNC: 4.7 MMOL/L (ref 3.4–5.3)
RBC # BLD AUTO: 3.21 10E6/UL (ref 3.8–5.2)
SODIUM SERPL-SCNC: 141 MMOL/L (ref 133–144)
TACROLIMUS BLD-MCNC: 8.7 UG/L (ref 5–15)
TME LAST DOSE: NORMAL H
TME LAST DOSE: NORMAL H
WBC # BLD AUTO: 4.8 10E3/UL (ref 4–11)

## 2022-04-25 PROCEDURE — 80197 ASSAY OF TACROLIMUS: CPT

## 2022-04-25 PROCEDURE — 85027 COMPLETE CBC AUTOMATED: CPT

## 2022-04-25 PROCEDURE — 82150 ASSAY OF AMYLASE: CPT

## 2022-04-25 PROCEDURE — 83690 ASSAY OF LIPASE: CPT

## 2022-04-25 PROCEDURE — 36415 COLL VENOUS BLD VENIPUNCTURE: CPT

## 2022-04-25 PROCEDURE — 80048 BASIC METABOLIC PNL TOTAL CA: CPT

## 2022-04-25 RX ORDER — PRUCALOPRIDE 2 MG/1
1 TABLET, FILM COATED ORAL DAILY
Qty: 90 TABLET | Refills: 0 | Status: SHIPPED | OUTPATIENT
Start: 2022-04-25 | End: 2022-07-27

## 2022-04-25 NOTE — TELEPHONE ENCOUNTER
ISSUE:  Creatinine=1.8  Baseline 1.1-1.3    PLAN:  Assess hydration  N/V/D?  Urinary sx's?   Fever? Pain over kidney?   New meds?   Diuretics?     OUTCOME:  Patient states she is drinking ~2L daily of water, + other fluids  Denies recent diuretic use, denies any new meds  No urinary sx's, negative UA last week  No fever, no pain over kidney, patient feels well  No N/V/D    Message sent to MD to review. Biopsy?     Reanna Cueto MD Rockers, Emily S, RN  Fk therapeutic, since it is a single value higher than baseline would repeat to ensure it's accurate & recheck UA Uc US kidney-if all neg then biopsy     Patient scheduled for US and UA/repeat BMP tomorrow afternoon. Will await repeat labs.

## 2022-04-27 ENCOUNTER — LAB (OUTPATIENT)
Dept: LAB | Facility: CLINIC | Age: 37
End: 2022-04-27
Payer: COMMERCIAL

## 2022-04-27 ENCOUNTER — ANCILLARY PROCEDURE (OUTPATIENT)
Dept: ULTRASOUND IMAGING | Facility: CLINIC | Age: 37
End: 2022-04-27
Attending: INTERNAL MEDICINE
Payer: COMMERCIAL

## 2022-04-27 DIAGNOSIS — Z94.0 KIDNEY REPLACED BY TRANSPLANT: ICD-10-CM

## 2022-04-27 DIAGNOSIS — Z94.83 PANCREAS REPLACED BY TRANSPLANT (H): ICD-10-CM

## 2022-04-27 DIAGNOSIS — Z79.899 ENCOUNTER FOR LONG-TERM CURRENT USE OF MEDICATION: ICD-10-CM

## 2022-04-27 DIAGNOSIS — Z48.298 AFTERCARE FOLLOWING ORGAN TRANSPLANT: ICD-10-CM

## 2022-04-27 LAB
ALBUMIN UR-MCNC: NEGATIVE MG/DL
ANION GAP SERPL CALCULATED.3IONS-SCNC: 5 MMOL/L (ref 3–14)
APPEARANCE UR: CLEAR
BACTERIA #/AREA URNS HPF: ABNORMAL /HPF
BILIRUB UR QL STRIP: NEGATIVE
BUN SERPL-MCNC: 31 MG/DL (ref 7–30)
CALCIUM SERPL-MCNC: 9.4 MG/DL (ref 8.5–10.1)
CHLORIDE BLD-SCNC: 110 MMOL/L (ref 94–109)
CO2 SERPL-SCNC: 25 MMOL/L (ref 20–32)
COLOR UR AUTO: COLORLESS
CREAT SERPL-MCNC: 1.69 MG/DL (ref 0.52–1.04)
FERRITIN SERPL-MCNC: 110 NG/ML (ref 12–150)
GFR SERPL CREATININE-BSD FRML MDRD: 39 ML/MIN/1.73M2
GLUCOSE BLD-MCNC: 97 MG/DL (ref 70–99)
GLUCOSE UR STRIP-MCNC: NEGATIVE MG/DL
HGB UR QL STRIP: ABNORMAL
IRON SATN MFR SERPL: 20 % (ref 15–46)
IRON SERPL-MCNC: 56 UG/DL (ref 35–180)
KETONES UR STRIP-MCNC: NEGATIVE MG/DL
LEUKOCYTE ESTERASE UR QL STRIP: NEGATIVE
LIPASE SERPL-CCNC: 115 U/L (ref 73–393)
NITRATE UR QL: NEGATIVE
PH UR STRIP: 6 [PH] (ref 5–7)
POTASSIUM BLD-SCNC: 4.6 MMOL/L (ref 3.4–5.3)
RBC URINE: <1 /HPF
SODIUM SERPL-SCNC: 140 MMOL/L (ref 133–144)
SP GR UR STRIP: 1 (ref 1–1.03)
SQUAMOUS EPITHELIAL: <1 /HPF
TIBC SERPL-MCNC: 281 UG/DL (ref 240–430)
UROBILINOGEN UR STRIP-MCNC: NORMAL MG/DL
WBC URINE: 0 /HPF

## 2022-04-27 PROCEDURE — 36415 COLL VENOUS BLD VENIPUNCTURE: CPT | Performed by: PATHOLOGY

## 2022-04-27 PROCEDURE — 76776 US EXAM K TRANSPL W/DOPPLER: CPT | Performed by: RADIOLOGY

## 2022-04-27 PROCEDURE — 82728 ASSAY OF FERRITIN: CPT | Performed by: PATHOLOGY

## 2022-04-27 PROCEDURE — 80048 BASIC METABOLIC PNL TOTAL CA: CPT | Performed by: PATHOLOGY

## 2022-04-27 PROCEDURE — 81001 URINALYSIS AUTO W/SCOPE: CPT | Performed by: PATHOLOGY

## 2022-04-27 PROCEDURE — 83550 IRON BINDING TEST: CPT | Performed by: PATHOLOGY

## 2022-04-27 PROCEDURE — 83690 ASSAY OF LIPASE: CPT | Performed by: PATHOLOGY

## 2022-04-27 NOTE — PROGRESS NOTES
[facility] to check if patient was schedule for their workup. Patient is scheduled, on 7/28/2022 at 10:45am.    Margareth Teixeira LPN

## 2022-04-28 ENCOUNTER — TELEPHONE (OUTPATIENT)
Dept: TRANSPLANT | Facility: CLINIC | Age: 37
End: 2022-04-28

## 2022-04-28 DIAGNOSIS — Z94.0 KIDNEY REPLACED BY TRANSPLANT: ICD-10-CM

## 2022-04-28 NOTE — TELEPHONE ENCOUNTER
ISSUE:  Increased creatinine  US WNL    PLAN:  Patient needs kidney biopsy    ----- Message from Reanna Villalta MD sent at 4/28/2022 10:31 AM CDT -----  Schedule kidney bx and send BK/DSA    OUTCOME:  Scheduled for biopsy at IR at 7am, 830am procedure, patient v/u of timing.     Patient instructed NPO after midnight on Sunday, and she needs a ride to procedure.     Per IR instructions, patient to hold eliquis for 4 doses. Patient notified to hold eliquis Saturday and Sunday prior to procedure.     COVID test ordered, patient to schedule tomorrow or Saturday.     Patient v/u of all instructions.

## 2022-04-29 ENCOUNTER — MYC MEDICAL ADVICE (OUTPATIENT)
Dept: INTERVENTIONAL RADIOLOGY/VASCULAR | Facility: CLINIC | Age: 37
End: 2022-04-29

## 2022-04-29 ENCOUNTER — LAB (OUTPATIENT)
Dept: LAB | Facility: CLINIC | Age: 37
End: 2022-04-29
Attending: INTERNAL MEDICINE
Payer: COMMERCIAL

## 2022-04-29 DIAGNOSIS — Z94.0 KIDNEY REPLACED BY TRANSPLANT: ICD-10-CM

## 2022-04-29 PROCEDURE — 99000 SPECIMEN HANDLING OFFICE-LAB: CPT | Performed by: PATHOLOGY

## 2022-04-29 PROCEDURE — U0005 INFEC AGEN DETEC AMPLI PROBE: HCPCS | Mod: 90 | Performed by: PATHOLOGY

## 2022-04-29 PROCEDURE — U0003 INFECTIOUS AGENT DETECTION BY NUCLEIC ACID (DNA OR RNA); SEVERE ACUTE RESPIRATORY SYNDROME CORONAVIRUS 2 (SARS-COV-2) (CORONAVIRUS DISEASE [COVID-19]), AMPLIFIED PROBE TECHNIQUE, MAKING USE OF HIGH THROUGHPUT TECHNOLOGIES AS DESCRIBED BY CMS-2020-01-R: HCPCS | Mod: 90 | Performed by: PATHOLOGY

## 2022-04-30 LAB — SARS-COV-2 RNA RESP QL NAA+PROBE: NEGATIVE

## 2022-05-02 ENCOUNTER — APPOINTMENT (OUTPATIENT)
Dept: MEDSURG UNIT | Facility: CLINIC | Age: 37
End: 2022-05-02
Attending: INTERNAL MEDICINE
Payer: COMMERCIAL

## 2022-05-02 ENCOUNTER — HOSPITAL ENCOUNTER (OUTPATIENT)
Facility: CLINIC | Age: 37
Discharge: HOME | End: 2022-05-02
Attending: INTERNAL MEDICINE | Admitting: PHYSICIAN ASSISTANT
Payer: COMMERCIAL

## 2022-05-02 ENCOUNTER — APPOINTMENT (OUTPATIENT)
Dept: INTERVENTIONAL RADIOLOGY/VASCULAR | Facility: CLINIC | Age: 37
End: 2022-05-02
Attending: INTERNAL MEDICINE
Payer: COMMERCIAL

## 2022-05-02 VITALS
RESPIRATION RATE: 14 BRPM | HEIGHT: 66 IN | TEMPERATURE: 98 F | OXYGEN SATURATION: 98 % | HEART RATE: 81 BPM | WEIGHT: 125 LBS | SYSTOLIC BLOOD PRESSURE: 128 MMHG | BODY MASS INDEX: 20.09 KG/M2 | DIASTOLIC BLOOD PRESSURE: 90 MMHG

## 2022-05-02 LAB
AMYLASE SERPL-CCNC: 46 U/L (ref 30–110)
ANION GAP SERPL CALCULATED.3IONS-SCNC: 7 MMOL/L (ref 3–14)
BUN SERPL-MCNC: 39 MG/DL (ref 7–30)
CALCIUM SERPL-MCNC: 9.2 MG/DL (ref 8.5–10.1)
CHLORIDE BLD-SCNC: 110 MMOL/L (ref 94–109)
CO2 SERPL-SCNC: 24 MMOL/L (ref 20–32)
CREAT SERPL-MCNC: 1.72 MG/DL (ref 0.52–1.04)
ERYTHROCYTE [DISTWIDTH] IN BLOOD BY AUTOMATED COUNT: 12 % (ref 10–15)
GFR SERPL CREATININE-BSD FRML MDRD: 39 ML/MIN/1.73M2
GLUCOSE BLD-MCNC: 86 MG/DL (ref 70–99)
HCT VFR BLD AUTO: 29.6 % (ref 35–47)
HGB BLD-MCNC: 9.5 G/DL (ref 11.7–15.7)
INR PPP: 0.96 (ref 0.85–1.15)
LIPASE SERPL-CCNC: 81 U/L (ref 73–393)
MCH RBC QN AUTO: 30 PG (ref 26.5–33)
MCHC RBC AUTO-ENTMCNC: 32.1 G/DL (ref 31.5–36.5)
MCV RBC AUTO: 93 FL (ref 78–100)
PLATELET # BLD AUTO: 248 10E3/UL (ref 150–450)
POTASSIUM BLD-SCNC: 4.7 MMOL/L (ref 3.4–5.3)
RBC # BLD AUTO: 3.17 10E6/UL (ref 3.8–5.2)
SODIUM SERPL-SCNC: 141 MMOL/L (ref 133–144)
TACROLIMUS BLD-MCNC: 8.9 UG/L (ref 5–15)
TME LAST DOSE: NORMAL H
TME LAST DOSE: NORMAL H
WBC # BLD AUTO: 3.4 10E3/UL (ref 4–11)

## 2022-05-02 PROCEDURE — 50200 RENAL BIOPSY PERQ: CPT | Mod: LT | Performed by: PHYSICIAN ASSISTANT

## 2022-05-02 PROCEDURE — 86832 HLA CLASS I HIGH DEFIN QUAL: CPT | Performed by: INTERNAL MEDICINE

## 2022-05-02 PROCEDURE — 85027 COMPLETE CBC AUTOMATED: CPT | Performed by: INTERNAL MEDICINE

## 2022-05-02 PROCEDURE — 76942 ECHO GUIDE FOR BIOPSY: CPT | Mod: 26 | Performed by: PHYSICIAN ASSISTANT

## 2022-05-02 PROCEDURE — 999N000142 HC STATISTIC PROCEDURE PREP ONLY

## 2022-05-02 PROCEDURE — 88313 SPECIAL STAINS GROUP 2: CPT | Mod: 26 | Performed by: PATHOLOGY

## 2022-05-02 PROCEDURE — 88346 IMFLUOR 1ST 1ANTB STAIN PX: CPT | Mod: 26 | Performed by: PATHOLOGY

## 2022-05-02 PROCEDURE — 88305 TISSUE EXAM BY PATHOLOGIST: CPT | Mod: 26 | Performed by: PATHOLOGY

## 2022-05-02 PROCEDURE — 83690 ASSAY OF LIPASE: CPT | Performed by: INTERNAL MEDICINE

## 2022-05-02 PROCEDURE — 86833 HLA CLASS II HIGH DEFIN QUAL: CPT | Performed by: INTERNAL MEDICINE

## 2022-05-02 PROCEDURE — 88305 TISSUE EXAM BY PATHOLOGIST: CPT | Mod: TC | Performed by: INTERNAL MEDICINE

## 2022-05-02 PROCEDURE — 82150 ASSAY OF AMYLASE: CPT | Performed by: INTERNAL MEDICINE

## 2022-05-02 PROCEDURE — 99152 MOD SED SAME PHYS/QHP 5/>YRS: CPT

## 2022-05-02 PROCEDURE — 80197 ASSAY OF TACROLIMUS: CPT | Performed by: INTERNAL MEDICINE

## 2022-05-02 PROCEDURE — 36415 COLL VENOUS BLD VENIPUNCTURE: CPT | Performed by: INTERNAL MEDICINE

## 2022-05-02 PROCEDURE — 88348 ELECTRON MICROSCOPY DX: CPT | Mod: 26 | Performed by: PATHOLOGY

## 2022-05-02 PROCEDURE — 85610 PROTHROMBIN TIME: CPT | Performed by: NURSE PRACTITIONER

## 2022-05-02 PROCEDURE — 272N000505 IR RENAL BIOPSY LEFT

## 2022-05-02 PROCEDURE — 88350 IMFLUOR EA ADDL 1ANTB STN PX: CPT | Mod: 26 | Performed by: PATHOLOGY

## 2022-05-02 PROCEDURE — 258N000003 HC RX IP 258 OP 636: Performed by: NURSE PRACTITIONER

## 2022-05-02 PROCEDURE — 80048 BASIC METABOLIC PNL TOTAL CA: CPT | Performed by: INTERNAL MEDICINE

## 2022-05-02 PROCEDURE — 999N000133 HC STATISTIC PP CARE STAGE 2

## 2022-05-02 PROCEDURE — 88350 IMFLUOR EA ADDL 1ANTB STN PX: CPT | Mod: TC | Performed by: INTERNAL MEDICINE

## 2022-05-02 PROCEDURE — 250N000011 HC RX IP 250 OP 636: Performed by: PHYSICIAN ASSISTANT

## 2022-05-02 PROCEDURE — 999N000248 HC STATISTIC IV INSERT WITH US BY RN

## 2022-05-02 PROCEDURE — 250N000009 HC RX 250: Performed by: PHYSICIAN ASSISTANT

## 2022-05-02 PROCEDURE — 99152 MOD SED SAME PHYS/QHP 5/>YRS: CPT | Performed by: PHYSICIAN ASSISTANT

## 2022-05-02 RX ORDER — NALOXONE HYDROCHLORIDE 0.4 MG/ML
0.4 INJECTION, SOLUTION INTRAMUSCULAR; INTRAVENOUS; SUBCUTANEOUS
Status: DISCONTINUED | OUTPATIENT
Start: 2022-05-02 | End: 2022-05-02 | Stop reason: HOSPADM

## 2022-05-02 RX ORDER — NALOXONE HYDROCHLORIDE 0.4 MG/ML
0.2 INJECTION, SOLUTION INTRAMUSCULAR; INTRAVENOUS; SUBCUTANEOUS
Status: DISCONTINUED | OUTPATIENT
Start: 2022-05-02 | End: 2022-05-02 | Stop reason: HOSPADM

## 2022-05-02 RX ORDER — FLUMAZENIL 0.1 MG/ML
0.2 INJECTION, SOLUTION INTRAVENOUS
Status: DISCONTINUED | OUTPATIENT
Start: 2022-05-02 | End: 2022-05-02 | Stop reason: HOSPADM

## 2022-05-02 RX ORDER — SODIUM CHLORIDE 9 MG/ML
INJECTION, SOLUTION INTRAVENOUS CONTINUOUS
Status: DISCONTINUED | OUTPATIENT
Start: 2022-05-02 | End: 2022-05-02 | Stop reason: HOSPADM

## 2022-05-02 RX ORDER — FENTANYL CITRATE 50 UG/ML
25-50 INJECTION, SOLUTION INTRAMUSCULAR; INTRAVENOUS EVERY 5 MIN PRN
Status: DISCONTINUED | OUTPATIENT
Start: 2022-05-02 | End: 2022-05-02 | Stop reason: HOSPADM

## 2022-05-02 RX ADMIN — SODIUM CHLORIDE: 9 INJECTION, SOLUTION INTRAVENOUS at 08:06

## 2022-05-02 RX ADMIN — MIDAZOLAM 1 MG: 1 INJECTION INTRAMUSCULAR; INTRAVENOUS at 08:54

## 2022-05-02 RX ADMIN — MIDAZOLAM 1 MG: 1 INJECTION INTRAMUSCULAR; INTRAVENOUS at 09:04

## 2022-05-02 RX ADMIN — LIDOCAINE HYDROCHLORIDE 6 ML: 10 INJECTION, SOLUTION EPIDURAL; INFILTRATION; INTRACAUDAL; PERINEURAL at 09:05

## 2022-05-02 RX ADMIN — FENTANYL CITRATE 50 MCG: 50 INJECTION, SOLUTION INTRAMUSCULAR; INTRAVENOUS at 08:54

## 2022-05-02 RX ADMIN — FENTANYL CITRATE 50 MCG: 50 INJECTION, SOLUTION INTRAMUSCULAR; INTRAVENOUS at 09:04

## 2022-05-02 NOTE — PROGRESS NOTES
Pt arrived for kidney biopsy. VSS on RA, denies pain. Vascular consult for IV access, labs pending. Consent signed. H&P current. Mom (Milagros) available for transportation home.

## 2022-05-02 NOTE — PRE-PROCEDURE
GENERAL PRE-PROCEDURE:   Procedure:  Image Guided LLQ transplant kidney biopsy   Date/Time:  5/2/2022 8:01 AM    Written consent obtained?: Yes    Risks and benefits: Risks, benefits and alternatives were discussed    Consent given by:  Patient  Patient states understanding of procedure being performed: Yes    Patient's understanding of procedure matches consent: Yes    Procedure consent matches procedure scheduled: Yes    Expected level of sedation:  Moderate  Appropriately NPO:  Yes  ASA Class:  3  Mallampati  :  Grade 1- soft palate, uvula, tonsillar pillars, and posterior pharyngeal wall visible  Lungs:  Lungs clear with good breath sounds bilaterally  Heart:  Normal heart sounds and rate  History & Physical reviewed:  History and physical reviewed and no updates needed  Statement of review:  I have reviewed the lab findings, diagnostic data, medications, and the plan for sedation    Has held apixaban for 6 doses.

## 2022-05-02 NOTE — PROGRESS NOTES
Patient Name: Marilyn Ortega  Medical Record Number: [id number]  Today's Date: 5/2/2022    Procedure: Image guided renal transplant biopsy  Proceduralist: Marnie Shrestha PA-C  Pathology present: Renal pathology and Renal Fellow and Staff on call unavailable.    Procedure Start: 0855  Procedure end: 0910  Sedation medications administered: Midazolam 2 mg, Fentanyl 100 mcg       Report given to: Tessie RAINES RN  : N/a    Other Notes: Pt arrived to IR room 6 from . Consent reviewed. Pt denies any questions or concerns regarding procedure. Pt positioned barger[pine and monitored per protocol. 4 cores obtained and sent to pathology.  Biopsy needle track closed with torpedo gelfoam.  Hemastatis achieved.  Pt tolerated procedure without any noted complications. Pt transferred back to .

## 2022-05-02 NOTE — PROCEDURES
[facility]    Procedure: IR Procedure Note    Date/Time: 5/2/2022 9:13 AM  Performed by: Marnie Shrestha PA-C  Authorized by: Marnie Shrestha PA-C       UNIVERSAL PROTOCOL   Site Marked: NA  Prior Images Obtained and Reviewed:  Yes  Required items: Required blood products, implants, devices and special equipment available    Patient identity confirmed:  Verbally with patient, arm band, provided demographic data and hospital-assigned identification number  Patient was reevaluated immediately before administering moderate or deep sedation or anesthesia  Confirmation Checklist:  Patient's identity using two indicators, relevant allergies, procedure was appropriate and matched the consent or emergent situation and correct equipment/implants were available  Time out: Immediately prior to the procedure a time out was called    Universal Protocol: the Joint Commission Universal Protocol was followed    Preparation: Patient was prepped and draped in usual sterile fashion       ANESTHESIA    Anesthesia: Local infiltration  Local Anesthetic:  Lidocaine 1% without epinephrine      SEDATION  Patient Sedated: Yes    Vital signs: Vital signs monitored during sedation    See dictated procedure note for full details.  Findings: Procedure Start: 0855  Procedure end: 0910  Sedation medications administered: Midazolam 2 mg, Fentanyl 100 mcg    Specimens: core needle biopsy specimens sent for pathological analysis    Complications: None    Condition: Stable    Plan: Transport to  for recovery      PROCEDURE  Describe Procedure: Completed ultrasound-guided LLQ renal transplant biopsy. four passes yielded four cores sent to pathology in preservative. Pathology called.   Gelfoam in tract.    See dictated note under imaging for more detailed information.      Patient Tolerance:  Patient tolerated the procedure well with no immediate complications  Length of time physician/provider present for 1:1  monitoring during sedation: 15

## 2022-05-02 NOTE — PROGRESS NOTES
Pt arrived from kidney biopsy. Q site C/D/I, negative for a hematoma. VS on RA, denies pain. Tolerating PO intake. Strict bedrest for one hour, then up with bathroom privileges for one hour.

## 2022-05-02 NOTE — DISCHARGE SUMMARY
Pt discharged to home. VSS on RA. LLQ abdomen site C/D/I, negative for a hematoma. Up ambulating and voiding w/o difficulty. Urine clear with no signs of blood. PIV access removed. Tolerating PO intake. Discharge instructions reviewed and all questions answered. Pt left independently to the front entrance where her mom is picking her up.

## 2022-05-02 NOTE — DISCHARGE INSTRUCTIONS
[facility]    Interventional Radiology  Patient Instructions Following Kidney Biopsy    AFTER YOU GO HOME  If you were given sedation DO NOT drive or operate machinery at home or at work for at least 24 hours  DO relax and take it easy for 48 hours, no strenuous activity for 24 hours  DO drink plenty of fluids  DO resume your regular diet, unless otherwise instructed by your Primary Physician  Keep the dressing dry and in place for 24 hours.  DO NOT SMOKE FOR AT LEAST 24 HOURS, if you have been given any medications that were to help you relax or sedate you during your procedure  DO NOT drink alcoholic beverages the day of your procedure  DO NOT do any strenuous exercise or lifting (> 10 lbs) for at least 7 days following your procedure  DO NOT take a bath or shower for at least 12 hours following your procedure  Remove dressing after shower the next day. Replace with Band aid for 2 days.  Never leave a wet dressing in place.  DO NOT make any important or legal decisions for 24 hours following your procedure  There should be minimum drainage from the biopsy site    CALL THE PHYSICIAN IF:  You start bleeding from the procedure site.  If you do start to bleed from that site, lie down flat and hold pressure on the site for a minimum of 10 minutes.  Your physician will tell you if you need to return to the hospital  You develop nausea or vomiting  You have excessive swelling, redness, or tenderness at the site  You have drainage that looks like it is infected.  You experience severe pain  You develop hives or a rash or unexplained itching  You develop shortness of breath  You develop a temperature of 101 degrees F or greater  You develop bloody clots or red urine after you are discharged    ADDITIONAL INSTRUCTIONS  If you are taking Coumadin, restart tonight.  Follow up with your Coumadin Clinic or Primary Care MD to have your INR rechecked.    [facility] INTERVENTIONAL RADIOLOGY DEPARTMENT  Procedure  Physician:                                      Date of procedure: May 2, 2022  Telephone Numbers: [phone] Monday-Friday 8:00 am to 4:30 pm  [phone] After 4:30 pm Monday-Friday, Weekends & Holidays.   Ask for the Interventional Radiologist on call.  Someone is on call 24 hrs/day  [facility] toll free number: [phone] Monday-Friday 8:00 am to 4:30 pm  [facility] Emergency Dept: [phone]

## 2022-05-03 ENCOUNTER — TELEPHONE (OUTPATIENT)
Dept: TRANSPLANT | Facility: CLINIC | Age: 37
End: 2022-05-03

## 2022-05-03 ENCOUNTER — MYC MEDICAL ADVICE (OUTPATIENT)
Dept: OTHER | Age: 37
End: 2022-05-03

## 2022-05-03 ENCOUNTER — DOCUMENTATION ONLY (OUTPATIENT)
Dept: NEPHROLOGY | Facility: CLINIC | Age: 37
End: 2022-05-03

## 2022-05-03 DIAGNOSIS — Z94.0 KIDNEY REPLACED BY TRANSPLANT: Primary | ICD-10-CM

## 2022-05-03 LAB
DONOR IDENTIFICATION: NORMAL
DSA COMMENTS: NORMAL
DSA PRESENT: NO
DSA TEST METHOD: NORMAL
ORGAN: NORMAL
PATH REPORT.COMMENTS IMP SPEC: NORMAL
PATH REPORT.FINAL DX SPEC: NORMAL
PATH REPORT.GROSS SPEC: NORMAL
PATH REPORT.MICROSCOPIC SPEC OTHER STN: NORMAL
PATH REPORT.RELEVANT HX SPEC: NORMAL
PHOTO IMAGE: NORMAL
SA 1 CELL: NORMAL
SA 1 TEST METHOD: NORMAL
SA 2 CELL: NORMAL
SA 2 TEST METHOD: NORMAL
SA1 HI RISK ABY: NORMAL
SA1 MOD RISK ABY: NORMAL
SA2 HI RISK ABY: NORMAL
SA2 MOD RISK ABY: NORMAL
UNACCEPTABLE ANTIGENS: NORMAL
UNOS CPRA: 32
ZZZSA 1  COMMENTS: NORMAL
ZZZSA 2 COMMENTS: NORMAL

## 2022-05-03 RX ORDER — EPINEPHRINE 1 MG/ML
0.3 INJECTION, SOLUTION, CONCENTRATE INTRAVENOUS EVERY 5 MIN PRN
Status: CANCELLED | OUTPATIENT
Start: 2022-05-03

## 2022-05-03 RX ORDER — DIPHENHYDRAMINE HYDROCHLORIDE 50 MG/ML
50 INJECTION INTRAMUSCULAR; INTRAVENOUS
Status: CANCELLED
Start: 2022-05-03

## 2022-05-03 RX ORDER — ALBUTEROL SULFATE 0.83 MG/ML
2.5 SOLUTION RESPIRATORY (INHALATION)
Status: CANCELLED | OUTPATIENT
Start: 2022-05-03

## 2022-05-03 RX ORDER — METHYLPREDNISOLONE SODIUM SUCCINATE 125 MG/2ML
125 INJECTION, POWDER, LYOPHILIZED, FOR SOLUTION INTRAMUSCULAR; INTRAVENOUS
Status: CANCELLED
Start: 2022-05-03

## 2022-05-03 RX ORDER — NALOXONE HYDROCHLORIDE 0.4 MG/ML
0.2 INJECTION, SOLUTION INTRAMUSCULAR; INTRAVENOUS; SUBCUTANEOUS
Status: CANCELLED | OUTPATIENT
Start: 2022-05-03

## 2022-05-03 RX ORDER — HEPARIN SODIUM,PORCINE 10 UNIT/ML
5 VIAL (ML) INTRAVENOUS
Status: CANCELLED | OUTPATIENT
Start: 2022-05-03

## 2022-05-03 RX ORDER — MEPERIDINE HYDROCHLORIDE 25 MG/ML
25 INJECTION INTRAMUSCULAR; INTRAVENOUS; SUBCUTANEOUS EVERY 30 MIN PRN
Status: CANCELLED | OUTPATIENT
Start: 2022-05-03

## 2022-05-03 RX ORDER — ALBUTEROL SULFATE 90 UG/1
1-2 AEROSOL, METERED RESPIRATORY (INHALATION)
Status: CANCELLED
Start: 2022-05-03

## 2022-05-03 RX ORDER — HEPARIN SODIUM (PORCINE) LOCK FLUSH IV SOLN 100 UNIT/ML 100 UNIT/ML
5 SOLUTION INTRAVENOUS
Status: CANCELLED | OUTPATIENT
Start: 2022-05-03

## 2022-05-03 NOTE — PROGRESS NOTES
After speaking with Dr. Motta who spoke with Dr. 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 Given the increase in Scr we will move forward with solumedrol 500mg IV x3 days and treat as borderline rejection. I tried to call x2 and left a message that I would call her back.    Kamran Corona MD, THUY  Transplant Nephrology  Pager: [phone]

## 2022-05-04 ENCOUNTER — INFUSION THERAPY VISIT (OUTPATIENT)
Dept: INFUSION THERAPY | Facility: CLINIC | Age: 37
End: 2022-05-04
Attending: INTERNAL MEDICINE
Payer: COMMERCIAL

## 2022-05-04 ENCOUNTER — TELEPHONE (OUTPATIENT)
Dept: TRANSPLANT | Facility: CLINIC | Age: 37
End: 2022-05-04

## 2022-05-04 ENCOUNTER — ANCILLARY PROCEDURE (OUTPATIENT)
Dept: ULTRASOUND IMAGING | Facility: CLINIC | Age: 37
End: 2022-05-04
Attending: NURSE PRACTITIONER
Payer: COMMERCIAL

## 2022-05-04 VITALS
RESPIRATION RATE: 16 BRPM | SYSTOLIC BLOOD PRESSURE: 124 MMHG | TEMPERATURE: 97.9 F | DIASTOLIC BLOOD PRESSURE: 82 MMHG | HEART RATE: 89 BPM | OXYGEN SATURATION: 99 %

## 2022-05-04 DIAGNOSIS — Z94.0 KIDNEY REPLACED BY TRANSPLANT: ICD-10-CM

## 2022-05-04 DIAGNOSIS — Z94.0 KIDNEY REPLACED BY TRANSPLANT: Primary | ICD-10-CM

## 2022-05-04 LAB
ALBUMIN SERPL-MCNC: 4.1 G/DL (ref 3.4–5)
ALP SERPL-CCNC: 70 U/L (ref 40–150)
ALT SERPL W P-5'-P-CCNC: 18 U/L (ref 0–50)
ANION GAP SERPL CALCULATED.3IONS-SCNC: 8 MMOL/L (ref 3–14)
AST SERPL W P-5'-P-CCNC: 19 U/L (ref 0–45)
BASOPHILS # BLD AUTO: 0.1 10E3/UL (ref 0–0.2)
BASOPHILS NFR BLD AUTO: 1 %
BILIRUB DIRECT SERPL-MCNC: <0.1 MG/DL (ref 0–0.2)
BILIRUB SERPL-MCNC: 0.2 MG/DL (ref 0.2–1.3)
BUN SERPL-MCNC: 32 MG/DL (ref 7–30)
CALCIUM SERPL-MCNC: 9.3 MG/DL (ref 8.5–10.1)
CHLORIDE BLD-SCNC: 109 MMOL/L (ref 94–109)
CO2 SERPL-SCNC: 24 MMOL/L (ref 20–32)
CREAT SERPL-MCNC: 1.46 MG/DL (ref 0.52–1.04)
EOSINOPHIL # BLD AUTO: 0.1 10E3/UL (ref 0–0.7)
EOSINOPHIL NFR BLD AUTO: 3 %
ERYTHROCYTE [DISTWIDTH] IN BLOOD BY AUTOMATED COUNT: 12.2 % (ref 10–15)
GFR SERPL CREATININE-BSD FRML MDRD: 47 ML/MIN/1.73M2
GLUCOSE BLD-MCNC: 79 MG/DL (ref 70–99)
HCT VFR BLD AUTO: 28.7 % (ref 35–47)
HGB BLD-MCNC: 9.3 G/DL (ref 11.7–15.7)
IMM GRANULOCYTES # BLD: 0 10E3/UL
IMM GRANULOCYTES NFR BLD: 0 %
LYMPHOCYTES # BLD AUTO: 0.8 10E3/UL (ref 0.8–5.3)
LYMPHOCYTES NFR BLD AUTO: 18 %
MCH RBC QN AUTO: 29.9 PG (ref 26.5–33)
MCHC RBC AUTO-ENTMCNC: 32.4 G/DL (ref 31.5–36.5)
MCV RBC AUTO: 92 FL (ref 78–100)
MONOCYTES # BLD AUTO: 0.3 10E3/UL (ref 0–1.3)
MONOCYTES NFR BLD AUTO: 6 %
NEUTROPHILS # BLD AUTO: 3.1 10E3/UL (ref 1.6–8.3)
NEUTROPHILS NFR BLD AUTO: 72 %
NRBC # BLD AUTO: 0 10E3/UL
NRBC BLD AUTO-RTO: 0 /100
PLATELET # BLD AUTO: 243 10E3/UL (ref 150–450)
POTASSIUM BLD-SCNC: 4 MMOL/L (ref 3.4–5.3)
PROT SERPL-MCNC: 7.6 G/DL (ref 6.8–8.8)
RBC # BLD AUTO: 3.11 10E6/UL (ref 3.8–5.2)
SODIUM SERPL-SCNC: 141 MMOL/L (ref 133–144)
WBC # BLD AUTO: 4.3 10E3/UL (ref 4–11)

## 2022-05-04 PROCEDURE — 250N000011 HC RX IP 250 OP 636: Performed by: INTERNAL MEDICINE

## 2022-05-04 PROCEDURE — 76776 US EXAM K TRANSPL W/DOPPLER: CPT | Mod: GC | Performed by: RADIOLOGY

## 2022-05-04 PROCEDURE — 82248 BILIRUBIN DIRECT: CPT | Performed by: INTERNAL MEDICINE

## 2022-05-04 PROCEDURE — 85025 COMPLETE CBC W/AUTO DIFF WBC: CPT | Performed by: INTERNAL MEDICINE

## 2022-05-04 PROCEDURE — 82310 ASSAY OF CALCIUM: CPT | Performed by: INTERNAL MEDICINE

## 2022-05-04 PROCEDURE — 36415 COLL VENOUS BLD VENIPUNCTURE: CPT | Performed by: INTERNAL MEDICINE

## 2022-05-04 PROCEDURE — 258N000003 HC RX IP 258 OP 636: Performed by: INTERNAL MEDICINE

## 2022-05-04 PROCEDURE — 999N000285 HC STATISTIC VASC ACCESS LAB DRAW WITH PIV START

## 2022-05-04 PROCEDURE — 96365 THER/PROPH/DIAG IV INF INIT: CPT

## 2022-05-04 RX ORDER — ALBUTEROL SULFATE 90 UG/1
1-2 AEROSOL, METERED RESPIRATORY (INHALATION)
Status: CANCELLED
Start: 2022-05-04

## 2022-05-04 RX ORDER — EPINEPHRINE 1 MG/ML
0.3 INJECTION, SOLUTION INTRAMUSCULAR; SUBCUTANEOUS EVERY 5 MIN PRN
Status: CANCELLED | OUTPATIENT
Start: 2022-05-05

## 2022-05-04 RX ORDER — HEPARIN SODIUM (PORCINE) LOCK FLUSH IV SOLN 100 UNIT/ML 100 UNIT/ML
5 SOLUTION INTRAVENOUS
Status: CANCELLED | OUTPATIENT
Start: 2022-05-04

## 2022-05-04 RX ORDER — NALOXONE HYDROCHLORIDE 0.4 MG/ML
0.2 INJECTION, SOLUTION INTRAMUSCULAR; INTRAVENOUS; SUBCUTANEOUS
Status: CANCELLED | OUTPATIENT
Start: 2022-05-04

## 2022-05-04 RX ORDER — METHYLPREDNISOLONE SODIUM SUCCINATE 125 MG/2ML
125 INJECTION, POWDER, LYOPHILIZED, FOR SOLUTION INTRAMUSCULAR; INTRAVENOUS
Status: CANCELLED
Start: 2022-05-05

## 2022-05-04 RX ORDER — ALBUTEROL SULFATE 90 UG/1
1-2 AEROSOL, METERED RESPIRATORY (INHALATION)
Status: CANCELLED
Start: 2022-05-05

## 2022-05-04 RX ORDER — MEPERIDINE HYDROCHLORIDE 25 MG/ML
25 INJECTION INTRAMUSCULAR; INTRAVENOUS; SUBCUTANEOUS EVERY 30 MIN PRN
Status: CANCELLED | OUTPATIENT
Start: 2022-05-05

## 2022-05-04 RX ORDER — EPINEPHRINE 1 MG/ML
0.3 INJECTION, SOLUTION INTRAMUSCULAR; SUBCUTANEOUS EVERY 5 MIN PRN
Status: CANCELLED | OUTPATIENT
Start: 2022-05-04

## 2022-05-04 RX ORDER — ALBUTEROL SULFATE 0.83 MG/ML
2.5 SOLUTION RESPIRATORY (INHALATION)
Status: CANCELLED | OUTPATIENT
Start: 2022-05-04

## 2022-05-04 RX ORDER — HEPARIN SODIUM,PORCINE 10 UNIT/ML
5 VIAL (ML) INTRAVENOUS
Status: CANCELLED | OUTPATIENT
Start: 2022-05-05

## 2022-05-04 RX ORDER — HEPARIN SODIUM (PORCINE) LOCK FLUSH IV SOLN 100 UNIT/ML 100 UNIT/ML
5 SOLUTION INTRAVENOUS
Status: CANCELLED | OUTPATIENT
Start: 2022-05-05

## 2022-05-04 RX ORDER — METHYLPREDNISOLONE SODIUM SUCCINATE 125 MG/2ML
125 INJECTION, POWDER, LYOPHILIZED, FOR SOLUTION INTRAMUSCULAR; INTRAVENOUS
Status: CANCELLED
Start: 2022-05-04

## 2022-05-04 RX ORDER — MEPERIDINE HYDROCHLORIDE 25 MG/ML
25 INJECTION INTRAMUSCULAR; INTRAVENOUS; SUBCUTANEOUS EVERY 30 MIN PRN
Status: CANCELLED | OUTPATIENT
Start: 2022-05-04

## 2022-05-04 RX ORDER — NALOXONE HYDROCHLORIDE 0.4 MG/ML
0.2 INJECTION, SOLUTION INTRAMUSCULAR; INTRAVENOUS; SUBCUTANEOUS
Status: CANCELLED | OUTPATIENT
Start: 2022-05-05

## 2022-05-04 RX ORDER — DIPHENHYDRAMINE HYDROCHLORIDE 50 MG/ML
50 INJECTION INTRAMUSCULAR; INTRAVENOUS
Status: CANCELLED
Start: 2022-05-05

## 2022-05-04 RX ORDER — HEPARIN SODIUM,PORCINE 10 UNIT/ML
5 VIAL (ML) INTRAVENOUS
Status: CANCELLED | OUTPATIENT
Start: 2022-05-04

## 2022-05-04 RX ORDER — DIPHENHYDRAMINE HYDROCHLORIDE 50 MG/ML
50 INJECTION INTRAMUSCULAR; INTRAVENOUS
Status: CANCELLED
Start: 2022-05-04

## 2022-05-04 RX ORDER — ALBUTEROL SULFATE 0.83 MG/ML
2.5 SOLUTION RESPIRATORY (INHALATION)
Status: CANCELLED | OUTPATIENT
Start: 2022-05-05

## 2022-05-04 RX ADMIN — SODIUM CHLORIDE 500 MG: 9 INJECTION, SOLUTION INTRAVENOUS at 14:46

## 2022-05-04 ASSESSMENT — PAIN SCALES - GENERAL: PAINLEVEL: MODERATE PAIN (4)

## 2022-05-04 NOTE — PATIENT INSTRUCTIONS
Dear Marilyn Ortega    Thank you for choosing [facility] ([facility]) for your infusion.  The following information is a summary of our appointment as well as important reminders.      We look forward in seeing you on your next appointment here at [facility] ([facility]).  Please don t hesitate to call us at [phone] to reschedule any of your appointments or to speak with one of the [facility] registered nurses.  It was a pleasure taking care of you today.    Sincerely,    [facility]  [facility]  [address] [place] [address]  [address]  Phone:  [phone]

## 2022-05-04 NOTE — TELEPHONE ENCOUNTER
Post Kidney and Pancreas Transplant Team Conference  Date: 5/4/2022  Transplant Coordinator: Yessica Becker   Attendees:  [x]  Dr. Pack [x] Brandi Hernandez, RN [x] Tamiko Cárdenas LPN     []  Dr. Motta [] Lizette Park RN [] Radhika Arnett LPN   []  Dr. Lee [x] Yessica Becker, EDWARD    [x]  Dr. Corona [x] Tasha Cartwright RN [] Bulmaro Weiner, PharmD   [x] Dr. El [x] Hamida Guevara, EDWARD    [x] Dr. Chavarria [x] Roderick Messer RN    [] Dr. Ocampo [x] Lynette De, EDWARD [x] Nena Wheeler RN   [] Dr. De León [] Naz Glaser RN    []  Dr. Aranda [] Radha Hurt RN    [] Dr. Wright [x] Ashely Tse RN    [] Sally Brito NP [x] Jovana Lemos RN        Verbal Plan Read Back:   Continue current treatment plan    Routed to RN Coordinator   Tamiko Cárdenas LPN

## 2022-05-04 NOTE — PROGRESS NOTES
Infusion Nursing Note:  Marilyn GERA Ortega presents today for solu-medrol.    Patient seen by provider today: No   present during visit today: Not Applicable.    Note: dose 1/3.    Intravenous Access:  Labs drawn without difficulty.  Peripheral IV placed by vascular access    Treatment Conditions:  Glucose WNL    Post Infusion Assessment:  Patient tolerated infusion without incident.  Blood return noted pre and post infusion.  Site patent and intact, free from redness, edema or discomfort.  No evidence of extravasations.  Access discontinued per protocol.     Discharge Plan:   Discharge instructions reviewed with: Patient.  Patient and/or family verbalized understanding of discharge instructions and all questions answered.  AVS to patient via ADstruc.  Patient will return tomorrow for next appointment.   Patient discharged in stable condition accompanied by: self.  Departure Mode: Ambulatory.    Administrations This Visit     methylPREDNISolone sodium succinate (solu-MEDROL) 500 mg in sodium chloride 0.9 % 100 mL intermittent infusion     Admin Date  05/04/2022 Action  New Bag Dose  500 mg Rate  228 mL/hr Route  Intravenous Administered By  Carolyn Ramirez, RN                Carolyn Ramirez, EDWARD

## 2022-05-05 ENCOUNTER — INFUSION THERAPY VISIT (OUTPATIENT)
Dept: INFUSION THERAPY | Facility: CLINIC | Age: 37
End: 2022-05-05
Attending: INTERNAL MEDICINE
Payer: COMMERCIAL

## 2022-05-05 ENCOUNTER — TELEPHONE (OUTPATIENT)
Dept: TRANSPLANT | Facility: CLINIC | Age: 37
End: 2022-05-05

## 2022-05-05 VITALS
TEMPERATURE: 98.1 F | RESPIRATION RATE: 15 BRPM | SYSTOLIC BLOOD PRESSURE: 123 MMHG | OXYGEN SATURATION: 100 % | HEART RATE: 94 BPM | DIASTOLIC BLOOD PRESSURE: 81 MMHG

## 2022-05-05 DIAGNOSIS — T38.0X5A STEROID-INDUCED HYPERGLYCEMIA: Primary | ICD-10-CM

## 2022-05-05 DIAGNOSIS — Z94.0 KIDNEY REPLACED BY TRANSPLANT: Primary | ICD-10-CM

## 2022-05-05 DIAGNOSIS — R73.9 STEROID-INDUCED HYPERGLYCEMIA: Primary | ICD-10-CM

## 2022-05-05 LAB — GLUCOSE BLDC GLUCOMTR-MCNC: 96 MG/DL (ref 70–99)

## 2022-05-05 PROCEDURE — 96365 THER/PROPH/DIAG IV INF INIT: CPT

## 2022-05-05 PROCEDURE — 250N000011 HC RX IP 250 OP 636: Performed by: INTERNAL MEDICINE

## 2022-05-05 PROCEDURE — 82962 GLUCOSE BLOOD TEST: CPT

## 2022-05-05 PROCEDURE — 258N000003 HC RX IP 258 OP 636: Performed by: INTERNAL MEDICINE

## 2022-05-05 RX ORDER — NALOXONE HYDROCHLORIDE 0.4 MG/ML
0.2 INJECTION, SOLUTION INTRAMUSCULAR; INTRAVENOUS; SUBCUTANEOUS
Status: CANCELLED | OUTPATIENT
Start: 2022-05-06

## 2022-05-05 RX ORDER — HEPARIN SODIUM,PORCINE 10 UNIT/ML
5 VIAL (ML) INTRAVENOUS
Status: CANCELLED | OUTPATIENT
Start: 2022-05-05

## 2022-05-05 RX ORDER — EPINEPHRINE 1 MG/ML
0.3 INJECTION, SOLUTION INTRAMUSCULAR; SUBCUTANEOUS EVERY 5 MIN PRN
Status: CANCELLED | OUTPATIENT
Start: 2022-05-05

## 2022-05-05 RX ORDER — MEPERIDINE HYDROCHLORIDE 25 MG/ML
25 INJECTION INTRAMUSCULAR; INTRAVENOUS; SUBCUTANEOUS EVERY 30 MIN PRN
Status: CANCELLED | OUTPATIENT
Start: 2022-05-05

## 2022-05-05 RX ORDER — DIPHENHYDRAMINE HYDROCHLORIDE 50 MG/ML
50 INJECTION INTRAMUSCULAR; INTRAVENOUS
Status: CANCELLED
Start: 2022-05-06

## 2022-05-05 RX ORDER — NALOXONE HYDROCHLORIDE 0.4 MG/ML
0.2 INJECTION, SOLUTION INTRAMUSCULAR; INTRAVENOUS; SUBCUTANEOUS
Status: CANCELLED | OUTPATIENT
Start: 2022-05-05

## 2022-05-05 RX ORDER — DIPHENHYDRAMINE HYDROCHLORIDE 50 MG/ML
50 INJECTION INTRAMUSCULAR; INTRAVENOUS
Status: CANCELLED
Start: 2022-05-05

## 2022-05-05 RX ORDER — INSULIN LISPRO 100 [IU]/ML
INJECTION, SOLUTION INTRAVENOUS; SUBCUTANEOUS
Qty: 15 ML | Refills: 0 | Status: SHIPPED | OUTPATIENT
Start: 2022-05-05 | End: 2023-02-09

## 2022-05-05 RX ORDER — ALBUTEROL SULFATE 0.83 MG/ML
2.5 SOLUTION RESPIRATORY (INHALATION)
Status: CANCELLED | OUTPATIENT
Start: 2022-05-06

## 2022-05-05 RX ORDER — HEPARIN SODIUM (PORCINE) LOCK FLUSH IV SOLN 100 UNIT/ML 100 UNIT/ML
5 SOLUTION INTRAVENOUS
Status: CANCELLED | OUTPATIENT
Start: 2022-05-05

## 2022-05-05 RX ORDER — ALBUTEROL SULFATE 90 UG/1
1-2 AEROSOL, METERED RESPIRATORY (INHALATION)
Status: CANCELLED
Start: 2022-05-06

## 2022-05-05 RX ORDER — ALBUTEROL SULFATE 90 UG/1
1-2 AEROSOL, METERED RESPIRATORY (INHALATION)
Status: CANCELLED
Start: 2022-05-05

## 2022-05-05 RX ORDER — HEPARIN SODIUM (PORCINE) LOCK FLUSH IV SOLN 100 UNIT/ML 100 UNIT/ML
5 SOLUTION INTRAVENOUS
Status: CANCELLED | OUTPATIENT
Start: 2022-05-06

## 2022-05-05 RX ORDER — HEPARIN SODIUM,PORCINE 10 UNIT/ML
5 VIAL (ML) INTRAVENOUS
Status: CANCELLED | OUTPATIENT
Start: 2022-05-06

## 2022-05-05 RX ORDER — METHYLPREDNISOLONE SODIUM SUCCINATE 125 MG/2ML
125 INJECTION, POWDER, LYOPHILIZED, FOR SOLUTION INTRAMUSCULAR; INTRAVENOUS
Status: CANCELLED
Start: 2022-05-06

## 2022-05-05 RX ORDER — MEPERIDINE HYDROCHLORIDE 25 MG/ML
25 INJECTION INTRAMUSCULAR; INTRAVENOUS; SUBCUTANEOUS EVERY 30 MIN PRN
Status: CANCELLED | OUTPATIENT
Start: 2022-05-06

## 2022-05-05 RX ORDER — INSULIN LISPRO 100 [IU]/ML
INJECTION, SOLUTION INTRAVENOUS; SUBCUTANEOUS
Qty: 15 ML | Refills: 0 | Status: SHIPPED | OUTPATIENT
Start: 2022-05-05 | End: 2022-05-05

## 2022-05-05 RX ORDER — ALBUTEROL SULFATE 0.83 MG/ML
2.5 SOLUTION RESPIRATORY (INHALATION)
Status: CANCELLED | OUTPATIENT
Start: 2022-05-05

## 2022-05-05 RX ORDER — EPINEPHRINE 1 MG/ML
0.3 INJECTION, SOLUTION INTRAMUSCULAR; SUBCUTANEOUS EVERY 5 MIN PRN
Status: CANCELLED | OUTPATIENT
Start: 2022-05-06

## 2022-05-05 RX ORDER — METHYLPREDNISOLONE SODIUM SUCCINATE 125 MG/2ML
125 INJECTION, POWDER, LYOPHILIZED, FOR SOLUTION INTRAMUSCULAR; INTRAVENOUS
Status: CANCELLED
Start: 2022-05-05

## 2022-05-05 RX ADMIN — SODIUM CHLORIDE 500 MG: 9 INJECTION, SOLUTION INTRAVENOUS at 14:48

## 2022-05-05 ASSESSMENT — PAIN SCALES - GENERAL: PAINLEVEL: NO PAIN (0)

## 2022-05-05 NOTE — TELEPHONE ENCOUNTER
Patient Call: General  Route to LPN    Reason for call: patient called looking to speak with coordinator. No additional details provided.    Call back needed? Yes    Return Call Needed  Same as documented in contacts section  When to return call?: Greater than one day: Route standard priority

## 2022-05-05 NOTE — TELEPHONE ENCOUNTER
Pt paged the triage line about her BGs getting up into the 300s after her first dose of steroids today. This RNCC told Marilyn to check her BG overnight and again in the morning and then call her coordinator to report back and see if they want her to start on insulin for the remainder of her steroid treatment. Pt verbalized understanding and has no further questions at this time.

## 2022-05-05 NOTE — TELEPHONE ENCOUNTER
Patient called because she has had high BG last night after solumedrol injection. Patient states BG was >200 for 9 hours, highest it got was up to 370.     Reviewed w/ Dr Corona, patient OK to take small amount of short acting insulin. Reviewed w/ pharmacist. Sent prescription for low dose sliding scale humalog to [facility] pharmacy. Left patient voicemail w/ instructions. Instructed patient to call RNCC w any questions.

## 2022-05-05 NOTE — PATIENT INSTRUCTIONS
Dear Marilyn Ortega    Thank you for choosing [facility] ([facility]) for your infusion.  The following information is a summary of our appointment as well as important reminders.      We look forward in seeing you on your next appointment here at [facility] ([facility]).  Please don t hesitate to call us at [phone] to reschedule any of your appointments or to speak with one of the [facility] registered nurses.  It was a pleasure taking care of you today.    Sincerely,    [facility]  [facility]  [address] [place] [address]  [address]  Phone:  [phone]

## 2022-05-05 NOTE — PROGRESS NOTES
Nursing Note  Marilyn Ortega [facility] for:   Chief Complaint   Patient presents with     Infusion     Solumedrol     During today's [facility] appointment, orders from Dr Corona were completed.  Frequency: today is dose 2 of 3 total    Progress note:  Patient identification verified by name and date of birth.  Assessment completed.  Vitals recorded in Doc Flowsheets.  Patient was provided with education regarding medication/procedure and possible side effects.  Patient verbalized understanding.    Treatment Conditions: Blood glucose measured before solumedrol given was 99 via dexcom monitor and 96 with POCT. Patient started some insulin this morning per transplant team for hyperglycemia last night after first dose of solumedrol.     Premedications: were not ordered.    Infusion length and rate:  infusion given over approximately 30 minutes    Labs: were drawn per orders.     Vascular access: peripheral IV was placed by vascular access nurse.    Is the next appt scheduled? yes  Asymptomatic COVID test completed? no    Post Infusion Assessment:  Patient tolerated infusion without incident.  Site patent and intact, free from redness, edema or discomfort.  Access discontinued per protocol.     Discharge Plan:   Follow up plan of care with: ongoing infusions at [facility]., ordering provider as scheduled. and after visit summary declined by patient  Discharge instructions were reviewed with patient.  Patient/representative verbalized understanding of discharge instructions and all questions answered.  Patient discharged from [facility] in stable condition.    Dara Cagle RN       Administrations This Visit     methylPREDNISolone sodium succinate (solu-MEDROL) 500 mg in sodium chloride 0.9 % 100 mL intermittent infusion     Admin Date  05/05/2022 Action  New Bag  Dose  500 mg Rate  228 mL/hr Route  Intravenous Administered By  Dara Cagle RN                  /78 (BP Location: Left arm, Patient Position: Sitting)   Pulse 93   Temp 98.1  F (36.7  C) (Oral)   Resp 15   SpO2 100%

## 2022-05-06 ENCOUNTER — INFUSION THERAPY VISIT (OUTPATIENT)
Dept: INFUSION THERAPY | Facility: CLINIC | Age: 37
End: 2022-05-06
Attending: INTERNAL MEDICINE
Payer: COMMERCIAL

## 2022-05-06 VITALS
TEMPERATURE: 97.8 F | HEART RATE: 91 BPM | SYSTOLIC BLOOD PRESSURE: 118 MMHG | OXYGEN SATURATION: 100 % | RESPIRATION RATE: 16 BRPM | DIASTOLIC BLOOD PRESSURE: 78 MMHG

## 2022-05-06 DIAGNOSIS — Z94.0 KIDNEY REPLACED BY TRANSPLANT: Primary | ICD-10-CM

## 2022-05-06 LAB
ANION GAP SERPL CALCULATED.3IONS-SCNC: 8 MMOL/L (ref 3–14)
BASOPHILS # BLD AUTO: 0 10E3/UL (ref 0–0.2)
BASOPHILS NFR BLD AUTO: 0 %
BUN SERPL-MCNC: 43 MG/DL (ref 7–30)
CALCIUM SERPL-MCNC: 9.4 MG/DL (ref 8.5–10.1)
CHLORIDE BLD-SCNC: 108 MMOL/L (ref 94–109)
CO2 SERPL-SCNC: 26 MMOL/L (ref 20–32)
CREAT SERPL-MCNC: 1.62 MG/DL (ref 0.52–1.04)
EOSINOPHIL # BLD AUTO: 0 10E3/UL (ref 0–0.7)
EOSINOPHIL NFR BLD AUTO: 0 %
ERYTHROCYTE [DISTWIDTH] IN BLOOD BY AUTOMATED COUNT: 12.3 % (ref 10–15)
GFR SERPL CREATININE-BSD FRML MDRD: 42 ML/MIN/1.73M2
GLUCOSE BLD-MCNC: 84 MG/DL (ref 70–99)
HCT VFR BLD AUTO: 25.2 % (ref 35–47)
HGB BLD-MCNC: 8.3 G/DL (ref 11.7–15.7)
IMM GRANULOCYTES # BLD: 0.1 10E3/UL
IMM GRANULOCYTES NFR BLD: 1 %
LYMPHOCYTES # BLD AUTO: 1.1 10E3/UL (ref 0.8–5.3)
LYMPHOCYTES NFR BLD AUTO: 7 %
MCH RBC QN AUTO: 30.1 PG (ref 26.5–33)
MCHC RBC AUTO-ENTMCNC: 32.9 G/DL (ref 31.5–36.5)
MCV RBC AUTO: 91 FL (ref 78–100)
MONOCYTES # BLD AUTO: 1 10E3/UL (ref 0–1.3)
MONOCYTES NFR BLD AUTO: 7 %
NEUTROPHILS # BLD AUTO: 12.9 10E3/UL (ref 1.6–8.3)
NEUTROPHILS NFR BLD AUTO: 85 %
NRBC # BLD AUTO: 0 10E3/UL
NRBC BLD AUTO-RTO: 0 /100
PLATELET # BLD AUTO: 247 10E3/UL (ref 150–450)
POTASSIUM BLD-SCNC: 3.1 MMOL/L (ref 3.4–5.3)
RBC # BLD AUTO: 2.76 10E6/UL (ref 3.8–5.2)
SODIUM SERPL-SCNC: 142 MMOL/L (ref 133–144)
WBC # BLD AUTO: 15 10E3/UL (ref 4–11)

## 2022-05-06 PROCEDURE — 250N000011 HC RX IP 250 OP 636: Performed by: INTERNAL MEDICINE

## 2022-05-06 PROCEDURE — 96365 THER/PROPH/DIAG IV INF INIT: CPT

## 2022-05-06 PROCEDURE — 80048 BASIC METABOLIC PNL TOTAL CA: CPT | Performed by: INTERNAL MEDICINE

## 2022-05-06 PROCEDURE — 258N000003 HC RX IP 258 OP 636: Performed by: INTERNAL MEDICINE

## 2022-05-06 PROCEDURE — 85025 COMPLETE CBC W/AUTO DIFF WBC: CPT | Performed by: INTERNAL MEDICINE

## 2022-05-06 PROCEDURE — 36415 COLL VENOUS BLD VENIPUNCTURE: CPT | Performed by: INTERNAL MEDICINE

## 2022-05-06 RX ORDER — ALBUTEROL SULFATE 90 UG/1
1-2 AEROSOL, METERED RESPIRATORY (INHALATION)
Status: CANCELLED
Start: 2022-05-06

## 2022-05-06 RX ORDER — MEPERIDINE HYDROCHLORIDE 25 MG/ML
25 INJECTION INTRAMUSCULAR; INTRAVENOUS; SUBCUTANEOUS EVERY 30 MIN PRN
Status: CANCELLED | OUTPATIENT
Start: 2022-05-06

## 2022-05-06 RX ORDER — METHYLPREDNISOLONE SODIUM SUCCINATE 125 MG/2ML
125 INJECTION, POWDER, LYOPHILIZED, FOR SOLUTION INTRAMUSCULAR; INTRAVENOUS
Status: CANCELLED
Start: 2022-05-06

## 2022-05-06 RX ORDER — ALBUTEROL SULFATE 0.83 MG/ML
2.5 SOLUTION RESPIRATORY (INHALATION)
Status: CANCELLED | OUTPATIENT
Start: 2022-05-06

## 2022-05-06 RX ORDER — EPINEPHRINE 1 MG/ML
0.3 INJECTION, SOLUTION INTRAMUSCULAR; SUBCUTANEOUS EVERY 5 MIN PRN
Status: CANCELLED | OUTPATIENT
Start: 2022-05-06

## 2022-05-06 RX ORDER — DIPHENHYDRAMINE HYDROCHLORIDE 50 MG/ML
50 INJECTION INTRAMUSCULAR; INTRAVENOUS
Status: CANCELLED
Start: 2022-05-06

## 2022-05-06 RX ORDER — HEPARIN SODIUM (PORCINE) LOCK FLUSH IV SOLN 100 UNIT/ML 100 UNIT/ML
5 SOLUTION INTRAVENOUS
Status: CANCELLED | OUTPATIENT
Start: 2022-05-06

## 2022-05-06 RX ORDER — NALOXONE HYDROCHLORIDE 0.4 MG/ML
0.2 INJECTION, SOLUTION INTRAMUSCULAR; INTRAVENOUS; SUBCUTANEOUS
Status: CANCELLED | OUTPATIENT
Start: 2022-05-06

## 2022-05-06 RX ORDER — HEPARIN SODIUM,PORCINE 10 UNIT/ML
5 VIAL (ML) INTRAVENOUS
Status: CANCELLED | OUTPATIENT
Start: 2022-05-06

## 2022-05-06 RX ADMIN — SODIUM CHLORIDE 500 MG: 9 INJECTION, SOLUTION INTRAVENOUS at 15:59

## 2022-05-06 NOTE — PROGRESS NOTES
Nursing Note  Marilyn Ortega [facility] for:   Chief Complaint   Patient presents with     Infusion     Solumedrol     During today's [facility] appointment, orders from Dr. Corona were completed.  Frequency: daily x 3 doses. Today is dose 3/3.     Progress note:  Patient identification verified by name and date of birth.  Assessment completed.  Vitals recorded in Doc Flowsheets.  Patient was provided with education regarding medication/procedure and possible side effects.  Patient verbalized understanding.     present during visit today: Not Applicable.    Treatment Conditions: Patient's blood glucose today of 84 (100 per patient's dexcom)    Premedications: were not ordered.    Drug Waste Record: No    Infusion length and rate:  infusion given over approximately 30 minutes    Labs: were drawn per orders.     Vascular access: peripheral IV was placed by vascular access nurse.    Is the next appt scheduled? na  Asymptomatic COVID test completed? no    Post Infusion Assessment:  Patient tolerated infusion without incident.     Discharge Plan:   Follow up plan of care with: ongoing infusions at [facility]., transplant coordinator., ordering provider as scheduled. and [facility]  Discharge instructions were reviewed with patient.  Patient/representative verbalized understanding of discharge instructions and all questions answered.  Patient discharged from [facility] in stable condition.    Jolene Allen RN       Administrations This Visit     methylPREDNISolone sodium succinate (solu-MEDROL) 500 mg in sodium chloride 0.9 % 100 mL intermittent infusion     Admin Date  05/06/2022 Action  New Bag Dose  500 mg Route  Intravenous Administered By  Jolene Allen RN                /78 (Patient Position: Right side)   Pulse 91   Temp 97.8  F (36.6  C) (Oral)    Resp 16   SpO2 100%

## 2022-05-06 NOTE — PATIENT INSTRUCTIONS
Dear Marilyn Ortega    Thank you for choosing [facility] ([facility]) for your Methylprednisilone/Solumedrol infusion.  The following information is a summary of our appointment as well as important reminders.          We look forward in seeing you on your next appointment here at [facility] ([facility]).  Please don t hesitate to call us at [phone] to reschedule any of your appointments or to speak with one of the [facility] registered nurses.  It was a pleasure taking care of you today.    Sincerely,    [facility]  [facility]  [address] [place] [address]  [address]  Phone:  [phone]

## 2022-05-06 NOTE — LETTER
5/6/2022         RE: Marilyn Oretga  [address]  [place] [address]        Dear Colleague,    Thank you for referring your patient, Marilyn Ortega, to the [facility]. Please see a copy of my visit note below.    Nursing Note  Marilyn Ortega [facility]:   Chief Complaint   Patient presents with     Infusion     Solumedrol     During today's [facility] appointment, orders from Dr. Corona were completed.  Frequency: daily x 3 doses. Today is dose 3/3.     Progress note:  Patient identification verified by name and date of birth.  Assessment completed.  Vitals recorded in Doc Flowsheets.  Patient was provided with education regarding medication/procedure and possible side effects.  Patient verbalized understanding.     present during visit today: Not Applicable.    Treatment Conditions: Patient's blood glucose today of 84 (100 per patient's dexcom)    Premedications: were not ordered.    Drug Waste Record: No    Infusion length and rate:  infusion given over approximately 30 minutes    Labs: were drawn per orders.     Vascular access: peripheral IV was placed by vascular access nurse.    Is the next appt scheduled? na  Asymptomatic COVID test completed? no    Post Infusion Assessment:  Patient tolerated infusion without incident.     Discharge Plan:   Follow up plan of care with: [facility] transplant coordinator., ordering provider as scheduled. and [facility]  Discharge instructions were reviewed with patient.  Patient/representative verbalized understanding of discharge instructions and all questions answered.  [facility] in stable condition.    Jolene Reyna RN       Administrations This Visit     methylPREDNISolone sodium succinate (solu-MEDROL)  500 mg in sodium chloride 0.9 % 100 mL intermittent infusion     Admin Date  05/06/2022 Action  New Bag Dose  500 mg Route  Intravenous Administered By  Jolene Allen, RN                /78 (Patient Position: Right side)   Pulse 91   Temp 97.8  F (36.6  C) (Oral)   Resp 16   SpO2 100%         Again, thank you for allowing me to participate in the care of your patient.        Sincerely,        [facility]

## 2022-05-09 ENCOUNTER — TELEPHONE (OUTPATIENT)
Dept: TRANSPLANT | Facility: CLINIC | Age: 37
End: 2022-05-09

## 2022-05-09 ENCOUNTER — LAB (OUTPATIENT)
Dept: LAB | Facility: CLINIC | Age: 37
End: 2022-05-09
Payer: COMMERCIAL

## 2022-05-09 DIAGNOSIS — Z48.298 AFTERCARE FOLLOWING ORGAN TRANSPLANT: ICD-10-CM

## 2022-05-09 DIAGNOSIS — Z94.83 PANCREAS REPLACED BY TRANSPLANT (H): ICD-10-CM

## 2022-05-09 DIAGNOSIS — Z94.83 PANCREAS TRANSPLANTED (H): ICD-10-CM

## 2022-05-09 DIAGNOSIS — Z94.83 PANCREAS REPLACED BY TRANSPLANT (H): Primary | ICD-10-CM

## 2022-05-09 DIAGNOSIS — Z94.0 KIDNEY REPLACED BY TRANSPLANT: ICD-10-CM

## 2022-05-09 DIAGNOSIS — Z79.899 ENCOUNTER FOR LONG-TERM CURRENT USE OF MEDICATION: ICD-10-CM

## 2022-05-09 DIAGNOSIS — Z94.0 KIDNEY REPLACED BY TRANSPLANT: Primary | ICD-10-CM

## 2022-05-09 LAB
AMYLASE SERPL-CCNC: 60 U/L (ref 30–110)
ANION GAP SERPL CALCULATED.3IONS-SCNC: 3 MMOL/L (ref 3–14)
BUN SERPL-MCNC: 31 MG/DL (ref 7–30)
CALCIUM SERPL-MCNC: 9.2 MG/DL (ref 8.5–10.1)
CHLORIDE BLD-SCNC: 107 MMOL/L (ref 94–109)
CO2 SERPL-SCNC: 28 MMOL/L (ref 20–32)
CREAT SERPL-MCNC: 1.21 MG/DL (ref 0.52–1.04)
ERYTHROCYTE [DISTWIDTH] IN BLOOD BY AUTOMATED COUNT: 12 % (ref 10–15)
GFR SERPL CREATININE-BSD FRML MDRD: 59 ML/MIN/1.73M2
GLUCOSE BLD-MCNC: 74 MG/DL (ref 70–99)
HCT VFR BLD AUTO: 32.5 % (ref 35–47)
HGB BLD-MCNC: 10.3 G/DL (ref 11.7–15.7)
LIPASE SERPL-CCNC: 323 U/L (ref 73–393)
MAGNESIUM SERPL-MCNC: 1.7 MG/DL (ref 1.6–2.3)
MCH RBC QN AUTO: 29.1 PG (ref 26.5–33)
MCHC RBC AUTO-ENTMCNC: 31.7 G/DL (ref 31.5–36.5)
MCV RBC AUTO: 92 FL (ref 78–100)
PHOSPHATE SERPL-MCNC: 3.1 MG/DL (ref 2.5–4.5)
PLATELET # BLD AUTO: 267 10E3/UL (ref 150–450)
POTASSIUM BLD-SCNC: 3.9 MMOL/L (ref 3.4–5.3)
RBC # BLD AUTO: 3.54 10E6/UL (ref 3.8–5.2)
SODIUM SERPL-SCNC: 138 MMOL/L (ref 133–144)
WBC # BLD AUTO: 7.4 10E3/UL (ref 4–11)

## 2022-05-09 PROCEDURE — 83735 ASSAY OF MAGNESIUM: CPT

## 2022-05-09 PROCEDURE — 80197 ASSAY OF TACROLIMUS: CPT

## 2022-05-09 PROCEDURE — 82150 ASSAY OF AMYLASE: CPT

## 2022-05-09 PROCEDURE — 84100 ASSAY OF PHOSPHORUS: CPT

## 2022-05-09 PROCEDURE — 85027 COMPLETE CBC AUTOMATED: CPT

## 2022-05-09 PROCEDURE — 87799 DETECT AGENT NOS DNA QUANT: CPT

## 2022-05-09 PROCEDURE — 80048 BASIC METABOLIC PNL TOTAL CA: CPT

## 2022-05-09 PROCEDURE — 36415 COLL VENOUS BLD VENIPUNCTURE: CPT

## 2022-05-09 PROCEDURE — 83690 ASSAY OF LIPASE: CPT

## 2022-05-09 NOTE — TELEPHONE ENCOUNTER
Patient called to touch base with the RNCC regarding labs and how she has been feeling over the weekend.

## 2022-05-10 ENCOUNTER — TELEPHONE (OUTPATIENT)
Dept: TRANSPLANT | Facility: CLINIC | Age: 37
End: 2022-05-10

## 2022-05-10 DIAGNOSIS — Z94.83 PANCREAS TRANSPLANTED (H): ICD-10-CM

## 2022-05-10 DIAGNOSIS — Z94.83 PANCREAS REPLACED BY TRANSPLANT (H): ICD-10-CM

## 2022-05-10 DIAGNOSIS — Z94.0 KIDNEY REPLACED BY TRANSPLANT: ICD-10-CM

## 2022-05-10 LAB
BK VIRUS DNA COPIES/ML, INSTRUMENT: 958 COPIES/ML
BKV DNA SPEC NAA+PROBE-LOG#: 3 {LOG_COPIES}/ML
TACROLIMUS BLD-MCNC: 7.1 UG/L (ref 5–15)
TME LAST DOSE: NORMAL H
TME LAST DOSE: NORMAL H

## 2022-05-10 RX ORDER — TACROLIMUS 1 MG/1
1 TABLET, EXTENDED RELEASE ORAL
Qty: 30 TABLET | Refills: 11 | Status: SHIPPED | OUTPATIENT
Start: 2022-05-10 | End: 2022-06-07

## 2022-05-10 RX ORDER — TACROLIMUS 4 MG/1
12 TABLET, EXTENDED RELEASE ORAL
Qty: 90 TABLET | Refills: 11 | Status: SHIPPED | OUTPATIENT
Start: 2022-05-10 | End: 2022-06-07

## 2022-05-10 RX ORDER — TACROLIMUS 0.75 MG/1
TABLET, EXTENDED RELEASE ORAL
Qty: 60 TABLET | Refills: 11
Start: 2022-05-10 | End: 2022-06-07

## 2022-05-10 NOTE — TELEPHONE ENCOUNTER
ISSUE:  Elevated lipase    PLAN:  Assess constipation  Dietary triggers?   Repeat labs, bowel regimen  abd xray, per Dr Stan Villalta    OUTCOME:  Patient states she is having bowel movements, but will get abd xray to assess stool burden.    No fever, no pain over pancreas. Patient eats fairly clean.     Patient will increase stool softeners and get abd xray on Wednesday along w/ repeat labs.     appts made, patient v/u of plan.

## 2022-05-10 NOTE — TELEPHONE ENCOUNTER
ISSUE:   Tacrolimus XR level 7.1 on 5/9, goal 8-10, dose 12.5 mg daily.    PLAN:   Please call patient and confirm this was an accurate 24-hour trough. Verify Tacrolimus XR dose 12.5 mg daily. Confirm no new medications or illness. Confirm no missed doses. If accurate trough and accurate dose, increase Tacrolimus XR dose to 13 mg daily and repeat labs in 1 weeks.      OUTCOME:   Spoke with patient, they confirm accurate trough level and current dose 12.5 mg daily. Patient confirmed dose change to 13 mg daily and to repeat labs in 1 weeks. Orders sent to [facility] pharmacy for dose change and lab for repeat labs. Patient voiced understanding of plan.

## 2022-05-11 ENCOUNTER — LAB (OUTPATIENT)
Dept: LAB | Facility: CLINIC | Age: 37
End: 2022-05-11
Attending: INTERNAL MEDICINE
Payer: COMMERCIAL

## 2022-05-11 ENCOUNTER — HOSPITAL ENCOUNTER (OUTPATIENT)
Dept: GENERAL RADIOLOGY | Facility: CLINIC | Age: 37
Discharge: HOME | End: 2022-05-11
Attending: INTERNAL MEDICINE
Payer: COMMERCIAL

## 2022-05-11 DIAGNOSIS — Z94.83 PANCREAS TRANSPLANTED (H): ICD-10-CM

## 2022-05-11 DIAGNOSIS — Z94.0 KIDNEY REPLACED BY TRANSPLANT: ICD-10-CM

## 2022-05-11 DIAGNOSIS — Z94.83 PANCREAS REPLACED BY TRANSPLANT (H): ICD-10-CM

## 2022-05-11 LAB
AMYLASE SERPL-CCNC: 43 U/L (ref 30–110)
ANION GAP SERPL CALCULATED.3IONS-SCNC: 6 MMOL/L (ref 3–14)
BUN SERPL-MCNC: 30 MG/DL (ref 7–30)
CALCIUM SERPL-MCNC: 9.1 MG/DL (ref 8.5–10.1)
CHLORIDE BLD-SCNC: 108 MMOL/L (ref 94–109)
CO2 SERPL-SCNC: 22 MMOL/L (ref 20–32)
CREAT SERPL-MCNC: 1.33 MG/DL (ref 0.52–1.04)
ERYTHROCYTE [DISTWIDTH] IN BLOOD BY AUTOMATED COUNT: 12.2 % (ref 10–15)
GFR SERPL CREATININE-BSD FRML MDRD: 53 ML/MIN/1.73M2
GLUCOSE BLD-MCNC: 83 MG/DL (ref 70–99)
HCT VFR BLD AUTO: 27.7 % (ref 35–47)
HGB BLD-MCNC: 8.9 G/DL (ref 11.7–15.7)
LIPASE SERPL-CCNC: 122 U/L (ref 73–393)
MCH RBC QN AUTO: 29.1 PG (ref 26.5–33)
MCHC RBC AUTO-ENTMCNC: 32.1 G/DL (ref 31.5–36.5)
MCV RBC AUTO: 91 FL (ref 78–100)
PLATELET # BLD AUTO: 232 10E3/UL (ref 150–450)
POTASSIUM BLD-SCNC: 4.4 MMOL/L (ref 3.4–5.3)
RBC # BLD AUTO: 3.06 10E6/UL (ref 3.8–5.2)
SODIUM SERPL-SCNC: 136 MMOL/L (ref 133–144)
WBC # BLD AUTO: 6.7 10E3/UL (ref 4–11)

## 2022-05-11 PROCEDURE — 80048 BASIC METABOLIC PNL TOTAL CA: CPT

## 2022-05-11 PROCEDURE — 36415 COLL VENOUS BLD VENIPUNCTURE: CPT

## 2022-05-11 PROCEDURE — 83690 ASSAY OF LIPASE: CPT

## 2022-05-11 PROCEDURE — 82150 ASSAY OF AMYLASE: CPT

## 2022-05-11 PROCEDURE — 74018 RADEX ABDOMEN 1 VIEW: CPT

## 2022-05-11 PROCEDURE — 85027 COMPLETE CBC AUTOMATED: CPT

## 2022-05-13 ENCOUNTER — VIRTUAL VISIT (OUTPATIENT)
Dept: NEPHROLOGY | Facility: CLINIC | Age: 37
End: 2022-05-13
Attending: SURGERY
Payer: COMMERCIAL

## 2022-05-13 ENCOUNTER — OFFICE VISIT (OUTPATIENT)
Dept: HEMATOLOGY | Facility: CLINIC | Age: 37
End: 2022-05-13
Attending: INTERNAL MEDICINE
Payer: COMMERCIAL

## 2022-05-13 VITALS
RESPIRATION RATE: 16 BRPM | TEMPERATURE: 97.6 F | HEIGHT: 66 IN | HEART RATE: 98 BPM | SYSTOLIC BLOOD PRESSURE: 120 MMHG | OXYGEN SATURATION: 100 % | WEIGHT: 127.5 LBS | DIASTOLIC BLOOD PRESSURE: 72 MMHG | BODY MASS INDEX: 20.49 KG/M2

## 2022-05-13 DIAGNOSIS — Z48.298 TRANSPLANT FOLLOW-UP: Primary | ICD-10-CM

## 2022-05-13 DIAGNOSIS — D68.51 HETEROZYGOUS FACTOR V LEIDEN MUTATION (H): Primary | ICD-10-CM

## 2022-05-13 DIAGNOSIS — D68.51 FACTOR V LEIDEN (H): ICD-10-CM

## 2022-05-13 PROCEDURE — 99214 OFFICE O/P EST MOD 30 MIN: CPT | Mod: GT | Performed by: INTERNAL MEDICINE

## 2022-05-13 PROCEDURE — 99202 OFFICE O/P NEW SF 15 MIN: CPT | Performed by: PHYSICIAN ASSISTANT

## 2022-05-13 RX ORDER — FUROSEMIDE 20 MG
20 TABLET ORAL PRN
Qty: 45 TABLET | Refills: 0 | Status: SHIPPED | OUTPATIENT
Start: 2022-05-13 | End: 2022-09-28

## 2022-05-13 ASSESSMENT — PAIN SCALES - GENERAL: PAINLEVEL: NO PAIN (0)

## 2022-05-13 NOTE — PATIENT INSTRUCTIONS
Marilyn    Nice to meet you today in clinic.     Below is a factor V Leiden mutation information.                                 [facility] [phone]    What Is Factor V Leiden? (Pronounced factor five lye?-den)  Factor V Leiden is a genetic mutation or abnormal version of factor V that is resistant to turning off clotting.     How Did I Get Factor V Leiden?  You inherit all your genes, including factor V Leiden, from your parents. You have two copies of every gene, one from your father and one from your mother. You may have inherited one copy of the factor V Leiden gene from one of your parents, making you heterozygous for the factor V Leiden. This means that you have about 50% of normal factor V and about 50% of abnormal factor V Leiden in your blood. Sometimes both parents pass factor V Leiden to their offspring, making you homozygous for factor V Leiden, and 100% of your factor V is abnormal.    What Are the Implications of Having Factor V Leiden?  Heterozygous factor V Leiden is found in about 5% of the white population and is most common in people of Northern  descent and in some  populations, whereas the homozygous form is found in fewer than 1%. Factor V Leiden is less common in the  populations and is rare in , , and  populations. Factor V Leiden is associated with a slight increased risk of developing a DVT (deep vein thrombosis). Approximately 1 in every 1000 people will develop a DVT or PE (pulmonary embolism) each year, and this increases from about 1 in 10,000 for those in their twenties to about 5 in 1000 for those in their seventies. Heterozygous factor V Leiden increases the risk of developing a first DVT by 5 to 7 fold (or 5 to 7 in 1000 people each year). Thus, Factor V Leiden is a weak risk factor for developing blood clots; in fact, most people who have heterozygous factor V Leiden never develop blood  clots. Homozygous factor V Leiden increases the risk of developing clots to a greater degree, about 25 to 50 fold. If you have the heterozygous form of factor V Leiden, the lifetime risk of developing a DVT is 10% or less, but may be higher if you have close family members who have had a DVT. Very often, people with factor V Leiden have additional risk factors that contributed to the development of blood clots. Having factor V Leiden alone does not appear to increase the risk of developing heart attacks and strokes.    Treatment  If you have factor V Leiden but have never had a blood clot, then you will not routinely be treated with a blood thinner. Rather, you should be counseled about reducing or eliminating other factors that may add to your risk of developing a clot in the future. In addition, you may require temporary treatment with a blood thinner during periods of particularly high risk, such as major surgery.    Special Considerations for Women  The use of estrogen, such as oral contraceptive pills (OCPs) and hormone replacement therapy (HRT) taken after menopause, increases the risk of developing DVT and PE. Healthy women taking OCPs have a 3 to 4 fold increased risk of developing a DVT or PE compared with women who do not take OCP. Women with factor V Leiden who take OCPs have about a 35 fold increased risk of developing a DVT or PE compared with women without factor V Leiden and those who do not take OCPs. This would translate to about 35 per 10,000 chance per year of use for women in their twenties with factor V Leiden. Likewise, postmenopausal women taking HRT have a 2 to 3 fold higher risk of developing a DVT or PE than women who do not take HRT, and women with factor V Leiden who take HRT have a 15 fold higher risk. This is about a 15 to 40 per 1000 chance per year of use for women in their fifties with factor V Leiden. Women with heterozygous factor V Leiden who are making decisions about using OCP  or HRT should consider these statistics when weighing the risks and benefits of treatment.    Pregnancy  Factor V Leiden increases the risk of developing a DVT during pregnancy by about 7 fold. Women with factor V Leiden who are planning pregnancy should discuss this with their obstetrician and/or hematologist. Most women with factor V Leiden have normal pregnancies and only require close follow-up during pregnancy. For those with a history of DVT or PE, treatment with an anticoagulant during a subsequent pregnancy can prevent recurrent problems.    Adapted from article, Factor V Leiden, Circulation April 22, 2003       Luis A Shrestha PA-C, MPAS  Physician Assistant  [facility].

## 2022-05-13 NOTE — PROGRESS NOTES
"    [facility]  [address] [place] [id number]  Main: [phone], Fax: [phone]    Patient seen at: [facility] at [address] [place] [address]    Outpatient Visit Note:    Patient: Marilyn Ortega  MRN: [id number]  : 1985  ELBA: May 13, 2022    Reason for visit:  Heterozygous factor V Leiden mutation. S/P renal and pancreatic transplant in 2021. Retinal venous stasis.     HPI:  Marilyn is a 37 year old female with a history of type I diabetes mellitus since age 14, hypertension, gastroparesis, gluten intolerance, chronic renal disease who underwent successful renal and pancreatic transplantation back on 2021, who is also found to have heterozygous factor V Leiden mutation during pre-transplant workup and long standing history of severe nonproliferative diabetic retinopathy of both eyes with macular edema, who is also found to have retinal venous stasis, referred by Dr. Kamran Corona for consultation.     As mentioned, Marilyn underwent successful renal and pancreatic transplant back in 2021.     Back in 2022, she started to experience some visual disturbance and went to the acute ophthalmology clinic for evaluation on 2022. Reportedly, she was noticing \"sun spots\" in her vision that have progressed over the 2 weeks. She has a family history of early cataracts. She underwent posterior iris sutured intraocular lenses and sclerally-fixed posterior chamber IOLs in Dec 2021 tot he right eye and same to the left eye in 2021. During her visit with ophtalmology clinic, she was found to have severe nonproliferative diabetic retinopathy of both eyes with macular edema associated with Type I DM, drusen of both optic discs, macular edema of both eyes, venous stasis of retina of both eyes and pseudophakia of both eyes.     According to a note by Dr. Kamran Corona (nephrology) on 3/17/2022, apparently the " "patient was placed on apixaban \"due to concern for optho of low flow and concern regarding factor V Leiden heterozygosity\".     Marilyn has had no history of venous thromboembolism.     ROS:  Denies any bleeding complications. Specifically, no frequent epistaxis. No issues with oral mucosal bleeding. Denies any hematuria or blood in stools. Denies any shortness of breath. No chest pain. No cough. No fever.    Medications:  Current Outpatient Medications   Medication     apixaban ANTICOAGULANT (ELIQUIS) 2.5 MG tablet     aspirin (ASA) 325 MG tablet     calcium carbonate-vitamin D (OS-STEPHANIE WITH D) 500-200 MG-UNIT tablet     cetirizine (ZYRTEC) 10 MG tablet     cholecalciferol 25 MCG (1000 UT) TABS     Continuous Blood Gluc Sensor (DEXCOM G6 SENSOR) MISC     Continuous Blood Gluc Transmit (DEXCOM G6 TRANSMITTER) MISC     docusate sodium (COLACE) 100 MG capsule     famotidine (PEPCID) 20 MG tablet     furosemide (LASIX) 20 MG tablet     glucose blood VI test strips strip     mycophenolic acid (GENERIC EQUIVALENT) 180 MG EC tablet     mycophenolic acid (GENERIC EQUIVALENT) 360 MG EC tablet     ondansetron (ZOFRAN) 4 MG tablet     Prucalopride Succinate (MOTEGRITY) 2 MG TABS     sulfamethoxazole-trimethoprim (BACTRIM) 400-80 MG tablet     tacrolimus (ENVARSUS XR) 0.75 MG 24 hr tablet     tacrolimus (ENVARSUS XR) 1 MG 24 hr tablet     tacrolimus (ENVARSUS XR) 4 MG 24 hr tablet     insulin lispro (HUMALOG KWIKPEN) 100 UNIT/ML (1 unit dial) KWIKPEN     insulin pen needle (31G X 5 MM) 31G X 5 MM miscellaneous     norgestimate-ethinyl estradiol (ORTHO-CYCLEN) 0.25-35 MG-MCG tablet     Current Facility-Administered Medications   Medication     bevacizumab (AVASTIN) intravitreal inj 1.25 mg     bevacizumab (AVASTIN) intravitreal inj 1.25 mg     Allergies:     Allergies   Allergen Reactions     Chlorhexidine Hives, Itching and Rash     PN: Patient had swelling/rash that was very itchy with chlorprep.     Ceclor [Cefaclor " "Monohydrate]      Cefaclor Rash     PmHx:  Past Medical History:   Diagnosis Date     Anemia 12/9/2021     C. difficile diarrhea 2013     CKD (chronic kidney disease) stage 4, GFR 15-29 ml/min (H)      Gastroparesis      Gluten intolerance      Heterozygous factor V Leiden mutation (H)      HTN (hypertension)      Infection due to 2019 novel coronavirus 11/2020     Osteomyelitis (H) 2013     Shingles 2013     Type 1 diabetes mellitus (H) 2000       Social History:   Deferred.    Family History:  She has no family history of DVT/PE.   Brother has symptomatic varicose veins but no definitive history of DVT/PE.   No other family members with history of DVT/PE.     Objective:  Vitals: /72   Pulse 98   Temp 97.6  F (36.4  C) (Oral)   Resp 16   Ht 1.676 m (5' 6\")   Wt 57.8 kg (127 lb 8 oz)   SpO2 100%   BMI 20.58 kg/m    Exam:   Complete exam is not performed today.     Labs:  7/21/2021 Positive for heterozygous factor V Leiden mutation.     Assessment:  In summary, Marilyn is a 37 year old female with a history of of type I diabetes mellitus since age 14, hypertension, gastroparesis, gluten intolerance, chronic renal disease who underwent successful renal and pancreatic transplantation back on 11/22/2021, who is also found to have heterozygous factor V Leiden mutation during pre-transplant workup and long standing history of severe nonproliferative diabetic retinopathy of both eyes with macular edema, who is also found to have retinal venous stasis, referred by Dr. Kamran Corona for consultation. Currently Marilyn is on apixaban since March 2022 \"due to concern for optho of low flow and concern regarding factor V Leiden heterozygosity\". She has had no personal or family history of venous thromboembolism.     Diagnosis:  1. History of Type I DM.  2. S/P renal and pancreatic transplantation on 11/22/2021.   3. Heterozygous factor V Leiden mutation.   4. History of severe nonproliferative diabetic retinopathy of " both eyes with macular edema.   5. S/P cataract surgery of both eyes at the end of 2021.  6. Retinal venous stasis bilaterally.    Plan:  I spent some time today to educate Marilyn about factor V Leiden mutation and venous thromboembolism.     I explain that Factor V Leiden mutation is a common genetic thrombophilia, about 5% of the general population carries the mutation. Only about 10% of whom with the mutation would ever develop a thromboembolic event in their lifetime and is usually associated with other provoking factors. People with heterozygous Factor V Leiden Mutation has a 5 folds increase risk of venous thrombosis. For comparison, women who are on estrogen based birth control have about a 7 fold increase risk of venous thrombosis. Thus heterozygous Factor V Leiden Mutation is only a minimal increase risk factor for DVT/PE.     Although the exact etiology of her retinal venous stasis is unclear and I will have to defer to the expertise of ophthalmology to address, however it is not an indications for anticoagulation therapy regardless of her factor V Leiden mutation status. This patient has no personal or family history of venous thromboembolism and just having heterozygous factor V Leiden mutation does not justify anticoagulation therapy. I recommend discontinuing anticoagulation therapy (apixaban).     With her heterozygous factor V Leiden mutation, I do recommend against the use of any estrogen containing products in the future including oral contraceptive pills that contains estrogen. I explain that IUD, progesterone only containing products and obviously barrier methods are all contraceptives alternatives that will not increase her risk of thrombosis.     I have answered all her questions to her satisfaction.    She is given our clinic's contact information and is instructed to call if she should have any further questions or concerns.     At this time I have no further plans to see this patient back  on a routine basis.     Thank you for letting us to participate in this patient's care.       Luis A Shrestha PA-C, MPAS  Physician Assistant  [facility].     20 minutes spent on the date of the encounter doing chart review, history and exam, documentation and further activities per the note.    Time IN: 12:45  Time OUT: 12:58

## 2022-05-13 NOTE — PROGRESS NOTES
Marilyn is a 37 year old who is being evaluated via a billable video visit.      How would you like to obtain your AVS? MyChart  If the video visit is dropped, the invitation should be resent by: Text to cell phone: [phone]  Will anyone else be joining your video visit? No      Video Start Time: 10:00  Video-Visit Details    Type of service:  Video Visit    Video End Time:10:45    Originating Location (pt. Location): Home    Distant Location (provider location):  [facility] [place]     Platform used for Video Visit: Herman Sanders is a 36 year old who is being evaluated via a billable video visit.            TRANSPLANT NEPHROLOGY EARLY POST TRANSPLANT VISIT  Recommendation:  -Start patient on oral Lasix 20 mg PRN for lower extremity swelling  - Covid spike  -Serum Ig G  -Serum vitamin D deficiency    Assessment & Plan   # DDKT (SPK): Stable   - Baseline Creatinine: ~ 1-1.3   - Proteinuria: Not checked post transplant   - Date DSA Last Checked: Nov/2021      Latest DSA: No DSA at time of transplant   - BK Viremia: Not checked post transplant   - Kidney Tx Biopsy: May 02, 2022; Result:  Kidney allograft biopsy with borderline acute cellular rejection and no evidence of humoral rejection    # Pancreas Tx (SPK):    - Pancreatic Exocrine Drainage: Enteric drained     - Blood glucose: Euglycemia      On insulin: No   - HbA1c: Last checked at time of transplant      Latest HbA1c: 7.7%   - Pancreatic enzymes: Trend down   - Date DSA Last Checked: Nov/2021  Latest DSA: No DSA at time of transplant   - Pancreas Tx Biopsy: No    # Immunosuppression: Tacrolimus extended release (goal 8-10) and Mycophenolic acid (dose 540 mg every 12 hours)   - Induction with Recent Transplant:  Intermediate Intensity   - Continue with intensive monitoring of immunosuppression for efficacy and toxicity.   - Changes: No but once BK resolve  Which she had probably due to being given steroids for borderline  rejection will need to   # Infection Prophylaxis: due for BK screen -pending from today  - PJP: Sulfa/TMP (Bactrim)  - CMV: Valganciclovir (Valcyte) completed 3 months    # Blood Pressure: Controlled;  Goal BP: < 140/90   - Volume status: Euvolemic  EDW ~ 122lbs currently ranges 125-130 lbs   e  # Anemia in Chronic Renal Disease: Hgb: stable     SYLVAIN: No   - Iron studies: low iron sat will start on IV iron    # Mineral Bone Disorder:   - Secondary renal hyperparathyroidism; PTH level: Normal (18-80 pg/ml)        On treatment: None  - Vitamin D; level: High normal        On supplement: Yes  - Calcium; level: Normal        On supplement: Yes discontinue calcium supplement  - Phosphorus; level: Normal        On supplement: No,    # Electrolytes:   - Potassium; level: Normal        On supplement: No  - Magnesium; level: Normal        On supplement: Yes, 400mg daily   - Bicarbonate; level: Normal        On supplement: No    # Medical Compliance: Yes    #  LE edema: intermittent mostly upon standing and worse towards the end of the day, symptoms suggestive of dependent edema and . On compression stockings.  Started on oral Lasix 20 mg prn for lower extremity swelling Most recent UPC normal    # COVID-19 Virus Review: Discussed COVID-19 virus and the potential medical risks.  Reviewed preventative health recommendations, including wearing a mask where appropriate.  Recommended COVID vaccination should be up to date with either an initial vaccination or booster shot when appropriate.  Asked the patient to inform the transplant center if they are exposed or diagnosed with this virus.  -Will covid spike    # COVID Vaccination Up To Date: Yes 3 doses due for 4th dose    # Transplant History:  Etiology of Kidney Failure: Diabetes mellitus type 1  Tx: SPK  Transplant: 11/15/2021 (Kidney / Pancreas)  Donor Type: Donation after Brain Death Donor Class:   Crossmatch at time of Tx: negative  DSA at time of Tx: No  Significant changes  in immunosuppression: None  CMV IgG Ab High Risk Discordance (D+/R-): No  EBV IgG Ab High Risk Discordance (D+/R-): No  Significant transplant-related complications: None, BK Viremia and Borderline acute rejection    Transplant Office Phone Number: [phone]    Assessment and plan was discussed with the patient and she voiced her understanding and agreement.      Return visit: 1 month    Chaka Lock MD    Chief Complaint   Ms. Ortega is a 37 year old here for kidney transplant, pancreas transplant, immunosuppression management and new medical concerns.     History of Present Illness      Marilyn is frustrated about ongoing intermittent swelling and generalized fatigue. swelling mostly affects lower legs, ankles, and feet;Also reports now involving the face.She reports Lasix has help in the past.    She follows low salt diet. BP ~115/80s        COVID vaccine x3 due for 4th dose    Home BP: 110-130s/70s    Problem List   Patient Active Problem List   Diagnosis     Osteopenia     Chronic constipation     Irritable bowel syndrome     Stage 3a chronic kidney disease (H)     HTN, kidney transplant related     Gastroparesis     Heterozygous factor V Leiden mutation (H)     Immunosuppression (H)     Kidney replaced by transplant     Pancreas replaced by transplant (H)     Anemia due to stage 3a chronic kidney disease (H)     Aftercare following organ transplant     Generalized edema       Allergies   Allergies   Allergen Reactions     Chlorhexidine Hives, Itching and Rash     PN: Patient had swelling/rash that was very itchy with chlorprep.     Ceclor [Cefaclor Monohydrate]      Cefaclor Rash       Medications   Current Outpatient Medications   Medication Sig     apixaban ANTICOAGULANT (ELIQUIS) 2.5 MG tablet Take 1 tablet (2.5 mg) by mouth in the morning and 1 tablet (2.5 mg) in the evening.     aspirin (ASA) 325 MG tablet Take 1 tablet (325 mg) by mouth daily     calcium carbonate-vitamin D (OS-STEPHANIE WITH D) 500-200  MG-UNIT tablet Take 1 tablet by mouth 2 times daily (with meals)     cetirizine (ZYRTEC) 10 MG tablet Take 1 tablet (10 mg) by mouth every other day     cholecalciferol 25 MCG (1000 UT) TABS Take 2,000 Units by mouth every other day      Continuous Blood Gluc Sensor (DEXCOM G6 SENSOR) MISC Use as directed for continuous glucose monitoring.  Change sensor every 10 days.     Continuous Blood Gluc Transmit (DEXCOM G6 TRANSMITTER) MISC Use as directed for continuous glucose monitoring.  Change every 3 months.     docusate sodium (COLACE) 100 MG capsule Take 200 mg by mouth daily before breakfast     famotidine (PEPCID) 20 MG tablet Take 20 mg by mouth 2 times daily     furosemide (LASIX) 20 MG tablet Take 1 tablet (20 mg) by mouth as needed (three time weekly prn for ankle swelling)     glucose blood VI test strips strip 4 times daily.     insulin lispro (HUMALOG KWIKPEN) 100 UNIT/ML (1 unit dial) KWIKPEN BEFORE MEALS TID use 2 unit rapid-acting insulin to correct for every 50 mg/dL that blood glucose is above 150 mg/dL. 150-199 mg/dL: add 2 units, 200-249 mg/dL: add 4 units, 250-299 mg/dL: add 6 units, 300-349 mg/dL: add 8 units, 350-399 mg/dL: add 10 units, >400 mg/dL: add 12 units and call your doctor.     insulin pen needle (31G X 5 MM) 31G X 5 MM miscellaneous Use 3 pen needles daily or as directed. Patient/RPH may decide gauge and length.     mycophenolic acid (GENERIC EQUIVALENT) 180 MG EC tablet Take 1 tablet (180 mg) by mouth 2 times daily Total dose=540mg BID     mycophenolic acid (GENERIC EQUIVALENT) 360 MG EC tablet Take 1 tablet (360 mg) by mouth 2 times daily Total dose=540mg BID     norgestimate-ethinyl estradiol (ORTHO-CYCLEN) 0.25-35 MG-MCG tablet Take 1 tablet by mouth daily     ondansetron (ZOFRAN) 4 MG tablet Take 1 tablet (4 mg) by mouth every 8 hours as needed for nausea     Prucalopride Succinate (MOTEGRITY) 2 MG TABS Take 1 tablet (2 mg) by mouth daily     sulfamethoxazole-trimethoprim (BACTRIM)  400-80 MG tablet Take 1 tablet by mouth daily     tacrolimus (ENVARSUS XR) 0.75 MG 24 hr tablet HOLD for dose change.     tacrolimus (ENVARSUS XR) 1 MG 24 hr tablet Take 1 tablet (1 mg) by mouth every morning (before breakfast) Total dose=13mg daily     tacrolimus (ENVARSUS XR) 4 MG 24 hr tablet Take 3 tablets (12 mg) by mouth every morning (before breakfast) Total dose=13mg daily     Current Facility-Administered Medications   Medication     bevacizumab (AVASTIN) intravitreal inj 1.25 mg     bevacizumab (AVASTIN) intravitreal inj 1.25 mg     There are no discontinued medications.    Physical Exam   Vital Signs: There were no vitals taken for this visit.    Done through video no acute distress    Data     Renal Latest Ref Rng & Units 5/11/2022 5/9/2022 5/6/2022   Na 133 - 144 mmol/L 136 138 142   K 3.4 - 5.3 mmol/L 4.4 3.9 3.1(L)   Cl 94 - 109 mmol/L 108 107 108   CO2 20 - 32 mmol/L 22 28 26   BUN 7 - 30 mg/dL 30 31(H) 43(H)   Cr 0.52 - 1.04 mg/dL 1.33(H) 1.21(H) 1.62(H)   Glucose 70 - 99 mg/dL 83 74 84   Ca  8.5 - 10.1 mg/dL 9.1 9.2 9.4   Mg 1.6 - 2.3 mg/dL - 1.7 -     Bone Health Latest Ref Rng & Units 5/9/2022 4/11/2022 3/21/2022   Phos 2.5 - 4.5 mg/dL 3.1 3.8 2.5   PTHi pg/mL - - 54   Vit D Def 20 - 75 ug/L - - -     Heme Latest Ref Rng & Units 5/11/2022 5/9/2022 5/6/2022   WBC 4.0 - 11.0 10e3/uL 6.7 7.4 15.0(H)   Hgb 11.7 - 15.7 g/dL 8.9(L) 10.3(L) 8.3(L)   Plt 150 - 450 10e3/uL 232 267 247   ABSOLUTE NEUTROPHIL 1.6 - 8.3 10e3/uL - - -   ABSOLUTE LYMPHOCYTES 0.8 - 5.3 10e3/uL - - -   ABSOLUTE MONOCYTES 0.0 - 1.3 10e3/uL - - -   ABSOLUTE EOSINOPHILS 0.0 - 0.7 10e3/uL - - -   ABSOLUTE BASOPHILS 0.0 - 0.2 10e9/L - - -     Liver Latest Ref Rng & Units 5/4/2022 3/17/2022 11/15/2021   AP 40 - 150 U/L 70 - 104   TBili 0.2 - 1.3 mg/dL 0.2 - 0.4   DBili 0.0 - 0.2 mg/dL <0.1 - -   ALT 0 - 50 U/L 18 - 23   AST 0 - 45 U/L 19 - 16   Tot Protein 6.8 - 8.8 g/dL 7.6 - 8.4   Albumin 3.4 - 5.0 g/dL 4.1 3.9 4.0     Pancreas  Latest Ref Rng & Units 5/11/2022 5/9/2022 5/2/2022   A1C 0.0 - 5.6 % - - -   Amylase 30 - 110 U/L 43 60 46   Lipase 73 - 393 U/L 122 323 81     Iron studies Latest Ref Rng & Units 4/27/2022 4/18/2022 3/3/2022   Iron 35 - 180 ug/dL 56 106 139   Iron sat 15 - 46 % 20 35 35   Ferritin 12 - 150 ng/mL 110 120 106     [facility] Txp Virology Latest Ref Rng & Units 4/18/2022 1/28/2022 12/13/2021   CMV QUANT IU/ML Not Detected IU/mL Not Detected Not Detected Not Detected   EBV CAPSID ANTIBODY IGG No detectable antibody. - - -   EBV DNA COPIES/ML Not Detected copies/mL Not Detected - -        Recent Labs   Lab Test 04/22/22  0932 04/25/22  0900 05/02/22  0803 05/09/22  0844   DOSTAC 4/21/2022 4/24/2022  --  5/8/2022   TACROL 8.7 8.7 8.9 7.1     Recent Labs   Lab Test 03/21/22  0819 04/04/22  0831 04/18/22  0805   DOSMPA 3/20/2022   8:00 AM 4/3/2022   8:30 PM 4/17/2022   8:30 PM   MPACID 3.21 5.98* 7.40*   MPAG 49.2 56.9 55.6       Patient was seen and evaluated by me, Joseph Motta MD. I have reviewed the note and agree with the the plan of care as documented by the fellow.  Reviewed labs and medications and discussed the following with kathy Watkins to stop probiotic and protein powder. We discussed the possibility of a kidney closure biopsy. Will discuss further plans during our multi-disciplinary meeting.

## 2022-05-13 NOTE — LETTER
5/13/2022       RE: Marilyn Ortega  [address]  [facility] [address]     Dear Colleague,    Thank you for referring your patient, Marilyn Ortega, to the [facility] [place] [facility]. Please see a copy of my visit note below.    Marilyn is a 37 year old who is being evaluated via a billable video visit.      How would you like to obtain your AVS? MyChart  If the video visit is dropped, the invitation should be resent by: Text to cell phone: [phone]  Will anyone else be joining your video visit? No      Video Start Time: 10:00  Video-Visit Details    Type of service:  Video Visit    Video End Time:10:45    Originating Location (pt. Location): Home    Distant Location (provider location):  [facility] [place]     Platform used for Video Visit: Herman Sanders is a 36 year old who is being evaluated via a billable video visit.            TRANSPLANT NEPHROLOGY EARLY POST TRANSPLANT VISIT  Recommendation:  -Start patient on oral Lasix 20 mg PRN for lower extremity swelling  - Covid spike  -Serum Ig G  -Serum vitamin D deficiency    Assessment & Plan   # DDKT (SPK): Stable   - Baseline Creatinine: ~ 1-1.3   - Proteinuria: Not checked post transplant   - Date DSA Last Checked: Nov/2021      Latest DSA: No DSA at time of transplant   - BK Viremia: Not checked post transplant   - Kidney Tx Biopsy: May 02, 2022; Result:  Kidney allograft biopsy with borderline acute cellular rejection and no evidence of humoral rejection    # Pancreas Tx (SPK):    - Pancreatic Exocrine Drainage: Enteric drained     - Blood glucose: Euglycemia      On insulin: No   - HbA1c: Last checked at time of transplant      Latest HbA1c: 7.7%   - Pancreatic enzymes: Trend down   - Date DSA Last Checked: Nov/2021  Latest DSA: No DSA at time of transplant   - Pancreas Tx Biopsy: No    # Immunosuppression: Tacrolimus extended release  (goal 8-10) and Mycophenolic acid (dose 540 mg every 12 hours)   - Induction with Recent Transplant:  Intermediate Intensity   - Continue with intensive monitoring of immunosuppression for efficacy and toxicity.   - Changes: No but once BK resolve  Which she had probably due to being given steroids for borderline rejection will need to   # Infection Prophylaxis: due for BK screen -pending from today  - PJP: Sulfa/TMP (Bactrim)  - CMV: Valganciclovir (Valcyte) completed 3 months    # Blood Pressure: Controlled;  Goal BP: < 140/90   - Volume status: Euvolemic  EDW ~ 122lbs currently ranges 125-130 lbs   e  # Anemia in Chronic Renal Disease: Hgb: stable     SYLVAIN: No   - Iron studies: low iron sat will start on IV iron    # Mineral Bone Disorder:   - Secondary renal hyperparathyroidism; PTH level: Normal (18-80 pg/ml)        On treatment: None  - Vitamin D; level: High normal        On supplement: Yes  - Calcium; level: Normal        On supplement: Yes discontinue calcium supplement  - Phosphorus; level: Normal        On supplement: No,    # Electrolytes:   - Potassium; level: Normal        On supplement: No  - Magnesium; level: Normal        On supplement: Yes, 400mg daily   - Bicarbonate; level: Normal        On supplement: No    # Medical Compliance: Yes    #  LE edema: intermittent mostly upon standing and worse towards the end of the day, symptoms suggestive of dependent edema and . On compression stockings.  Started on oral Lasix 20 mg prn for lower extremity swelling Most recent UPC normal    # COVID-19 Virus Review: Discussed COVID-19 virus and the potential medical risks.  Reviewed preventative health recommendations, including wearing a mask where appropriate.  Recommended COVID vaccination should be up to date with either an initial vaccination or booster shot when appropriate.  Asked the patient to inform the transplant center if they are exposed or diagnosed with this virus.  -Will covid spike    # COVID  Vaccination Up To Date: Yes 3 doses due for 4th dose    # Transplant History:  Etiology of Kidney Failure: Diabetes mellitus type 1  Tx: SPK  Transplant: 11/15/2021 (Kidney / Pancreas)  Donor Type: Donation after Brain Death Donor Class:   Crossmatch at time of Tx: negative  DSA at time of Tx: No  Significant changes in immunosuppression: None  CMV IgG Ab High Risk Discordance (D+/R-): No  EBV IgG Ab High Risk Discordance (D+/R-): No  Significant transplant-related complications: None, BK Viremia and Borderline acute rejection    Transplant Office Phone Number: [phone]    Assessment and plan was discussed with the patient and she voiced her understanding and agreement.      Return visit: 1 month    Chaka Lock MD    Chief Complaint   Ms. Ortega is a 37 year old here for kidney transplant, pancreas transplant, immunosuppression management and new medical concerns.     History of Present Illness      Marilyn is frustrated about ongoing intermittent swelling and generalized fatigue. swelling mostly affects lower legs, ankles, and feet;Also reports now involving the face.She reports Lasix has help in the past.    She follows low salt diet. BP ~115/80s        COVID vaccine x3 due for 4th dose    Home BP: 110-130s/70s    Problem List   Patient Active Problem List   Diagnosis     Osteopenia     Chronic constipation     Irritable bowel syndrome     Stage 3a chronic kidney disease (H)     HTN, kidney transplant related     Gastroparesis     Heterozygous factor V Leiden mutation (H)     Immunosuppression (H)     Kidney replaced by transplant     Pancreas replaced by transplant (H)     Anemia due to stage 3a chronic kidney disease (H)     Aftercare following organ transplant     Generalized edema       Allergies   Allergies   Allergen Reactions     Chlorhexidine Hives, Itching and Rash     PN: Patient had swelling/rash that was very itchy with chlorprep.     Ceclor [Cefaclor Monohydrate]      Cefaclor Rash        Medications   Current Outpatient Medications   Medication Sig     apixaban ANTICOAGULANT (ELIQUIS) 2.5 MG tablet Take 1 tablet (2.5 mg) by mouth in the morning and 1 tablet (2.5 mg) in the evening.     aspirin (ASA) 325 MG tablet Take 1 tablet (325 mg) by mouth daily     calcium carbonate-vitamin D (OS-STEPHANIE WITH D) 500-200 MG-UNIT tablet Take 1 tablet by mouth 2 times daily (with meals)     cetirizine (ZYRTEC) 10 MG tablet Take 1 tablet (10 mg) by mouth every other day     cholecalciferol 25 MCG (1000 UT) TABS Take 2,000 Units by mouth every other day      Continuous Blood Gluc Sensor (DEXCOM G6 SENSOR) MISC Use as directed for continuous glucose monitoring.  Change sensor every 10 days.     Continuous Blood Gluc Transmit (DEXCOM G6 TRANSMITTER) MISC Use as directed for continuous glucose monitoring.  Change every 3 months.     docusate sodium (COLACE) 100 MG capsule Take 200 mg by mouth daily before breakfast     famotidine (PEPCID) 20 MG tablet Take 20 mg by mouth 2 times daily     furosemide (LASIX) 20 MG tablet Take 1 tablet (20 mg) by mouth as needed (three time weekly prn for ankle swelling)     glucose blood VI test strips strip 4 times daily.     insulin lispro (HUMALOG KWIKPEN) 100 UNIT/ML (1 unit dial) KWIKPEN BEFORE MEALS TID use 2 unit rapid-acting insulin to correct for every 50 mg/dL that blood glucose is above 150 mg/dL. 150-199 mg/dL: add 2 units, 200-249 mg/dL: add 4 units, 250-299 mg/dL: add 6 units, 300-349 mg/dL: add 8 units, 350-399 mg/dL: add 10 units, >400 mg/dL: add 12 units and call your doctor.     insulin pen needle (31G X 5 MM) 31G X 5 MM miscellaneous Use 3 pen needles daily or as directed. Patient/RPH may decide gauge and length.     mycophenolic acid (GENERIC EQUIVALENT) 180 MG EC tablet Take 1 tablet (180 mg) by mouth 2 times daily Total dose=540mg BID     mycophenolic acid (GENERIC EQUIVALENT) 360 MG EC tablet Take 1 tablet (360 mg) by mouth 2 times daily Total dose=540mg BID      norgestimate-ethinyl estradiol (ORTHO-CYCLEN) 0.25-35 MG-MCG tablet Take 1 tablet by mouth daily     ondansetron (ZOFRAN) 4 MG tablet Take 1 tablet (4 mg) by mouth every 8 hours as needed for nausea     Prucalopride Succinate (MOTEGRITY) 2 MG TABS Take 1 tablet (2 mg) by mouth daily     sulfamethoxazole-trimethoprim (BACTRIM) 400-80 MG tablet Take 1 tablet by mouth daily     tacrolimus (ENVARSUS XR) 0.75 MG 24 hr tablet HOLD for dose change.     tacrolimus (ENVARSUS XR) 1 MG 24 hr tablet Take 1 tablet (1 mg) by mouth every morning (before breakfast) Total dose=13mg daily     tacrolimus (ENVARSUS XR) 4 MG 24 hr tablet Take 3 tablets (12 mg) by mouth every morning (before breakfast) Total dose=13mg daily     Current Facility-Administered Medications   Medication     bevacizumab (AVASTIN) intravitreal inj 1.25 mg     bevacizumab (AVASTIN) intravitreal inj 1.25 mg     There are no discontinued medications.    Physical Exam   Vital Signs: There were no vitals taken for this visit.    Done through video no acute distress    Data     Renal Latest Ref Rng & Units 5/11/2022 5/9/2022 5/6/2022   Na 133 - 144 mmol/L 136 138 142   K 3.4 - 5.3 mmol/L 4.4 3.9 3.1(L)   Cl 94 - 109 mmol/L 108 107 108   CO2 20 - 32 mmol/L 22 28 26   BUN 7 - 30 mg/dL 30 31(H) 43(H)   Cr 0.52 - 1.04 mg/dL 1.33(H) 1.21(H) 1.62(H)   Glucose 70 - 99 mg/dL 83 74 84   Ca  8.5 - 10.1 mg/dL 9.1 9.2 9.4   Mg 1.6 - 2.3 mg/dL - 1.7 -     Bone Health Latest Ref Rng & Units 5/9/2022 4/11/2022 3/21/2022   Phos 2.5 - 4.5 mg/dL 3.1 3.8 2.5   PTHi pg/mL - - 54   Vit D Def 20 - 75 ug/L - - -     Heme Latest Ref Rng & Units 5/11/2022 5/9/2022 5/6/2022   WBC 4.0 - 11.0 10e3/uL 6.7 7.4 15.0(H)   Hgb 11.7 - 15.7 g/dL 8.9(L) 10.3(L) 8.3(L)   Plt 150 - 450 10e3/uL 232 267 247   ABSOLUTE NEUTROPHIL 1.6 - 8.3 10e3/uL - - -   ABSOLUTE LYMPHOCYTES 0.8 - 5.3 10e3/uL - - -   ABSOLUTE MONOCYTES 0.0 - 1.3 10e3/uL - - -   ABSOLUTE EOSINOPHILS 0.0 - 0.7 10e3/uL - - -   ABSOLUTE  BASOPHILS 0.0 - 0.2 10e9/L - - -     Liver Latest Ref Rng & Units 5/4/2022 3/17/2022 11/15/2021   AP 40 - 150 U/L 70 - 104   TBili 0.2 - 1.3 mg/dL 0.2 - 0.4   DBili 0.0 - 0.2 mg/dL <0.1 - -   ALT 0 - 50 U/L 18 - 23   AST 0 - 45 U/L 19 - 16   Tot Protein 6.8 - 8.8 g/dL 7.6 - 8.4   Albumin 3.4 - 5.0 g/dL 4.1 3.9 4.0     Pancreas Latest Ref Rng & Units 5/11/2022 5/9/2022 5/2/2022   A1C 0.0 - 5.6 % - - -   Amylase 30 - 110 U/L 43 60 46   Lipase 73 - 393 U/L 122 323 81     Iron studies Latest Ref Rng & Units 4/27/2022 4/18/2022 3/3/2022   Iron 35 - 180 ug/dL 56 106 139   Iron sat 15 - 46 % 20 35 35   Ferritin 12 - 150 ng/mL 110 120 106     [facility] Txp Virology Latest Ref Rng & Units 4/18/2022 1/28/2022 12/13/2021   CMV QUANT IU/ML Not Detected IU/mL Not Detected Not Detected Not Detected   EBV CAPSID ANTIBODY IGG No detectable antibody. - - -   EBV DNA COPIES/ML Not Detected copies/mL Not Detected - -        Recent Labs   Lab Test 04/22/22  0932 04/25/22  0900 05/02/22  0803 05/09/22  0844   DOSTAC 4/21/2022 4/24/2022  --  5/8/2022   TACROL 8.7 8.7 8.9 7.1     Recent Labs   Lab Test 03/21/22  0819 04/04/22  0831 04/18/22  0805   DOSMPA 3/20/2022   8:00 AM 4/3/2022   8:30 PM 4/17/2022   8:30 PM   MPACID 3.21 5.98* 7.40*   MPAG 49.2 56.9 55.6       Patient was seen and evaluated by me, Joseph Motta MD. I have reviewed the note and agree with the the plan of care as documented by the fellow.  Reviewed labs and medications and discussed the following with Ms. Ortega, ok to stop probiotic and protein powder. We discussed the possibility of a kidney closure biopsy. Will discuss further plans during our multi-disciplinary meeting.       Again, thank you for allowing me to participate in the care of your patient.      Sincerely,    Joseph Motta MD

## 2022-05-13 NOTE — LETTER
"          [facility]  [address] [place] [id number]  Main: [phone], Fax: [phone]    Patient seen at: [facility] at [address] [place] [address]    Outpatient Visit Note:    Patient: Marilyn Ortega  MRN: [id number]  : 1985  ELBA: May 13, 2022    Reason for visit:  Heterozygous factor V Leiden mutation. S/P renal and pancreatic transplant in 2021. Retinal venous stasis.     HPI:  Marilyn is a 37 year old female with a history of type I diabetes mellitus since age 14, hypertension, gastroparesis, gluten intolerance, chronic renal disease who underwent successful renal and pancreatic transplantation back on 2021, who is also found to have heterozygous factor V Leiden mutation during pre-transplant workup and long standing history of severe nonproliferative diabetic retinopathy of both eyes with macular edema, who is also found to have retinal venous stasis, referred by Dr. Kamran Corona for consultation.     As mentioned, Marilyn underwent successful renal and pancreatic transplant back in 2021.     Back in 2022, she started to experience some visual disturbance and went to the acute ophthalmology clinic for evaluation on 2022. Reportedly, she was noticing \"sun spots\" in her vision that have progressed over the 2 weeks. She has a family history of early cataracts. She underwent posterior iris sutured intraocular lenses and sclerally-fixed posterior chamber IOLs in Dec 2021 tot he right eye and same to the left eye in 2021. During her visit with ophtalmology clinic, she was found to have severe nonproliferative diabetic retinopathy of both eyes with macular edema associated with Type I DM, drusen of both optic discs, macular edema of both eyes, venous stasis of retina of both eyes and pseudophakia of both eyes.     According to a note by Dr. Kamran Corona (nephrology) on 3/17/2022, apparently " "the patient was placed on apixaban \"due to concern for optho of low flow and concern regarding factor V Leiden heterozygosity\".     Marilyn has had no history of venous thromboembolism.     ROS:  Denies any bleeding complications. Specifically, no frequent epistaxis. No issues with oral mucosal bleeding. Denies any hematuria or blood in stools. Denies any shortness of breath. No chest pain. No cough. No fever.    Medications:  Current Outpatient Medications   Medication     apixaban ANTICOAGULANT (ELIQUIS) 2.5 MG tablet     aspirin (ASA) 325 MG tablet     calcium carbonate-vitamin D (OS-STEPHANIE WITH D) 500-200 MG-UNIT tablet     cetirizine (ZYRTEC) 10 MG tablet     cholecalciferol 25 MCG (1000 UT) TABS     Continuous Blood Gluc Sensor (DEXCOM G6 SENSOR) MISC     Continuous Blood Gluc Transmit (DEXCOM G6 TRANSMITTER) MISC     docusate sodium (COLACE) 100 MG capsule     famotidine (PEPCID) 20 MG tablet     furosemide (LASIX) 20 MG tablet     glucose blood VI test strips strip     mycophenolic acid (GENERIC EQUIVALENT) 180 MG EC tablet     mycophenolic acid (GENERIC EQUIVALENT) 360 MG EC tablet     ondansetron (ZOFRAN) 4 MG tablet     Prucalopride Succinate (MOTEGRITY) 2 MG TABS     sulfamethoxazole-trimethoprim (BACTRIM) 400-80 MG tablet     tacrolimus (ENVARSUS XR) 0.75 MG 24 hr tablet     tacrolimus (ENVARSUS XR) 1 MG 24 hr tablet     tacrolimus (ENVARSUS XR) 4 MG 24 hr tablet     insulin lispro (HUMALOG KWIKPEN) 100 UNIT/ML (1 unit dial) KWIKPEN     insulin pen needle (31G X 5 MM) 31G X 5 MM miscellaneous     norgestimate-ethinyl estradiol (ORTHO-CYCLEN) 0.25-35 MG-MCG tablet     Current Facility-Administered Medications   Medication     bevacizumab (AVASTIN) intravitreal inj 1.25 mg     bevacizumab (AVASTIN) intravitreal inj 1.25 mg     Allergies:     Allergies   Allergen Reactions     Chlorhexidine Hives, Itching and Rash     PN: Patient had swelling/rash that was very itchy with chlorprep.     Ceclor [Cefaclor " "Monohydrate]      Cefaclor Rash     PmHx:  Past Medical History:   Diagnosis Date     Anemia 12/9/2021     C. difficile diarrhea 2013     CKD (chronic kidney disease) stage 4, GFR 15-29 ml/min (H)      Gastroparesis      Gluten intolerance      Heterozygous factor V Leiden mutation (H)      HTN (hypertension)      Infection due to 2019 novel coronavirus 11/2020     Osteomyelitis (H) 2013     Shingles 2013     Type 1 diabetes mellitus (H) 2000       Social History:   Deferred.    Family History:  She has no family history of DVT/PE.   Brother has symptomatic varicose veins but no definitive history of DVT/PE.   No other family members with history of DVT/PE.     Objective:  Vitals: /72   Pulse 98   Temp 97.6  F (36.4  C) (Oral)   Resp 16   Ht 1.676 m (5' 6\")   Wt 57.8 kg (127 lb 8 oz)   SpO2 100%   BMI 20.58 kg/m    Exam:   Complete exam is not performed today.     Labs:  7/21/2021 Positive for heterozygous factor V Leiden mutation.     Assessment:  In summary, Marilyn is a 37 year old female with a history of of type I diabetes mellitus since age 14, hypertension, gastroparesis, gluten intolerance, chronic renal disease who underwent successful renal and pancreatic transplantation back on 11/22/2021, who is also found to have heterozygous factor V Leiden mutation during pre-transplant workup and long standing history of severe nonproliferative diabetic retinopathy of both eyes with macular edema, who is also found to have retinal venous stasis, referred by Dr. Kamran Corona for consultation. Currently Marilyn is on apixaban since March 2022 \"due to concern for optho of low flow and concern regarding factor V Leiden heterozygosity\". She has had no personal or family history of venous thromboembolism.     Diagnosis:  1. History of Type I DM.  2. S/P renal and pancreatic transplantation on 11/22/2021.   3. Heterozygous factor V Leiden mutation.   4. History of severe nonproliferative diabetic retinopathy of " both eyes with macular edema.   5. S/P cataract surgery of both eyes at the end of 2021.  6. Retinal venous stasis bilaterally.    Plan:  I spent some time today to educate Marilyn about factor V Leiden mutation and venous thromboembolism.     I explain that Factor V Leiden mutation is a common genetic thrombophilia, about 5% of the general population carries the mutation. Only about 10% of whom with the mutation would ever develop a thromboembolic event in their lifetime and is usually associated with other provoking factors. People with heterozygous Factor V Leiden Mutation has a 5 folds increase risk of venous thrombosis. For comparison, women who are on estrogen based birth control have about a 7 fold increase risk of venous thrombosis. Thus heterozygous Factor V Leiden Mutation is only a minimal increase risk factor for DVT/PE.     Although the exact etiology of her retinal venous stasis is unclear and I will have to defer to the expertise of ophthalmology to address, however it is not an indications for anticoagulation therapy regardless of her factor V Leiden mutation status. This patient has no personal or family history of venous thromboembolism and just having heterozygous factor V Leiden mutation does not justify anticoagulation therapy. I recommend discontinuing anticoagulation therapy (apixaban).     With her heterozygous factor V Leiden mutation, I do recommend against the use of any estrogen containing products in the future including oral contraceptive pills that contains estrogen. I explain that IUD, progesterone only containing products and obviously barrier methods are all contraceptives alternatives that will not increase her risk of thrombosis.     I have answered all her questions to her satisfaction.    She is given our clinic's contact information and is instructed to call if she should have any further questions or concerns.     At this time I have no further plans to see this patient back  on a routine basis.     Thank you for letting us to participate in this patient's care.       Luis A Shrestha PA-C, MPAS  Physician Assistant  [facility].     20 minutes spent on the date of the encounter doing chart review, history and exam, documentation and further activities per the note.    Time IN: 12:45  Time OUT: 12:58      Vitals completed along with medication and allergy review by Amee Quinonse CMA.

## 2022-05-13 NOTE — Clinical Note
"2022       RE: Marilyn Ortega  [address]  [facility] [address]     Dear Colleague,    Thank you for referring your patient, Marilyn Ortega, to the [facility] at [facility]. Please see a copy of my visit note below.        [facility]  [address] [place] [id number]  Main: [phone], Fax: [phone]    Patient seen at: [facility] at [address] [place] [address]    Outpatient Visit Note:    Patient: Marilyn Ortega  MRN: [id number]  : 1985  ELBA: May 13, 2022    Reason for visit:  Heterozygous factor V Leiden mutation. S/P renal and pancreatic transplant in 2021. Retinal venous stasis.     HPI:  Marilyn is a 37 year old female with a history of type I diabetes mellitus since age 14, hypertension, gastroparesis, gluten intolerance, chronic renal disease who underwent successful renal and pancreatic transplantation back on 2021, who is also found to have heterozygous factor V Leiden mutation during pre-transplant workup and long standing history of severe nonproliferative diabetic retinopathy of both eyes with macular edema, who is also found to have retinal venous stasis, referred by Dr. Kamran Corona for consultation.     As mentioned, Marilyn underwent successful renal and pancreatic transplant back in 2021.     Back in 2022, she started to experience some visual disturbance and went to the acute ophthalmology clinic for evaluation on 2022. Reportedly, she was noticing \"sun spots\" in her vision that have progressed over the 2 weeks. She has a family history of early cataracts. She underwent posterior iris sutured intraocular lenses and sclerally-fixed posterior chamber IOLs in Dec 2021 tot he right eye and same to the left eye in 2021. During her visit with ophtalmology clinic, she " "was found to have severe nonproliferative diabetic retinopathy of both eyes with macular edema associated with Type I DM, drusen of both optic discs, macular edema of both eyes, venous stasis of retina of both eyes and pseudophakia of both eyes.     According to a note by Dr. Kamran Corona (nephrology) on 3/17/2022, apparently the patient was placed on apixaban \"due to concern for optho of low flow and concern regarding factor V Leiden heterozygosity\".     Marilyn has had no history of venous thromboembolism.     ROS:  Denies any bleeding complications. Specifically, no frequent epistaxis. No issues with oral mucosal bleeding. Denies any hematuria or blood in stools. Denies any shortness of breath. No chest pain. No cough. No fever.    Medications:  Current Outpatient Medications   Medication     apixaban ANTICOAGULANT (ELIQUIS) 2.5 MG tablet     aspirin (ASA) 325 MG tablet     calcium carbonate-vitamin D (OS-STEPHANIE WITH D) 500-200 MG-UNIT tablet     cetirizine (ZYRTEC) 10 MG tablet     cholecalciferol 25 MCG (1000 UT) TABS     Continuous Blood Gluc Sensor (DEXCOM G6 SENSOR) MISC     Continuous Blood Gluc Transmit (DEXCOM G6 TRANSMITTER) MISC     docusate sodium (COLACE) 100 MG capsule     famotidine (PEPCID) 20 MG tablet     furosemide (LASIX) 20 MG tablet     glucose blood VI test strips strip     mycophenolic acid (GENERIC EQUIVALENT) 180 MG EC tablet     mycophenolic acid (GENERIC EQUIVALENT) 360 MG EC tablet     ondansetron (ZOFRAN) 4 MG tablet     Prucalopride Succinate (MOTEGRITY) 2 MG TABS     sulfamethoxazole-trimethoprim (BACTRIM) 400-80 MG tablet     tacrolimus (ENVARSUS XR) 0.75 MG 24 hr tablet     tacrolimus (ENVARSUS XR) 1 MG 24 hr tablet     tacrolimus (ENVARSUS XR) 4 MG 24 hr tablet     insulin lispro (HUMALOG KWIKPEN) 100 UNIT/ML (1 unit dial) KWIKPEN     insulin pen needle (31G X 5 MM) 31G X 5 MM miscellaneous     norgestimate-ethinyl estradiol (ORTHO-CYCLEN) 0.25-35 MG-MCG tablet     Current " "Facility-Administered Medications   Medication     bevacizumab (AVASTIN) intravitreal inj 1.25 mg     bevacizumab (AVASTIN) intravitreal inj 1.25 mg     Allergies:     Allergies   Allergen Reactions     Chlorhexidine Hives, Itching and Rash     PN: Patient had swelling/rash that was very itchy with chlorprep.     Ceclor [Cefaclor Monohydrate]      Cefaclor Rash     PmHx:  Past Medical History:   Diagnosis Date     Anemia 12/9/2021     C. difficile diarrhea 2013     CKD (chronic kidney disease) stage 4, GFR 15-29 ml/min (H)      Gastroparesis      Gluten intolerance      Heterozygous factor V Leiden mutation (H)      HTN (hypertension)      Infection due to 2019 novel coronavirus 11/2020     Osteomyelitis (H) 2013     Shingles 2013     Type 1 diabetes mellitus (H) 2000       Social History:   Deferred.    Family History:  She has no family history of DVT/PE.   Brother has symptomatic varicose veins but no definitive history of DVT/PE.   No other family members with history of DVT/PE.     Objective:  Vitals: /72   Pulse 98   Temp 97.6  F (36.4  C) (Oral)   Resp 16   Ht 1.676 m (5' 6\")   Wt 57.8 kg (127 lb 8 oz)   SpO2 100%   BMI 20.58 kg/m    Exam:   Complete exam is not performed today.     Labs:  7/21/2021 Positive for heterozygous factor V Leiden mutation.     Assessment:  In summary, Marilyn is a 37 year old female with a history of of type I diabetes mellitus since age 14, hypertension, gastroparesis, gluten intolerance, chronic renal disease who underwent successful renal and pancreatic transplantation back on 11/22/2021, who is also found to have heterozygous factor V Leiden mutation during pre-transplant workup and long standing history of severe nonproliferative diabetic retinopathy of both eyes with macular edema, who is also found to have retinal venous stasis, referred by Dr. Kamran Corona for consultation. Currently Marilyn is on apixaban since March 2022 \"due to concern for optho of low flow " "and concern regarding factor V Leiden heterozygosity\". She has had no personal or family history of venous thromboembolism.     Diagnosis:  1. History of Type I DM.  2. S/P renal and pancreatic transplantation on 11/22/2021.   3. Heterozygous factor V Leiden mutation.   4. History of severe nonproliferative diabetic retinopathy of both eyes with macular edema.   5. S/P cataract surgery of both eyes at the end of 2021.  6. Retinal venous stasis bilaterally.    Plan:  I spent some time today to educate Marilyn about factor V Leiden mutation and venous thromboembolism.     I explain that Factor V Leiden mutation is a common genetic thrombophilia, about 5% of the general population carries the mutation. Only about 10% of whom with the mutation would ever develop a thromboembolic event in their lifetime and is usually associated with other provoking factors. People with heterozygous Factor V Leiden Mutation has a 5 folds increase risk of venous thrombosis. For comparison, women who are on estrogen based birth control have about a 7 fold increase risk of venous thrombosis. Thus heterozygous Factor V Leiden Mutation is only a minimal increase risk factor for DVT/PE.     Although the exact etiology of her retinal venous stasis is unclear and I will have to defer to the expertise of ophthalmology to address, however it is not an indications for anticoagulation therapy regardless of her factor V Leiden mutation status. This patient has no personal or family history of venous thromboembolism and just having heterozygous factor V Leiden mutation does not justify anticoagulation therapy. I recommend discontinuing anticoagulation therapy (apixaban).     With her heterozygous factor V Leiden mutation, I do recommend against the use of any estrogen containing products in the future including oral contraceptive pills that contains estrogen. I explain that IUD, progesterone only containing products and obviously barrier methods are " all contraceptives alternatives that will not increase her risk of thrombosis.     I have answered all her questions to her satisfaction.    She is given our clinic's contact information and is instructed to call if she should have any further questions or concerns.     At this time I have no further plans to see this patient back on a routine basis.     Thank you for letting us to participate in this patient's care.       Luis A Shrestha PA-C, MPAS  Physician Assistant  [facility].     20 minutes spent on the date of the encounter doing chart review, history and exam, documentation and further activities per the note.    Time IN: 12:45  Time OUT: 12:58          Vitals completed along with medication and allergy review by Amee Quinones CMA.          Again, thank you for allowing me to participate in the care of your patient.      Sincerely,    Luis A Shrestha PA-C

## 2022-05-15 ENCOUNTER — NURSE TRIAGE (OUTPATIENT)
Dept: NURSING | Facility: CLINIC | Age: 37
End: 2022-05-15

## 2022-05-15 NOTE — TELEPHONE ENCOUNTER
Patient is calling and is needing to schedule a covid serology test. Order is currently in epic as patient is transplant patient and is needing testing for antibodies.  [facility]NA warm transferred caller through to scheduling to schedule.    COVID 19 Nurse Triage Plan/Patient Instructions    Please be aware that novel coronavirus (COVID-19) may be circulating in the community. If you develop symptoms such as fever, cough, or SOB or if you have concerns about the presence of another infection including coronavirus (COVID-19), please contact your health care provider or visit [email or web address].     Disposition/Instructions    Home care recommended. Follow home care protocol based instructions.    Thank you for taking steps to prevent the spread of this virus.  o Limit your contact with others.  o Wear a simple mask to cover your cough.  o Wash your hands well and often.    Resources    [facility]: About COVID-19: [email or web address]    CDC: What to Do If You're Sick: www.cdc.gov/coronavirus/2019-ncov/about/steps-when-sick.html    CDC: Ending Home Isolation: www.cdc.gov/coronavirus/2019-ncov/hcp/disposition-in-home-patients.html     CDC: Caring for Someone: www.cdc.gov/coronavirus/2019-ncov/if-you-are-sick/care-for-someone.html     [facility]: Interim Guidance for Hospital Discharge to Home: www.health.[facility].mn.us/diseases/coronavirus/hcp/hospdischarge.pdf    [facility] clinical trials (COVID-19 research studies): [email or web address]     Below are the COVID-19 hotlines at the [facility] ([facility]). Interpreters are available.   o For health questions: Call 678-152-5655 or 1-847.124.2526 (7 a.m. to 7 p.m.)  o For questions about schools and childcare: Call 997-524-1705 or 1-381.195.6102 (7 a.m. to 7 p.m.)

## 2022-05-16 ENCOUNTER — LAB (OUTPATIENT)
Dept: LAB | Facility: CLINIC | Age: 37
End: 2022-05-16
Payer: COMMERCIAL

## 2022-05-16 DIAGNOSIS — Z48.298 TRANSPLANT FOLLOW-UP: ICD-10-CM

## 2022-05-16 DIAGNOSIS — Z79.899 ENCOUNTER FOR LONG-TERM CURRENT USE OF MEDICATION: ICD-10-CM

## 2022-05-16 DIAGNOSIS — Z94.0 KIDNEY REPLACED BY TRANSPLANT: ICD-10-CM

## 2022-05-16 DIAGNOSIS — Z94.83 PANCREAS REPLACED BY TRANSPLANT (H): ICD-10-CM

## 2022-05-16 DIAGNOSIS — Z48.298 AFTERCARE FOLLOWING ORGAN TRANSPLANT: ICD-10-CM

## 2022-05-16 LAB
AMYLASE SERPL-CCNC: 50 U/L (ref 30–110)
ANION GAP SERPL CALCULATED.3IONS-SCNC: 4 MMOL/L (ref 3–14)
BUN SERPL-MCNC: 29 MG/DL (ref 7–30)
CALCIUM SERPL-MCNC: 8.8 MG/DL (ref 8.5–10.1)
CHLORIDE BLD-SCNC: 109 MMOL/L (ref 94–109)
CO2 SERPL-SCNC: 26 MMOL/L (ref 20–32)
CREAT SERPL-MCNC: 1.39 MG/DL (ref 0.52–1.04)
DEPRECATED CALCIDIOL+CALCIFEROL SERPL-MC: 45 UG/L (ref 20–75)
ERYTHROCYTE [DISTWIDTH] IN BLOOD BY AUTOMATED COUNT: 12.3 % (ref 10–15)
GFR SERPL CREATININE-BSD FRML MDRD: 50 ML/MIN/1.73M2
GLUCOSE BLD-MCNC: 74 MG/DL (ref 70–99)
HCT VFR BLD AUTO: 31.2 % (ref 35–47)
HGB BLD-MCNC: 10 G/DL (ref 11.7–15.7)
LIPASE SERPL-CCNC: 134 U/L (ref 73–393)
MCH RBC QN AUTO: 29.8 PG (ref 26.5–33)
MCHC RBC AUTO-ENTMCNC: 32.1 G/DL (ref 31.5–36.5)
MCV RBC AUTO: 93 FL (ref 78–100)
MYCOPHENOLATE SERPL LC/MS/MS-MCNC: 3.48 MG/L (ref 1–3.5)
MYCOPHENOLATE-G SERPL LC/MS/MS-MCNC: 70.9 MG/L (ref 30–95)
PLATELET # BLD AUTO: 253 10E3/UL (ref 150–450)
POTASSIUM BLD-SCNC: 4.1 MMOL/L (ref 3.4–5.3)
RBC # BLD AUTO: 3.36 10E6/UL (ref 3.8–5.2)
SODIUM SERPL-SCNC: 139 MMOL/L (ref 133–144)
TACROLIMUS BLD-MCNC: 8.5 UG/L (ref 5–15)
TME LAST DOSE: NORMAL H
WBC # BLD AUTO: 6.3 10E3/UL (ref 4–11)

## 2022-05-16 PROCEDURE — 85027 COMPLETE CBC AUTOMATED: CPT

## 2022-05-16 PROCEDURE — 86769 SARS-COV-2 COVID-19 ANTIBODY: CPT

## 2022-05-16 PROCEDURE — 80048 BASIC METABOLIC PNL TOTAL CA: CPT

## 2022-05-16 PROCEDURE — 80197 ASSAY OF TACROLIMUS: CPT

## 2022-05-16 PROCEDURE — 83690 ASSAY OF LIPASE: CPT

## 2022-05-16 PROCEDURE — 80180 DRUG SCRN QUAN MYCOPHENOLATE: CPT

## 2022-05-16 PROCEDURE — 36415 COLL VENOUS BLD VENIPUNCTURE: CPT

## 2022-05-16 PROCEDURE — 82784 ASSAY IGA/IGD/IGG/IGM EACH: CPT

## 2022-05-16 PROCEDURE — 82306 VITAMIN D 25 HYDROXY: CPT | Performed by: INTERNAL MEDICINE

## 2022-05-16 PROCEDURE — 82150 ASSAY OF AMYLASE: CPT

## 2022-05-17 LAB
IGG SERPL-MCNC: 981 MG/DL (ref 610–1616)
SARS-COV-2 AB SERPL IA-ACNC: >250 U/ML
SARS-COV-2 AB SERPL QL IA: POSITIVE

## 2022-05-18 ENCOUNTER — VIRTUAL VISIT (OUTPATIENT)
Dept: NEPHROLOGY | Facility: CLINIC | Age: 37
End: 2022-05-18
Attending: INTERNAL MEDICINE
Payer: COMMERCIAL

## 2022-05-18 ENCOUNTER — TELEPHONE (OUTPATIENT)
Dept: TRANSPLANT | Facility: CLINIC | Age: 37
End: 2022-05-18

## 2022-05-18 VITALS — BODY MASS INDEX: 20.18 KG/M2 | WEIGHT: 125 LBS

## 2022-05-18 DIAGNOSIS — Z48.298 AFTERCARE FOLLOWING ORGAN TRANSPLANT: Primary | ICD-10-CM

## 2022-05-18 DIAGNOSIS — D84.9 IMMUNOSUPPRESSION (H): ICD-10-CM

## 2022-05-18 DIAGNOSIS — Z94.0 KIDNEY REPLACED BY TRANSPLANT: ICD-10-CM

## 2022-05-18 DIAGNOSIS — Z94.0 KIDNEY REPLACED BY TRANSPLANT: Primary | ICD-10-CM

## 2022-05-18 DIAGNOSIS — Z94.0 HTN, KIDNEY TRANSPLANT RELATED: ICD-10-CM

## 2022-05-18 DIAGNOSIS — Z94.83 PANCREAS REPLACED BY TRANSPLANT (H): ICD-10-CM

## 2022-05-18 DIAGNOSIS — R53.83 FATIGUE: ICD-10-CM

## 2022-05-18 DIAGNOSIS — I15.1 HTN, KIDNEY TRANSPLANT RELATED: ICD-10-CM

## 2022-05-18 PROCEDURE — 99215 OFFICE O/P EST HI 40 MIN: CPT | Mod: GT | Performed by: INTERNAL MEDICINE

## 2022-05-18 ASSESSMENT — PAIN SCALES - GENERAL: PAINLEVEL: NO PAIN (0)

## 2022-05-18 NOTE — PROGRESS NOTES
Marilyn is a 37 year old who is being evaluated via a billable video visit.      How would you like to obtain your AVS? MyChart  If the video visit is dropped, the invitation should be resent by: Send to e-mail at: [email or web address]  Will anyone else be joining your video visit? No      Video Start Time: 9;58    Video-Visit Details    Type of service:  Video Visit    Video End Time: 10:33    Originating Location (pt. Location): Home    Distant Location (provider location):  [facility] [place]     Platform used for Video Visit: Magnetic           TRANSPLANT NEPHROLOGY EARLY POST TRANSPLANT VISIT  Plan:  COVID booster  Discussed evusheld (COVID Ab positive on last check)  Continue to monitor BK biweekly  Unclear explanation for the persistent fatigue, low energy, and inability to return to baseline function. W/up included EBV, CMV, TSH, iron studies  Exercise stress test   Closure biopsy of the kidney 1st in 4-6 weeks post initial with borderline and will consider pancreas bx if uptrend in lipase    Plan discussed during pancreas meeting with multidisciplinary team    Assessment & Plan   # DDKT (SPK): Stable   - Baseline Creatinine: ~ 1-1.3   - Proteinuria: Not checked post transplant   - Date DSA Last Checked: Nov/2021      Latest DSA: No DSA at time of transplant   - BK Viremia: Not checked post transplant   - Kidney Tx Biopsy: No    # Pancreas Tx (SPK):    - Pancreatic Exocrine Drainage: Enteric drained     - Blood glucose: Euglycemia      On insulin: No   - HbA1c: Last checked at time of transplant      Latest HbA1c: 7.7%   - Pancreatic enzymes: Trend down   - Date DSA Last Checked: Nov/2021  Latest DSA: No DSA at time of transplant   - Pancreas Tx Biopsy: No    # Immunosuppression: Tacrolimus immediate release (goal 8-10), Mycophenolic acid (dose 720 mg every 12 hours) and Prednisone (dose taper)   - Induction with Recent Transplant:  Intermediate Intensity   - Continue with  intensive monitoring of immunosuppression for efficacy and toxicity.   - Changes: yes envarsus increased from 4 to 6 mg po every day for sub-therpeutic level, awaiting repeat today    # Infection Prophylaxis: due for BK screen -pending from today  - PJP: Sulfa/TMP (Bactrim)  - CMV: Valganciclovir (Valcyte) completed 3 months    # Blood Pressure: Controlled;  Goal BP: < 140/90   - Volume status: Euvolemic  EDW ~ 122lbs currently ranges 125-130 lbs   e  # Anemia in Chronic Renal Disease: Hgb: stable     SYLVAIN: No   - Iron studies: previously on iron tabs currently off recheck given fatigue and recent menorrhagia    # Mineral Bone Disorder:   - Secondary renal hyperparathyroidism; PTH level: Normal (18-80 pg/ml)        On treatment: None  - Vitamin D; level: High normal        On supplement: Yes  - Calcium; level: Normal        On supplement: No  - Phosphorus; level: Normal        On supplement: Yes, 250mg bid if next phos>2 could reduce to 250 mg po qd    # Electrolytes:   - Potassium; level: Normal        On supplement: No  - Magnesium; level: Normal        On supplement: Yes, 400mg daily   - Bicarbonate; level: Normal        On supplement: No    # Medical Compliance: Yes    # Fatigue: check CMV EBV TSH iron studies    #  LE edema: intermittent mostly upon standing and worse towards the end of the day, symptoms suggestive of dependent edema and  Not intravascular fluid overload, trial of various forms of diuretics led to BERNIE & discontinuation. Responds to compression stockings. Most recent C normal    # COVID-19 Virus Review: Discussed COVID-19 virus and the potential medical risks.  Reviewed preventative health recommendations, including wearing a mask where appropriate.  Recommended COVID vaccination should be up to date with either an initial vaccination or booster shot when appropriate.  Asked the patient to inform the transplant center if they are exposed or diagnosed with this virus.    # COVID Vaccination Up  To Date: Yes 3 doses due for 4th dose    # Transplant History:  Etiology of Kidney Failure: Diabetes mellitus type 1  Tx: SPK  Transplant: 11/15/2021 (Kidney / Pancreas)  Donor Type: Donation after Brain Death Donor Class:   Crossmatch at time of Tx: negative  DSA at time of Tx: No  Significant changes in immunosuppression: None  CMV IgG Ab High Risk Discordance (D+/R-): No  EBV IgG Ab High Risk Discordance (D+/R-): No  Significant transplant-related complications: None    Transplant Office Phone Number: [phone]    Assessment and plan was discussed with the patient and she voiced her understanding and agreement.      Return visit: 1 month    Reanna Lee MD    Chief Complaint   Ms. Ortega is a 37 year old here for kidney transplant, pancreas transplant, immunosuppression management and new medical concerns.     History of Present Illness      Marilyn was recently treated for borderline cellular rejection of the kidney noted on kidney bx done for cause as Cr was up to 1.6 from prior baseline ~1.1-1.3. Cr now down to 1.2-1.3. she had some facial swelling in the following days. She continues to struggle with generalized fatigue and inability to get back to her prior energy level and exercise tolerance. W/up so far has been unrevealing with neg EBV/CMV/TSH, nl iron studies. She denies any fevers, chills, nausea. Vomiting, diarrhea. She continues to delal with intermittent facial and leg/foot swelling that improves with compression stockings, yet this has significantly impacted her quality of life since transplant. Tremors have improved with the switch to envarsus from short acting tacrolimus.   Labs have shown intermittent rise in lipase which subsequently improved with bowel regimen. KUB suggested stool burden and US pancreas was unremarkable    IS Envarsus 6 mg every day, /540 (reduced by  1/31 due to GI issues)-most recent trough 3.5 5/16  COVID vaccine x3 due for 4th dose        Problem  List   Patient Active Problem List   Diagnosis     Osteopenia     Chronic constipation     Irritable bowel syndrome     Stage 3a chronic kidney disease (H)     HTN, kidney transplant related     Gastroparesis     Heterozygous factor V Leiden mutation (H)     Immunosuppression (H)     Kidney replaced by transplant     Pancreas replaced by transplant (H)     Anemia due to stage 3a chronic kidney disease (H)     Aftercare following organ transplant     Generalized edema       Allergies   Allergies   Allergen Reactions     Chlorhexidine Hives, Itching and Rash     PN: Patient had swelling/rash that was very itchy with chlorprep.     Ceclor [Cefaclor Monohydrate]      Cefaclor Rash       Medications   Current Outpatient Medications   Medication Sig     apixaban ANTICOAGULANT (ELIQUIS) 2.5 MG tablet Take 1 tablet (2.5 mg) by mouth in the morning and 1 tablet (2.5 mg) in the evening.     aspirin (ASA) 325 MG tablet Take 1 tablet (325 mg) by mouth daily     calcium carbonate-vitamin D (OS-STEPHANIE WITH D) 500-200 MG-UNIT tablet Take 1 tablet by mouth 2 times daily (with meals)     cetirizine (ZYRTEC) 10 MG tablet Take 1 tablet (10 mg) by mouth every other day     cholecalciferol 25 MCG (1000 UT) TABS Take 2,000 Units by mouth every other day      Continuous Blood Gluc Sensor (DEXCOM G6 SENSOR) MISC Use as directed for continuous glucose monitoring.  Change sensor every 10 days.     Continuous Blood Gluc Transmit (DEXCOM G6 TRANSMITTER) MISC Use as directed for continuous glucose monitoring.  Change every 3 months.     docusate sodium (COLACE) 100 MG capsule Take 200 mg by mouth daily before breakfast     famotidine (PEPCID) 20 MG tablet Take 20 mg by mouth 2 times daily     furosemide (LASIX) 20 MG tablet Take 1 tablet (20 mg) by mouth as needed (three time weekly prn for ankle swelling)     glucose blood VI test strips strip 4 times daily.     insulin lispro (HUMALOG KWIKPEN) 100 UNIT/ML (1 unit dial) KWIKPEN BEFORE MEALS TID  use 2 unit rapid-acting insulin to correct for every 50 mg/dL that blood glucose is above 150 mg/dL. 150-199 mg/dL: add 2 units, 200-249 mg/dL: add 4 units, 250-299 mg/dL: add 6 units, 300-349 mg/dL: add 8 units, 350-399 mg/dL: add 10 units, >400 mg/dL: add 12 units and call your doctor.     insulin pen needle (31G X 5 MM) 31G X 5 MM miscellaneous Use 3 pen needles daily or as directed. Patient/RPH may decide gauge and length.     mycophenolic acid (GENERIC EQUIVALENT) 180 MG EC tablet Take 1 tablet (180 mg) by mouth 2 times daily Total dose=540mg BID     mycophenolic acid (GENERIC EQUIVALENT) 360 MG EC tablet Take 1 tablet (360 mg) by mouth 2 times daily Total dose=540mg BID     norgestimate-ethinyl estradiol (ORTHO-CYCLEN) 0.25-35 MG-MCG tablet Take 1 tablet by mouth daily     ondansetron (ZOFRAN) 4 MG tablet Take 1 tablet (4 mg) by mouth every 8 hours as needed for nausea     Prucalopride Succinate (MOTEGRITY) 2 MG TABS Take 1 tablet (2 mg) by mouth daily     sulfamethoxazole-trimethoprim (BACTRIM) 400-80 MG tablet Take 1 tablet by mouth daily     tacrolimus (ENVARSUS XR) 0.75 MG 24 hr tablet HOLD for dose change.     tacrolimus (ENVARSUS XR) 1 MG 24 hr tablet Take 1 tablet (1 mg) by mouth every morning (before breakfast) Total dose=13mg daily     tacrolimus (ENVARSUS XR) 4 MG 24 hr tablet Take 3 tablets (12 mg) by mouth every morning (before breakfast) Total dose=13mg daily     Current Facility-Administered Medications   Medication     bevacizumab (AVASTIN) intravitreal inj 1.25 mg     bevacizumab (AVASTIN) intravitreal inj 1.25 mg     There are no discontinued medications.    Physical Exam   Vital Signs: Wt 56.7 kg (125 lb)   BMI 20.18 kg/m      GENERAL: Healthy, alert and no distress  EYES: Eyes grossly normal to inspection.  No discharge or erythema, or obvious scleral/conjunctival abnormalities.  RESP: No audible wheeze, cough, or visible cyanosis.  No visible retractions or increased work of breathing.     SKIN: Visible skin clear. No significant rash, abnormal pigmentation or lesions.  NEURO: Cranial nerves grossly intact.  Mentation and speech appropriate for age.  PSYCH: Mentation appears normal, affect normal/bright, judgement and insight intact, normal speech and appearance well-groomed.    Data     Renal Latest Ref Rng & Units 5/16/2022 5/11/2022 5/9/2022   Na 133 - 144 mmol/L 139 136 138   K 3.4 - 5.3 mmol/L 4.1 4.4 3.9   Cl 94 - 109 mmol/L 109 108 107   CO2 20 - 32 mmol/L 26 22 28   BUN 7 - 30 mg/dL 29 30 31(H)   Cr 0.52 - 1.04 mg/dL 1.39(H) 1.33(H) 1.21(H)   Glucose 70 - 99 mg/dL 74 83 74   Ca  8.5 - 10.1 mg/dL 8.8 9.1 9.2   Mg 1.6 - 2.3 mg/dL - - 1.7     Bone Health Latest Ref Rng & Units 5/16/2022 5/9/2022 4/11/2022   Phos 2.5 - 4.5 mg/dL - 3.1 3.8   PTHi pg/mL - - -   Vit D Def 20 - 75 ug/L 45 - -     Heme Latest Ref Rng & Units 5/16/2022 5/11/2022 5/9/2022   WBC 4.0 - 11.0 10e3/uL 6.3 6.7 7.4   Hgb 11.7 - 15.7 g/dL 10.0(L) 8.9(L) 10.3(L)   Plt 150 - 450 10e3/uL 253 232 267   ABSOLUTE NEUTROPHIL 1.6 - 8.3 10e3/uL - - -   ABSOLUTE LYMPHOCYTES 0.8 - 5.3 10e3/uL - - -   ABSOLUTE MONOCYTES 0.0 - 1.3 10e3/uL - - -   ABSOLUTE EOSINOPHILS 0.0 - 0.7 10e3/uL - - -   ABSOLUTE BASOPHILS 0.0 - 0.2 10e9/L - - -     Liver Latest Ref Rng & Units 5/4/2022 3/17/2022 11/15/2021   AP 40 - 150 U/L 70 - 104   TBili 0.2 - 1.3 mg/dL 0.2 - 0.4   DBili 0.0 - 0.2 mg/dL <0.1 - -   ALT 0 - 50 U/L 18 - 23   AST 0 - 45 U/L 19 - 16   Tot Protein 6.8 - 8.8 g/dL 7.6 - 8.4   Albumin 3.4 - 5.0 g/dL 4.1 3.9 4.0     Pancreas Latest Ref Rng & Units 5/16/2022 5/11/2022 5/9/2022   A1C 0.0 - 5.6 % - - -   Amylase 30 - 110 U/L 50 43 60   Lipase 73 - 393 U/L 134 122 323     Iron studies Latest Ref Rng & Units 4/27/2022 4/18/2022 3/3/2022   Iron 35 - 180 ug/dL 56 106 139   Iron sat 15 - 46 % 20 35 35   Ferritin 12 - 150 ng/mL 110 120 106     [facility] Txp Virology Latest Ref Rng & Units 4/18/2022 1/28/2022 12/13/2021   CMV QUANT IU/ML Not Detected IU/mL  Not Detected Not Detected Not Detected   EBV CAPSID ANTIBODY IGG No detectable antibody. - - -   EBV DNA COPIES/ML Not Detected copies/mL Not Detected - -        Recent Labs   Lab Test 04/25/22  0900 05/02/22  0803 05/09/22  0844 05/16/22  0904   DOSTAC 4/24/2022  --  5/8/2022 5/15/2022   TACROL 8.7 8.9 7.1 8.5     Recent Labs   Lab Test 04/04/22  0831 04/18/22  0805 05/16/22  0904   DOSMPA 4/3/2022   8:30 PM 4/17/2022   8:30 PM 5/15/2022   8:00 PM   MPACID 5.98* 7.40* 3.48   MPAG 56.9 55.6 70.9

## 2022-05-18 NOTE — LETTER
5/18/2022       RE: Marilyn Ortega  [address]  [facility] [address]     Dear Colleague,    Thank you for referring your patient, Marilyn Ortega, to the [facility] NEPHROLOGY [facility] [place] at [facility]. Please see a copy of my visit note below.        Marilyn is a 37 year old who is being evaluated via a billable video visit.      How would you like to obtain your AVS? MyChart  If the video visit is dropped, the invitation should be resent by: Send to e-mail at: [email or web address]  Will anyone else be joining your video visit? No      Video Start Time: 9;58    Video-Visit Details    Type of service:  Video Visit    Video End Time: 10:33    Originating Location (pt. Location): Home    Distant Location (provider location):  [facility] NEPHROLOGY [facility] [place]     Platform used for Video Visit: Shineon           TRANSPLANT NEPHROLOGY EARLY POST TRANSPLANT VISIT  Plan:  COVID booster  Discussed shira (COVID Ab positive on last check)  Continue to monitor BK biweekly  Unclear explanation for the persistent fatigue, low energy, and inability to return to baseline function. W/up included EBV, CMV, TSH, iron studies  Exercise stress test   Closure biopsy of the kidney 1st in 4-6 weeks post initial with borderline and will consider pancreas bx if uptrend in lipase    Plan discussed during pancreas meeting with multidisciplinary team    Assessment & Plan   # DDKT (SPK): Stable   - Baseline Creatinine: ~ 1-1.3   - Proteinuria: Not checked post transplant   - Date DSA Last Checked: Nov/2021      Latest DSA: No DSA at time of transplant   - BK Viremia: Not checked post transplant   - Kidney Tx Biopsy: No    # Pancreas Tx (SPK):    - Pancreatic Exocrine Drainage: Enteric drained     - Blood glucose: Euglycemia      On insulin: No   - HbA1c: Last checked at time of transplant      Latest HbA1c: 7.7%   - Pancreatic enzymes: Trend down   - Date  DSA Last Checked: Nov/2021  Latest DSA: No DSA at time of transplant   - Pancreas Tx Biopsy: No    # Immunosuppression: Tacrolimus immediate release (goal 8-10), Mycophenolic acid (dose 720 mg every 12 hours) and Prednisone (dose taper)   - Induction with Recent Transplant:  Intermediate Intensity   - Continue with intensive monitoring of immunosuppression for efficacy and toxicity.   - Changes: yes envarsus increased from 4 to 6 mg po every day for sub-therpeutic level, awaiting repeat today    # Infection Prophylaxis: due for BK screen -pending from today  - PJP: Sulfa/TMP (Bactrim)  - CMV: Valganciclovir (Valcyte) completed 3 months    # Blood Pressure: Controlled;  Goal BP: < 140/90   - Volume status: Euvolemic  EDW ~ 122lbs currently ranges 125-130 lbs   e  # Anemia in Chronic Renal Disease: Hgb: stable     SYLVAIN: No   - Iron studies: previously on iron tabs currently off recheck given fatigue and recent menorrhagia    # Mineral Bone Disorder:   - Secondary renal hyperparathyroidism; PTH level: Normal (18-80 pg/ml)        On treatment: None  - Vitamin D; level: High normal        On supplement: Yes  - Calcium; level: Normal        On supplement: No  - Phosphorus; level: Normal        On supplement: Yes, 250mg bid if next phos>2 could reduce to 250 mg po qd    # Electrolytes:   - Potassium; level: Normal        On supplement: No  - Magnesium; level: Normal        On supplement: Yes, 400mg daily   - Bicarbonate; level: Normal        On supplement: No    # Medical Compliance: Yes    # Fatigue: check CMV EBV TSH iron studies    #  LE edema: intermittent mostly upon standing and worse towards the end of the day, symptoms suggestive of dependent edema and  Not intravascular fluid overload, trial of various forms of diuretics led to BERNIE & discontinuation. Responds to compression stockings. Most recent UPC normal    # COVID-19 Virus Review: Discussed COVID-19 virus and the potential medical risks.  Reviewed preventative  health recommendations, including wearing a mask where appropriate.  Recommended COVID vaccination should be up to date with either an initial vaccination or booster shot when appropriate.  Asked the patient to inform the transplant center if they are exposed or diagnosed with this virus.    # COVID Vaccination Up To Date: Yes 3 doses due for 4th dose    # Transplant History:  Etiology of Kidney Failure: Diabetes mellitus type 1  Tx: SPK  Transplant: 11/15/2021 (Kidney / Pancreas)  Donor Type: Donation after Brain Death Donor Class:   Crossmatch at time of Tx: negative  DSA at time of Tx: No  Significant changes in immunosuppression: None  CMV IgG Ab High Risk Discordance (D+/R-): No  EBV IgG Ab High Risk Discordance (D+/R-): No  Significant transplant-related complications: None    Transplant Office Phone Number: [phone]    Assessment and plan was discussed with the patient and she voiced her understanding and agreement.      Return visit: 1 month    Reanna Lee MD    Chief Complaint   Ms. Ortega is a 37 year old here for kidney transplant, pancreas transplant, immunosuppression management and new medical concerns.     History of Present Illness      Marilyn was recently treated for borderline cellular rejection of the kidney noted on kidney bx done for cause as Cr was up to 1.6 from prior baseline ~1.1-1.3. Cr now down to 1.2-1.3. she had some facial swelling in the following days. She continues to struggle with generalized fatigue and inability to get back to her prior energy level and exercise tolerance. W/up so far has been unrevealing with neg EBV/CMV/TSH, nl iron studies. She denies any fevers, chills, nausea. Vomiting, diarrhea. She continues to delal with intermittent facial and leg/foot swelling that improves with compression stockings, yet this has significantly impacted her quality of life since transplant. Tremors have improved with the switch to envarsus from short acting tacrolimus.   Labs  have shown intermittent rise in lipase which subsequently improved with bowel regimen. KUB suggested stool burden and US pancreas was unremarkable    IS Envarsus 6 mg every day, /540 (reduced by  1/31 due to GI issues)-most recent trough 3.5 5/16  COVID vaccine x3 due for 4th dose        Problem List   Patient Active Problem List   Diagnosis     Osteopenia     Chronic constipation     Irritable bowel syndrome     Stage 3a chronic kidney disease (H)     HTN, kidney transplant related     Gastroparesis     Heterozygous factor V Leiden mutation (H)     Immunosuppression (H)     Kidney replaced by transplant     Pancreas replaced by transplant (H)     Anemia due to stage 3a chronic kidney disease (H)     Aftercare following organ transplant     Generalized edema       Allergies   Allergies   Allergen Reactions     Chlorhexidine Hives, Itching and Rash     PN: Patient had swelling/rash that was very itchy with chlorprep.     Ceclor [Cefaclor Monohydrate]      Cefaclor Rash       Medications   Current Outpatient Medications   Medication Sig     apixaban ANTICOAGULANT (ELIQUIS) 2.5 MG tablet Take 1 tablet (2.5 mg) by mouth in the morning and 1 tablet (2.5 mg) in the evening.     aspirin (ASA) 325 MG tablet Take 1 tablet (325 mg) by mouth daily     calcium carbonate-vitamin D (OS-STEPHANIE WITH D) 500-200 MG-UNIT tablet Take 1 tablet by mouth 2 times daily (with meals)     cetirizine (ZYRTEC) 10 MG tablet Take 1 tablet (10 mg) by mouth every other day     cholecalciferol 25 MCG (1000 UT) TABS Take 2,000 Units by mouth every other day      Continuous Blood Gluc Sensor (DEXCOM G6 SENSOR) MISC Use as directed for continuous glucose monitoring.  Change sensor every 10 days.     Continuous Blood Gluc Transmit (DEXCOM G6 TRANSMITTER) MISC Use as directed for continuous glucose monitoring.  Change every 3 months.     docusate sodium (COLACE) 100 MG capsule Take 200 mg by mouth daily before breakfast     famotidine  (PEPCID) 20 MG tablet Take 20 mg by mouth 2 times daily     furosemide (LASIX) 20 MG tablet Take 1 tablet (20 mg) by mouth as needed (three time weekly prn for ankle swelling)     glucose blood VI test strips strip 4 times daily.     insulin lispro (HUMALOG KWIKPEN) 100 UNIT/ML (1 unit dial) KWIKPEN BEFORE MEALS TID use 2 unit rapid-acting insulin to correct for every 50 mg/dL that blood glucose is above 150 mg/dL. 150-199 mg/dL: add 2 units, 200-249 mg/dL: add 4 units, 250-299 mg/dL: add 6 units, 300-349 mg/dL: add 8 units, 350-399 mg/dL: add 10 units, >400 mg/dL: add 12 units and call your doctor.     insulin pen needle (31G X 5 MM) 31G X 5 MM miscellaneous Use 3 pen needles daily or as directed. Patient/RPH may decide gauge and length.     mycophenolic acid (GENERIC EQUIVALENT) 180 MG EC tablet Take 1 tablet (180 mg) by mouth 2 times daily Total dose=540mg BID     mycophenolic acid (GENERIC EQUIVALENT) 360 MG EC tablet Take 1 tablet (360 mg) by mouth 2 times daily Total dose=540mg BID     norgestimate-ethinyl estradiol (ORTHO-CYCLEN) 0.25-35 MG-MCG tablet Take 1 tablet by mouth daily     ondansetron (ZOFRAN) 4 MG tablet Take 1 tablet (4 mg) by mouth every 8 hours as needed for nausea     Prucalopride Succinate (MOTEGRITY) 2 MG TABS Take 1 tablet (2 mg) by mouth daily     sulfamethoxazole-trimethoprim (BACTRIM) 400-80 MG tablet Take 1 tablet by mouth daily     tacrolimus (ENVARSUS XR) 0.75 MG 24 hr tablet HOLD for dose change.     tacrolimus (ENVARSUS XR) 1 MG 24 hr tablet Take 1 tablet (1 mg) by mouth every morning (before breakfast) Total dose=13mg daily     tacrolimus (ENVARSUS XR) 4 MG 24 hr tablet Take 3 tablets (12 mg) by mouth every morning (before breakfast) Total dose=13mg daily     Current Facility-Administered Medications   Medication     bevacizumab (AVASTIN) intravitreal inj 1.25 mg     bevacizumab (AVASTIN) intravitreal inj 1.25 mg     There are no discontinued medications.    Physical Exam   Vital  Signs: Wt 56.7 kg (125 lb)   BMI 20.18 kg/m      GENERAL: Healthy, alert and no distress  EYES: Eyes grossly normal to inspection.  No discharge or erythema, or obvious scleral/conjunctival abnormalities.  RESP: No audible wheeze, cough, or visible cyanosis.  No visible retractions or increased work of breathing.    SKIN: Visible skin clear. No significant rash, abnormal pigmentation or lesions.  NEURO: Cranial nerves grossly intact.  Mentation and speech appropriate for age.  PSYCH: Mentation appears normal, affect normal/bright, judgement and insight intact, normal speech and appearance well-groomed.    Data     Renal Latest Ref Rng & Units 5/16/2022 5/11/2022 5/9/2022   Na 133 - 144 mmol/L 139 136 138   K 3.4 - 5.3 mmol/L 4.1 4.4 3.9   Cl 94 - 109 mmol/L 109 108 107   CO2 20 - 32 mmol/L 26 22 28   BUN 7 - 30 mg/dL 29 30 31(H)   Cr 0.52 - 1.04 mg/dL 1.39(H) 1.33(H) 1.21(H)   Glucose 70 - 99 mg/dL 74 83 74   Ca  8.5 - 10.1 mg/dL 8.8 9.1 9.2   Mg 1.6 - 2.3 mg/dL - - 1.7     Bone Health Latest Ref Rng & Units 5/16/2022 5/9/2022 4/11/2022   Phos 2.5 - 4.5 mg/dL - 3.1 3.8   PTHi pg/mL - - -   Vit D Def 20 - 75 ug/L 45 - -     Heme Latest Ref Rng & Units 5/16/2022 5/11/2022 5/9/2022   WBC 4.0 - 11.0 10e3/uL 6.3 6.7 7.4   Hgb 11.7 - 15.7 g/dL 10.0(L) 8.9(L) 10.3(L)   Plt 150 - 450 10e3/uL 253 232 267   ABSOLUTE NEUTROPHIL 1.6 - 8.3 10e3/uL - - -   ABSOLUTE LYMPHOCYTES 0.8 - 5.3 10e3/uL - - -   ABSOLUTE MONOCYTES 0.0 - 1.3 10e3/uL - - -   ABSOLUTE EOSINOPHILS 0.0 - 0.7 10e3/uL - - -   ABSOLUTE BASOPHILS 0.0 - 0.2 10e9/L - - -     Liver Latest Ref Rng & Units 5/4/2022 3/17/2022 11/15/2021   AP 40 - 150 U/L 70 - 104   TBili 0.2 - 1.3 mg/dL 0.2 - 0.4   DBili 0.0 - 0.2 mg/dL <0.1 - -   ALT 0 - 50 U/L 18 - 23   AST 0 - 45 U/L 19 - 16   Tot Protein 6.8 - 8.8 g/dL 7.6 - 8.4   Albumin 3.4 - 5.0 g/dL 4.1 3.9 4.0     Pancreas Latest Ref Rng & Units 5/16/2022 5/11/2022 5/9/2022   A1C 0.0 - 5.6 % - - -   Amylase 30 - 110 U/L 50 43  60   Lipase 73 - 393 U/L 134 122 323     Iron studies Latest Ref Rng & Units 4/27/2022 4/18/2022 3/3/2022   Iron 35 - 180 ug/dL 56 106 139   Iron sat 15 - 46 % 20 35 35   Ferritin 12 - 150 ng/mL 110 120 106     [facility] Txp Virology Latest Ref Rng & Units 4/18/2022 1/28/2022 12/13/2021   CMV QUANT IU/ML Not Detected IU/mL Not Detected Not Detected Not Detected   EBV CAPSID ANTIBODY IGG No detectable antibody. - - -   EBV DNA COPIES/ML Not Detected copies/mL Not Detected - -        Recent Labs   Lab Test 04/25/22  0900 05/02/22  0803 05/09/22  0844 05/16/22  0904   DOSTAC 4/24/2022  --  5/8/2022 5/15/2022   TACROL 8.7 8.9 7.1 8.5     Recent Labs   Lab Test 04/04/22  0831 04/18/22  0805 05/16/22  0904   DOSMPA 4/3/2022   8:30 PM 4/17/2022   8:30 PM 5/15/2022   8:00 PM   MPACID 5.98* 7.40* 3.48   MPAG 56.9 55.6 70.9       Again, thank you for allowing me to participate in the care of your patient.      Sincerely,    Reanna Lee MD

## 2022-05-18 NOTE — LETTER
Date:May 19, 2022      Patient was self referred, no letter generated. Do not send.        [facility] Health Information

## 2022-05-20 ENCOUNTER — VIRTUAL VISIT (OUTPATIENT)
Dept: GASTROENTEROLOGY | Facility: CLINIC | Age: 37
End: 2022-05-20
Payer: COMMERCIAL

## 2022-05-20 VITALS — DIASTOLIC BLOOD PRESSURE: 75 MMHG | WEIGHT: 124 LBS | SYSTOLIC BLOOD PRESSURE: 115 MMHG | BODY MASS INDEX: 20.01 KG/M2

## 2022-05-20 DIAGNOSIS — K58.2 IRRITABLE BOWEL SYNDROME WITH BOTH CONSTIPATION AND DIARRHEA: ICD-10-CM

## 2022-05-20 DIAGNOSIS — Z94.83 PANCREAS REPLACED BY TRANSPLANT (H): ICD-10-CM

## 2022-05-20 DIAGNOSIS — K31.84 GASTROPARESIS: ICD-10-CM

## 2022-05-20 DIAGNOSIS — Z94.0 KIDNEY REPLACED BY TRANSPLANT: ICD-10-CM

## 2022-05-20 DIAGNOSIS — K59.09 CHRONIC CONSTIPATION: Primary | ICD-10-CM

## 2022-05-20 DIAGNOSIS — D84.9 IMMUNOSUPPRESSION (H): ICD-10-CM

## 2022-05-20 PROCEDURE — 99214 OFFICE O/P EST MOD 30 MIN: CPT | Mod: GT | Performed by: INTERNAL MEDICINE

## 2022-05-20 ASSESSMENT — PAIN SCALES - GENERAL: PAINLEVEL: NO PAIN (0)

## 2022-05-20 NOTE — LETTER
5/20/2022         RE: Marilyn Ortega  [address]  [place] [address]        Dear Colleague,    Thank you for referring your patient, Marilyn Ortega, to the [facility] GASTROENTEROLOGY [facility]IN[facility] [place]. Please see a copy of my visit note below.    Marilyn is a 37 year old who is being evaluated via a billable video visit.      How would you like to obtain your AVS? MyChart  If the video visit is dropped, the invitation should be resent by: Send to e-mail at: [email or web address]  Will anyone else be joining your video visit? No      Video Start Time: 1008  Video-Visit Details    Type of service:  Video Visit    Video End Time:10:27 AM    Originating Location (pt. Location): Home    Distant Location (provider location):  [facility] GASTROENTEROLOGY [facility]IN[facility] [place]     Platform used for Video Visit: [employer]    HPI:   36 yo woman with type 1 DM (not previously on dialysis) s/p panc-kidney transplant Nov 2021, gastroparesis but not retinopathy, neuropathy who presents for follow up of gastroparesis and change in bowel habits.     Had face, mouth twitching with Reglan so stopped this. Symptoms have improved, also wondering if this is related to tacrolimus as she had this a little before Reglan. Feels like the reglan amplified it though.     Alternating constipation and regular bowel movements. No diarrhea. Essentially unchanged since our last visit in February.     Schedule for pelvic floor testing on 5/31. We discussed confounders to gastric emptying study including medications, fatty meals, constipation.     A 10 point ROS is otherwise negative.    Past Medical History:   Diagnosis Date     Anemia 12/9/2021     C. difficile diarrhea 2013     CKD (chronic kidney disease) stage 4, GFR 15-29 ml/min (H)      Gastroparesis      Gluten intolerance      Heterozygous factor V Leiden mutation (H)      HTN (hypertension)      Infection due to 2019 novel coronavirus 11/2020     Osteomyelitis  (H) 2013     Shingles      Type 1 diabetes mellitus (H) 2000       Past Surgical History:   Procedure Laterality Date     BENCH KIDNEY N/A 11/15/2021    Procedure: Bench kidney;  Surgeon: Branden Aranda MD;  Location: [facility] OR     BENCH PANCREAS N/A 11/15/2021    Procedure: Bench pancreas;  Surgeon: Branden Aranda MD;  Location: [facility] OR     CATARACT EXTRACTION  2021     IR RENAL BIOPSY LEFT  2022     ORIF HIP FRACTURE Left      ORTHOPEDIC SURGERY Bilateral     Repair of labral tear     TRANSPLANT PANCREAS, KIDNEY  DONOR, COMBINED Left 11/15/2021    Procedure: TRANSPLANT, KIDNEY AND PANCREAS,  DONOR;  Surgeon: Branden Aranda MD;  Location: [facility] OR       Family History   Problem Relation Age of Onset     Diabetes Paternal Grandfather      Macular Degeneration Mother      Arthritis Maternal Grandmother      Hypertension Maternal Grandmother      Cancer - colorectal Maternal Grandfather      Glaucoma No family hx of        Social History     Tobacco Use     Smoking status: Never Smoker     Smokeless tobacco: Never Used   Substance Use Topics     Alcohol use: Yes     Comment: socially       Current Outpatient Medications   Medication     apixaban ANTICOAGULANT (ELIQUIS) 2.5 MG tablet     aspirin (ASA) 325 MG tablet     calcium carbonate-vitamin D (OS-STEPHANIE WITH D) 500-200 MG-UNIT tablet     cetirizine (ZYRTEC) 10 MG tablet     cholecalciferol 25 MCG (1000 UT) TABS     Continuous Blood Gluc Sensor (DEXCOM G6 SENSOR) MISC     Continuous Blood Gluc Transmit (DEXCOM G6 TRANSMITTER) MISC     docusate sodium (COLACE) 100 MG capsule     famotidine (PEPCID) 20 MG tablet     furosemide (LASIX) 20 MG tablet     glucose blood VI test strips strip     insulin lispro (HUMALOG KWIKPEN) 100 UNIT/ML (1 unit dial) KWIKPEN     insulin pen needle (31G X 5 MM) 31G X 5 MM miscellaneous     mycophenolic acid (GENERIC EQUIVALENT) 180 MG EC tablet     mycophenolic acid  (GENERIC EQUIVALENT) 360 MG EC tablet     norgestimate-ethinyl estradiol (ORTHO-CYCLEN) 0.25-35 MG-MCG tablet     ondansetron (ZOFRAN) 4 MG tablet     Prucalopride Succinate (MOTEGRITY) 2 MG TABS     sulfamethoxazole-trimethoprim (BACTRIM) 400-80 MG tablet     tacrolimus (ENVARSUS XR) 0.75 MG 24 hr tablet     tacrolimus (ENVARSUS XR) 1 MG 24 hr tablet     tacrolimus (ENVARSUS XR) 4 MG 24 hr tablet     Current Facility-Administered Medications   Medication     bevacizumab (AVASTIN) intravitreal inj 1.25 mg     bevacizumab (AVASTIN) intravitreal inj 1.25 mg        Physical exam:  GEN: NAD  Eyes: EOMI  Ears: hearing intact  Mouth: MMM  Neck: full ROM  Cardiopulmonary: non labored  Skin: no jaundice  Neuro: awake and oriented  MSK: moves arms equally  Psych: normal affect     Labs:   Cr 1.39    Imaging:   AXR 5/11/22  IMPRESSION: Moderate amount of stool.  Nonobstructed bowel gas  Pattern.    Endoscopy:  No new endoscopy    Assessment and recommendations:   36 yo woman with type 1 DM (not previously on dialysis) s/p panc-kidney transplant Nov 2021, gastroparesis but not retinopathy, neuropathy who presents for follow up of gastroparesis and change in bowel habits.     Noted to have delayed gastric emptying in 2018 without gastric outlet obstruction, which was prior to starting prucalopride. It is not clear if her symptoms are more related to delayed gastric emptying or irregular bowel patterns/constipation. We discussed confounders to GES such as fatty meals, medications, constipation, etc.      She did not tolerate metoclopramide due to facial twitching, furthermore she didn't have any improvement in gastric symptoms.    Given persistence of symptoms while on prucalopride, it is reasonable to rule out pelvic floor dysfunction while increasing laxatives.     - keep appointment with the [facility]  - continue prucalopride      RTC 3 to 6 months with Dr Lowery or Kathy Mo    35 minutes spent on the date  of the encounter doing chart review, test interpretation, history and exam, documentation and further activities as noted above.        Again, thank you for allowing me to participate in the care of your patient.        Sincerely,        Flex Bocanegra MD

## 2022-05-20 NOTE — PROGRESS NOTES
Marilyn is a 37 year old who is being evaluated via a billable video visit.      How would you like to obtain your AVS? MyChart  If the video visit is dropped, the invitation should be resent by: Send to e-mail at: [email or web address]  Will anyone else be joining your video visit? No      Video Start Time: 1008  Video-Visit Details    Type of service:  Video Visit    Video End Time:10:27 AM    Originating Location (pt. Location): Home    Distant Location (provider location):  [facility] GASTROENTEROLOGY [facility]INIC [place]     Platform used for Video Visit: [employer]

## 2022-05-20 NOTE — LETTER
Date:May 27, 2022      Provider requested that no letter be sent. Do not send.       [facility]

## 2022-05-20 NOTE — PATIENT INSTRUCTIONS
It was a pleasure taking care of you today.  I've included a brief summary of our discussion and care plan from today's visit below.  Please review this information with your primary care provider.  _______________________________________________________________________    My recommendations are summarized as follows:  - keep appointment with the [facility]  - continue prucalopride     Please call our nurse Randa with any questions or concerns- [phone].  --    Return to GI Clinic in 3 to 6 months to review your progress. Try to schedule an appointment with Dr Lowery. If she is booked too far out, you can call to schedule with Kathy Mo instead.   _______________________________________________________________________    Who do I call with any questions after my visit?  Please be in touch if there are any further questions that arise following today's visit.  There are multiple ways to contact your gastroenterology care team.      During business hours, you may reach a Gastroenterology nurse at [phone] and choose option 3.       To schedule or reschedule an appointment, please call [phone].     You can always send a secure message through [facility].  [facility] messages are answered by your nurse or doctor typically within 24 hours.  Please allow extra time on weekends and holidays.      For urgent/emergent questions after business hours, you may reach the on-call GI Fellow by contacting the [facility] at [phone].     How will I get the results of any tests ordered?    You will receive all of your results.  If you have signed up for [facility], any tests ordered at your visit will be available to you after your physician reviews them.  Typically this takes 1-2 weeks.  If there are urgent results that require a change in your care plan, your physician or nurse will call you to discuss the next steps.      What is [email or web address]?  [facility] is a secure way for you to access all of  your healthcare records from the [facility].  It is a web based computer program, so you can sign on to it from any location.  It also allows you to send secure messages to your care team.  I recommend signing up for [employer] access if you have not already done so and are comfortable with using a computer.      How to I schedule a follow-up visit?  If you did not schedule a follow-up visit today, please call [phone] to schedule a follow-up office visit.        Sincerely,    Flex Bocanegra MD     [facility]  Division of Gastroenterology

## 2022-05-20 NOTE — PROGRESS NOTES
HPI:   38 yo woman with type 1 DM (not previously on dialysis) s/p panc-kidney transplant 2021, gastroparesis but not retinopathy, neuropathy who presents for follow up of gastroparesis and change in bowel habits.     Had face, mouth twitching with Reglan so stopped this. Symptoms have improved, also wondering if this is related to tacrolimus as she had this a little before Reglan. Feels like the reglan amplified it though.     Alternating constipation and regular bowel movements. No diarrhea. Essentially unchanged since our last visit in February.     Schedule for pelvic floor testing on . We discussed confounders to gastric emptying study including medications, fatty meals, constipation.     A 10 point ROS is otherwise negative.    Past Medical History:   Diagnosis Date     Anemia 2021     C. difficile diarrhea      CKD (chronic kidney disease) stage 4, GFR 15-29 ml/min (H)      Gastroparesis      Gluten intolerance      Heterozygous factor V Leiden mutation (H)      HTN (hypertension)      Infection due to 2019 novel coronavirus 2020     Osteomyelitis (H)      Shingles 2013     Type 1 diabetes mellitus (H)        Past Surgical History:   Procedure Laterality Date     BENCH KIDNEY N/A 11/15/2021    Procedure: Bench kidney;  Surgeon: Branden Aranda MD;  Location:  OR     BENCH PANCREAS N/A 11/15/2021    Procedure: Bench pancreas;  Surgeon: Branden Aranda MD;  Location:  OR     CATARACT EXTRACTION  2021     IR RENAL BIOPSY LEFT  2022     ORIF HIP FRACTURE Left      ORTHOPEDIC SURGERY Bilateral     Repair of labral tear     TRANSPLANT PANCREAS, KIDNEY  DONOR, COMBINED Left 11/15/2021    Procedure: TRANSPLANT, KIDNEY AND PANCREAS,  DONOR;  Surgeon: Branden Aranda MD;  Location:  OR       Family History   Problem Relation Age of Onset     Diabetes Paternal Grandfather      Macular Degeneration Mother       Arthritis Maternal Grandmother      Hypertension Maternal Grandmother      Cancer - colorectal Maternal Grandfather      Glaucoma No family hx of        Social History     Tobacco Use     Smoking status: Never Smoker     Smokeless tobacco: Never Used   Substance Use Topics     Alcohol use: Yes     Comment: socially       Current Outpatient Medications   Medication     apixaban ANTICOAGULANT (ELIQUIS) 2.5 MG tablet     aspirin (ASA) 325 MG tablet     calcium carbonate-vitamin D (OS-STEPHANIE WITH D) 500-200 MG-UNIT tablet     cetirizine (ZYRTEC) 10 MG tablet     cholecalciferol 25 MCG (1000 UT) TABS     Continuous Blood Gluc Sensor (DEXCOM G6 SENSOR) MISC     Continuous Blood Gluc Transmit (DEXCOM G6 TRANSMITTER) MISC     docusate sodium (COLACE) 100 MG capsule     famotidine (PEPCID) 20 MG tablet     furosemide (LASIX) 20 MG tablet     glucose blood VI test strips strip     insulin lispro (HUMALOG KWIKPEN) 100 UNIT/ML (1 unit dial) KWIKPEN     insulin pen needle (31G X 5 MM) 31G X 5 MM miscellaneous     mycophenolic acid (GENERIC EQUIVALENT) 180 MG EC tablet     mycophenolic acid (GENERIC EQUIVALENT) 360 MG EC tablet     norgestimate-ethinyl estradiol (ORTHO-CYCLEN) 0.25-35 MG-MCG tablet     ondansetron (ZOFRAN) 4 MG tablet     Prucalopride Succinate (MOTEGRITY) 2 MG TABS     sulfamethoxazole-trimethoprim (BACTRIM) 400-80 MG tablet     tacrolimus (ENVARSUS XR) 0.75 MG 24 hr tablet     tacrolimus (ENVARSUS XR) 1 MG 24 hr tablet     tacrolimus (ENVARSUS XR) 4 MG 24 hr tablet     Current Facility-Administered Medications   Medication     bevacizumab (AVASTIN) intravitreal inj 1.25 mg     bevacizumab (AVASTIN) intravitreal inj 1.25 mg        Physical exam:  GEN: NAD  Eyes: EOMI  Ears: hearing intact  Mouth: MMM  Neck: full ROM  Cardiopulmonary: non labored  Skin: no jaundice  Neuro: awake and oriented  MSK: moves arms equally  Psych: normal affect     Labs:   Cr 1.39    Imaging:   AXR 5/11/22  IMPRESSION: Moderate amount of  stool.  Nonobstructed bowel gas  Pattern.    Endoscopy:  No new endoscopy    Assessment and recommendations:   38 yo woman with type 1 DM (not previously on dialysis) s/p panc-kidney transplant Nov 2021, gastroparesis but not retinopathy, neuropathy who presents for follow up of gastroparesis and change in bowel habits.     Noted to have delayed gastric emptying in 2018 without gastric outlet obstruction, which was prior to starting prucalopride. It is not clear if her symptoms are more related to delayed gastric emptying or irregular bowel patterns/constipation. We discussed confounders to GES such as fatty meals, medications, constipation, etc.      She did not tolerate metoclopramide due to facial twitching, furthermore she didn't have any improvement in gastric symptoms.    Given persistence of symptoms while on prucalopride, it is reasonable to rule out pelvic floor dysfunction while increasing laxatives.     - [facility]  - continue prucalopride      RTC 3 to 6 months with Dr Lowery or Kathy Mo    35 minutes spent on the date of the encounter doing chart review, test interpretation, history and exam, documentation and further activities as noted above.

## 2022-05-23 ENCOUNTER — LAB (OUTPATIENT)
Dept: LAB | Facility: CLINIC | Age: 37
End: 2022-05-23
Payer: COMMERCIAL

## 2022-05-23 DIAGNOSIS — Z48.298 AFTERCARE FOLLOWING ORGAN TRANSPLANT: ICD-10-CM

## 2022-05-23 DIAGNOSIS — Z79.899 ENCOUNTER FOR LONG-TERM CURRENT USE OF MEDICATION: ICD-10-CM

## 2022-05-23 DIAGNOSIS — Z94.0 KIDNEY REPLACED BY TRANSPLANT: ICD-10-CM

## 2022-05-23 DIAGNOSIS — Z94.83 PANCREAS REPLACED BY TRANSPLANT (H): ICD-10-CM

## 2022-05-23 DIAGNOSIS — H35.81 MACULAR EDEMA: Primary | ICD-10-CM

## 2022-05-23 PROBLEM — T86.10 COMPLICATION OF TRANSPLANTED KIDNEY: Status: ACTIVE | Noted: 2022-05-02

## 2022-05-23 PROBLEM — T86.11 KIDNEY TRANSPLANT REJECTION: Status: ACTIVE | Noted: 2022-05-02

## 2022-05-23 LAB
ALBUMIN SERPL-MCNC: 4 G/DL (ref 3.4–5)
ALP SERPL-CCNC: 84 U/L (ref 40–150)
ALT SERPL W P-5'-P-CCNC: 20 U/L (ref 0–50)
AMYLASE SERPL-CCNC: 52 U/L (ref 30–110)
ANION GAP SERPL CALCULATED.3IONS-SCNC: 7 MMOL/L (ref 3–14)
AST SERPL W P-5'-P-CCNC: 13 U/L (ref 0–45)
BILIRUB DIRECT SERPL-MCNC: <0.1 MG/DL (ref 0–0.2)
BILIRUB SERPL-MCNC: 0.3 MG/DL (ref 0.2–1.3)
BUN SERPL-MCNC: 35 MG/DL (ref 7–30)
CALCIUM SERPL-MCNC: 9 MG/DL (ref 8.5–10.1)
CHLORIDE BLD-SCNC: 111 MMOL/L (ref 94–109)
CO2 SERPL-SCNC: 21 MMOL/L (ref 20–32)
CREAT SERPL-MCNC: 1.3 MG/DL (ref 0.52–1.04)
CREAT UR-MCNC: 95 MG/DL
ERYTHROCYTE [DISTWIDTH] IN BLOOD BY AUTOMATED COUNT: 12.9 % (ref 10–15)
GFR SERPL CREATININE-BSD FRML MDRD: 54 ML/MIN/1.73M2
GLUCOSE BLD-MCNC: 86 MG/DL (ref 70–99)
HBA1C MFR BLD: 5 % (ref 0–5.6)
HCT VFR BLD AUTO: 31.8 % (ref 35–47)
HGB BLD-MCNC: 10.1 G/DL (ref 11.7–15.7)
LIPASE SERPL-CCNC: 125 U/L (ref 73–393)
MCH RBC QN AUTO: 29.7 PG (ref 26.5–33)
MCHC RBC AUTO-ENTMCNC: 31.8 G/DL (ref 31.5–36.5)
MCV RBC AUTO: 94 FL (ref 78–100)
PLATELET # BLD AUTO: 277 10E3/UL (ref 150–450)
POTASSIUM BLD-SCNC: 4.4 MMOL/L (ref 3.4–5.3)
PROT SERPL-MCNC: 7.9 G/DL (ref 6.8–8.8)
PROT UR-MCNC: 0.12 G/L
PROT/CREAT 24H UR: 0.13 G/G CR (ref 0–0.2)
RBC # BLD AUTO: 3.4 10E6/UL (ref 3.8–5.2)
SODIUM SERPL-SCNC: 139 MMOL/L (ref 133–144)
TACROLIMUS BLD-MCNC: 8.9 UG/L (ref 5–15)
TME LAST DOSE: NORMAL H
TME LAST DOSE: NORMAL H
URATE SERPL-MCNC: 5 MG/DL (ref 2.6–6)
WBC # BLD AUTO: 8.8 10E3/UL (ref 4–11)

## 2022-05-23 PROCEDURE — 84156 ASSAY OF PROTEIN URINE: CPT

## 2022-05-23 PROCEDURE — 84550 ASSAY OF BLOOD/URIC ACID: CPT

## 2022-05-23 PROCEDURE — 85048 AUTOMATED LEUKOCYTE COUNT: CPT

## 2022-05-23 PROCEDURE — 36415 COLL VENOUS BLD VENIPUNCTURE: CPT

## 2022-05-23 PROCEDURE — 82248 BILIRUBIN DIRECT: CPT

## 2022-05-23 PROCEDURE — 83036 HEMOGLOBIN GLYCOSYLATED A1C: CPT

## 2022-05-23 PROCEDURE — 80053 COMPREHEN METABOLIC PANEL: CPT

## 2022-05-23 PROCEDURE — 82150 ASSAY OF AMYLASE: CPT

## 2022-05-23 PROCEDURE — 83690 ASSAY OF LIPASE: CPT

## 2022-05-23 PROCEDURE — 80197 ASSAY OF TACROLIMUS: CPT

## 2022-05-25 ENCOUNTER — OFFICE VISIT (OUTPATIENT)
Dept: OPHTHALMOLOGY | Facility: CLINIC | Age: 37
End: 2022-05-25
Attending: OPHTHALMOLOGY
Payer: COMMERCIAL

## 2022-05-25 DIAGNOSIS — E10.3413 SEVERE NONPROLIFERATIVE DIABETIC RETINOPATHY OF BOTH EYES WITH MACULAR EDEMA ASSOCIATED WITH TYPE 1 DIABETES MELLITUS (H): Primary | ICD-10-CM

## 2022-05-25 PROCEDURE — G0463 HOSPITAL OUTPT CLINIC VISIT: HCPCS

## 2022-05-25 PROCEDURE — 99213 OFFICE O/P EST LOW 20 MIN: CPT | Performed by: OPHTHALMOLOGY

## 2022-05-25 PROCEDURE — 92134 CPTRZ OPH DX IMG PST SGM RTA: CPT | Performed by: OPHTHALMOLOGY

## 2022-05-25 ASSESSMENT — TONOMETRY
OD_IOP_MMHG: 21
IOP_METHOD: TONOPEN
OS_IOP_MMHG: 20

## 2022-05-25 ASSESSMENT — EXTERNAL EXAM - RIGHT EYE: OD_EXAM: NORMAL

## 2022-05-25 ASSESSMENT — SLIT LAMP EXAM - LIDS
COMMENTS: NORMAL
COMMENTS: NORMAL

## 2022-05-25 ASSESSMENT — VISUAL ACUITY
OD_SC: 20/20
OS_SC: 20/25-2
METHOD: SNELLEN - LINEAR

## 2022-05-25 ASSESSMENT — CONF VISUAL FIELD
OS_NORMAL: 1
OD_NORMAL: 1
METHOD: COUNTING FINGERS

## 2022-05-25 ASSESSMENT — EXTERNAL EXAM - LEFT EYE: OS_EXAM: NORMAL

## 2022-05-25 NOTE — PROGRESS NOTES
Chief Complaint(s) and History of Present Illness(es)     Cystoid Macular Edema Follow Up     Laterality: both eyes    Onset: months ago    Location: central vision    Timing: throughout the day    Course: stable    Associated symptoms: Negative for flashes, floaters (unchanged, long   standing) and itching (eyes are not itchy, they are watery)    Treatments tried: eye drops    Pain scale: 0/10              Comments     Pt has allergy symptoms in eyes x 2 months, watery, tired.  Using OTC   Olopatadine 0.2% once daily and she does not feel it has had any effect on   the symptoms.  She would like recommendations or to try something   different that is prescription.      No other new concerns since last visit.  Here to follow up on macular   edema.      Had three day infusion of solumedrol since last visit due to kidney   showing signs of rejection -May 4-6.     Tenisha Tolbert, MARILEE 12:40 PM 05/25/2022                Review of systems for the eyes was negative other than the pertinent positives/negatives listed in the HPI.      Assessment & Plan      Marilyn Ortega is a 37 year old female with the following diagnoses:   1. Severe nonproliferative diabetic retinopathy of both eyes with macular edema associated with type 1 diabetes mellitus (H)       S/P avastin x 3 both eyes   Last injection both eyes 4/19/2022  Much improved macular edema.  Very mild exudate now left eye > right eye   Vision nearly 20/20 both eyes     Monitor today   Stressed good glycemic and hypertensive control  Return precautions reviewed     Increase artificial tears as needed   Warm compresses as needed     Patient disposition:   Return in about 6 weeks (around 7/6/2022) for DFE; optos FA (transit right), OCT mac.           Attending Physician Attestation:  Complete documentation of historical and exam elements from today's encounter can be found in the full encounter summary report (not reduplicated in this progress note).  I personally  obtained the chief complaint(s) and history of present illness.  I confirmed and edited as necessary the review of systems, past medical/surgical history, family history, social history, and examination findings as documented by others; and I examined the patient myself.  I personally reviewed the relevant tests, images, and reports as documented above.  I formulated and edited as necessary the assessment and plan and discussed the findings and management plan with the patient and family. . - Ochoa Barnes MD

## 2022-05-31 ENCOUNTER — LAB (OUTPATIENT)
Dept: LAB | Facility: CLINIC | Age: 37
End: 2022-05-31
Payer: COMMERCIAL

## 2022-05-31 ENCOUNTER — TRANSFERRED RECORDS (OUTPATIENT)
Dept: HEALTH INFORMATION MANAGEMENT | Facility: CLINIC | Age: 37
End: 2022-05-31

## 2022-05-31 DIAGNOSIS — Z48.298 AFTERCARE FOLLOWING ORGAN TRANSPLANT: ICD-10-CM

## 2022-05-31 DIAGNOSIS — Z94.83 PANCREAS TRANSPLANTED (H): ICD-10-CM

## 2022-05-31 DIAGNOSIS — Z94.83 PANCREAS REPLACED BY TRANSPLANT (H): ICD-10-CM

## 2022-05-31 DIAGNOSIS — Z79.899 ENCOUNTER FOR LONG-TERM CURRENT USE OF MEDICATION: ICD-10-CM

## 2022-05-31 DIAGNOSIS — Z94.0 KIDNEY REPLACED BY TRANSPLANT: ICD-10-CM

## 2022-05-31 LAB
AMYLASE SERPL-CCNC: 56 U/L (ref 30–110)
ANION GAP SERPL CALCULATED.3IONS-SCNC: 6 MMOL/L (ref 3–14)
BUN SERPL-MCNC: 37 MG/DL (ref 7–30)
CALCIUM SERPL-MCNC: 9.1 MG/DL (ref 8.5–10.1)
CHLORIDE BLD-SCNC: 107 MMOL/L (ref 94–109)
CHOLEST SERPL-MCNC: 160 MG/DL
CO2 SERPL-SCNC: 24 MMOL/L (ref 20–32)
CREAT SERPL-MCNC: 1.28 MG/DL (ref 0.52–1.04)
ERYTHROCYTE [DISTWIDTH] IN BLOOD BY AUTOMATED COUNT: 12.8 % (ref 10–15)
FASTING STATUS PATIENT QL REPORTED: YES
GFR SERPL CREATININE-BSD FRML MDRD: 55 ML/MIN/1.73M2
GLUCOSE BLD-MCNC: 84 MG/DL (ref 70–99)
HCT VFR BLD AUTO: 32.1 % (ref 35–47)
HDLC SERPL-MCNC: 79 MG/DL
HGB BLD-MCNC: 10.4 G/DL (ref 11.7–15.7)
LDLC SERPL CALC-MCNC: 61 MG/DL
LIPASE SERPL-CCNC: 157 U/L (ref 73–393)
MCH RBC QN AUTO: 29.7 PG (ref 26.5–33)
MCHC RBC AUTO-ENTMCNC: 32.4 G/DL (ref 31.5–36.5)
MCV RBC AUTO: 92 FL (ref 78–100)
NONHDLC SERPL-MCNC: 81 MG/DL
PLATELET # BLD AUTO: 349 10E3/UL (ref 150–450)
POTASSIUM BLD-SCNC: 4.6 MMOL/L (ref 3.4–5.3)
RBC # BLD AUTO: 3.5 10E6/UL (ref 3.8–5.2)
SODIUM SERPL-SCNC: 137 MMOL/L (ref 133–144)
TRIGL SERPL-MCNC: 102 MG/DL
WBC # BLD AUTO: 4.3 10E3/UL (ref 4–11)

## 2022-05-31 PROCEDURE — 80180 DRUG SCRN QUAN MYCOPHENOLATE: CPT

## 2022-05-31 PROCEDURE — 83690 ASSAY OF LIPASE: CPT

## 2022-05-31 PROCEDURE — 80061 LIPID PANEL: CPT

## 2022-05-31 PROCEDURE — 36415 COLL VENOUS BLD VENIPUNCTURE: CPT

## 2022-05-31 PROCEDURE — 80048 BASIC METABOLIC PNL TOTAL CA: CPT

## 2022-05-31 PROCEDURE — 80197 ASSAY OF TACROLIMUS: CPT

## 2022-05-31 PROCEDURE — 87799 DETECT AGENT NOS DNA QUANT: CPT

## 2022-05-31 PROCEDURE — 82150 ASSAY OF AMYLASE: CPT

## 2022-05-31 PROCEDURE — 85027 COMPLETE CBC AUTOMATED: CPT

## 2022-06-01 ENCOUNTER — TELEPHONE (OUTPATIENT)
Dept: TRANSPLANT | Facility: CLINIC | Age: 37
End: 2022-06-01

## 2022-06-01 LAB
BKV DNA # SPEC NAA+PROBE: <500 COPIES/ML
BKV DNA SPEC NAA+PROBE-LOG#: <2.7 {LOG_COPIES}/ML
MYCOPHENOLATE SERPL LC/MS/MS-MCNC: 6.22 MG/L (ref 1–3.5)
MYCOPHENOLATE-G SERPL LC/MS/MS-MCNC: 78.3 MG/L (ref 30–95)
TACROLIMUS BLD-MCNC: 8.7 UG/L (ref 5–15)
TME LAST DOSE: ABNORMAL H
TME LAST DOSE: ABNORMAL H
TME LAST DOSE: NORMAL H
TME LAST DOSE: NORMAL H

## 2022-06-01 NOTE — TELEPHONE ENCOUNTER
ISSUE:  Lipase elevated from baseline    PLAN:  Bowel regiman  Assess for constipation?   Repeat lipase this week    OUTCOME  Patient states she did not feel super constipated, but did an enema and got out a lot of stool. Patient is feeling much less constipated now.     Will recheck lipase on Thurs or Fri.

## 2022-06-03 ENCOUNTER — LAB (OUTPATIENT)
Dept: LAB | Facility: CLINIC | Age: 37
End: 2022-06-03
Payer: COMMERCIAL

## 2022-06-03 DIAGNOSIS — Z94.0 KIDNEY REPLACED BY TRANSPLANT: ICD-10-CM

## 2022-06-03 LAB — LIPASE SERPL-CCNC: 126 U/L (ref 73–393)

## 2022-06-03 PROCEDURE — 36415 COLL VENOUS BLD VENIPUNCTURE: CPT

## 2022-06-03 PROCEDURE — 83690 ASSAY OF LIPASE: CPT

## 2022-06-06 ENCOUNTER — LAB (OUTPATIENT)
Dept: LAB | Facility: CLINIC | Age: 37
End: 2022-06-06
Payer: COMMERCIAL

## 2022-06-06 ENCOUNTER — TELEPHONE (OUTPATIENT)
Dept: TRANSPLANT | Facility: CLINIC | Age: 37
End: 2022-06-06

## 2022-06-06 DIAGNOSIS — Z94.0 KIDNEY REPLACED BY TRANSPLANT: ICD-10-CM

## 2022-06-06 DIAGNOSIS — Z79.899 ENCOUNTER FOR LONG-TERM CURRENT USE OF MEDICATION: ICD-10-CM

## 2022-06-06 DIAGNOSIS — Z48.298 AFTERCARE FOLLOWING ORGAN TRANSPLANT: ICD-10-CM

## 2022-06-06 DIAGNOSIS — Z94.83 PANCREAS TRANSPLANTED (H): ICD-10-CM

## 2022-06-06 DIAGNOSIS — Z94.83 PANCREAS REPLACED BY TRANSPLANT (H): ICD-10-CM

## 2022-06-06 DIAGNOSIS — Z94.0 KIDNEY REPLACED BY TRANSPLANT: Primary | ICD-10-CM

## 2022-06-06 LAB
AMYLASE SERPL-CCNC: 53 U/L (ref 30–110)
ANION GAP SERPL CALCULATED.3IONS-SCNC: 7 MMOL/L (ref 3–14)
BUN SERPL-MCNC: 43 MG/DL (ref 7–30)
CALCIUM SERPL-MCNC: 9.3 MG/DL (ref 8.5–10.1)
CHLORIDE BLD-SCNC: 106 MMOL/L (ref 94–109)
CO2 SERPL-SCNC: 23 MMOL/L (ref 20–32)
CREAT SERPL-MCNC: 1.69 MG/DL (ref 0.52–1.04)
ERYTHROCYTE [DISTWIDTH] IN BLOOD BY AUTOMATED COUNT: 12.6 % (ref 10–15)
GFR SERPL CREATININE-BSD FRML MDRD: 39 ML/MIN/1.73M2
GLUCOSE BLD-MCNC: 97 MG/DL (ref 70–99)
HCT VFR BLD AUTO: 31.8 % (ref 35–47)
HGB BLD-MCNC: 10.2 G/DL (ref 11.7–15.7)
LIPASE SERPL-CCNC: 156 U/L (ref 73–393)
MAGNESIUM SERPL-MCNC: 1.9 MG/DL (ref 1.6–2.3)
MCH RBC QN AUTO: 29.2 PG (ref 26.5–33)
MCHC RBC AUTO-ENTMCNC: 32.1 G/DL (ref 31.5–36.5)
MCV RBC AUTO: 91 FL (ref 78–100)
PHOSPHATE SERPL-MCNC: 3.6 MG/DL (ref 2.5–4.5)
PLATELET # BLD AUTO: 313 10E3/UL (ref 150–450)
POTASSIUM BLD-SCNC: 4.5 MMOL/L (ref 3.4–5.3)
RBC # BLD AUTO: 3.49 10E6/UL (ref 3.8–5.2)
SODIUM SERPL-SCNC: 136 MMOL/L (ref 133–144)
TACROLIMUS BLD-MCNC: 12 UG/L (ref 5–15)
TME LAST DOSE: NORMAL H
TME LAST DOSE: NORMAL H
WBC # BLD AUTO: 4.9 10E3/UL (ref 4–11)

## 2022-06-06 PROCEDURE — 83735 ASSAY OF MAGNESIUM: CPT

## 2022-06-06 PROCEDURE — 83690 ASSAY OF LIPASE: CPT

## 2022-06-06 PROCEDURE — 84100 ASSAY OF PHOSPHORUS: CPT

## 2022-06-06 PROCEDURE — 80197 ASSAY OF TACROLIMUS: CPT

## 2022-06-06 PROCEDURE — 85014 HEMATOCRIT: CPT

## 2022-06-06 PROCEDURE — 80048 BASIC METABOLIC PNL TOTAL CA: CPT

## 2022-06-06 PROCEDURE — 36415 COLL VENOUS BLD VENIPUNCTURE: CPT

## 2022-06-06 PROCEDURE — 82150 ASSAY OF AMYLASE: CPT

## 2022-06-07 ENCOUNTER — TELEPHONE (OUTPATIENT)
Dept: TRANSPLANT | Facility: CLINIC | Age: 37
End: 2022-06-07

## 2022-06-07 ENCOUNTER — TELEPHONE (OUTPATIENT)
Dept: PHARMACY | Facility: CLINIC | Age: 37
End: 2022-06-07

## 2022-06-07 DIAGNOSIS — Z94.83 PANCREAS REPLACED BY TRANSPLANT (H): ICD-10-CM

## 2022-06-07 DIAGNOSIS — Z94.0 KIDNEY REPLACED BY TRANSPLANT: ICD-10-CM

## 2022-06-07 DIAGNOSIS — Z94.83 PANCREAS TRANSPLANTED (H): ICD-10-CM

## 2022-06-07 RX ORDER — TACROLIMUS 1 MG/1
3 TABLET, EXTENDED RELEASE ORAL
Qty: 90 TABLET | Refills: 11 | Status: SHIPPED | OUTPATIENT
Start: 2022-06-07 | End: 2022-06-14

## 2022-06-07 RX ORDER — TACROLIMUS 0.75 MG/1
1.5 TABLET, EXTENDED RELEASE ORAL
Qty: 60 TABLET | Refills: 11 | Status: SHIPPED | OUTPATIENT
Start: 2022-06-07 | End: 2022-06-14

## 2022-06-07 RX ORDER — TACROLIMUS 4 MG/1
8 TABLET, EXTENDED RELEASE ORAL
Qty: 60 TABLET | Refills: 11 | Status: SHIPPED | OUTPATIENT
Start: 2022-06-07 | End: 2022-06-14

## 2022-06-07 NOTE — TELEPHONE ENCOUNTER
"ISSUE:  Elevated creatinine  Rise in lipase    PLAN:  Assess hydration  N/V/D  Constipation?      Reviewed w/ Dr Stan Villalta  UA/UC, repeat labs on Wednesday  Await tacro level   Hold lasix for now until follow up labs  COVID test for vague symptoms    Reanna Cueto MD Rockers, Emily S RN  Yes and suggest holding lasix for now pending w/up covid screen/RVP             Previous Messages       ----- Message -----   From: Yessica Becker RN   Sent: 6/6/2022   2:23 PM CDT   To: Reanna Villalta MD     Weight is stable. BP is stable. She has been taking lasix three times a week, but that is not a change for the past 3 weeks.     Patient said she started to \"feel bad\" this weekend, like she was getting a cold. She hasn't taken a COVID test yet but she will. No fever, no concrete symptoms, just feels like she could be coming down with something. I ordered UA, she will get it checked today. No cough, sinus drainage, etc.     She has been drinking 2+L daily. Patient states prior to Friday, she was feeling better last week than she has in weeks. She was able to exercise and her swelling was improving.     No diarrhea, she doesn't feel constipated, but she does feel gassy. No nausea.     She is scheduled for repeat kidney biopsy this Thursday as well.       Patient to repeat labs and UA/UC tomorrow (Wednesday).   tacro level high, see other note.   Patient v/u of instructions.     UPDATE:  Home COVID test negative.     "

## 2022-06-07 NOTE — TELEPHONE ENCOUNTER
Clinical Pharmacy Consult:                                                      Transplant Specific: 6 Month Post Transplant Call  Date of Transplant: 11/15/2021  Type of Transplant: kidney and pancreas  First Transplant: yes  History of rejection: no    Immunosuppression Regimen   TAC XR 13mg qAM and Myforitic 540mg qAM & 540mg qPM  Patient specific goal: 8-10  Most recent level: 12, date 6/6/22   Immunosuppressant Levels:  Supratherapeutic  Pt adherent to lab draws: yes  Scr:   Lab Results   Component Value Date    CR 1.42 12/27/2021    CR 2.69 05/05/2021     Side effects: most side effects have cleared up but has eye problems, swelling of optic nerve and light sensitivity.  Is working with ophthalmology.     Prophylactic Medications  Antibacterial:  Bactrim SS daily  Scheduled Discontinue Date: Lifelong    Antifungal: Not needed thus far  Scheduled Discontinue Date: N/A    Antiviral: CrCl >/=60 mL/minute: Valcyte 900mg daily   Scheduled Discontinue Date: completed    Acid Reducer: Pepcid (famotidine 20mg)  Scheduled Reviewed Date: MD to determine    Thrombosis Prevention: Aspirin 325 mg PO daily  Scheduled Discontinue Date: MD to determine    Blood Pressure Management  Frequency of home Blood Pressure checks: Unable to reach  Most recent home BP:   Patient Blood pressure goal: <140/90  Patient blood pressure at goal:  yes  Hospitalizations/ER visits since last assessment: 0    Med rec/DUR performed: yes  Med Rec Discrepancies: no    Medication adherence flowsheet 2/24/2022   Patient medication administration: Responsible for own medications   Patient estimated adherence level: %   Pharmacist assessment of adherence: Good   Patient reported doses missed per week: 0-1   Facilitators to medication adherence  Cell phone;Medication dosing chart;Pill box;Schedule/routine   Patient reported barriers to medication adherence  -   Adherence intervention(s): -      Medication access flowsheet 2/24/2022   Number of  pharmacies used: 2   Pharmacy: [facility];Other   Other pharmacy: local retail   Enrolled in [facility] pharmacy? Yes   Patient reported barriers to accessing medications: -   Medication access interventions: -      Unable to reach Marilyn to check in.  Appears to be filling prescriptions on time per refill history.  Last tacro level yesterday was high. She is following closely with the clinic. No concerns today.       Follow up in 6 months    Millie Sexton [facility]  Specialty Pharmacist [phone]

## 2022-06-07 NOTE — TELEPHONE ENCOUNTER
ISSUE:   Tacrolimus XR level 12 on 6/6, goal 8-10, dose 13 mg daily.    PLAN:   Please call patient and confirm this was an accurate 24-hour trough. Verify Tacrolimus XR dose 13 mg daily. Confirm no new medications or illness. Confirm no missed doses. If accurate trough and accurate dose, decrease Tacrolimus XR dose to 12.5 mg daily and repeat labs in 2 days    OUTCOME:   Spoke with patient, they confirm accurate trough level and current dose 13 mg daily. Patient confirmed dose change to 12.5 mg daily and to repeat labs in 2 days. Orders sent to preferred pharmacy for dose change and lab for repeat labs. Patient voiced understanding of plan.

## 2022-06-08 ENCOUNTER — LAB (OUTPATIENT)
Dept: LAB | Facility: CLINIC | Age: 37
End: 2022-06-08
Payer: COMMERCIAL

## 2022-06-08 DIAGNOSIS — Z94.0 KIDNEY REPLACED BY TRANSPLANT: ICD-10-CM

## 2022-06-08 DIAGNOSIS — Z48.298 AFTERCARE FOLLOWING ORGAN TRANSPLANT: ICD-10-CM

## 2022-06-08 DIAGNOSIS — Z79.899 ENCOUNTER FOR LONG-TERM CURRENT USE OF MEDICATION: ICD-10-CM

## 2022-06-08 DIAGNOSIS — Z94.83 PANCREAS REPLACED BY TRANSPLANT (H): Primary | ICD-10-CM

## 2022-06-08 DIAGNOSIS — Z94.83 PANCREAS TRANSPLANTED (H): ICD-10-CM

## 2022-06-08 LAB
ALBUMIN UR-MCNC: NEGATIVE MG/DL
AMYLASE SERPL-CCNC: 49 U/L (ref 30–110)
ANION GAP SERPL CALCULATED.3IONS-SCNC: 7 MMOL/L (ref 3–14)
APPEARANCE UR: CLEAR
BACTERIA #/AREA URNS HPF: ABNORMAL /HPF
BILIRUB UR QL STRIP: NEGATIVE
BUN SERPL-MCNC: 42 MG/DL (ref 7–30)
CALCIUM SERPL-MCNC: 8.8 MG/DL (ref 8.5–10.1)
CHLORIDE BLD-SCNC: 105 MMOL/L (ref 94–109)
CO2 SERPL-SCNC: 25 MMOL/L (ref 20–32)
COLOR UR AUTO: ABNORMAL
CREAT SERPL-MCNC: 1.55 MG/DL (ref 0.52–1.04)
ERYTHROCYTE [DISTWIDTH] IN BLOOD BY AUTOMATED COUNT: 12.4 % (ref 10–15)
GFR SERPL CREATININE-BSD FRML MDRD: 44 ML/MIN/1.73M2
GLUCOSE BLD-MCNC: 75 MG/DL (ref 70–99)
GLUCOSE UR STRIP-MCNC: NEGATIVE MG/DL
HCT VFR BLD AUTO: 28.8 % (ref 35–47)
HGB BLD-MCNC: 9.2 G/DL (ref 11.7–15.7)
HGB UR QL STRIP: NEGATIVE
KETONES UR STRIP-MCNC: NEGATIVE MG/DL
LEUKOCYTE ESTERASE UR QL STRIP: NEGATIVE
LIPASE SERPL-CCNC: 125 U/L (ref 73–393)
MCH RBC QN AUTO: 28.8 PG (ref 26.5–33)
MCHC RBC AUTO-ENTMCNC: 31.9 G/DL (ref 31.5–36.5)
MCV RBC AUTO: 90 FL (ref 78–100)
MUCOUS THREADS #/AREA URNS LPF: PRESENT /LPF
NITRATE UR QL: NEGATIVE
PH UR STRIP: 6.5 [PH] (ref 5–7)
PLATELET # BLD AUTO: 280 10E3/UL (ref 150–450)
POTASSIUM BLD-SCNC: 4.2 MMOL/L (ref 3.4–5.3)
RBC # BLD AUTO: 3.19 10E6/UL (ref 3.8–5.2)
RBC URINE: 0 /HPF
SODIUM SERPL-SCNC: 137 MMOL/L (ref 133–144)
SP GR UR STRIP: 1.02 (ref 1–1.03)
SQUAMOUS EPITHELIAL: 5 /HPF
TACROLIMUS BLD-MCNC: 8.6 UG/L (ref 5–15)
TME LAST DOSE: NORMAL H
TME LAST DOSE: NORMAL H
UROBILINOGEN UR STRIP-MCNC: NORMAL MG/DL
WBC # BLD AUTO: 6.6 10E3/UL (ref 4–11)
WBC URINE: 1 /HPF

## 2022-06-08 PROCEDURE — 80197 ASSAY OF TACROLIMUS: CPT

## 2022-06-08 PROCEDURE — 81001 URINALYSIS AUTO W/SCOPE: CPT

## 2022-06-08 PROCEDURE — 80048 BASIC METABOLIC PNL TOTAL CA: CPT

## 2022-06-08 PROCEDURE — 36415 COLL VENOUS BLD VENIPUNCTURE: CPT

## 2022-06-08 PROCEDURE — 85027 COMPLETE CBC AUTOMATED: CPT

## 2022-06-08 PROCEDURE — 83690 ASSAY OF LIPASE: CPT

## 2022-06-08 PROCEDURE — 82150 ASSAY OF AMYLASE: CPT

## 2022-06-09 ENCOUNTER — ALLIED HEALTH/NURSE VISIT (OUTPATIENT)
Dept: RESEARCH | Facility: CLINIC | Age: 37
End: 2022-06-09

## 2022-06-09 ENCOUNTER — HOSPITAL ENCOUNTER (OUTPATIENT)
Facility: CLINIC | Age: 37
Discharge: HOME | End: 2022-06-09
Attending: INTERNAL MEDICINE | Admitting: PHYSICIAN ASSISTANT
Payer: COMMERCIAL

## 2022-06-09 ENCOUNTER — APPOINTMENT (OUTPATIENT)
Dept: INTERVENTIONAL RADIOLOGY/VASCULAR | Facility: CLINIC | Age: 37
End: 2022-06-09
Attending: INTERNAL MEDICINE
Payer: COMMERCIAL

## 2022-06-09 ENCOUNTER — APPOINTMENT (OUTPATIENT)
Dept: MEDSURG UNIT | Facility: CLINIC | Age: 37
End: 2022-06-09
Attending: INTERNAL MEDICINE
Payer: COMMERCIAL

## 2022-06-09 VITALS
HEIGHT: 66 IN | HEART RATE: 94 BPM | RESPIRATION RATE: 16 BRPM | TEMPERATURE: 97.6 F | SYSTOLIC BLOOD PRESSURE: 127 MMHG | WEIGHT: 125 LBS | OXYGEN SATURATION: 100 % | DIASTOLIC BLOOD PRESSURE: 89 MMHG | BODY MASS INDEX: 20.09 KG/M2

## 2022-06-09 DIAGNOSIS — Z00.6 EXAMINATION OF PARTICIPANT IN CLINICAL TRIAL: Primary | ICD-10-CM

## 2022-06-09 DIAGNOSIS — Z94.0 KIDNEY REPLACED BY TRANSPLANT: ICD-10-CM

## 2022-06-09 LAB
CREAT SERPL-MCNC: 1.39 MG/DL (ref 0.52–1.04)
ERYTHROCYTE [DISTWIDTH] IN BLOOD BY AUTOMATED COUNT: 12.4 % (ref 10–15)
GFR SERPL CREATININE-BSD FRML MDRD: 50 ML/MIN/1.73M2
HCT VFR BLD AUTO: 31.7 % (ref 35–47)
HGB BLD-MCNC: 10.1 G/DL (ref 11.7–15.7)
INR PPP: 1.01 (ref 0.85–1.15)
MCH RBC QN AUTO: 29.4 PG (ref 26.5–33)
MCHC RBC AUTO-ENTMCNC: 31.9 G/DL (ref 31.5–36.5)
MCV RBC AUTO: 92 FL (ref 78–100)
PLATELET # BLD AUTO: 275 10E3/UL (ref 150–450)
RBC # BLD AUTO: 3.43 10E6/UL (ref 3.8–5.2)
WBC # BLD AUTO: 6.9 10E3/UL (ref 4–11)

## 2022-06-09 PROCEDURE — 50200 RENAL BIOPSY PERQ: CPT | Mod: LT | Performed by: PHYSICIAN ASSISTANT

## 2022-06-09 PROCEDURE — 82565 ASSAY OF CREATININE: CPT | Performed by: NURSE PRACTITIONER

## 2022-06-09 PROCEDURE — 88305 TISSUE EXAM BY PATHOLOGIST: CPT | Performed by: INTERNAL MEDICINE

## 2022-06-09 PROCEDURE — 88350 IMFLUOR EA ADDL 1ANTB STN PX: CPT | Performed by: INTERNAL MEDICINE

## 2022-06-09 PROCEDURE — 999N000142 HC STATISTIC PROCEDURE PREP ONLY

## 2022-06-09 PROCEDURE — 76942 ECHO GUIDE FOR BIOPSY: CPT | Mod: 26 | Performed by: PHYSICIAN ASSISTANT

## 2022-06-09 PROCEDURE — 88346 IMFLUOR 1ST 1ANTB STAIN PX: CPT | Performed by: INTERNAL MEDICINE

## 2022-06-09 PROCEDURE — 99152 MOD SED SAME PHYS/QHP 5/>YRS: CPT | Performed by: PHYSICIAN ASSISTANT

## 2022-06-09 PROCEDURE — 36415 COLL VENOUS BLD VENIPUNCTURE: CPT | Performed by: NURSE PRACTITIONER

## 2022-06-09 PROCEDURE — 84999 UNLISTED CHEMISTRY PROCEDURE: CPT | Mod: 59 | Performed by: INTERNAL MEDICINE

## 2022-06-09 PROCEDURE — 999N000133 HC STATISTIC PP CARE STAGE 2

## 2022-06-09 PROCEDURE — 99152 MOD SED SAME PHYS/QHP 5/>YRS: CPT

## 2022-06-09 PROCEDURE — 85610 PROTHROMBIN TIME: CPT | Performed by: NURSE PRACTITIONER

## 2022-06-09 PROCEDURE — 272N000505 IR RENAL BIOPSY LEFT

## 2022-06-09 PROCEDURE — 258N000003 HC RX IP 258 OP 636: Performed by: NURSE PRACTITIONER

## 2022-06-09 PROCEDURE — 250N000011 HC RX IP 250 OP 636: Performed by: PHYSICIAN ASSISTANT

## 2022-06-09 PROCEDURE — 88313 SPECIAL STAINS GROUP 2: CPT | Mod: 59 | Performed by: INTERNAL MEDICINE

## 2022-06-09 PROCEDURE — 85014 HEMATOCRIT: CPT | Performed by: NURSE PRACTITIONER

## 2022-06-09 PROCEDURE — 250N000009 HC RX 250: Performed by: PHYSICIAN ASSISTANT

## 2022-06-09 PROCEDURE — 88348 ELECTRON MICROSCOPY DX: CPT | Performed by: INTERNAL MEDICINE

## 2022-06-09 RX ORDER — NALOXONE HYDROCHLORIDE 0.4 MG/ML
0.2 INJECTION, SOLUTION INTRAMUSCULAR; INTRAVENOUS; SUBCUTANEOUS
Status: DISCONTINUED | OUTPATIENT
Start: 2022-06-09 | End: 2022-06-09 | Stop reason: HOSPADM

## 2022-06-09 RX ORDER — FLUMAZENIL 0.1 MG/ML
0.2 INJECTION, SOLUTION INTRAVENOUS
Status: DISCONTINUED | OUTPATIENT
Start: 2022-06-09 | End: 2022-06-09 | Stop reason: HOSPADM

## 2022-06-09 RX ORDER — NALOXONE HYDROCHLORIDE 0.4 MG/ML
0.4 INJECTION, SOLUTION INTRAMUSCULAR; INTRAVENOUS; SUBCUTANEOUS
Status: DISCONTINUED | OUTPATIENT
Start: 2022-06-09 | End: 2022-06-09 | Stop reason: HOSPADM

## 2022-06-09 RX ORDER — BIOTIN 1 MG
1000 TABLET ORAL DAILY
COMMUNITY
End: 2023-02-09

## 2022-06-09 RX ORDER — FENTANYL CITRATE 50 UG/ML
25-50 INJECTION, SOLUTION INTRAMUSCULAR; INTRAVENOUS EVERY 5 MIN PRN
Status: DISCONTINUED | OUTPATIENT
Start: 2022-06-09 | End: 2022-06-09 | Stop reason: HOSPADM

## 2022-06-09 RX ORDER — LIDOCAINE 40 MG/G
CREAM TOPICAL
Status: DISCONTINUED | OUTPATIENT
Start: 2022-06-09 | End: 2022-06-09 | Stop reason: HOSPADM

## 2022-06-09 RX ORDER — SODIUM CHLORIDE 9 MG/ML
INJECTION, SOLUTION INTRAVENOUS CONTINUOUS
Status: DISCONTINUED | OUTPATIENT
Start: 2022-06-09 | End: 2022-06-09 | Stop reason: HOSPADM

## 2022-06-09 RX ADMIN — FENTANYL CITRATE 50 MCG: 50 INJECTION INTRAMUSCULAR; INTRAVENOUS at 12:59

## 2022-06-09 RX ADMIN — MIDAZOLAM HYDROCHLORIDE 0.5 MG: 1 INJECTION, SOLUTION INTRAMUSCULAR; INTRAVENOUS at 13:10

## 2022-06-09 RX ADMIN — LIDOCAINE HYDROCHLORIDE 10 ML: 10 INJECTION, SOLUTION EPIDURAL; INFILTRATION; INTRACAUDAL; PERINEURAL at 13:09

## 2022-06-09 RX ADMIN — MIDAZOLAM HYDROCHLORIDE 1 MG: 1 INJECTION, SOLUTION INTRAMUSCULAR; INTRAVENOUS at 12:59

## 2022-06-09 RX ADMIN — SODIUM CHLORIDE: 9 INJECTION, SOLUTION INTRAVENOUS at 12:04

## 2022-06-09 RX ADMIN — MIDAZOLAM HYDROCHLORIDE 0.5 MG: 1 INJECTION, SOLUTION INTRAMUSCULAR; INTRAVENOUS at 13:02

## 2022-06-09 RX ADMIN — FENTANYL CITRATE 25 MCG: 50 INJECTION INTRAMUSCULAR; INTRAVENOUS at 13:10

## 2022-06-09 RX ADMIN — FENTANYL CITRATE 25 MCG: 50 INJECTION INTRAMUSCULAR; INTRAVENOUS at 13:02

## 2022-06-09 NOTE — PROGRESS NOTES
PIV removed. Discharge paperwork reviewed with patient, pt stated understanding. Mom Milagros will transport patient home.

## 2022-06-09 NOTE — PROCEDURES
[facility]    Procedure: IR Procedure Note    Date/Time: 6/9/2022 1:26 PM  Performed by: Sotero Bolden PA-C  Authorized by: Sotero Bolden PA-C   IR Fellow Physician:  Other(s) attending procedure: Performed by MAIDA Shrestha PA-C      UNIVERSAL PROTOCOL   Site Marked: NA  Prior Images Obtained and Reviewed:  Yes  Required items: Required blood products, implants, devices and special equipment available    Patient identity confirmed:  Verbally with patient, arm band, provided demographic data and hospital-assigned identification number  Patient was reevaluated immediately before administering moderate or deep sedation or anesthesia  Confirmation Checklist:  Patient's identity using two indicators, relevant allergies, procedure was appropriate and matched the consent or emergent situation and correct equipment/implants were available  Time out: Immediately prior to the procedure a time out was called    Universal Protocol: the Joint Commission Universal Protocol was followed    Preparation: Patient was prepped and draped in usual sterile fashion    ESBL (mL):  3     ANESTHESIA    Anesthesia: Local infiltration  Local Anesthetic:  Lidocaine 1% without epinephrine  Anesthetic Total (mL):  10      SEDATION  Patient Sedated: Yes    Sedation Type:  Moderate (conscious) sedation  Sedation:  Fentanyl and midazolam  Vital signs: Vital signs monitored during sedation    See dictated procedure note for full details.  Findings: U/S guided LLQ transplant renal biopsy. Three 18 gauge cores taken. Adequate cortical tissue confirmed by Pathology service.    Specimens: core needle biopsy specimens sent for pathological analysis    Complications: None    Condition: Stable    Plan: Bedrest for 2 hours, no strenuous activity for 3 days. Biopsy results pending.      PROCEDURE    Patient Tolerance:  Patient tolerated the procedure well with no immediate complications  Length of  time physician/provider present for 1:1 monitoring during sedation: 20   Time of sedation: 23 minutes.

## 2022-06-09 NOTE — PROGRESS NOTES
Arrived to [address] for tx kidney biopsy procedure in IR. AFVSS. Denies pain. Labs processing. Consent done. Pt's mom, Milagros, to drive pt home post procedure #[phone]. Mayra.

## 2022-06-09 NOTE — PROGRESS NOTES
Surgery Clinical Trials Office Informed Consent Process Documentation    Study Name: Use of dd-cfDNA in Multi-Organ Transplant Rejection Detection (MOTR) (IRB # [id number])    ICF Version Date / IRB Approval Date:  Version 8/30/2021, IRB Approved 10/25/2021    Date of Approach/Consent:  09 JUN 2022  The subject was screened and meets all of the inclusion criteria and none of the exclusion criteria is met.      The subject was told:  -that the study involves research   -the purpose of the research study  -the expected duration of the study and the approximate number of subject sought  -of procedures that are identified as experimental  -of reasonably foreseeable risks or discomforts to the subject  -of any benefits to the subject or others that may be expected from the research  -of alternative procedures and/or treatment  -how the confidentiality of records would be maintained  -whether or not compensation and medical treatments are available should injury occur as a result of the study  -who to contact if they have questions related to the research study or questions regarding research subjects' rights  -that participation is completely voluntary and that their decision to or not to participate will have no impact on their relationships with the N and the staff    No study procedures were completed prior to the consent being obtained.  The use of historical information (lab or assessments) used for the purpose of the study was approved by subject.  The subject was fully aware that we would be reviewing their medical record for the study.    The subject demonstrated an understanding of what the study involved.  Specifically, how this study differed from standard of care at our center and what was required of the subject as part of the study.    The subject reviewed the consent form and was given the opportunity to ask questions before signing.  Questions and concerns were answered by the study staff and/or  study physician.    A copy of the signed informed consent document was provided to the subject.  [x] Yes [] No    The subject was offered a copy of the signed informed consent but declined. [] Yes [x] No    The consent required the use of a :   [] Yes [x]  No       A 'short form' consent was used:     [] Yes [x] No         If yes, provide name of witness:     The subject required a legally-authorized representative (LAR) to sign on their behalf:                                                    [] Yes  [x] No   If yes, please record name of LAR:     Questions to Evaluate Subject Comprehension of Study:    Question: Adequate Response? If No, explain what actions were taken   What is being studied? [x] Yes  [] No   If you participate, what will be different than if you decide not to participate?  [x] Yes  [] No   How long will the study last; will you be required to return for visits? [x] Yes  [] No   What kinds of risks are there? [x] Yes  [] No   Do you understand that you can withdraw consent at any time and for any reason while participating in the study? [x] Yes  [] No       Study Coordinators: Dara Bennett     ([phone])                                        Shawnee Dawn         ([phone])                                       :       Marilyn Chance  ([phone])

## 2022-06-09 NOTE — DISCHARGE INSTRUCTIONS
[facility]    Interventional Radiology  Patient Instructions Following Biopsy    AFTER YOU GO HOME  If you were given sedation DO NOT drive or operate machinery at home or at work for at least 24 hours  DO relax and take it easy for 48 hours, no strenuous activity for 24 hours  DO drink plenty of fluids  DO resume your regular diet, unless otherwise instructed by your Primary Physician  Keep the dressing dry and in place for 24 hours. Remove dressing after showering the next day. Replace with Band aid for 2 days. Never leave a wet dressing in place.   DO NOT SMOKE FOR AT LEAST 24 HOURS, if you have been given any medications that were to help you relax or sedate you during your procedure  DO NOT drink alcoholic beverages the day of your procedure  DO NOT do any strenuous exercise or lifting (> 10 lbs) for at least 7 days following your procedure  DO NOT take a bath or shower for at least 12 hours following your procedure  DO NOT make any important or legal decisions for 24 hours following your procedure  There should be minimal drainage from the biopsy site    CALL THE PHYSICIAN IF:  You start bleeding from the procedure site.  If you do start to bleed from that site, lie down flat and hold pressure on the site for a minimum of 10 minutes.  Your physician will tell you if you need to return to the hospital  You develop nausea or vomiting  You have excessive swelling, redness, or tenderness at the site  You have drainage that looks like it is infected.  You experience severe pain  You develop hives or a rash or unexplained itching  You develop shortness of breath  You develop a temperature of 101 degrees F or greater  You develop bloody clots or red urine after you are discharged  You develop chest pain or cough up blood, lightheadedness or fainting    ADDITIONAL INSTRUCTIONS  If you are taking Coumadin, restart tonight.  Follow up with your Coumadin Clinic or Primary Care MD to have your INR  rechecked.    [facility] INTERVENTIONAL RADIOLOGY DEPARTMENT  Procedure Physician Assistant: Marnie Shrestha PA-C                                    Date of procedure: June 9, 2022    Telephone Numbers: [phone] Monday-Friday 8:00 am to 4:30 pm  [phone] After 4:30 pm Monday-Friday, Weekends & Holidays. Ask for the Interventional Radiologist on call.  Someone is on call 24 hrs/day  [facility] toll free number: [phone] Monday-Friday 8:00 am to 4:30 pm  [facility] Emergency Dept: [phone]

## 2022-06-09 NOTE — PROGRESS NOTES
Tolerated ambulation to BR. Able to void without complications. UO clear yellow. Assisted back to bed. LLQ site unchanged, stable.

## 2022-06-09 NOTE — PRE-PROCEDURE
GENERAL PRE-PROCEDURE:   Procedure:  Left lower quadrant transplant kidney biopsy   Date/Time:  6/9/2022 12:01 PM    Written consent obtained?: Yes    Risks and benefits: Risks, benefits and alternatives were discussed    Consent given by:  Patient  Patient states understanding of procedure being performed: Yes    Patient's understanding of procedure matches consent: Yes    Procedure consent matches procedure scheduled: Yes    Expected level of sedation:  Moderate  Appropriately NPO:  Yes  ASA Class:  3  Mallampati  :  Grade 2- soft palate, base of uvula, tonsillar pillars, and portion of posterior pharyngeal wall visible  Lungs:  Lungs clear with good breath sounds bilaterally  Heart:  Normal heart sounds and rate  History & Physical reviewed:  History and physical reviewed and no updates needed  Statement of review:  I have reviewed the lab findings, diagnostic data, medications, and the plan for sedation

## 2022-06-09 NOTE — IR NOTE
Patient Name: Marilyn Ortega  Medical Record Number: [id number]  Today's Date: 6/9/2022    Procedure: Image guided left transplant kidney biopsy  Proceduralist: Marnie Shrestha PA-C  Renal Pathology present: Transplant Renal Fellow-yes    Procedure Start: 1258  Procedure end: 1321  Sedation medications administered:   Fentanyl 100 mcg  Midazolam 2 mg    Sedation start time: 1258  Sedation end time: 1321  Total sedation time: 23 minutes     Report given to: Cristal LEON 2A  : PAOLO    Other Notes: Pt arrived to IR room 6 from . Consent reviewed. Pt denies any questions or concerns regarding procedure. Pt positioned supine and monitored per protocol. Pt tolerated procedure without any noted complications. 3 core samples collected, transplant renal fellow present for tissue sampling, specimens sent to lab. Pt transferred back to .

## 2022-06-13 ENCOUNTER — LAB (OUTPATIENT)
Dept: LAB | Facility: CLINIC | Age: 37
End: 2022-06-13
Payer: COMMERCIAL

## 2022-06-13 DIAGNOSIS — Z94.83 PANCREAS REPLACED BY TRANSPLANT (H): ICD-10-CM

## 2022-06-13 DIAGNOSIS — Z48.298 AFTERCARE FOLLOWING ORGAN TRANSPLANT: ICD-10-CM

## 2022-06-13 DIAGNOSIS — Z94.0 KIDNEY REPLACED BY TRANSPLANT: ICD-10-CM

## 2022-06-13 DIAGNOSIS — Z79.899 ENCOUNTER FOR LONG-TERM CURRENT USE OF MEDICATION: ICD-10-CM

## 2022-06-13 LAB
AMYLASE SERPL-CCNC: 50 U/L (ref 30–110)
ANION GAP SERPL CALCULATED.3IONS-SCNC: 6 MMOL/L (ref 3–14)
BUN SERPL-MCNC: 34 MG/DL (ref 7–30)
CALCIUM SERPL-MCNC: 9.5 MG/DL (ref 8.5–10.1)
CHLORIDE BLD-SCNC: 110 MMOL/L (ref 94–109)
CO2 SERPL-SCNC: 21 MMOL/L (ref 20–32)
CREAT SERPL-MCNC: 1.47 MG/DL (ref 0.52–1.04)
ERYTHROCYTE [DISTWIDTH] IN BLOOD BY AUTOMATED COUNT: 12.4 % (ref 10–15)
GFR SERPL CREATININE-BSD FRML MDRD: 47 ML/MIN/1.73M2
GLUCOSE BLD-MCNC: 99 MG/DL (ref 70–99)
HCT VFR BLD AUTO: 29.9 % (ref 35–47)
HGB BLD-MCNC: 9.6 G/DL (ref 11.7–15.7)
LIPASE SERPL-CCNC: 108 U/L (ref 73–393)
MAYO MISC RESULT: NORMAL
MCH RBC QN AUTO: 29.2 PG (ref 26.5–33)
MCHC RBC AUTO-ENTMCNC: 32.1 G/DL (ref 31.5–36.5)
MCV RBC AUTO: 91 FL (ref 78–100)
PLATELET # BLD AUTO: 227 10E3/UL (ref 150–450)
POTASSIUM BLD-SCNC: 4.1 MMOL/L (ref 3.4–5.3)
RBC # BLD AUTO: 3.29 10E6/UL (ref 3.8–5.2)
SODIUM SERPL-SCNC: 137 MMOL/L (ref 133–144)
TACROLIMUS BLD-MCNC: 6.6 UG/L (ref 5–15)
TME LAST DOSE: NORMAL H
TME LAST DOSE: NORMAL H
WBC # BLD AUTO: 4.7 10E3/UL (ref 4–11)

## 2022-06-13 PROCEDURE — 80048 BASIC METABOLIC PNL TOTAL CA: CPT

## 2022-06-13 PROCEDURE — 82150 ASSAY OF AMYLASE: CPT

## 2022-06-13 PROCEDURE — 83690 ASSAY OF LIPASE: CPT

## 2022-06-13 PROCEDURE — 85027 COMPLETE CBC AUTOMATED: CPT

## 2022-06-13 PROCEDURE — 80197 ASSAY OF TACROLIMUS: CPT

## 2022-06-13 PROCEDURE — 36415 COLL VENOUS BLD VENIPUNCTURE: CPT

## 2022-06-14 ENCOUNTER — TELEPHONE (OUTPATIENT)
Dept: TRANSPLANT | Facility: CLINIC | Age: 37
End: 2022-06-14

## 2022-06-14 DIAGNOSIS — Z94.0 KIDNEY REPLACED BY TRANSPLANT: Primary | ICD-10-CM

## 2022-06-14 DIAGNOSIS — Z94.83 PANCREAS TRANSPLANTED (H): ICD-10-CM

## 2022-06-14 DIAGNOSIS — Z94.83 PANCREAS REPLACED BY TRANSPLANT (H): ICD-10-CM

## 2022-06-14 RX ORDER — TACROLIMUS 4 MG/1
12 TABLET, EXTENDED RELEASE ORAL
Qty: 90 TABLET | Refills: 11 | Status: SHIPPED | OUTPATIENT
Start: 2022-06-14 | End: 2022-07-11

## 2022-06-14 RX ORDER — TACROLIMUS 0.75 MG/1
1.5 TABLET, EXTENDED RELEASE ORAL
Qty: 60 TABLET | Refills: 11 | Status: SHIPPED | OUTPATIENT
Start: 2022-06-14 | End: 2022-07-11

## 2022-06-14 RX ORDER — TACROLIMUS 1 MG/1
1 TABLET, EXTENDED RELEASE ORAL
Qty: 30 TABLET | Refills: 11 | Status: SHIPPED | OUTPATIENT
Start: 2022-06-14 | End: 2022-08-18

## 2022-06-14 NOTE — TELEPHONE ENCOUNTER
ISSUE:  Kidney biopsy, no evidence of rejection per Dr Lee  Tacro=6.8    PLAN:  Increase tacro for goal 8-10  Notify patient of biopsy results  Recheck labs later this week with BK and DSA, if creatinine rising, kidney US\    OUTCOME:  Patient notified to repeat labs on Thursday, BK and DSA added to orders as well  Will monitor creatinine and schedule for US if creat rises.   Patient v/u of negative biopsy results.

## 2022-06-16 ENCOUNTER — TELEPHONE (OUTPATIENT)
Dept: TRANSPLANT | Facility: CLINIC | Age: 37
End: 2022-06-16

## 2022-06-16 ENCOUNTER — LAB (OUTPATIENT)
Dept: LAB | Facility: CLINIC | Age: 37
End: 2022-06-16
Payer: COMMERCIAL

## 2022-06-16 DIAGNOSIS — Z94.0 KIDNEY REPLACED BY TRANSPLANT: Primary | ICD-10-CM

## 2022-06-16 DIAGNOSIS — Z94.0 KIDNEY REPLACED BY TRANSPLANT: ICD-10-CM

## 2022-06-16 DIAGNOSIS — Z94.83 PANCREAS REPLACED BY TRANSPLANT (H): ICD-10-CM

## 2022-06-16 LAB
ALBUMIN UR-MCNC: NEGATIVE MG/DL
ANION GAP SERPL CALCULATED.3IONS-SCNC: 7 MMOL/L (ref 3–14)
APPEARANCE UR: CLEAR
BACTERIA #/AREA URNS HPF: ABNORMAL /HPF
BILIRUB UR QL STRIP: NEGATIVE
BUN SERPL-MCNC: 47 MG/DL (ref 7–30)
CALCIUM SERPL-MCNC: 9 MG/DL (ref 8.5–10.1)
CHLORIDE BLD-SCNC: 113 MMOL/L (ref 94–109)
CO2 SERPL-SCNC: 19 MMOL/L (ref 20–32)
COLOR UR AUTO: ABNORMAL
CREAT SERPL-MCNC: 1.71 MG/DL (ref 0.52–1.04)
CREAT UR-MCNC: 54 MG/DL
ERYTHROCYTE [DISTWIDTH] IN BLOOD BY AUTOMATED COUNT: 12.7 % (ref 10–15)
GFR SERPL CREATININE-BSD FRML MDRD: 39 ML/MIN/1.73M2
GLUCOSE BLD-MCNC: 85 MG/DL (ref 70–99)
GLUCOSE UR STRIP-MCNC: NEGATIVE MG/DL
HCT VFR BLD AUTO: 29.8 % (ref 35–47)
HGB BLD-MCNC: 9.3 G/DL (ref 11.7–15.7)
HGB UR QL STRIP: NEGATIVE
KETONES UR STRIP-MCNC: NEGATIVE MG/DL
LEUKOCYTE ESTERASE UR QL STRIP: NEGATIVE
LIPASE SERPL-CCNC: 119 U/L (ref 73–393)
MCH RBC QN AUTO: 29 PG (ref 26.5–33)
MCHC RBC AUTO-ENTMCNC: 31.2 G/DL (ref 31.5–36.5)
MCV RBC AUTO: 93 FL (ref 78–100)
NITRATE UR QL: NEGATIVE
PATH REPORT.COMMENTS IMP SPEC: NORMAL
PATH REPORT.COMMENTS IMP SPEC: NORMAL
PATH REPORT.FINAL DX SPEC: NORMAL
PATH REPORT.RELEVANT HX SPEC: NORMAL
PH UR STRIP: 5 [PH] (ref 5–7)
PHOTO IMAGE: NORMAL
PLATELET # BLD AUTO: 208 10E3/UL (ref 150–450)
POTASSIUM BLD-SCNC: 5.2 MMOL/L (ref 3.4–5.3)
PROT UR-MCNC: 0.06 G/L
PROT/CREAT 24H UR: 0.11 G/G CR (ref 0–0.2)
RBC # BLD AUTO: 3.21 10E6/UL (ref 3.8–5.2)
RBC URINE: 0 /HPF
SODIUM SERPL-SCNC: 139 MMOL/L (ref 133–144)
SP GR UR STRIP: 1.01 (ref 1–1.03)
SQUAMOUS EPITHELIAL: 3 /HPF
TACROLIMUS BLD-MCNC: 10.4 UG/L (ref 5–15)
TME LAST DOSE: NORMAL H
TME LAST DOSE: NORMAL H
UROBILINOGEN UR STRIP-MCNC: NORMAL MG/DL
WBC # BLD AUTO: 7.3 10E3/UL (ref 4–11)
WBC URINE: 0 /HPF

## 2022-06-16 PROCEDURE — 85027 COMPLETE CBC AUTOMATED: CPT

## 2022-06-16 PROCEDURE — 84156 ASSAY OF PROTEIN URINE: CPT

## 2022-06-16 PROCEDURE — 80197 ASSAY OF TACROLIMUS: CPT

## 2022-06-16 PROCEDURE — 82310 ASSAY OF CALCIUM: CPT

## 2022-06-16 PROCEDURE — 86832 HLA CLASS I HIGH DEFIN QUAL: CPT

## 2022-06-16 PROCEDURE — 83690 ASSAY OF LIPASE: CPT

## 2022-06-16 PROCEDURE — 81003 URINALYSIS AUTO W/O SCOPE: CPT

## 2022-06-16 PROCEDURE — 86833 HLA CLASS II HIGH DEFIN QUAL: CPT

## 2022-06-16 PROCEDURE — 87799 DETECT AGENT NOS DNA QUANT: CPT

## 2022-06-16 PROCEDURE — 36415 COLL VENOUS BLD VENIPUNCTURE: CPT

## 2022-06-16 NOTE — TELEPHONE ENCOUNTER
ISSUE:  Rise in creatinine, after biopsy.    Will schedule for kidney txp US, per plan earlier this week  BK and DSA pending.    Reviewed w/ Dr Lee - also needs repeat UA/UC and UPC, repeat  CBC/BMP tomorrow. Hold lasix.   May need repeat biopsy due to small sample.     OUTCOME:  US scheduled for Monday 6/20 @ 4pm. RNCC to check w/ radiology tomorrow for possible cancellation.

## 2022-06-17 ENCOUNTER — LAB (OUTPATIENT)
Dept: LAB | Facility: CLINIC | Age: 37
End: 2022-06-17
Payer: COMMERCIAL

## 2022-06-17 DIAGNOSIS — Z94.0 KIDNEY REPLACED BY TRANSPLANT: ICD-10-CM

## 2022-06-17 LAB
ANION GAP SERPL CALCULATED.3IONS-SCNC: 7 MMOL/L (ref 3–14)
BKV DNA # SPEC NAA+PROBE: <500 COPIES/ML
BKV DNA SPEC NAA+PROBE-LOG#: 2.7 {LOG_COPIES}/ML
BUN SERPL-MCNC: 41 MG/DL (ref 7–30)
CALCIUM SERPL-MCNC: 9.1 MG/DL (ref 8.5–10.1)
CHLORIDE BLD-SCNC: 112 MMOL/L (ref 94–109)
CO2 SERPL-SCNC: 20 MMOL/L (ref 20–32)
CREAT SERPL-MCNC: 1.36 MG/DL (ref 0.52–1.04)
ERYTHROCYTE [DISTWIDTH] IN BLOOD BY AUTOMATED COUNT: 12.7 % (ref 10–15)
GFR SERPL CREATININE-BSD FRML MDRD: 51 ML/MIN/1.73M2
GLUCOSE BLD-MCNC: 91 MG/DL (ref 70–99)
HCT VFR BLD AUTO: 29.9 % (ref 35–47)
HGB BLD-MCNC: 9.7 G/DL (ref 11.7–15.7)
MCH RBC QN AUTO: 29.1 PG (ref 26.5–33)
MCHC RBC AUTO-ENTMCNC: 32.4 G/DL (ref 31.5–36.5)
MCV RBC AUTO: 90 FL (ref 78–100)
PLATELET # BLD AUTO: 244 10E3/UL (ref 150–450)
POTASSIUM BLD-SCNC: 4.6 MMOL/L (ref 3.4–5.3)
RBC # BLD AUTO: 3.33 10E6/UL (ref 3.8–5.2)
SODIUM SERPL-SCNC: 139 MMOL/L (ref 133–144)
WBC # BLD AUTO: 6.1 10E3/UL (ref 4–11)

## 2022-06-17 PROCEDURE — 80048 BASIC METABOLIC PNL TOTAL CA: CPT

## 2022-06-17 PROCEDURE — 85027 COMPLETE CBC AUTOMATED: CPT

## 2022-06-17 PROCEDURE — 36415 COLL VENOUS BLD VENIPUNCTURE: CPT

## 2022-06-20 ENCOUNTER — ANCILLARY PROCEDURE (OUTPATIENT)
Dept: ULTRASOUND IMAGING | Facility: CLINIC | Age: 37
End: 2022-06-20
Attending: INTERNAL MEDICINE
Payer: COMMERCIAL

## 2022-06-20 ENCOUNTER — LAB (OUTPATIENT)
Dept: LAB | Facility: CLINIC | Age: 37
End: 2022-06-20
Payer: COMMERCIAL

## 2022-06-20 DIAGNOSIS — Z94.83 PANCREAS REPLACED BY TRANSPLANT (H): ICD-10-CM

## 2022-06-20 DIAGNOSIS — Z94.0 KIDNEY REPLACED BY TRANSPLANT: ICD-10-CM

## 2022-06-20 DIAGNOSIS — Z48.298 AFTERCARE FOLLOWING ORGAN TRANSPLANT: ICD-10-CM

## 2022-06-20 DIAGNOSIS — Z79.899 ENCOUNTER FOR LONG-TERM CURRENT USE OF MEDICATION: ICD-10-CM

## 2022-06-20 LAB
AMYLASE SERPL-CCNC: 51 U/L (ref 30–110)
ANION GAP SERPL CALCULATED.3IONS-SCNC: 6 MMOL/L (ref 3–14)
BUN SERPL-MCNC: 43 MG/DL (ref 7–30)
CALCIUM SERPL-MCNC: 9.2 MG/DL (ref 8.5–10.1)
CHLORIDE BLD-SCNC: 113 MMOL/L (ref 94–109)
CO2 SERPL-SCNC: 19 MMOL/L (ref 20–32)
CREAT SERPL-MCNC: 1.33 MG/DL (ref 0.52–1.04)
ERYTHROCYTE [DISTWIDTH] IN BLOOD BY AUTOMATED COUNT: 12.7 % (ref 10–15)
GFR SERPL CREATININE-BSD FRML MDRD: 53 ML/MIN/1.73M2
GLUCOSE BLD-MCNC: 91 MG/DL (ref 70–99)
HCT VFR BLD AUTO: 30.5 % (ref 35–47)
HGB BLD-MCNC: 9.5 G/DL (ref 11.7–15.7)
LIPASE SERPL-CCNC: 128 U/L (ref 73–393)
MCH RBC QN AUTO: 29 PG (ref 26.5–33)
MCHC RBC AUTO-ENTMCNC: 31.1 G/DL (ref 31.5–36.5)
MCV RBC AUTO: 93 FL (ref 78–100)
PLATELET # BLD AUTO: 233 10E3/UL (ref 150–450)
POTASSIUM BLD-SCNC: 4.4 MMOL/L (ref 3.4–5.3)
RBC # BLD AUTO: 3.28 10E6/UL (ref 3.8–5.2)
SODIUM SERPL-SCNC: 138 MMOL/L (ref 133–144)
WBC # BLD AUTO: 5 10E3/UL (ref 4–11)

## 2022-06-20 PROCEDURE — 36415 COLL VENOUS BLD VENIPUNCTURE: CPT

## 2022-06-20 PROCEDURE — 85014 HEMATOCRIT: CPT

## 2022-06-20 PROCEDURE — 80048 BASIC METABOLIC PNL TOTAL CA: CPT

## 2022-06-20 PROCEDURE — 82150 ASSAY OF AMYLASE: CPT

## 2022-06-20 PROCEDURE — 76776 US EXAM K TRANSPL W/DOPPLER: CPT | Performed by: RADIOLOGY

## 2022-06-20 PROCEDURE — 83690 ASSAY OF LIPASE: CPT

## 2022-06-20 PROCEDURE — 80197 ASSAY OF TACROLIMUS: CPT

## 2022-06-21 LAB
DONOR IDENTIFICATION: NORMAL
DSA COMMENTS: NORMAL
DSA PRESENT: NO
DSA TEST METHOD: NORMAL
ORGAN: NORMAL
SA 1 CELL: NORMAL
SA 1 TEST METHOD: NORMAL
SA 2 CELL: NORMAL
SA 2 TEST METHOD: NORMAL
SA1 HI RISK ABY: NORMAL
SA1 MOD RISK ABY: NORMAL
SA2 HI RISK ABY: NORMAL
SA2 MOD RISK ABY: NORMAL
TACROLIMUS BLD-MCNC: 8.1 UG/L (ref 5–15)
TME LAST DOSE: NORMAL H
TME LAST DOSE: NORMAL H
UNACCEPTABLE ANTIGENS: NORMAL
UNOS CPRA: 32
ZZZSA 1  COMMENTS: NORMAL
ZZZSA 2 COMMENTS: NORMAL

## 2022-06-22 ENCOUNTER — TELEPHONE (OUTPATIENT)
Dept: TRANSPLANT | Facility: CLINIC | Age: 37
End: 2022-06-22

## 2022-06-22 NOTE — TELEPHONE ENCOUNTER
Post Kidney and Pancreas Transplant Team Conference  Date: 6/22/2022  Transplant Coordinator: Yessica Becker     Attendees:  [x]  Dr. Pack [x] Brandi Hernandez, RN [x] Tamiko Cárdenas LPN     [x]  Dr. Motta [] Lizette Park, EDWARD [] Radhika Arnett LPN   [x]  Dr. Lee [x] Yessica Becker, EDWARD    [x]  Dr. Corona [x] Tasha Cartwright RN [] Bulmaro Weiner, IsaccD   [x] Dr. El [x] Hamida Guevara, EDWARD    [] Dr. Chavarria [] Roderick Messer RN    [x] Dr. Ocampo [x] Lynette De, EDWARD [x] Nena Wheeler RN   [] Dr. De León [] Naz Glaser RN    []  Dr. Aranda [] Radha Hurt, RN    [x] Dr. Wright [x] Ashely Tse, EDWARD    [x] Sally Brito, CAITLIN [x] Jovana Lemos RN        Verbal Plan Read Back:   Continued current treatment plan    Routed to RN Coordinator   Tamiko Cárdenas LPN

## 2022-06-27 ENCOUNTER — TELEPHONE (OUTPATIENT)
Dept: OPHTHALMOLOGY | Facility: CLINIC | Age: 37
End: 2022-06-27

## 2022-06-27 ENCOUNTER — LAB (OUTPATIENT)
Dept: LAB | Facility: CLINIC | Age: 37
End: 2022-06-27
Payer: COMMERCIAL

## 2022-06-27 DIAGNOSIS — Z94.0 KIDNEY REPLACED BY TRANSPLANT: ICD-10-CM

## 2022-06-27 DIAGNOSIS — Z79.899 ENCOUNTER FOR LONG-TERM CURRENT USE OF MEDICATION: ICD-10-CM

## 2022-06-27 DIAGNOSIS — Z94.83 PANCREAS REPLACED BY TRANSPLANT (H): ICD-10-CM

## 2022-06-27 DIAGNOSIS — Z48.298 AFTERCARE FOLLOWING ORGAN TRANSPLANT: ICD-10-CM

## 2022-06-27 LAB
AMYLASE SERPL-CCNC: 55 U/L (ref 30–110)
ANION GAP SERPL CALCULATED.3IONS-SCNC: 7 MMOL/L (ref 3–14)
BUN SERPL-MCNC: 36 MG/DL (ref 7–30)
CALCIUM SERPL-MCNC: 9.4 MG/DL (ref 8.5–10.1)
CHLORIDE BLD-SCNC: 112 MMOL/L (ref 94–109)
CO2 SERPL-SCNC: 20 MMOL/L (ref 20–32)
CREAT SERPL-MCNC: 1.07 MG/DL (ref 0.52–1.04)
ERYTHROCYTE [DISTWIDTH] IN BLOOD BY AUTOMATED COUNT: 12.7 % (ref 10–15)
GFR SERPL CREATININE-BSD FRML MDRD: 68 ML/MIN/1.73M2
GLUCOSE BLD-MCNC: 95 MG/DL (ref 70–99)
HCT VFR BLD AUTO: 31.1 % (ref 35–47)
HGB BLD-MCNC: 9.7 G/DL (ref 11.7–15.7)
LIPASE SERPL-CCNC: 130 U/L (ref 73–393)
MCH RBC QN AUTO: 29.3 PG (ref 26.5–33)
MCHC RBC AUTO-ENTMCNC: 31.2 G/DL (ref 31.5–36.5)
MCV RBC AUTO: 94 FL (ref 78–100)
PLATELET # BLD AUTO: 273 10E3/UL (ref 150–450)
POTASSIUM BLD-SCNC: 4.3 MMOL/L (ref 3.4–5.3)
RBC # BLD AUTO: 3.31 10E6/UL (ref 3.8–5.2)
SODIUM SERPL-SCNC: 139 MMOL/L (ref 133–144)
TACROLIMUS BLD-MCNC: 7.1 UG/L (ref 5–15)
TME LAST DOSE: NORMAL H
TME LAST DOSE: NORMAL H
WBC # BLD AUTO: 4.2 10E3/UL (ref 4–11)

## 2022-06-27 PROCEDURE — 80048 BASIC METABOLIC PNL TOTAL CA: CPT

## 2022-06-27 PROCEDURE — 82150 ASSAY OF AMYLASE: CPT

## 2022-06-27 PROCEDURE — 80197 ASSAY OF TACROLIMUS: CPT

## 2022-06-27 PROCEDURE — 85027 COMPLETE CBC AUTOMATED: CPT

## 2022-06-27 PROCEDURE — 83690 ASSAY OF LIPASE: CPT

## 2022-06-27 PROCEDURE — 36415 COLL VENOUS BLD VENIPUNCTURE: CPT

## 2022-06-27 NOTE — TELEPHONE ENCOUNTER
The patient notes vision problems and trouble reading her phone.  She thinks it is time for an injection.  Sent to provider team for scheduling.

## 2022-06-27 NOTE — TELEPHONE ENCOUNTER
Health Call Center    Phone Message    May a detailed message be left on voicemail: yes     Reason for Call: Symptoms or Concerns     If patient has red-flag symptoms, warm transfer to triage line    Current symptom or concern: Pt is reporting a lot of light sensitivity as well as trouble with contrast.    Symptoms have been present for:  1-2 week(s)    Has patient previously been seen for this? Yes    By : Dr. Barnes    Date: 5/25/22    Are there any new or worsening symptoms? Yes: Light sensitivity and trouble with contrast.      Action Taken: Message routed to:  [facility] EYE    Travel Screening: Not Applicable

## 2022-06-28 ENCOUNTER — TELEPHONE (OUTPATIENT)
Dept: TRANSPLANT | Facility: CLINIC | Age: 37
End: 2022-06-28

## 2022-06-28 ENCOUNTER — OFFICE VISIT (OUTPATIENT)
Dept: NEPHROLOGY | Facility: CLINIC | Age: 37
End: 2022-06-28
Attending: INTERNAL MEDICINE
Payer: COMMERCIAL

## 2022-06-28 VITALS
TEMPERATURE: 98 F | WEIGHT: 123.9 LBS | BODY MASS INDEX: 20 KG/M2 | SYSTOLIC BLOOD PRESSURE: 122 MMHG | HEART RATE: 85 BPM | OXYGEN SATURATION: 100 % | DIASTOLIC BLOOD PRESSURE: 86 MMHG

## 2022-06-28 DIAGNOSIS — I15.1 HTN, KIDNEY TRANSPLANT RELATED: ICD-10-CM

## 2022-06-28 DIAGNOSIS — Z48.298 AFTERCARE FOLLOWING ORGAN TRANSPLANT: Primary | ICD-10-CM

## 2022-06-28 DIAGNOSIS — Z23 NEED FOR COVID-19 VACCINE: ICD-10-CM

## 2022-06-28 DIAGNOSIS — Z94.0 HTN, KIDNEY TRANSPLANT RELATED: ICD-10-CM

## 2022-06-28 DIAGNOSIS — Z94.83 PANCREAS REPLACED BY TRANSPLANT (H): ICD-10-CM

## 2022-06-28 DIAGNOSIS — Z94.0 KIDNEY REPLACED BY TRANSPLANT: ICD-10-CM

## 2022-06-28 DIAGNOSIS — D84.9 IMMUNOSUPPRESSION (H): ICD-10-CM

## 2022-06-28 PROCEDURE — G0463 HOSPITAL OUTPT CLINIC VISIT: HCPCS | Mod: 25

## 2022-06-28 PROCEDURE — 91305 HC RX IP 250 OP 636: CPT | Performed by: INTERNAL MEDICINE

## 2022-06-28 PROCEDURE — 0054A HC ADMIN COVID VAC PFIZER TRS-SUCR, BOOSTER: CPT | Performed by: INTERNAL MEDICINE

## 2022-06-28 PROCEDURE — 250N000011 HC RX IP 250 OP 636: Performed by: INTERNAL MEDICINE

## 2022-06-28 PROCEDURE — 99215 OFFICE O/P EST HI 40 MIN: CPT | Performed by: INTERNAL MEDICINE

## 2022-06-28 RX ADMIN — BNT162B2 30 MCG: 0.23 INJECTION, SUSPENSION INTRAMUSCULAR at 11:01

## 2022-06-28 ASSESSMENT — PAIN SCALES - GENERAL: PAINLEVEL: NO PAIN (0)

## 2022-06-28 NOTE — LETTER
6/28/2022       RE: Marilyn Ortega  [address]  [place] [id number]     Dear Colleague,    Thank you for referring your patient, Marilyn Ortega, to the [facility] [place] [facility]. Please see a copy of my visit note below.        TRANSPLANT NEPHROLOGY EARLY POST TRANSPLANT VISIT   7 month visit    Assessment & Plan   # DDKT (SPK): Stable significant variability 1-1.3 and occasional uptrend to 1.4-1.5, peak 1.6-1.7 around the time she had borderline rejection , significant variability on FK trough as well on envarsus though overall therapeutic and very compliant to her med regimen   - Baseline Creatinine: ~ 1-1.3 more rceently up to 1.3-1.5 since 3/2022, bx threshold of 1.6 or higher   - Proteinuria: Not checked post transplant   - Date DSA Last Checked: Nov/2021      Latest DSA: No DSA at time of transplant   - BK Viremia: Continue to monitor BK biweekly   - Kidney Tx Biopsy: yes  # kidney bx 5/2: read as borderline cellular rejection,isolated tubulitis i0t1, IF suggests possible IgA nephropathy MEST0   # closure Bx: 6/9 very limited sample, 2 glomeruli on EM, c4d not available, no signs of rejection   She was treated for 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 Cr now down to 1.2-1.3.  Closure biopsy of the kidney 1st in 4-6 weeks and will consider pancreas bx if uptrend in lipase    # Pancreas Tx (SPK):    - Pancreatic Exocrine Drainage: Enteric drained     - Blood glucose: Euglycemia      On insulin: No   - HbA1c: Last checked at time of transplant      Latest HbA1c: 5%   - Pancreatic enzymes: Trend down   - Date DSA Last Checked: Nov/2021  Latest DSA: No DSA at time of transplant   - Pancreas Tx Biopsy: No    # Immunosuppression: Tacrolimus immediate release (goal 8-10) and Mycophenolic acid (dose 540 mg every 12 hours)   - Continue with intensive  monitoring of immunosuppression for efficacy and toxicity.   - Induction with Recent Transplant:  Intermediate Intensity   - Continue with intensive monitoring of immunosuppression for efficacy and toxicity.   - Changes: yes recent FK trough subtherapeutic -7, awaiting repeat, if still<8 will increase envarsus dose    note:  Tremors have improved with the switch to envarsus from short acting tacrolimus.     MPA reduced 1/31/2022 due to GI issues, level was high too    # Infection Prophylaxis: due for BK screen -pending from today  - PJP: Sulfa/TMP (Bactrim)  - CMV: Valganciclovir (Valcyte) completed 3 months    # Blood Pressure: Controlled;  Goal BP:<130/80   - Volume status: Euvolemic  EDW ~ 122lbs currently ranges 125-130 lbs   e  # Anemia in Chronic Renal Disease: Hgb: stable     SYLVAIN: No   - Iron studies: previously on iron tabs currently off recheck given fatigue and recent menorrhagia    # Mineral Bone Disorder:   - Secondary renal hyperparathyroidism; PTH level: Normal (18-80 pg/ml)        On treatment: None  - Vitamin D; level: High normal        On supplement: Yes  - Calcium; level: Normal        On supplement: No  - Phosphorus; level: Normal        On supplement: Yes, 250mg bid if next phos>2 could reduce to 250 mg po qd    # Electrolytes:   - Potassium; level: Normal        On supplement: No  - Magnesium; level: Normal        On supplement: Yes, 400mg daily   - Bicarbonate; level: Normal        On supplement: No    # Medical Compliance: Yes    # Fatigue: nl CMV EBV TSH iron studies stress test    #  LE edema: intermittent mostly upon standing and worse towards the end of the day, symptoms suggestive of dependent edema and  Not intravascular fluid overload, trial of various forms of diuretics led to BERNIE & discontinuation. Responds to compression stockings. Most recent UPC normal. More recently on lasix 20 mg po qMWF    # COVID-19 Virus Review: Discussed COVID-19 virus and the potential medical risks.   Reviewed preventative health recommendations, including wearing a mask where appropriate.  Recommended COVID vaccination should be up to date with either an initial vaccination or booster shot when appropriate.  Asked the patient to inform the transplant center if they are exposed or diagnosed with this virus.    # COVID Vaccination Up To Date: Yes 3 doses due for 4th dose will give today    # Transplant History:  Etiology of Kidney Failure: Diabetes mellitus type 1  Tx: SPK  Transplant: 11/15/2021 (Kidney / Pancreas)  Donor Type: Donation after Brain Death Donor Class:   Crossmatch at time of Tx: negative  DSA at time of Tx: No  Significant changes in immunosuppression: None  CMV IgG Ab High Risk Discordance (D+/R-): No  EBV IgG Ab High Risk Discordance (D+/R-): No  Significant transplant-related complications: None    Transplant Office Phone Number: [phone]    Assessment and plan was discussed with the patient and she voiced her understanding and agreement.      Return visit: 1 month    Reanna Lee MD    Chief Complaint   Ms. Ortega is a 37 year old here for kidney transplant, pancreas transplant, immunosuppression management and new medical concerns.     History of Present Illness      Marilyn feels better overall. She reports improved energy level . She has been able to be more active closer to her baseline prior to transplant Leg swelling is well controlled with lasix 20 mg qMWF. BP well controlled. No nausea, vomiting, regular BM's.   Recent kidney tx bx showed very small/inadequate sample but based on EM evaluation of 2 glomeruli no signs of rejection. IF not available, c4d not available, DSA was neg and BK <500.   Labs continue to show significant variability in Cr levels more recently Cr down to 1 without any change in her diuretic regimen or recent med changes. It appears to fluctuate with fluctuation in FK trough -lower recently to 7 on envarsus despite no change in dose or timing of med.    IS  Envarsus 6 mg every day, /540   COVID vaccine x3 due for 4th dose      Problem List   Patient Active Problem List   Diagnosis     Osteopenia     Chronic constipation     Irritable bowel syndrome     Stage 3a chronic kidney disease (H)     HTN, kidney transplant related     Gastroparesis     Heterozygous factor V Leiden mutation (H)     Immunosuppression (H)     Kidney replaced by transplant     Pancreas replaced by transplant (H)     Anemia due to stage 3a chronic kidney disease (H)     Aftercare following organ transplant     Generalized edema     Kidney transplant rejection       Allergies   Allergies   Allergen Reactions     Chlorhexidine Hives, Itching and Rash     PN: Patient had swelling/rash that was very itchy with chlorprep.     Ceclor [Cefaclor Monohydrate]      Metoclopramide Other (See Comments)     Noted mouth/facial twitching with reglan in 2022 when it was tried for gastroparesis.     Cefaclor Rash       Medications   Current Outpatient Medications   Medication Sig     aspirin (ASA) 325 MG tablet Take 1 tablet (325 mg) by mouth daily     biotin 1000 MCG TABS tablet Take 1,000 mcg by mouth daily     cetirizine (ZYRTEC) 10 MG tablet Take 1 tablet (10 mg) by mouth every other day     cholecalciferol 25 MCG (1000 UT) TABS Take 2,000 Units by mouth every other day      Continuous Blood Gluc Sensor (DEXCOM G6 SENSOR) MISC Use as directed for continuous glucose monitoring.  Change sensor every 10 days.     Continuous Blood Gluc Transmit (DEXCOM G6 TRANSMITTER) MISC Use as directed for continuous glucose monitoring.  Change every 3 months.     docusate sodium (COLACE) 100 MG capsule Take 200 mg by mouth daily before breakfast     famotidine (PEPCID) 20 MG tablet Take 20 mg by mouth 2 times daily     furosemide (LASIX) 20 MG tablet Take 1 tablet (20 mg) by mouth as needed (three time weekly prn for ankle swelling)     glucose blood VI test strips strip 4 times daily.     insulin pen needle (31G X 5 MM)  31G X 5 MM miscellaneous Use 3 pen needles daily or as directed. Patient/RPH may decide gauge and length.     mycophenolic acid (GENERIC EQUIVALENT) 180 MG EC tablet Take 1 tablet (180 mg) by mouth 2 times daily Total dose=540mg BID     mycophenolic acid (GENERIC EQUIVALENT) 360 MG EC tablet Take 1 tablet (360 mg) by mouth 2 times daily Total dose=540mg BID     ondansetron (ZOFRAN) 4 MG tablet Take 1 tablet (4 mg) by mouth every 8 hours as needed for nausea     Prucalopride Succinate (MOTEGRITY) 2 MG TABS Take 1 tablet (2 mg) by mouth daily     sulfamethoxazole-trimethoprim (BACTRIM) 400-80 MG tablet Take 1 tablet by mouth daily     tacrolimus (ENVARSUS XR) 0.75 MG 24 hr tablet Take 2 tablets (1.5 mg) by mouth every morning (before breakfast) HOLD for dose change.     tacrolimus (ENVARSUS XR) 1 MG 24 hr tablet Take 1 tablet (1 mg) by mouth every morning (before breakfast) Total dose=13mg daily     tacrolimus (ENVARSUS XR) 4 MG 24 hr tablet Take 3 tablets (12 mg) by mouth every morning (before breakfast) Total dose=13mg daily     insulin lispro (HUMALOG KWIKPEN) 100 UNIT/ML (1 unit dial) KWIKPEN BEFORE MEALS TID use 2 unit rapid-acting insulin to correct for every 50 mg/dL that blood glucose is above 150 mg/dL. 150-199 mg/dL: add 2 units, 200-249 mg/dL: add 4 units, 250-299 mg/dL: add 6 units, 300-349 mg/dL: add 8 units, 350-399 mg/dL: add 10 units, >400 mg/dL: add 12 units and call your doctor. (Patient not taking: Reported on 6/28/2022)     Current Facility-Administered Medications   Medication     bevacizumab (AVASTIN) intravitreal inj 1.25 mg     bevacizumab (AVASTIN) intravitreal inj 1.25 mg     There are no discontinued medications.    Physical Exam   Vital Signs: There were no vitals taken for this visit.    GENERAL: Healthy, alert and no distress  EYES: Eyes grossly normal to inspection.  No discharge or erythema, or obvious scleral/conjunctival abnormalities.  RESP: No audible wheeze, cough, or visible  cyanosis.  No visible retractions or increased work of breathing.    SKIN: Visible skin clear. No significant rash, abnormal pigmentation or lesions.  NEURO: Cranial nerves grossly intact.  Mentation and speech appropriate for age.  PSYCH: Mentation appears normal, affect normal/bright, judgement and insight intact, normal speech and appearance well-groomed.    Data     Renal Latest Ref Rng & Units 6/27/2022 6/20/2022 6/17/2022   Na 133 - 144 mmol/L 139 138 139   K 3.4 - 5.3 mmol/L 4.3 4.4 4.6   Cl 94 - 109 mmol/L 112(H) 113(H) 112(H)   CO2 20 - 32 mmol/L 20 19(L) 20   BUN 7 - 30 mg/dL 36(H) 43(H) 41(H)   Cr 0.52 - 1.04 mg/dL 1.07(H) 1.33(H) 1.36(H)   Glucose 70 - 99 mg/dL 95 91 91   Ca  8.5 - 10.1 mg/dL 9.4 9.2 9.1   Mg 1.6 - 2.3 mg/dL - - -     Bone Health Latest Ref Rng & Units 6/6/2022 5/16/2022 5/9/2022   Phos 2.5 - 4.5 mg/dL 3.6 - 3.1   PTHi pg/mL - - -   Vit D Def 20 - 75 ug/L - 45 -     Heme Latest Ref Rng & Units 6/27/2022 6/20/2022 6/17/2022   WBC 4.0 - 11.0 10e3/uL 4.2 5.0 6.1   Hgb 11.7 - 15.7 g/dL 9.7(L) 9.5(L) 9.7(L)   Plt 150 - 450 10e3/uL 273 233 244   ABSOLUTE NEUTROPHIL 1.6 - 8.3 10e3/uL - - -   ABSOLUTE LYMPHOCYTES 0.8 - 5.3 10e3/uL - - -   ABSOLUTE MONOCYTES 0.0 - 1.3 10e3/uL - - -   ABSOLUTE EOSINOPHILS 0.0 - 0.7 10e3/uL - - -   ABSOLUTE BASOPHILS 0.0 - 0.2 10e9/L - - -     Liver Latest Ref Rng & Units 5/23/2022 5/4/2022 3/17/2022   AP 40 - 150 U/L 84 70 -   TBili 0.2 - 1.3 mg/dL 0.3 0.2 -   DBili 0.0 - 0.2 mg/dL <0.1 <0.1 -   ALT 0 - 50 U/L 20 18 -   AST 0 - 45 U/L 13 19 -   Tot Protein 6.8 - 8.8 g/dL 7.9 7.6 -   Albumin 3.4 - 5.0 g/dL 4.0 4.1 3.9     Pancreas Latest Ref Rng & Units 6/27/2022 6/20/2022 6/16/2022   A1C 0.0 - 5.6 % - - -   Amylase 30 - 110 U/L 55 51 -   Lipase 73 - 393 U/L 130 128 119     Iron studies Latest Ref Rng & Units 4/27/2022 4/18/2022 3/3/2022   Iron 35 - 180 ug/dL 56 106 139   Iron sat 15 - 46 % 20 35 35   Ferritin 12 - 150 ng/mL 110 120 106     [facility] Txp Virology Latest  Ref Rng & Units 4/18/2022 1/28/2022 12/13/2021   CMV QUANT IU/ML Not Detected IU/mL Not Detected Not Detected Not Detected   EBV CAPSID ANTIBODY IGG No detectable antibody. - - -   EBV DNA COPIES/ML Not Detected copies/mL Not Detected - -        Recent Labs   Lab Test 06/16/22  0832 06/20/22  0830 06/27/22  0839   DOSTAC 6/15/2022 6/19/2022 6/26/2022   TACROL 10.4 8.1 7.1     Recent Labs   Lab Test 04/18/22  0805 05/16/22  0904 05/31/22  0842   DOSMPA 4/17/2022   8:30 PM 5/15/2022   8:00 PM  --    MPACID 7.40* 3.48 6.22*   MPAG 55.6 70.9 78.3       Again, thank you for allowing me to participate in the care of your patient.      Sincerely,    Reanna Lee MD

## 2022-06-28 NOTE — LETTER
Date:July 6, 2022      Patient was self referred, no letter generated. Do not send.        [facility] Health Information

## 2022-06-28 NOTE — NURSING NOTE
Chief Complaint   Patient presents with     RECHECK     4 wk f/u       /86   Pulse 85   Temp 98  F (36.7  C) (Oral)   Wt 56.2 kg (123 lb 14.4 oz)   SpO2 100%   BMI 20.00 kg/m      Gallito Franco on 6/28/2022 at 9:57 AM

## 2022-06-28 NOTE — PROGRESS NOTES
TRANSPLANT NEPHROLOGY EARLY POST TRANSPLANT VISIT   7 month visit    Assessment & Plan   # DDKT (SPK): Stable significant variability 1-1.3 and occasional uptrend to 1.4-1.5, peak 1.6-1.7 around the time she had borderline rejection , significant variability on FK trough as well on envarsus though overall therapeutic and very compliant to her med regimen   - Baseline Creatinine: ~ 1-1.3 more rceently up to 1.3-1.5 since 3/2022, bx threshold of 1.6 or higher   - Proteinuria: Not checked post transplant   - Date DSA Last Checked: Nov/2021      Latest DSA: No DSA at time of transplant   - BK Viremia: Continue to monitor BK biweekly   - Kidney Tx Biopsy: yes  # kidney bx 5/2: read as borderline cellular rejection,isolated tubulitis i0t1, IF suggests possible IgA nephropathy MEST0   # closure Bx: 6/9 very limited sample, 2 glomeruli on EM, c4d not available, no signs of rejection   She was treated for 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 Cr now down to 1.2-1.3.  Closure biopsy of the kidney 1st in 4-6 weeks and will consider pancreas bx if uptrend in lipase    # Pancreas Tx (SPK):    - Pancreatic Exocrine Drainage: Enteric drained     - Blood glucose: Euglycemia      On insulin: No   - HbA1c: Last checked at time of transplant      Latest HbA1c: 5%   - Pancreatic enzymes: Trend down   - Date DSA Last Checked: Nov/2021  Latest DSA: No DSA at time of transplant   - Pancreas Tx Biopsy: No    # Immunosuppression: Tacrolimus immediate release (goal 8-10) and Mycophenolic acid (dose 540 mg every 12 hours)   - Continue with intensive monitoring of immunosuppression for efficacy and toxicity.   - Induction with Recent Transplant:  Intermediate Intensity   - Continue with intensive monitoring of immunosuppression for efficacy and toxicity.   - Changes: yes recent FK trough subtherapeutic -7, awaiting repeat, if still<8 will increase envarsus dose     note:  Tremors have improved with the switch to envarsus from short acting tacrolimus.     MPA reduced 1/31/2022 due to GI issues, level was high too    # Infection Prophylaxis: due for BK screen -pending from today  - PJP: Sulfa/TMP (Bactrim)  - CMV: Valganciclovir (Valcyte) completed 3 months    # Blood Pressure: Controlled;  Goal BP:<130/80   - Volume status: Euvolemic  EDW ~ 122lbs currently ranges 125-130 lbs   e  # Anemia in Chronic Renal Disease: Hgb: stable     SYLVAIN: No   - Iron studies: previously on iron tabs currently off recheck given fatigue and recent menorrhagia    # Mineral Bone Disorder:   - Secondary renal hyperparathyroidism; PTH level: Normal (18-80 pg/ml)        On treatment: None  - Vitamin D; level: High normal        On supplement: Yes  - Calcium; level: Normal        On supplement: No  - Phosphorus; level: Normal        On supplement: Yes, 250mg bid if next phos>2 could reduce to 250 mg po qd    # Electrolytes:   - Potassium; level: Normal        On supplement: No  - Magnesium; level: Normal        On supplement: Yes, 400mg daily   - Bicarbonate; level: Normal        On supplement: No    # Medical Compliance: Yes    # Fatigue: nl CMV EBV TSH iron studies stress test    #  LE edema: intermittent mostly upon standing and worse towards the end of the day, symptoms suggestive of dependent edema and  Not intravascular fluid overload, trial of various forms of diuretics led to BERNIE & discontinuation. Responds to compression stockings. Most recent UPC normal. More recently on lasix 20 mg po qMWF    # COVID-19 Virus Review: Discussed COVID-19 virus and the potential medical risks.  Reviewed preventative health recommendations, including wearing a mask where appropriate.  Recommended COVID vaccination should be up to date with either an initial vaccination or booster shot when appropriate.  Asked the patient to inform the transplant center if they are exposed or diagnosed with this virus.    # COVID  Vaccination Up To Date: Yes 3 doses due for 4th dose will give today    # Transplant History:  Etiology of Kidney Failure: Diabetes mellitus type 1  Tx: SPK  Transplant: 11/15/2021 (Kidney / Pancreas)  Donor Type: Donation after Brain Death Donor Class:   Crossmatch at time of Tx: negative  DSA at time of Tx: No  Significant changes in immunosuppression: None  CMV IgG Ab High Risk Discordance (D+/R-): No  EBV IgG Ab High Risk Discordance (D+/R-): No  Significant transplant-related complications: None    Transplant Office Phone Number: [phone]    Assessment and plan was discussed with the patient and she voiced her understanding and agreement.      Return visit: 1 month    Reanna Lee MD    Chief Complaint   Ms. Ortega is a 37 year old here for kidney transplant, pancreas transplant, immunosuppression management and new medical concerns.     History of Present Illness      Marilyn feels better overall. She reports improved energy level . She has been able to be more active closer to her baseline prior to transplant Leg swelling is well controlled with lasix 20 mg qMWF. BP well controlled. No nausea, vomiting, regular BM's.   Recent kidney tx bx showed very small/inadequate sample but based on EM evaluation of 2 glomeruli no signs of rejection. IF not available, c4d not available, DSA was neg and BK <500.   Labs continue to show significant variability in Cr levels more recently Cr down to 1 without any change in her diuretic regimen or recent med changes. It appears to fluctuate with fluctuation in FK trough -lower recently to 7 on envarsus despite no change in dose or timing of med.    IS Envarsus 6 mg every day, /540   COVID vaccine x3 due for 4th dose      Problem List   Patient Active Problem List   Diagnosis     Osteopenia     Chronic constipation     Irritable bowel syndrome     Stage 3a chronic kidney disease (H)     HTN, kidney transplant related     Gastroparesis     Heterozygous factor V  Leiden mutation (H)     Immunosuppression (H)     Kidney replaced by transplant     Pancreas replaced by transplant (H)     Anemia due to stage 3a chronic kidney disease (H)     Aftercare following organ transplant     Generalized edema     Kidney transplant rejection       Allergies   Allergies   Allergen Reactions     Chlorhexidine Hives, Itching and Rash     PN: Patient had swelling/rash that was very itchy with chlorprep.     Ceclor [Cefaclor Monohydrate]      Metoclopramide Other (See Comments)     Noted mouth/facial twitching with reglan in 2022 when it was tried for gastroparesis.     Cefaclor Rash       Medications   Current Outpatient Medications   Medication Sig     aspirin (ASA) 325 MG tablet Take 1 tablet (325 mg) by mouth daily     biotin 1000 MCG TABS tablet Take 1,000 mcg by mouth daily     cetirizine (ZYRTEC) 10 MG tablet Take 1 tablet (10 mg) by mouth every other day     cholecalciferol 25 MCG (1000 UT) TABS Take 2,000 Units by mouth every other day      Continuous Blood Gluc Sensor (DEXCOM G6 SENSOR) MISC Use as directed for continuous glucose monitoring.  Change sensor every 10 days.     Continuous Blood Gluc Transmit (DEXCOM G6 TRANSMITTER) MISC Use as directed for continuous glucose monitoring.  Change every 3 months.     docusate sodium (COLACE) 100 MG capsule Take 200 mg by mouth daily before breakfast     famotidine (PEPCID) 20 MG tablet Take 20 mg by mouth 2 times daily     furosemide (LASIX) 20 MG tablet Take 1 tablet (20 mg) by mouth as needed (three time weekly prn for ankle swelling)     glucose blood VI test strips strip 4 times daily.     insulin pen needle (31G X 5 MM) 31G X 5 MM miscellaneous Use 3 pen needles daily or as directed. Patient/RPH may decide gauge and length.     mycophenolic acid (GENERIC EQUIVALENT) 180 MG EC tablet Take 1 tablet (180 mg) by mouth 2 times daily Total dose=540mg BID     mycophenolic acid (GENERIC EQUIVALENT) 360 MG EC tablet Take 1 tablet (360 mg) by  mouth 2 times daily Total dose=540mg BID     ondansetron (ZOFRAN) 4 MG tablet Take 1 tablet (4 mg) by mouth every 8 hours as needed for nausea     Prucalopride Succinate (MOTEGRITY) 2 MG TABS Take 1 tablet (2 mg) by mouth daily     sulfamethoxazole-trimethoprim (BACTRIM) 400-80 MG tablet Take 1 tablet by mouth daily     tacrolimus (ENVARSUS XR) 0.75 MG 24 hr tablet Take 2 tablets (1.5 mg) by mouth every morning (before breakfast) HOLD for dose change.     tacrolimus (ENVARSUS XR) 1 MG 24 hr tablet Take 1 tablet (1 mg) by mouth every morning (before breakfast) Total dose=13mg daily     tacrolimus (ENVARSUS XR) 4 MG 24 hr tablet Take 3 tablets (12 mg) by mouth every morning (before breakfast) Total dose=13mg daily     insulin lispro (HUMALOG KWIKPEN) 100 UNIT/ML (1 unit dial) KWIKPEN BEFORE MEALS TID use 2 unit rapid-acting insulin to correct for every 50 mg/dL that blood glucose is above 150 mg/dL. 150-199 mg/dL: add 2 units, 200-249 mg/dL: add 4 units, 250-299 mg/dL: add 6 units, 300-349 mg/dL: add 8 units, 350-399 mg/dL: add 10 units, >400 mg/dL: add 12 units and call your doctor. (Patient not taking: Reported on 6/28/2022)     Current Facility-Administered Medications   Medication     bevacizumab (AVASTIN) intravitreal inj 1.25 mg     bevacizumab (AVASTIN) intravitreal inj 1.25 mg     There are no discontinued medications.    Physical Exam   Vital Signs: There were no vitals taken for this visit.    GENERAL: Healthy, alert and no distress  EYES: Eyes grossly normal to inspection.  No discharge or erythema, or obvious scleral/conjunctival abnormalities.  RESP: No audible wheeze, cough, or visible cyanosis.  No visible retractions or increased work of breathing.    SKIN: Visible skin clear. No significant rash, abnormal pigmentation or lesions.  NEURO: Cranial nerves grossly intact.  Mentation and speech appropriate for age.  PSYCH: Mentation appears normal, affect normal/bright, judgement and insight intact, normal  speech and appearance well-groomed.    Data     Renal Latest Ref Rng & Units 6/27/2022 6/20/2022 6/17/2022   Na 133 - 144 mmol/L 139 138 139   K 3.4 - 5.3 mmol/L 4.3 4.4 4.6   Cl 94 - 109 mmol/L 112(H) 113(H) 112(H)   CO2 20 - 32 mmol/L 20 19(L) 20   BUN 7 - 30 mg/dL 36(H) 43(H) 41(H)   Cr 0.52 - 1.04 mg/dL 1.07(H) 1.33(H) 1.36(H)   Glucose 70 - 99 mg/dL 95 91 91   Ca  8.5 - 10.1 mg/dL 9.4 9.2 9.1   Mg 1.6 - 2.3 mg/dL - - -     Bone Health Latest Ref Rng & Units 6/6/2022 5/16/2022 5/9/2022   Phos 2.5 - 4.5 mg/dL 3.6 - 3.1   PTHi pg/mL - - -   Vit D Def 20 - 75 ug/L - 45 -     Heme Latest Ref Rng & Units 6/27/2022 6/20/2022 6/17/2022   WBC 4.0 - 11.0 10e3/uL 4.2 5.0 6.1   Hgb 11.7 - 15.7 g/dL 9.7(L) 9.5(L) 9.7(L)   Plt 150 - 450 10e3/uL 273 233 244   ABSOLUTE NEUTROPHIL 1.6 - 8.3 10e3/uL - - -   ABSOLUTE LYMPHOCYTES 0.8 - 5.3 10e3/uL - - -   ABSOLUTE MONOCYTES 0.0 - 1.3 10e3/uL - - -   ABSOLUTE EOSINOPHILS 0.0 - 0.7 10e3/uL - - -   ABSOLUTE BASOPHILS 0.0 - 0.2 10e9/L - - -     Liver Latest Ref Rng & Units 5/23/2022 5/4/2022 3/17/2022   AP 40 - 150 U/L 84 70 -   TBili 0.2 - 1.3 mg/dL 0.3 0.2 -   DBili 0.0 - 0.2 mg/dL <0.1 <0.1 -   ALT 0 - 50 U/L 20 18 -   AST 0 - 45 U/L 13 19 -   Tot Protein 6.8 - 8.8 g/dL 7.9 7.6 -   Albumin 3.4 - 5.0 g/dL 4.0 4.1 3.9     Pancreas Latest Ref Rng & Units 6/27/2022 6/20/2022 6/16/2022   A1C 0.0 - 5.6 % - - -   Amylase 30 - 110 U/L 55 51 -   Lipase 73 - 393 U/L 130 128 119     Iron studies Latest Ref Rng & Units 4/27/2022 4/18/2022 3/3/2022   Iron 35 - 180 ug/dL 56 106 139   Iron sat 15 - 46 % 20 35 35   Ferritin 12 - 150 ng/mL 110 120 106     [facility] Txp Virology Latest Ref Rng & Units 4/18/2022 1/28/2022 12/13/2021   CMV QUANT IU/ML Not Detected IU/mL Not Detected Not Detected Not Detected   EBV CAPSID ANTIBODY IGG No detectable antibody. - - -   EBV DNA COPIES/ML Not Detected copies/mL Not Detected - -        Recent Labs   Lab Test 06/16/22  0832 06/20/22  0830 06/27/22  0839   DOSTAC  6/15/2022 6/19/2022 6/26/2022   TACROL 10.4 8.1 7.1     Recent Labs   Lab Test 04/18/22  0805 05/16/22  0904 05/31/22  0842   DOSMPA 4/17/2022   8:30 PM 5/15/2022   8:00 PM  --    MPACID 7.40* 3.48 6.22*   MPAG 55.6 70.9 78.3

## 2022-06-29 NOTE — TELEPHONE ENCOUNTER
Pt calling back. Stating that she hasn't heard from anyone regarding her symptoms. Please advise.

## 2022-06-29 NOTE — TELEPHONE ENCOUNTER
Dr. Barnes reviewed and able to see today    Per review yesterday pt had meeting in AM til 10 AM    Left message at 0745 reviewing Dr. Barnes able to see today at 1045 and tentatively scheduled an appointment    Provided direct number for any adjustments to scheduling needed or questions.    Meet Hough RN 7:50 AM 06/30/22            H/o diabetic retinopaty/diabetic macular edema    No intravitreal injection last visit    More light sensitivities and some mild blurrier vision per pt in past 1-2 weeks along with decrease in contrast    Will review scheduling options with Dr. Barnes and call back this afternoon.    Meet Hough RN 1:10 PM 06/29/22

## 2022-06-30 ENCOUNTER — OFFICE VISIT (OUTPATIENT)
Dept: OPHTHALMOLOGY | Facility: CLINIC | Age: 37
End: 2022-06-30
Attending: OPHTHALMOLOGY
Payer: COMMERCIAL

## 2022-06-30 ENCOUNTER — LAB (OUTPATIENT)
Dept: LAB | Facility: CLINIC | Age: 37
End: 2022-06-30
Payer: COMMERCIAL

## 2022-06-30 DIAGNOSIS — H35.81 MACULAR EDEMA: ICD-10-CM

## 2022-06-30 DIAGNOSIS — Z48.298 AFTERCARE FOLLOWING ORGAN TRANSPLANT: ICD-10-CM

## 2022-06-30 DIAGNOSIS — E10.3413 SEVERE NONPROLIFERATIVE DIABETIC RETINOPATHY OF BOTH EYES WITH MACULAR EDEMA ASSOCIATED WITH TYPE 1 DIABETES MELLITUS (H): Primary | ICD-10-CM

## 2022-06-30 DIAGNOSIS — H26.491 PCO (POSTERIOR CAPSULAR OPACIFICATION), RIGHT: ICD-10-CM

## 2022-06-30 LAB
ANION GAP SERPL CALCULATED.3IONS-SCNC: 6 MMOL/L (ref 3–14)
BUN SERPL-MCNC: 40 MG/DL (ref 7–30)
CALCIUM SERPL-MCNC: 9 MG/DL (ref 8.5–10.1)
CHLORIDE BLD-SCNC: 112 MMOL/L (ref 94–109)
CO2 SERPL-SCNC: 20 MMOL/L (ref 20–32)
CREAT SERPL-MCNC: 1.54 MG/DL (ref 0.52–1.04)
GFR SERPL CREATININE-BSD FRML MDRD: 44 ML/MIN/1.73M2
GLUCOSE BLD-MCNC: 88 MG/DL (ref 70–99)
LIPASE SERPL-CCNC: 135 U/L (ref 73–393)
POTASSIUM BLD-SCNC: 4.5 MMOL/L (ref 3.4–5.3)
SODIUM SERPL-SCNC: 138 MMOL/L (ref 133–144)
TACROLIMUS BLD-MCNC: 9.7 UG/L (ref 5–15)
TME LAST DOSE: NORMAL H
TME LAST DOSE: NORMAL H

## 2022-06-30 PROCEDURE — 92134 CPTRZ OPH DX IMG PST SGM RTA: CPT | Performed by: OPHTHALMOLOGY

## 2022-06-30 PROCEDURE — 80197 ASSAY OF TACROLIMUS: CPT | Performed by: INTERNAL MEDICINE

## 2022-06-30 PROCEDURE — 80048 BASIC METABOLIC PNL TOTAL CA: CPT

## 2022-06-30 PROCEDURE — 92235 FLUORESCEIN ANGRPH MLTIFRAME: CPT | Performed by: OPHTHALMOLOGY

## 2022-06-30 PROCEDURE — 66821 AFTER CATARACT LASER SURGERY: CPT | Mod: RT | Performed by: OPHTHALMOLOGY

## 2022-06-30 PROCEDURE — G0463 HOSPITAL OUTPT CLINIC VISIT: HCPCS | Mod: 25

## 2022-06-30 PROCEDURE — 83690 ASSAY OF LIPASE: CPT

## 2022-06-30 PROCEDURE — 36415 COLL VENOUS BLD VENIPUNCTURE: CPT | Performed by: INTERNAL MEDICINE

## 2022-06-30 PROCEDURE — 99214 OFFICE O/P EST MOD 30 MIN: CPT | Mod: 57 | Performed by: OPHTHALMOLOGY

## 2022-06-30 ASSESSMENT — TONOMETRY
OS_IOP_MMHG: 18
IOP_METHOD: TONOPEN
OD_IOP_MMHG: 17

## 2022-06-30 ASSESSMENT — CONF VISUAL FIELD
METHOD: COUNTING FINGERS
OD_NORMAL: 1
OS_NORMAL: 1

## 2022-06-30 ASSESSMENT — CUP TO DISC RATIO
OS_RATIO: 0.2
OD_RATIO: 0.2

## 2022-06-30 ASSESSMENT — VISUAL ACUITY
OD_SC+: -2
OS_SC+: -1
METHOD: SNELLEN - LINEAR
OS_SC: 20/25
OD_SC: 20/20

## 2022-06-30 ASSESSMENT — SLIT LAMP EXAM - LIDS
COMMENTS: NORMAL
COMMENTS: NORMAL

## 2022-06-30 ASSESSMENT — EXTERNAL EXAM - LEFT EYE: OS_EXAM: NORMAL

## 2022-06-30 ASSESSMENT — EXTERNAL EXAM - RIGHT EYE: OD_EXAM: NORMAL

## 2022-06-30 NOTE — NURSING NOTE
"Chief Complaints and History of Present Illnesses   Patient presents with     Diabetic Retinopathy Follow Up     Chief Complaint(s) and History of Present Illness(es)     Diabetic Retinopathy Follow Up     Associated symptoms: photophobia and flashes (\"strobe lights\" - in the AM).  Negative for eye pain, headache and floaters    Pain scale: 0/10              Comments     Pt here today for follow up.  States thinks it may have been too long between injections.  Started having some symptoms - mostly heightened light sensitivity and some blurred vision.  No pain or discomfort today.    Ekaterina TINEO, JESSE 11:28 AM 06/30/2022                     "

## 2022-06-30 NOTE — PROGRESS NOTES
"Chief Complaint(s) and History of Present Illness(es)     Diabetic Retinopathy Follow Up     Associated symptoms: photophobia and flashes (\"strobe lights\" - in the   AM).  Negative for eye pain, headache and floaters    Pain scale: 0/10              Comments     Pt here today for follow up.  States thinks it may have been too long between injections.  Started having some symptoms - mostly heightened light sensitivity and   some blurred vision.  No pain or discomfort today.    Ekaterina TINEO, JESSE 11:28 AM 06/30/2022                 Review of systems for the eyes was negative other than the pertinent positives/negatives listed in the HPI.      Assessment & Plan      Marilyn Ortega is a 37 year old female with the following diagnoses:   1. Severe nonproliferative diabetic retinopathy of both eyes with macular edema associated with type 1 diabetes mellitus (H)    2. Macular edema    3. PCO (posterior capsular opacification), right         Here acutely for worsening blur/glare in her vision  Doing well systemically since kidney-pancreas transplant  Retinal exam improved in both eyes   S/P avastin both eyes x 3  Fluorescein angiography with improved leakage throughout, no neovascularization  Very minimal diabetic macular edema left eye today.  not visually significant   Progressing posterior capsular opacity (PCO) right eye - visually significant     RBA to YAG capsulotomy discussed, consent obtained, will proceed today.   Return precautions reviewed       Patient disposition:   Return in about 6 weeks (around 8/11/2022) for VT only, OCT Macula.           Attending Physician Attestation:  Complete documentation of historical and exam elements from today's encounter can be found in the full encounter summary report (not reduplicated in this progress note).  I personally obtained the chief complaint(s) and history of present illness.  I confirmed and edited as necessary the review of systems, past medical/surgical " history, family history, social history, and examination findings as documented by others; and I examined the patient myself.  I personally reviewed the relevant tests, images, and reports as documented above.  I formulated and edited as necessary the assessment and plan and discussed the findings and management plan with the patient and family. . - Ochoa Barnes MD

## 2022-07-05 ENCOUNTER — LAB (OUTPATIENT)
Dept: LAB | Facility: CLINIC | Age: 37
End: 2022-07-05
Payer: COMMERCIAL

## 2022-07-05 DIAGNOSIS — Z94.83 PANCREAS TRANSPLANTED (H): ICD-10-CM

## 2022-07-05 DIAGNOSIS — Z94.0 KIDNEY REPLACED BY TRANSPLANT: ICD-10-CM

## 2022-07-05 DIAGNOSIS — Z48.298 AFTERCARE FOLLOWING ORGAN TRANSPLANT: ICD-10-CM

## 2022-07-05 DIAGNOSIS — Z79.899 ENCOUNTER FOR LONG-TERM CURRENT USE OF MEDICATION: ICD-10-CM

## 2022-07-05 DIAGNOSIS — Z94.83 PANCREAS REPLACED BY TRANSPLANT (H): ICD-10-CM

## 2022-07-05 LAB
AMYLASE SERPL-CCNC: 40 U/L (ref 30–110)
ANION GAP SERPL CALCULATED.3IONS-SCNC: 6 MMOL/L (ref 3–14)
BUN SERPL-MCNC: 33 MG/DL (ref 7–30)
CALCIUM SERPL-MCNC: 8.9 MG/DL (ref 8.5–10.1)
CHLORIDE BLD-SCNC: 108 MMOL/L (ref 94–109)
CO2 SERPL-SCNC: 24 MMOL/L (ref 20–32)
CREAT SERPL-MCNC: 1.45 MG/DL (ref 0.52–1.04)
ERYTHROCYTE [DISTWIDTH] IN BLOOD BY AUTOMATED COUNT: 12.9 % (ref 10–15)
GFR SERPL CREATININE-BSD FRML MDRD: 47 ML/MIN/1.73M2
GLUCOSE BLD-MCNC: 91 MG/DL (ref 70–99)
HCT VFR BLD AUTO: 28.1 % (ref 35–47)
HGB BLD-MCNC: 8.8 G/DL (ref 11.7–15.7)
LIPASE SERPL-CCNC: 79 U/L (ref 73–393)
MCH RBC QN AUTO: 28.7 PG (ref 26.5–33)
MCHC RBC AUTO-ENTMCNC: 31.3 G/DL (ref 31.5–36.5)
MCV RBC AUTO: 92 FL (ref 78–100)
PLATELET # BLD AUTO: 246 10E3/UL (ref 150–450)
POTASSIUM BLD-SCNC: 4.2 MMOL/L (ref 3.4–5.3)
RBC # BLD AUTO: 3.07 10E6/UL (ref 3.8–5.2)
SODIUM SERPL-SCNC: 138 MMOL/L (ref 133–144)
TACROLIMUS BLD-MCNC: 7.3 UG/L (ref 5–15)
TME LAST DOSE: NORMAL H
TME LAST DOSE: NORMAL H
WBC # BLD AUTO: 3.8 10E3/UL (ref 4–11)

## 2022-07-05 PROCEDURE — 87799 DETECT AGENT NOS DNA QUANT: CPT

## 2022-07-05 PROCEDURE — 83690 ASSAY OF LIPASE: CPT

## 2022-07-05 PROCEDURE — 80180 DRUG SCRN QUAN MYCOPHENOLATE: CPT

## 2022-07-05 PROCEDURE — 36415 COLL VENOUS BLD VENIPUNCTURE: CPT

## 2022-07-05 PROCEDURE — 82150 ASSAY OF AMYLASE: CPT

## 2022-07-05 PROCEDURE — 85027 COMPLETE CBC AUTOMATED: CPT

## 2022-07-05 PROCEDURE — 80048 BASIC METABOLIC PNL TOTAL CA: CPT

## 2022-07-05 PROCEDURE — 80197 ASSAY OF TACROLIMUS: CPT

## 2022-07-06 LAB
BKV DNA # SPEC NAA+PROBE: <500 COPIES/ML
BKV DNA SPEC NAA+PROBE-LOG#: <2.7 {LOG_COPIES}/ML
MYCOPHENOLATE SERPL LC/MS/MS-MCNC: 9.87 MG/L (ref 1–3.5)
MYCOPHENOLATE-G SERPL LC/MS/MS-MCNC: 95 MG/L (ref 30–95)
TME LAST DOSE: ABNORMAL H
TME LAST DOSE: ABNORMAL H

## 2022-07-07 ENCOUNTER — TELEPHONE (OUTPATIENT)
Dept: TRANSPLANT | Facility: CLINIC | Age: 37
End: 2022-07-07

## 2022-07-07 DIAGNOSIS — Z94.83 PANCREAS REPLACED BY TRANSPLANT (H): ICD-10-CM

## 2022-07-07 DIAGNOSIS — Z94.0 KIDNEY REPLACED BY TRANSPLANT: Primary | ICD-10-CM

## 2022-07-08 ENCOUNTER — LAB (OUTPATIENT)
Dept: LAB | Facility: CLINIC | Age: 37
End: 2022-07-08
Payer: COMMERCIAL

## 2022-07-08 DIAGNOSIS — Z94.83 PANCREAS REPLACED BY TRANSPLANT (H): ICD-10-CM

## 2022-07-08 DIAGNOSIS — Z94.0 KIDNEY REPLACED BY TRANSPLANT: ICD-10-CM

## 2022-07-08 LAB
TACROLIMUS BLD-MCNC: 7.3 UG/L (ref 5–15)
TME LAST DOSE: NORMAL H
TME LAST DOSE: NORMAL H

## 2022-07-08 PROCEDURE — 80197 ASSAY OF TACROLIMUS: CPT

## 2022-07-08 PROCEDURE — 36415 COLL VENOUS BLD VENIPUNCTURE: CPT

## 2022-07-11 ENCOUNTER — LAB (OUTPATIENT)
Dept: LAB | Facility: CLINIC | Age: 37
End: 2022-07-11
Payer: COMMERCIAL

## 2022-07-11 ENCOUNTER — TELEPHONE (OUTPATIENT)
Dept: TRANSPLANT | Facility: CLINIC | Age: 37
End: 2022-07-11

## 2022-07-11 DIAGNOSIS — Z94.83 PANCREAS TRANSPLANTED (H): ICD-10-CM

## 2022-07-11 DIAGNOSIS — Z79.899 ENCOUNTER FOR LONG-TERM CURRENT USE OF MEDICATION: ICD-10-CM

## 2022-07-11 DIAGNOSIS — Z94.0 KIDNEY REPLACED BY TRANSPLANT: ICD-10-CM

## 2022-07-11 DIAGNOSIS — Z94.83 PANCREAS REPLACED BY TRANSPLANT (H): Primary | ICD-10-CM

## 2022-07-11 DIAGNOSIS — Z48.298 AFTERCARE FOLLOWING ORGAN TRANSPLANT: ICD-10-CM

## 2022-07-11 DIAGNOSIS — Z94.0 KIDNEY REPLACED BY TRANSPLANT: Primary | ICD-10-CM

## 2022-07-11 DIAGNOSIS — Z94.83 PANCREAS REPLACED BY TRANSPLANT (H): ICD-10-CM

## 2022-07-11 LAB
AMYLASE SERPL-CCNC: 55 U/L (ref 30–110)
ANION GAP SERPL CALCULATED.3IONS-SCNC: 7 MMOL/L (ref 3–14)
BUN SERPL-MCNC: 37 MG/DL (ref 7–30)
CALCIUM SERPL-MCNC: 9.1 MG/DL (ref 8.5–10.1)
CHLORIDE BLD-SCNC: 112 MMOL/L (ref 94–109)
CO2 SERPL-SCNC: 19 MMOL/L (ref 20–32)
CREAT SERPL-MCNC: 1.23 MG/DL (ref 0.52–1.04)
ERYTHROCYTE [DISTWIDTH] IN BLOOD BY AUTOMATED COUNT: 13 % (ref 10–15)
GFR SERPL CREATININE-BSD FRML MDRD: 58 ML/MIN/1.73M2
GLUCOSE BLD-MCNC: 82 MG/DL (ref 70–99)
HCT VFR BLD AUTO: 30.7 % (ref 35–47)
HGB BLD-MCNC: 9.5 G/DL (ref 11.7–15.7)
LIPASE SERPL-CCNC: 116 U/L (ref 73–393)
MAGNESIUM SERPL-MCNC: 2 MG/DL (ref 1.6–2.3)
MCH RBC QN AUTO: 29 PG (ref 26.5–33)
MCHC RBC AUTO-ENTMCNC: 30.9 G/DL (ref 31.5–36.5)
MCV RBC AUTO: 94 FL (ref 78–100)
PHOSPHATE SERPL-MCNC: 3.5 MG/DL (ref 2.5–4.5)
PLATELET # BLD AUTO: 237 10E3/UL (ref 150–450)
POTASSIUM BLD-SCNC: 4.4 MMOL/L (ref 3.4–5.3)
RBC # BLD AUTO: 3.28 10E6/UL (ref 3.8–5.2)
SODIUM SERPL-SCNC: 138 MMOL/L (ref 133–144)
TACROLIMUS BLD-MCNC: 6.2 UG/L (ref 5–15)
TME LAST DOSE: NORMAL H
TME LAST DOSE: NORMAL H
WBC # BLD AUTO: 3.8 10E3/UL (ref 4–11)

## 2022-07-11 PROCEDURE — 85027 COMPLETE CBC AUTOMATED: CPT

## 2022-07-11 PROCEDURE — 82150 ASSAY OF AMYLASE: CPT

## 2022-07-11 PROCEDURE — 36415 COLL VENOUS BLD VENIPUNCTURE: CPT

## 2022-07-11 PROCEDURE — 83735 ASSAY OF MAGNESIUM: CPT

## 2022-07-11 PROCEDURE — 83690 ASSAY OF LIPASE: CPT

## 2022-07-11 PROCEDURE — 80180 DRUG SCRN QUAN MYCOPHENOLATE: CPT

## 2022-07-11 PROCEDURE — 84100 ASSAY OF PHOSPHORUS: CPT

## 2022-07-11 PROCEDURE — 80197 ASSAY OF TACROLIMUS: CPT

## 2022-07-11 PROCEDURE — 80048 BASIC METABOLIC PNL TOTAL CA: CPT

## 2022-07-11 RX ORDER — TACROLIMUS 4 MG/1
12 TABLET, EXTENDED RELEASE ORAL
Qty: 90 TABLET | Refills: 11 | Status: SHIPPED | OUTPATIENT
Start: 2022-07-11 | End: 2022-09-07

## 2022-07-11 RX ORDER — TACROLIMUS 0.75 MG/1
1.5 TABLET, EXTENDED RELEASE ORAL
Qty: 60 TABLET | Refills: 11 | Status: SHIPPED | OUTPATIENT
Start: 2022-07-11 | End: 2022-09-07

## 2022-07-11 NOTE — TELEPHONE ENCOUNTER
ISSUE:   Tacrolimus XR level 6.2 on 7/11, goal 8-10, dose 13 mg daily.    PLAN:   Please call patient and confirm this was an accurate 24-hour trough. Verify Tacrolimus XR dose 13 mg daily. Confirm no new medications or illness. Confirm no missed doses. If accurate trough and accurate dose, increase Tacrolimus XR dose to 13.5 mg daily and repeat labs in 3 days    OUTCOME:   Spoke with patient, they confirm accurate trough level and current dose 13 mg daily. Patient confirmed dose change to 13.5 mg daily and to repeat labs in 3 days. Orders sent to preferred pharmacy for dose change and lab for repeat labs. Patient voiced understanding of plan.

## 2022-07-12 ENCOUNTER — TELEPHONE (OUTPATIENT)
Dept: TRANSPLANT | Facility: CLINIC | Age: 37
End: 2022-07-12

## 2022-07-12 LAB
MYCOPHENOLATE SERPL LC/MS/MS-MCNC: 4.74 MG/L (ref 1–3.5)
MYCOPHENOLATE-G SERPL LC/MS/MS-MCNC: 47 MG/L (ref 30–95)
TME LAST DOSE: ABNORMAL H
TME LAST DOSE: ABNORMAL H

## 2022-07-12 NOTE — TELEPHONE ENCOUNTER
General  Route to LPN    Reason for call: Marilyn requested to speak to Yessica, declined to provide details.    Call back needed? Yes  Return Call Needed  Same as documented in contacts section

## 2022-07-14 ENCOUNTER — LAB (OUTPATIENT)
Dept: LAB | Facility: CLINIC | Age: 37
End: 2022-07-14
Payer: COMMERCIAL

## 2022-07-14 DIAGNOSIS — Z94.0 KIDNEY REPLACED BY TRANSPLANT: ICD-10-CM

## 2022-07-14 DIAGNOSIS — Z94.83 PANCREAS REPLACED BY TRANSPLANT (H): ICD-10-CM

## 2022-07-14 DIAGNOSIS — Z94.83 PANCREAS REPLACED BY TRANSPLANT (H): Primary | ICD-10-CM

## 2022-07-14 LAB
ALBUMIN SERPL-MCNC: 4 G/DL (ref 3.4–5)
AMYLASE SERPL-CCNC: 47 U/L (ref 30–110)
ANION GAP SERPL CALCULATED.3IONS-SCNC: 7 MMOL/L (ref 3–14)
BUN SERPL-MCNC: 48 MG/DL (ref 7–30)
CALCIUM SERPL-MCNC: 8.8 MG/DL (ref 8.5–10.1)
CHLORIDE BLD-SCNC: 108 MMOL/L (ref 94–109)
CO2 SERPL-SCNC: 21 MMOL/L (ref 20–32)
CREAT SERPL-MCNC: 1.54 MG/DL (ref 0.52–1.04)
ERYTHROCYTE [DISTWIDTH] IN BLOOD BY AUTOMATED COUNT: 13 % (ref 10–15)
GFR SERPL CREATININE-BSD FRML MDRD: 44 ML/MIN/1.73M2
GLUCOSE BLD-MCNC: 92 MG/DL (ref 70–99)
HCT VFR BLD AUTO: 28.7 % (ref 35–47)
HGB BLD-MCNC: 9 G/DL (ref 11.7–15.7)
LIPASE SERPL-CCNC: 116 U/L (ref 73–393)
MCH RBC QN AUTO: 28.8 PG (ref 26.5–33)
MCHC RBC AUTO-ENTMCNC: 31.4 G/DL (ref 31.5–36.5)
MCV RBC AUTO: 92 FL (ref 78–100)
PLATELET # BLD AUTO: 252 10E3/UL (ref 150–450)
POTASSIUM BLD-SCNC: 4.5 MMOL/L (ref 3.4–5.3)
RBC # BLD AUTO: 3.12 10E6/UL (ref 3.8–5.2)
SODIUM SERPL-SCNC: 136 MMOL/L (ref 133–144)
TACROLIMUS BLD-MCNC: 8 UG/L (ref 5–15)
TME LAST DOSE: NORMAL H
TME LAST DOSE: NORMAL H
WBC # BLD AUTO: 4.6 10E3/UL (ref 4–11)

## 2022-07-14 PROCEDURE — 80197 ASSAY OF TACROLIMUS: CPT

## 2022-07-14 PROCEDURE — 82150 ASSAY OF AMYLASE: CPT

## 2022-07-14 PROCEDURE — 85014 HEMATOCRIT: CPT

## 2022-07-14 PROCEDURE — 82310 ASSAY OF CALCIUM: CPT

## 2022-07-14 PROCEDURE — 36415 COLL VENOUS BLD VENIPUNCTURE: CPT

## 2022-07-14 PROCEDURE — 82040 ASSAY OF SERUM ALBUMIN: CPT

## 2022-07-14 PROCEDURE — 83690 ASSAY OF LIPASE: CPT

## 2022-07-15 ENCOUNTER — TELEPHONE (OUTPATIENT)
Dept: TRANSPLANT | Facility: CLINIC | Age: 37
End: 2022-07-15

## 2022-07-15 NOTE — TELEPHONE ENCOUNTER
Patient has questions about orthostatic hypotension and midodrine vs florinef. Patient has upcoming clinic appt, will review in clinic appt w

## 2022-07-18 ENCOUNTER — LAB (OUTPATIENT)
Dept: LAB | Facility: CLINIC | Age: 37
End: 2022-07-18
Payer: COMMERCIAL

## 2022-07-18 DIAGNOSIS — Z48.298 AFTERCARE FOLLOWING ORGAN TRANSPLANT: ICD-10-CM

## 2022-07-18 DIAGNOSIS — Z94.0 KIDNEY REPLACED BY TRANSPLANT: ICD-10-CM

## 2022-07-18 DIAGNOSIS — Z94.83 PANCREAS REPLACED BY TRANSPLANT (H): ICD-10-CM

## 2022-07-18 DIAGNOSIS — Z79.899 ENCOUNTER FOR LONG-TERM CURRENT USE OF MEDICATION: ICD-10-CM

## 2022-07-18 LAB
AMYLASE SERPL-CCNC: 48 U/L (ref 30–110)
ANION GAP SERPL CALCULATED.3IONS-SCNC: 6 MMOL/L (ref 3–14)
BUN SERPL-MCNC: 41 MG/DL (ref 7–30)
CALCIUM SERPL-MCNC: 9 MG/DL (ref 8.5–10.1)
CHLORIDE BLD-SCNC: 114 MMOL/L (ref 94–109)
CO2 SERPL-SCNC: 20 MMOL/L (ref 20–32)
CREAT SERPL-MCNC: 1.67 MG/DL (ref 0.52–1.04)
ERYTHROCYTE [DISTWIDTH] IN BLOOD BY AUTOMATED COUNT: 12.9 % (ref 10–15)
GFR SERPL CREATININE-BSD FRML MDRD: 40 ML/MIN/1.73M2
GLUCOSE BLD-MCNC: 93 MG/DL (ref 70–99)
HCT VFR BLD AUTO: 27.9 % (ref 35–47)
HGB BLD-MCNC: 9 G/DL (ref 11.7–15.7)
LIPASE SERPL-CCNC: 111 U/L (ref 73–393)
MCH RBC QN AUTO: 29.2 PG (ref 26.5–33)
MCHC RBC AUTO-ENTMCNC: 32.3 G/DL (ref 31.5–36.5)
MCV RBC AUTO: 91 FL (ref 78–100)
PLATELET # BLD AUTO: 195 10E3/UL (ref 150–450)
POTASSIUM BLD-SCNC: 4.6 MMOL/L (ref 3.4–5.3)
RBC # BLD AUTO: 3.08 10E6/UL (ref 3.8–5.2)
SODIUM SERPL-SCNC: 140 MMOL/L (ref 133–144)
TACROLIMUS BLD-MCNC: 8.2 UG/L (ref 5–15)
TME LAST DOSE: NORMAL H
TME LAST DOSE: NORMAL H
WBC # BLD AUTO: 4.7 10E3/UL (ref 4–11)

## 2022-07-18 PROCEDURE — 80197 ASSAY OF TACROLIMUS: CPT

## 2022-07-18 PROCEDURE — 36415 COLL VENOUS BLD VENIPUNCTURE: CPT

## 2022-07-18 PROCEDURE — 82150 ASSAY OF AMYLASE: CPT

## 2022-07-18 PROCEDURE — 84132 ASSAY OF SERUM POTASSIUM: CPT

## 2022-07-18 PROCEDURE — 85027 COMPLETE CBC AUTOMATED: CPT

## 2022-07-18 PROCEDURE — 83690 ASSAY OF LIPASE: CPT

## 2022-07-21 ENCOUNTER — HOSPITAL ENCOUNTER (OUTPATIENT)
Dept: CARDIOLOGY | Facility: CLINIC | Age: 37
Discharge: HOME | End: 2022-07-21
Attending: INTERNAL MEDICINE | Admitting: INTERNAL MEDICINE
Payer: COMMERCIAL

## 2022-07-21 DIAGNOSIS — R53.83 FATIGUE: ICD-10-CM

## 2022-07-21 PROCEDURE — 93016 CV STRESS TEST SUPVJ ONLY: CPT | Performed by: INTERNAL MEDICINE

## 2022-07-21 PROCEDURE — 93018 CV STRESS TEST I&R ONLY: CPT | Performed by: INTERNAL MEDICINE

## 2022-07-21 PROCEDURE — 93017 CV STRESS TEST TRACING ONLY: CPT

## 2022-07-22 NOTE — TELEPHONE ENCOUNTER
ISSUE:   Tacrolimus XR level 6.3 on 12/22, goal 8-10, dose 6 mg daily.    PLAN:   Please call patient and confirm this was an accurate 24-hour trough. Verify Tacrolimus XR dose 6 mg daily. Confirm no new medications or illness. Confirm no missed doses. If accurate trough and accurate dose, increase Tacrolimus IR dose to 7 mg daily and repeat labs in 1 days    OUTCOME:   Spoke with patient, they confirm accurate trough level and current dose 6 mg daily. Patient confirmed dose change to 7 mg daily and to repeat labs in 1 days. Orders sent to [facility] pharmacy for dose change and lab for repeat labs. Patient voiced understanding of plan.      [facility]  [facility] [id number]  Scheduled Appointment: 07/24/2022

## 2022-07-25 ENCOUNTER — LAB (OUTPATIENT)
Dept: LAB | Facility: CLINIC | Age: 37
End: 2022-07-25
Payer: COMMERCIAL

## 2022-07-25 DIAGNOSIS — Z94.83 PANCREAS TRANSPLANTED (H): ICD-10-CM

## 2022-07-25 DIAGNOSIS — Z94.83 PANCREAS REPLACED BY TRANSPLANT (H): ICD-10-CM

## 2022-07-25 DIAGNOSIS — Z94.0 KIDNEY REPLACED BY TRANSPLANT: ICD-10-CM

## 2022-07-25 DIAGNOSIS — Z48.298 AFTERCARE FOLLOWING ORGAN TRANSPLANT: ICD-10-CM

## 2022-07-25 DIAGNOSIS — Z79.899 ENCOUNTER FOR LONG-TERM CURRENT USE OF MEDICATION: ICD-10-CM

## 2022-07-25 LAB
AMYLASE SERPL-CCNC: 56 U/L (ref 30–110)
ANION GAP SERPL CALCULATED.3IONS-SCNC: 6 MMOL/L (ref 3–14)
BUN SERPL-MCNC: 36 MG/DL (ref 7–30)
CALCIUM SERPL-MCNC: 9.2 MG/DL (ref 8.5–10.1)
CHLORIDE BLD-SCNC: 110 MMOL/L (ref 94–109)
CO2 SERPL-SCNC: 22 MMOL/L (ref 20–32)
CREAT SERPL-MCNC: 1.48 MG/DL (ref 0.52–1.04)
ERYTHROCYTE [DISTWIDTH] IN BLOOD BY AUTOMATED COUNT: 12.6 % (ref 10–15)
GFR SERPL CREATININE-BSD FRML MDRD: 46 ML/MIN/1.73M2
GLUCOSE BLD-MCNC: 87 MG/DL (ref 70–99)
HCT VFR BLD AUTO: 31.6 % (ref 35–47)
HGB BLD-MCNC: 9.8 G/DL (ref 11.7–15.7)
LIPASE SERPL-CCNC: 110 U/L (ref 73–393)
MCH RBC QN AUTO: 29.1 PG (ref 26.5–33)
MCHC RBC AUTO-ENTMCNC: 31 G/DL (ref 31.5–36.5)
MCV RBC AUTO: 94 FL (ref 78–100)
MYCOPHENOLATE SERPL LC/MS/MS-MCNC: 5.7 MG/L (ref 1–3.5)
MYCOPHENOLATE-G SERPL LC/MS/MS-MCNC: 71 MG/L (ref 30–95)
PLATELET # BLD AUTO: 257 10E3/UL (ref 150–450)
POTASSIUM BLD-SCNC: 4.6 MMOL/L (ref 3.4–5.3)
RBC # BLD AUTO: 3.37 10E6/UL (ref 3.8–5.2)
SODIUM SERPL-SCNC: 138 MMOL/L (ref 133–144)
TACROLIMUS BLD-MCNC: 10 UG/L (ref 5–15)
TME LAST DOSE: ABNORMAL H
TME LAST DOSE: ABNORMAL H
TME LAST DOSE: NORMAL H
TME LAST DOSE: NORMAL H
WBC # BLD AUTO: 3.5 10E3/UL (ref 4–11)

## 2022-07-25 PROCEDURE — 36415 COLL VENOUS BLD VENIPUNCTURE: CPT

## 2022-07-25 PROCEDURE — 80197 ASSAY OF TACROLIMUS: CPT

## 2022-07-25 PROCEDURE — 80180 DRUG SCRN QUAN MYCOPHENOLATE: CPT

## 2022-07-25 PROCEDURE — 82150 ASSAY OF AMYLASE: CPT

## 2022-07-25 PROCEDURE — 83690 ASSAY OF LIPASE: CPT

## 2022-07-25 PROCEDURE — 87799 DETECT AGENT NOS DNA QUANT: CPT

## 2022-07-25 PROCEDURE — 85027 COMPLETE CBC AUTOMATED: CPT

## 2022-07-25 PROCEDURE — 80048 BASIC METABOLIC PNL TOTAL CA: CPT

## 2022-07-26 ENCOUNTER — TELEPHONE (OUTPATIENT)
Dept: TRANSPLANT | Facility: CLINIC | Age: 37
End: 2022-07-26

## 2022-07-26 DIAGNOSIS — K59.09 CHRONIC CONSTIPATION: ICD-10-CM

## 2022-07-26 LAB
BKV DNA # SPEC NAA+PROBE: <500 COPIES/ML
BKV DNA SPEC NAA+PROBE-LOG#: <2.7 {LOG_COPIES}/ML

## 2022-07-27 RX ORDER — PRUCALOPRIDE 2 MG/1
1 TABLET, FILM COATED ORAL DAILY
Qty: 90 TABLET | Refills: 1 | Status: SHIPPED | OUTPATIENT
Start: 2022-07-27 | End: 2023-03-07

## 2022-07-27 NOTE — TELEPHONE ENCOUNTER
Last Clinic Visit: 5/20/2022  [facility] [place]  - continue prucalopride    RTC 3 to 6 months with Dr Lowery or Kathy Mo

## 2022-07-28 ENCOUNTER — OFFICE VISIT (OUTPATIENT)
Dept: NEPHROLOGY | Facility: CLINIC | Age: 37
End: 2022-07-28
Attending: INTERNAL MEDICINE
Payer: COMMERCIAL

## 2022-07-28 VITALS
OXYGEN SATURATION: 99 % | SYSTOLIC BLOOD PRESSURE: 138 MMHG | WEIGHT: 125.7 LBS | BODY MASS INDEX: 20.29 KG/M2 | HEART RATE: 90 BPM | TEMPERATURE: 98.1 F | DIASTOLIC BLOOD PRESSURE: 84 MMHG

## 2022-07-28 DIAGNOSIS — R00.0 TACHYCARDIA: ICD-10-CM

## 2022-07-28 DIAGNOSIS — Z94.83 PANCREAS REPLACED BY TRANSPLANT (H): ICD-10-CM

## 2022-07-28 DIAGNOSIS — D84.9 IMMUNOSUPPRESSION (H): ICD-10-CM

## 2022-07-28 DIAGNOSIS — Z94.0 KIDNEY REPLACED BY TRANSPLANT: Primary | ICD-10-CM

## 2022-07-28 PROCEDURE — G0463 HOSPITAL OUTPT CLINIC VISIT: HCPCS

## 2022-07-28 PROCEDURE — 99214 OFFICE O/P EST MOD 30 MIN: CPT | Mod: 24 | Performed by: INTERNAL MEDICINE

## 2022-07-28 ASSESSMENT — PAIN SCALES - GENERAL: PAINLEVEL: NO PAIN (0)

## 2022-07-28 NOTE — NURSING NOTE
Chief Complaint   Patient presents with     RECHECK     Recheck, has medication concerns.     Blood pressure 138/84, pulse 90, temperature 98.1  F (36.7  C), weight 57 kg (125 lb 11.2 oz), SpO2 99 %, not currently breastfeeding.    NATHANIEL BISHOP

## 2022-07-28 NOTE — LETTER
7/28/2022       RE: Marilyn Ortega  [address]  [facility] [address]     Dear Colleague,    Thank you for referring your patient, Marilyn Ortega, to the [facility] [place] [facility]. Please see a copy of my visit note below.    TRANSPLANT NEPHROLOGY EARLY POST TRANSPLANT VISIT   8 month visit    Assessment & Plan    Marilyn is here for 8 month follow up. On paper/from laboratory perspective, she appears to be doing reasonably well with good kidney/pancreas allograft function.     In spite of this, she has a high symptom burden and is tolerating her medications fairly at best.     I looked through her records prior to transplant and noted that she has had a high symptom burden with non-specific complaints for a long time ie; it is difficult to correlate her symptoms to transplant/immunosuppression. As I discussed with Marilyn and has been shared in our committee meetings, strong concerns exist about moving away from a tacrolimus-based regimen prior to 1 year given her risk of rejection, namely pancreas rejection, in addition to persistent non-specific symptoms which tacrolimus is likely not having that much of an effect on.     I think rejection atop all of her current issues would be significant burdens on her health/wellbeing and thus the risk of rejection significantly outweighs the theoretical benefit at this time.     Close follow-up, continued investigations of what may be causing her to have her symptoms as well as facilitating mental health services to help her cope post-transplant will be key to helping her remain on her current immunosuppression until 1 year. At 1 year post transplant, we could consider a different approach.     Recommendations:    -Continue Lasix     -Continue current immunosuppression     -Return to clinic in 1 month       # DDKT (SPK): Stable significant variability 1-1.3 and occasional uptrend  to 1.4-1.5, peak 1.6-1.7 around the time she had borderline rejection , significant variability on FK trough as well on envarsus though overall therapeutic and very compliant to her med regimen   - Baseline Creatinine: ~ 1-1.3 more rceently up to 1.3-1.5 since 3/2022, bx threshold of 1.6 or higher   - Proteinuria: Normal (<0.2 grams)   - Date DSA Last Checked: Nov/2021      Latest DSA: No DSA at time of transplant   - BK Viremia: Continue to monitor BK biweekly   - Kidney Tx Biopsy: yes  # kidney bx 5/2: read as borderline cellular rejection,isolated tubulitis i0t1, IF suggests possible IgA nephropathy MEST0   # closure Bx: 6/9 very limited sample, 2 glomeruli on EM, c4d not available, no signs of rejection   She was treated for 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 Cr now down to 1.2-1.3.      # Pancreas Tx (SPK):    - Pancreatic Exocrine Drainage: Enteric drained     - Blood glucose: Euglycemia      On insulin: No   - HbA1c: Last checked at time of transplant      Latest HbA1c: 5%   - Pancreatic enzymes: Trend down   - Date DSA Last Checked: Nov/2021  Latest DSA: No DSA at time of transplant   - Pancreas Tx Biopsy: No    # Immunosuppression: Tacrolimus immediate release (goal 8-10) and Mycophenolic acid (dose 540 mg every 12 hours)   - Continue with intensive monitoring of immunosuppression for efficacy and toxicity.   - Induction with Recent Transplant:  Intermediate Intensity   - Continue with intensive monitoring of immunosuppression for efficacy and toxicity.   - Changes: No at goal.    note:  Tremors have improved with the switch to envarsus from short acting tacrolimus.    MPA level 5.7; she is mildly leukopenic as well. cPRA 32, no DSA. GI symptoms    # Infection Prophylaxis:   - PJP: Sulfa/TMP (Bactrim)  - CMV: Valganciclovir (Valcyte) completed 3 months    # Blood Pressure: Controlled;  Goal BP:<130/80   - Volume status: Euvolemic   EDW ~ 122lbs currently ranges 125-130 lbs   e  # Anemia in Chronic Renal Disease: Hgb: stable     SYLVAIN: No   - Iron studies: previously on iron tabs currently off recheck given fatigue and recent menorrhagia    # Mineral Bone Disorder:   - Secondary renal hyperparathyroidism; PTH level: Normal (18-80 pg/ml)        On treatment: None  - Vitamin D; level: High normal        On supplement: Yes  - Calcium; level: Normal        On supplement: No  - Phosphorus; level: Normal        On supplement: Yes, 250mg bid if next phos>2 could reduce to 250 mg po qd    # Electrolytes:   - Potassium; level: Normal        On supplement: No  - Magnesium; level: Normal        On supplement: Yes, 400mg daily   - Bicarbonate; level: Normal        On supplement: No    # Medical Compliance: Yes    # Fatigue: nl CMV EBV TSH iron studies stress test    #  LE edema: intermittent mostly upon standing and worse towards the end of the day, symptoms suggestive of dependent edema and  Not intravascular fluid overload, trial of various forms of diuretics led to BERNIE & discontinuation. Responds to compression stockings. Most recent UPC normal. More recently on lasix 20 mg po qMWF    # COVID-19 Virus Review: Discussed COVID-19 virus and the potential medical risks.  Reviewed preventative health recommendations, including wearing a mask where appropriate.  Recommended COVID vaccination should be up to date with either an initial vaccination or booster shot when appropriate.  Asked the patient to inform the transplant center if they are exposed or diagnosed with this virus.    # COVID Vaccination Up To Date: Yes     # Transplant History:  Etiology of Kidney Failure: Diabetes mellitus type 1  Tx: SPK  Transplant: 11/15/2021 (Kidney / Pancreas)  Donor Type: Donation after Brain Death Donor Class:   Crossmatch at time of Tx: negative  DSA at time of Tx: No  Significant changes in immunosuppression: None  CMV IgG Ab High Risk Discordance (D+/R-): No  EBV IgG  "Ab High Risk Discordance (D+/R-): No  Significant transplant-related complications: None    Transplant Office Phone Number: [phone]    Assessment and plan was discussed with the patient and she voiced her understanding and agreement.      Return visit: 1 month    Julio Cesar El MD  Transplant Nephrology   Pager: [phone]    Chief Complaint   Ms. Ortega is a 37 year old here for kidney transplant, pancreas transplant, immunosuppression management and new medical concerns.     History of Present Illness    Marilyn is here for routine follow up.     She reports improved energy level . She has been able to be more active closer to her baseline prior to transplant Leg swelling is well controlled with lasix 20 mg qMWF. BP well controlled. No nausea, vomiting, regular BM's.     Recent kidney tx bx showed very small/inadequate sample but based on EM evaluation of 2 glomeruli no signs of rejection. IF not available, c4d not available, DSA was neg and BK <500.   Labs continue to show significant variability in Cr levels more recently Cr down to 1 without any change in her diuretic regimen or recent med changes. It appears to fluctuate with fluctuation in FK trough.    She is having issues with orthostatic hypotension. On lasix. No other BP meds. Exercise stress test with Cardiology.   She can't walk up a flight of stairs due to tachycardia.   She also note issues with headaches.     She is having vision issues. She can't see street signs. She was having swelling in her optic nerve. She saw her eye doctor. She was seeing perfectly. 6 weeks-8 weeks ago, started coming back. Didn't inject anything. They \"did everything.\"     Marilyn has struggled post-transplant. She noted the following:   She \"felt like garbage\" on prednisone, her weight has been labile,  \"My life looks nothing like it did last summer\". During our encounter she became quite tearful and said she felt better before transplant.       IS Envarsus " 13.5mg every day, /540         Problem List   Patient Active Problem List   Diagnosis     Osteopenia     Chronic constipation     Irritable bowel syndrome     Stage 3a chronic kidney disease (H)     HTN, kidney transplant related     Gastroparesis     Heterozygous factor V Leiden mutation (H)     Immunosuppression (H)     Kidney replaced by transplant     Pancreas replaced by transplant (H)     Anemia due to stage 3a chronic kidney disease (H)     Aftercare following organ transplant     Generalized edema     Kidney transplant rejection       Allergies   Allergies   Allergen Reactions     Chlorhexidine Hives, Itching and Rash     PN: Patient had swelling/rash that was very itchy with chlorprep.     Ceclor [Cefaclor Monohydrate]      Metoclopramide Other (See Comments)     Noted mouth/facial twitching with reglan in 2022 when it was tried for gastroparesis.     Cefaclor Rash       Medications   Current Outpatient Medications   Medication Sig     aspirin (ASA) 325 MG tablet Take 1 tablet (325 mg) by mouth daily     biotin 1000 MCG TABS tablet Take 1,000 mcg by mouth daily     cetirizine (ZYRTEC) 10 MG tablet Take 1 tablet (10 mg) by mouth every other day     cholecalciferol 25 MCG (1000 UT) TABS Take 2,000 Units by mouth every other day      Continuous Blood Gluc Sensor (DEXCOM G6 SENSOR) MISC Use as directed for continuous glucose monitoring.  Change sensor every 10 days.     Continuous Blood Gluc Transmit (DEXCOM G6 TRANSMITTER) MISC Use as directed for continuous glucose monitoring.  Change every 3 months.     docusate sodium (COLACE) 100 MG capsule Take 200 mg by mouth daily before breakfast     famotidine (PEPCID) 20 MG tablet Take 20 mg by mouth 2 times daily     furosemide (LASIX) 20 MG tablet Take 1 tablet (20 mg) by mouth as needed (three time weekly prn for ankle swelling)     glucose blood VI test strips strip 4 times daily.     insulin lispro (HUMALOG KWIKPEN) 100 UNIT/ML (1 unit dial) KWIKPEN  BEFORE MEALS TID use 2 unit rapid-acting insulin to correct for every 50 mg/dL that blood glucose is above 150 mg/dL. 150-199 mg/dL: add 2 units, 200-249 mg/dL: add 4 units, 250-299 mg/dL: add 6 units, 300-349 mg/dL: add 8 units, 350-399 mg/dL: add 10 units, >400 mg/dL: add 12 units and call your doctor. (Patient not taking: Reported on 6/28/2022)     insulin pen needle (31G X 5 MM) 31G X 5 MM miscellaneous Use 3 pen needles daily or as directed. Patient/RPH may decide gauge and length.     mycophenolic acid (GENERIC EQUIVALENT) 180 MG EC tablet Take 1 tablet (180 mg) by mouth 2 times daily Total dose=540mg BID     mycophenolic acid (GENERIC EQUIVALENT) 360 MG EC tablet Take 1 tablet (360 mg) by mouth 2 times daily Total dose=540mg BID     ondansetron (ZOFRAN) 4 MG tablet Take 1 tablet (4 mg) by mouth every 8 hours as needed for nausea     Prucalopride Succinate (MOTEGRITY) 2 MG TABS Take 1 tablet (2 mg) by mouth daily     sulfamethoxazole-trimethoprim (BACTRIM) 400-80 MG tablet Take 1 tablet by mouth daily     tacrolimus (ENVARSUS XR) 0.75 MG 24 hr tablet Take 2 tablets (1.5 mg) by mouth every morning (before breakfast) Total dose=13.5mg daily     tacrolimus (ENVARSUS XR) 1 MG 24 hr tablet Take 1 tablet (1 mg) by mouth every morning (before breakfast) Total dose=13mg daily     tacrolimus (ENVARSUS XR) 4 MG 24 hr tablet Take 3 tablets (12 mg) by mouth every morning (before breakfast) Total dose=13.5mg daily     Current Facility-Administered Medications   Medication     bevacizumab (AVASTIN) intravitreal inj 1.25 mg     bevacizumab (AVASTIN) intravitreal inj 1.25 mg     There are no discontinued medications.    Physical Exam   Vital Signs: /84   Pulse 90   Temp 98.1  F (36.7  C)   Wt 57 kg (125 lb 11.2 oz)   SpO2 99%   BMI 20.29 kg/m      GENERAL: Healthy, alert and no distress  EYES: Eyes grossly normal to inspection.  No discharge or erythema, or obvious scleral/conjunctival abnormalities.  RESP: No  audible wheeze, cough, or visible cyanosis.  No visible retractions or increased work of breathing.    SKIN: Visible skin clear. No significant rash, abnormal pigmentation or lesions.  NEURO: Cranial nerves grossly intact.  Mentation and speech appropriate for age.  PSYCH: Mentation appears normal, affect normal/bright, judgement and insight intact, normal speech and appearance well-groomed.    Data     Renal Latest Ref Rng & Units 7/25/2022 7/18/2022 7/14/2022   Na 133 - 144 mmol/L 138 140 136   K 3.4 - 5.3 mmol/L 4.6 4.6 4.5   Cl 94 - 109 mmol/L 110(H) 114(H) 108   CO2 20 - 32 mmol/L 22 20 21   BUN 7 - 30 mg/dL 36(H) 41(H) 48(H)   Cr 0.52 - 1.04 mg/dL 1.48(H) 1.67(H) 1.54(H)   Glucose 70 - 99 mg/dL 87 93 92   Ca  8.5 - 10.1 mg/dL 9.2 9.0 8.8   Mg 1.6 - 2.3 mg/dL - - -     Bone Health Latest Ref Rng & Units 7/11/2022 6/6/2022 5/16/2022   Phos 2.5 - 4.5 mg/dL 3.5 3.6 -   PTHi pg/mL - - -   Vit D Def 20 - 75 ug/L - - 45     Heme Latest Ref Rng & Units 7/25/2022 7/18/2022 7/14/2022   WBC 4.0 - 11.0 10e3/uL 3.5(L) 4.7 4.6   Hgb 11.7 - 15.7 g/dL 9.8(L) 9.0(L) 9.0(L)   Plt 150 - 450 10e3/uL 257 195 252   ABSOLUTE NEUTROPHIL 1.6 - 8.3 10e3/uL - - -   ABSOLUTE LYMPHOCYTES 0.8 - 5.3 10e3/uL - - -   ABSOLUTE MONOCYTES 0.0 - 1.3 10e3/uL - - -   ABSOLUTE EOSINOPHILS 0.0 - 0.7 10e3/uL - - -   ABSOLUTE BASOPHILS 0.0 - 0.2 10e9/L - - -     Liver Latest Ref Rng & Units 7/14/20222022 5/23/2022 5/4/2022   AP 40 - 150 U/L - 84 70   TBili 0.2 - 1.3 mg/dL - 0.3 0.2   DBili 0.0 - 0.2 mg/dL - <0.1 <0.1   ALT 0 - 50 U/L - 20 18   AST 0 - 45 U/L - 13 19   Tot Protein 6.8 - 8.8 g/dL - 7.9 7.6   Albumin 3.4 - 5.0 g/dL 4.0 4.0 4.1     Pancreas Latest Ref Rng & Units 7/25/2022 7/18/2022 7/14/2022   A1C 0.0 - 5.6 % - - -   Amylase 30 - 110 U/L 56 48 47   Lipase 73 - 393 U/L 110 111 116     Iron studies Latest Ref Rng & Units 4/27/2022 4/18/2022 3/3/2022   Iron 35 - 180 ug/dL 56 106 139   Iron sat 15 - 46 % 20 35 35   Ferritin 12 - 150 ng/mL 110 120  106     [facility] Txp Virology Latest Ref Rng & Units 4/18/2022 1/28/2022 12/13/2021   CMV QUANT IU/ML Not Detected IU/mL Not Detected Not Detected Not Detected   EBV CAPSID ANTIBODY IGG No detectable antibody. - - -   EBV DNA COPIES/ML Not Detected copies/mL Not Detected - -        Recent Labs   Lab Test 07/14/22  0804 07/18/22  0837 07/25/22  0834   DOSTAC 7/13/2022 7/17/2022 7/24/2022   TACROL 8.0 8.2 10.0     Recent Labs   Lab Test 05/16/22  0904 05/31/22  0842 07/05/22  0835 07/11/22  0843 07/25/22  0834   DOSMPA 5/15/2022   8:00 PM  --   --   --  7/24/2022   8:00 PM   MPACID 3.48   < > 9.87* 4.74* 5.70*   MPAG 70.9   < > 95.0 47.0 71.0    < > = values in this interval not displayed.       Again, thank you for allowing me to participate in the care of your patient.      Sincerely,    Julio Cesar El MD

## 2022-07-28 NOTE — PROGRESS NOTES
TRANSPLANT NEPHROLOGY EARLY POST TRANSPLANT VISIT   8 month visit    Assessment & Plan    Marilyn is here for 8 month follow up. On paper/from laboratory perspective, she appears to be doing reasonably well with good kidney/pancreas allograft function.     In spite of this, she has a high symptom burden and is tolerating her medications fairly at best.     I looked through her records prior to transplant and noted that she has had a high symptom burden with non-specific complaints for a long time ie; it is difficult to correlate her symptoms to transplant/immunosuppression. As I discussed with Marilyn and has been shared in our committee meetings, strong concerns exist about moving away from a tacrolimus-based regimen prior to 1 year given her risk of rejection, namely pancreas rejection, in addition to persistent non-specific symptoms which tacrolimus is likely not having that much of an effect on.     I think rejection atop all of her current issues would be significant burdens on her health/wellbeing and thus the risk of rejection significantly outweighs the theoretical benefit at this time.     Close follow-up, continued investigations of what may be causing her to have her symptoms as well as facilitating mental health services to help her cope post-transplant will be key to helping her remain on her current immunosuppression until 1 year. At 1 year post transplant, we could consider a different approach.     Recommendations:    -Continue Lasix     -Continue current immunosuppression     -Return to clinic in 1 month       # DDKT (SPK): Stable significant variability 1-1.3 and occasional uptrend to 1.4-1.5, peak 1.6-1.7 around the time she had borderline rejection , significant variability on FK trough as well on envarsus though overall therapeutic and very compliant to her med regimen   - Baseline Creatinine: ~ 1-1.3 more rceently up to 1.3-1.5 since 3/2022, bx threshold of 1.6 or higher   - Proteinuria: Normal  (<0.2 grams)   - Date DSA Last Checked: Nov/2021      Latest DSA: No DSA at time of transplant   - BK Viremia: Continue to monitor BK biweekly   - Kidney Tx Biopsy: yes  # kidney bx 5/2: read as borderline cellular rejection,isolated tubulitis i0t1, IF suggests possible IgA nephropathy MEST0   # closure Bx: 6/9 very limited sample, 2 glomeruli on EM, c4d not available, no signs of rejection   She was treated for 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 Cr now down to 1.2-1.3.      # Pancreas Tx (SPK):    - Pancreatic Exocrine Drainage: Enteric drained     - Blood glucose: Euglycemia      On insulin: No   - HbA1c: Last checked at time of transplant      Latest HbA1c: 5%   - Pancreatic enzymes: Trend down   - Date DSA Last Checked: Nov/2021  Latest DSA: No DSA at time of transplant   - Pancreas Tx Biopsy: No    # Immunosuppression: Tacrolimus immediate release (goal 8-10) and Mycophenolic acid (dose 540 mg every 12 hours)   - Continue with intensive monitoring of immunosuppression for efficacy and toxicity.   - Induction with Recent Transplant:  Intermediate Intensity   - Continue with intensive monitoring of immunosuppression for efficacy and toxicity.   - Changes: No at goal.    note:  Tremors have improved with the switch to envarsus from short acting tacrolimus.    MPA level 5.7; she is mildly leukopenic as well. cPRA 32, no DSA. GI symptoms    # Infection Prophylaxis:   - PJP: Sulfa/TMP (Bactrim)  - CMV: Valganciclovir (Valcyte) completed 3 months    # Blood Pressure: Controlled;  Goal BP:<130/80   - Volume status: Euvolemic  EDW ~ 122lbs currently ranges 125-130 lbs   e  # Anemia in Chronic Renal Disease: Hgb: stable     SYLVAIN: No   - Iron studies: previously on iron tabs currently off recheck given fatigue and recent menorrhagia    # Mineral Bone Disorder:   - Secondary renal hyperparathyroidism; PTH level: Normal (18-80 pg/ml)        On  treatment: None  - Vitamin D; level: High normal        On supplement: Yes  - Calcium; level: Normal        On supplement: No  - Phosphorus; level: Normal        On supplement: Yes, 250mg bid if next phos>2 could reduce to 250 mg po qd    # Electrolytes:   - Potassium; level: Normal        On supplement: No  - Magnesium; level: Normal        On supplement: Yes, 400mg daily   - Bicarbonate; level: Normal        On supplement: No    # Medical Compliance: Yes    # Fatigue: nl CMV EBV TSH iron studies stress test    #  LE edema: intermittent mostly upon standing and worse towards the end of the day, symptoms suggestive of dependent edema and  Not intravascular fluid overload, trial of various forms of diuretics led to BERNIE & discontinuation. Responds to compression stockings. Most recent UPC normal. More recently on lasix 20 mg po qMWF    # COVID-19 Virus Review: Discussed COVID-19 virus and the potential medical risks.  Reviewed preventative health recommendations, including wearing a mask where appropriate.  Recommended COVID vaccination should be up to date with either an initial vaccination or booster shot when appropriate.  Asked the patient to inform the transplant center if they are exposed or diagnosed with this virus.    # COVID Vaccination Up To Date: Yes     # Transplant History:  Etiology of Kidney Failure: Diabetes mellitus type 1  Tx: SPK  Transplant: 11/15/2021 (Kidney / Pancreas)  Donor Type: Donation after Brain Death Donor Class:   Crossmatch at time of Tx: negative  DSA at time of Tx: No  Significant changes in immunosuppression: None  CMV IgG Ab High Risk Discordance (D+/R-): No  EBV IgG Ab High Risk Discordance (D+/R-): No  Significant transplant-related complications: None    Transplant Office Phone Number: [phone]    Assessment and plan was discussed with the patient and she voiced her understanding and agreement.      Return visit: 1 month    Julio Cesar El MD  Transplant Nephrology  "  Pager: [phone]    Chief Complaint   Ms. Ortega is a 37 year old here for kidney transplant, pancreas transplant, immunosuppression management and new medical concerns.     History of Present Illness    Marilyn is here for routine follow up.     She reports improved energy level . She has been able to be more active closer to her baseline prior to transplant Leg swelling is well controlled with lasix 20 mg qMWF. BP well controlled. No nausea, vomiting, regular BM's.     Recent kidney tx bx showed very small/inadequate sample but based on EM evaluation of 2 glomeruli no signs of rejection. IF not available, c4d not available, DSA was neg and BK <500.   Labs continue to show significant variability in Cr levels more recently Cr down to 1 without any change in her diuretic regimen or recent med changes. It appears to fluctuate with fluctuation in FK trough.    She is having issues with orthostatic hypotension. On lasix. No other BP meds. Exercise stress test with Cardiology.   She can't walk up a flight of stairs due to tachycardia.   She also note issues with headaches.     She is having vision issues. She can't see street signs. She was having swelling in her optic nerve. She saw her eye doctor. She was seeing perfectly. 6 weeks-8 weeks ago, started coming back. Didn't inject anything. They \"did everything.\"     Marilyn has struggled post-transplant. She noted the following:   She \"felt like garbage\" on prednisone, her weight has been labile,  \"My life looks nothing like it did last summer\". During our encounter she became quite tearful and said she felt better before transplant.       IS Envarsus 13.5mg every day, /540         Problem List   Patient Active Problem List   Diagnosis     Osteopenia     Chronic constipation     Irritable bowel syndrome     Stage 3a chronic kidney disease (H)     HTN, kidney transplant related     Gastroparesis     Heterozygous factor V Leiden mutation (H)     " Immunosuppression (H)     Kidney replaced by transplant     Pancreas replaced by transplant (H)     Anemia due to stage 3a chronic kidney disease (H)     Aftercare following organ transplant     Generalized edema     Kidney transplant rejection       Allergies   Allergies   Allergen Reactions     Chlorhexidine Hives, Itching and Rash     PN: Patient had swelling/rash that was very itchy with chlorprep.     Ceclor [Cefaclor Monohydrate]      Metoclopramide Other (See Comments)     Noted mouth/facial twitching with reglan in 2022 when it was tried for gastroparesis.     Cefaclor Rash       Medications   Current Outpatient Medications   Medication Sig     aspirin (ASA) 325 MG tablet Take 1 tablet (325 mg) by mouth daily     biotin 1000 MCG TABS tablet Take 1,000 mcg by mouth daily     cetirizine (ZYRTEC) 10 MG tablet Take 1 tablet (10 mg) by mouth every other day     cholecalciferol 25 MCG (1000 UT) TABS Take 2,000 Units by mouth every other day      Continuous Blood Gluc Sensor (DEXCOM G6 SENSOR) MISC Use as directed for continuous glucose monitoring.  Change sensor every 10 days.     Continuous Blood Gluc Transmit (DEXCOM G6 TRANSMITTER) MISC Use as directed for continuous glucose monitoring.  Change every 3 months.     docusate sodium (COLACE) 100 MG capsule Take 200 mg by mouth daily before breakfast     famotidine (PEPCID) 20 MG tablet Take 20 mg by mouth 2 times daily     furosemide (LASIX) 20 MG tablet Take 1 tablet (20 mg) by mouth as needed (three time weekly prn for ankle swelling)     glucose blood VI test strips strip 4 times daily.     insulin lispro (HUMALOG KWIKPEN) 100 UNIT/ML (1 unit dial) KWIKPEN BEFORE MEALS TID use 2 unit rapid-acting insulin to correct for every 50 mg/dL that blood glucose is above 150 mg/dL. 150-199 mg/dL: add 2 units, 200-249 mg/dL: add 4 units, 250-299 mg/dL: add 6 units, 300-349 mg/dL: add 8 units, 350-399 mg/dL: add 10 units, >400 mg/dL: add 12 units and call your doctor.  (Patient not taking: Reported on 6/28/2022)     insulin pen needle (31G X 5 MM) 31G X 5 MM miscellaneous Use 3 pen needles daily or as directed. Patient/RPH may decide gauge and length.     mycophenolic acid (GENERIC EQUIVALENT) 180 MG EC tablet Take 1 tablet (180 mg) by mouth 2 times daily Total dose=540mg BID     mycophenolic acid (GENERIC EQUIVALENT) 360 MG EC tablet Take 1 tablet (360 mg) by mouth 2 times daily Total dose=540mg BID     ondansetron (ZOFRAN) 4 MG tablet Take 1 tablet (4 mg) by mouth every 8 hours as needed for nausea     Prucalopride Succinate (MOTEGRITY) 2 MG TABS Take 1 tablet (2 mg) by mouth daily     sulfamethoxazole-trimethoprim (BACTRIM) 400-80 MG tablet Take 1 tablet by mouth daily     tacrolimus (ENVARSUS XR) 0.75 MG 24 hr tablet Take 2 tablets (1.5 mg) by mouth every morning (before breakfast) Total dose=13.5mg daily     tacrolimus (ENVARSUS XR) 1 MG 24 hr tablet Take 1 tablet (1 mg) by mouth every morning (before breakfast) Total dose=13mg daily     tacrolimus (ENVARSUS XR) 4 MG 24 hr tablet Take 3 tablets (12 mg) by mouth every morning (before breakfast) Total dose=13.5mg daily     Current Facility-Administered Medications   Medication     bevacizumab (AVASTIN) intravitreal inj 1.25 mg     bevacizumab (AVASTIN) intravitreal inj 1.25 mg     There are no discontinued medications.    Physical Exam   Vital Signs: /84   Pulse 90   Temp 98.1  F (36.7  C)   Wt 57 kg (125 lb 11.2 oz)   SpO2 99%   BMI 20.29 kg/m      GENERAL: Healthy, alert and no distress  EYES: Eyes grossly normal to inspection.  No discharge or erythema, or obvious scleral/conjunctival abnormalities.  RESP: No audible wheeze, cough, or visible cyanosis.  No visible retractions or increased work of breathing.    SKIN: Visible skin clear. No significant rash, abnormal pigmentation or lesions.  NEURO: Cranial nerves grossly intact.  Mentation and speech appropriate for age.  PSYCH: Mentation appears normal, affect  normal/bright, judgement and insight intact, normal speech and appearance well-groomed.    Data     Renal Latest Ref Rng & Units 7/25/2022 7/18/2022 7/14/2022   Na 133 - 144 mmol/L 138 140 136   K 3.4 - 5.3 mmol/L 4.6 4.6 4.5   Cl 94 - 109 mmol/L 110(H) 114(H) 108   CO2 20 - 32 mmol/L 22 20 21   BUN 7 - 30 mg/dL 36(H) 41(H) 48(H)   Cr 0.52 - 1.04 mg/dL 1.48(H) 1.67(H) 1.54(H)   Glucose 70 - 99 mg/dL 87 93 92   Ca  8.5 - 10.1 mg/dL 9.2 9.0 8.8   Mg 1.6 - 2.3 mg/dL - - -     Bone Health Latest Ref Rng & Units 7/11/2022 6/6/2022 5/16/2022   Phos 2.5 - 4.5 mg/dL 3.5 3.6 -   PTHi pg/mL - - -   Vit D Def 20 - 75 ug/L - - 45     Heme Latest Ref Rng & Units 7/25/2022 7/18/2022 7/14/2022   WBC 4.0 - 11.0 10e3/uL 3.5(L) 4.7 4.6   Hgb 11.7 - 15.7 g/dL 9.8(L) 9.0(L) 9.0(L)   Plt 150 - 450 10e3/uL 257 195 252   ABSOLUTE NEUTROPHIL 1.6 - 8.3 10e3/uL - - -   ABSOLUTE LYMPHOCYTES 0.8 - 5.3 10e3/uL - - -   ABSOLUTE MONOCYTES 0.0 - 1.3 10e3/uL - - -   ABSOLUTE EOSINOPHILS 0.0 - 0.7 10e3/uL - - -   ABSOLUTE BASOPHILS 0.0 - 0.2 10e9/L - - -     Liver Latest Ref Rng & Units 7/14/2022 5/23/2022 5/4/2022   AP 40 - 150 U/L - 84 70   TBili 0.2 - 1.3 mg/dL - 0.3 0.2   DBili 0.0 - 0.2 mg/dL - <0.1 <0.1   ALT 0 - 50 U/L - 20 18   AST 0 - 45 U/L - 13 19   Tot Protein 6.8 - 8.8 g/dL - 7.9 7.6   Albumin 3.4 - 5.0 g/dL 4.0 4.0 4.1     Pancreas Latest Ref Rng & Units 7/25/2022 7/18/2022 7/14/2022   A1C 0.0 - 5.6 % - - -   Amylase 30 - 110 U/L 56 48 47   Lipase 73 - 393 U/L 110 111 116     Iron studies Latest Ref Rng & Units 4/27/2022 4/18/2022 3/3/2022   Iron 35 - 180 ug/dL 56 106 139   Iron sat 15 - 46 % 20 35 35   Ferritin 12 - 150 ng/mL 110 120 106     [facility] Txp Virology Latest Ref Rng & Units 4/18/2022 1/28/2022 12/13/2021   CMV QUANT IU/ML Not Detected IU/mL Not Detected Not Detected Not Detected   EBV CAPSID ANTIBODY IGG No detectable antibody. - - -   EBV DNA COPIES/ML Not Detected copies/mL Not Detected - -        Recent Labs   Lab Test  07/14/22  0804 07/18/22  0837 07/25/22  0834   DOSTAC 7/13/2022 7/17/2022 7/24/2022   TACROL 8.0 8.2 10.0     Recent Labs   Lab Test 05/16/22  0904 05/31/22  0842 07/05/22  0835 07/11/22  0843 07/25/22  0834   DOSMPA 5/15/2022   8:00 PM  --   --   --  7/24/2022   8:00 PM   MPACID 3.48   < > 9.87* 4.74* 5.70*   MPAG 70.9   < > 95.0 47.0 71.0    < > = values in this interval not displayed.

## 2022-07-28 NOTE — PATIENT INSTRUCTIONS
We will discuss your case at the meetings next week Wednesday regarding belatacept/immunosuppression.     Continue your medications as prescribed and labs at the recommended interval.     Return to clinic in 1 month

## 2022-08-01 ENCOUNTER — LAB (OUTPATIENT)
Dept: LAB | Facility: CLINIC | Age: 37
End: 2022-08-01
Payer: COMMERCIAL

## 2022-08-01 DIAGNOSIS — Z94.83 PANCREAS REPLACED BY TRANSPLANT (H): ICD-10-CM

## 2022-08-01 DIAGNOSIS — Z79.899 ENCOUNTER FOR LONG-TERM CURRENT USE OF MEDICATION: ICD-10-CM

## 2022-08-01 DIAGNOSIS — Z94.0 KIDNEY REPLACED BY TRANSPLANT: ICD-10-CM

## 2022-08-01 DIAGNOSIS — Z94.83 PANCREAS TRANSPLANTED (H): ICD-10-CM

## 2022-08-01 DIAGNOSIS — Z48.298 AFTERCARE FOLLOWING ORGAN TRANSPLANT: ICD-10-CM

## 2022-08-01 LAB
AMYLASE SERPL-CCNC: 50 U/L (ref 30–110)
ANION GAP SERPL CALCULATED.3IONS-SCNC: 5 MMOL/L (ref 3–14)
BUN SERPL-MCNC: 42 MG/DL (ref 7–30)
CALCIUM SERPL-MCNC: 9.2 MG/DL (ref 8.5–10.1)
CHLORIDE BLD-SCNC: 108 MMOL/L (ref 94–109)
CO2 SERPL-SCNC: 23 MMOL/L (ref 20–32)
CREAT SERPL-MCNC: 1.38 MG/DL (ref 0.52–1.04)
ERYTHROCYTE [DISTWIDTH] IN BLOOD BY AUTOMATED COUNT: 12.6 % (ref 10–15)
GFR SERPL CREATININE-BSD FRML MDRD: 50 ML/MIN/1.73M2
GLUCOSE BLD-MCNC: 83 MG/DL (ref 70–99)
HCT VFR BLD AUTO: 31.4 % (ref 35–47)
HGB BLD-MCNC: 9.9 G/DL (ref 11.7–15.7)
LIPASE SERPL-CCNC: 115 U/L (ref 73–393)
MCH RBC QN AUTO: 28.9 PG (ref 26.5–33)
MCHC RBC AUTO-ENTMCNC: 31.5 G/DL (ref 31.5–36.5)
MCV RBC AUTO: 92 FL (ref 78–100)
PLATELET # BLD AUTO: 238 10E3/UL (ref 150–450)
POTASSIUM BLD-SCNC: 4.2 MMOL/L (ref 3.4–5.3)
RBC # BLD AUTO: 3.43 10E6/UL (ref 3.8–5.2)
SODIUM SERPL-SCNC: 136 MMOL/L (ref 133–144)
TACROLIMUS BLD-MCNC: 7.6 UG/L (ref 5–15)
TME LAST DOSE: NORMAL H
TME LAST DOSE: NORMAL H
WBC # BLD AUTO: 3.6 10E3/UL (ref 4–11)

## 2022-08-01 PROCEDURE — 36415 COLL VENOUS BLD VENIPUNCTURE: CPT

## 2022-08-01 PROCEDURE — 82150 ASSAY OF AMYLASE: CPT

## 2022-08-01 PROCEDURE — 83690 ASSAY OF LIPASE: CPT

## 2022-08-01 PROCEDURE — 80197 ASSAY OF TACROLIMUS: CPT

## 2022-08-01 PROCEDURE — 85027 COMPLETE CBC AUTOMATED: CPT

## 2022-08-01 PROCEDURE — 87799 DETECT AGENT NOS DNA QUANT: CPT

## 2022-08-01 PROCEDURE — 80048 BASIC METABOLIC PNL TOTAL CA: CPT

## 2022-08-02 ENCOUNTER — TELEPHONE (OUTPATIENT)
Dept: TRANSPLANT | Facility: CLINIC | Age: 37
End: 2022-08-02

## 2022-08-02 DIAGNOSIS — Z94.0 KIDNEY TRANSPLANTED: Primary | ICD-10-CM

## 2022-08-02 LAB
BKV DNA # SPEC NAA+PROBE: <500 COPIES/ML
BKV DNA SPEC NAA+PROBE-LOG#: <2.7 {LOG_COPIES}/ML

## 2022-08-02 NOTE — TELEPHONE ENCOUNTER
Tacrolimus  Level  7.6  Slightly below goal level -8       PLAN:   Please call patient and confirm this was an accurate 24-hour trough. Verify Tacrolimus XR dose 13.5  mg daily. Confirm no new medications or illness. Confirm no missed doses. If accurate trough and accurate dose, increase Tacrolimus XR dose to 14 mg daily and repeat labs in  7 to 14 days

## 2022-08-02 NOTE — TELEPHONE ENCOUNTER
Spoke to patient who confirms this was an accurate 24-hour trough. Verified Tacrolimus XR dose 13.5  mg daily. Confirmed no new medications or illness. Confirmed no missed doses. Patient prefers to repeat labs on Thursday of this week before increasing Tacrolimus XR dose.  Lab orders updated.

## 2022-08-03 ENCOUNTER — TELEPHONE (OUTPATIENT)
Dept: NEPHROLOGY | Facility: CLINIC | Age: 37
End: 2022-08-03

## 2022-08-03 NOTE — TELEPHONE ENCOUNTER
lvm for patient to call us back to schedule a 1 month f/u appointment with any post transplant provider

## 2022-08-04 ENCOUNTER — LAB (OUTPATIENT)
Dept: LAB | Facility: CLINIC | Age: 37
End: 2022-08-04
Payer: COMMERCIAL

## 2022-08-04 DIAGNOSIS — Z94.0 KIDNEY TRANSPLANTED: ICD-10-CM

## 2022-08-04 LAB
AMYLASE SERPL-CCNC: 49 U/L (ref 30–110)
ANION GAP SERPL CALCULATED.3IONS-SCNC: 7 MMOL/L (ref 3–14)
BUN SERPL-MCNC: 44 MG/DL (ref 7–30)
CALCIUM SERPL-MCNC: 9.2 MG/DL (ref 8.5–10.1)
CHLORIDE BLD-SCNC: 112 MMOL/L (ref 94–109)
CO2 SERPL-SCNC: 23 MMOL/L (ref 20–32)
CREAT SERPL-MCNC: 1.49 MG/DL (ref 0.52–1.04)
ERYTHROCYTE [DISTWIDTH] IN BLOOD BY AUTOMATED COUNT: 12.5 % (ref 10–15)
GFR SERPL CREATININE-BSD FRML MDRD: 46 ML/MIN/1.73M2
GLUCOSE BLD-MCNC: 100 MG/DL (ref 70–99)
HCT VFR BLD AUTO: 28 % (ref 35–47)
HGB BLD-MCNC: 9 G/DL (ref 11.7–15.7)
LIPASE SERPL-CCNC: 125 U/L (ref 73–393)
MCH RBC QN AUTO: 29 PG (ref 26.5–33)
MCHC RBC AUTO-ENTMCNC: 32.1 G/DL (ref 31.5–36.5)
MCV RBC AUTO: 90 FL (ref 78–100)
PLATELET # BLD AUTO: 211 10E3/UL (ref 150–450)
POTASSIUM BLD-SCNC: 4.4 MMOL/L (ref 3.4–5.3)
RBC # BLD AUTO: 3.1 10E6/UL (ref 3.8–5.2)
SODIUM SERPL-SCNC: 142 MMOL/L (ref 133–144)
TACROLIMUS BLD-MCNC: 8.1 UG/L (ref 5–15)
TME LAST DOSE: NORMAL H
TME LAST DOSE: NORMAL H
WBC # BLD AUTO: 5 10E3/UL (ref 4–11)

## 2022-08-04 PROCEDURE — 83690 ASSAY OF LIPASE: CPT

## 2022-08-04 PROCEDURE — 82150 ASSAY OF AMYLASE: CPT

## 2022-08-04 PROCEDURE — 36415 COLL VENOUS BLD VENIPUNCTURE: CPT

## 2022-08-04 PROCEDURE — 85027 COMPLETE CBC AUTOMATED: CPT

## 2022-08-04 PROCEDURE — 80048 BASIC METABOLIC PNL TOTAL CA: CPT

## 2022-08-04 PROCEDURE — 80197 ASSAY OF TACROLIMUS: CPT

## 2022-08-08 ENCOUNTER — LAB (OUTPATIENT)
Dept: LAB | Facility: CLINIC | Age: 37
End: 2022-08-08
Payer: COMMERCIAL

## 2022-08-08 DIAGNOSIS — Z94.0 KIDNEY REPLACED BY TRANSPLANT: ICD-10-CM

## 2022-08-08 DIAGNOSIS — Z94.83 PANCREAS REPLACED BY TRANSPLANT (H): ICD-10-CM

## 2022-08-08 DIAGNOSIS — Z48.298 AFTERCARE FOLLOWING ORGAN TRANSPLANT: ICD-10-CM

## 2022-08-08 DIAGNOSIS — Z79.899 ENCOUNTER FOR LONG-TERM CURRENT USE OF MEDICATION: ICD-10-CM

## 2022-08-08 LAB
AMYLASE SERPL-CCNC: 61 U/L (ref 30–110)
ANION GAP SERPL CALCULATED.3IONS-SCNC: 6 MMOL/L (ref 3–14)
BUN SERPL-MCNC: 37 MG/DL (ref 7–30)
CALCIUM SERPL-MCNC: 9.3 MG/DL (ref 8.5–10.1)
CHLORIDE BLD-SCNC: 110 MMOL/L (ref 94–109)
CO2 SERPL-SCNC: 22 MMOL/L (ref 20–32)
CREAT SERPL-MCNC: 1.37 MG/DL (ref 0.52–1.04)
ERYTHROCYTE [DISTWIDTH] IN BLOOD BY AUTOMATED COUNT: 12.4 % (ref 10–15)
GFR SERPL CREATININE-BSD FRML MDRD: 51 ML/MIN/1.73M2
GLUCOSE BLD-MCNC: 92 MG/DL (ref 70–99)
HCT VFR BLD AUTO: 30.1 % (ref 35–47)
HGB BLD-MCNC: 9.6 G/DL (ref 11.7–15.7)
LIPASE SERPL-CCNC: 126 U/L (ref 73–393)
MAGNESIUM SERPL-MCNC: 1.8 MG/DL (ref 1.6–2.3)
MCH RBC QN AUTO: 29.1 PG (ref 26.5–33)
MCHC RBC AUTO-ENTMCNC: 31.9 G/DL (ref 31.5–36.5)
MCV RBC AUTO: 91 FL (ref 78–100)
PHOSPHATE SERPL-MCNC: 3.8 MG/DL (ref 2.5–4.5)
PLATELET # BLD AUTO: 227 10E3/UL (ref 150–450)
POTASSIUM BLD-SCNC: 4.4 MMOL/L (ref 3.4–5.3)
RBC # BLD AUTO: 3.3 10E6/UL (ref 3.8–5.2)
SODIUM SERPL-SCNC: 138 MMOL/L (ref 133–144)
TACROLIMUS BLD-MCNC: 7.7 UG/L (ref 5–15)
TME LAST DOSE: NORMAL H
TME LAST DOSE: NORMAL H
WBC # BLD AUTO: 6 10E3/UL (ref 4–11)

## 2022-08-08 PROCEDURE — 80048 BASIC METABOLIC PNL TOTAL CA: CPT

## 2022-08-08 PROCEDURE — 83735 ASSAY OF MAGNESIUM: CPT

## 2022-08-08 PROCEDURE — 85027 COMPLETE CBC AUTOMATED: CPT

## 2022-08-08 PROCEDURE — 36415 COLL VENOUS BLD VENIPUNCTURE: CPT

## 2022-08-08 PROCEDURE — 83690 ASSAY OF LIPASE: CPT

## 2022-08-08 PROCEDURE — 80197 ASSAY OF TACROLIMUS: CPT

## 2022-08-08 PROCEDURE — 84100 ASSAY OF PHOSPHORUS: CPT

## 2022-08-08 PROCEDURE — 82150 ASSAY OF AMYLASE: CPT

## 2022-08-09 DIAGNOSIS — Z94.0 KIDNEY TRANSPLANTED: ICD-10-CM

## 2022-08-09 DIAGNOSIS — Z94.83 PANCREAS REPLACED BY TRANSPLANT (H): Primary | ICD-10-CM

## 2022-08-10 ENCOUNTER — TEAM CONFERENCE (OUTPATIENT)
Dept: TRANSPLANT | Facility: CLINIC | Age: 37
End: 2022-08-10

## 2022-08-10 DIAGNOSIS — Z94.0 KIDNEY TRANSPLANTED: ICD-10-CM

## 2022-08-10 DIAGNOSIS — Z94.83 PANCREAS REPLACED BY TRANSPLANT (H): Primary | ICD-10-CM

## 2022-08-10 NOTE — TELEPHONE ENCOUNTER
Post Kidney and Pancreas Transplant Team Conference  Date: 8/10/2022  Transplant Coordinator: Yessica Becker     Attendees:  []  Dr. Pack [] Brandi Hernandez, RN [] Tamiko Cárdenas LPN     []  Dr. Motta [] Lizette Park RN [] Radhika Arnett LPN   []  Dr. Lee [] Yessica Becker RN    []  Dr. Corona [] Tasha Cartwright RN [] Bulmaro Weiner, PharmD   [] Dr. El [] Hamida Guevara RN    [] Dr. Chavarria [] Roderick Messer RN    [] Dr. Ocampo [] Lynette De RN [] Nena Wheeler RN   [] Dr. De León [] Naz Glaser RN    []  Dr. Aranda [] Radha Hurt RN    [] Dr. Wright [] Ashely Tse RN    [] Sally Brito NP [] Jovana Lemos RN        Verbal Plan Read Back:   CRP Sed Rate  CK if body aches    Routed to RN Coordinator   Radhika Arnett LPN    Orders entered for CK and CRP. To be checked w labs next week. Patient v/u of instructions.

## 2022-08-10 NOTE — PROGRESS NOTES
Called [facility] to ensure patient had appointment on 7/28/2022. Requested records be faxed back to [phone]. They are sending over report.    Margareth Teixeira LPN

## 2022-08-11 NOTE — TELEPHONE ENCOUNTER
ISSUE:   Tacrolimus XR level 7.7 on 8/8, goal 8-10, dose 13.5 mg daily.    PLAN:   Please call patient and confirm this was an accurate 24-hour trough. Verify Tacrolimus XR dose 13.5 mg daily. Confirm no new medications or illness. Confirm no missed doses. If accurate trough and accurate dose, increase Tacrolimus XR dose to 14 mg daily and repeat labs in 1 weeks.    OUTCOME:   Spoke with patient, they confirm accurate trough level and current dose 13.5 mg daily. Patient confirmed dose change to 14 mg daily and to repeat labs in 1 weeks. Orders sent to [facility] pharmacy for dose change and lab for repeat labs. Patient voiced understanding of plan.

## 2022-08-15 ENCOUNTER — LAB (OUTPATIENT)
Dept: LAB | Facility: CLINIC | Age: 37
End: 2022-08-15
Payer: COMMERCIAL

## 2022-08-15 DIAGNOSIS — Z94.0 KIDNEY TRANSPLANTED: ICD-10-CM

## 2022-08-15 DIAGNOSIS — Z48.298 AFTERCARE FOLLOWING ORGAN TRANSPLANT: ICD-10-CM

## 2022-08-15 DIAGNOSIS — Z79.899 ENCOUNTER FOR LONG-TERM CURRENT USE OF MEDICATION: ICD-10-CM

## 2022-08-15 DIAGNOSIS — Z94.0 KIDNEY REPLACED BY TRANSPLANT: ICD-10-CM

## 2022-08-15 DIAGNOSIS — Z94.83 PANCREAS REPLACED BY TRANSPLANT (H): ICD-10-CM

## 2022-08-15 LAB
AMYLASE SERPL-CCNC: 58 U/L (ref 30–110)
ANION GAP SERPL CALCULATED.3IONS-SCNC: 6 MMOL/L (ref 3–14)
BUN SERPL-MCNC: 46 MG/DL (ref 7–30)
CALCIUM SERPL-MCNC: 9.5 MG/DL (ref 8.5–10.1)
CHLORIDE BLD-SCNC: 115 MMOL/L (ref 94–109)
CK SERPL-CCNC: 126 U/L (ref 30–225)
CO2 SERPL-SCNC: 18 MMOL/L (ref 20–32)
CREAT SERPL-MCNC: 1.63 MG/DL (ref 0.52–1.04)
CRP SERPL-MCNC: <2.9 MG/L (ref 0–8)
ERYTHROCYTE [DISTWIDTH] IN BLOOD BY AUTOMATED COUNT: 12.3 % (ref 10–15)
GFR SERPL CREATININE-BSD FRML MDRD: 41 ML/MIN/1.73M2
GLUCOSE BLD-MCNC: 93 MG/DL (ref 70–99)
HCT VFR BLD AUTO: 32.6 % (ref 35–47)
HGB BLD-MCNC: 10.4 G/DL (ref 11.7–15.7)
LIPASE SERPL-CCNC: 137 U/L (ref 73–393)
MCH RBC QN AUTO: 29.4 PG (ref 26.5–33)
MCHC RBC AUTO-ENTMCNC: 31.9 G/DL (ref 31.5–36.5)
MCV RBC AUTO: 92 FL (ref 78–100)
PLATELET # BLD AUTO: 239 10E3/UL (ref 150–450)
POTASSIUM BLD-SCNC: 4.7 MMOL/L (ref 3.4–5.3)
RBC # BLD AUTO: 3.54 10E6/UL (ref 3.8–5.2)
SODIUM SERPL-SCNC: 139 MMOL/L (ref 133–144)
TACROLIMUS BLD-MCNC: 9.5 UG/L (ref 5–15)
TME LAST DOSE: NORMAL H
TME LAST DOSE: NORMAL H
WBC # BLD AUTO: 6.2 10E3/UL (ref 4–11)

## 2022-08-15 PROCEDURE — 83690 ASSAY OF LIPASE: CPT

## 2022-08-15 PROCEDURE — 36415 COLL VENOUS BLD VENIPUNCTURE: CPT

## 2022-08-15 PROCEDURE — 86140 C-REACTIVE PROTEIN: CPT

## 2022-08-15 PROCEDURE — 80048 BASIC METABOLIC PNL TOTAL CA: CPT

## 2022-08-15 PROCEDURE — 82150 ASSAY OF AMYLASE: CPT

## 2022-08-15 PROCEDURE — 82550 ASSAY OF CK (CPK): CPT

## 2022-08-15 PROCEDURE — 85027 COMPLETE CBC AUTOMATED: CPT

## 2022-08-15 PROCEDURE — 80197 ASSAY OF TACROLIMUS: CPT

## 2022-08-17 ENCOUNTER — TEAM CONFERENCE (OUTPATIENT)
Dept: TRANSPLANT | Facility: CLINIC | Age: 37
End: 2022-08-17

## 2022-08-17 DIAGNOSIS — Z94.83 PANCREAS REPLACED BY TRANSPLANT (H): ICD-10-CM

## 2022-08-17 DIAGNOSIS — Z94.83 PANCREAS TRANSPLANTED (H): ICD-10-CM

## 2022-08-17 DIAGNOSIS — Z94.0 KIDNEY REPLACED BY TRANSPLANT: ICD-10-CM

## 2022-08-17 NOTE — TELEPHONE ENCOUNTER
Post Kidney and Pancreas Transplant Team Conference  Date: 8/17/2022  Transplant Coordinator: Yessica Becker     Attendees:  [x]  Dr. Pack [] Brandi Hernandez, RN [] Tamiko Cárdenas LPN     []  Dr. Motta [] Lizette Park RN [] Radhika Arnett LPN   [x]  Dr. Lee [] Yessica Becker RN    []  Dr. Corona [] Tasha Cartwright RN [] Bulmaro Weiner, PharmD   [] Dr. El [] Hamiad Guevara RN    [] Dr. Chavarria [] Roderick Messer RN    [] Dr. Ocampo [] Lynette De RN [] Nena Wheeler RN   [] Dr. De León [] Naz Glaser RN    []  Dr. Aranda [] Radha Hurt RN    [] Dr. Wright [] Ashely Tse RN    [] Sally Brito NP [] Jovana Lemos RN        Verbal Plan Read Back:   Tacrolimus goal 8  Counseling    Routed to RN Coordinator   Radhika Arnett LPN    Patient notified OK to decrease tac for goal closer to 8.   Decreased Envarsus dose from 14mg daily, to 13.5mg daily. Recheck labs on Monday.  Had long discussion again with patient about how she is feeling. Expectations with other medications, all medications have side effects. Will re-discuss w team next week once we recheck creatinine, if back at her baseline. Patient v/u of plan.

## 2022-08-18 RX ORDER — TACROLIMUS 1 MG/1
1 TABLET, EXTENDED RELEASE ORAL
Qty: 30 TABLET | Refills: 11 | Status: SHIPPED | OUTPATIENT
Start: 2022-08-18 | End: 2022-09-07

## 2022-08-22 ENCOUNTER — LAB (OUTPATIENT)
Dept: LAB | Facility: CLINIC | Age: 37
End: 2022-08-22
Payer: COMMERCIAL

## 2022-08-22 DIAGNOSIS — Z94.83 PANCREAS REPLACED BY TRANSPLANT (H): ICD-10-CM

## 2022-08-22 DIAGNOSIS — Z79.899 ENCOUNTER FOR LONG-TERM CURRENT USE OF MEDICATION: ICD-10-CM

## 2022-08-22 DIAGNOSIS — Z94.0 KIDNEY REPLACED BY TRANSPLANT: ICD-10-CM

## 2022-08-22 DIAGNOSIS — Z94.83 PANCREAS TRANSPLANTED (H): ICD-10-CM

## 2022-08-22 DIAGNOSIS — Z48.298 AFTERCARE FOLLOWING ORGAN TRANSPLANT: ICD-10-CM

## 2022-08-22 LAB
AMYLASE SERPL-CCNC: 59 U/L (ref 30–110)
ANION GAP SERPL CALCULATED.3IONS-SCNC: 6 MMOL/L (ref 3–14)
BUN SERPL-MCNC: 34 MG/DL (ref 7–30)
CALCIUM SERPL-MCNC: 9.5 MG/DL (ref 8.5–10.1)
CHLORIDE BLD-SCNC: 108 MMOL/L (ref 94–109)
CO2 SERPL-SCNC: 23 MMOL/L (ref 20–32)
CREAT SERPL-MCNC: 1.3 MG/DL (ref 0.52–1.04)
ERYTHROCYTE [DISTWIDTH] IN BLOOD BY AUTOMATED COUNT: 12.5 % (ref 10–15)
GFR SERPL CREATININE-BSD FRML MDRD: 54 ML/MIN/1.73M2
GLUCOSE BLD-MCNC: 101 MG/DL (ref 70–99)
HCT VFR BLD AUTO: 31.8 % (ref 35–47)
HGB BLD-MCNC: 10.2 G/DL (ref 11.7–15.7)
LIPASE SERPL-CCNC: 136 U/L (ref 73–393)
MCH RBC QN AUTO: 28.7 PG (ref 26.5–33)
MCHC RBC AUTO-ENTMCNC: 32.1 G/DL (ref 31.5–36.5)
MCV RBC AUTO: 89 FL (ref 78–100)
PLATELET # BLD AUTO: 291 10E3/UL (ref 150–450)
POTASSIUM BLD-SCNC: 4.2 MMOL/L (ref 3.4–5.3)
RBC # BLD AUTO: 3.56 10E6/UL (ref 3.8–5.2)
SODIUM SERPL-SCNC: 137 MMOL/L (ref 133–144)
TACROLIMUS BLD-MCNC: 8.6 UG/L (ref 5–15)
TME LAST DOSE: NORMAL H
TME LAST DOSE: NORMAL H
WBC # BLD AUTO: 4.6 10E3/UL (ref 4–11)

## 2022-08-22 PROCEDURE — 80180 DRUG SCRN QUAN MYCOPHENOLATE: CPT

## 2022-08-22 PROCEDURE — 82150 ASSAY OF AMYLASE: CPT

## 2022-08-22 PROCEDURE — 80197 ASSAY OF TACROLIMUS: CPT

## 2022-08-22 PROCEDURE — 87799 DETECT AGENT NOS DNA QUANT: CPT

## 2022-08-22 PROCEDURE — 80048 BASIC METABOLIC PNL TOTAL CA: CPT

## 2022-08-22 PROCEDURE — 36415 COLL VENOUS BLD VENIPUNCTURE: CPT

## 2022-08-22 PROCEDURE — 85027 COMPLETE CBC AUTOMATED: CPT

## 2022-08-22 PROCEDURE — 83690 ASSAY OF LIPASE: CPT

## 2022-08-23 LAB
BKV DNA # SPEC NAA+PROBE: <500 COPIES/ML
BKV DNA SPEC NAA+PROBE-LOG#: <2.7 {LOG_COPIES}/ML
MYCOPHENOLATE SERPL LC/MS/MS-MCNC: 4.73 MG/L (ref 1–3.5)
MYCOPHENOLATE-G SERPL LC/MS/MS-MCNC: 48.9 MG/L (ref 30–95)
TME LAST DOSE: ABNORMAL H
TME LAST DOSE: ABNORMAL H

## 2022-08-24 ENCOUNTER — TELEPHONE (OUTPATIENT)
Dept: TRANSPLANT | Facility: CLINIC | Age: 37
End: 2022-08-24

## 2022-08-24 DIAGNOSIS — Z94.0 KIDNEY REPLACED BY TRANSPLANT: ICD-10-CM

## 2022-08-24 DIAGNOSIS — Z94.83 PANCREAS REPLACED BY TRANSPLANT (H): ICD-10-CM

## 2022-08-24 RX ORDER — MYCOPHENOLIC ACID 360 MG/1
360 TABLET, DELAYED RELEASE ORAL 2 TIMES DAILY
Qty: 60 TABLET | Refills: 11 | Status: SHIPPED | OUTPATIENT
Start: 2022-08-24 | End: 2023-09-26

## 2022-08-24 NOTE — TELEPHONE ENCOUNTER
ISSUE:  MPA level = 4.73    PLAN:  Decrease MPA to 540mg BID    ----- Message from Kamran Corona MD sent at 8/23/2022  3:55 PM CDT -----  If she is taking MPA 540mg bid ok to drop to 360mg bid for high level    OUTCOME:  Patient notified to decrease MPA dose to 360mg BID.

## 2022-08-29 ENCOUNTER — LAB (OUTPATIENT)
Dept: LAB | Facility: CLINIC | Age: 37
End: 2022-08-29
Payer: COMMERCIAL

## 2022-08-29 ENCOUNTER — TELEPHONE (OUTPATIENT)
Dept: TRANSPLANT | Facility: CLINIC | Age: 37
End: 2022-08-29

## 2022-08-29 DIAGNOSIS — Z48.298 AFTERCARE FOLLOWING ORGAN TRANSPLANT: ICD-10-CM

## 2022-08-29 DIAGNOSIS — Z94.0 KIDNEY REPLACED BY TRANSPLANT: Primary | ICD-10-CM

## 2022-08-29 DIAGNOSIS — Z79.899 ENCOUNTER FOR LONG-TERM CURRENT USE OF MEDICATION: ICD-10-CM

## 2022-08-29 DIAGNOSIS — Z94.83 PANCREAS REPLACED BY TRANSPLANT (H): ICD-10-CM

## 2022-08-29 DIAGNOSIS — Z94.0 KIDNEY REPLACED BY TRANSPLANT: ICD-10-CM

## 2022-08-29 LAB
AMYLASE SERPL-CCNC: 54 U/L (ref 30–110)
ANION GAP SERPL CALCULATED.3IONS-SCNC: 6 MMOL/L (ref 3–14)
BUN SERPL-MCNC: 42 MG/DL (ref 7–30)
CALCIUM SERPL-MCNC: 9.1 MG/DL (ref 8.5–10.1)
CHLORIDE BLD-SCNC: 108 MMOL/L (ref 94–109)
CO2 SERPL-SCNC: 23 MMOL/L (ref 20–32)
CREAT SERPL-MCNC: 1.53 MG/DL (ref 0.52–1.04)
ERYTHROCYTE [DISTWIDTH] IN BLOOD BY AUTOMATED COUNT: 12.1 % (ref 10–15)
GFR SERPL CREATININE-BSD FRML MDRD: 44 ML/MIN/1.73M2
GLUCOSE BLD-MCNC: 97 MG/DL (ref 70–99)
HCT VFR BLD AUTO: 29.8 % (ref 35–47)
HGB BLD-MCNC: 9.4 G/DL (ref 11.7–15.7)
LIPASE SERPL-CCNC: 134 U/L (ref 73–393)
MCH RBC QN AUTO: 28.8 PG (ref 26.5–33)
MCHC RBC AUTO-ENTMCNC: 31.5 G/DL (ref 31.5–36.5)
MCV RBC AUTO: 91 FL (ref 78–100)
PLATELET # BLD AUTO: 223 10E3/UL (ref 150–450)
POTASSIUM BLD-SCNC: 4.4 MMOL/L (ref 3.4–5.3)
RBC # BLD AUTO: 3.26 10E6/UL (ref 3.8–5.2)
SODIUM SERPL-SCNC: 137 MMOL/L (ref 133–144)
WBC # BLD AUTO: 3.1 10E3/UL (ref 4–11)

## 2022-08-29 PROCEDURE — 82150 ASSAY OF AMYLASE: CPT

## 2022-08-29 PROCEDURE — 36415 COLL VENOUS BLD VENIPUNCTURE: CPT

## 2022-08-29 PROCEDURE — 80048 BASIC METABOLIC PNL TOTAL CA: CPT

## 2022-08-29 PROCEDURE — 83690 ASSAY OF LIPASE: CPT

## 2022-08-29 PROCEDURE — 85027 COMPLETE CBC AUTOMATED: CPT

## 2022-08-29 NOTE — TELEPHONE ENCOUNTER
Pt stated she does not have any more tacro orders in Epic  please check lab orders and re new her orders  Did labs this Am  And said no more Tacro levels  there

## 2022-08-30 ENCOUNTER — TELEPHONE (OUTPATIENT)
Dept: TRANSPLANT | Facility: CLINIC | Age: 37
End: 2022-08-30

## 2022-08-30 DIAGNOSIS — Z94.0 KIDNEY REPLACED BY TRANSPLANT: Primary | ICD-10-CM

## 2022-08-30 DIAGNOSIS — Z94.83 PANCREAS REPLACED BY TRANSPLANT (H): ICD-10-CM

## 2022-08-30 NOTE — TELEPHONE ENCOUNTER
Left voicemail for patient to repeat tacro level tomorrow. Was not able to be added on to labs from yesterday. Orders entered.

## 2022-08-30 NOTE — TELEPHONE ENCOUNTER
Patient called regarding her tacrolimus lab draw and would like a return call. Tacrolimus needs to be collected patient thought it was taken.

## 2022-08-31 ENCOUNTER — LAB (OUTPATIENT)
Dept: LAB | Facility: CLINIC | Age: 37
End: 2022-08-31
Payer: COMMERCIAL

## 2022-08-31 ENCOUNTER — OFFICE VISIT (OUTPATIENT)
Dept: NEPHROLOGY | Facility: CLINIC | Age: 37
End: 2022-08-31
Attending: INTERNAL MEDICINE
Payer: COMMERCIAL

## 2022-08-31 VITALS
SYSTOLIC BLOOD PRESSURE: 138 MMHG | DIASTOLIC BLOOD PRESSURE: 64 MMHG | OXYGEN SATURATION: 100 % | HEART RATE: 83 BPM | HEIGHT: 66 IN | WEIGHT: 129.2 LBS | BODY MASS INDEX: 20.76 KG/M2

## 2022-08-31 DIAGNOSIS — I15.1 HTN, KIDNEY TRANSPLANT RELATED: ICD-10-CM

## 2022-08-31 DIAGNOSIS — Z94.0 KIDNEY TRANSPLANTED: ICD-10-CM

## 2022-08-31 DIAGNOSIS — Z94.0 KIDNEY REPLACED BY TRANSPLANT: Primary | ICD-10-CM

## 2022-08-31 DIAGNOSIS — Z48.298 AFTERCARE FOLLOWING ORGAN TRANSPLANT: ICD-10-CM

## 2022-08-31 DIAGNOSIS — Z79.899 ENCOUNTER FOR LONG-TERM CURRENT USE OF MEDICATION: ICD-10-CM

## 2022-08-31 DIAGNOSIS — Z94.0 HTN, KIDNEY TRANSPLANT RELATED: ICD-10-CM

## 2022-08-31 DIAGNOSIS — Z94.83 PANCREAS REPLACED BY TRANSPLANT (H): Primary | ICD-10-CM

## 2022-08-31 DIAGNOSIS — D84.9 IMMUNOSUPPRESSION (H): ICD-10-CM

## 2022-08-31 DIAGNOSIS — Z94.0 KIDNEY REPLACED BY TRANSPLANT: ICD-10-CM

## 2022-08-31 DIAGNOSIS — Z94.83 PANCREAS REPLACED BY TRANSPLANT (H): ICD-10-CM

## 2022-08-31 LAB
AMYLASE SERPL-CCNC: 48 U/L (ref 28–100)
ANION GAP SERPL CALCULATED.3IONS-SCNC: 12 MMOL/L (ref 7–15)
BUN SERPL-MCNC: 38.3 MG/DL (ref 6–20)
CALCIUM SERPL-MCNC: 9.5 MG/DL (ref 8.6–10)
CHLORIDE SERPL-SCNC: 105 MMOL/L (ref 98–107)
CREAT SERPL-MCNC: 1.63 MG/DL (ref 0.51–0.95)
DEPRECATED HCO3 PLAS-SCNC: 21 MMOL/L (ref 22–29)
ERYTHROCYTE [DISTWIDTH] IN BLOOD BY AUTOMATED COUNT: 12.4 % (ref 10–15)
GFR SERPL CREATININE-BSD FRML MDRD: 41 ML/MIN/1.73M2
GLUCOSE SERPL-MCNC: 90 MG/DL (ref 70–99)
HCT VFR BLD AUTO: 25.9 % (ref 35–47)
HGB BLD-MCNC: 8.3 G/DL (ref 11.7–15.7)
LIPASE SERPL-CCNC: 27 U/L (ref 13–60)
MAGNESIUM SERPL-MCNC: 1.6 MG/DL (ref 1.7–2.3)
MCH RBC QN AUTO: 28.9 PG (ref 26.5–33)
MCHC RBC AUTO-ENTMCNC: 32 G/DL (ref 31.5–36.5)
MCV RBC AUTO: 90 FL (ref 78–100)
PHOSPHATE SERPL-MCNC: 3.2 MG/DL (ref 2.5–4.5)
PLATELET # BLD AUTO: 206 10E3/UL (ref 150–450)
POTASSIUM SERPL-SCNC: 4.6 MMOL/L (ref 3.4–5.3)
RBC # BLD AUTO: 2.87 10E6/UL (ref 3.8–5.2)
SODIUM SERPL-SCNC: 138 MMOL/L (ref 136–145)
TACROLIMUS BLD-MCNC: 7.5 UG/L (ref 5–15)
TACROLIMUS BLD-MCNC: 7.5 UG/L (ref 5–15)
TME LAST DOSE: NORMAL H
WBC # BLD AUTO: 3.3 10E3/UL (ref 4–11)

## 2022-08-31 PROCEDURE — 80197 ASSAY OF TACROLIMUS: CPT | Performed by: INTERNAL MEDICINE

## 2022-08-31 PROCEDURE — 82150 ASSAY OF AMYLASE: CPT | Performed by: PATHOLOGY

## 2022-08-31 PROCEDURE — 99000 SPECIMEN HANDLING OFFICE-LAB: CPT | Performed by: PATHOLOGY

## 2022-08-31 PROCEDURE — 99417 PROLNG OP E/M EACH 15 MIN: CPT | Performed by: INTERNAL MEDICINE

## 2022-08-31 PROCEDURE — 83690 ASSAY OF LIPASE: CPT | Performed by: PATHOLOGY

## 2022-08-31 PROCEDURE — 80180 DRUG SCRN QUAN MYCOPHENOLATE: CPT | Mod: 90 | Performed by: PATHOLOGY

## 2022-08-31 PROCEDURE — 80197 ASSAY OF TACROLIMUS: CPT | Mod: 90 | Performed by: PATHOLOGY

## 2022-08-31 PROCEDURE — 84100 ASSAY OF PHOSPHORUS: CPT | Performed by: PATHOLOGY

## 2022-08-31 PROCEDURE — G0463 HOSPITAL OUTPT CLINIC VISIT: HCPCS

## 2022-08-31 PROCEDURE — 99215 OFFICE O/P EST HI 40 MIN: CPT | Mod: 24 | Performed by: INTERNAL MEDICINE

## 2022-08-31 PROCEDURE — 83735 ASSAY OF MAGNESIUM: CPT | Performed by: PATHOLOGY

## 2022-08-31 PROCEDURE — 87799 DETECT AGENT NOS DNA QUANT: CPT | Mod: 90 | Performed by: PATHOLOGY

## 2022-08-31 PROCEDURE — 80048 BASIC METABOLIC PNL TOTAL CA: CPT | Performed by: PATHOLOGY

## 2022-08-31 PROCEDURE — 85027 COMPLETE CBC AUTOMATED: CPT | Performed by: PATHOLOGY

## 2022-08-31 ASSESSMENT — PAIN SCALES - GENERAL: PAINLEVEL: NO PAIN (0)

## 2022-08-31 NOTE — NURSING NOTE
"Chief Complaint   Patient presents with     RECHECK     Acute kidney TX   ./64   Pulse 83   Ht 1.676 m (5' 6\")   Wt 58.6 kg (129 lb 3.2 oz)   SpO2 100%   BMI 20.85 kg/m        Francisco Beltrán MA    "

## 2022-08-31 NOTE — LETTER
Date:September 7, 2022      Patient was self referred, no letter generated. Do not send.        [facility] Information

## 2022-08-31 NOTE — PROGRESS NOTES
TRANSPLANT NEPHROLOGY EARLY POST TRANSPLANT VISIT   9 month visit    Assessment & Plan   # DDKT (SPK): Stable significant variability 1-1.3 and occasional uptrend to 1.4-1.5, peak 1.6-1.7 around the time she had borderline rejection , significant variability on FK trough as well on envarsus though overall therapeutic and very compliant to her med regimen.recent range 1.3-1.6   - Baseline Creatinine: ~ 1-1.3 more rceently up to 1.3-1.5 since 3/2022, bx threshold of 1.6 or higher   - Proteinuria: Not checked post transplant   - Date DSA Last Checked: Nov/2021      Latest DSA: No DSA at time of transplant   - BK Viremia: Continue to monitor BK biweekly   - Kidney Tx Biopsy: yes  # kidney bx 5/2: read as borderline cellular rejection,isolated tubulitis i0t1, IF suggests possible IgA nephropathy MEST0   # closure Bx: 6/9 very limited sample, 2 glomeruli on EM, c4d not available, no signs of rejection   She was treated for 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 Cr now down to 1.2-1.3.  Closure biopsy of the kidney 1st in 4-6 weeks and will consider pancreas bx if uptrend in lipase    # Pancreas Tx (SPK):    - Pancreatic Exocrine Drainage: Enteric drained     - Blood glucose: Euglycemia      On insulin: No   - HbA1c: Last checked at time of transplant      Latest HbA1c: 5%   - Pancreatic enzymes: Trend down   - Date DSA Last Checked: Nov/2021  Latest DSA: No DSA at time of transplant   - Pancreas Tx Biopsy: No    # Immunosuppression: Tacrolimus immediate release (goal 8-10) and Mycophenolic acid (dose 540 mg every 12 hours)   - Continue with intensive monitoring of immunosuppression for efficacy and toxicity.   - Induction with Recent Transplant:  Intermediate Intensity   - Continue with intensive monitoring of immunosuppression for efficacy and toxicity.   - Changes: yes  awaiting repeat, goal to run FK closer to 8 low grade BK viremia that  resolved and persistent periioral tingling     note:  Tremors have improved with the switch to envarsus from short acting tacrolimus.     MPA reduced 1/31/2022 due to GI issues, level was high too   - monitor DSA next month    # Infection Prophylaxis: due for BK screen -pending from today  - PJP: Sulfa/TMP (Bactrim)  - CMV: Valganciclovir (Valcyte) completed 3 months    # Blood Pressure: Controlled;  Goal BP:<130/80   - Volume status: Euvolemic  EDW ~ 122lbs currently ranges 125-130 lbs   e  # Anemia in Chronic Renal Disease: Hgb: stable     SYLVAIN: No   - Iron studies: previously on iron tabs currently off recheck given fatigue and recent menorrhagia    # Mineral Bone Disorder:   - Secondary renal hyperparathyroidism; PTH level: Normal (18-80 pg/ml)        On treatment: None  - Vitamin D; level: High normal        On supplement: Yes  - Calcium; level: Normal        On supplement: No  - Phosphorus; level: Normal        On supplement: Yes, 250mg bid if next phos>2 could reduce to 250 mg po qd    # Electrolytes:   - Potassium; level: Normal        On supplement: No  - Magnesium; level: Normal        On supplement: Yes, 400mg daily   - Bicarbonate; level: Normal        On supplement: No    # Medical Compliance: Yes    # Fatigue: nl CMV EBV TSH iron studies stress test showed hypotensive response to exercise , exercise EKG didn't reveal inducible ischemia but given long standing DM hx and symptoms, scheduled for angio per cards (will need IVF pre & post contrast exposure), echocardiogram    #  LE edema: intermittent mostly upon standing and worse towards the end of the day, symptoms suggestive of dependent edema and  Not intravascular fluid overload, trial of various forms of diuretics led to BERNEI & discontinuation. Responds to compression stockings. Most recent UPC normal. More recently on lasix 20 mg po qMWF. Scheduled for echocardiogram per cards    # COVID-19 Virus Review: Discussed COVID-19 virus and the potential  "medical risks.  Reviewed preventative health recommendations, including wearing a mask where appropriate.  Recommended COVID vaccination should be up to date with either an initial vaccination or booster shot when appropriate.  Asked the patient to inform the transplant center if they are exposed or diagnosed with this virus.    # COVID Vaccination Up To Date: Yes 4 doses due for 5th dose Oct 2022  # Skin Cancer: New lesions: none, chronic multiple nevi over the abdomen, no changes in size, color, texture, reminded about derm f/up, scheduled in Dec   - Discussed sun protection and recommend regular follow up with Dermatology.      # Transplant History:  Etiology of Kidney Failure: Diabetes mellitus type 1  Tx: SPK  Transplant: 11/15/2021 (Kidney / Pancreas)  Donor Type: Donation after Brain Death Donor Class:   Crossmatch at time of Tx: negative  DSA at time of Tx: No  Significant changes in immunosuppression: None  CMV IgG Ab High Risk Discordance (D+/R-): No  EBV IgG Ab High Risk Discordance (D+/R-): No  Significant transplant-related complications: None    Transplant Office Phone Number: [phone]    Assessment and plan was discussed with the patient and she voiced her understanding and agreement.      Return visit: 6 weeks  Reanna Lee MD    Chief Complaint   Ms. Ortega is a 37 year old here for kidney transplant, pancreas transplant, immunosuppression management and new medical concerns.     History of Present Illness      Marilyn feels \"awful\".she continues to struggle with several issues over the past few months with no clear etiology based on w/up done so far:   Severe fatigue limiting her ability to maintain the previously active lifestyle she had  Leg and eyelid swelling, on/off, fatigue, low energy  Recent dizziness the past few days  Dicussed that switching to mTOR is not a good options for her given concerns about swelling and rejection risks with belatacept switch  Seen by ophthalmo for " phostosensitivity and exam was completely normal per report  Denies any work/personal stressors, Feels has enough support and worried about any side effects and doesn't think therapy would help    IS Envarus 13.5 mg po every day,  mg po bid    Derm f/up multiple nevi-scheduled in Dec  FK goal 6-8 & monitor BK weekly    IS Envarsus 6 mg every day, /540   COVID vaccine x3 due for 5th dose Oct 2022      Problem List   Patient Active Problem List   Diagnosis     Osteopenia     Chronic constipation     Irritable bowel syndrome     Stage 3a chronic kidney disease (H)     HTN, kidney transplant related     Gastroparesis     Heterozygous factor V Leiden mutation (H)     Immunosuppression (H)     Kidney replaced by transplant     Pancreas replaced by transplant (H)     Anemia due to stage 3a chronic kidney disease (H)     Aftercare following organ transplant     Generalized edema     Kidney transplant rejection       Allergies   Allergies   Allergen Reactions     Chlorhexidine Hives, Itching and Rash     PN: Patient had swelling/rash that was very itchy with chlorprep.     Ceclor [Cefaclor Monohydrate]      Metoclopramide Other (See Comments)     Noted mouth/facial twitching with reglan in 2022 when it was tried for gastroparesis.     Cefaclor Rash       Medications   Current Outpatient Medications   Medication Sig     aspirin (ASA) 325 MG tablet Take 1 tablet (325 mg) by mouth daily     biotin 1000 MCG TABS tablet Take 1,000 mcg by mouth daily     cetirizine (ZYRTEC) 10 MG tablet Take 1 tablet (10 mg) by mouth every other day     cholecalciferol 25 MCG (1000 UT) TABS Take 2,000 Units by mouth every other day      Continuous Blood Gluc Sensor (DEXCOM G6 SENSOR) MISC Use as directed for continuous glucose monitoring.  Change sensor every 10 days.     Continuous Blood Gluc Transmit (DEXCOM G6 TRANSMITTER) MISC Use as directed for continuous glucose monitoring.  Change every 3 months.     docusate sodium (COLACE)  "100 MG capsule Take 200 mg by mouth daily before breakfast     famotidine (PEPCID) 20 MG tablet Take 20 mg by mouth 2 times daily     furosemide (LASIX) 20 MG tablet Take 1 tablet (20 mg) by mouth as needed (three time weekly prn for ankle swelling)     glucose blood VI test strips strip 4 times daily.     insulin lispro (HUMALOG KWIKPEN) 100 UNIT/ML (1 unit dial) KWIKPEN BEFORE MEALS TID use 2 unit rapid-acting insulin to correct for every 50 mg/dL that blood glucose is above 150 mg/dL. 150-199 mg/dL: add 2 units, 200-249 mg/dL: add 4 units, 250-299 mg/dL: add 6 units, 300-349 mg/dL: add 8 units, 350-399 mg/dL: add 10 units, >400 mg/dL: add 12 units and call your doctor.     insulin pen needle (31G X 5 MM) 31G X 5 MM miscellaneous Use 3 pen needles daily or as directed. Patient/RPH may decide gauge and length.     mycophenolic acid (GENERIC EQUIVALENT) 360 MG EC tablet Take 1 tablet (360 mg) by mouth 2 times daily     ondansetron (ZOFRAN) 4 MG tablet Take 1 tablet (4 mg) by mouth every 8 hours as needed for nausea     Prucalopride Succinate (MOTEGRITY) 2 MG TABS Take 1 tablet (2 mg) by mouth daily     sulfamethoxazole-trimethoprim (BACTRIM) 400-80 MG tablet Take 1 tablet by mouth daily     tacrolimus (ENVARSUS XR) 0.75 MG 24 hr tablet Take 2 tablets (1.5 mg) by mouth every morning (before breakfast) Total dose=13.5mg daily     tacrolimus (ENVARSUS XR) 1 MG 24 hr tablet Take 1 tablet (1 mg) by mouth every morning (before breakfast) Total dose=13.5mg daily     tacrolimus (ENVARSUS XR) 4 MG 24 hr tablet Take 3 tablets (12 mg) by mouth every morning (before breakfast) Total dose=13.5mg daily     Current Facility-Administered Medications   Medication     bevacizumab (AVASTIN) intravitreal inj 1.25 mg     bevacizumab (AVASTIN) intravitreal inj 1.25 mg     There are no discontinued medications.    Physical Exam   Vital Signs: /64   Pulse 83   Ht 1.676 m (5' 6\")   Wt 58.6 kg (129 lb 3.2 oz)   SpO2 100%   BMI " 20.85 kg/m      GENERAL: Healthy, alert and no distress  EYES: Eyes grossly normal to inspection.  No discharge or erythema, or obvious scleral/conjunctival abnormalities.  RESP: No audible wheeze, cough, or visible cyanosis.  No visible retractions or increased work of breathing.    SKIN: Visible skin clear. No significant rash, abnormal pigmentation or lesions.  NEURO: Cranial nerves grossly intact.  Mentation and speech appropriate for age.  PSYCH: Mentation appears normal, affect normal/bright, judgement and insight intact, normal speech and appearance well-groomed.    Data     Renal Latest Ref Rng & Units 8/29/2022 8/22/2022 8/15/2022   Na 133 - 144 mmol/L 137 137 139   K 3.4 - 5.3 mmol/L 4.4 4.2 4.7   Cl 94 - 109 mmol/L 108 108 115(H)   CO2 20 - 32 mmol/L 23 23 18(L)   BUN 7 - 30 mg/dL 42(H) 34(H) 46(H)   Cr 0.52 - 1.04 mg/dL 1.53(H) 1.30(H) 1.63(H)   Glucose 70 - 99 mg/dL 97 101(H) 93   Ca  8.5 - 10.1 mg/dL 9.1 9.5 9.5   Mg 1.6 - 2.3 mg/dL - - -     Bone Health Latest Ref Rng & Units 8/8/2022 7/11/2022 6/6/2022   Phos 2.5 - 4.5 mg/dL 3.8 3.5 3.6   PTHi pg/mL - - -   Vit D Def 20 - 75 ug/L - - -     Heme Latest Ref Rng & Units 8/29/2022 8/22/2022 8/15/2022   WBC 4.0 - 11.0 10e3/uL 3.1(L) 4.6 6.2   Hgb 11.7 - 15.7 g/dL 9.4(L) 10.2(L) 10.4(L)   Plt 150 - 450 10e3/uL 223 291 239   ABSOLUTE NEUTROPHIL 1.6 - 8.3 10e3/uL - - -   ABSOLUTE LYMPHOCYTES 0.8 - 5.3 10e3/uL - - -   ABSOLUTE MONOCYTES 0.0 - 1.3 10e3/uL - - -   ABSOLUTE EOSINOPHILS 0.0 - 0.7 10e3/uL - - -   ABSOLUTE BASOPHILS 0.0 - 0.2 10e9/L - - -     Liver Latest Ref Rng & Units 7/14/2022 5/23/2022 5/4/2022   AP 40 - 150 U/L - 84 70   TBili 0.2 - 1.3 mg/dL - 0.3 0.2   DBili 0.0 - 0.2 mg/dL - <0.1 <0.1   ALT 0 - 50 U/L - 20 18   AST 0 - 45 U/L - 13 19   Tot Protein 6.8 - 8.8 g/dL - 7.9 7.6   Albumin 3.4 - 5.0 g/dL 4.0 4.0 4.1     Pancreas Latest Ref Rng & Units 8/29/2022 8/22/2022 8/15/2022   A1C 0.0 - 5.6 % - - -   Amylase 30 - 110 U/L 54 59 58   Lipase 73  - 393 U/L 134 136 137     Iron studies Latest Ref Rng & Units 4/27/2022 4/18/2022 3/3/2022   Iron 35 - 180 ug/dL 56 106 139   Iron sat 15 - 46 % 20 35 35   Ferritin 12 - 150 ng/mL 110 120 106     [facility] Txp Virology Latest Ref Rng & Units 4/18/2022 1/28/2022 12/13/2021   CMV QUANT IU/ML Not Detected IU/mL Not Detected Not Detected Not Detected   EBV CAPSID ANTIBODY IGG No detectable antibody. - - -   EBV DNA COPIES/ML Not Detected copies/mL Not Detected - -        Recent Labs   Lab Test 08/08/22  0841 08/15/22  0900 08/22/22  0858   DOSTAC 8/7/2022 8/14/2022 8/21/2022   TACROL 7.7 9.5 8.6     Recent Labs   Lab Test 07/11/22  0843 07/25/22  0834 08/22/22  0858   DOSMPA  --  7/24/2022   8:00 PM 8/21/2022   8:30 PM   MPACID 4.74* 5.70* 4.73*   MPAG 47.0 71.0 48.9     I spent a total of 77 minutes on the date of the encounter doing chart review, performing a history and physical exam, completing documentation and patient counseling as noted above.

## 2022-08-31 NOTE — LETTER
8/31/2022       RE: Marilyn Ortega  [address]  [facility] [address]     Dear Colleague,    Thank you for referring your patient, Marilyn Ortega, to the [facility] [place] at [facility]. Please see a copy of my visit note below.        TRANSPLANT NEPHROLOGY EARLY POST TRANSPLANT VISIT   9 month visit    Assessment & Plan   # DDKT (SPK): Stable significant variability 1-1.3 and occasional uptrend to 1.4-1.5, peak 1.6-1.7 around the time she had borderline rejection , significant variability on FK trough as well on envarsus though overall therapeutic and very compliant to her med regimen.recent range 1.3-1.6   - Baseline Creatinine: ~ 1-1.3 more rceently up to 1.3-1.5 since 3/2022, bx threshold of 1.6 or higher   - Proteinuria: Not checked post transplant   - Date DSA Last Checked: Nov/2021      Latest DSA: No DSA at time of transplant   - BK Viremia: Continue to monitor BK biweekly   - Kidney Tx Biopsy: yes  # kidney bx 5/2: read as borderline cellular rejection,isolated tubulitis i0t1, IF suggests possible IgA nephropathy MEST0   # closure Bx: 6/9 very limited sample, 2 glomeruli on EM, c4d not available, no signs of rejection   She was treated for 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 Cr now down to 1.2-1.3.  Closure biopsy of the kidney 1st in 4-6 weeks and will consider pancreas bx if uptrend in lipase    # Pancreas Tx (SPK):    - Pancreatic Exocrine Drainage: Enteric drained     - Blood glucose: Euglycemia      On insulin: No   - HbA1c: Last checked at time of transplant      Latest HbA1c: 5%   - Pancreatic enzymes: Trend down   - Date DSA Last Checked: Nov/2021  Latest DSA: No DSA at time of transplant   - Pancreas Tx Biopsy: No    # Immunosuppression: Tacrolimus immediate release (goal 8-10) and Mycophenolic acid (dose 540 mg every 12 hours)   -  Continue with intensive monitoring of immunosuppression for efficacy and toxicity.   - Induction with Recent Transplant:  Intermediate Intensity   - Continue with intensive monitoring of immunosuppression for efficacy and toxicity.   - Changes: yes  awaiting repeat, goal to run FK closer to 8 low grade BK viremia that resolved and persistent periioral tingling     note:  Tremors have improved with the switch to envarsus from short acting tacrolimus.     MPA reduced 1/31/2022 due to GI issues, level was high too   - monitor DSA next month    # Infection Prophylaxis: due for BK screen -pending from today  - PJP: Sulfa/TMP (Bactrim)  - CMV: Valganciclovir (Valcyte) completed 3 months    # Blood Pressure: Controlled;  Goal BP:<130/80   - Volume status: Euvolemic  EDW ~ 122lbs currently ranges 125-130 lbs   e  # Anemia in Chronic Renal Disease: Hgb: stable     SYLVAIN: No   - Iron studies: previously on iron tabs currently off recheck given fatigue and recent menorrhagia    # Mineral Bone Disorder:   - Secondary renal hyperparathyroidism; PTH level: Normal (18-80 pg/ml)        On treatment: None  - Vitamin D; level: High normal        On supplement: Yes  - Calcium; level: Normal        On supplement: No  - Phosphorus; level: Normal        On supplement: Yes, 250mg bid if next phos>2 could reduce to 250 mg po qd    # Electrolytes:   - Potassium; level: Normal        On supplement: No  - Magnesium; level: Normal        On supplement: Yes, 400mg daily   - Bicarbonate; level: Normal        On supplement: No    # Medical Compliance: Yes    # Fatigue: nl CMV EBV TSH iron studies stress test showed hypotensive response to exercise , exercise EKG didn't reveal inducible ischemia but given long standing DM hx and symptoms, scheduled for angio per cards (will need IVF pre & post contrast exposure), echocardiogram    #  LE edema: intermittent mostly upon standing and worse towards the end of the day, symptoms suggestive of dependent  "edema and  Not intravascular fluid overload, trial of various forms of diuretics led to BERNIE & discontinuation. Responds to compression stockings. Most recent UPC normal. More recently on lasix 20 mg po qMWF. Scheduled for echocardiogram per cards    # COVID-19 Virus Review: Discussed COVID-19 virus and the potential medical risks.  Reviewed preventative health recommendations, including wearing a mask where appropriate.  Recommended COVID vaccination should be up to date with either an initial vaccination or booster shot when appropriate.  Asked the patient to inform the transplant center if they are exposed or diagnosed with this virus.    # COVID Vaccination Up To Date: Yes 4 doses due for 5th dose Oct 2022  # Skin Cancer: New lesions: none, chronic multiple nevi over the abdomen, no changes in size, color, texture, reminded about derm f/up, scheduled in Dec   - Discussed sun protection and recommend regular follow up with Dermatology.      # Transplant History:  Etiology of Kidney Failure: Diabetes mellitus type 1  Tx: SPK  Transplant: 11/15/2021 (Kidney / Pancreas)  Donor Type: Donation after Brain Death Donor Class:   Crossmatch at time of Tx: negative  DSA at time of Tx: No  Significant changes in immunosuppression: None  CMV IgG Ab High Risk Discordance (D+/R-): No  EBV IgG Ab High Risk Discordance (D+/R-): No  Significant transplant-related complications: None    Transplant Office Phone Number: [phone]    Assessment and plan was discussed with the patient and she voiced her understanding and agreement.      Return visit: 6 weeks  Reanna Lee MD    Chief Complaint   Ms. Ortega is a 37 year old here for kidney transplant, pancreas transplant, immunosuppression management and new medical concerns.     History of Present Illness      Marilyn feels \"awful\".she continues to struggle with several issues over the past few months with no clear etiology based on w/up done so far:   Severe fatigue limiting " her ability to maintain the previously active lifestyle she had  Leg and eyelid swelling, on/off, fatigue, low energy  Recent dizziness the past few days  Dicussed that switching to mTOR is not a good options for her given concerns about swelling and rejection risks with belatacept switch  Seen by ophthalmo for phostosensitivity and exam was completely normal per report  Denies any work/personal stressors, Feels has enough support and worried about any side effects and doesn't think therapy would help    IS Envarus 13.5 mg po every day,  mg po bid    Derm f/up multiple nevi-scheduled in Dec  FK goal 6-8 & monitor BK weekly    IS Envarsus 6 mg every day, /540   COVID vaccine x3 due for 5th dose Oct 2022      Problem List   Patient Active Problem List   Diagnosis     Osteopenia     Chronic constipation     Irritable bowel syndrome     Stage 3a chronic kidney disease (H)     HTN, kidney transplant related     Gastroparesis     Heterozygous factor V Leiden mutation (H)     Immunosuppression (H)     Kidney replaced by transplant     Pancreas replaced by transplant (H)     Anemia due to stage 3a chronic kidney disease (H)     Aftercare following organ transplant     Generalized edema     Kidney transplant rejection       Allergies   Allergies   Allergen Reactions     Chlorhexidine Hives, Itching and Rash     PN: Patient had swelling/rash that was very itchy with chlorprep.     Ceclor [Cefaclor Monohydrate]      Metoclopramide Other (See Comments)     Noted mouth/facial twitching with reglan in 2022 when it was tried for gastroparesis.     Cefaclor Rash       Medications   Current Outpatient Medications   Medication Sig     aspirin (ASA) 325 MG tablet Take 1 tablet (325 mg) by mouth daily     biotin 1000 MCG TABS tablet Take 1,000 mcg by mouth daily     cetirizine (ZYRTEC) 10 MG tablet Take 1 tablet (10 mg) by mouth every other day     cholecalciferol 25 MCG (1000 UT) TABS Take 2,000 Units by mouth every  other day      Continuous Blood Gluc Sensor (DEXCOM G6 SENSOR) MISC Use as directed for continuous glucose monitoring.  Change sensor every 10 days.     Continuous Blood Gluc Transmit (DEXCOM G6 TRANSMITTER) MISC Use as directed for continuous glucose monitoring.  Change every 3 months.     docusate sodium (COLACE) 100 MG capsule Take 200 mg by mouth daily before breakfast     famotidine (PEPCID) 20 MG tablet Take 20 mg by mouth 2 times daily     furosemide (LASIX) 20 MG tablet Take 1 tablet (20 mg) by mouth as needed (three time weekly prn for ankle swelling)     glucose blood VI test strips strip 4 times daily.     insulin lispro (HUMALOG KWIKPEN) 100 UNIT/ML (1 unit dial) KWIKPEN BEFORE MEALS TID use 2 unit rapid-acting insulin to correct for every 50 mg/dL that blood glucose is above 150 mg/dL. 150-199 mg/dL: add 2 units, 200-249 mg/dL: add 4 units, 250-299 mg/dL: add 6 units, 300-349 mg/dL: add 8 units, 350-399 mg/dL: add 10 units, >400 mg/dL: add 12 units and call your doctor.     insulin pen needle (31G X 5 MM) 31G X 5 MM miscellaneous Use 3 pen needles daily or as directed. Patient/RPH may decide gauge and length.     mycophenolic acid (GENERIC EQUIVALENT) 360 MG EC tablet Take 1 tablet (360 mg) by mouth 2 times daily     ondansetron (ZOFRAN) 4 MG tablet Take 1 tablet (4 mg) by mouth every 8 hours as needed for nausea     Prucalopride Succinate (MOTEGRITY) 2 MG TABS Take 1 tablet (2 mg) by mouth daily     sulfamethoxazole-trimethoprim (BACTRIM) 400-80 MG tablet Take 1 tablet by mouth daily     tacrolimus (ENVARSUS XR) 0.75 MG 24 hr tablet Take 2 tablets (1.5 mg) by mouth every morning (before breakfast) Total dose=13.5mg daily     tacrolimus (ENVARSUS XR) 1 MG 24 hr tablet Take 1 tablet (1 mg) by mouth every morning (before breakfast) Total dose=13.5mg daily     tacrolimus (ENVARSUS XR) 4 MG 24 hr tablet Take 3 tablets (12 mg) by mouth every morning (before breakfast) Total dose=13.5mg daily     Current  "Facility-Administered Medications   Medication     bevacizumab (AVASTIN) intravitreal inj 1.25 mg     bevacizumab (AVASTIN) intravitreal inj 1.25 mg     There are no discontinued medications.    Physical Exam   Vital Signs: /64   Pulse 83   Ht 1.676 m (5' 6\")   Wt 58.6 kg (129 lb 3.2 oz)   SpO2 100%   BMI 20.85 kg/m      GENERAL: Healthy, alert and no distress  EYES: Eyes grossly normal to inspection.  No discharge or erythema, or obvious scleral/conjunctival abnormalities.  RESP: No audible wheeze, cough, or visible cyanosis.  No visible retractions or increased work of breathing.    SKIN: Visible skin clear. No significant rash, abnormal pigmentation or lesions.  NEURO: Cranial nerves grossly intact.  Mentation and speech appropriate for age.  PSYCH: Mentation appears normal, affect normal/bright, judgement and insight intact, normal speech and appearance well-groomed.    Data     Renal Latest Ref Rng & Units 8/29/2022 8/22/2022 8/15/2022   Na 133 - 144 mmol/L 137 137 139   K 3.4 - 5.3 mmol/L 4.4 4.2 4.7   Cl 94 - 109 mmol/L 108 108 115(H)   CO2 20 - 32 mmol/L 23 23 18(L)   BUN 7 - 30 mg/dL 42(H) 34(H) 46(H)   Cr 0.52 - 1.04 mg/dL 1.53(H) 1.30(H) 1.63(H)   Glucose 70 - 99 mg/dL 97 101(H) 93   Ca  8.5 - 10.1 mg/dL 9.1 9.5 9.5   Mg 1.6 - 2.3 mg/dL - - -     Bone Health Latest Ref Rng & Units 8/8/2022 7/11/2022 6/6/2022   Phos 2.5 - 4.5 mg/dL 3.8 3.5 3.6   PTHi pg/mL - - -   Vit D Def 20 - 75 ug/L - - -     Heme Latest Ref Rng & Units 8/29/2022 8/22/2022 8/15/2022   WBC 4.0 - 11.0 10e3/uL 3.1(L) 4.6 6.2   Hgb 11.7 - 15.7 g/dL 9.4(L) 10.2(L) 10.4(L)   Plt 150 - 450 10e3/uL 223 291 239   ABSOLUTE NEUTROPHIL 1.6 - 8.3 10e3/uL - - -   ABSOLUTE LYMPHOCYTES 0.8 - 5.3 10e3/uL - - -   ABSOLUTE MONOCYTES 0.0 - 1.3 10e3/uL - - -   ABSOLUTE EOSINOPHILS 0.0 - 0.7 10e3/uL - - -   ABSOLUTE BASOPHILS 0.0 - 0.2 10e9/L - - -     Liver Latest Ref Rng & Units 7/14/2022 5/23/2022 5/4/2022   AP 40 - 150 U/L - 84 70   TBili 0.2 " - 1.3 mg/dL - 0.3 0.2   DBili 0.0 - 0.2 mg/dL - <0.1 <0.1   ALT 0 - 50 U/L - 20 18   AST 0 - 45 U/L - 13 19   Tot Protein 6.8 - 8.8 g/dL - 7.9 7.6   Albumin 3.4 - 5.0 g/dL 4.0 4.0 4.1     Pancreas Latest Ref Rng & Units 8/29/2022 8/22/2022 8/15/2022   A1C 0.0 - 5.6 % - - -   Amylase 30 - 110 U/L 54 59 58   Lipase 73 - 393 U/L 134 136 137     Iron studies Latest Ref Rng & Units 4/27/2022 4/18/2022 3/3/2022   Iron 35 - 180 ug/dL 56 106 139   Iron sat 15 - 46 % 20 35 35   Ferritin 12 - 150 ng/mL 110 120 106     [facility] Txp Virology Latest Ref Rng & Units 4/18/2022 1/28/2022 12/13/2021   CMV QUANT IU/ML Not Detected IU/mL Not Detected Not Detected Not Detected   EBV CAPSID ANTIBODY IGG No detectable antibody. - - -   EBV DNA COPIES/ML Not Detected copies/mL Not Detected - -        Recent Labs   Lab Test 08/08/22  0841 08/15/22  0900 08/22/22  0858   DOSTAC 8/7/2022 8/14/2022 8/21/2022   TACROL 7.7 9.5 8.6     Recent Labs   Lab Test 07/11/22  0843 07/25/22  0834 08/22/22  0858   DOSMPA  --  7/24/2022   8:00 PM 8/21/2022   8:30 PM   MPACID 4.74* 5.70* 4.73*   MPAG 47.0 71.0 48.9     I spent a total of 77 minutes on the date of the encounter doing chart review, performing a history and physical exam, completing documentation and patient counseling as noted above.        Again, thank you for allowing me to participate in the care of your patient.      Sincerely,    Reanna Lee MD

## 2022-09-01 ENCOUNTER — VIRTUAL VISIT (OUTPATIENT)
Dept: CARDIOLOGY | Facility: CLINIC | Age: 37
End: 2022-09-01
Payer: COMMERCIAL

## 2022-09-01 ENCOUNTER — MYC MEDICAL ADVICE (OUTPATIENT)
Dept: CARDIOLOGY | Facility: CLINIC | Age: 37
End: 2022-09-01

## 2022-09-01 DIAGNOSIS — R06.09 DOE (DYSPNEA ON EXERTION): ICD-10-CM

## 2022-09-01 DIAGNOSIS — R07.9 CHEST PAIN, UNSPECIFIED TYPE: Primary | ICD-10-CM

## 2022-09-01 LAB
BKV DNA # SPEC NAA+PROBE: <500 COPIES/ML
BKV DNA SPEC NAA+PROBE-LOG#: <2.7 {LOG_COPIES}/ML
MYCOPHENOLATE SERPL LC/MS/MS-MCNC: 3.17 MG/L (ref 1–3.5)
MYCOPHENOLATE-G SERPL LC/MS/MS-MCNC: 41.4 MG/L (ref 30–95)
TME LAST DOSE: NORMAL H
TME LAST DOSE: NORMAL H

## 2022-09-01 PROCEDURE — 99215 OFFICE O/P EST HI 40 MIN: CPT | Mod: 24 | Performed by: INTERNAL MEDICINE

## 2022-09-01 NOTE — PATIENT INSTRUCTIONS
Right and left heart cath in biplane room (to minimize volume of contrast needed in this post kidney transplant patient)  2.  Echocardiogram after cath done  3. Followup TBD pending results

## 2022-09-01 NOTE — NURSING NOTE
Chief Complaint   Patient presents with     Consult     Discuss stress test and recent symptoms per pt, vitals were taken yest. At another appt and in the chart     Patient declined individual allergy and medication review by support staff because they deny any changes and state that all information remains accurate since last reviewed/verified.   Reviewed yesterday, no changes per pt.    Puja Hanley, VF/CMA

## 2022-09-01 NOTE — PROGRESS NOTES
Visit Date: 2022      PATIENT:  Marilyn Ortega  MRN:  [id number]  :  1985      To Whom it May Concern:     It was a pleasure participating in the care of your patient, Ms. Marilyn Ortega.  As you know, she is a 37-year-old lady who I saw today over virtual video visit via [employer] for exertional lightheadedness, chest heaviness, shortness of breath, and hypotension.  Her past medical history is significant for the followin.  Type 1 diabetes since age 14.  2.  Hypertension.  3.  Status post kidney and pancreas transplant 11/15/2021 with rejection, baseline GFR of 44 mL per minute as of 2022.  4.  Heterozygous factor V Leiden mutation.  5.  IBS.  6.  Constipation gastroparesis.  7.  Osteopenia.  8.  Left femoral neck fracture, status post open reduction and internal fixation.    She presents today after having had her kidney and pancreas transplant last November with severe symptoms for the past 2-3 months.    She relates that she was previously quite active and loved to go running and do hot yoga in the past; however, now she is almost immobilized, as she can no longer walk down the hallway without feeling dizzy.  When she feels dizzy, her blood pressure drops down to 76/44 and she feels short of breath, wheezy and she gets chest heaviness as well.  She has also noticed swelling in her lower extremities, and pants and shoes leave marks.  She can no longer exercise because she is so short of breath and fatigued.  When she stands up, she is lightheaded.  She is also quite fatigued.    She did have an exercise stress test 2022 which showed that her blood pressure did not respond appropriately to stress exercise and it dropped from 132 down to 122 and she did experience her extreme shortness of breath at that time.  Her EKG was unremarkable and she did achieve 14 metabolic equivalents.    Her symptoms are affecting her quality of life quite prominently, in a negative  fashion.  She is experiencing chest heaviness, shortness of breath, but denies PND or orthopnea.  She is getting lower extremity edema.  She denies palpitations or true syncope.    [place] sleepiness scale score is 3 today.    REVIEW OF SYSTEMS:  Her 10-point review of systems is positive for fatigue and exertional intolerance.    CURRENT MEDICATIONS:  Include aspirin 325 mg a day, Lasix 20 mg as needed for swelling, insulin, mycophenolate, ondansetron, Bactrim, tacrolimus, Avastin.    PHYSICAL EXAMINATION:      VITAL SIGNS:  Her blood pressure on 08/31/2022 was 138/64 with a pulse of 83.  Her weight was 129 pounds.  GENERAL:  She appears comfortable, well groomed.  PSYCHIATRIC:  She is alert and oriented x 3.  HEENT:  Her eyes do not appear grossly erythematous or have exudate.  RESPIRATORY:  She is breathing comfortably without gross cough.    The remainder of the comprehensive physical exam was deferred secondary to the COVID-19 pandemic and secondary to video visit restrictions.    LABORATORY:  08/29/2022, potassium 4.4, GFR 44, hemoglobin 9.4.    Exercise stress test 07/21/2022:  She exercised for 10 minutes and 2 seconds on the treadmill, rate pressure product of 18,666, no chest pain, but she did get quite short of breath.  Achieved 14 metabolic equivalents.  Peak heart rate 153, above average aerobic capacity; however, there was a drop in overall blood pressure from 132 down to 122 that accompanied her symptoms.  EKG was unremarkable.    EKG, 11/15/2021, normal sinus rhythm at a rate of 98 beats per minute, RSR prime in V1.      IMPRESSION:      Marilyn is a 37-year-old lady whose past medical history is significant for having had type 1 diabetes since age 14, status post kidney and pancreas transplant 11/15/2021 with some rejection, baseline GFR of 44 mL per minute with heterozygous factor V Leiden mutation, who presents with following issues:    1.  Exertional lightheadedness, shortness of breath and chest  heaviness with documented inappropriate blood pressure response to exercise on recent stress test:    Her symptoms are quite concerning, as she can no longer walk down the hallway without feeling dizzy and short of breath.  If she exerts herself further, she will feel chest heaviness.    Additionally, her exercise stress test 07/21/2022 has documented a definitive drop in her blood pressure from 132 mmHg down to 122 mmHg, which correlated with her symptoms of exertional dyspnea and exercise intolerance.    In the context of her greater than 20-year history of type 1 diabetes, hypertension, renal insufficiency and other risk factors, along with her very concerning symptoms and lack of prior definitive ischemic workup in the past, further more definitive evaluation would certainly be indicated.      PLAN:       1.  Right and left heart catheterization.      This will not only more definitively rule out an ischemic etiology for her severe symptoms, but also provide data for more definitive diagnosis of why she is short of breath and experiencing lower extremity edema as well.    Obviously, with her prior to kidney transplant and mild renal insufficiency, minimizing the volume of iodinated contrast used for the procedure would be prudent and we will attempt to schedule the procedure using biplane imaging.    She understands that there is a small risk of actually inducing dialysis, a heart attack, stroke or deadly complication, but she still wishes to proceed.    Further updates to follow, pending the above test results.    2.  Echocardiogram to rule out new structural pathology as a potential cause for symptoms as well.    Once again, it was a pleasure participating in the care of your patient, Ms. Marilyn Ortega.  Please feel free to contact me at any time with any questions regarding her care in the future.          Osiel Raphael MD      Addendum 9/9/22:    Right and left heart cath reveal:      Normal PA pressures.      Left sided filling pressures are mildly elevated.     Normal cardiac output level.     Right sided filling pressures are normal.     Mean RA 10  Mean PA 24  PCWP 17  CO 6L/min      Angiographically normal coronaries.   RHC with mildly elevated wedge pressure.       Impression:    Symptom limiting exertional hypotension, ? Autonomic dysfunction component?    Mildly elevated PCWP    Plan:    1.  Continue Lasix prn for sxs/swelling    2.  Await echo results    3.  Consider EP consultation to consider tilt table testing      Addendum 22:    Echo reveals normal LV systolic function without significant valvular pathology    Plan:    1.  EP consultation with Dr. Rodriguez for exertional lightheadedness, consideration autonomic dysfunction/tilt table testing        D: 2022   T: 2022   MT: ABELARDO    Name:     NORY CAMARGO  MRN:      [id number]        Account:    [id number]   :      1985           Visit Date: 2022     Document: [id number]

## 2022-09-01 NOTE — PROGRESS NOTES
"Marilyn is a 37 year old who is being evaluated via a billable video visit.      How would you like to obtain your AVS? MyChart  If the video visit is dropped, the invitation should be resent by: Text to cell phone: [phone]  Will anyone else be joining your video visit? ALLI Lr/JASON    The patient has been notified of following:     \"This video visit will be conducted via a call between you and your physician/provider. We have found that certain health care needs can be provided without the need for an in-person physical exam.  This service lets us provide the care you need with a video conversation.  If a prescription is necessary we can send it directly to your pharmacy.  If lab work is needed we can place an order for that and you can then stop by our lab to have the test done at a later time.    Video visits are billed at different rates depending on your insurance coverage.  Please reach out to your insurance provider with any questions.    If during the course of the call the physician/provider feels a video visit is not appropriate, you will not be charged for this service.\"    Patient has given verbal consent for video visit? Yes    How would you like to obtain your AVS? Mail    Video-Visit Details    Type of service:  Video Visit    Video Start Time:814am    Video End Time:844am    Total visit time including video visit, chart review, charting, coordination of care =46min    Originating Location (pt. Location):patient home      Distant Location (provider location):  home office    Platform used for Video Visit: Pattie    See dictation #[id number]    [facility]#:[id number]  "

## 2022-09-02 ENCOUNTER — TELEPHONE (OUTPATIENT)
Dept: CARDIOLOGY | Facility: CLINIC | Age: 37
End: 2022-09-02

## 2022-09-02 NOTE — PATIENT INSTRUCTIONS
Patient Recommendations:  Follow up with cards    Continue current meds    Transplant Patient Information  Your Post Transplant Coordinator is: Yessica Becker  You and your care team can contact your transplant coordinator Monday - Friday, 8am - 5pm at [phone] (Option 2 to reach the coordinator or Option 4 to schedule an appointment).  You can also reach your care team online via [employer].  After hours for urgent matters, please call [facility] at [phone].

## 2022-09-02 NOTE — TELEPHONE ENCOUNTER
Pt called and left a VM on clinic line.  Requesting a call back from Dr. Raphael's Nurse. needing assistance scheduling a Angiogram.    Routing message to  Cardiology Team.    Mireille Gonzalez LPN

## 2022-09-06 ENCOUNTER — TELEPHONE (OUTPATIENT)
Dept: CARDIOLOGY | Facility: CLINIC | Age: 37
End: 2022-09-06

## 2022-09-06 ENCOUNTER — LAB (OUTPATIENT)
Dept: LAB | Facility: CLINIC | Age: 37
End: 2022-09-06
Payer: COMMERCIAL

## 2022-09-06 DIAGNOSIS — Z79.899 ENCOUNTER FOR LONG-TERM CURRENT USE OF MEDICATION: ICD-10-CM

## 2022-09-06 DIAGNOSIS — Z48.298 AFTERCARE FOLLOWING ORGAN TRANSPLANT: ICD-10-CM

## 2022-09-06 DIAGNOSIS — Z94.83 PANCREAS REPLACED BY TRANSPLANT (H): ICD-10-CM

## 2022-09-06 DIAGNOSIS — Z94.0 KIDNEY REPLACED BY TRANSPLANT: ICD-10-CM

## 2022-09-06 LAB
AMYLASE SERPL-CCNC: 56 U/L (ref 30–110)
ANION GAP SERPL CALCULATED.3IONS-SCNC: 5 MMOL/L (ref 3–14)
BUN SERPL-MCNC: 38 MG/DL (ref 7–30)
CALCIUM SERPL-MCNC: 9.4 MG/DL (ref 8.5–10.1)
CHLORIDE BLD-SCNC: 109 MMOL/L (ref 94–109)
CO2 SERPL-SCNC: 23 MMOL/L (ref 20–32)
CREAT SERPL-MCNC: 1.54 MG/DL (ref 0.52–1.04)
CREAT UR-MCNC: 113 MG/DL
ERYTHROCYTE [DISTWIDTH] IN BLOOD BY AUTOMATED COUNT: 12.5 % (ref 10–15)
GFR SERPL CREATININE-BSD FRML MDRD: 44 ML/MIN/1.73M2
GLUCOSE BLD-MCNC: 82 MG/DL (ref 70–99)
HCT VFR BLD AUTO: 29.7 % (ref 35–47)
HGB BLD-MCNC: 9.5 G/DL (ref 11.7–15.7)
LIPASE SERPL-CCNC: 129 U/L (ref 73–393)
MCH RBC QN AUTO: 28.4 PG (ref 26.5–33)
MCHC RBC AUTO-ENTMCNC: 32 G/DL (ref 31.5–36.5)
MCV RBC AUTO: 89 FL (ref 78–100)
PLATELET # BLD AUTO: 261 10E3/UL (ref 150–450)
POTASSIUM BLD-SCNC: 4.5 MMOL/L (ref 3.4–5.3)
PROT UR-MCNC: 0.1 G/L
PROT/CREAT 24H UR: 0.09 G/G CR (ref 0–0.2)
RBC # BLD AUTO: 3.34 10E6/UL (ref 3.8–5.2)
SODIUM SERPL-SCNC: 137 MMOL/L (ref 133–144)
TACROLIMUS BLD-MCNC: 7.7 UG/L (ref 5–15)
TME LAST DOSE: NORMAL H
TME LAST DOSE: NORMAL H
WBC # BLD AUTO: 6.2 10E3/UL (ref 4–11)

## 2022-09-06 PROCEDURE — 85027 COMPLETE CBC AUTOMATED: CPT

## 2022-09-06 PROCEDURE — 80197 ASSAY OF TACROLIMUS: CPT

## 2022-09-06 PROCEDURE — 36415 COLL VENOUS BLD VENIPUNCTURE: CPT

## 2022-09-06 PROCEDURE — 80180 DRUG SCRN QUAN MYCOPHENOLATE: CPT

## 2022-09-06 PROCEDURE — 83690 ASSAY OF LIPASE: CPT

## 2022-09-06 PROCEDURE — 84156 ASSAY OF PROTEIN URINE: CPT

## 2022-09-06 PROCEDURE — 82150 ASSAY OF AMYLASE: CPT

## 2022-09-06 PROCEDURE — 82310 ASSAY OF CALCIUM: CPT

## 2022-09-06 NOTE — TELEPHONE ENCOUNTER
Called and spoke to patient. Advised that if there is a cancellation on the cath lab schedule this week or next week, we will let her know.   She also said she spoke to her transplant team as asked by Dr Raphael and they do want her to have fluids before and after her angiogram.   She also would like to schedule her echo in [place] in the next two weeks, or after her angiogram/RHC. Scheduled for 8/12/22    Alondra Simms RN  Cardiology Care Coordinator  [facility]  Phone: [phone]

## 2022-09-06 NOTE — TELEPHONE ENCOUNTER
Osiel Raphael MD Balcome, Alondra A, RN  Caller: Unspecified (Today,  9:04 AM)  Aydin Ford,     She is in close contact with her transplant team, will defer fluids to them      Called and spoke to patient. Will route message to Transplant Coordinator, Yessica Becker to have her Team order the fluids.     Alondra Simms, RN  Cardiology Care Coordinator  [facility]  Phone: [phone]

## 2022-09-06 NOTE — TELEPHONE ENCOUNTER
[facility] Call Center    Phone Message    May a detailed message be left on voicemail: yes     Reason for Call: Other: Patient called looking to discuss her upcoming procedure with a member of her care team. Please call patient back to further discuss, thank you.    Action Taken: Message routed to:  Other: Cardiology    Travel Screening: Not Applicable

## 2022-09-07 ENCOUNTER — TELEPHONE (OUTPATIENT)
Dept: TRANSPLANT | Facility: CLINIC | Age: 37
End: 2022-09-07

## 2022-09-07 DIAGNOSIS — Z94.0 KIDNEY REPLACED BY TRANSPLANT: ICD-10-CM

## 2022-09-07 DIAGNOSIS — Z94.83 PANCREAS TRANSPLANTED (H): ICD-10-CM

## 2022-09-07 DIAGNOSIS — Z94.83 PANCREAS REPLACED BY TRANSPLANT (H): ICD-10-CM

## 2022-09-07 LAB
MYCOPHENOLATE SERPL LC/MS/MS-MCNC: 5.09 MG/L (ref 1–3.5)
MYCOPHENOLATE-G SERPL LC/MS/MS-MCNC: 46.9 MG/L (ref 30–95)
TME LAST DOSE: ABNORMAL H
TME LAST DOSE: ABNORMAL H

## 2022-09-07 RX ORDER — ASPIRIN 81 MG/1
243 TABLET, CHEWABLE ORAL ONCE
Status: CANCELLED | OUTPATIENT
Start: 2022-09-07

## 2022-09-07 RX ORDER — TACROLIMUS 1 MG/1
3 TABLET, EXTENDED RELEASE ORAL
Qty: 90 TABLET | Refills: 11 | Status: SHIPPED | OUTPATIENT
Start: 2022-09-07 | End: 2022-09-19

## 2022-09-07 RX ORDER — SODIUM CHLORIDE 9 MG/ML
INJECTION, SOLUTION INTRAVENOUS CONTINUOUS
Status: CANCELLED | OUTPATIENT
Start: 2022-09-07

## 2022-09-07 RX ORDER — TACROLIMUS 4 MG/1
8 TABLET, EXTENDED RELEASE ORAL
Qty: 60 TABLET | Refills: 11 | Status: SHIPPED | OUTPATIENT
Start: 2022-09-07 | End: 2022-09-19

## 2022-09-07 RX ORDER — TACROLIMUS 0.75 MG/1
1.5 TABLET, EXTENDED RELEASE ORAL
Qty: 60 TABLET | Refills: 11 | Status: SHIPPED | OUTPATIENT
Start: 2022-09-07 | End: 2022-09-19

## 2022-09-07 RX ORDER — ASPIRIN 325 MG
325 TABLET ORAL ONCE
Status: CANCELLED | OUTPATIENT
Start: 2022-09-07 | End: 2022-09-07

## 2022-09-07 RX ORDER — LIDOCAINE 40 MG/G
CREAM TOPICAL
Status: CANCELLED | OUTPATIENT
Start: 2022-09-07

## 2022-09-07 NOTE — TELEPHONE ENCOUNTER
OK to decrease envarsus to 12.5mg daily. tacro has been on high end of range 6-8. Recheck labs on Monday. Med list updated. Patient v/u of instructions.

## 2022-09-07 NOTE — TELEPHONE ENCOUNTER
From: Reanna Cueto MD   Sent: 9/1/2022   1:31 PM CDT   To: Yessica Becker RN   Subject: RE: heart cath                                   yes   ----- Message -----   From: Yessica Becker RN   Sent: 9/1/2022  12:02 PM CDT   To: Reanna Villalta MD   Subject: RE: heart cath                                   OK, like 500ml before and after?   ----- Message -----   From: Reanna Cueto MD   Sent: 9/1/2022  10:47 AM CDT   To: Yessica Becker RN   Subject: RE: heart cath                                   Give IVF prior to contrast exposure and after with repeat BMP post        Above orders added to Admit orders for coronary angiogram and RHC.   Patient notified of orders for fluids.       Alondra Simms RN  Cardiology Care Coordinator  [facility]  Phone: [phone]

## 2022-09-07 NOTE — TELEPHONE ENCOUNTER
Patient's angiogram was rescheduled for 9/8/22. Patient is aware of need for COVID test and other prep instructions. Patient is wondering about the fluids prior to her procedure but the patient will discuss with her transplant coordinator.     Maribel Randle RN   Cardiology Care Coordinator  [facility]   Phone: [phone]  Fax: [phone]

## 2022-09-08 ENCOUNTER — HOSPITAL ENCOUNTER (OUTPATIENT)
Facility: CLINIC | Age: 37
Discharge: HOME | End: 2022-09-08
Attending: INTERNAL MEDICINE | Admitting: INTERNAL MEDICINE
Payer: COMMERCIAL

## 2022-09-08 ENCOUNTER — APPOINTMENT (OUTPATIENT)
Dept: MEDSURG UNIT | Facility: CLINIC | Age: 37
End: 2022-09-08
Attending: INTERNAL MEDICINE
Payer: COMMERCIAL

## 2022-09-08 ENCOUNTER — APPOINTMENT (OUTPATIENT)
Dept: LAB | Facility: CLINIC | Age: 37
End: 2022-09-08
Attending: INTERNAL MEDICINE
Payer: COMMERCIAL

## 2022-09-08 VITALS
DIASTOLIC BLOOD PRESSURE: 81 MMHG | TEMPERATURE: 98 F | WEIGHT: 129.19 LBS | OXYGEN SATURATION: 100 % | HEART RATE: 78 BPM | BODY MASS INDEX: 20.85 KG/M2 | RESPIRATION RATE: 14 BRPM | SYSTOLIC BLOOD PRESSURE: 152 MMHG

## 2022-09-08 DIAGNOSIS — R07.9 CHEST PAIN, UNSPECIFIED TYPE: ICD-10-CM

## 2022-09-08 DIAGNOSIS — R06.09 DOE (DYSPNEA ON EXERTION): ICD-10-CM

## 2022-09-08 LAB
ANION GAP SERPL CALCULATED.3IONS-SCNC: 19 MMOL/L (ref 7–15)
ANION GAP SERPL CALCULATED.3IONS-SCNC: 9 MMOL/L (ref 7–15)
BUN SERPL-MCNC: 29.8 MG/DL (ref 6–20)
BUN SERPL-MCNC: 34.4 MG/DL (ref 6–20)
CALCIUM SERPL-MCNC: 8.8 MG/DL (ref 8.6–10)
CALCIUM SERPL-MCNC: 9.8 MG/DL (ref 8.6–10)
CHLORIDE SERPL-SCNC: 106 MMOL/L (ref 98–107)
CHLORIDE SERPL-SCNC: 109 MMOL/L (ref 98–107)
CREAT SERPL-MCNC: 1.32 MG/DL (ref 0.51–0.95)
CREAT SERPL-MCNC: 1.34 MG/DL (ref 0.51–0.95)
DEPRECATED HCO3 PLAS-SCNC: 13 MMOL/L (ref 22–29)
DEPRECATED HCO3 PLAS-SCNC: 21 MMOL/L (ref 22–29)
GFR SERPL CREATININE-BSD FRML MDRD: 52 ML/MIN/1.73M2
GFR SERPL CREATININE-BSD FRML MDRD: 53 ML/MIN/1.73M2
GLUCOSE SERPL-MCNC: 81 MG/DL (ref 70–99)
GLUCOSE SERPL-MCNC: 88 MG/DL (ref 70–99)
HCG INTACT+B SERPL-ACNC: <1 MIU/ML
HGB BLD-MCNC: 8.5 G/DL (ref 11.7–15.7)
HGB BLD-MCNC: 8.6 G/DL (ref 11.7–15.7)
INR PPP: 0.97 (ref 0.85–1.15)
OXYHGB MFR BLDV: 78 % (ref 92–100)
OXYHGB MFR BLDV: 98 % (ref 92–100)
POTASSIUM SERPL-SCNC: 4.2 MMOL/L (ref 3.4–5.3)
POTASSIUM SERPL-SCNC: 4.6 MMOL/L (ref 3.4–5.3)
SODIUM SERPL-SCNC: 138 MMOL/L (ref 136–145)
SODIUM SERPL-SCNC: 139 MMOL/L (ref 136–145)

## 2022-09-08 PROCEDURE — 272N000001 HC OR GENERAL SUPPLY STERILE: Performed by: INTERNAL MEDICINE

## 2022-09-08 PROCEDURE — 999N000142 HC STATISTIC PROCEDURE PREP ONLY

## 2022-09-08 PROCEDURE — 250N000013 HC RX MED GY IP 250 OP 250 PS 637: Performed by: INTERNAL MEDICINE

## 2022-09-08 PROCEDURE — 93456 R HRT CORONARY ARTERY ANGIO: CPT | Performed by: INTERNAL MEDICINE

## 2022-09-08 PROCEDURE — C1887 CATHETER, GUIDING: HCPCS | Performed by: INTERNAL MEDICINE

## 2022-09-08 PROCEDURE — 99152 MOD SED SAME PHYS/QHP 5/>YRS: CPT | Mod: GC | Performed by: INTERNAL MEDICINE

## 2022-09-08 PROCEDURE — 999N000127 HC STATISTIC PERIPHERAL IV START W US GUIDANCE

## 2022-09-08 PROCEDURE — 80048 BASIC METABOLIC PNL TOTAL CA: CPT | Performed by: INTERNAL MEDICINE

## 2022-09-08 PROCEDURE — 258N000003 HC RX IP 258 OP 636: Performed by: INTERNAL MEDICINE

## 2022-09-08 PROCEDURE — 99153 MOD SED SAME PHYS/QHP EA: CPT | Performed by: INTERNAL MEDICINE

## 2022-09-08 PROCEDURE — 250N000009 HC RX 250: Performed by: INTERNAL MEDICINE

## 2022-09-08 PROCEDURE — 36415 COLL VENOUS BLD VENIPUNCTURE: CPT | Performed by: STUDENT IN AN ORGANIZED HEALTH CARE EDUCATION/TRAINING PROGRAM

## 2022-09-08 PROCEDURE — 99152 MOD SED SAME PHYS/QHP 5/>YRS: CPT | Performed by: INTERNAL MEDICINE

## 2022-09-08 PROCEDURE — C1894 INTRO/SHEATH, NON-LASER: HCPCS | Performed by: INTERNAL MEDICINE

## 2022-09-08 PROCEDURE — 80048 BASIC METABOLIC PNL TOTAL CA: CPT | Performed by: STUDENT IN AN ORGANIZED HEALTH CARE EDUCATION/TRAINING PROGRAM

## 2022-09-08 PROCEDURE — 36415 COLL VENOUS BLD VENIPUNCTURE: CPT | Performed by: INTERNAL MEDICINE

## 2022-09-08 PROCEDURE — 93010 ELECTROCARDIOGRAM REPORT: CPT | Mod: 59 | Performed by: INTERNAL MEDICINE

## 2022-09-08 PROCEDURE — 84702 CHORIONIC GONADOTROPIN TEST: CPT | Performed by: INTERNAL MEDICINE

## 2022-09-08 PROCEDURE — 999N000134 HC STATISTIC PP CARE STAGE 3

## 2022-09-08 PROCEDURE — 93456 R HRT CORONARY ARTERY ANGIO: CPT | Mod: 26 | Performed by: INTERNAL MEDICINE

## 2022-09-08 PROCEDURE — 82810 BLOOD GASES O2 SAT ONLY: CPT

## 2022-09-08 PROCEDURE — 85018 HEMOGLOBIN: CPT

## 2022-09-08 PROCEDURE — 250N000011 HC RX IP 250 OP 636: Performed by: INTERNAL MEDICINE

## 2022-09-08 PROCEDURE — 85610 PROTHROMBIN TIME: CPT | Performed by: INTERNAL MEDICINE

## 2022-09-08 RX ORDER — ASPIRIN 81 MG/1
243 TABLET, CHEWABLE ORAL ONCE
Status: COMPLETED | OUTPATIENT
Start: 2022-09-08 | End: 2022-09-08

## 2022-09-08 RX ORDER — ONDANSETRON 2 MG/ML
4 INJECTION INTRAMUSCULAR; INTRAVENOUS EVERY 6 HOURS PRN
Status: DISCONTINUED | OUTPATIENT
Start: 2022-09-08 | End: 2022-09-08 | Stop reason: HOSPADM

## 2022-09-08 RX ORDER — DIPHENHYDRAMINE HYDROCHLORIDE 50 MG/ML
INJECTION INTRAMUSCULAR; INTRAVENOUS
Status: DISCONTINUED | OUTPATIENT
Start: 2022-09-08 | End: 2022-09-08 | Stop reason: HOSPADM

## 2022-09-08 RX ORDER — SODIUM CHLORIDE 9 MG/ML
INJECTION, SOLUTION INTRAVENOUS CONTINUOUS
Status: DISCONTINUED | OUTPATIENT
Start: 2022-09-08 | End: 2022-09-08 | Stop reason: HOSPADM

## 2022-09-08 RX ORDER — FENTANYL CITRATE 50 UG/ML
INJECTION, SOLUTION INTRAMUSCULAR; INTRAVENOUS
Status: DISCONTINUED | OUTPATIENT
Start: 2022-09-08 | End: 2022-09-08 | Stop reason: HOSPADM

## 2022-09-08 RX ORDER — NITROGLYCERIN 5 MG/ML
VIAL (ML) INTRAVENOUS
Status: DISCONTINUED | OUTPATIENT
Start: 2022-09-08 | End: 2022-09-08 | Stop reason: HOSPADM

## 2022-09-08 RX ORDER — HEPARIN SODIUM 1000 [USP'U]/ML
INJECTION, SOLUTION INTRAVENOUS; SUBCUTANEOUS
Status: DISCONTINUED | OUTPATIENT
Start: 2022-09-08 | End: 2022-09-08 | Stop reason: HOSPADM

## 2022-09-08 RX ORDER — ACETAMINOPHEN 325 MG/1
650 TABLET ORAL EVERY 4 HOURS PRN
Status: DISCONTINUED | OUTPATIENT
Start: 2022-09-08 | End: 2022-09-08 | Stop reason: HOSPADM

## 2022-09-08 RX ORDER — ASPIRIN 325 MG
325 TABLET ORAL ONCE
Status: COMPLETED | OUTPATIENT
Start: 2022-09-08 | End: 2022-09-08

## 2022-09-08 RX ORDER — PROCHLORPERAZINE MALEATE 5 MG
10 TABLET ORAL EVERY 6 HOURS PRN
Status: DISCONTINUED | OUTPATIENT
Start: 2022-09-08 | End: 2022-09-08 | Stop reason: HOSPADM

## 2022-09-08 RX ORDER — NICARDIPINE HYDROCHLORIDE 2.5 MG/ML
INJECTION INTRAVENOUS
Status: DISCONTINUED | OUTPATIENT
Start: 2022-09-08 | End: 2022-09-08 | Stop reason: HOSPADM

## 2022-09-08 RX ORDER — ONDANSETRON 4 MG/1
4 TABLET, ORALLY DISINTEGRATING ORAL EVERY 6 HOURS PRN
Status: DISCONTINUED | OUTPATIENT
Start: 2022-09-08 | End: 2022-09-08 | Stop reason: HOSPADM

## 2022-09-08 RX ORDER — ASPIRIN 81 MG/1
81 TABLET, CHEWABLE ORAL DAILY
COMMUNITY

## 2022-09-08 RX ORDER — LIDOCAINE 40 MG/G
CREAM TOPICAL
Status: DISCONTINUED | OUTPATIENT
Start: 2022-09-08 | End: 2022-09-08 | Stop reason: HOSPADM

## 2022-09-08 RX ORDER — PROCHLORPERAZINE 25 MG
25 SUPPOSITORY, RECTAL RECTAL EVERY 12 HOURS PRN
Status: DISCONTINUED | OUTPATIENT
Start: 2022-09-08 | End: 2022-09-08 | Stop reason: HOSPADM

## 2022-09-08 RX ADMIN — SODIUM CHLORIDE: 9 INJECTION, SOLUTION INTRAVENOUS at 12:17

## 2022-09-08 RX ADMIN — ASPIRIN 81 MG CHEWABLE TABLET 243 MG: 81 TABLET CHEWABLE at 11:59

## 2022-09-08 ASSESSMENT — ACTIVITIES OF DAILY LIVING (ADL)
ADLS_ACUITY_SCORE: 33
ADLS_ACUITY_SCORE: 35

## 2022-09-08 NOTE — DISCHARGE INSTRUCTIONS
Going Home after Coronary Angiogram  For 24 hours:        Have an adult stay with you for 24 hours.        Relax and take it easy.        Drink plenty of fluids.        You may eat your normal diet, unless your doctor tells you otherwise.        Do NOT make any important or legal decisions.        Do NOT drive or operate machines at home or at work.        Do NOT drink alcohol.   Do NOT smoke.     Medicines:        If you have begun Plavix (clopidogrel), Effient (prasugrel), or Brilinta (ticagrelor), do not stop taking it until you talk to your heart doctor (cardiologist).        If you are on metformin (Glucophage), do not restart it until you have blood tests (within 2 to 3 days after discharge). When your doctor tells you it is safe, you may restart the metformin.        If you have stopped any other medicines, check with your nurse or provider about when to restart them.    Care of wrist or arm site:        It is normal to have soreness at the puncture site and mild tingling in your hand for up to 3 days.          Remove the Band-Aid after 24 hours. If there is minor oozing, apply another Band-aid and remove it after 12 hours.         Do NOT take a bath, or use a hot tub or pool for the next 48 hours. You may shower.         It is normal to have a small bruise.  There should not be a lump at the site.        Do not scrub the site.        Do not use lotion or powder near the puncture site for 3 days.        For 2 days, do not use your hand or arm to support your weight (such as rising from a chair) or bend your wrist (such as lifting a garage door).        For 2 days, do not lift more than 5 pounds or exercise your arm (tennis, golf or bowling).    If you start bleeding from the site in your arm: Sit down and press firmly on the site with your fingers for 10 minutes. Call your doctor as soon as you can.    Call 911 right away if you have bleeding that is heavy or does not stop.     Call your doctor if:        You  have a large or growing hard lump around the site.        The site is red, swollen, hot or tender.        Blood or fluid is draining from the site.        You have chills or a fever greater than 101 F (38 C).        Your leg or arm turns bluish, feels numb or cool.        You have hives, a rash or unusual itching.   Discharge Instructions Post Right Heart Cath    AFTER YOU GO HOME:  DO drink plenty of fluids  DO resume your regular diet and medications unless otherwise instructed by your Primary Physician  Do Not scrub the procedure site vigorously  No lotion or powder to the puncture site for 3 days    CALL YOUR PRIMARY PHYSICIAN IF: You may resume all normal activity.  Monitor neck site for bleeding, swelling, or voice changes. If you notice bleeding or swelling immediately apply pressure to the site and call number below to speak with Cardiology Fellow.  If you experience any changes in your breathing you should call your doctor immediately or come to the closest Emergency Department.  Do not drive yourself.    ADDITIONAL INSTRUCTIONS: Medications: You are to resume all home medications including anticoagulation therapy unless otherwise advised by your primary cardiologist or nurse coordinator.                                                 [facility] [place]:   [phone] (7 days a week)      Cardiology Fellow on call (24 hours per day) at [facility]:   [phone] (ask for Cardiology Fellow on call)

## 2022-09-08 NOTE — Clinical Note
dry, intact, no bleeding and no hematoma. 7Fr sheath removed from RIJ. Manual pressure held. Hemostasis obtained.   7Fr sheath removed from RRA. TR band in place with 14cc in balloon.

## 2022-09-08 NOTE — PRE-PROCEDURE
GENERAL PRE-PROCEDURE:   Procedure:  Angiogram and RHC  Date/Time:  9/8/2022 11:13 AM    Verbal consent obtained?: Yes    Written consent obtained?: Yes    Risks and benefits: Risks, benefits and alternatives were discussed    Consent given by:  Patient  Patient states understanding of procedure being performed: Yes    Patient's understanding of procedure matches consent: Yes    Procedure consent matches procedure scheduled: Yes    Expected level of sedation:  Moderate  Appropriately NPO:  Yes  ASA Class:  3  Mallampati  :  Grade 1- soft palate, uvula, tonsillar pillars, and posterior pharyngeal wall visible  Lungs:  Lungs clear with good breath sounds bilaterally  Heart:  Normal heart sounds and rate  History & Physical reviewed:  History and physical reviewed and no updates needed  Statement of review:  I have reviewed the lab findings, diagnostic data, medications, and the plan for sedation

## 2022-09-08 NOTE — PROGRESS NOTES
Patient arrived to , [address], via litter s/p coronary angiogram and RHC. Right neck site is covered with Primapore bandage and is soft and flat to palpation with no bleeding or hematoma. Right wrist has a TR band in place with 14 ml of air, will begin deflation at 1645. Site is CDI. Patient eating a small snack, blood sugar 87 per Dexcom. She is alert and oriented x 4 and able to make needs known. Will continue to monitor.

## 2022-09-09 LAB
ATRIAL RATE - MUSE: 84 BPM
DIASTOLIC BLOOD PRESSURE - MUSE: NORMAL MMHG
INTERPRETATION ECG - MUSE: NORMAL
P AXIS - MUSE: 39 DEGREES
PR INTERVAL - MUSE: 104 MS
QRS DURATION - MUSE: 80 MS
QT - MUSE: 366 MS
QTC - MUSE: 432 MS
R AXIS - MUSE: 45 DEGREES
SYSTOLIC BLOOD PRESSURE - MUSE: NORMAL MMHG
T AXIS - MUSE: 43 DEGREES
VENTRICULAR RATE- MUSE: 84 BPM

## 2022-09-09 NOTE — PROGRESS NOTES
TR band deflated and removed, site soft and flat to palpation with no bleeding or hematoma, patient does endorse tenderness. Right neck site is soft and flat to palpation with no bleeding or hematoma. Patient tolerated a regular diet and water. She ambulated around unit and in room. She voided spontaneously and without issue. She verbalizes understanding of all discharge instructions. PIV removed. Patient declined offer of wheelchair and ambulated to main entrance with this RN with all belongings. At time of discharge, right neck and right wrist sites remained soft and flat to palpation with no bleeding or understanding. All questions answered. Patient discharged to home with friend.

## 2022-09-12 ENCOUNTER — ANCILLARY PROCEDURE (OUTPATIENT)
Dept: CARDIOLOGY | Facility: CLINIC | Age: 37
End: 2022-09-12
Attending: INTERNAL MEDICINE
Payer: COMMERCIAL

## 2022-09-12 ENCOUNTER — LAB (OUTPATIENT)
Dept: LAB | Facility: CLINIC | Age: 37
End: 2022-09-12
Payer: COMMERCIAL

## 2022-09-12 DIAGNOSIS — R06.09 DOE (DYSPNEA ON EXERTION): ICD-10-CM

## 2022-09-12 DIAGNOSIS — Z94.0 KIDNEY REPLACED BY TRANSPLANT: ICD-10-CM

## 2022-09-12 DIAGNOSIS — Z79.899 ENCOUNTER FOR LONG-TERM CURRENT USE OF MEDICATION: ICD-10-CM

## 2022-09-12 DIAGNOSIS — Z48.298 AFTERCARE FOLLOWING ORGAN TRANSPLANT: ICD-10-CM

## 2022-09-12 LAB
AMYLASE SERPL-CCNC: 49 U/L (ref 30–110)
ANION GAP SERPL CALCULATED.3IONS-SCNC: 8 MMOL/L (ref 3–14)
BUN SERPL-MCNC: 35 MG/DL (ref 7–30)
CALCIUM SERPL-MCNC: 9.5 MG/DL (ref 8.5–10.1)
CHLORIDE BLD-SCNC: 110 MMOL/L (ref 94–109)
CO2 SERPL-SCNC: 22 MMOL/L (ref 20–32)
CREAT SERPL-MCNC: 1.36 MG/DL (ref 0.52–1.04)
ERYTHROCYTE [DISTWIDTH] IN BLOOD BY AUTOMATED COUNT: 12.4 % (ref 10–15)
GFR SERPL CREATININE-BSD FRML MDRD: 51 ML/MIN/1.73M2
GLUCOSE BLD-MCNC: 88 MG/DL (ref 70–99)
HCT VFR BLD AUTO: 29 % (ref 35–47)
HGB BLD-MCNC: 9.1 G/DL (ref 11.7–15.7)
LIPASE SERPL-CCNC: 113 U/L (ref 73–393)
LVEF ECHO: NORMAL
MCH RBC QN AUTO: 28.3 PG (ref 26.5–33)
MCHC RBC AUTO-ENTMCNC: 31.4 G/DL (ref 31.5–36.5)
MCV RBC AUTO: 90 FL (ref 78–100)
PLATELET # BLD AUTO: 257 10E3/UL (ref 150–450)
POTASSIUM BLD-SCNC: 4.8 MMOL/L (ref 3.4–5.3)
RBC # BLD AUTO: 3.22 10E6/UL (ref 3.8–5.2)
SODIUM SERPL-SCNC: 140 MMOL/L (ref 133–144)
TACROLIMUS BLD-MCNC: 6.4 UG/L (ref 5–15)
TME LAST DOSE: NORMAL H
TME LAST DOSE: NORMAL H
WBC # BLD AUTO: 4 10E3/UL (ref 4–11)

## 2022-09-12 PROCEDURE — 80197 ASSAY OF TACROLIMUS: CPT

## 2022-09-12 PROCEDURE — 85027 COMPLETE CBC AUTOMATED: CPT

## 2022-09-12 PROCEDURE — 82310 ASSAY OF CALCIUM: CPT

## 2022-09-12 PROCEDURE — 82150 ASSAY OF AMYLASE: CPT

## 2022-09-12 PROCEDURE — 83690 ASSAY OF LIPASE: CPT

## 2022-09-12 PROCEDURE — 36415 COLL VENOUS BLD VENIPUNCTURE: CPT

## 2022-09-12 PROCEDURE — 93306 TTE W/DOPPLER COMPLETE: CPT | Performed by: INTERNAL MEDICINE

## 2022-09-12 PROCEDURE — 80180 DRUG SCRN QUAN MYCOPHENOLATE: CPT

## 2022-09-13 ENCOUNTER — TELEPHONE (OUTPATIENT)
Dept: CARDIOLOGY | Facility: CLINIC | Age: 37
End: 2022-09-13

## 2022-09-13 LAB
MYCOPHENOLATE SERPL LC/MS/MS-MCNC: 5.94 MG/L (ref 1–3.5)
MYCOPHENOLATE-G SERPL LC/MS/MS-MCNC: 45.8 MG/L (ref 30–95)
TME LAST DOSE: ABNORMAL H
TME LAST DOSE: ABNORMAL H

## 2022-09-13 NOTE — TELEPHONE ENCOUNTER
Addendum 9/13/22:     Echo reveals normal LV systolic function without significant valvular pathology     Plan:     1.  EP consultation with Dr. Rodriguez for exertional lightheadedness, consideration autonomic dysfunction/tilt table testing     Called and reviewed Dr Raphael's addendum above and addendum regarding angiogram and right heart cath. She is agreeable to see Dr Rodriguez.   She is VERY anxious about the result in MyChart that states she has pulmonary hypertension.   She wants to know what the prognosis is and what she can do about it so she does not end up on oxygen   Advised I would send a message to Dr Raphael and call her back with his response. Also will have scheduling call her to set up appt with Dr Michael Simms, RN  Cardiology Care Coordinator  [facility]  Phone: [phone]

## 2022-09-13 NOTE — TELEPHONE ENCOUNTER
[facility] Call Center    Phone Message    May a detailed message be left on voicemail: yes     Reason for Call: Other: The patient wants to know if she needs any follow up post her Angio and Echo? If so please have in clinic staff call her to schedule     Action Taken: Other: Cardiology    Travel Screening: Not Applicable

## 2022-09-14 ENCOUNTER — TELEPHONE (OUTPATIENT)
Dept: CARDIOLOGY | Facility: CLINIC | Age: 37
End: 2022-09-14

## 2022-09-14 ENCOUNTER — MYC MEDICAL ADVICE (OUTPATIENT)
Dept: CARDIOLOGY | Facility: CLINIC | Age: 37
End: 2022-09-14

## 2022-09-14 NOTE — TELEPHONE ENCOUNTER
Osiel Raphael MD Balcome, Alondra WIN, RN  Caller: Unspecified (Yesterday, 12:25 PM)  RAMIREZ Ford,     Please schedule virtual video visit appointment next Tuesday to discuss test results in detail.       Called and spoke to patient. Appt made for Sept 9/20 with Dr Raphael.     Alondra Simms, RN  Cardiology Care Coordinator  [facility]  Phone: [phone]

## 2022-09-14 NOTE — TELEPHONE ENCOUNTER
How Severe Is It?: moderate THAIM to get pt scheduled for referral to  9/20 clinic    Is This A New Presentation, Or A Follow-Up?: Dandruff Additional History: Reports that her scalp is very itchy and will form scabs. But is not flaky. She has tried tea tree oil but it hasn’t helped much.

## 2022-09-14 NOTE — TELEPHONE ENCOUNTER
----- Message from Terese Davenport sent at 9/14/2022  8:33 AM CDT -----  Regarding: FW: appt with Michael    ----- Message -----  From: Terese Davenport  Sent: 9/14/2022   6:20 AM CDT  To: Alondra Simms, RN  Subject: RE: appt with Michael Rodriguez it trying to open a clinic day next week so, I'll try to get her in there.     Thanks!  Liliana  ----- Message -----  From: Alondra Simms, RN  Sent: 9/13/2022   2:39 PM CDT  To: Terese Davenprot  Subject: appt with Michael                                   EP consultation with Dr. Rodriguez for exertional lightheadedness, consideration autonomic dysfunction/tilt table testing per Dr Raphael please    Alondra

## 2022-09-16 ENCOUNTER — TELEPHONE (OUTPATIENT)
Dept: GASTROENTEROLOGY | Facility: CLINIC | Age: 37
End: 2022-09-16

## 2022-09-16 NOTE — TELEPHONE ENCOUNTER
Talked with pt and pt asked me to send message asking to be seen sooner then April of 2023. I sent message back to Jocelyn. Waiting to hear back

## 2022-09-19 ENCOUNTER — TELEPHONE (OUTPATIENT)
Dept: TRANSPLANT | Facility: CLINIC | Age: 37
End: 2022-09-19

## 2022-09-19 ENCOUNTER — TELEPHONE (OUTPATIENT)
Dept: GASTROENTEROLOGY | Facility: CLINIC | Age: 37
End: 2022-09-19

## 2022-09-19 ENCOUNTER — LAB (OUTPATIENT)
Dept: LAB | Facility: CLINIC | Age: 37
End: 2022-09-19
Payer: COMMERCIAL

## 2022-09-19 DIAGNOSIS — Z94.0 KIDNEY REPLACED BY TRANSPLANT: ICD-10-CM

## 2022-09-19 DIAGNOSIS — Z94.83 PANCREAS REPLACED BY TRANSPLANT (H): ICD-10-CM

## 2022-09-19 DIAGNOSIS — Z79.899 ENCOUNTER FOR LONG-TERM CURRENT USE OF MEDICATION: ICD-10-CM

## 2022-09-19 DIAGNOSIS — Z94.83 PANCREAS TRANSPLANTED (H): ICD-10-CM

## 2022-09-19 DIAGNOSIS — Z48.298 AFTERCARE FOLLOWING ORGAN TRANSPLANT: ICD-10-CM

## 2022-09-19 LAB
AMYLASE SERPL-CCNC: 52 U/L (ref 30–110)
ANION GAP SERPL CALCULATED.3IONS-SCNC: 6 MMOL/L (ref 3–14)
BUN SERPL-MCNC: 48 MG/DL (ref 7–30)
CALCIUM SERPL-MCNC: 9.6 MG/DL (ref 8.5–10.1)
CHLORIDE BLD-SCNC: 110 MMOL/L (ref 94–109)
CO2 SERPL-SCNC: 23 MMOL/L (ref 20–32)
CREAT SERPL-MCNC: 1.61 MG/DL (ref 0.52–1.04)
ERYTHROCYTE [DISTWIDTH] IN BLOOD BY AUTOMATED COUNT: 12.6 % (ref 10–15)
GFR SERPL CREATININE-BSD FRML MDRD: 42 ML/MIN/1.73M2
GLUCOSE BLD-MCNC: 89 MG/DL (ref 70–99)
HCT VFR BLD AUTO: 30.4 % (ref 35–47)
HGB BLD-MCNC: 9.7 G/DL (ref 11.7–15.7)
LIPASE SERPL-CCNC: 113 U/L (ref 73–393)
MCH RBC QN AUTO: 28.3 PG (ref 26.5–33)
MCHC RBC AUTO-ENTMCNC: 31.9 G/DL (ref 31.5–36.5)
MCV RBC AUTO: 89 FL (ref 78–100)
PLATELET # BLD AUTO: 288 10E3/UL (ref 150–450)
POTASSIUM BLD-SCNC: 4.4 MMOL/L (ref 3.4–5.3)
RBC # BLD AUTO: 3.43 10E6/UL (ref 3.8–5.2)
SODIUM SERPL-SCNC: 139 MMOL/L (ref 133–144)
TACROLIMUS BLD-MCNC: 14.2 UG/L (ref 5–15)
TME LAST DOSE: NORMAL H
TME LAST DOSE: NORMAL H
WBC # BLD AUTO: 4.2 10E3/UL (ref 4–11)

## 2022-09-19 PROCEDURE — 36415 COLL VENOUS BLD VENIPUNCTURE: CPT

## 2022-09-19 PROCEDURE — 80197 ASSAY OF TACROLIMUS: CPT

## 2022-09-19 PROCEDURE — 82150 ASSAY OF AMYLASE: CPT

## 2022-09-19 PROCEDURE — 80180 DRUG SCRN QUAN MYCOPHENOLATE: CPT

## 2022-09-19 PROCEDURE — 83690 ASSAY OF LIPASE: CPT

## 2022-09-19 PROCEDURE — 85014 HEMATOCRIT: CPT

## 2022-09-19 PROCEDURE — 80048 BASIC METABOLIC PNL TOTAL CA: CPT

## 2022-09-19 RX ORDER — TACROLIMUS 4 MG/1
12 TABLET, EXTENDED RELEASE ORAL
Qty: 90 TABLET | Refills: 11 | Status: SHIPPED | OUTPATIENT
Start: 2022-09-19 | End: 2022-10-12

## 2022-09-19 RX ORDER — TACROLIMUS 1 MG/1
TABLET, EXTENDED RELEASE ORAL
Qty: 90 TABLET | Refills: 11 | Status: SHIPPED | OUTPATIENT
Start: 2022-09-19 | End: 2022-10-12

## 2022-09-19 RX ORDER — TACROLIMUS 0.75 MG/1
TABLET, EXTENDED RELEASE ORAL
Qty: 60 TABLET | Refills: 11 | Status: SHIPPED | OUTPATIENT
Start: 2022-09-19 | End: 2022-10-26

## 2022-09-19 NOTE — TELEPHONE ENCOUNTER
[facility] Call Center    Phone Message    May a detailed message be left on voicemail: yes     Reason for Call: Other: Called Pt to reschedule and she said someone told her she was already rescheduled for 10/5 at 9am, please update in Epic so it shows in her MyChart as well, thanks     Action Taken: Other: GI    Travel Screening: Not Applicable

## 2022-09-19 NOTE — TELEPHONE ENCOUNTER
Patient Call: General  Route to LPN    Reason for call: Pt headed out of town tomorrow  Her Tacro level is high     Call back needed? Yes    Return Call Needed  Same as documented in contacts section  When to return call?: Greater than one day: Route standard priority

## 2022-09-19 NOTE — TELEPHONE ENCOUNTER
Asotin back from Jocelyn and she has a held slot on Oct 5th at 9am for this pt.     I gave pt a call and she has not answered so I will call pt back in a day or so to reschedule.

## 2022-09-19 NOTE — TELEPHONE ENCOUNTER
ISSUE:  Rise in creatinine.     Tac level elevated to 14, dose adjusted. Patient feeling well, hydrating well.   Will repeat labs on Friday.

## 2022-09-19 NOTE — TELEPHONE ENCOUNTER
ISSUE:   Tacrolimus XR level 14 on 9/19, goal 6-8, dose 12.5 mg daily.    PLAN:   Please call patient and confirm this was an accurate 24-hour trough. Verify Tacrolimus XR dose 12.5 mg daily. Confirm no new medications or illness. Confirm no missed doses. If accurate trough and accurate dose, decrease Tacrolimus XR dose to 12 mg daily and repeat labs in 4 days.    OUTCOME:   Spoke with patient, they confirm accurate trough level and current dose 12.5 mg daily. Patient confirmed dose change to 12 mg daily and to repeat labs in 4 days. Orders sent to [facility] pharmacy for dose change and lab for repeat labs. Patient voiced understanding of plan.

## 2022-09-20 ENCOUNTER — VIRTUAL VISIT (OUTPATIENT)
Dept: CARDIOLOGY | Facility: CLINIC | Age: 37
End: 2022-09-20
Payer: COMMERCIAL

## 2022-09-20 DIAGNOSIS — R06.02 SOB (SHORTNESS OF BREATH): Primary | ICD-10-CM

## 2022-09-20 LAB
MYCOPHENOLATE SERPL LC/MS/MS-MCNC: 4.79 MG/L (ref 1–3.5)
MYCOPHENOLATE-G SERPL LC/MS/MS-MCNC: 51.8 MG/L (ref 30–95)
TME LAST DOSE: ABNORMAL H
TME LAST DOSE: ABNORMAL H

## 2022-09-20 PROCEDURE — 99215 OFFICE O/P EST HI 40 MIN: CPT | Mod: 24 | Performed by: INTERNAL MEDICINE

## 2022-09-20 NOTE — PATIENT INSTRUCTIONS
Cardiac MRI with contrast   [facility] clinic in person visit at [facility] in approx 2 weeks  Followup TBD pending results and clinical progress

## 2022-09-20 NOTE — PROGRESS NOTES
"Marilyn is a 37 year old who is being evaluated via a billable video visit.      How would you like to obtain your AVS? MyChart  If the video visit is dropped, the invitation should be resent by: Text to cell phone: [phone]  Will anyone else be joining your video visit? No   Patient states is currently in the [facility]: Yes      The patient has been notified of following:     \"This video visit will be conducted via a call between you and your physician/provider. We have found that certain health care needs can be provided without the need for an in-person physical exam.  This service lets us provide the care you need with a video conversation.  If a prescription is necessary we can send it directly to your pharmacy.  If lab work is needed we can place an order for that and you can then stop by our lab to have the test done at a later time.    Video visits are billed at different rates depending on your insurance coverage.  Please reach out to your insurance provider with any questions.    If during the course of the call the physician/provider feels a video visit is not appropriate, you will not be charged for this service.\"    Patient has given verbal consent for video visit? Yes    How would you like to obtain your AVS? Mail    Video-Visit Details    Type of service:  Video Visit    Video Start Time:731am    Video End Time:803am    Total visit time including video visit, chart review, charting, coordination of care =54min    Originating Location (pt. Location):patient home      Distant Location (provider location):  home office    Platform used for Video Visit: Pattie    See dictation #[id number]    [facility]#:[id number]  "

## 2022-09-20 NOTE — NURSING NOTE
Chief Complaint   Patient presents with     Follow Up       Patient confirms medications and allergies are accurate via patients echeck in completion, and or denies any changes since last reviewed/verified.       Ashley Perez, Virtual Facilitator

## 2022-09-23 ENCOUNTER — TELEPHONE (OUTPATIENT)
Dept: TRANSPLANT | Facility: CLINIC | Age: 37
End: 2022-09-23

## 2022-09-23 ENCOUNTER — LAB (OUTPATIENT)
Dept: LAB | Facility: CLINIC | Age: 37
End: 2022-09-23
Payer: COMMERCIAL

## 2022-09-23 DIAGNOSIS — Z94.83 PANCREAS REPLACED BY TRANSPLANT (H): ICD-10-CM

## 2022-09-23 DIAGNOSIS — Z94.0 KIDNEY REPLACED BY TRANSPLANT: ICD-10-CM

## 2022-09-23 LAB
AMYLASE SERPL-CCNC: 50 U/L (ref 30–110)
ANION GAP SERPL CALCULATED.3IONS-SCNC: 5 MMOL/L (ref 3–14)
BUN SERPL-MCNC: 39 MG/DL (ref 7–30)
CALCIUM SERPL-MCNC: 9.5 MG/DL (ref 8.5–10.1)
CHLORIDE BLD-SCNC: 110 MMOL/L (ref 94–109)
CO2 SERPL-SCNC: 22 MMOL/L (ref 20–32)
CREAT SERPL-MCNC: 1.38 MG/DL (ref 0.52–1.04)
ERYTHROCYTE [DISTWIDTH] IN BLOOD BY AUTOMATED COUNT: 12.6 % (ref 10–15)
GFR SERPL CREATININE-BSD FRML MDRD: 50 ML/MIN/1.73M2
GLUCOSE BLD-MCNC: 105 MG/DL (ref 70–99)
HCT VFR BLD AUTO: 29 % (ref 35–47)
HGB BLD-MCNC: 9.4 G/DL (ref 11.7–15.7)
LIPASE SERPL-CCNC: 122 U/L (ref 73–393)
MCH RBC QN AUTO: 28.2 PG (ref 26.5–33)
MCHC RBC AUTO-ENTMCNC: 32.4 G/DL (ref 31.5–36.5)
MCV RBC AUTO: 87 FL (ref 78–100)
PLATELET # BLD AUTO: 284 10E3/UL (ref 150–450)
POTASSIUM BLD-SCNC: 4.6 MMOL/L (ref 3.4–5.3)
RBC # BLD AUTO: 3.33 10E6/UL (ref 3.8–5.2)
SODIUM SERPL-SCNC: 137 MMOL/L (ref 133–144)
TACROLIMUS BLD-MCNC: 7.2 UG/L (ref 5–15)
TME LAST DOSE: NORMAL H
TME LAST DOSE: NORMAL H
WBC # BLD AUTO: 3.6 10E3/UL (ref 4–11)

## 2022-09-23 PROCEDURE — 36415 COLL VENOUS BLD VENIPUNCTURE: CPT

## 2022-09-23 PROCEDURE — 80197 ASSAY OF TACROLIMUS: CPT

## 2022-09-23 PROCEDURE — 80048 BASIC METABOLIC PNL TOTAL CA: CPT

## 2022-09-23 PROCEDURE — 83690 ASSAY OF LIPASE: CPT

## 2022-09-23 PROCEDURE — 82150 ASSAY OF AMYLASE: CPT

## 2022-09-23 PROCEDURE — 85027 COMPLETE CBC AUTOMATED: CPT

## 2022-09-28 ENCOUNTER — OFFICE VISIT (OUTPATIENT)
Dept: CARDIOLOGY | Facility: CLINIC | Age: 37
End: 2022-09-28
Attending: NURSE PRACTITIONER
Payer: COMMERCIAL

## 2022-09-28 VITALS
HEART RATE: 85 BPM | DIASTOLIC BLOOD PRESSURE: 99 MMHG | SYSTOLIC BLOOD PRESSURE: 155 MMHG | OXYGEN SATURATION: 100 % | WEIGHT: 128.2 LBS | BODY MASS INDEX: 20.69 KG/M2

## 2022-09-28 DIAGNOSIS — R00.0 RACING HEART BEAT: Primary | ICD-10-CM

## 2022-09-28 DIAGNOSIS — Z48.298 TRANSPLANT FOLLOW-UP: ICD-10-CM

## 2022-09-28 DIAGNOSIS — R42 LIGHTHEADEDNESS: ICD-10-CM

## 2022-09-28 DIAGNOSIS — R00.0 RACING HEART BEAT: ICD-10-CM

## 2022-09-28 DIAGNOSIS — R06.09 DOE (DYSPNEA ON EXERTION): ICD-10-CM

## 2022-09-28 DIAGNOSIS — R06.02 SOB (SHORTNESS OF BREATH): ICD-10-CM

## 2022-09-28 PROCEDURE — G0463 HOSPITAL OUTPT CLINIC VISIT: HCPCS

## 2022-09-28 PROCEDURE — 93246 EXT ECG>7D<15D RECORDING: CPT

## 2022-09-28 PROCEDURE — 93248 EXT ECG>7D<15D REV&INTERPJ: CPT | Performed by: INTERNAL MEDICINE

## 2022-09-28 PROCEDURE — 99215 OFFICE O/P EST HI 40 MIN: CPT | Mod: 25 | Performed by: NURSE PRACTITIONER

## 2022-09-28 RX ORDER — FUROSEMIDE 20 MG
20 TABLET ORAL DAILY
Qty: 45 TABLET | Refills: 0 | Status: SHIPPED | OUTPATIENT
Start: 2022-09-28

## 2022-09-28 ASSESSMENT — PAIN SCALES - GENERAL: PAINLEVEL: NO PAIN (0)

## 2022-09-28 NOTE — LETTER
9/28/2022      RE: Marilyn Ortega  [address]  [facility] [address]       Dear Colleague,    Thank you for the opportunity to participate in the care of your patient, Marilyn Ortega, at the [facility] [place] at [facility]. Please see a copy of my visit note below.          [facility]      HPI:   Ms. Ortega is a 37 year old female with medical history pertinent for HTN, T1DM, s/p kidney and pancrease transplant (11/2021) with rejection and baseline GFR 44 mL per minute as of 08/29/2022, heterozygous factor V Leiden mutation, IBS, constipation and gastroparesis, osteopenia, and left femoral neck fracture s/p ORIF. She was recently seen by Dr. Raphael for exertional lightheadedness and shortness of breath. Cardiac work-up has included a coronary angiogram that showed normal coronaries and RHC with mildly elevated left sided filling pressures. Ms. Ortega is seen in CORE clinic for initial evaluation and further management of fluid status and symptoms.     Prior to Ms. Ortega's visits with Dr. Raphael in early and mid-September, she has been without any remarkable cardiac history. As mentioned, she was evaluated for exertional lightheadedness and SOB. Her RHC showed mildly elevated pulmonary HTN (mean PA 24), mildly elevated left sided pressures (PCWP 17), mildly elevated right sided pressures (RA 10), and hyperdynamic CO (6.1). Her echo was unremarkable and showed preserved LV function and normal diastolic function. At follow up with Dr. Raphael to discuss test results, she reported significant weight fluctuations with swelling. Her weight had been up to 130 lbs with associated edema. Following diuresis with lasix 20 mg every other day, weight decreased down to 123 lb.  With diuresis, Ms. Ortega reports better exercise tolerance.     Today, Ms. Ortega reports ongoing weight fluctuations, ranging from 123 lb to 130 lb. She is 128 lbs today and  reports increased swelling in her lower extremities, as well facial edema. States that sometimes in the morning she has so much swelling that it is difficult to open her eyes. She also reports ongoing fatigue, episodes of dizziness/lightheadedness with exertion, such as when she climbs a flight of stairs, and periods where she feels like her heart is racing without exertion. She is currently taking lasix 20 mg M/W/F. Does not feel that this dose is sufficiently helping.     She has normally been quite active and physically fit. A year ago she was running 3-4 miles several days/week. She now reports severely reduced exercise tolerance and cannot tolerate running. At present, she walks on her treadmill at an incline of 10-12 at 3.5 mph. She reports that this is aerobically challenging, however she notes that  HR does not go above 110 bpm, despite hard exertion.     Ms. Ortega is hypertensive in clinic today, /99. She reports that she monitors BP at home and typically her BP ranges 110-120s/70s. Review of previous clinic readings have been well controlled. On occasion she also reports hypotension with reading as low as 70/46.       Cardiac Medications:   - Aspirin 81 mg daily  - Lasix 20 mg M/W/F       PAST MEDICAL HISTORY:  Past Medical History:   Diagnosis Date     Anemia 12/9/2021     C. difficile diarrhea 2013     CKD (chronic kidney disease) stage 4, GFR 15-29 ml/min (H)      Complication of transplanted kidney 5/23/2022     Gastroparesis      Gluten intolerance      Heterozygous factor V Leiden mutation (H)      HTN (hypertension)      Infection due to 2019 novel coronavirus 11/2020     Osteomyelitis (H) 2013     Shingles 2013     Type 1 diabetes mellitus (H) 2000       FAMILY HISTORY:  Family History   Problem Relation Age of Onset     Diabetes Paternal Grandfather      Macular Degeneration Mother      Arthritis Maternal Grandmother      Hypertension Maternal Grandmother      Cancer - colorectal Maternal  Grandfather      Glaucoma No family hx of        SOCIAL HISTORY:  Social History     Socioeconomic History     Marital status: Single   Tobacco Use     Smoking status: Never Smoker     Smokeless tobacco: Never Used   Substance and Sexual Activity     Alcohol use: Yes     Comment: socially     Drug use: No     Sexual activity: Yes       CURRENT MEDICATIONS:  aspirin (ASA) 81 MG chewable tablet, Take 81 mg by mouth daily  biotin 1000 MCG TABS tablet, Take 1,000 mcg by mouth daily  cetirizine (ZYRTEC) 10 MG tablet, Take 1 tablet (10 mg) by mouth every other day  cholecalciferol 25 MCG (1000 UT) TABS, Take 2,000 Units by mouth every other day   Continuous Blood Gluc Sensor (DEXCOM G6 SENSOR) MISC, Use as directed for continuous glucose monitoring.  Change sensor every 10 days.  Continuous Blood Gluc Transmit (DEXCOM G6 TRANSMITTER) MISC, Use as directed for continuous glucose monitoring.  Change every 3 months.  docusate sodium (COLACE) 100 MG capsule, Take 200 mg by mouth daily before breakfast  famotidine (PEPCID) 20 MG tablet, Take 20 mg by mouth 2 times daily  furosemide (LASIX) 20 MG tablet, Take 1 tablet (20 mg) by mouth as needed (three time weekly prn for ankle swelling)  glucose blood VI test strips strip, 4 times daily.  insulin lispro (HUMALOG KWIKPEN) 100 UNIT/ML (1 unit dial) KWIKPEN, BEFORE MEALS TID use 2 unit rapid-acting insulin to correct for every 50 mg/dL that blood glucose is above 150 mg/dL. 150-199 mg/dL: add 2 units, 200-249 mg/dL: add 4 units, 250-299 mg/dL: add 6 units, 300-349 mg/dL: add 8 units, 350-399 mg/dL: add 10 units, >400 mg/dL: add 12 units and call your doctor.  insulin pen needle (31G X 5 MM) 31G X 5 MM miscellaneous, Use 3 pen needles daily or as directed. Patient/RPH may decide gauge and length.  mycophenolic acid (GENERIC EQUIVALENT) 360 MG EC tablet, Take 1 tablet (360 mg) by mouth 2 times daily  ondansetron (ZOFRAN) 4 MG tablet, Take 1 tablet (4 mg) by mouth every 8 hours as  needed for nausea  Prucalopride Succinate (MOTEGRITY) 2 MG TABS, Take 1 tablet (2 mg) by mouth daily  sulfamethoxazole-trimethoprim (BACTRIM) 400-80 MG tablet, Take 1 tablet by mouth daily  tacrolimus (ENVARSUS XR) 0.75 MG 24 hr tablet, HOLD for dose adjustment.  tacrolimus (ENVARSUS XR) 1 MG 24 hr tablet, HOLD for dose adjustment.  tacrolimus (ENVARSUS XR) 4 MG 24 hr tablet, Take 3 tablets (12 mg) by mouth every morning (before breakfast)    bevacizumab (AVASTIN) intravitreal inj 1.25 mg  bevacizumab (AVASTIN) intravitreal inj 1.25 mg        ROS:   Refer to HPI    EXAM:  There were no vitals taken for this visit.  GENERAL: Appears comfortable, in no acute distress.   HEENT: Eye symmetrical, no discharge or icterus bilaterally. Mucous membranes moist and without lesions.  CV: RRR, +S1S2, no murmur, rub, or gallop. JVP below clavicle sitting upright.   RESPIRATORY: Respirations regular, even, and unlabored. Lungs CTA throughout.   GI: Soft and non distended with normoactive bowel sounds present in all quadrants. No tenderness, rebound, guarding. No hepatomegaly.   EXTREMITIES: Trace peripheral edema. 2+ bilateral pedal pulses.   NEUROLOGIC: Alert and oriented x 3. No focal deficits.   MUSCULOSKELETAL: No joint swelling or tenderness.   SKIN: No jaundice. No rashes or lesions.     Labs, reviewed with patient in clinic today:  CBC RESULTS:  Lab Results   Component Value Date    WBC 3.6 (L) 09/23/2022    WBC 9.6 05/24/2012    RBC 3.33 (L) 09/23/2022    RBC 4.03 05/24/2012    HGB 9.4 (L) 09/23/2022    HGB 11.8 05/24/2012    HCT 29.0 (L) 09/23/2022    HCT 35.9 05/24/2012    MCV 87 09/23/2022    MCV 89 05/24/2012    MCH 28.2 09/23/2022    MCH 29.3 05/24/2012    MCHC 32.4 09/23/2022    MCHC 32.9 05/24/2012    RDW 12.6 09/23/2022    RDW 12.6 05/24/2012     09/23/2022     05/24/2012       CMP RESULTS:  Lab Results   Component Value Date     09/23/2022     11/15/2021     06/08/2012    POTASSIUM  4.6 09/23/2022    POTASSIUM 3.1 (L) 11/15/2021    POTASSIUM 5.0 05/05/2021    CHLORIDE 110 (H) 09/23/2022    CHLORIDE 101 06/08/2012    CO2 22 09/23/2022    CO2 30 06/08/2012    ANIONGAP 5 09/23/2022    ANIONGAP 9 06/08/2012     (H) 09/23/2022     (H) 05/05/2021    BUN 39 (H) 09/23/2022    BUN 27 (H) 06/08/2012    CR 1.38 (H) 09/23/2022    CR 2.69 (H) 05/05/2021    GFRESTIMATED 50 (L) 09/23/2022    GFRESTIMATED 22 (L) 05/05/2021    GFRESTBLACK 88 06/08/2012    STEPHANIE 9.5 09/23/2022    STEPHANIE 8.9 07/05/2012    BILITOTAL 0.3 05/23/2022    BILITOTAL 0.3 06/08/2012    ALBUMIN 4.0 07/14/2022    ALBUMIN 4.2 06/08/2012    ALKPHOS 84 05/23/2022    ALKPHOS 96 06/08/2012    ALT 20 05/23/2022    ALT 13 06/08/2012    AST 13 05/23/2022    AST 33 06/08/2012        INR RESULTS:  Lab Results   Component Value Date    INR 0.97 09/08/2022       Lab Results   Component Value Date    MAG 1.6 (L) 08/31/2022    MAG 2.0 07/05/2012     No results found for: NTBNPI  No results found for: NTBNP    Cardiac Diagnostics:    9/8/2022 Angiography/RHC    Mild elevated pulmonary hypertension.    Left sided filling pressures are mildly elevated.    Hyperdynamic cardiac output level.    Right sided filling pressures are mildly elevated.    Hemodynamic data has been modified in Epic per physician review.     Angiographically normal coronaries.       9/12/2022 ECHO  Interpretation Summary     Left ventricular size, wall motion and function are normal. The ejection  fraction is 60-65%.  Right ventricular function, chamber size, wall motion, and thickness are  normal.  The inferior vena cava was normal in size with preserved respiratory  variability. No pericardial effusion is present.     There has been no change.    7/21/2022 Exercise Stress Test  Interpretation Summary     A moderately-high workload was achieved.  The EKG portion of this stress test was negative for inducible ischemia (see  echo results below).  The patient exhibited no chest  pain during exercise.  Suboptimal blood pressure response to exercise.          Assessment and Plan:   Ms. Ortega is a 37 year old female with medical history pertinent for HTN, T1DM, s/p kidney and pancrease transplant (11/2021) with rejection and baseline GFR 44 mL per minute as of 08/29/2022, heterozygous factor V Leiden mutation, IBS, constipation and gastroparesis, osteopenia, and left femoral neck fracture s/p ORIF. She was recently seen by Dr. Raphael for exertional lightheadedness and shortness of breath. Cardiac work-up has included a coronary angiogram that showed normal coronaries and RHC with mildly elevated left sided filling pressures. Ms. Ortega is seen in CORE clinic for initial evaluation and further management of fluid status and symptoms.     At present, Ms. Ortega does not carry the diagnosis of heart failure. Her recent echo shows preserved LVEF and normal RV function. Her RHC was notable for elevated PCWP, which is likely contributing to her shortness of breath. Further, her symptoms of exertional lightheadedness, fatigue, and SOB may also be effected by her mild renal insufficiency and possible autonomic dysfunction. Unclear if she is experiencing palpitations, however she does note racing heart beat at nighttime. Additionally, while review of exercise stress test shows appropriate HR response to exercise, she reports a max HR of 110 with hard physical exertion on treadmill at home. Will plan for 10 day zio to assess HR trends. Chronotropic incompetence is not high on my differentials, but she has not had any prior evaluation.   Her symptoms and functional capacity historically has improved with diuresis. She will undergo cMRI with contrast in the near future to rule out any infiltrative process or other abnormal structural pathology.       # Exertional lightheadedness and SOB 2/2 to mildly elevated left sided filling pressures and possible autonomic dysfunction  - RHC 8/2022 with PCWP 17  -  Trial lasix 20 mg daily with BMP in 1 week   - EDW ~ 122lbs; currently ranges 125-130 lbs  - Consider HRS enrollment for weight monitoring and diuretic titration   - If SOB worsens or she has increased diuretic needs, could consider repeat RHC  - Counseled on things to consider and or avoid 2/2 to LH: stand slowly and give self time to adapt to position changes, avoid overheating/hot showers, hydrate, compression stockings, avoid alcohol      # # DDKT (SPK); Stable   - Cr with significant variability; baseline 1.0-1.3 and occasional uptrend to 1.4-1.5, peak 1.6-1.7(around the time she had borderline rejection)  - Cr 1.3 (9/23/22)  - Routine follow-up with transplant nephrology    - Tacro level 5-8, this level was prematurely reduced due to worsening renal function              Follow up:  - Dr. Diez 10/11/22  - cMRI 10/31/22  - BMP in 1 week   - CORE in 6 weeks      Chart review time today: 20 minutes  Visit time today: 35 minutes  Total time spent today: 55 minutes        Hope Ruvalcaba DNP, NP-C  Advance Heart Failure  09/28/22          CC        Please do not hesitate to contact me if you have any questions/concerns.     Sincerely,     BETITO Rasmussen CNP

## 2022-09-28 NOTE — PATIENT INSTRUCTIONS
Take your medicines every day, as directed    Changes made today:  Lasix 20 mg daily  Zio patch for 10 days  Labs next week as scheduled   Monitor Your Weight and Symptoms    Contact us if you:    Gain 2 pounds in one day or 5 pounds in one week  Feel more short of breath  Notice more leg swelling  Feel lightheadeded   Change your lifestyle    Limit Salt or Sodium:  2000 mg  Limit Fluids:  2000 mL or approximately 64 ounces  Eat a Heart Healthy Diet  Low in saturated fats  Stay Active:  Aim to move at least 150 minutes every  week         To Contact us    During Business Hours:  [phone], option # 1      After hours, weekends or holidays:   [phone], Option #4  Ask to speak to the On-Call Cardiologist. Inform them you are a CORE/heart failure patient at the [facility].     Use [employer] allows you to communicate directly with your heart team through secure messaging.  [employer] can be accessed any time on your phone, computer, or tablet.  If you need assistance, we'd be happy to help!         Keep your Heart Appointments:    CORE in 6 weeks

## 2022-09-28 NOTE — LETTER
Date:September 29, 2022      Patient was self referred, no letter generated. Do not send.        [facility] Health Information

## 2022-09-28 NOTE — PROGRESS NOTES
[facility]      HPI:   Ms. Ortega is a 37 year old female with medical history pertinent for HTN, T1DM, s/p kidney and pancrease transplant (11/2021) with rejection and baseline GFR 44 mL per minute as of 08/29/2022, heterozygous factor V Leiden mutation, IBS, constipation and gastroparesis, osteopenia, and left femoral neck fracture s/p ORIF. She was recently seen by Dr. Raphael for exertional lightheadedness and shortness of breath. Cardiac work-up has included a coronary angiogram that showed normal coronaries and RHC with mildly elevated left sided filling pressures. Ms. Ortega is seen in [facility] for initial evaluation and further management of fluid status and symptoms.     Prior to Ms. Ortega's visits with Dr. Raphael in early and mid-September, she has been without any remarkable cardiac history. As mentioned, she was evaluated for exertional lightheadedness and SOB. Her RHC showed mildly elevated pulmonary HTN (mean PA 24), mildly elevated left sided pressures (PCWP 17), mildly elevated right sided pressures (RA 10), and hyperdynamic CO (6.1). Her echo was unremarkable and showed preserved LV function and normal diastolic function. At follow up with Dr. Raphael to discuss test results, she reported significant weight fluctuations with swelling. Her weight had been up to 130 lbs with associated edema. Following diuresis with lasix 20 mg every other day, weight decreased down to 123 lb.  With diuresis, Ms. Ortega reports better exercise tolerance.     Today, Ms. Ortega reports ongoing weight fluctuations, ranging from 123 lb to 130 lb. She is 128 lbs today and reports increased swelling in her lower extremities, as well facial edema. States that sometimes in the morning she has so much swelling that it is difficult to open her eyes. She also reports ongoing fatigue, episodes of dizziness/lightheadedness with exertion, such as when she climbs a flight of stairs, and periods where she feels like her heart is  racing without exertion. She is currently taking lasix 20 mg M/W/F. Does not feel that this dose is sufficiently helping.     She has normally been quite active and physically fit. A year ago she was running 3-4 miles several days/week. She now reports severely reduced exercise tolerance and cannot tolerate running. At present, she walks on her treadmill at an incline of 10-12 at 3.5 mph. She reports that this is aerobically challenging, however she notes that  HR does not go above 110 bpm, despite hard exertion.     Ms. Ortega is hypertensive in clinic today, /99. She reports that she monitors BP at home and typically her BP ranges 110-120s/70s. Review of previous clinic readings have been well controlled. On occasion she also reports hypotension with reading as low as 70/46.       Cardiac Medications:   - Aspirin 81 mg daily  - Lasix 20 mg M/W/F       PAST MEDICAL HISTORY:  Past Medical History:   Diagnosis Date     Anemia 12/9/2021     C. difficile diarrhea 2013     CKD (chronic kidney disease) stage 4, GFR 15-29 ml/min (H)      Complication of transplanted kidney 5/23/2022     Gastroparesis      Gluten intolerance      Heterozygous factor V Leiden mutation (H)      HTN (hypertension)      Infection due to 2019 novel coronavirus 11/2020     Osteomyelitis (H) 2013     Shingles 2013     Type 1 diabetes mellitus (H) 2000       FAMILY HISTORY:  Family History   Problem Relation Age of Onset     Diabetes Paternal Grandfather      Macular Degeneration Mother      Arthritis Maternal Grandmother      Hypertension Maternal Grandmother      Cancer - colorectal Maternal Grandfather      Glaucoma No family hx of        SOCIAL HISTORY:  Social History     Socioeconomic History     Marital status: Single   Tobacco Use     Smoking status: Never Smoker     Smokeless tobacco: Never Used   Substance and Sexual Activity     Alcohol use: Yes     Comment: socially     Drug use: No     Sexual activity: Yes       CURRENT  MEDICATIONS:  aspirin (ASA) 81 MG chewable tablet, Take 81 mg by mouth daily  biotin 1000 MCG TABS tablet, Take 1,000 mcg by mouth daily  cetirizine (ZYRTEC) 10 MG tablet, Take 1 tablet (10 mg) by mouth every other day  cholecalciferol 25 MCG (1000 UT) TABS, Take 2,000 Units by mouth every other day   Continuous Blood Gluc Sensor (DEXCOM G6 SENSOR) MISC, Use as directed for continuous glucose monitoring.  Change sensor every 10 days.  Continuous Blood Gluc Transmit (DEXCOM G6 TRANSMITTER) MISC, Use as directed for continuous glucose monitoring.  Change every 3 months.  docusate sodium (COLACE) 100 MG capsule, Take 200 mg by mouth daily before breakfast  famotidine (PEPCID) 20 MG tablet, Take 20 mg by mouth 2 times daily  furosemide (LASIX) 20 MG tablet, Take 1 tablet (20 mg) by mouth as needed (three time weekly prn for ankle swelling)  glucose blood VI test strips strip, 4 times daily.  insulin lispro (HUMALOG KWIKPEN) 100 UNIT/ML (1 unit dial) KWIKPEN, BEFORE MEALS TID use 2 unit rapid-acting insulin to correct for every 50 mg/dL that blood glucose is above 150 mg/dL. 150-199 mg/dL: add 2 units, 200-249 mg/dL: add 4 units, 250-299 mg/dL: add 6 units, 300-349 mg/dL: add 8 units, 350-399 mg/dL: add 10 units, >400 mg/dL: add 12 units and call your doctor.  insulin pen needle (31G X 5 MM) 31G X 5 MM miscellaneous, Use 3 pen needles daily or as directed. Patient/RPH may decide gauge and length.  mycophenolic acid (GENERIC EQUIVALENT) 360 MG EC tablet, Take 1 tablet (360 mg) by mouth 2 times daily  ondansetron (ZOFRAN) 4 MG tablet, Take 1 tablet (4 mg) by mouth every 8 hours as needed for nausea  Prucalopride Succinate (MOTEGRITY) 2 MG TABS, Take 1 tablet (2 mg) by mouth daily  sulfamethoxazole-trimethoprim (BACTRIM) 400-80 MG tablet, Take 1 tablet by mouth daily  tacrolimus (ENVARSUS XR) 0.75 MG 24 hr tablet, HOLD for dose adjustment.  tacrolimus (ENVARSUS XR) 1 MG 24 hr tablet, HOLD for dose adjustment.  tacrolimus  (ENVARSUS XR) 4 MG 24 hr tablet, Take 3 tablets (12 mg) by mouth every morning (before breakfast)    bevacizumab (AVASTIN) intravitreal inj 1.25 mg  bevacizumab (AVASTIN) intravitreal inj 1.25 mg        ROS:   Refer to HPI    EXAM:  There were no vitals taken for this visit.  GENERAL: Appears comfortable, in no acute distress.   HEENT: Eye symmetrical, no discharge or icterus bilaterally. Mucous membranes moist and without lesions.  CV: RRR, +S1S2, no murmur, rub, or gallop. JVP below clavicle sitting upright.   RESPIRATORY: Respirations regular, even, and unlabored. Lungs CTA throughout.   GI: Soft and non distended with normoactive bowel sounds present in all quadrants. No tenderness, rebound, guarding. No hepatomegaly.   EXTREMITIES: Trace peripheral edema. 2+ bilateral pedal pulses.   NEUROLOGIC: Alert and oriented x 3. No focal deficits.   MUSCULOSKELETAL: No joint swelling or tenderness.   SKIN: No jaundice. No rashes or lesions.     Labs, reviewed with patient in clinic today:  CBC RESULTS:  Lab Results   Component Value Date    WBC 3.6 (L) 09/23/2022    WBC 9.6 05/24/2012    RBC 3.33 (L) 09/23/2022    RBC 4.03 05/24/2012    HGB 9.4 (L) 09/23/2022    HGB 11.8 05/24/2012    HCT 29.0 (L) 09/23/2022    HCT 35.9 05/24/2012    MCV 87 09/23/2022    MCV 89 05/24/2012    MCH 28.2 09/23/2022    MCH 29.3 05/24/2012    MCHC 32.4 09/23/2022    MCHC 32.9 05/24/2012    RDW 12.6 09/23/2022    RDW 12.6 05/24/2012     09/23/2022     05/24/2012       CMP RESULTS:  Lab Results   Component Value Date     09/23/2022     11/15/2021     06/08/2012    POTASSIUM 4.6 09/23/2022    POTASSIUM 3.1 (L) 11/15/2021    POTASSIUM 5.0 05/05/2021    CHLORIDE 110 (H) 09/23/2022    CHLORIDE 101 06/08/2012    CO2 22 09/23/2022    CO2 30 06/08/2012    ANIONGAP 5 09/23/2022    ANIONGAP 9 06/08/2012     (H) 09/23/2022     (H) 05/05/2021    BUN 39 (H) 09/23/2022    BUN 27 (H) 06/08/2012    CR 1.38 (H)  09/23/2022    CR 2.69 (H) 05/05/2021    GFRESTIMATED 50 (L) 09/23/2022    GFRESTIMATED 22 (L) 05/05/2021    GFRESTBLACK 88 06/08/2012    STEPHANIE 9.5 09/23/2022    STEPHANIE 8.9 07/05/2012    BILITOTAL 0.3 05/23/2022    BILITOTAL 0.3 06/08/2012    ALBUMIN 4.0 07/14/2022    ALBUMIN 4.2 06/08/2012    ALKPHOS 84 05/23/2022    ALKPHOS 96 06/08/2012    ALT 20 05/23/2022    ALT 13 06/08/2012    AST 13 05/23/2022    AST 33 06/08/2012        INR RESULTS:  Lab Results   Component Value Date    INR 0.97 09/08/2022       Lab Results   Component Value Date    MAG 1.6 (L) 08/31/2022    MAG 2.0 07/05/2012     No results found for: NTBNPI  No results found for: NTBNP    Cardiac Diagnostics:    9/8/2022 Angiography/RHC    Mild elevated pulmonary hypertension.    Left sided filling pressures are mildly elevated.    Hyperdynamic cardiac output level.    Right sided filling pressures are mildly elevated.    Hemodynamic data has been modified in Epic per physician review.     Angiographically normal coronaries.       9/12/2022 ECHO  Interpretation Summary     Left ventricular size, wall motion and function are normal. The ejection  fraction is 60-65%.  Right ventricular function, chamber size, wall motion, and thickness are  normal.  The inferior vena cava was normal in size with preserved respiratory  variability. No pericardial effusion is present.     There has been no change.    7/21/2022 Exercise Stress Test  Interpretation Summary     A moderately-high workload was achieved.  The EKG portion of this stress test was negative for inducible ischemia (see  echo results below).  The patient exhibited no chest pain during exercise.  Suboptimal blood pressure response to exercise.          Assessment and Plan:   Ms. Ortega is a 37 year old female with medical history pertinent for HTN, T1DM, s/p kidney and pancrease transplant (11/2021) with rejection and baseline GFR 44 mL per minute as of 08/29/2022, heterozygous factor V Leiden mutation, IBS,  constipation and gastroparesis, osteopenia, and left femoral neck fracture s/p ORIF. She was recently seen by Dr. Raphael for exertional lightheadedness and shortness of breath. Cardiac work-up has included a coronary angiogram that showed normal coronaries and RHC with mildly elevated left sided filling pressures. Ms. Ortega is seen in [facility] for initial evaluation and further management of fluid status and symptoms.     At present, Ms. Ortega does not carry the diagnosis of heart failure. Her recent echo shows preserved LVEF and normal RV function. Her RHC was notable for elevated PCWP, which is likely contributing to her shortness of breath. Further, her symptoms of exertional lightheadedness, fatigue, and SOB may also be effected by her mild renal insufficiency and possible autonomic dysfunction. Unclear if she is experiencing palpitations, however she does note racing heart beat at nighttime. Additionally, while review of exercise stress test shows appropriate HR response to exercise, she reports a max HR of 110 with hard physical exertion on treadmill at home. Will plan for 10 day zio to assess HR trends. Chronotropic incompetence is not high on my differentials, but she has not had any prior evaluation.   Her symptoms and functional capacity historically has improved with diuresis. She will undergo cMRI with contrast in the near future to rule out any infiltrative process or other abnormal structural pathology.       # Exertional lightheadedness and SOB 2/2 to mildly elevated left sided filling pressures and possible autonomic dysfunction  - RHC 8/2022 with PCWP 17  - Trial lasix 20 mg daily with BMP in 1 week   - EDW ~ 122lbs; currently ranges 125-130 lbs  - Consider HRS enrollment for weight monitoring and diuretic titration   - If SOB worsens or she has increased diuretic needs, could consider repeat RHC  - Counseled on things to consider and or avoid 2/2 to LH: stand slowly and give self time to adapt  to position changes, avoid overheating/hot showers, hydrate, compression stockings, avoid alcohol      # # DDKT (SPK); Stable   - Cr with significant variability; baseline 1.0-1.3 and occasional uptrend to 1.4-1.5, peak 1.6-1.7(around the time she had borderline rejection)  - Cr 1.3 (9/23/22)  - Routine follow-up with transplant nephrology    - Tacro level 5-8, this level was prematurely reduced due to worsening renal function              Follow up:  - Dr. Diez 10/11/22  - cMRI 10/31/22  - BMP in 1 week   - [facility] in 6 weeks      Chart review time today: 20 minutes  Visit time today: 35 minutes  Total time spent today: 55 minutes        Hope Ruvalcaba DNP, NP-C  [facility]  09/28/22          CC

## 2022-09-28 NOTE — NURSING NOTE
Labs: Patient was given results of the laboratory testing obtained today. Patient was instructed to return for the next laboratory testing in 1 week. Patient demonstrated understanding of this information and agreed to call with further questions or concerns.     Med Reconcile: Reviewed and verified all current medications with the patient. The updated medication list was printed and given to the patient.    Return Appointment: Patient given instructions regarding scheduling next clinic visit. Patient demonstrated understanding of this information and agreed to call with further questions or concerns. [facility] in 6 weeks.    Medication Change: Patient was educated regarding prescribed medication change, including discussion of the indication, administration, side effects, and when to report to MD or RN. Patient demonstrated understanding of this information and agreed to call with further questions or concerns. Take lasix 20 mg daily.    Patient stated she understood all health information given and agreed to call with further questions or concerns.    Steph Lobo, EDWARD

## 2022-09-28 NOTE — NURSING NOTE
Chief Complaint   Patient presents with     Follow Up     9/28/22: NEW [facility]: Chest pain, exertional lightheadedness/shortness of breath, labs done 9/23 and again 9/29 if needed     Vitals were taken and medications reconciled.    Austin Baldwin, EMT  12:55 PM

## 2022-09-28 NOTE — PROGRESS NOTES
Per Hope Smith patient to have 10 day zio patch monitor placed.  Diagnosis: Racing heart rate R00.0 Lightheadedness R42  Monitor placed: Yes  Patient Instructed: Yes  Patient verbalized understanding: Yes  Holter # [id number]  Placed by Vj Yanez

## 2022-09-29 ENCOUNTER — LAB (OUTPATIENT)
Dept: LAB | Facility: CLINIC | Age: 37
End: 2022-09-29
Payer: COMMERCIAL

## 2022-09-29 DIAGNOSIS — Z94.83 PANCREAS REPLACED BY TRANSPLANT (H): ICD-10-CM

## 2022-09-29 DIAGNOSIS — Z94.0 KIDNEY REPLACED BY TRANSPLANT: ICD-10-CM

## 2022-09-29 DIAGNOSIS — Z48.298 AFTERCARE FOLLOWING ORGAN TRANSPLANT: ICD-10-CM

## 2022-09-29 DIAGNOSIS — Z79.899 ENCOUNTER FOR LONG-TERM CURRENT USE OF MEDICATION: ICD-10-CM

## 2022-09-29 LAB
AMYLASE SERPL-CCNC: 52 U/L (ref 30–110)
ANION GAP SERPL CALCULATED.3IONS-SCNC: 7 MMOL/L (ref 3–14)
BUN SERPL-MCNC: 35 MG/DL (ref 7–30)
CALCIUM SERPL-MCNC: 9.5 MG/DL (ref 8.5–10.1)
CHLORIDE BLD-SCNC: 105 MMOL/L (ref 94–109)
CO2 SERPL-SCNC: 24 MMOL/L (ref 20–32)
CREAT SERPL-MCNC: 1.59 MG/DL (ref 0.52–1.04)
ERYTHROCYTE [DISTWIDTH] IN BLOOD BY AUTOMATED COUNT: 12.7 % (ref 10–15)
GFR SERPL CREATININE-BSD FRML MDRD: 42 ML/MIN/1.73M2
GLUCOSE BLD-MCNC: 96 MG/DL (ref 70–99)
HCT VFR BLD AUTO: 30.2 % (ref 35–47)
HGB BLD-MCNC: 9.7 G/DL (ref 11.7–15.7)
LIPASE SERPL-CCNC: 103 U/L (ref 73–393)
MAGNESIUM SERPL-MCNC: 1.9 MG/DL (ref 1.6–2.3)
MCH RBC QN AUTO: 28.5 PG (ref 26.5–33)
MCHC RBC AUTO-ENTMCNC: 32.1 G/DL (ref 31.5–36.5)
MCV RBC AUTO: 89 FL (ref 78–100)
PHOSPHATE SERPL-MCNC: 3.2 MG/DL (ref 2.5–4.5)
PLATELET # BLD AUTO: 267 10E3/UL (ref 150–450)
POTASSIUM BLD-SCNC: 4 MMOL/L (ref 3.4–5.3)
RBC # BLD AUTO: 3.4 10E6/UL (ref 3.8–5.2)
SODIUM SERPL-SCNC: 136 MMOL/L (ref 133–144)
TACROLIMUS BLD-MCNC: 8.4 UG/L (ref 5–15)
TME LAST DOSE: NORMAL H
TME LAST DOSE: NORMAL H
WBC # BLD AUTO: 5.8 10E3/UL (ref 4–11)

## 2022-09-29 PROCEDURE — 80197 ASSAY OF TACROLIMUS: CPT

## 2022-09-29 PROCEDURE — 80048 BASIC METABOLIC PNL TOTAL CA: CPT

## 2022-09-29 PROCEDURE — 80180 DRUG SCRN QUAN MYCOPHENOLATE: CPT

## 2022-09-29 PROCEDURE — 83735 ASSAY OF MAGNESIUM: CPT

## 2022-09-29 PROCEDURE — 82150 ASSAY OF AMYLASE: CPT

## 2022-09-29 PROCEDURE — 87799 DETECT AGENT NOS DNA QUANT: CPT

## 2022-09-29 PROCEDURE — 36415 COLL VENOUS BLD VENIPUNCTURE: CPT

## 2022-09-29 PROCEDURE — 85027 COMPLETE CBC AUTOMATED: CPT

## 2022-09-29 PROCEDURE — 84100 ASSAY OF PHOSPHORUS: CPT

## 2022-09-29 PROCEDURE — 83690 ASSAY OF LIPASE: CPT

## 2022-09-30 ENCOUNTER — TELEPHONE (OUTPATIENT)
Dept: TRANSPLANT | Facility: CLINIC | Age: 37
End: 2022-09-30

## 2022-09-30 LAB
BKV DNA # SPEC NAA+PROBE: <500 COPIES/ML
BKV DNA SPEC NAA+PROBE-LOG#: <2.7 {LOG_COPIES}/ML
MYCOPHENOLATE SERPL LC/MS/MS-MCNC: 3.2 MG/L (ref 1–3.5)
MYCOPHENOLATE-G SERPL LC/MS/MS-MCNC: 33.1 MG/L (ref 30–95)
TME LAST DOSE: NORMAL H
TME LAST DOSE: NORMAL H

## 2022-09-30 NOTE — TELEPHONE ENCOUNTER
ISSUE:   Tacrolimus XR level 8.4 on 9/29, goal 6-8, dose 12 mg daily.    PLAN:   Please call patient and confirm this was an accurate 24-hour trough. Verify Tacrolimus XR dose 12 mg daily. Confirm no new medications or illness. Confirm no missed doses. If accurate trough and accurate dose, decrease Tacrolimus XR dose to 11.5 mg daily and repeat labs in 1 weeks

## 2022-10-03 ENCOUNTER — LAB (OUTPATIENT)
Dept: LAB | Facility: CLINIC | Age: 37
End: 2022-10-03
Payer: COMMERCIAL

## 2022-10-03 DIAGNOSIS — Z94.83 PANCREAS REPLACED BY TRANSPLANT (H): ICD-10-CM

## 2022-10-03 DIAGNOSIS — Z94.0 KIDNEY REPLACED BY TRANSPLANT: ICD-10-CM

## 2022-10-03 LAB
AMYLASE SERPL-CCNC: 51 U/L (ref 30–110)
ANION GAP SERPL CALCULATED.3IONS-SCNC: 6 MMOL/L (ref 3–14)
BUN SERPL-MCNC: 39 MG/DL (ref 7–30)
CALCIUM SERPL-MCNC: 9.4 MG/DL (ref 8.5–10.1)
CHLORIDE BLD-SCNC: 106 MMOL/L (ref 94–109)
CO2 SERPL-SCNC: 24 MMOL/L (ref 20–32)
CREAT SERPL-MCNC: 1.48 MG/DL (ref 0.52–1.04)
ERYTHROCYTE [DISTWIDTH] IN BLOOD BY AUTOMATED COUNT: 12.7 % (ref 10–15)
GFR SERPL CREATININE-BSD FRML MDRD: 46 ML/MIN/1.73M2
GLUCOSE BLD-MCNC: 103 MG/DL (ref 70–99)
HCT VFR BLD AUTO: 30 % (ref 35–47)
HGB BLD-MCNC: 9.8 G/DL (ref 11.7–15.7)
LIPASE SERPL-CCNC: 89 U/L (ref 73–393)
MCH RBC QN AUTO: 28.7 PG (ref 26.5–33)
MCHC RBC AUTO-ENTMCNC: 32.7 G/DL (ref 31.5–36.5)
MCV RBC AUTO: 88 FL (ref 78–100)
PLATELET # BLD AUTO: 266 10E3/UL (ref 150–450)
POTASSIUM BLD-SCNC: 3.9 MMOL/L (ref 3.4–5.3)
RBC # BLD AUTO: 3.41 10E6/UL (ref 3.8–5.2)
SODIUM SERPL-SCNC: 136 MMOL/L (ref 133–144)
TACROLIMUS BLD-MCNC: 7.8 UG/L (ref 5–15)
TME LAST DOSE: NORMAL H
TME LAST DOSE: NORMAL H
WBC # BLD AUTO: 5.9 10E3/UL (ref 4–11)

## 2022-10-03 PROCEDURE — 36415 COLL VENOUS BLD VENIPUNCTURE: CPT

## 2022-10-03 PROCEDURE — 85027 COMPLETE CBC AUTOMATED: CPT

## 2022-10-03 PROCEDURE — 82150 ASSAY OF AMYLASE: CPT

## 2022-10-03 PROCEDURE — 80197 ASSAY OF TACROLIMUS: CPT

## 2022-10-03 PROCEDURE — 83690 ASSAY OF LIPASE: CPT

## 2022-10-03 PROCEDURE — 82310 ASSAY OF CALCIUM: CPT

## 2022-10-04 ENCOUNTER — OFFICE VISIT (OUTPATIENT)
Dept: OPHTHALMOLOGY | Facility: CLINIC | Age: 37
End: 2022-10-04
Attending: OPHTHALMOLOGY
Payer: COMMERCIAL

## 2022-10-04 DIAGNOSIS — H47.323 DRUSEN OF BOTH OPTIC DISCS: ICD-10-CM

## 2022-10-04 DIAGNOSIS — E10.3413 SEVERE NONPROLIFERATIVE DIABETIC RETINOPATHY OF BOTH EYES WITH MACULAR EDEMA ASSOCIATED WITH TYPE 1 DIABETES MELLITUS (H): Primary | ICD-10-CM

## 2022-10-04 DIAGNOSIS — Z96.1 PSEUDOPHAKIA OF BOTH EYES: ICD-10-CM

## 2022-10-04 DIAGNOSIS — H35.81 MACULAR EDEMA: ICD-10-CM

## 2022-10-04 PROCEDURE — 67028 INJECTION EYE DRUG: CPT | Mod: LT | Performed by: OPHTHALMOLOGY

## 2022-10-04 PROCEDURE — 92134 CPTRZ OPH DX IMG PST SGM RTA: CPT | Performed by: OPHTHALMOLOGY

## 2022-10-04 PROCEDURE — 250N000011 HC RX IP 250 OP 636: Performed by: STUDENT IN AN ORGANIZED HEALTH CARE EDUCATION/TRAINING PROGRAM

## 2022-10-04 PROCEDURE — G0463 HOSPITAL OUTPT CLINIC VISIT: HCPCS | Mod: 25

## 2022-10-04 RX ADMIN — Medication 1.25 MG: at 16:36

## 2022-10-04 ASSESSMENT — EXTERNAL EXAM - LEFT EYE: OS_EXAM: NORMAL

## 2022-10-04 ASSESSMENT — VISUAL ACUITY
OS_SC: 20/20
OD_SC: 20/15
OS_SC+: -3
METHOD: SNELLEN - LINEAR

## 2022-10-04 ASSESSMENT — CONF VISUAL FIELD
OD_NORMAL: 1
METHOD: COUNTING FINGERS
OS_NORMAL: 1

## 2022-10-04 ASSESSMENT — TONOMETRY
IOP_METHOD: ICARE
OD_IOP_MMHG: 19
OS_IOP_MMHG: 20

## 2022-10-04 ASSESSMENT — SLIT LAMP EXAM - LIDS
COMMENTS: NORMAL
COMMENTS: NORMAL

## 2022-10-04 ASSESSMENT — EXTERNAL EXAM - RIGHT EYE: OD_EXAM: NORMAL

## 2022-10-04 NOTE — NURSING NOTE
"Chief Complaints and History of Present Illnesses   Patient presents with     Diabetic Retinopathy Follow Up     Chief Complaint(s) and History of Present Illness(es)     Diabetic Retinopathy Follow Up     Associated symptoms: photophobia and flashes (\"strobe lights\" - in the AM - still occurring).  Negative for eye pain, headache and floaters    Pain scale: 0/10              Comments     Pt here today for follow up proliferative   Pt still thinks it may have been too long between injections.  Symptoms have not seised;  heightened light sensitivity; some blurry vision.  No pain - but pt experiencing allergy eyes - treating with a daily dose of Claritin - managing/ prophylactic.    Ekaterina Hickman COA, JESSE 3:20 PM 10/04/2022          Ekaterina TINEO, JESSE 11:28 AM 06/30/2022                     "

## 2022-10-04 NOTE — PROGRESS NOTES
"Chief Complaint(s) and History of Present Illness(es)     Diabetic Retinopathy Follow Up     Associated symptoms: photophobia and flashes (\"strobe lights\" - in the AM   - still occurring).  Negative for eye pain, headache and floaters    Pain scale: 0/10              Comments     Pt here today for follow up proliferative   Pt still thinks it may have been too long between injections.  Symptoms have not seised;  heightened light sensitivity; some blurry   vision.  No pain - but pt experiencing allergy eyes - treating with a daily dose of   Claritin - managing/ prophylactic.    Ekaterina Hickman COA, JESSE 3:20 PM 10/04/2022          Ekaterina Hickman COA, JESSE 11:28 AM 06/30/2022                 Review of systems for the eyes was negative other than the pertinent positives/negatives listed in the HPI.      Assessment & Plan      Marilyn Ortega is a 37 year old female with the following diagnoses:   1. Severe nonproliferative diabetic retinopathy of both eyes with macular edema associated with type 1 diabetes mellitus (H)         S/p pancreatic transplant 11/2021, last A1C 5.1%  11/2021 and 1/2022 CE IOL [facility] - starbursts and haloes symptoms started following surgery  S/P avastin x 3 both eyes   Last injection both eyes 4/19/2022  Much improved macular edema following injections, recurrent left eye today , visual acuity 20/20- but symptomatic with more haloes in this eye     R/b/a avastin left eye today, consent obtained  Great glycemic control since transplant.  Monitor hypertensive control  Return precautions reviewed     May benefit from YAG left eye.  Will check dilation next visit    Patient disposition:   Return in about 2 months (around 12/4/2022) for V/T/D Mac OCT OU.      Kinga Walden MD  Ophthalmology, PGY4    Attending Physician Attestation:  Complete documentation of historical and exam elements from today's encounter can be found in the full encounter summary report (not reduplicated in " this progress note).  I personally obtained the chief complaint(s) and history of present illness.  I confirmed and edited as necessary the review of systems, past medical/surgical history, family history, social history, and examination findings as documented by others; and I examined the patient myself.  I personally reviewed the relevant tests, images, and reports as documented above.  I formulated and edited as necessary the assessment and plan and discussed the findings and management plan with the patient and family. Attending Physician Image/Tesing Attestation: I personally reviewed the ophthalmic test(s) associated with this encounter, agree with the interpretation(s) as documented by the resident/fellow, and have edited the corresponding report(s) as necessary.  Attending Physician Procedure Attestation: I was present for the entire procedure. - Ochoa Barnes MD

## 2022-10-11 ENCOUNTER — LAB (OUTPATIENT)
Dept: LAB | Facility: CLINIC | Age: 37
End: 2022-10-11
Payer: COMMERCIAL

## 2022-10-11 ENCOUNTER — OFFICE VISIT (OUTPATIENT)
Dept: CARDIOLOGY | Facility: CLINIC | Age: 37
End: 2022-10-11
Payer: COMMERCIAL

## 2022-10-11 VITALS
BODY MASS INDEX: 20.56 KG/M2 | SYSTOLIC BLOOD PRESSURE: 131 MMHG | DIASTOLIC BLOOD PRESSURE: 80 MMHG | HEART RATE: 86 BPM | OXYGEN SATURATION: 98 % | WEIGHT: 127.4 LBS

## 2022-10-11 DIAGNOSIS — R06.09 DYSPNEA ON EXERTION: ICD-10-CM

## 2022-10-11 DIAGNOSIS — Z94.83 PANCREAS REPLACED BY TRANSPLANT (H): ICD-10-CM

## 2022-10-11 DIAGNOSIS — Z94.0 KIDNEY REPLACED BY TRANSPLANT: Primary | ICD-10-CM

## 2022-10-11 DIAGNOSIS — Z94.0 KIDNEY REPLACED BY TRANSPLANT: ICD-10-CM

## 2022-10-11 LAB
AMYLASE SERPL-CCNC: 52 U/L (ref 30–110)
ANION GAP SERPL CALCULATED.3IONS-SCNC: 5 MMOL/L (ref 3–14)
BUN SERPL-MCNC: 30 MG/DL (ref 7–30)
CALCIUM SERPL-MCNC: 9.3 MG/DL (ref 8.5–10.1)
CHLORIDE BLD-SCNC: 111 MMOL/L (ref 94–109)
CO2 SERPL-SCNC: 24 MMOL/L (ref 20–32)
CREAT SERPL-MCNC: 1.4 MG/DL (ref 0.52–1.04)
ERYTHROCYTE [DISTWIDTH] IN BLOOD BY AUTOMATED COUNT: 12.8 % (ref 10–15)
GFR SERPL CREATININE-BSD FRML MDRD: 49 ML/MIN/1.73M2
GLUCOSE BLD-MCNC: 92 MG/DL (ref 70–99)
HCT VFR BLD AUTO: 30.6 % (ref 35–47)
HGB BLD-MCNC: 9.9 G/DL (ref 11.7–15.7)
LIPASE SERPL-CCNC: 108 U/L (ref 73–393)
MCH RBC QN AUTO: 28.6 PG (ref 26.5–33)
MCHC RBC AUTO-ENTMCNC: 32.4 G/DL (ref 31.5–36.5)
MCV RBC AUTO: 88 FL (ref 78–100)
PLATELET # BLD AUTO: 243 10E3/UL (ref 150–450)
POTASSIUM BLD-SCNC: 4.4 MMOL/L (ref 3.4–5.3)
RBC # BLD AUTO: 3.46 10E6/UL (ref 3.8–5.2)
SODIUM SERPL-SCNC: 140 MMOL/L (ref 133–144)
TACROLIMUS BLD-MCNC: 11.6 UG/L (ref 5–15)
TME LAST DOSE: NORMAL H
TME LAST DOSE: NORMAL H
WBC # BLD AUTO: 3.9 10E3/UL (ref 4–11)

## 2022-10-11 PROCEDURE — 83690 ASSAY OF LIPASE: CPT

## 2022-10-11 PROCEDURE — 82310 ASSAY OF CALCIUM: CPT

## 2022-10-11 PROCEDURE — 99215 OFFICE O/P EST HI 40 MIN: CPT | Performed by: INTERNAL MEDICINE

## 2022-10-11 PROCEDURE — 36415 COLL VENOUS BLD VENIPUNCTURE: CPT

## 2022-10-11 PROCEDURE — 80197 ASSAY OF TACROLIMUS: CPT

## 2022-10-11 PROCEDURE — 85027 COMPLETE CBC AUTOMATED: CPT

## 2022-10-11 PROCEDURE — 82150 ASSAY OF AMYLASE: CPT

## 2022-10-11 NOTE — PATIENT INSTRUCTIONS
The following is a summary of your office visit today:        Nurse contact information:  Cardiology Care Coordinators:  Alondra Simms, RN and Maribel Randle, RN   Phone  [phone]    Fax [phone]       HOW TO CHECK YOUR BLOOD PRESSURE AT HOME:     Avoid eating, smoking, and exercising for at least 30 minutes before taking a reading.    Be sure you have taken your BP medication at least 2-3 hours before you check it.     Sit quietly for 10 minutes before a reading.     Sit in a chair with your feet flat on the floor. Rest your  arm on a table so that the arm cuff is at the same level as your heart.    Remain still during the reading.    Record your blood pressure and pulse in a log and bring to your next appointment.     If you have had any blood work, imaging or other testing completed we will be in touch within 1-2 weeks regarding the results. If you have any questions, concerns or need to schedule a follow up, please contact us at [phone]. If you are needing refills please contact your pharmacy. For urgent after hour care please call the [facility] at [phone] or the [facility] at [phone] and ask to speak to the cardiologist on call.    It was a pleasure meeting with you today. Please let us know if there is anything else we can do for you so that we can be sure you are leaving completely satisfied with your care experience.     Your Cardiology Team at [facility]  RN Care Coordinators: Lynn  Support Staff: Marcial and Channing

## 2022-10-11 NOTE — NURSING NOTE
Marilyn Ortega's goals for this visit include:   Chief Complaint   Patient presents with     Follow Up       She requests these members of her care team be copied on today's visit information: yes     PCP: No Ref-Primary, Physician    Referring Provider:  Avi Wray MD  [address] [place] [id number]    There were no vitals taken for this visit.    Do you need any medication refills at today's visit? No       MICAH Pugh   Cardiology Team  [facility]

## 2022-10-11 NOTE — PROGRESS NOTES
I am delighted to see Marilyn Ortega at the [facility] for evaluation of dyspnea.     The patient is a 37 year old  female whom I met once before on 2021 when she was being evaluated for a kidney/pancreas transplant. She's had type I diabetes since age 14. At that time she had CKD with creatinine 3.6, not on dialysis. She was very active, ran 4 miles a day over 45 minutes. She did a stress bicycle echo which was normal.     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 She did great post surgery. Tapered off prednisone about 5 weeks after surgery. Since then she's had a significant and gradual decline in her functional status. She is fatigued all the time, has frequent edema with facial swelling, has significant dyspnea with activities. She still tries to exercise on the treadmill. She can do 3.8 mph at no incline for an hour but it's challenging for her. She can only jog for less than a minute before becoming extremely short of breath and lightheaded. She denies syncope, chest discomfort, orthopnea. She saw Dr. Raphael in September. Echocardiogram as well as left and right heart catheterizations were all within normal limits. She was referred back here to see if autonomic dysfunction might be playing a role.     She continues to work as a , however travelling has been difficult for her given symptoms. She is single and lives alone. She is quite affected by her functional status, states that she regrets her decision for the transplant because she was doing much better prior to transplant.She believes her symptoms may be attributed to her immunosuppression medications.     Past Medical History:  1. Diabetes type I diagnosed age 14, [place]. Called EMT once when BG 20-30s for over an hour, admitted 2015 - found to have significant EKG changes ultimately attributed to high K or 6.8.  Echo was normal. She has Dexcom CGM, omnipod pump.  2.  Left femoral neck fracture  sustained while running, s/p ORIF  3. COVID+ 2020; now fully vaccinated  4. 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 Heterozygous Factor V Leiden mutation    Allergies:    Allergies   Allergen Reactions     Chlorhexidine Hives, Itching and Rash     PN: Patient had swelling/rash that was very itchy with chlorprep.     Ceclor [Cefaclor Monohydrate]      Metoclopramide Other (See Comments)     Noted mouth/facial twitching with reglan in  when it was tried for gastroparesis.     Cefaclor Rash       Medications:   Aspirin 81 every day  Lasix 20 every day    Mycophenolate  Prucalopride  Bactrim  Tacrolimus    Physical examination  Vitals: /80 (BP Location: Left arm, Patient Position: Chair, Cuff Size: Adult Regular)   Pulse 86   Wt 57.8 kg (127 lb 6.4 oz)   SpO2 98%   BMI 20.56 kg/m    BMI= Body mass index is 20.56 kg/m .    Constitutional: In general, the patient is a pleasant female in no acute physical distress but clearly frustrated.  Targeted cardiovascular exam:  Regular rate and rhythm. Normal S1, S2. No murmur, rub, click, or gallop.   Extremities:Pulses are normal bilaterally throughout. Trace peripheral edema.  Respiratory: Clear to asculation.      I have personally and independently reviewed the following:  Labs:  10/11/2022: K 4.4, cr 1.4, hgb 9.9, plt 243K    Echo 2022: EF 60-65%, normal RV/IVC, no valve disease, JUAN ANTONIO 15    Stress test   2022:  Liam protocol  Duration 10 min, 14 METS  Max  bpm, base /68, peak /60    Bike stress echo 2021 (pre transplant):  Duration 14 minutes; max , base /92, peak /75    Right and left heart cath 2022:  No CAD  Mean RA 9  Mean PA 24  Mean PCWP 17      EK2022: sinus 84 bpm, normal intervals    Patch monitor: pending      Assessment :  Chronic fatigue, dyspnea with exertion.  All cardiac evaluation thus far within normal limits.  Her symptoms are not  suggestive of autonomic dysfunction.  She did a treadmill stress test with appropriate increase in HR. Her SBP declined from 132 to 122 at peak exercise after 10 minutes on the treadmill.  Although her BP did not increase with exercise, the reduction was small and not consistent with autonomic dysfunction. Compared to her stress test pre-transplant, duration was less and BP was lower but that is to be expected post kidney transplant. She has no evidence of chronotropic incompetency. I do not feel that a tilt table test would be useful at this time. I will however explore the possibility of exercise-induced pulmonary hypertension and check with my colleagues in the PH section to see if an exercise RHC would be helpful since her symptoms are mainly with exercise. Ultimately it may be her immunosuppressive medications that contribute to her symptoms of fatigue, dyspnea, swelling.      Plan:  I will review her patch monitor when it becomes available.        I spent a total of 20 minutes face to face with  Marilyn Ortega during today's office visit. I have spent an additional 20  minutes today on chart review and documentation.        The patient is to return  as needed. The patient understood the treatment plan as outlined above.  There were no barriers to learning.      Prema Diez MD       Addendum:  Discussed with Dr. Sow, he feels that CPX and exercise RHC may be helpful.  Will schedule on same day. I spoke with Marilyn over the phone with suggestions and she is agreeable to proceed.  RUSSELL  10/13/2022

## 2022-10-12 ENCOUNTER — TELEPHONE (OUTPATIENT)
Dept: TRANSPLANT | Facility: CLINIC | Age: 37
End: 2022-10-12

## 2022-10-12 ENCOUNTER — VIRTUAL VISIT (OUTPATIENT)
Dept: NEPHROLOGY | Facility: CLINIC | Age: 37
End: 2022-10-12
Attending: INTERNAL MEDICINE
Payer: COMMERCIAL

## 2022-10-12 DIAGNOSIS — I15.1 HTN, KIDNEY TRANSPLANT RELATED: ICD-10-CM

## 2022-10-12 DIAGNOSIS — R60.1 GENERALIZED EDEMA: ICD-10-CM

## 2022-10-12 DIAGNOSIS — Z94.0 HTN, KIDNEY TRANSPLANT RELATED: ICD-10-CM

## 2022-10-12 DIAGNOSIS — Z94.0 KIDNEY TRANSPLANTED: ICD-10-CM

## 2022-10-12 DIAGNOSIS — Z94.83 PANCREAS REPLACED BY TRANSPLANT (H): ICD-10-CM

## 2022-10-12 DIAGNOSIS — D84.9 IMMUNOSUPPRESSION (H): ICD-10-CM

## 2022-10-12 DIAGNOSIS — Z94.83 PANCREAS TRANSPLANTED (H): ICD-10-CM

## 2022-10-12 DIAGNOSIS — Z48.298 AFTERCARE FOLLOWING ORGAN TRANSPLANT: Primary | ICD-10-CM

## 2022-10-12 DIAGNOSIS — Z94.0 KIDNEY REPLACED BY TRANSPLANT: ICD-10-CM

## 2022-10-12 PROCEDURE — 99215 OFFICE O/P EST HI 40 MIN: CPT | Mod: GT | Performed by: INTERNAL MEDICINE

## 2022-10-12 PROCEDURE — G0463 HOSPITAL OUTPT CLINIC VISIT: HCPCS | Mod: PN,RTG | Performed by: INTERNAL MEDICINE

## 2022-10-12 RX ORDER — TACROLIMUS 4 MG/1
8 TABLET, EXTENDED RELEASE ORAL
Qty: 60 TABLET | Refills: 11 | Status: SHIPPED | OUTPATIENT
Start: 2022-10-12 | End: 2022-10-18

## 2022-10-12 RX ORDER — TACROLIMUS 1 MG/1
3 TABLET, EXTENDED RELEASE ORAL
Qty: 90 TABLET | Refills: 11 | Status: SHIPPED | OUTPATIENT
Start: 2022-10-12 | End: 2022-10-18

## 2022-10-12 NOTE — PATIENT INSTRUCTIONS
Patient Recommendations:    Reduce Envarsus to 10.5 mg po every day    You can schedule COVID booster & Flu vaccine once your symptoms improve    Transplant Patient Information  Your Post Transplant Coordinator is: Yessica Becker  You and your care team can contact your transplant coordinator Monday - Friday, 8am - 5pm at [phone] (Option 2 to reach the coordinator or Option 4 to schedule an appointment).  You can also reach your care team online via [employer].  After hours for urgent matters, please call [facility] at [phone].

## 2022-10-12 NOTE — TELEPHONE ENCOUNTER
ISSUE:   Tacrolimus IR level 11.6 on 10/11, goal 6-8, dose 11.5 mg daily.    PLAN:   Please call patient and confirm this was an accurate 24-hour trough. Verify Tacrolimus XR dose 11.5 mg daily. Confirm no new medications or illness. Confirm no missed doses. If accurate trough and accurate dose, decrease Tacrolimus XR dose to 11 mg daily and repeat labs in 1 weeks    OUTCOME:   Spoke with patient, they confirm accurate trough level and current dose 11.5 mg daily. Patient confirmed dose change to 11 mg daily and to repeat labs in 1 weeks. Orders sent to preferred pharmacy for dose change and lab for repeat labs. Patient voiced understanding of plan.

## 2022-10-12 NOTE — PROGRESS NOTES
Marilyn is a 37 year old who is being evaluated via a billable video visit.      How would you like to obtain your AVS? MyChart  If the video visit is dropped, the invitation should be resent by: Send to e-mail at: [email or web address]  Will anyone else be joining your video visit? No        Video-Visit Details    Video Start Time: 9:30    Type of service:  Video Visit    Video End Time:10:05    Originating Location (pt. Location): Home    Distant Location (provider location):  [facility]     Platform used for Video Visit: [employer]

## 2022-10-12 NOTE — LETTER
10/12/2022       RE: Marilyn Ortega  [address]  [place] [place] [facility] [address]     Dear Colleague,    Thank you for referring your patient, Marilyn Ortega, to the [facility] [place] at [facility]. Please see a copy of my visit note below.    Marilyn is a 37 year old who is being evaluated via a billable video visit.      How would you like to obtain your AVS? MyChart  If the video visit is dropped, the invitation should be resent by: Send to e-mail at: [email or web address]  Will anyone else be joining your video visit? No        Video-Visit Details    Video Start Time: 9:30    Type of service:  Video Visit    Video End Time:10:05    Originating Location (pt. Location): Home    Distant Location (provider location):  [facility] [place]     Platform used for Video Visit: [employer]      TRANSPLANT NEPHROLOGY EARLY POST TRANSPLANT VISIT   11 month visit    Assessment & Plan   # DDKT (SPK): Stable      - of note significant variability 1-1.3 and occasional uptrend to 1.4-1.5, peak 1.6-1.7 around the time she had  borderline rejection , significant variability on FK trough as well on envarsus though overall therapeutic and very  compliant to her med regimen.   - Baseline Creatinine: ~ 1-1.3 then up to 1.3-1.5 since 3/2022, bx threshold of 1.6 or higher   - Proteinuria: Not checked post transplant   - Date DSA Last Checked: Nov/2021      Latest DSA: No DSA at time of transplant   - BK Viremia: Continue to monitor BK biweekly   - Kidney Tx Biopsy: yes  # kidney bx 5/2: read as borderline cellular rejection,isolated tubulitis i0t1, IF suggests possible IgA nephropathy MEST0   # closure Bx: 6/9 very limited sample, 2 glomeruli on EM, c4d not available, no signs of rejection   She was treated for 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 Cr now down to  1.2-1.3.  Closure biopsy of the kidney 1st in 4-6 weeks and will consider pancreas bx if uptrend in lipase    # Pancreas Tx (SPK):    - Pancreatic Exocrine Drainage: Enteric drained     - Blood glucose: Euglycemia      On insulin: No   - HbA1c: Last checked at time of transplant      Latest HbA1c: 5%   - Pancreatic enzymes: Trend down   - Date DSA Last Checked: Nov/2021  Latest DSA: No DSA at time of transplant   - Pancreas Tx Biopsy: No    # Immunosuppression: Envarsus (goal trough 8-10),  mg po bid   - Continue with intensive monitoring of immunosuppression for efficacy and toxicity.   - Induction with Recent Transplant:  Intermediate Intensity   - Continue with intensive monitoring of immunosuppression for efficacy and toxicity.   - Changes: yes  Reduce envarsus from 11.5 to 10.5, Goal trough now 5-8 as she is approaching 1 yr ani      note: Tremors have improved with the switch to envarsus from short acting tacrolimus.     MPA reduced 1/31/2022 due to GI issues, level was high too   - monitor DSA next month    # Infection Prophylaxis:   - PJP: Sulfa/TMP (Bactrim)  - CMV: Valganciclovir (Valcyte) completed 3 months   - BK <500, monitor every other week then monthly if negative    # Blood Pressure: Controlled;  Goal BP:<130/80   - Volume status: Euvolemic  EDW ~ 122lbs currently ranges 125-130 lbs   e  # Anemia in Chronic Renal Disease: Hgb: stable     SYLVAIN: No   - Iron studies: previously on iron tabs currently off recheck given fatigue and recent menorrhagia    # Mineral Bone Disorder:   - Secondary renal hyperparathyroidism; PTH level: Normal (18-80 pg/ml)        On treatment: None  - Vitamin D; level: High normal        On supplement: Yes  - Calcium; level: Normal        On supplement: No  - Phosphorus; level: Normal        On supplement: Yes    # Electrolytes:   - Potassium; level: Normal        On supplement: No  - Magnesium; level: Normal        On supplement: Yes, 400mg daily   - Bicarbonate; level:  Normal        On supplement: No    # Medical Compliance: Yes    # Fatigue: nl CMV EBV TSH iron studies stress test showed hypotensive response to exercise , exercise EKG didn't reveal inducible ischemia but given long standing DM hx and symptoms, recent angiogram with normal coronaries, ziopatch results pending and scheduled for MRI    #  LE edema: intermittent mostly upon standing and worse towards the end of the day, symptoms suggestive of dependent edema and  Not intravascular fluid overload, trial of various forms of diuretics led to BERNIE & discontinuation. tried compression stockings with minimal improvement. Most recent UPC normal. previously on lasix 20 mg po qMWF. Echo with nl ef, no valvular abnormalities    # COVID-19 Virus Review: Discussed COVID-19 virus and the potential medical risks.  Reviewed preventative health recommendations, including wearing a mask where appropriate.  Recommended COVID vaccination should be up to date with either an initial vaccination or booster shot when appropriate.  Asked the patient to inform the transplant center if they are exposed or diagnosed with this virus.    # COVID Vaccination Up To Date: Yes 4 doses due for 5th dose Oct 2022    # Skin Cancer: New lesions: none, chronic multiple nevi over the abdomen, no changes in size, color, texture   - Derm f/up, scheduled in Dec   - Discussed sun protection and recommend regular follow up with Dermatology.      # Transplant History:  Etiology of Kidney Failure: Diabetes mellitus type 1  Tx: SPK  Transplant: 11/15/2021 (Kidney / Pancreas)  Donor Type: Donation after Brain Death Donor Class:   Crossmatch at time of Tx: negative  DSA at time of Tx: No  Significant changes in immunosuppression: None  CMV IgG Ab High Risk Discordance (D+/R-): No  EBV IgG Ab High Risk Discordance (D+/R-): No  Significant transplant-related complications: None    Transplant Office Phone Number: [phone]    Assessment and plan was discussed with  the patient and she voiced her understanding and agreement.      Return visit: 6 weeks for 1 yr post transplant visit    Reanna Lee MD    Chief Complaint   Ms. Ortega is a 37 year old here for kidney transplant, pancreas transplant, immunosuppression management and new medical concerns.     History of Present Illness      Marilyn reports feeling sick with URI symptoms since Sunday  New onset dry cough, no fevers, chills, shortness of breath  Recent angiogram to further evaluate fatigue and WEI and was normal, ziopatch was just completed.    On/off eye swelling especially when waking up in am  Overall doesn't feel back to baseline in terms of energy level, on/off swelling since transplant. W/up so far unrevealing with negative infectious, cardiac tests so far. Variability in CNI levels noted but no signs of neurotoxicity, tremors and headaches resolved with switch to Envarsus and GI issues resolved with reducing MPA dose to 360 mg po bid as level was high.   She wishes to seek 2nd opinion at [facility] to discuss additional options/diagnostic evaluation for her ongoing fatigue    IS Envarus 11.5 mg po every day,  mg po bid  COVID: due for booster  Flu due for vaccine    Derm f/up multiple nevi-scheduled in Dec.  COVID vaccine x4 due for 5th dose Oct 2022    Problem List   Patient Active Problem List   Diagnosis     Osteopenia     Chronic constipation     Irritable bowel syndrome     Stage 3a chronic kidney disease (H)     HTN, kidney transplant related     Gastroparesis     Heterozygous factor V Leiden mutation (H)     Immunosuppression (H)     Kidney replaced by transplant     Pancreas replaced by transplant (H)     Anemia due to stage 3a chronic kidney disease (H)     Aftercare following organ transplant     Generalized edema     Kidney transplant rejection     WEI (dyspnea on exertion)     Chest pain, unspecified type       Allergies   Allergies   Allergen Reactions     Chlorhexidine Hives, Itching and Rash      PN: Patient had swelling/rash that was very itchy with chlorprep.     Ceclor [Cefaclor Monohydrate]      Metoclopramide Other (See Comments)     Noted mouth/facial twitching with reglan in 2022 when it was tried for gastroparesis.     Cefaclor Rash       Medications   Current Outpatient Medications   Medication Sig     aspirin (ASA) 81 MG chewable tablet Take 81 mg by mouth daily     biotin 1000 MCG TABS tablet Take 1,000 mcg by mouth daily     cetirizine (ZYRTEC) 10 MG tablet Take 1 tablet (10 mg) by mouth every other day     cholecalciferol 25 MCG (1000 UT) TABS Take 2,000 Units by mouth every other day      Continuous Blood Gluc Sensor (DEXCOM G6 SENSOR) MISC Use as directed for continuous glucose monitoring.  Change sensor every 10 days.     Continuous Blood Gluc Transmit (DEXCOM G6 TRANSMITTER) MISC Use as directed for continuous glucose monitoring.  Change every 3 months.     docusate sodium (COLACE) 100 MG capsule Take 200 mg by mouth daily before breakfast     famotidine (PEPCID) 20 MG tablet Take 20 mg by mouth 2 times daily     furosemide (LASIX) 20 MG tablet Take 1 tablet (20 mg) by mouth daily     glucose blood VI test strips strip 4 times daily.     insulin lispro (HUMALOG KWIKPEN) 100 UNIT/ML (1 unit dial) KWIKPEN BEFORE MEALS TID use 2 unit rapid-acting insulin to correct for every 50 mg/dL that blood glucose is above 150 mg/dL. 150-199 mg/dL: add 2 units, 200-249 mg/dL: add 4 units, 250-299 mg/dL: add 6 units, 300-349 mg/dL: add 8 units, 350-399 mg/dL: add 10 units, >400 mg/dL: add 12 units and call your doctor.     insulin pen needle (31G X 5 MM) 31G X 5 MM miscellaneous Use 3 pen needles daily or as directed. Patient/RPH may decide gauge and length.     mycophenolic acid (GENERIC EQUIVALENT) 360 MG EC tablet Take 1 tablet (360 mg) by mouth 2 times daily     ondansetron (ZOFRAN) 4 MG tablet Take 1 tablet (4 mg) by mouth every 8 hours as needed for nausea     Prucalopride Succinate (MOTEGRITY) 2  MG TABS Take 1 tablet (2 mg) by mouth daily     sulfamethoxazole-trimethoprim (BACTRIM) 400-80 MG tablet Take 1 tablet by mouth daily     tacrolimus (ENVARSUS XR) 0.75 MG 24 hr tablet HOLD for dose adjustment.     tacrolimus (ENVARSUS XR) 1 MG 24 hr tablet HOLD for dose adjustment.     tacrolimus (ENVARSUS XR) 4 MG 24 hr tablet Take 3 tablets (12 mg) by mouth every morning (before breakfast)     Current Facility-Administered Medications   Medication     bevacizumab (AVASTIN) intravitreal inj 1.25 mg     bevacizumab (AVASTIN) intravitreal inj 1.25 mg     bevacizumab (AVASTIN) intravitreal inj 1.25 mg     There are no discontinued medications.    Physical Exam   Vital Signs: There were no vitals taken for this visit.    GENERAL: Healthy, alert and no distress  EYES: Eyes grossly normal to inspection.  No discharge or erythema, or obvious scleral/conjunctival abnormalities.  RESP: No audible wheeze, cough, or visible cyanosis.  No visible retractions or increased work of breathing.    SKIN: Visible skin clear. No significant rash, abnormal pigmentation or lesions.  NEURO: Cranial nerves grossly intact.  Mentation and speech appropriate for age.  PSYCH: Mentation appears normal, affect normal/bright, judgement and insight intact, normal speech and appearance well-groomed.    Data     Renal Latest Ref Rng & Units 10/11/2022 10/3/2022 9/29/2022   Na 133 - 144 mmol/L 140 136 136   K 3.4 - 5.3 mmol/L 4.4 3.9 4.0   Cl 94 - 109 mmol/L 111(H) 106 105   CO2 20 - 32 mmol/L 24 24 24   BUN 7 - 30 mg/dL 30 39(H) 35(H)   Cr 0.52 - 1.04 mg/dL 1.40(H) 1.48(H) 1.59(H)   Glucose 70 - 99 mg/dL 92 103(H) 96   Ca  8.5 - 10.1 mg/dL 9.3 9.4 9.5   Mg 1.6 - 2.3 mg/dL - - 1.9     Bone Health Latest Ref Rng & Units 9/29/2022 8/31/2022 8/8/2022   Phos 2.5 - 4.5 mg/dL 3.2 3.2 3.8   PTHi pg/mL - - -   Vit D Def 20 - 75 ug/L - - -     Heme Latest Ref Rng & Units 10/11/2022 10/3/2022 9/29/2022   WBC 4.0 - 11.0 10e3/uL 3.9(L) 5.9 5.8   Hgb 11.7 - 15.7  g/dL 9.9(L) 9.8(L) 9.7(L)   Plt 150 - 450 10e3/uL 243 266 267   ABSOLUTE NEUTROPHIL 1.6 - 8.3 10e3/uL - - -   ABSOLUTE LYMPHOCYTES 0.8 - 5.3 10e3/uL - - -   ABSOLUTE MONOCYTES 0.0 - 1.3 10e3/uL - - -   ABSOLUTE EOSINOPHILS 0.0 - 0.7 10e3/uL - - -   ABSOLUTE BASOPHILS 0.0 - 0.2 10e9/L - - -     Liver Latest Ref Rng & Units 7/14/2022 5/23/2022 5/4/2022   AP 40 - 150 U/L - 84 70   TBili 0.2 - 1.3 mg/dL - 0.3 0.2   DBili 0.0 - 0.2 mg/dL - <0.1 <0.1   ALT 0 - 50 U/L - 20 18   AST 0 - 45 U/L - 13 19   Tot Protein 6.8 - 8.8 g/dL - 7.9 7.6   Albumin 3.4 - 5.0 g/dL 4.0 4.0 4.1     Pancreas Latest Ref Rng & Units 10/11/2022 10/3/2022 9/29/2022   A1C 0.0 - 5.6 % - - -   Amylase 30 - 110 U/L 52 51 52   Lipase 73 - 393 U/L 108 89 103   Lipase (Roche) 13 - 60 U/L - - -     Iron studies Latest Ref Rng & Units 4/27/2022 4/18/2022 3/3/2022   Iron 35 - 180 ug/dL 56 106 139   Iron sat 15 - 46 % 20 35 35   Ferritin 12 - 150 ng/mL 110 120 106     UMP Txp Virology Latest Ref Rng & Units 4/18/2022 1/28/2022 12/13/2021   CMV QUANT IU/ML Not Detected IU/mL Not Detected Not Detected Not Detected   EBV CAPSID ANTIBODY IGG No detectable antibody. - - -   EBV DNA COPIES/ML Not Detected copies/mL Not Detected - -        Recent Labs   Lab Test 09/29/22  0843 10/03/22  0833 10/11/22  0805   DOSTAC 9/28/2022 10/2/2022 10/10/2022   TACROL 8.4 7.8 11.6     Recent Labs   Lab Test 09/12/22  0843 09/19/22  0848 09/29/22  0843   DOSMPA 9/11/2022   8:00 PM 9/18/2022   8:00 PM 9/28/2022   8:00 AM   MPACID 5.94* 4.79* 3.20   MPAG 45.8 51.8 33.1     I spent a total of 46 minutes on the date of the encounter doing chart review, performing a history and physical exam, completing documentation and any further activities as noted above.        Again, thank you for allowing me to participate in the care of your patient.      Sincerely,    Reanna Lee MD

## 2022-10-12 NOTE — LETTER
Date:October 13, 2022      Patient was self referred, no letter generated. Do not send.        [facility] Health Information

## 2022-10-12 NOTE — PROGRESS NOTES
TRANSPLANT NEPHROLOGY EARLY POST TRANSPLANT VISIT   11 month visit    Assessment & Plan   # DDKT (SPK): Stable      - of note significant variability 1-1.3 and occasional uptrend to 1.4-1.5, peak 1.6-1.7 around the time she had  borderline rejection , significant variability on FK trough as well on envarsus though overall therapeutic and very  compliant to her med regimen.   - Baseline Creatinine: ~ 1-1.3 then up to 1.3-1.5 since 3/2022, bx threshold of 1.6 or higher   - Proteinuria: Not checked post transplant   - Date DSA Last Checked: Nov/2021      Latest DSA: No DSA at time of transplant   - BK Viremia: Continue to monitor BK biweekly   - Kidney Tx Biopsy: yes  # kidney bx 5/2: read as borderline cellular rejection,isolated tubulitis i0t1, IF suggests possible IgA nephropathy MEST0   # closure Bx: 6/9 very limited sample, 2 glomeruli on EM, c4d not available, no signs of rejection   She was treated for 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 Cr now down to 1.2-1.3.  Closure biopsy of the kidney 1st in 4-6 weeks and will consider pancreas bx if uptrend in lipase    # Pancreas Tx (SPK):    - Pancreatic Exocrine Drainage: Enteric drained     - Blood glucose: Euglycemia      On insulin: No   - HbA1c: Last checked at time of transplant      Latest HbA1c: 5%   - Pancreatic enzymes: Trend down   - Date DSA Last Checked: Nov/2021  Latest DSA: No DSA at time of transplant   - Pancreas Tx Biopsy: No    # Immunosuppression: Envarsus (goal trough 8-10),  mg po bid   - Continue with intensive monitoring of immunosuppression for efficacy and toxicity.   - Induction with Recent Transplant:  Intermediate Intensity   - Continue with intensive monitoring of immunosuppression for efficacy and toxicity.   - Changes: yes  Reduce envarsus from 11.5 to 10.5, Goal trough now 5-8 as she is approaching 1 yr ani      note: Tremors have improved with the  switch to envarsus from short acting tacrolimus.     MPA reduced 1/31/2022 due to GI issues, level was high too   - monitor DSA next month    # Infection Prophylaxis:   - PJP: Sulfa/TMP (Bactrim)  - CMV: Valganciclovir (Valcyte) completed 3 months   - BK <500, monitor every other week then monthly if negative    # Blood Pressure: Controlled;  Goal BP:<130/80   - Volume status: Euvolemic  EDW ~ 122lbs currently ranges 125-130 lbs   e  # Anemia in Chronic Renal Disease: Hgb: stable     SYLVAIN: No   - Iron studies: previously on iron tabs currently off recheck given fatigue and recent menorrhagia    # Mineral Bone Disorder:   - Secondary renal hyperparathyroidism; PTH level: Normal (18-80 pg/ml)        On treatment: None  - Vitamin D; level: High normal        On supplement: Yes  - Calcium; level: Normal        On supplement: No  - Phosphorus; level: Normal        On supplement: Yes    # Electrolytes:   - Potassium; level: Normal        On supplement: No  - Magnesium; level: Normal        On supplement: Yes, 400mg daily   - Bicarbonate; level: Normal        On supplement: No    # Medical Compliance: Yes    # Fatigue: nl CMV EBV TSH iron studies stress test showed hypotensive response to exercise , exercise EKG didn't reveal inducible ischemia but given long standing DM hx and symptoms, recent angiogram with normal coronaries, ziopatch results pending and scheduled for MRI    #  LE edema: intermittent mostly upon standing and worse towards the end of the day, symptoms suggestive of dependent edema and  Not intravascular fluid overload, trial of various forms of diuretics led to BERNIE & discontinuation. tried compression stockings with minimal improvement. Most recent UPC normal. previously on lasix 20 mg po qMWF. Echo with nl ef, no valvular abnormalities    # COVID-19 Virus Review: Discussed COVID-19 virus and the potential medical risks.  Reviewed preventative health recommendations, including wearing a mask where  appropriate.  Recommended COVID vaccination should be up to date with either an initial vaccination or booster shot when appropriate.  Asked the patient to inform the transplant center if they are exposed or diagnosed with this virus.    # COVID Vaccination Up To Date: Yes 4 doses due for 5th dose Oct 2022    # Skin Cancer: New lesions: none, chronic multiple nevi over the abdomen, no changes in size, color, texture   - Derm f/up, scheduled in Dec   - Discussed sun protection and recommend regular follow up with Dermatology.      # Transplant History:  Etiology of Kidney Failure: Diabetes mellitus type 1  Tx: SPK  Transplant: 11/15/2021 (Kidney / Pancreas)  Donor Type: Donation after Brain Death Donor Class:   Crossmatch at time of Tx: negative  DSA at time of Tx: No  Significant changes in immunosuppression: None  CMV IgG Ab High Risk Discordance (D+/R-): No  EBV IgG Ab High Risk Discordance (D+/R-): No  Significant transplant-related complications: None    Transplant Office Phone Number: [phone]    Assessment and plan was discussed with the patient and she voiced her understanding and agreement.      Return visit: 6 weeks for 1 yr post transplant visit    Reanna Lee MD    Chief Complaint   Ms. Ortega is a 37 year old here for kidney transplant, pancreas transplant, immunosuppression management and new medical concerns.     History of Present Illness      Marilyn reports feeling sick with URI symptoms since Sunday  New onset dry cough, no fevers, chills, shortness of breath  Recent angiogram to further evaluate fatigue and WEI and was normal, ziopatch was just completed.    On/off eye swelling especially when waking up in am  Overall doesn't feel back to baseline in terms of energy level, on/off swelling since transplant. W/up so far unrevealing with negative infectious, cardiac tests so far. Variability in CNI levels noted but no signs of neurotoxicity, tremors and headaches resolved with switch to  Envarsus and GI issues resolved with reducing MPA dose to 360 mg po bid as level was high.   She wishes to seek 2nd opinion at [place] to discuss additional options/diagnostic evaluation for her ongoing fatigue    IS Envarus 11.5 mg po every day,  mg po bid  COVID: due for booster  Flu due for vaccine    Derm f/up multiple nevi-scheduled in Dec.  COVID vaccine x4 due for 5th dose Oct 2022    Problem List   Patient Active Problem List   Diagnosis     Osteopenia     Chronic constipation     Irritable bowel syndrome     Stage 3a chronic kidney disease (H)     HTN, kidney transplant related     Gastroparesis     Heterozygous factor V Leiden mutation (H)     Immunosuppression (H)     Kidney replaced by transplant     Pancreas replaced by transplant (H)     Anemia due to stage 3a chronic kidney disease (H)     Aftercare following organ transplant     Generalized edema     Kidney transplant rejection     WEI (dyspnea on exertion)     Chest pain, unspecified type       Allergies   Allergies   Allergen Reactions     Chlorhexidine Hives, Itching and Rash     PN: Patient had swelling/rash that was very itchy with chlorprep.     Ceclor [Cefaclor Monohydrate]      Metoclopramide Other (See Comments)     Noted mouth/facial twitching with reglan in 2022 when it was tried for gastroparesis.     Cefaclor Rash       Medications   Current Outpatient Medications   Medication Sig     aspirin (ASA) 81 MG chewable tablet Take 81 mg by mouth daily     biotin 1000 MCG TABS tablet Take 1,000 mcg by mouth daily     cetirizine (ZYRTEC) 10 MG tablet Take 1 tablet (10 mg) by mouth every other day     cholecalciferol 25 MCG (1000 UT) TABS Take 2,000 Units by mouth every other day      Continuous Blood Gluc Sensor (DEXCOM G6 SENSOR) MISC Use as directed for continuous glucose monitoring.  Change sensor every 10 days.     Continuous Blood Gluc Transmit (DEXCOM G6 TRANSMITTER) MISC Use as directed for continuous glucose monitoring.  Change every  3 months.     docusate sodium (COLACE) 100 MG capsule Take 200 mg by mouth daily before breakfast     famotidine (PEPCID) 20 MG tablet Take 20 mg by mouth 2 times daily     furosemide (LASIX) 20 MG tablet Take 1 tablet (20 mg) by mouth daily     glucose blood VI test strips strip 4 times daily.     insulin lispro (HUMALOG KWIKPEN) 100 UNIT/ML (1 unit dial) KWIKPEN BEFORE MEALS TID use 2 unit rapid-acting insulin to correct for every 50 mg/dL that blood glucose is above 150 mg/dL. 150-199 mg/dL: add 2 units, 200-249 mg/dL: add 4 units, 250-299 mg/dL: add 6 units, 300-349 mg/dL: add 8 units, 350-399 mg/dL: add 10 units, >400 mg/dL: add 12 units and call your doctor.     insulin pen needle (31G X 5 MM) 31G X 5 MM miscellaneous Use 3 pen needles daily or as directed. Patient/RPH may decide gauge and length.     mycophenolic acid (GENERIC EQUIVALENT) 360 MG EC tablet Take 1 tablet (360 mg) by mouth 2 times daily     ondansetron (ZOFRAN) 4 MG tablet Take 1 tablet (4 mg) by mouth every 8 hours as needed for nausea     Prucalopride Succinate (MOTEGRITY) 2 MG TABS Take 1 tablet (2 mg) by mouth daily     sulfamethoxazole-trimethoprim (BACTRIM) 400-80 MG tablet Take 1 tablet by mouth daily     tacrolimus (ENVARSUS XR) 0.75 MG 24 hr tablet HOLD for dose adjustment.     tacrolimus (ENVARSUS XR) 1 MG 24 hr tablet HOLD for dose adjustment.     tacrolimus (ENVARSUS XR) 4 MG 24 hr tablet Take 3 tablets (12 mg) by mouth every morning (before breakfast)     Current Facility-Administered Medications   Medication     bevacizumab (AVASTIN) intravitreal inj 1.25 mg     bevacizumab (AVASTIN) intravitreal inj 1.25 mg     bevacizumab (AVASTIN) intravitreal inj 1.25 mg     There are no discontinued medications.    Physical Exam   Vital Signs: There were no vitals taken for this visit.    GENERAL: Healthy, alert and no distress  EYES: Eyes grossly normal to inspection.  No discharge or erythema, or obvious scleral/conjunctival  abnormalities.  RESP: No audible wheeze, cough, or visible cyanosis.  No visible retractions or increased work of breathing.    SKIN: Visible skin clear. No significant rash, abnormal pigmentation or lesions.  NEURO: Cranial nerves grossly intact.  Mentation and speech appropriate for age.  PSYCH: Mentation appears normal, affect normal/bright, judgement and insight intact, normal speech and appearance well-groomed.    Data     Renal Latest Ref Rng & Units 10/11/2022 10/3/2022 9/29/2022   Na 133 - 144 mmol/L 140 136 136   K 3.4 - 5.3 mmol/L 4.4 3.9 4.0   Cl 94 - 109 mmol/L 111(H) 106 105   CO2 20 - 32 mmol/L 24 24 24   BUN 7 - 30 mg/dL 30 39(H) 35(H)   Cr 0.52 - 1.04 mg/dL 1.40(H) 1.48(H) 1.59(H)   Glucose 70 - 99 mg/dL 92 103(H) 96   Ca  8.5 - 10.1 mg/dL 9.3 9.4 9.5   Mg 1.6 - 2.3 mg/dL - - 1.9     Bone Health Latest Ref Rng & Units 9/29/2022 8/31/2022 8/8/2022   Phos 2.5 - 4.5 mg/dL 3.2 3.2 3.8   PTHi pg/mL - - -   Vit D Def 20 - 75 ug/L - - -     Heme Latest Ref Rng & Units 10/11/2022 10/3/2022 9/29/2022   WBC 4.0 - 11.0 10e3/uL 3.9(L) 5.9 5.8   Hgb 11.7 - 15.7 g/dL 9.9(L) 9.8(L) 9.7(L)   Plt 150 - 450 10e3/uL 243 266 267   ABSOLUTE NEUTROPHIL 1.6 - 8.3 10e3/uL - - -   ABSOLUTE LYMPHOCYTES 0.8 - 5.3 10e3/uL - - -   ABSOLUTE MONOCYTES 0.0 - 1.3 10e3/uL - - -   ABSOLUTE EOSINOPHILS 0.0 - 0.7 10e3/uL - - -   ABSOLUTE BASOPHILS 0.0 - 0.2 10e9/L - - -     Liver Latest Ref Rng & Units 7/14/2022 5/23/2022 5/4/2022   AP 40 - 150 U/L - 84 70   TBili 0.2 - 1.3 mg/dL - 0.3 0.2   DBili 0.0 - 0.2 mg/dL - <0.1 <0.1   ALT 0 - 50 U/L - 20 18   AST 0 - 45 U/L - 13 19   Tot Protein 6.8 - 8.8 g/dL - 7.9 7.6   Albumin 3.4 - 5.0 g/dL 4.0 4.0 4.1     Pancreas Latest Ref Rng & Units 10/11/2022 10/3/2022 9/29/2022   A1C 0.0 - 5.6 % - - -   Amylase 30 - 110 U/L 52 51 52   Lipase 73 - 393 U/L 108 89 103   Lipase (Roche) 13 - 60 U/L - - -     Iron studies Latest Ref Rng & Units 4/27/2022 4/18/2022 3/3/2022   Iron 35 - 180 ug/dL 56 106 139    Iron sat 15 - 46 % 20 35 35   Ferritin 12 - 150 ng/mL 110 120 106     [facility] Txp Virology Latest Ref Rng & Units 4/18/2022 1/28/2022 12/13/2021   CMV QUANT IU/ML Not Detected IU/mL Not Detected Not Detected Not Detected   EBV CAPSID ANTIBODY IGG No detectable antibody. - - -   EBV DNA COPIES/ML Not Detected copies/mL Not Detected - -        Recent Labs   Lab Test 09/29/22  0843 10/03/22  0833 10/11/22  0805   DOSTAC 9/28/2022 10/2/2022 10/10/2022   TACROL 8.4 7.8 11.6     Recent Labs   Lab Test 09/12/22  0843 09/19/22  0848 09/29/22  0843   DOSMPA 9/11/2022   8:00 PM 9/18/2022   8:00 PM 9/28/2022   8:00 AM   MPACID 5.94* 4.79* 3.20   MPAG 45.8 51.8 33.1     I spent a total of 46 minutes on the date of the encounter doing chart review, performing a history and physical exam, completing documentation and any further activities as noted above.

## 2022-10-13 ENCOUNTER — OFFICE VISIT (OUTPATIENT)
Dept: ALLERGY | Facility: CLINIC | Age: 37
End: 2022-10-13
Payer: COMMERCIAL

## 2022-10-13 VITALS
OXYGEN SATURATION: 98 % | WEIGHT: 126.6 LBS | SYSTOLIC BLOOD PRESSURE: 128 MMHG | BODY MASS INDEX: 20.43 KG/M2 | HEART RATE: 88 BPM | DIASTOLIC BLOOD PRESSURE: 77 MMHG

## 2022-10-13 DIAGNOSIS — H57.9 ITCH OF EYE: ICD-10-CM

## 2022-10-13 DIAGNOSIS — R06.09 DYSPNEA ON EXERTION: Primary | ICD-10-CM

## 2022-10-13 DIAGNOSIS — H04.203 BILATERAL EPIPHORA: ICD-10-CM

## 2022-10-13 DIAGNOSIS — R06.7 SNEEZING: Primary | ICD-10-CM

## 2022-10-13 PROCEDURE — 99203 OFFICE O/P NEW LOW 30 MIN: CPT | Performed by: INTERNAL MEDICINE

## 2022-10-13 RX ORDER — LORATADINE 10 MG/1
10 TABLET ORAL DAILY
COMMUNITY
End: 2023-08-01

## 2022-10-13 RX ORDER — LIDOCAINE 40 MG/G
CREAM TOPICAL
Status: CANCELLED | OUTPATIENT
Start: 2022-10-13

## 2022-10-13 ASSESSMENT — ENCOUNTER SYMPTOMS
COUGH: 0
FEVER: 0
RHINORRHEA: 1
EYE REDNESS: 0
EYE ITCHING: 1
ARTHRALGIAS: 0
SINUS PRESSURE: 1
FACIAL SWELLING: 0
MYALGIAS: 0
CHEST TIGHTNESS: 0
EYE DISCHARGE: 1
VOMITING: 0
JOINT SWELLING: 0
DIARRHEA: 0
HEADACHES: 0
ACTIVITY CHANGE: 0
ADENOPATHY: 0
CHILLS: 0
NAUSEA: 0
SHORTNESS OF BREATH: 0
WHEEZING: 0

## 2022-10-13 NOTE — LETTER
"    10/13/2022         RE: Marilyn Ortega  [address]  [facility] [address]        Dear Colleague,    Thank you for referring your patient, Marilyn Ortega, to the [facility] [place]. Please see a copy of my visit note below.    Marilyn Ortega was seen in the Allergy Clinic at [facility].    Marilyn Ortega is a 37 year old female being seen today in consultation for allergies.  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  She feels like her allergy symptoms have worsened in the last year.  However she had allergy symptoms for at least 10 years and has been taking antihistamines for at least 10 years.  She was taking Claritin and switched to Zyrtec to see if this was more effective. Stopping antihistamines for the last 6 days resulted in itching of her eyes, runny nose, as well as sneezing.  With the antihistamines all symptoms are improved except for the eye symptoms which continue with itching and watering of her eyes.  She has seen an ophthalmologist in regards to this also.    She switched to hypoallergenic products (allergenic pillows) without significant provement.  She is here for allergy evaluation.  She has tried Pataday eyedrops without much improvement and also lubricating eyedrops.    She does not have a history of asthma or eczema.  Her mother has a history of allergic rhinitis and her father has a history of eczema.      Chief Complaint   Patient presents with     Allergy Consult     Pt stated that she is allergic to \"everything\". Pt feels terrible because she has been off antihistamine. HX are watery and itchy eyes. Pt uses hypoallergenic pillow but it does not work, pt has a cat and thinks she might be allergic to cats. Her hx are all year round, since pt had a surgery 1 year ago her symptoms are getting worst.Pt takes Zyrtec and Claritin doesn't work for the past couple months, which she is very concern.  Pt " took anti histamine last Friday 10/7/22, 1tb Zyrtec.         Past Medical History:   Diagnosis Date     Anemia 2021     C. difficile diarrhea      CKD (chronic kidney disease) stage 4, GFR 15-29 ml/min (H)      Complication of transplanted kidney 2022     Gastroparesis      Gluten intolerance      Heterozygous factor V Leiden mutation (H)      HTN (hypertension)      Infection due to 2019 novel coronavirus 2020     Osteomyelitis (H) 2013     Shingles 2013     Type 1 diabetes mellitus (H) 2000     Family History   Problem Relation Age of Onset     Diabetes Paternal Grandfather      Macular Degeneration Mother      Arthritis Maternal Grandmother      Hypertension Maternal Grandmother      Cancer - colorectal Maternal Grandfather      Glaucoma No family hx of      Past Surgical History:   Procedure Laterality Date     BENCH KIDNEY N/A 11/15/2021    Procedure: Bench kidney;  Surgeon: Branden Aradna MD;  Location: [facility] OR     BENCH PANCREAS N/A 11/15/2021    Procedure: Bench pancreas;  Surgeon: Branden Aranda MD;  Location: [facility] OR     CATARACT EXTRACTION  2021     CV CORONARY ANGIOGRAM N/A 2022    Procedure: Coronary Angiogram;  Surgeon: Bebeto Ventura MD;  Location:  HEART CARDIAC CATH LAB     CV RIGHT HEART CATH MEASUREMENTS RECORDED N/A 2022    Procedure: Right Heart Catheterization;  Surgeon: Bebeto Ventura MD;  Location:  HEART CARDIAC CATH LAB     IR RENAL BIOPSY LEFT  2022     IR RENAL BIOPSY LEFT  2022     ORIF HIP FRACTURE Left      ORTHOPEDIC SURGERY Bilateral     Repair of labral tear     TRANSPLANT PANCREAS, KIDNEY  DONOR, COMBINED Left 11/15/2021    Procedure: TRANSPLANT, KIDNEY AND PANCREAS,  DONOR;  Surgeon: Branden Aranda MD;  Location:  OR       ENVIRONMENTAL HISTORY:   Pets inside the house include 1 cat(s).  Do you smoke cigarettes or other recreational drugs?  No There is/are 0 smokers living in the house. The house does not have a damp basement.     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      Review of Systems   Constitutional: Negative for activity change, chills and fever.   HENT: Positive for postnasal drip, rhinorrhea, sinus pressure and sneezing. Negative for congestion, dental problem, ear pain, facial swelling and nosebleeds.    Eyes: Positive for discharge and itching. Negative for redness.   Respiratory: Negative for cough, chest tightness, shortness of breath and wheezing.    Cardiovascular: Negative for chest pain.   Gastrointestinal: Negative for diarrhea, nausea and vomiting.   Musculoskeletal: Negative for arthralgias, joint swelling and myalgias.   Skin: Negative for rash.   Neurological: Negative for headaches.   Hematological: Negative for adenopathy.   Psychiatric/Behavioral: Negative for behavioral problems and self-injury.         Current Outpatient Medications:      aspirin (ASA) 81 MG chewable tablet, Take 81 mg by mouth daily, Disp: , Rfl:      cholecalciferol 25 MCG (1000 UT) TABS, Take 2,000 Units by mouth every other day , Disp: , Rfl:      Continuous Blood Gluc Sensor (DEXCOM G6 SENSOR) MISC, Use as directed for continuous glucose monitoring.  Change sensor every 10 days., Disp: , Rfl:      Continuous Blood Gluc Transmit (DEXCOM G6 TRANSMITTER) MISC, Use as directed for continuous glucose monitoring.  Change every 3 months., Disp: , Rfl:      docusate sodium (COLACE) 100 MG capsule, Take 200 mg by mouth daily before breakfast, Disp: , Rfl:      famotidine (PEPCID) 20 MG tablet, Take 20 mg by mouth 2 times daily, Disp: , Rfl:      furosemide (LASIX) 20 MG tablet, Take 1 tablet (20 mg) by mouth daily, Disp: 45 tablet, Rfl: 0     glucose blood VI test strips strip, 4 times daily., Disp: , Rfl:      insulin pen needle (31G X 5 MM) 31G X 5 MM miscellaneous, Use 3 pen needles daily or as directed. Patient/RPH may  decide gauge and length., Disp: 100 each, Rfl: 0     loratadine (CLARITIN) 10 MG tablet, Take 10 mg by mouth daily, Disp: , Rfl:      mycophenolic acid (GENERIC EQUIVALENT) 360 MG EC tablet, Take 1 tablet (360 mg) by mouth 2 times daily, Disp: 60 tablet, Rfl: 11     ondansetron (ZOFRAN) 4 MG tablet, Take 1 tablet (4 mg) by mouth every 8 hours as needed for nausea, Disp: 30 tablet, Rfl: 0     Prucalopride Succinate (MOTEGRITY) 2 MG TABS, Take 1 tablet (2 mg) by mouth daily, Disp: 90 tablet, Rfl: 1     sulfamethoxazole-trimethoprim (BACTRIM) 400-80 MG tablet, Take 1 tablet by mouth daily, Disp: 30 tablet, Rfl: 11     tacrolimus (ENVARSUS XR) 0.75 MG 24 hr tablet, HOLD for dose adjustment., Disp: 60 tablet, Rfl: 11     tacrolimus (ENVARSUS XR) 1 MG 24 hr tablet, Take 3 tablets (3 mg) by mouth every morning (before breakfast) Total dose=11mg daily, Disp: 90 tablet, Rfl: 11     tacrolimus (ENVARSUS XR) 4 MG 24 hr tablet, Take 2 tablets (8 mg) by mouth every morning (before breakfast) Total dose=11mg daily, Disp: 60 tablet, Rfl: 11     biotin 1000 MCG TABS tablet, Take 1,000 mcg by mouth daily (Patient not taking: Reported on 10/13/2022), Disp: , Rfl:      cetirizine (ZYRTEC) 10 MG tablet, Take 1 tablet (10 mg) by mouth every other day, Disp: , Rfl:      insulin lispro (HUMALOG KWIKPEN) 100 UNIT/ML (1 unit dial) KWIKPEN, BEFORE MEALS TID use 2 unit rapid-acting insulin to correct for every 50 mg/dL that blood glucose is above 150 mg/dL. 150-199 mg/dL: add 2 units, 200-249 mg/dL: add 4 units, 250-299 mg/dL: add 6 units, 300-349 mg/dL: add 8 units, 350-399 mg/dL: add 10 units, >400 mg/dL: add 12 units and call your doctor. (Patient not taking: Reported on 10/13/2022), Disp: 15 mL, Rfl: 0    Current Facility-Administered Medications:      bevacizumab (AVASTIN) intravitreal inj 1.25 mg, 1.25 mg, Intravitreal, Once, Ochoa Barnes MD     bevacizumab (AVASTIN) intravitreal inj 1.25 mg, 1.25 mg, Intravitreal, Q28 Days,  Pio Zamorano MD, 1.25 mg at 04/19/22 1034     bevacizumab (AVASTIN) intravitreal inj 1.25 mg, 1.25 mg, Intravitreal, Q28 Days, Pio Zamorano MD, 1.25 mg at 10/04/22 1636  Allergies   Allergen Reactions     Chlorhexidine Hives, Itching and Rash     PN: Patient had swelling/rash that was very itchy with chlorprep.     Ceclor [Cefaclor Monohydrate]      Metoclopramide Other (See Comments)     Noted mouth/facial twitching with reglan in 2022 when it was tried for gastroparesis.     Cefaclor Rash         EXAM:   /77   Pulse 88   Wt 57.4 kg (126 lb 9.6 oz)   SpO2 98%   BMI 20.43 kg/m      Physical Exam  Constitutional:       General: She is not in acute distress.     Appearance: Normal appearance. She is not ill-appearing.   HENT:      Head: Normocephalic and atraumatic.      Nose: He has nasal turbinate hypertrophy which is mild in nature bilaterally.  Mucosa is erythematous     Mouth/Throat:      Mouth: Mucous membranes are moist.      Pharynx: Oropharynx is clear. No posterior oropharyngeal erythema.   Eyes:      General:         Right eye: No discharge.         Left eye: No discharge.   Cardiovascular:      Rate and Rhythm: Normal rate and regular rhythm.      Heart sounds: Normal heart sounds.   Pulmonary:      Effort: Pulmonary effort is normal.      Breath sounds: Normal breath sounds. No wheezing or rhonchi.   Skin:     General: Skin is warm.      Findings: No erythema or rash.   Neurological:      General: No focal deficit present.      Mental Status: She is alert. Mental status is at baseline.   Psychiatric:         Mood and Affect: Mood normal.         Behavior: Behavior normal.     WORKUP: Skin testing was unable to be performed since the control did not develop which may be related to her medication regimen.    ASSESSMENT/PLAN:  Marilyn Ortega is a 37 year old female seen today for allergy evaluation.  Unable to skin test.  Will order blood testing    1. Try Allegra 180 mg daily and  compare to Zyrtec/Claritin. May take 2 pills in one day.  2. Continue Pataday eyedrops  3. Consider Flonase Sensimist, 1 spray each nostril daily as needed    Follow-up will be determined based on the blood test results.  Allergy shots could be considered if positive.      Thank you for allowing me to participate in the care of Marilyn Ortega.      I spent 35 minutes on the date of the encounter doing chart review, history and exam, documentation and further coordination as noted above exclusive of separately reported interpretations    George Munson MD  Allergy/Immunology  [facility]        Again, thank you for allowing me to participate in the care of your patient.        Sincerely,        George Munson MD

## 2022-10-13 NOTE — PATIENT INSTRUCTIONS
Try Allegra 180 mg daily and compare to Zyrtec/Claritin. May take 2 pills in one day.  Continue Pataday eyedrops  Consider Flonase Sensimist, 1 spray each nostril daily as needed          Allergy Staff Appt Hours Shot Hours Location         Physician   George Munson MD      Support Staff   EDWARD Galdamez MA          Mondays  Not available until January/2023 Wednesdays  Close    Tuesdays Thursdays and Fridays:  Debora 7-5                    [place]     Tuesdays: 7:40-3:20      Fridays: 7:40-4:20                [facility]  [address]  [place] [address]  Appt Line: [phone]    Pulmonary Function Scheduling:  [place]: [phone]         Questions about cost of your care?  For questions about your cost of your visit, procedure, lab or imaging contact: [employer] Consumer Price Line [phone] or visit: [email or web address]    Important Scheduling Information  Appointments for skin testing: Appointment will last approximately 45 minutes.  Please call the appointment line for your clinic to schedule.  Discontinue oral antihistamines 7 days prior to the appointment.  Discontinue nasal and ocular antihistamines 4 days prior to appointment.    Appointments for challenges (oral food challenge, penicillin testing, aspirin desensitization) If your provider instructs you to that this additional testing is indicated, it will need to be scheduled directly through the allergy department.  Please contact them via [employer] or by calling the clinic and asking to speak with the allergy team.  They will provide additional information and instructions for the appointment.  Discontinue oral antihistamines 7 days prior to the appointment.  Discontinue nasal and ocular antihistamines 4 days prior to the appointment.    Thank you for trusting us with your care. Please feel free to contact us with any questions or concerns you may have.

## 2022-10-13 NOTE — PROGRESS NOTES
Prema Diez MD McDonald, EDWARD Carrillo - I saw this patient at [facility] but I would like to get some studies at the  can you help schedule?     Cardiopulmonary exercise stress test followed by exercise right heart cath, same day please.   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 Sow who does the exercise R[facility] and he gave the go-ahead to schedule with him.     I called Marilyn and she is agreeable. Same day for both test (Magi says this can be done) would be preferable due to her work schedule. No duff.     Thanks.   Prema     Orders entered. Routing to procedure scheduling.  Libby Rashid, RN on 10/13/2022 at 11:50 AM

## 2022-10-13 NOTE — PROGRESS NOTES
"Marilyn Ortega was seen in the Allergy Clinic at [facility].    Marilyn Ortega is a 37 year old female being seen today in consultation for allergies.  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  She feels like her allergy symptoms have worsened in the last year.  However she had allergy symptoms for at least 10 years and has been taking antihistamines for at least 10 years.  She was taking Claritin and switched to Zyrtec to see if this was more effective. Stopping antihistamines for the last 6 days resulted in itching of her eyes, runny nose, as well as sneezing.  With the antihistamines all symptoms are improved except for the eye symptoms which continue with itching and watering of her eyes.  She has seen an ophthalmologist in regards to this also.    She switched to hypoallergenic products (allergenic pillows) without significant provement.  She is here for allergy evaluation.  She has tried Pataday eyedrops without much improvement and also lubricating eyedrops.    She does not have a history of asthma or eczema.  Her mother has a history of allergic rhinitis and her father has a history of eczema.      Chief Complaint   Patient presents with     Allergy Consult     Pt stated that she is allergic to \"everything\". Pt feels terrible because she has been off antihistamine. HX are watery and itchy eyes. Pt uses hypoallergenic pillow but it does not work, pt has a cat and thinks she might be allergic to cats. Her hx are all year round, since pt had a surgery 1 year ago her symptoms are getting worst.Pt takes Zyrtec and Claritin doesn't work for the past couple months, which she is very concern.  Pt took anti histamine last Friday 10/7/22, 1tb Zyrtec.         Past Medical History:   Diagnosis Date     Anemia 12/9/2021     C. difficile diarrhea 2013     CKD (chronic kidney disease) stage 4, GFR 15-29 ml/min (H)      Complication of transplanted kidney 5/23/2022 "     Gastroparesis      Gluten intolerance      Heterozygous factor V Leiden mutation (H)      HTN (hypertension)      Infection due to 2019 novel coronavirus 2020     Osteomyelitis (H) 2013     Shingles 2013     Type 1 diabetes mellitus (H) 2000     Family History   Problem Relation Age of Onset     Diabetes Paternal Grandfather      Macular Degeneration Mother      Arthritis Maternal Grandmother      Hypertension Maternal Grandmother      Cancer - colorectal Maternal Grandfather      Glaucoma No family hx of      Past Surgical History:   Procedure Laterality Date     BENCH KIDNEY N/A 11/15/2021    Procedure: Bench kidney;  Surgeon: Branden Aranda MD;  Location: [facility] OR     BENCH PANCREAS N/A 11/15/2021    Procedure: Bench pancreas;  Surgeon: Branden Aranda MD;  Location: [facility] OR     CATARACT EXTRACTION  2021     CV CORONARY ANGIOGRAM N/A 2022    Procedure: Coronary Angiogram;  Surgeon: Bebeto Ventura MD;  Location:  HEART CARDIAC CATH LAB     CV RIGHT HEART CATH MEASUREMENTS RECORDED N/A 2022    Procedure: Right Heart Catheterization;  Surgeon: Bebeto Ventura MD;  Location:  HEART CARDIAC CATH LAB     IR RENAL BIOPSY LEFT  2022     IR RENAL BIOPSY LEFT  2022     ORIF HIP FRACTURE Left      ORTHOPEDIC SURGERY Bilateral     Repair of labral tear     TRANSPLANT PANCREAS, KIDNEY  DONOR, COMBINED Left 11/15/2021    Procedure: TRANSPLANT, KIDNEY AND PANCREAS,  DONOR;  Surgeon: Branden Aranda MD;  Location:  OR       ENVIRONMENTAL HISTORY:   Pets inside the house include 1 cat(s).  Do you smoke cigarettes or other recreational drugs? No There is/are 0 smokers living in the house. The house does not have a damp basement.     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      Review of Systems   Constitutional: Negative for activity change, chills and fever.   HENT:  Positive for postnasal drip, rhinorrhea, sinus pressure and sneezing. Negative for congestion, dental problem, ear pain, facial swelling and nosebleeds.    Eyes: Positive for discharge and itching. Negative for redness.   Respiratory: Negative for cough, chest tightness, shortness of breath and wheezing.    Cardiovascular: Negative for chest pain.   Gastrointestinal: Negative for diarrhea, nausea and vomiting.   Musculoskeletal: Negative for arthralgias, joint swelling and myalgias.   Skin: Negative for rash.   Neurological: Negative for headaches.   Hematological: Negative for adenopathy.   Psychiatric/Behavioral: Negative for behavioral problems and self-injury.         Current Outpatient Medications:      aspirin (ASA) 81 MG chewable tablet, Take 81 mg by mouth daily, Disp: , Rfl:      cholecalciferol 25 MCG (1000 UT) TABS, Take 2,000 Units by mouth every other day , Disp: , Rfl:      Continuous Blood Gluc Sensor (DEXCOM G6 SENSOR) MISC, Use as directed for continuous glucose monitoring.  Change sensor every 10 days., Disp: , Rfl:      Continuous Blood Gluc Transmit (DEXCOM G6 TRANSMITTER) MISC, Use as directed for continuous glucose monitoring.  Change every 3 months., Disp: , Rfl:      docusate sodium (COLACE) 100 MG capsule, Take 200 mg by mouth daily before breakfast, Disp: , Rfl:      famotidine (PEPCID) 20 MG tablet, Take 20 mg by mouth 2 times daily, Disp: , Rfl:      furosemide (LASIX) 20 MG tablet, Take 1 tablet (20 mg) by mouth daily, Disp: 45 tablet, Rfl: 0     glucose blood VI test strips strip, 4 times daily., Disp: , Rfl:      insulin pen needle (31G X 5 MM) 31G X 5 MM miscellaneous, Use 3 pen needles daily or as directed. Patient/RPH may decide gauge and length., Disp: 100 each, Rfl: 0     loratadine (CLARITIN) 10 MG tablet, Take 10 mg by mouth daily, Disp: , Rfl:      mycophenolic acid (GENERIC EQUIVALENT) 360 MG EC tablet, Take 1 tablet (360 mg) by mouth 2 times daily, Disp: 60 tablet, Rfl: 11      ondansetron (ZOFRAN) 4 MG tablet, Take 1 tablet (4 mg) by mouth every 8 hours as needed for nausea, Disp: 30 tablet, Rfl: 0     Prucalopride Succinate (MOTEGRITY) 2 MG TABS, Take 1 tablet (2 mg) by mouth daily, Disp: 90 tablet, Rfl: 1     sulfamethoxazole-trimethoprim (BACTRIM) 400-80 MG tablet, Take 1 tablet by mouth daily, Disp: 30 tablet, Rfl: 11     tacrolimus (ENVARSUS XR) 0.75 MG 24 hr tablet, HOLD for dose adjustment., Disp: 60 tablet, Rfl: 11     tacrolimus (ENVARSUS XR) 1 MG 24 hr tablet, Take 3 tablets (3 mg) by mouth every morning (before breakfast) Total dose=11mg daily, Disp: 90 tablet, Rfl: 11     tacrolimus (ENVARSUS XR) 4 MG 24 hr tablet, Take 2 tablets (8 mg) by mouth every morning (before breakfast) Total dose=11mg daily, Disp: 60 tablet, Rfl: 11     biotin 1000 MCG TABS tablet, Take 1,000 mcg by mouth daily (Patient not taking: Reported on 10/13/2022), Disp: , Rfl:      cetirizine (ZYRTEC) 10 MG tablet, Take 1 tablet (10 mg) by mouth every other day, Disp: , Rfl:      insulin lispro (HUMALOG KWIKPEN) 100 UNIT/ML (1 unit dial) KWIKPEN, BEFORE MEALS TID use 2 unit rapid-acting insulin to correct for every 50 mg/dL that blood glucose is above 150 mg/dL. 150-199 mg/dL: add 2 units, 200-249 mg/dL: add 4 units, 250-299 mg/dL: add 6 units, 300-349 mg/dL: add 8 units, 350-399 mg/dL: add 10 units, >400 mg/dL: add 12 units and call your doctor. (Patient not taking: Reported on 10/13/2022), Disp: 15 mL, Rfl: 0    Current Facility-Administered Medications:      bevacizumab (AVASTIN) intravitreal inj 1.25 mg, 1.25 mg, Intravitreal, Once, Ochoa Barnes MD     bevacizumab (AVASTIN) intravitreal inj 1.25 mg, 1.25 mg, Intravitreal, Q28 Days, Pio Zamorano MD, 1.25 mg at 04/19/22 1034     bevacizumab (AVASTIN) intravitreal inj 1.25 mg, 1.25 mg, Intravitreal, Q28 Days, Pio Zamorano MD, 1.25 mg at 10/04/22 1636  Allergies   Allergen Reactions     Chlorhexidine Hives, Itching and Rash     PN: Patient  had swelling/rash that was very itchy with chlorprep.     Ceclor [Cefaclor Monohydrate]      Metoclopramide Other (See Comments)     Noted mouth/facial twitching with reglan in 2022 when it was tried for gastroparesis.     Cefaclor Rash         EXAM:   /77   Pulse 88   Wt 57.4 kg (126 lb 9.6 oz)   SpO2 98%   BMI 20.43 kg/m      Physical Exam  Constitutional:       General: She is not in acute distress.     Appearance: Normal appearance. She is not ill-appearing.   HENT:      Head: Normocephalic and atraumatic.      Nose: He has nasal turbinate hypertrophy which is mild in nature bilaterally.  Mucosa is erythematous     Mouth/Throat:      Mouth: Mucous membranes are moist.      Pharynx: Oropharynx is clear. No posterior oropharyngeal erythema.   Eyes:      General:         Right eye: No discharge.         Left eye: No discharge.   Cardiovascular:      Rate and Rhythm: Normal rate and regular rhythm.      Heart sounds: Normal heart sounds.   Pulmonary:      Effort: Pulmonary effort is normal.      Breath sounds: Normal breath sounds. No wheezing or rhonchi.   Skin:     General: Skin is warm.      Findings: No erythema or rash.   Neurological:      General: No focal deficit present.      Mental Status: She is alert. Mental status is at baseline.   Psychiatric:         Mood and Affect: Mood normal.         Behavior: Behavior normal.     WORKUP: Skin testing was unable to be performed since the control did not develop which may be related to her medication regimen.    ASSESSMENT/PLAN:  Marilyn Ortega is a 37 year old female seen today for allergy evaluation.  Unable to skin test.  Will order blood testing    1. Try Allegra 180 mg daily and compare to Zyrtec/Claritin. May take 2 pills in one day.  2. Continue Pataday eyedrops  3. Consider Flonase Sensimist, 1 spray each nostril daily as needed    Follow-up will be determined based on the blood test results.  Allergy shots could be considered if  positive.      Thank you for allowing me to participate in the care of Marilyn GERA Ortega.      I spent 35 minutes on the date of the encounter doing chart review, history and exam, documentation and further coordination as noted above exclusive of separately reported interpretations    George Munson MD  Allergy/Immunology  [facility]

## 2022-10-17 ENCOUNTER — LAB (OUTPATIENT)
Dept: LAB | Facility: CLINIC | Age: 37
End: 2022-10-17
Payer: COMMERCIAL

## 2022-10-17 DIAGNOSIS — R06.7 SNEEZING: ICD-10-CM

## 2022-10-17 DIAGNOSIS — H57.9 ITCH OF EYE: ICD-10-CM

## 2022-10-17 DIAGNOSIS — H04.203 BILATERAL EPIPHORA: ICD-10-CM

## 2022-10-17 DIAGNOSIS — Z94.83 PANCREAS REPLACED BY TRANSPLANT (H): ICD-10-CM

## 2022-10-17 DIAGNOSIS — Z94.0 KIDNEY REPLACED BY TRANSPLANT: ICD-10-CM

## 2022-10-17 LAB
A ALTERNATA IGE QN: <0.1 KU(A)/L
A FUMIGATUS IGE QN: <0.1 KU(A)/L
AMYLASE SERPL-CCNC: 52 U/L (ref 30–110)
ANION GAP SERPL CALCULATED.3IONS-SCNC: 4 MMOL/L (ref 3–14)
BUN SERPL-MCNC: 33 MG/DL (ref 7–30)
C HERBARUM IGE QN: <0.1 KU(A)/L
CALCIUM SERPL-MCNC: 9.6 MG/DL (ref 8.5–10.1)
CALIF WALNUT POLN IGE QN: <0.1 KU(A)/L
CAT DANDER IGG QN: <0.1 KU(A)/L
CEDAR IGE QN: <0.1 KU(A)/L
CHLORIDE BLD-SCNC: 106 MMOL/L (ref 94–109)
CO2 SERPL-SCNC: 26 MMOL/L (ref 20–32)
COMMON RAGWEED IGE QN: <0.1 KU(A)/L
COTTONWOOD IGE QN: <0.1 KU(A)/L
CREAT SERPL-MCNC: 1.34 MG/DL (ref 0.52–1.04)
D FARINAE IGE QN: 0.24 KU(A)/L
D PTERONYSS IGE QN: <0.1 KU(A)/L
DOG DANDER+EPITH IGE QN: <0.1 KU(A)/L
E PURPURASCENS IGE QN: <0.1 KU(A)/L
EAST WHITE PINE IGE QN: <0.1 KU(A)/L
ENGL PLANTAIN IGE QN: <0.1 KU(A)/L
ERYTHROCYTE [DISTWIDTH] IN BLOOD BY AUTOMATED COUNT: 12.3 % (ref 10–15)
FIREBUSH IGE QN: <0.1 KU(A)/L
GFR SERPL CREATININE-BSD FRML MDRD: 52 ML/MIN/1.73M2
GIANT RAGWEED IGE QN: <0.1 KU(A)/L
GLUCOSE BLD-MCNC: 99 MG/DL (ref 70–99)
GOOSEFOOT IGE QN: <0.1 KU(A)/L
HCT VFR BLD AUTO: 33.8 % (ref 35–47)
HGB BLD-MCNC: 10.5 G/DL (ref 11.7–15.7)
JOHNSON GRASS IGE QN: <0.1 KU(A)/L
LIPASE SERPL-CCNC: 94 U/L (ref 73–393)
MAPLE IGE QN: <0.1 KU(A)/L
MCH RBC QN AUTO: 28.2 PG (ref 26.5–33)
MCHC RBC AUTO-ENTMCNC: 31.1 G/DL (ref 31.5–36.5)
MCV RBC AUTO: 91 FL (ref 78–100)
MUGWORT IGE QN: <0.1 KU(A)/L
NETTLE IGE QN: <0.1 KU(A)/L
P NOTATUM IGE QN: <0.1 KU(A)/L
PLATELET # BLD AUTO: 296 10E3/UL (ref 150–450)
POTASSIUM BLD-SCNC: 3.9 MMOL/L (ref 3.4–5.3)
RBC # BLD AUTO: 3.73 10E6/UL (ref 3.8–5.2)
RED MULBERRY IGE QN: <0.1 KU(A)/L
SALTWORT IGE QN: <0.1 KU(A)/L
SHEEP SORREL IGE QN: <0.1 KU(A)/L
SILVER BIRCH IGE QN: <0.1 KU(A)/L
SODIUM SERPL-SCNC: 136 MMOL/L (ref 133–144)
TACROLIMUS BLD-MCNC: 7.7 UG/L (ref 5–15)
TIMOTHY IGE QN: <0.1 KU(A)/L
TME LAST DOSE: NORMAL H
TME LAST DOSE: NORMAL H
WBC # BLD AUTO: 3.8 10E3/UL (ref 4–11)
WHITE ASH IGE QN: <0.1 KU(A)/L
WHITE ELM IGE QN: <0.1 KU(A)/L
WHITE MULBERRY IGE QN: <0.1 KU(A)/L
WHITE OAK IGE QN: <0.1 KU(A)/L
WORMWOOD IGE QN: <0.1 KU(A)/L

## 2022-10-17 PROCEDURE — 36415 COLL VENOUS BLD VENIPUNCTURE: CPT

## 2022-10-17 PROCEDURE — 86003 ALLG SPEC IGE CRUDE XTRC EA: CPT

## 2022-10-17 PROCEDURE — 80197 ASSAY OF TACROLIMUS: CPT

## 2022-10-17 PROCEDURE — 80048 BASIC METABOLIC PNL TOTAL CA: CPT

## 2022-10-17 PROCEDURE — 85027 COMPLETE CBC AUTOMATED: CPT

## 2022-10-17 PROCEDURE — 83690 ASSAY OF LIPASE: CPT

## 2022-10-17 PROCEDURE — 82150 ASSAY OF AMYLASE: CPT

## 2022-10-18 ENCOUNTER — TELEPHONE (OUTPATIENT)
Dept: CARDIOLOGY | Facility: CLINIC | Age: 37
End: 2022-10-18

## 2022-10-18 ENCOUNTER — TELEPHONE (OUTPATIENT)
Dept: TRANSPLANT | Facility: CLINIC | Age: 37
End: 2022-10-18

## 2022-10-18 DIAGNOSIS — Z94.0 KIDNEY REPLACED BY TRANSPLANT: ICD-10-CM

## 2022-10-18 DIAGNOSIS — Z94.83 PANCREAS TRANSPLANTED (H): ICD-10-CM

## 2022-10-18 DIAGNOSIS — Z94.83 PANCREAS REPLACED BY TRANSPLANT (H): ICD-10-CM

## 2022-10-18 RX ORDER — TACROLIMUS 4 MG/1
8 TABLET, EXTENDED RELEASE ORAL
Qty: 60 TABLET | Refills: 11 | Status: SHIPPED | OUTPATIENT
Start: 2022-10-18 | End: 2022-10-26

## 2022-10-18 RX ORDER — TACROLIMUS 1 MG/1
2 TABLET, EXTENDED RELEASE ORAL
Qty: 60 TABLET | Refills: 11 | Status: SHIPPED | OUTPATIENT
Start: 2022-10-18 | End: 2022-10-26

## 2022-10-18 NOTE — TELEPHONE ENCOUNTER
ISSUE:   Tacrolimus XR level 7.7 on 10/17, goal 5-7, dose 10.5 mg daily.    PLAN:   Please call patient and confirm this was an accurate 24-hour trough. Verify Tacrolimus XR dose 10.5 mg daily. Confirm no new medications or illness. Confirm no missed doses. If accurate trough and accurate dose, decrease Tacrolimus XR dose to 10 mg daily and repeat labs in 1 weeks    OUTCOME:   Spoke with patient, they confirm accurate trough level and current dose 10.5 mg daily. Patient confirmed dose change to 10 mg daily and to repeat labs in 1 weeks. Orders sent to [facility] pharmacy for dose change and lab for repeat labs. Patient voiced understanding of plan.

## 2022-10-18 NOTE — TELEPHONE ENCOUNTER
Patient scheduled for CPXT and right heart cath with exercise on 11/23 at 8:30 am.  Reviewed below instructions and will also send in Biorasis.    Alondra Simms RN  Cardiology Care Coordinator  [facility] [place], [place]  Phone: [phone]      What is a Cardiopulmonary Exercise Test?  This exam is done to evaluate your exercise capacity and to determine how well your heart functions during exercise. During this test you will either walk on a treadmill or pedal a bicycle. Your electrocardiogram, heart rate, and rhythm will be continuously monitored. Your blood pressure will also be checked. In addition, you will breathe through a mouthpiece and your exhaled air will be measured at rest and during exercise.      Prep for Cardiopulmonary Stress test:    1. Nothing to eat or drink 3 hours prior (clear liquids until 5:30 am)  2. No coffee, alcohol, or tobacco 12 hours prior  3. Report to [facility] Room in [facility]  4. Wear comfortable clothes.       A right heart catheterization is performed to determine how well the heart is pumping and to measure the pressures in the heart and lungs.  In a right heart cath, the doctor guides a special catheter (a small, hollow tube) called a pulmonary artery (PA) catheter to the right side of the heart and passes it into the pulmonary artery, the main artery carrying blood to the lungs. The doctor observes blood flow through the heart and measures the pressures inside the heart and in the lungs.    RHC Patient Instructions:  Check-In  Time  Check-In Location  Estimated Length  Procedure   Name       [facility] room  60-90 minutes  Right Heart Catheterization**      Procedure Preparations & Instructions   This is an invasive procedure that DOES require preparation:    Nothing to eat for 6 hours    You may have clear liquids up until the time of your procedure    A ride should be arranged for you in the instance you are unable to drive home, however you should be able  to function as you normally would after the procedure     *For Patients with Diabetes:    DO NOT take any oral diabetic medication, short-acting diabetes medications/insulin, humalog or regular insulin the morning of your test    Take   dose of long-acting insulin (Lantus, Levemir) the day of your test    Remember to bring your glucometer and insulin with you to take after your test if needed     *For Patients on anticoagulants:    Your Coumadin Clinic or RN Coordinator will give you instructions on medication adjustments or bridging prior to the procedure

## 2022-10-24 ENCOUNTER — LAB (OUTPATIENT)
Dept: LAB | Facility: CLINIC | Age: 37
End: 2022-10-24
Payer: COMMERCIAL

## 2022-10-24 ENCOUNTER — MYC MEDICAL ADVICE (OUTPATIENT)
Dept: ALLERGY | Facility: CLINIC | Age: 37
End: 2022-10-24

## 2022-10-24 DIAGNOSIS — Z79.899 ENCOUNTER FOR LONG-TERM CURRENT USE OF MEDICATION: ICD-10-CM

## 2022-10-24 DIAGNOSIS — Z94.0 KIDNEY REPLACED BY TRANSPLANT: ICD-10-CM

## 2022-10-24 DIAGNOSIS — Z48.298 AFTERCARE FOLLOWING ORGAN TRANSPLANT: ICD-10-CM

## 2022-10-24 DIAGNOSIS — Z94.83 PANCREAS REPLACED BY TRANSPLANT (H): ICD-10-CM

## 2022-10-24 LAB
AMYLASE SERPL-CCNC: 50 U/L (ref 30–110)
ANION GAP SERPL CALCULATED.3IONS-SCNC: 3 MMOL/L (ref 3–14)
BUN SERPL-MCNC: 31 MG/DL (ref 7–30)
CALCIUM SERPL-MCNC: 8.9 MG/DL (ref 8.5–10.1)
CHLORIDE BLD-SCNC: 109 MMOL/L (ref 94–109)
CO2 SERPL-SCNC: 25 MMOL/L (ref 20–32)
CREAT SERPL-MCNC: 1.35 MG/DL (ref 0.52–1.04)
ERYTHROCYTE [DISTWIDTH] IN BLOOD BY AUTOMATED COUNT: 12.4 % (ref 10–15)
GFR SERPL CREATININE-BSD FRML MDRD: 52 ML/MIN/1.73M2
GLUCOSE BLD-MCNC: 92 MG/DL (ref 70–99)
HCT VFR BLD AUTO: 31.6 % (ref 35–47)
HGB BLD-MCNC: 9.9 G/DL (ref 11.7–15.7)
LIPASE SERPL-CCNC: 109 U/L (ref 73–393)
MAGNESIUM SERPL-MCNC: 1.8 MG/DL (ref 1.6–2.3)
MCH RBC QN AUTO: 28.4 PG (ref 26.5–33)
MCHC RBC AUTO-ENTMCNC: 31.3 G/DL (ref 31.5–36.5)
MCV RBC AUTO: 91 FL (ref 78–100)
MYCOPHENOLATE SERPL LC/MS/MS-MCNC: 5.87 MG/L (ref 1–3.5)
MYCOPHENOLATE-G SERPL LC/MS/MS-MCNC: 55.9 MG/L (ref 30–95)
PHOSPHATE SERPL-MCNC: 3.3 MG/DL (ref 2.5–4.5)
PLATELET # BLD AUTO: 273 10E3/UL (ref 150–450)
POTASSIUM BLD-SCNC: 4.5 MMOL/L (ref 3.4–5.3)
RBC # BLD AUTO: 3.49 10E6/UL (ref 3.8–5.2)
SODIUM SERPL-SCNC: 137 MMOL/L (ref 133–144)
TACROLIMUS BLD-MCNC: 7.3 UG/L (ref 5–15)
TME LAST DOSE: ABNORMAL H
TME LAST DOSE: ABNORMAL H
TME LAST DOSE: NORMAL H
TME LAST DOSE: NORMAL H
WBC # BLD AUTO: 4.9 10E3/UL (ref 4–11)

## 2022-10-24 PROCEDURE — 85027 COMPLETE CBC AUTOMATED: CPT

## 2022-10-24 PROCEDURE — 82150 ASSAY OF AMYLASE: CPT

## 2022-10-24 PROCEDURE — 84100 ASSAY OF PHOSPHORUS: CPT

## 2022-10-24 PROCEDURE — 36415 COLL VENOUS BLD VENIPUNCTURE: CPT

## 2022-10-24 PROCEDURE — 83735 ASSAY OF MAGNESIUM: CPT

## 2022-10-24 PROCEDURE — 80197 ASSAY OF TACROLIMUS: CPT

## 2022-10-24 PROCEDURE — 83690 ASSAY OF LIPASE: CPT

## 2022-10-24 PROCEDURE — 80180 DRUG SCRN QUAN MYCOPHENOLATE: CPT

## 2022-10-24 PROCEDURE — 87799 DETECT AGENT NOS DNA QUANT: CPT

## 2022-10-24 PROCEDURE — 80048 BASIC METABOLIC PNL TOTAL CA: CPT

## 2022-10-24 NOTE — TELEPHONE ENCOUNTER
Routing this to Dr. Munson to advise patient on the specifics of allergy serums and her questions.     Tari Escamilla, BSN, RN

## 2022-10-25 LAB — BKV DNA # SPEC NAA+PROBE: NOT DETECTED COPIES/ML

## 2022-10-26 DIAGNOSIS — Z94.83 PANCREAS TRANSPLANTED (H): ICD-10-CM

## 2022-10-26 DIAGNOSIS — Z94.83 PANCREAS REPLACED BY TRANSPLANT (H): ICD-10-CM

## 2022-10-26 DIAGNOSIS — Z94.0 KIDNEY REPLACED BY TRANSPLANT: ICD-10-CM

## 2022-10-26 RX ORDER — TACROLIMUS 4 MG/1
8 TABLET, EXTENDED RELEASE ORAL
Qty: 60 TABLET | Refills: 11 | Status: SHIPPED | OUTPATIENT
Start: 2022-10-26 | End: 2022-12-20

## 2022-10-26 RX ORDER — TACROLIMUS 0.75 MG/1
1.5 TABLET, EXTENDED RELEASE ORAL
Qty: 60 TABLET | Refills: 11 | Status: SHIPPED | OUTPATIENT
Start: 2022-10-26 | End: 2022-12-20

## 2022-10-26 RX ORDER — TACROLIMUS 1 MG/1
TABLET, EXTENDED RELEASE ORAL
Qty: 60 TABLET | Refills: 11 | Status: SHIPPED | OUTPATIENT
Start: 2022-10-26 | End: 2023-02-07

## 2022-10-26 NOTE — TELEPHONE ENCOUNTER
Catheter removed. Left voice message that I am patient's current transplant coordinator.  Requested return call from patient should she have any questions/concerns.

## 2022-10-31 ENCOUNTER — HOSPITAL ENCOUNTER (OUTPATIENT)
Dept: MRI IMAGING | Facility: CLINIC | Age: 37
Discharge: HOME | End: 2022-10-31
Attending: INTERNAL MEDICINE | Admitting: INTERNAL MEDICINE
Payer: COMMERCIAL

## 2022-10-31 DIAGNOSIS — Z94.0 KIDNEY REPLACED BY TRANSPLANT: ICD-10-CM

## 2022-10-31 DIAGNOSIS — Z79.899 ENCOUNTER FOR LONG-TERM CURRENT USE OF MEDICATION: ICD-10-CM

## 2022-10-31 DIAGNOSIS — Z48.298 AFTERCARE FOLLOWING ORGAN TRANSPLANT: ICD-10-CM

## 2022-10-31 DIAGNOSIS — Z94.83 PANCREAS REPLACED BY TRANSPLANT (H): ICD-10-CM

## 2022-10-31 DIAGNOSIS — R06.02 SOB (SHORTNESS OF BREATH): ICD-10-CM

## 2022-10-31 LAB
AMYLASE SERPL-CCNC: 61 U/L (ref 28–100)
ANION GAP SERPL CALCULATED.3IONS-SCNC: 11 MMOL/L (ref 7–15)
BUN SERPL-MCNC: 29.9 MG/DL (ref 6–20)
CALCIUM SERPL-MCNC: 9.9 MG/DL (ref 8.6–10)
CHLORIDE SERPL-SCNC: 104 MMOL/L (ref 98–107)
CREAT SERPL-MCNC: 1.35 MG/DL (ref 0.51–0.95)
DEPRECATED HCO3 PLAS-SCNC: 24 MMOL/L (ref 22–29)
ERYTHROCYTE [DISTWIDTH] IN BLOOD BY AUTOMATED COUNT: 12.9 % (ref 10–15)
GFR SERPL CREATININE-BSD FRML MDRD: 52 ML/MIN/1.73M2
GLUCOSE SERPL-MCNC: 81 MG/DL (ref 70–99)
HCT VFR BLD AUTO: 32.3 % (ref 35–47)
HGB BLD-MCNC: 10.3 G/DL (ref 11.7–15.7)
LIPASE SERPL-CCNC: 26 U/L (ref 13–60)
MCH RBC QN AUTO: 28.2 PG (ref 26.5–33)
MCHC RBC AUTO-ENTMCNC: 31.9 G/DL (ref 31.5–36.5)
MCV RBC AUTO: 89 FL (ref 78–100)
PLATELET # BLD AUTO: 263 10E3/UL (ref 150–450)
POTASSIUM SERPL-SCNC: 4.1 MMOL/L (ref 3.4–5.3)
RBC # BLD AUTO: 3.65 10E6/UL (ref 3.8–5.2)
SODIUM SERPL-SCNC: 139 MMOL/L (ref 136–145)
TACROLIMUS BLD-MCNC: 6.2 UG/L (ref 5–15)
TME LAST DOSE: NORMAL H
TME LAST DOSE: NORMAL H
WBC # BLD AUTO: 5.4 10E3/UL (ref 4–11)

## 2022-10-31 PROCEDURE — 255N000002 HC RX 255 OP 636: Performed by: INTERNAL MEDICINE

## 2022-10-31 PROCEDURE — 80197 ASSAY OF TACROLIMUS: CPT | Performed by: INTERNAL MEDICINE

## 2022-10-31 PROCEDURE — 83690 ASSAY OF LIPASE: CPT | Performed by: INTERNAL MEDICINE

## 2022-10-31 PROCEDURE — 82150 ASSAY OF AMYLASE: CPT | Performed by: INTERNAL MEDICINE

## 2022-10-31 PROCEDURE — 82374 ASSAY BLOOD CARBON DIOXIDE: CPT | Performed by: INTERNAL MEDICINE

## 2022-10-31 PROCEDURE — 75561 CARDIAC MRI FOR MORPH W/DYE: CPT

## 2022-10-31 PROCEDURE — 75561 CARDIAC MRI FOR MORPH W/DYE: CPT | Mod: 26 | Performed by: RADIOLOGY

## 2022-10-31 PROCEDURE — 80051 ELECTROLYTE PANEL: CPT | Performed by: INTERNAL MEDICINE

## 2022-10-31 PROCEDURE — 85027 COMPLETE CBC AUTOMATED: CPT | Performed by: INTERNAL MEDICINE

## 2022-10-31 PROCEDURE — 999N000128 HC STATISTIC PERIPHERAL IV START W/O US GUIDANCE

## 2022-10-31 PROCEDURE — 36415 COLL VENOUS BLD VENIPUNCTURE: CPT | Performed by: INTERNAL MEDICINE

## 2022-10-31 PROCEDURE — A9585 GADOBUTROL INJECTION: HCPCS | Performed by: INTERNAL MEDICINE

## 2022-10-31 RX ORDER — GADOBUTROL 604.72 MG/ML
6 INJECTION INTRAVENOUS ONCE
Status: COMPLETED | OUTPATIENT
Start: 2022-10-31 | End: 2022-10-31

## 2022-10-31 RX ADMIN — GADOBUTROL 6 ML: 604.72 INJECTION INTRAVENOUS at 08:57

## 2022-11-01 ENCOUNTER — TELEPHONE (OUTPATIENT)
Dept: TRANSPLANT | Facility: CLINIC | Age: 37
End: 2022-11-01

## 2022-11-01 NOTE — TELEPHONE ENCOUNTER
Patient called to ask if drinking pure cranberry juice is OK with her medications. Message sent to pharmacist.   Patient also is seeing transplant physician at [facility] for second opinion on her symptoms of swelling/fatigue that she has had since transplant.

## 2022-11-03 ENCOUNTER — TELEPHONE (OUTPATIENT)
Dept: CARDIOLOGY | Facility: CLINIC | Age: 37
End: 2022-11-03

## 2022-11-03 ENCOUNTER — TELEPHONE (OUTPATIENT)
Dept: TRANSPLANT | Facility: CLINIC | Age: 37
End: 2022-11-03

## 2022-11-03 DIAGNOSIS — Z94.0 KIDNEY REPLACED BY TRANSPLANT: Primary | ICD-10-CM

## 2022-11-03 DIAGNOSIS — Z94.83 PANCREAS REPLACED BY TRANSPLANT (H): ICD-10-CM

## 2022-11-03 NOTE — TELEPHONE ENCOUNTER
ISSUE:  COVID+  Bad cough, congestion, cough  No fever, no body aches    PLAN:  MAb infusion  Message sent to provider about MPA dose

## 2022-11-03 NOTE — TELEPHONE ENCOUNTER
Reanna Cueto MD Rockers, Emily S, RN  Given SPK I would be concerned about reducing without adding prednisone and I know she doesn't like to be on pred. If she agrees to be on pred then could reduce by 1 tab only to 360 am/180 pm     If he symptoms are mild she is vaccinated and boosted would rather monitor with mAb     Patient notifed, referral for mAb infusion entered.   Will check in with patient again in a few days.

## 2022-11-07 ENCOUNTER — LAB (OUTPATIENT)
Dept: LAB | Facility: CLINIC | Age: 37
End: 2022-11-07
Payer: COMMERCIAL

## 2022-11-07 DIAGNOSIS — Z94.0 KIDNEY REPLACED BY TRANSPLANT: ICD-10-CM

## 2022-11-07 DIAGNOSIS — Z94.83 PANCREAS REPLACED BY TRANSPLANT (H): ICD-10-CM

## 2022-11-07 LAB
AMYLASE SERPL-CCNC: 47 U/L (ref 30–110)
ANION GAP SERPL CALCULATED.3IONS-SCNC: 7 MMOL/L (ref 3–14)
BUN SERPL-MCNC: 25 MG/DL (ref 7–30)
CALCIUM SERPL-MCNC: 9 MG/DL (ref 8.5–10.1)
CHLORIDE BLD-SCNC: 107 MMOL/L (ref 94–109)
CO2 SERPL-SCNC: 23 MMOL/L (ref 20–32)
CREAT SERPL-MCNC: 1.34 MG/DL (ref 0.52–1.04)
ERYTHROCYTE [DISTWIDTH] IN BLOOD BY AUTOMATED COUNT: 12.5 % (ref 10–15)
GFR SERPL CREATININE-BSD FRML MDRD: 52 ML/MIN/1.73M2
GLUCOSE BLD-MCNC: 69 MG/DL (ref 70–99)
HCT VFR BLD AUTO: 32.1 % (ref 35–47)
HGB BLD-MCNC: 10 G/DL (ref 11.7–15.7)
LIPASE SERPL-CCNC: 117 U/L (ref 73–393)
MCH RBC QN AUTO: 28.1 PG (ref 26.5–33)
MCHC RBC AUTO-ENTMCNC: 31.2 G/DL (ref 31.5–36.5)
MCV RBC AUTO: 90 FL (ref 78–100)
PLATELET # BLD AUTO: 247 10E3/UL (ref 150–450)
POTASSIUM BLD-SCNC: 4.6 MMOL/L (ref 3.4–5.3)
RBC # BLD AUTO: 3.56 10E6/UL (ref 3.8–5.2)
SODIUM SERPL-SCNC: 137 MMOL/L (ref 133–144)
TACROLIMUS BLD-MCNC: 7 UG/L (ref 5–15)
TME LAST DOSE: NORMAL H
TME LAST DOSE: NORMAL H
WBC # BLD AUTO: 3.5 10E3/UL (ref 4–11)

## 2022-11-07 PROCEDURE — 80197 ASSAY OF TACROLIMUS: CPT

## 2022-11-07 PROCEDURE — 36415 COLL VENOUS BLD VENIPUNCTURE: CPT

## 2022-11-07 PROCEDURE — 85018 HEMOGLOBIN: CPT

## 2022-11-07 PROCEDURE — 83690 ASSAY OF LIPASE: CPT

## 2022-11-07 PROCEDURE — 80048 BASIC METABOLIC PNL TOTAL CA: CPT

## 2022-11-07 PROCEDURE — 82150 ASSAY OF AMYLASE: CPT

## 2022-11-07 NOTE — TELEPHONE ENCOUNTER
COVID check in:   Patient feeling very well. Minimal symptoms since mAb infusion on Saturday.   Will continue to monitor symptoms. Patient verbalized understanding to notify RNCC if symptoms worsen again.

## 2022-11-11 DIAGNOSIS — Z94.83 PANCREAS REPLACED BY TRANSPLANT (H): Primary | ICD-10-CM

## 2022-11-11 NOTE — TELEPHONE ENCOUNTER
Medication Refill  Route to LPN    Patient is going out of the country and requested a refill to be submitted today or early Monday morning so she can have it before she leaves next week    Pharmacy Name: [facility] - [place], [place] - [address]    Name of Medication: sulfamethoxazole-trimethoprim (BACTRIM) 400-80 MG tablet   Dose: Take 1 tablet by mouth daily, Disp-30 tablet, R-11, E-Prescribe    When will the patient be out of this medication?: Less than 3 days (Route high priority)

## 2022-11-14 ENCOUNTER — TELEPHONE (OUTPATIENT)
Dept: TRANSPLANT | Facility: CLINIC | Age: 37
End: 2022-11-14

## 2022-11-14 ENCOUNTER — LAB (OUTPATIENT)
Dept: LAB | Facility: CLINIC | Age: 37
End: 2022-11-14
Payer: COMMERCIAL

## 2022-11-14 DIAGNOSIS — Z79.899 ENCOUNTER FOR LONG-TERM CURRENT USE OF MEDICATION: ICD-10-CM

## 2022-11-14 DIAGNOSIS — Z94.83 PANCREAS REPLACED BY TRANSPLANT (H): ICD-10-CM

## 2022-11-14 DIAGNOSIS — Z48.298 AFTERCARE FOLLOWING ORGAN TRANSPLANT: ICD-10-CM

## 2022-11-14 DIAGNOSIS — Z94.0 KIDNEY REPLACED BY TRANSPLANT: ICD-10-CM

## 2022-11-14 LAB
AMYLASE SERPL-CCNC: 53 U/L (ref 30–110)
ANION GAP SERPL CALCULATED.3IONS-SCNC: 6 MMOL/L (ref 3–14)
BUN SERPL-MCNC: 30 MG/DL (ref 7–30)
CALCIUM SERPL-MCNC: 9.6 MG/DL (ref 8.5–10.1)
CHLORIDE BLD-SCNC: 106 MMOL/L (ref 94–109)
CO2 SERPL-SCNC: 23 MMOL/L (ref 20–32)
CREAT SERPL-MCNC: 1.4 MG/DL (ref 0.52–1.04)
CREAT UR-MCNC: 80 MG/DL
ERYTHROCYTE [DISTWIDTH] IN BLOOD BY AUTOMATED COUNT: 12.2 % (ref 10–15)
GFR SERPL CREATININE-BSD FRML MDRD: 49 ML/MIN/1.73M2
GLUCOSE BLD-MCNC: 90 MG/DL (ref 70–99)
HCT VFR BLD AUTO: 32.8 % (ref 35–47)
HGB BLD-MCNC: 10.9 G/DL (ref 11.7–15.7)
LIPASE SERPL-CCNC: 145 U/L (ref 73–393)
MCH RBC QN AUTO: 28.7 PG (ref 26.5–33)
MCHC RBC AUTO-ENTMCNC: 33.2 G/DL (ref 31.5–36.5)
MCV RBC AUTO: 86 FL (ref 78–100)
PLATELET # BLD AUTO: 280 10E3/UL (ref 150–450)
POTASSIUM BLD-SCNC: 4.1 MMOL/L (ref 3.4–5.3)
PROT UR-MCNC: 0.12 G/L
PROT/CREAT 24H UR: 0.15 G/G CR (ref 0–0.2)
RBC # BLD AUTO: 3.8 10E6/UL (ref 3.8–5.2)
SODIUM SERPL-SCNC: 135 MMOL/L (ref 133–144)
TACROLIMUS BLD-MCNC: 7 UG/L (ref 5–15)
TME LAST DOSE: NORMAL H
TME LAST DOSE: NORMAL H
WBC # BLD AUTO: 4.2 10E3/UL (ref 4–11)

## 2022-11-14 PROCEDURE — 82150 ASSAY OF AMYLASE: CPT

## 2022-11-14 PROCEDURE — 85027 COMPLETE CBC AUTOMATED: CPT

## 2022-11-14 PROCEDURE — 36415 COLL VENOUS BLD VENIPUNCTURE: CPT

## 2022-11-14 PROCEDURE — 83690 ASSAY OF LIPASE: CPT

## 2022-11-14 PROCEDURE — 80197 ASSAY OF TACROLIMUS: CPT

## 2022-11-14 PROCEDURE — 84156 ASSAY OF PROTEIN URINE: CPT

## 2022-11-14 PROCEDURE — 82310 ASSAY OF CALCIUM: CPT

## 2022-11-14 RX ORDER — SULFAMETHOXAZOLE AND TRIMETHOPRIM 400; 80 MG/1; MG/1
1 TABLET ORAL DAILY
Qty: 30 TABLET | Refills: 11 | Status: CANCELLED | OUTPATIENT
Start: 2022-11-14

## 2022-11-14 NOTE — TELEPHONE ENCOUNTER
ISSUE:  Rise in lipase    PLAN:  Assess for constipation  Dietary triggers?    OUTCOME:  Patient states she has been eating much more trail mix/nuts recently. No constipation. Diet changes could be contributing. She will decrease nut intake and recheck labs.

## 2022-11-15 ENCOUNTER — TELEPHONE (OUTPATIENT)
Dept: TRANSPLANT | Facility: CLINIC | Age: 37
End: 2022-11-15

## 2022-11-15 RX ORDER — SULFAMETHOXAZOLE AND TRIMETHOPRIM 400; 80 MG/1; MG/1
1 TABLET ORAL DAILY
Qty: 30 TABLET | Refills: 11 | Status: SHIPPED | OUTPATIENT
Start: 2022-11-15 | End: 2022-12-20

## 2022-11-15 NOTE — TELEPHONE ENCOUNTER
Patient leaving tomorrow, spending 6 days in the [place]. Instructed patient to bring 1 week of additional medication, in case of flight cancellations. Patient knows to wear medical alert bracelet and will locate nearby medical facilities, just in case of emergency.

## 2022-11-15 NOTE — TELEPHONE ENCOUNTER
Pt will be traveling out of the country soon  Wants to review with the coordinator if something happens  What would she do

## 2022-11-21 ENCOUNTER — HEALTH MAINTENANCE LETTER (OUTPATIENT)
Age: 37
End: 2022-11-21

## 2022-11-21 ENCOUNTER — LAB (OUTPATIENT)
Dept: LAB | Facility: CLINIC | Age: 37
End: 2022-11-21
Payer: COMMERCIAL

## 2022-11-21 DIAGNOSIS — Z94.83 PANCREAS REPLACED BY TRANSPLANT (H): ICD-10-CM

## 2022-11-21 DIAGNOSIS — Z48.298 AFTERCARE FOLLOWING ORGAN TRANSPLANT: ICD-10-CM

## 2022-11-21 DIAGNOSIS — Z94.0 KIDNEY REPLACED BY TRANSPLANT: ICD-10-CM

## 2022-11-21 DIAGNOSIS — Z79.899 ENCOUNTER FOR LONG-TERM CURRENT USE OF MEDICATION: ICD-10-CM

## 2022-11-21 LAB
AMYLASE SERPL-CCNC: 43 U/L (ref 30–110)
ANION GAP SERPL CALCULATED.3IONS-SCNC: 5 MMOL/L (ref 3–14)
BUN SERPL-MCNC: 26 MG/DL (ref 7–30)
CALCIUM SERPL-MCNC: 9.2 MG/DL (ref 8.5–10.1)
CHLORIDE BLD-SCNC: 106 MMOL/L (ref 94–109)
CO2 SERPL-SCNC: 27 MMOL/L (ref 20–32)
CREAT SERPL-MCNC: 1.32 MG/DL (ref 0.52–1.04)
ERYTHROCYTE [DISTWIDTH] IN BLOOD BY AUTOMATED COUNT: 12.5 % (ref 10–15)
GFR SERPL CREATININE-BSD FRML MDRD: 53 ML/MIN/1.73M2
GLUCOSE BLD-MCNC: 107 MG/DL (ref 70–99)
HCT VFR BLD AUTO: 32.3 % (ref 35–47)
HGB BLD-MCNC: 10.5 G/DL (ref 11.7–15.7)
LIPASE SERPL-CCNC: 122 U/L (ref 73–393)
MAGNESIUM SERPL-MCNC: 2 MG/DL (ref 1.6–2.3)
MCH RBC QN AUTO: 28.8 PG (ref 26.5–33)
MCHC RBC AUTO-ENTMCNC: 32.5 G/DL (ref 31.5–36.5)
MCV RBC AUTO: 89 FL (ref 78–100)
PHOSPHATE SERPL-MCNC: 3 MG/DL (ref 2.5–4.5)
PLATELET # BLD AUTO: 291 10E3/UL (ref 150–450)
POTASSIUM BLD-SCNC: 3.8 MMOL/L (ref 3.4–5.3)
RBC # BLD AUTO: 3.65 10E6/UL (ref 3.8–5.2)
SODIUM SERPL-SCNC: 138 MMOL/L (ref 133–144)
TACROLIMUS BLD-MCNC: 5.6 UG/L (ref 5–15)
TME LAST DOSE: NORMAL H
TME LAST DOSE: NORMAL H
WBC # BLD AUTO: 5.4 10E3/UL (ref 4–11)

## 2022-11-21 PROCEDURE — 82150 ASSAY OF AMYLASE: CPT

## 2022-11-21 PROCEDURE — 80180 DRUG SCRN QUAN MYCOPHENOLATE: CPT

## 2022-11-21 PROCEDURE — 85018 HEMOGLOBIN: CPT

## 2022-11-21 PROCEDURE — 84100 ASSAY OF PHOSPHORUS: CPT

## 2022-11-21 PROCEDURE — 80048 BASIC METABOLIC PNL TOTAL CA: CPT

## 2022-11-21 PROCEDURE — 80197 ASSAY OF TACROLIMUS: CPT

## 2022-11-21 PROCEDURE — 36415 COLL VENOUS BLD VENIPUNCTURE: CPT

## 2022-11-21 PROCEDURE — 83735 ASSAY OF MAGNESIUM: CPT

## 2022-11-21 PROCEDURE — 87799 DETECT AGENT NOS DNA QUANT: CPT

## 2022-11-21 PROCEDURE — 83690 ASSAY OF LIPASE: CPT

## 2022-11-22 ENCOUNTER — TELEPHONE (OUTPATIENT)
Dept: CARDIOLOGY | Facility: CLINIC | Age: 37
End: 2022-11-22

## 2022-11-22 LAB
BKV DNA # SPEC NAA+PROBE: <500 COPIES/ML
BKV DNA SPEC NAA+PROBE-LOG#: <2.7 {LOG_COPIES}/ML

## 2022-11-22 NOTE — TELEPHONE ENCOUNTER
Left voicemail for patient to complete Travel Screen for Cardiac Cath Lab appointment on 11/23 and inform patient of updated Visitor Policy.     Informed pt on message of need for covid test 1-2 days prior and to take picture of results.

## 2022-11-23 ENCOUNTER — APPOINTMENT (OUTPATIENT)
Dept: LAB | Facility: CLINIC | Age: 37
End: 2022-11-23
Attending: INTERNAL MEDICINE
Payer: COMMERCIAL

## 2022-11-23 ENCOUNTER — HOSPITAL ENCOUNTER (OUTPATIENT)
Dept: CARDIOLOGY | Facility: CLINIC | Age: 37
Discharge: HOME | End: 2022-11-23
Attending: INTERNAL MEDICINE | Admitting: INTERNAL MEDICINE
Payer: COMMERCIAL

## 2022-11-23 ENCOUNTER — HOSPITAL ENCOUNTER (OUTPATIENT)
Facility: CLINIC | Age: 37
Discharge: HOME | End: 2022-11-23
Attending: INTERNAL MEDICINE | Admitting: INTERNAL MEDICINE
Payer: COMMERCIAL

## 2022-11-23 ENCOUNTER — APPOINTMENT (OUTPATIENT)
Dept: MEDSURG UNIT | Facility: CLINIC | Age: 37
End: 2022-11-23
Attending: INTERNAL MEDICINE
Payer: COMMERCIAL

## 2022-11-23 ENCOUNTER — TELEPHONE (OUTPATIENT)
Dept: TRANSPLANT | Facility: CLINIC | Age: 37
End: 2022-11-23

## 2022-11-23 ENCOUNTER — ALLIED HEALTH/NURSE VISIT (OUTPATIENT)
Dept: RESEARCH | Facility: CLINIC | Age: 37
End: 2022-11-23

## 2022-11-23 VITALS
DIASTOLIC BLOOD PRESSURE: 89 MMHG | BODY MASS INDEX: 20.22 KG/M2 | TEMPERATURE: 98.2 F | WEIGHT: 125.8 LBS | HEART RATE: 97 BPM | HEIGHT: 66 IN | RESPIRATION RATE: 16 BRPM | OXYGEN SATURATION: 98 % | SYSTOLIC BLOOD PRESSURE: 128 MMHG

## 2022-11-23 VITALS — HEIGHT: 66 IN | WEIGHT: 126.32 LBS | BODY MASS INDEX: 20.3 KG/M2

## 2022-11-23 DIAGNOSIS — R06.09 DYSPNEA ON EXERTION: ICD-10-CM

## 2022-11-23 DIAGNOSIS — Z00.6 EXAMINATION OF PARTICIPANT IN CLINICAL TRIAL: Primary | ICD-10-CM

## 2022-11-23 LAB
HCG INTACT+B SERPL-ACNC: <1 MIU/ML
HGB BLD-MCNC: 10.1 G/DL (ref 11.7–15.7)
HGB BLD-MCNC: 9.4 G/DL (ref 11.7–15.7)
MYCOPHENOLATE SERPL LC/MS/MS-MCNC: 12.59 MG/L (ref 1–3.5)
MYCOPHENOLATE-G SERPL LC/MS/MS-MCNC: 56.4 MG/L (ref 30–95)
OXYHGB MFR BLDV: 32 % (ref 92–100)
OXYHGB MFR BLDV: 72 % (ref 92–100)
TME LAST DOSE: ABNORMAL H
TME LAST DOSE: ABNORMAL H

## 2022-11-23 PROCEDURE — 93451 RIGHT HEART CATH: CPT | Mod: 26 | Performed by: INTERNAL MEDICINE

## 2022-11-23 PROCEDURE — 272N000001 HC OR GENERAL SUPPLY STERILE: Performed by: INTERNAL MEDICINE

## 2022-11-23 PROCEDURE — 94621 CARDIOPULM EXERCISE TESTING: CPT

## 2022-11-23 PROCEDURE — 82810 BLOOD GASES O2 SAT ONLY: CPT

## 2022-11-23 PROCEDURE — 36415 COLL VENOUS BLD VENIPUNCTURE: CPT | Performed by: INTERNAL MEDICINE

## 2022-11-23 PROCEDURE — 94621 CARDIOPULM EXERCISE TESTING: CPT | Mod: 26 | Performed by: INTERNAL MEDICINE

## 2022-11-23 PROCEDURE — 999N000132 HC STATISTIC PP CARE STAGE 1

## 2022-11-23 PROCEDURE — 250N000009 HC RX 250: Performed by: INTERNAL MEDICINE

## 2022-11-23 PROCEDURE — 93464 EXERCISE W/HEMODYNAMIC MEAS: CPT | Performed by: INTERNAL MEDICINE

## 2022-11-23 PROCEDURE — 93451 RIGHT HEART CATH: CPT | Performed by: INTERNAL MEDICINE

## 2022-11-23 PROCEDURE — 84702 CHORIONIC GONADOTROPIN TEST: CPT | Performed by: INTERNAL MEDICINE

## 2022-11-23 PROCEDURE — 999N000142 HC STATISTIC PROCEDURE PREP ONLY

## 2022-11-23 PROCEDURE — 93464 EXERCISE W/HEMODYNAMIC MEAS: CPT | Mod: 26 | Performed by: INTERNAL MEDICINE

## 2022-11-23 PROCEDURE — 85018 HEMOGLOBIN: CPT

## 2022-11-23 RX ORDER — LIDOCAINE 40 MG/G
CREAM TOPICAL
Status: COMPLETED | OUTPATIENT
Start: 2022-11-23 | End: 2022-11-23

## 2022-11-23 RX ADMIN — LIDOCAINE: 40 CREAM TOPICAL at 10:44

## 2022-11-23 ASSESSMENT — ACTIVITIES OF DAILY LIVING (ADL)
ADLS_ACUITY_SCORE: 35

## 2022-11-23 NOTE — DISCHARGE SUMMARY
Pt arrived back from [facility]. [facility] site C/D/I, negative for a hematoma. Declines food and drink. Discharge instructions reviewed and all questions answered.

## 2022-11-23 NOTE — Clinical Note
dry, intact, no bleeding and no hematoma. 7fr sheath removed from righ internal jugular. Akbar pressure held, band aid applied.

## 2022-11-23 NOTE — TELEPHONE ENCOUNTER
DATE:  11/23/2022     TIME OF RECEIPT FROM LAB:  0908    ORDERING PROVIDER: Dr. Reanna Villalta     LAB TEST:  Mycophenolate Acid     LAB VALUE:  12.98    RESULTS GIVEN WITH READ-BACK TO (PROVIDER):  Yessica Becker RN    TIME LAB VALUE REPORTED TO PROVIDER:   0912    CLARENCE Enamorado, LPN   Transplant

## 2022-11-23 NOTE — Clinical Note
Potential access sites were evaluated for patency using ultrasound.   The right internal jugular vein was selected. Access was obtained under with Sonosite guidance using a standard 18 gauge needle with direct visualization of needle entry.

## 2022-11-23 NOTE — PROGRESS NOTES
Pt prepped for RHC.  VSS.  Denies pain.  Lido applied to right neck.  HCG negative.  Awaiting consent.

## 2022-11-23 NOTE — PROGRESS NOTES
Surgery Clinical Trials Office Informed Consent Process Documentation      Observational study to compare Non-Invasive Venous waveform Analysis (NIVA) with Pulmonary Capillary Wedge Pressure (PCWP) during cardiac catheterization.     Protocol Version 2.1, IRB Approved 04 APR 2022    PI:  Shiv Nicholson MD    Date of Approach/Consent: 23 NOV 2022    I met with this patient today to discuss the NIVA study.    The subject was screened and meets all of the inclusion criteria and none of the exclusion criteria is met.      The subject was told:  -that the study involves research   -the purpose of the research study  -the expected duration of the study and the approximate number of subject sought  -of procedures that are identified as experimental  -of reasonably foreseeable risks or discomforts to the subject  -of any benefits to the subject or others that may be expected from the research  -of alternative procedures and/or treatment  -how the confidentiality of records would be maintained  -whether or not compensation and medical treatments are available should injury occur as a result of the study  -who to contact if they have questions related to the research study or questions regarding research subjects' rights  -that participation is completely voluntary and that their decision to or not to participate will have no impact on their relationships with the N and the staff    No study procedures were completed prior to the consent being obtained.  The use of historical information (lab or assessments) used for the purpose of the study was approved by subject.  The subject was fully aware that we would be reviewing their medical record for the study.    The subject demonstrated an understanding of what the study involved.  Specifically, how this study differed from standard of care at our center and what was required of the subject as part of the study.    The subject reviewed the consent form and was given the  opportunity to ask questions before signing.  Questions and concerns were answered by the study staff and/or study physician.    A copy of the signed informed consent document was provided to the subject.  [x] Yes [] No    The subject was offered a copy of the signed informed consent but declined. [] Yes [x] No    The consent required the use of a :   [] Yes [x]  No       A 'short form' consent was used:     [] Yes [x] No         If yes, provide name of witness:     The subject required a legally-authorized representative (LAR) to sign on their behalf:                                                    [] Yes  [x] No   If yes, please record name of LAR:     Questions to Evaluate Subject Comprehension of Study:    Question: Adequate Response? If No, explain what actions were taken   What is being studied? [x] Yes  [] No   If you participate, what will be different than if you decide not to participate?  [x] Yes  [] No   How long will the study last; will you be required to return for visits? [x] Yes  [] No   What kinds of risks are there? [x] Yes  [] No   Do you understand that you can withdraw consent at any time and for any reason while participating in the study? [x] Yes  [] No       Study Coordinators:  Shawnee Dawn  ([phone]), Shad Mcdaniels ([phone])                                                                            : Jolene Loredo  ([phone])

## 2022-11-23 NOTE — DISCHARGE INSTRUCTIONS
[facility]                        Interventional Cardiology  Discharge Instructions   Post Right Heart Cath      AFTER YOU GO HOME:  DO drink plenty of fluids  DO resume your regular diet and medications unless otherwise instructed by your Primary Physician  Do Not scrub the procedure site vigorously  No lotion or powder to the puncture site for 3 days    CALL YOUR PRIMARY PHYSICIAN IF: You may resume all normal activity.  Monitor neck site for bleeding, swelling, or voice changes. If you notice bleeding or swelling immediately apply pressure to the site and call number below to speak with Cardiology Fellow.  If you experience any changes in your breathing you should call your doctor immediately or come to the closest Emergency Department.  Do not drive yourself.    ADDITIONAL INSTRUCTIONS: Medications: You are to resume all home medications including anticoagulation therapy unless otherwise advised by your primary cardiologist or nurse coordinator.    Follow Up: Per your primary cardiology team    If you have any questions or concerns regarding your procedure site please call [phone] at anytime and ask for Cardiology Fellow on call.  They are available 24 hours a day.  You may also contact the Cardiology Clinic after hours number at [phone].                                                       Telephone Numbers [phone] Monday-Friday 8:00 am to 4:30 pm    [phone] After 4:30 pm Monday-Friday, Weekends & Holidays  Ask for Interventional Cardiologist on call. Someone is on call 24 hours/day   [facility] toll free number [phone] Monday-Friday 8:00 am to 4:30 pm   [facility] Emergency Dept [phone]

## 2022-11-25 LAB
CARDIOPULMONARY ANAEROBIC THRESHOLD PREDICTED PEAK: 74 %
CARDIOPULMONARY ANAEROBIC THRESHOLD VO2: 23.5 ML/KG/MIN
CARDIOPULMONARY BLOOD PRESSURE REST: NORMAL MMHG
CARDIOPULMONARY BREATHING RESERVE REST: 92.8
CARDIOPULMONARY BREATHING RESERVE V02MAX: 30
CARDIOPULMONARY CO2 OUTPUT REST: 259 ML/MIN
CARDIOPULMONARY CO2 OUTPUT VO2MAX: 2066 ML/MIN
CARDIOPULMONARY FEV 1.0 (L) ACTUAL: 2.86
CARDIOPULMONARY FEV 1.0 (L) PRECENT: 88 %
CARDIOPULMONARY FEV 1.0 (L) PREDICTED: 3.26
CARDIOPULMONARY FEV 1.0 FVC (%) ACTUAL: 77.1
CARDIOPULMONARY FEV 1.0 FVC (%) PERCENT: 93 %
CARDIOPULMONARY FEV 1.0 FVC (%) PREDICTED: 83
CARDIOPULMONARY FUNCTIONAL CAPACITY MAX ML/KG/MIN: 28.7 ML/KG/MIN
CARDIOPULMONARY FUNCTIONAL CAPACITY PERCENT: 91 %
CARDIOPULMONARY FUNCTIONAL CAPACITY PREDICTED: 31.7 ML/KG/MIN
CARDIOPULMONARY FVC (L) ACTUAL: 3.72
CARDIOPULMONARY FVC (L) PERCENT: 94 %
CARDIOPULMONARY FVC (L) PREDICTED: 3.97
CARDIOPULMONARY HEART RATE REST: 85 BPM
CARDIOPULMONARY MET'S REST: 1.7
CARDIOPULMONARY MINUTE VENTILATION REST: 7.2 L/MIN
CARDIOPULMONARY MINUTE VENTILATION VO2MAX: 69.9 L/MIN
CARDIOPULMONARY MYOCARDIAC O2 DEMAND MAX: NORMAL
CARDIOPULMONARY OXYGEN CONSUMPTION REST: 5.8 ML/KG/MIN
CARDIOPULMONARY OXYGEN CONSUMPTION VO2MAX: 28.7 ML/KG/MIN
CARDIOPULMONARY OXYGEN PULSE REST: 4 ML/BEAT
CARDIOPULMONARY OXYGEN PULSE VO2MAX: 10.8 ML/BEAT
CARDIOPULMONARY OXYGEN SATURATION- OXIMETRY REST: 100 %
CARDIOPULMONARY OXYGEN SATURATION- OXIMETRY VO2MAX: 100 %
CARDIOPULMONARY PET C02 REST: 41
CARDIOPULMONARY PET C02 VO2MAX: 32
CARDIOPULMONARY PET02 REST: 95
CARDIOPULMONARY PET02 V02 MAX: 117
CARDIOPULMONARY RER: 1.19
CARDIOPULMONARY RESPIRALORY EXCHANGE RATIO VO2MAX: 1.19
CARDIOPULMONARY RESPIRALORY EXCHANGE RATIO: 0.78
CARDIOPULMONARY RESPIRATORY RATE REST: 8 BR/MIN
CARDIOPULMONARY RESPIRATORY RATE VO2MAX: 34 BR/MIN
CARDIOPULMONARY STRESS BASE 1 BP MMHG: NORMAL MMHG
CARDIOPULMONARY STRESS BASE 1 BPA: 138 BPM
CARDIOPULMONARY STRESS BASE 1 SPO2: 100 % SPO2
CARDIOPULMONARY STRESS BASE 1 TIME: 1 MINS
CARDIOPULMONARY STRESS BASE 2 BP MMHG: NORMAL MMHG
CARDIOPULMONARY STRESS BASE 2 BPA: 107 BPM
CARDIOPULMONARY STRESS BASE 2 SPO2: 100 % SPO2
CARDIOPULMONARY STRESS BASE 2 TIME: 3 MINS
CARDIOPULMONARY STRESS BASE 3 BP MMHG: NORMAL MMHG
CARDIOPULMONARY STRESS BASE 3 BPA: 93 BPM
CARDIOPULMONARY STRESS BASE 3 SPO2: 100 % SPO2
CARDIOPULMONARY STRESS BASE 3 TIME: 7 MINS
CARDIOPULMONARY STRESS PHASE 1 BP MMHG: NORMAL MMHG
CARDIOPULMONARY STRESS PHASE 1 BPM: 102 BPM
CARDIOPULMONARY STRESS PHASE 1 SPO2: 100 % SPO2
CARDIOPULMONARY STRESS PHASE 1 TIME: 3 MINS
CARDIOPULMONARY STRESS PHASE 2 BP MMHG: NORMAL MMHG
CARDIOPULMONARY STRESS PHASE 2 BPM: 122 BPM
CARDIOPULMONARY STRESS PHASE 2 SPO2: 100 % SPO2
CARDIOPULMONARY STRESS PHASE 2 TIME: 6 MINS
CARDIOPULMONARY STRESS PHASE 3 BP MMHG: NORMAL MMHG
CARDIOPULMONARY STRESS PHASE 3 BPM: 133 BPM
CARDIOPULMONARY STRESS PHASE 3 SPO2: 100 % SPO2
CARDIOPULMONARY STRESS PHASE 3 TIME: 9 MINS
CARDIOPULMONARY STRESS PHASE 4 BP MMHG: NORMAL MMHG
CARDIOPULMONARY STRESS PHASE 4 BPM: 146 BPM
CARDIOPULMONARY STRESS PHASE 4 SPO2: 100 % SPO2
CARDIOPULMONARY STRESS PHASE 4 TIME: 12 MINS
CARDIOPULMONARY STRESS PHASE 5 BPM: 152 BPM
CARDIOPULMONARY STRESS PHASE 5 SPO2: 100 % SPO2
CARDIOPULMONARY STRESS PHASE 5 TIME SEC: 41 SEC
CARDIOPULMONARY STRESS PHASE 5 TIME: 12 MINS
CARDIOPULMONARY SVC (L) ACTUAL: 3.78
CARDIOPULMONARY SVC (L) PERCENT: 95 %
CARDIOPULMONARY SVC (L) PREDICTED: 3.97
CARDIOPULMONARY TIDAL VOLUME REST: 863 ML
CARDIOPULMONARY TIDAL VOLUME VO2MAX: 2037 ML
CARDIOPULMONARY VE/VCO2 SLOPE: 33.35
CARDIOPULMONARY VENTILATORY EQUIVALENT 02 REST: 22
CARDIOPULMONARY VENTILATORY EQUIVALENT 02 V02: 39
CARDIOPULMONARY VENTILATORY EQUIVALENT C02 REST: 28
CARDIOPULMONARY VENTILATORY EQUIVALENT C02 SLOPE VO2MAX: 33.35
CARDIOPULMONARY VENTILATORY EQUIVALENT C02 VO2MAX: 34
CV STRESS MAX HR HE: 152
PREDICTED VO2MAX: 31.7
RATED PERCEIVED EXERTION: 18
STRESS ANGINA INDEX: 0
STRESS ECHO BASELINE BP: NORMAL MMHG
STRESS ECHO BASELINE HR: 88 BPM
STRESS ECHO CALCULATED PERCENT HR: 83 %
STRESS ECHO LAST STRESS BP: NORMAL MMHG
STRESS ECHO POST ESTIMATED WORKLOAD: 8.2 METS
STRESS ECHO POST EXERCISE DUR MIN: 12 MIN
STRESS ECHO POST EXERCISE DUR SEC: 41 SEC
STRESS ECHO TARGET HR: 183

## 2022-11-28 ENCOUNTER — LAB (OUTPATIENT)
Dept: LAB | Facility: CLINIC | Age: 37
End: 2022-11-28
Payer: COMMERCIAL

## 2022-11-28 DIAGNOSIS — Z94.0 KIDNEY REPLACED BY TRANSPLANT: ICD-10-CM

## 2022-11-28 DIAGNOSIS — Z48.298 AFTERCARE FOLLOWING ORGAN TRANSPLANT: ICD-10-CM

## 2022-11-28 DIAGNOSIS — Z79.899 ENCOUNTER FOR LONG-TERM CURRENT USE OF MEDICATION: ICD-10-CM

## 2022-11-28 DIAGNOSIS — Z94.83 PANCREAS REPLACED BY TRANSPLANT (H): ICD-10-CM

## 2022-11-28 LAB
AMYLASE SERPL-CCNC: 53 U/L (ref 30–110)
ANION GAP SERPL CALCULATED.3IONS-SCNC: 8 MMOL/L (ref 3–14)
BUN SERPL-MCNC: 31 MG/DL (ref 7–30)
CALCIUM SERPL-MCNC: 9.2 MG/DL (ref 8.5–10.1)
CHLORIDE BLD-SCNC: 108 MMOL/L (ref 94–109)
CO2 SERPL-SCNC: 23 MMOL/L (ref 20–32)
CREAT SERPL-MCNC: 1.31 MG/DL (ref 0.52–1.04)
ERYTHROCYTE [DISTWIDTH] IN BLOOD BY AUTOMATED COUNT: 13.2 % (ref 10–15)
GFR SERPL CREATININE-BSD FRML MDRD: 54 ML/MIN/1.73M2
GLUCOSE BLD-MCNC: 94 MG/DL (ref 70–99)
HCT VFR BLD AUTO: 32.5 % (ref 35–47)
HGB BLD-MCNC: 10.2 G/DL (ref 11.7–15.7)
LIPASE SERPL-CCNC: 123 U/L (ref 73–393)
MCH RBC QN AUTO: 28.4 PG (ref 26.5–33)
MCHC RBC AUTO-ENTMCNC: 31.4 G/DL (ref 31.5–36.5)
MCV RBC AUTO: 91 FL (ref 78–100)
PLATELET # BLD AUTO: 261 10E3/UL (ref 150–450)
POTASSIUM BLD-SCNC: 4.2 MMOL/L (ref 3.4–5.3)
RBC # BLD AUTO: 3.59 10E6/UL (ref 3.8–5.2)
SODIUM SERPL-SCNC: 139 MMOL/L (ref 133–144)
TACROLIMUS BLD-MCNC: 5.1 UG/L (ref 5–15)
TME LAST DOSE: NORMAL H
TME LAST DOSE: NORMAL H
WBC # BLD AUTO: 5.5 10E3/UL (ref 4–11)

## 2022-11-28 PROCEDURE — 36415 COLL VENOUS BLD VENIPUNCTURE: CPT

## 2022-11-28 PROCEDURE — 80180 DRUG SCRN QUAN MYCOPHENOLATE: CPT

## 2022-11-28 PROCEDURE — 80048 BASIC METABOLIC PNL TOTAL CA: CPT

## 2022-11-28 PROCEDURE — 80197 ASSAY OF TACROLIMUS: CPT

## 2022-11-28 PROCEDURE — 83690 ASSAY OF LIPASE: CPT

## 2022-11-28 PROCEDURE — 82150 ASSAY OF AMYLASE: CPT

## 2022-11-28 PROCEDURE — 85027 COMPLETE CBC AUTOMATED: CPT

## 2022-11-28 NOTE — LETTER
5/5/2022         RE: Marilyn Ortega  [address]  [place] [address]        Dear Colleague,    Thank you for referring your patient, Marilyn Ortega, to the [facility] TREATMENT [facility]. Please see a copy of my visit note below.    Nursing Note  Marilyn Ortega [facility]:   Chief Complaint   Patient presents with     Infusion     Solumedrol     During today's [facility] appointment, orders from Dr Corona were completed.  Frequency: today is dose 2 of 3 total    Progress note:  Patient identification verified by name and date of birth.  Assessment completed.  Vitals recorded in Doc Flowsheets.  Patient was provided with education regarding medication/procedure and possible side effects.  Patient verbalized understanding.    Treatment Conditions: Blood glucose measured before solumedrol given was 99 via dexcom monitor and 96 with POCT. Patient started some insulin this morning per transplant team for hyperglycemia last night after first dose of solumedrol.     Premedications: were not ordered.    Infusion length and rate:  infusion given over approximately 30 minutes    Labs: were drawn per orders.     Vascular access: peripheral IV was placed by vascular access nurse.    Is the next appt scheduled? yes  Asymptomatic COVID test completed? no    Post Infusion Assessment:  Patient tolerated infusion without incident.  Site patent and intact, free from redness, edema or discomfort.  Access discontinued per protocol.     Discharge Plan:   Follow up plan of care with: ongoing infusions at [facility]., ordering provider as scheduled. and after visit summary declined by patient  Discharge instructions were reviewed with patient.  Patient/representative verbalized understanding of discharge instructions and all questions answered.  Patient discharged from [facility]  "[facility]   Physical Therapy Treatment Note     Name: Jaimee Mccarthy  Clinic Number: [id number]    Therapy Diagnosis:   Encounter Diagnosis   Name Primary?    Leg weakness, bilateral Yes     Physician: Mihir Gibbs MD    Visit Date: 11/28/2022    Physician: Mihir Gibbs MD     Physician Orders: PT Eval and Treat   Medical Diagnosis from Referral: R26.89 (ICD-10-CM) - Balance problem  Evaluation Date: 11/7/2022  Authorization Period Expiration: 10/24/23  Plan of Care Expiration: 1/21/23  Visit # / Visits authorized: 3/20    PTA Visit #: 1/5     Time In: 7:36am  Time Out: 8:30am  Total Billable Time: 53 minutes    SUBJECTIVE     Pt reports: continuing to have weakness and balance deficits present by the end of the day. Patient reports usually on her feet all day with minimal rest breaks. Patient reports having pain in both shoulders and in back to top of hips.  She was not compliant with home exercise program.  Response to previous treatment: n/a  Functional change: n/a    Pain: 4/10  Location: all over, shoulders/back/hips      OBJECTIVE     Objective Measures updated at progress report unless specified.     Treatment     Jaimee received the treatments listed below:      therapeutic exercises to develop strength, endurance, posture, and core stabilization for 53 minutes including:    Seated OAL alternating with core stabilization; 2x10  SLR supine; 2x8 both  Clams; 2x10 each  TA isometrics; 10x10"  PPT; 2x10  Tandem Stance in parallel bars; 3x20"  Sit to Stands; 2x8  Step Ups in parallel bars; 2x8 each      manual therapy techniques: Myofacial release and Soft tissue Mobilization were applied to the: low back and hips for 00 minutes, including:  Not performed today    Jaimee participated in neuromuscular re-education activities to improve: Balance, Coordination, Kinesthetic, Sense, Proprioception, and Posture for 45 minutes. The following activities were included:  Seated and " "standing vor 30"h/v 2x ea  Lateral stepping with head turn 1x10 B  Amb with head turn post Vc 2 laps  Vor cancellation 1x10 horiz        Vestibular Testing  Horizontal tracking: normal with increased nausea and eye strain  Vertical Tracking: increased nausea and dizziness, normal motion  Diagonal tracking:  NT  Lateral Visual acuity: NT  Dynamic Visual Acuity: 2-3 line differential   Vestibular Occular Reflex   Vertical: normal, limited tolerance    Horizontal: increased nausea, limited tolerance  Convergence: NT  Fritch Halpike:  negative  Saccades: negative     Vestibular:     Head shake:  positive right  nystagmus   Fritch Hallpike  R neg, L neg   Roll test R neg, L neg    Balance testing:  Double limb standing eyes open/ eyes closed: 30" no sway to min sway /30" mod sway  Gait: amb with slight right veer and LOB  Gait with head turn: increased LOB and veering to right with right head turn      Patient Education and Home Exercises     Home Exercises Provided and Patient Education Provided     Education provided:   - consistency with HEP  - rest breaks throughout the day  - upright posture  - core stabilization    Written Home Exercises Provided: Patient instructed to cont prior HEP. Exercises were reviewed and Jaimee was able to demonstrate them prior to the end of the session.  Jaimee demonstrated good  understanding of the education provided. See EMR under Patient Instructions for exercises provided during therapy sessions    ASSESSMENT     Vestibular and balance testing completed this session secondary to recent fall and daily near falls and LOB. Noted right sided peripheral vestibulopathy and some motion sensitivity with horizontal head motion. Pt provided HEP for vestibular rehab and balance. Patient reported feeling better post treatment and stated feeling excitement to continue with therapy to get better.     Jaimee Is progressing well towards her goals.   Pt prognosis is Good.     Pt will continue to benefit from " [facility] in stable condition.    Dara Cagle RN       Administrations This Visit     methylPREDNISolone sodium succinate (solu-MEDROL) 500 mg in sodium chloride 0.9 % 100 mL intermittent infusion     Admin Date  05/05/2022 Action  New Bag Dose  500 mg Rate  228 mL/hr Route  Intravenous Administered By  Dara Cagle RN                  /78 (BP Location: Left arm, Patient Position: Sitting)   Pulse 93   Temp 98.1  F (36.7  C) (Oral)   Resp 15   SpO2 100%         Again, thank you for allowing me to participate in the care of your patient.        Sincerely,        [facility] [place]     skilled outpatient physical therapy to address the deficits listed in the problem list box on initial evaluation, provide pt/family education and to maximize pt's level of independence in the home and community environment.     Pt's spiritual, cultural and educational needs considered and pt agreeable to plan of care and goals.     Anticipated barriers to physical therapy: none    Goals: Short Term Goals: In 5 weeks   1.I with HEP  2.Pt to be able to reach high and low surfaces without discomfort in lumbar spine.  3. Pt to increase TrA strength to very good to assist with improved balance and finding of COG.    4.Pt to have good dynamic standing balance to achieve low/ no risk for falls.  5.Pt to score less than 20% impaired on the FOTO.     Long Term Goals: In 10 weeks  1.Pt to score less than 10% impaired on the FOTO.  2. Patient to demo increase in LE strength to 5/5 gross MMT to improve tolerance for bending and squatting.  3. 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    PLAN     Plan of care Certification: 11/7/2022 to 1/21/23.     Outpatient Physical Therapy 2 times weekly for 10 weeks to include the following interventions: Gait Training, Manual Therapy, Moist Heat/ Ice, Neuromuscular Re-ed, Patient Education, Self Care, Therapeutic Activities, and Therapeutic Exercise.    Lynda Goode, PT

## 2022-11-29 LAB
MYCOPHENOLATE SERPL LC/MS/MS-MCNC: 5.39 MG/L (ref 1–3.5)
MYCOPHENOLATE-G SERPL LC/MS/MS-MCNC: 36.5 MG/L (ref 30–95)
TME LAST DOSE: ABNORMAL H
TME LAST DOSE: ABNORMAL H

## 2022-11-30 ENCOUNTER — TELEPHONE (OUTPATIENT)
Dept: PHARMACY | Facility: CLINIC | Age: 37
End: 2022-11-30

## 2022-11-30 NOTE — TELEPHONE ENCOUNTER
Clinical Pharmacy Consult:                                                      Transplant Specific: 12 Month Post Transplant Call  Date of Transplant: 11/15/2021  Type of Transplant: kidney and pancreas  First Transplant: yes  History of rejection: no    Immunosuppression Regimen   TAC XR 9.5mg qAM and Myforitic 360mg qAM & 360mg qPM  Patient specific goal: 5-8  Most recent level: 5.1, date 11/28/22   Immunosuppressant Levels:  therapeutic  Pt adherent to lab draws: yes  Scr:   Lab Results   Component Value Date    CR 1.42 12/27/2021    CR 2.69 05/05/2021     Side effects: fatigue, ill feeling, puffy, headaches, lip numbing, tremors    Prophylactic Medications  Antibacterial:  Bactrim SS daily  Scheduled Discontinue Date: Lifelong    Antifungal: Not needed thus far  Scheduled Discontinue Date: N/A    Antiviral: CrCl >/=60 mL/minute: Valcyte 900mg daily   Scheduled Discontinue Date: completed    Acid Reducer: Pepcid (famotidine 20mg)  Scheduled Reviewed Date: MD to determine    Thrombosis Prevention: Aspirin 325 mg PO daily  Scheduled Discontinue Date: MD to determine    Blood Pressure Management  Frequency of home Blood Pressure checks: get in weekly clinic visit  Most recent home BP: recent clinic: 128/77  Patient Blood pressure goal: <140/90  Patient blood pressure at goal:  yes  Hospitalizations/ER visits since last assessment: 0    Med rec/DUR performed: yes  Med Rec Discrepancies: no    Medication adherence flowsheet 11/30/2022   Patient medication administration: Responsible for own medications   Patient estimated adherence level: %   Pharmacist assessment of adherence: Excellent   Patient reported doses missed per week: 0-1   Facilitators to medication adherence  Alarm;Pill box   Patient reported barriers to medication adherence  -   Adherence intervention(s): -      Medication access flowsheet 11/30/2022   Number of pharmacies used: 1   Pharmacy: [facility]   Other pharmacy: -   Enrolled in  [facility]? Yes   Patient reported barriers to accessing medications: -   Medication access interventions: -       Marilyn reported her kidney \"is doing what it's supposed to do\". It is functioning, but she has a lot of ups and down days. She still has fatigue, puffiness, headaches, numbing in the lips, tremors and an overall ill feelings. These have prevented her from exercising. She is not happy as these have been occurring since transplant. She stated she did not feel this way before the transplant. Dose reductions in tacrolimus have not improve her symptoms much. She is not happy with her current therapy and is going to [place] to get a second opinion. She sometimes feels the transplant was not worth it.  Marilyn sets up her own pill box and uses alarms on her phone as reminders. She reported no missed dose. She checks her BP occasionally since she has weekly visit to clinic and get it checked there. She stated her at home sometimes goes as low as 80/60 and her team have been looking at the reasons. Her BP in clinic have been normal.    She still wants to stay with us for her medication even if she changes over to [place] after her appointment with them on 12/14.    She has no other questions or concerns at this time. Follow up in 1 year.    Aaln Jade, Pharm D  [facility]    "

## 2022-12-05 ENCOUNTER — LAB (OUTPATIENT)
Dept: LAB | Facility: CLINIC | Age: 37
End: 2022-12-05
Payer: COMMERCIAL

## 2022-12-05 DIAGNOSIS — Z94.83 PANCREAS REPLACED BY TRANSPLANT (H): ICD-10-CM

## 2022-12-05 DIAGNOSIS — Z94.0 KIDNEY REPLACED BY TRANSPLANT: ICD-10-CM

## 2022-12-05 DIAGNOSIS — Z94.83 PANCREAS REPLACED BY TRANSPLANT (H): Primary | ICD-10-CM

## 2022-12-05 LAB
AMYLASE SERPL-CCNC: 54 U/L (ref 30–110)
ANION GAP SERPL CALCULATED.3IONS-SCNC: 5 MMOL/L (ref 3–14)
BUN SERPL-MCNC: 27 MG/DL (ref 7–30)
CALCIUM SERPL-MCNC: 8.9 MG/DL (ref 8.5–10.1)
CHLORIDE BLD-SCNC: 109 MMOL/L (ref 94–109)
CO2 SERPL-SCNC: 24 MMOL/L (ref 20–32)
CREAT SERPL-MCNC: 1.32 MG/DL (ref 0.52–1.04)
ERYTHROCYTE [DISTWIDTH] IN BLOOD BY AUTOMATED COUNT: 13.4 % (ref 10–15)
GFR SERPL CREATININE-BSD FRML MDRD: 53 ML/MIN/1.73M2
GLUCOSE BLD-MCNC: 78 MG/DL (ref 70–99)
HBA1C MFR BLD: 5 % (ref 0–5.6)
HCT VFR BLD AUTO: 30.5 % (ref 35–47)
HGB BLD-MCNC: 9.9 G/DL (ref 11.7–15.7)
LIPASE SERPL-CCNC: 134 U/L (ref 73–393)
MCH RBC QN AUTO: 28.7 PG (ref 26.5–33)
MCHC RBC AUTO-ENTMCNC: 32.5 G/DL (ref 31.5–36.5)
MCV RBC AUTO: 88 FL (ref 78–100)
PLATELET # BLD AUTO: 235 10E3/UL (ref 150–450)
POTASSIUM BLD-SCNC: 4.2 MMOL/L (ref 3.4–5.3)
RBC # BLD AUTO: 3.45 10E6/UL (ref 3.8–5.2)
SODIUM SERPL-SCNC: 138 MMOL/L (ref 133–144)
TACROLIMUS BLD-MCNC: 6.2 UG/L (ref 5–15)
TME LAST DOSE: NORMAL H
TME LAST DOSE: NORMAL H
WBC # BLD AUTO: 4.6 10E3/UL (ref 4–11)

## 2022-12-05 PROCEDURE — 83690 ASSAY OF LIPASE: CPT

## 2022-12-05 PROCEDURE — 80048 BASIC METABOLIC PNL TOTAL CA: CPT

## 2022-12-05 PROCEDURE — 82150 ASSAY OF AMYLASE: CPT

## 2022-12-05 PROCEDURE — 36415 COLL VENOUS BLD VENIPUNCTURE: CPT

## 2022-12-05 PROCEDURE — 85014 HEMATOCRIT: CPT

## 2022-12-05 PROCEDURE — 83036 HEMOGLOBIN GLYCOSYLATED A1C: CPT

## 2022-12-05 PROCEDURE — 80197 ASSAY OF TACROLIMUS: CPT

## 2022-12-06 ENCOUNTER — OFFICE VISIT (OUTPATIENT)
Dept: OPHTHALMOLOGY | Facility: CLINIC | Age: 37
End: 2022-12-06
Attending: OPHTHALMOLOGY
Payer: COMMERCIAL

## 2022-12-06 DIAGNOSIS — E10.3413 SEVERE NONPROLIFERATIVE DIABETIC RETINOPATHY OF BOTH EYES WITH MACULAR EDEMA ASSOCIATED WITH TYPE 1 DIABETES MELLITUS (H): Primary | ICD-10-CM

## 2022-12-06 PROCEDURE — 67028 INJECTION EYE DRUG: CPT | Mod: LT | Performed by: OPHTHALMOLOGY

## 2022-12-06 PROCEDURE — 92134 CPTRZ OPH DX IMG PST SGM RTA: CPT | Performed by: OPHTHALMOLOGY

## 2022-12-06 PROCEDURE — G0463 HOSPITAL OUTPT CLINIC VISIT: HCPCS | Mod: 25

## 2022-12-06 PROCEDURE — 250N000011 HC RX IP 250 OP 636: Performed by: STUDENT IN AN ORGANIZED HEALTH CARE EDUCATION/TRAINING PROGRAM

## 2022-12-06 RX ADMIN — Medication 1.25 MG: at 10:37

## 2022-12-06 ASSESSMENT — CONF VISUAL FIELD
OS_INFERIOR_NASAL_RESTRICTION: 0
OS_SUPERIOR_TEMPORAL_RESTRICTION: 0
OS_SUPERIOR_NASAL_RESTRICTION: 0
OS_INFERIOR_TEMPORAL_RESTRICTION: 0
OS_NORMAL: 1
OD_INFERIOR_NASAL_RESTRICTION: 0
METHOD: COUNTING FINGERS
OD_SUPERIOR_TEMPORAL_RESTRICTION: 0
OD_NORMAL: 1
OD_INFERIOR_TEMPORAL_RESTRICTION: 0
OD_SUPERIOR_NASAL_RESTRICTION: 0

## 2022-12-06 ASSESSMENT — VISUAL ACUITY
OD_SC+: +1
METHOD: SNELLEN - LINEAR
OD_SC: 20/20
OS_SC+: -2
OS_SC: 20/30

## 2022-12-06 ASSESSMENT — SLIT LAMP EXAM - LIDS
COMMENTS: MILD MGD
COMMENTS: MILD MGD

## 2022-12-06 ASSESSMENT — TONOMETRY
IOP_METHOD: TONOPEN
OS_IOP_MMHG: 21
OD_IOP_MMHG: 22

## 2022-12-06 ASSESSMENT — EXTERNAL EXAM - RIGHT EYE: OD_EXAM: NORMAL

## 2022-12-06 ASSESSMENT — EXTERNAL EXAM - LEFT EYE: OS_EXAM: NORMAL

## 2022-12-06 NOTE — NURSING NOTE
"Chief Complaints and History of Present Illnesses   Patient presents with     Diabetic Retinopathy Follow Up     2 month follow up      Chief Complaint(s) and History of Present Illness(es)     Diabetic Retinopathy Follow Up            Comments: 2 month follow up           Comments    Pt states that she is noticing light sensitivity in the morning. Occasional \"strobe lights\" in each eye usually in the morning or when pt goes from dark to light.   No eye pain today. No redness  or dryness. No eye pain today.    DAMON Pacheco December 6, 2022 9:43 AM                       "

## 2022-12-07 NOTE — PROGRESS NOTES
"Chief Complaint(s) and History of Present Illness(es)     Diabetic Retinopathy Follow Up            Comments: 2 month follow up           Comments    Pt states that she is noticing light sensitivity in the morning.   Occasional \"strobe lights\" in each eye usually in the morning or when pt   goes from dark to light.   No eye pain today. No redness  or dryness. No eye pain today.    DAMON Pacheco December 6, 2022 9:43 AM                   Review of systems for the eyes was negative other than the pertinent positives/negatives listed in the HPI.      Assessment & Plan      Marilyn Ortega is a 37 year old female with the following diagnoses:   1. Severe nonproliferative diabetic retinopathy of both eyes with macular edema associated with type 1 diabetes mellitus (H)       S/P avastin x many both eyes   Last injection left eye 10/4/2022  Improving edema left eye, but still significant  Right eye looks great  RBA to repeat avastin left eye discussed, consent obtained, will proceed today.     Foreign body sensation and lid swelling improved with Cliradex and warm compresses  Looking good today, ok to continue as needed     Patient disposition:   Return in about 2 months (around 2/6/2023) for OCT Macula, DFE.           Attending Physician Attestation:  Complete documentation of historical and exam elements from today's encounter can be found in the full encounter summary report (not reduplicated in this progress note).  I personally obtained the chief complaint(s) and history of present illness.  I confirmed and edited as necessary the review of systems, past medical/surgical history, family history, social history, and examination findings as documented by others; and I examined the patient myself.  I personally reviewed the relevant tests, images, and reports as documented above.  I formulated and edited as necessary the assessment and plan and discussed the findings and management plan with the patient and " family. . - Ochoa Barnes MD

## 2022-12-12 ENCOUNTER — LAB (OUTPATIENT)
Dept: LAB | Facility: CLINIC | Age: 37
End: 2022-12-12
Payer: COMMERCIAL

## 2022-12-12 DIAGNOSIS — Z94.0 KIDNEY REPLACED BY TRANSPLANT: ICD-10-CM

## 2022-12-12 DIAGNOSIS — Z94.83 PANCREAS REPLACED BY TRANSPLANT (H): ICD-10-CM

## 2022-12-12 LAB
AMYLASE SERPL-CCNC: 40 U/L (ref 30–110)
ANION GAP SERPL CALCULATED.3IONS-SCNC: 6 MMOL/L (ref 3–14)
BUN SERPL-MCNC: 26 MG/DL (ref 7–30)
CALCIUM SERPL-MCNC: 9.5 MG/DL (ref 8.5–10.1)
CHLORIDE BLD-SCNC: 109 MMOL/L (ref 94–109)
CO2 SERPL-SCNC: 26 MMOL/L (ref 20–32)
CREAT SERPL-MCNC: 1.25 MG/DL (ref 0.52–1.04)
ERYTHROCYTE [DISTWIDTH] IN BLOOD BY AUTOMATED COUNT: 13.4 % (ref 10–15)
GFR SERPL CREATININE-BSD FRML MDRD: 57 ML/MIN/1.73M2
GLUCOSE BLD-MCNC: 92 MG/DL (ref 70–99)
HCT VFR BLD AUTO: 32.1 % (ref 35–47)
HGB BLD-MCNC: 10.3 G/DL (ref 11.7–15.7)
LIPASE SERPL-CCNC: 125 U/L (ref 73–393)
MCH RBC QN AUTO: 28.4 PG (ref 26.5–33)
MCHC RBC AUTO-ENTMCNC: 32.1 G/DL (ref 31.5–36.5)
MCV RBC AUTO: 88 FL (ref 78–100)
PLATELET # BLD AUTO: 231 10E3/UL (ref 150–450)
POTASSIUM BLD-SCNC: 4.1 MMOL/L (ref 3.4–5.3)
RBC # BLD AUTO: 3.63 10E6/UL (ref 3.8–5.2)
SODIUM SERPL-SCNC: 141 MMOL/L (ref 133–144)
TACROLIMUS BLD-MCNC: 6.1 UG/L (ref 5–15)
TME LAST DOSE: NORMAL H
TME LAST DOSE: NORMAL H
WBC # BLD AUTO: 3.6 10E3/UL (ref 4–11)

## 2022-12-12 PROCEDURE — 82150 ASSAY OF AMYLASE: CPT

## 2022-12-12 PROCEDURE — 80048 BASIC METABOLIC PNL TOTAL CA: CPT

## 2022-12-12 PROCEDURE — 36415 COLL VENOUS BLD VENIPUNCTURE: CPT

## 2022-12-12 PROCEDURE — 83690 ASSAY OF LIPASE: CPT

## 2022-12-12 PROCEDURE — 80197 ASSAY OF TACROLIMUS: CPT

## 2022-12-12 PROCEDURE — 85027 COMPLETE CBC AUTOMATED: CPT

## 2022-12-16 DIAGNOSIS — Z94.0 KIDNEY REPLACED BY TRANSPLANT: Primary | ICD-10-CM

## 2022-12-16 DIAGNOSIS — Z94.83 PANCREAS REPLACED BY TRANSPLANT (H): ICD-10-CM

## 2022-12-19 ENCOUNTER — LAB (OUTPATIENT)
Dept: LAB | Facility: CLINIC | Age: 37
End: 2022-12-19
Payer: COMMERCIAL

## 2022-12-19 DIAGNOSIS — Z79.899 ENCOUNTER FOR LONG-TERM CURRENT USE OF MEDICATION: ICD-10-CM

## 2022-12-19 DIAGNOSIS — Z94.83 PANCREAS REPLACED BY TRANSPLANT (H): ICD-10-CM

## 2022-12-19 DIAGNOSIS — Z48.298 AFTERCARE FOLLOWING ORGAN TRANSPLANT: ICD-10-CM

## 2022-12-19 DIAGNOSIS — Z94.0 KIDNEY REPLACED BY TRANSPLANT: ICD-10-CM

## 2022-12-19 LAB
ANION GAP SERPL CALCULATED.3IONS-SCNC: 7 MMOL/L (ref 3–14)
BUN SERPL-MCNC: 20 MG/DL (ref 7–30)
CALCIUM SERPL-MCNC: 9.5 MG/DL (ref 8.5–10.1)
CD3 CELLS # BLD: 648 CELLS/UL (ref 603–2990)
CD3 CELLS NFR BLD: 78 % (ref 49–84)
CD3+CD4+ CELLS # BLD: 424 CELLS/UL (ref 441–2156)
CD3+CD4+ CELLS NFR BLD: 51 % (ref 28–63)
CD3+CD4+ CELLS/CD3+CD8+ CLL BLD: 2.02 % (ref 1.4–2.6)
CD3+CD8+ CELLS # BLD: 210 CELLS/UL (ref 125–1312)
CD3+CD8+ CELLS NFR BLD: 25 % (ref 10–40)
CHLORIDE BLD-SCNC: 106 MMOL/L (ref 94–109)
CO2 SERPL-SCNC: 26 MMOL/L (ref 20–32)
CREAT SERPL-MCNC: 1.2 MG/DL (ref 0.52–1.04)
ERYTHROCYTE [DISTWIDTH] IN BLOOD BY AUTOMATED COUNT: 13.3 % (ref 10–15)
GFR SERPL CREATININE-BSD FRML MDRD: 59 ML/MIN/1.73M2
GLUCOSE BLD-MCNC: 89 MG/DL (ref 70–99)
HCT VFR BLD AUTO: 35.2 % (ref 35–47)
HGB BLD-MCNC: 11.1 G/DL (ref 11.7–15.7)
MAGNESIUM SERPL-MCNC: 2 MG/DL (ref 1.6–2.3)
MCH RBC QN AUTO: 28.5 PG (ref 26.5–33)
MCHC RBC AUTO-ENTMCNC: 31.5 G/DL (ref 31.5–36.5)
MCV RBC AUTO: 91 FL (ref 78–100)
PHOSPHATE SERPL-MCNC: 3 MG/DL (ref 2.5–4.5)
PLATELET # BLD AUTO: 274 10E3/UL (ref 150–450)
POTASSIUM BLD-SCNC: 4.2 MMOL/L (ref 3.4–5.3)
RBC # BLD AUTO: 3.89 10E6/UL (ref 3.8–5.2)
SODIUM SERPL-SCNC: 139 MMOL/L (ref 133–144)
T CELL COMMENT: ABNORMAL
TACROLIMUS BLD-MCNC: 8 UG/L (ref 5–15)
TME LAST DOSE: NORMAL H
TME LAST DOSE: NORMAL H
WBC # BLD AUTO: 5.3 10E3/UL (ref 4–11)

## 2022-12-19 PROCEDURE — 83735 ASSAY OF MAGNESIUM: CPT

## 2022-12-19 PROCEDURE — 86360 T CELL ABSOLUTE COUNT/RATIO: CPT

## 2022-12-19 PROCEDURE — 84100 ASSAY OF PHOSPHORUS: CPT

## 2022-12-19 PROCEDURE — 80180 DRUG SCRN QUAN MYCOPHENOLATE: CPT

## 2022-12-19 PROCEDURE — 85027 COMPLETE CBC AUTOMATED: CPT

## 2022-12-19 PROCEDURE — 80197 ASSAY OF TACROLIMUS: CPT

## 2022-12-19 PROCEDURE — 87799 DETECT AGENT NOS DNA QUANT: CPT

## 2022-12-19 PROCEDURE — 36415 COLL VENOUS BLD VENIPUNCTURE: CPT

## 2022-12-19 PROCEDURE — 80048 BASIC METABOLIC PNL TOTAL CA: CPT

## 2022-12-20 ENCOUNTER — TELEPHONE (OUTPATIENT)
Dept: TRANSPLANT | Facility: CLINIC | Age: 37
End: 2022-12-20

## 2022-12-20 DIAGNOSIS — Z94.0 KIDNEY REPLACED BY TRANSPLANT: ICD-10-CM

## 2022-12-20 DIAGNOSIS — Z94.83 PANCREAS TRANSPLANTED (H): ICD-10-CM

## 2022-12-20 DIAGNOSIS — Z94.83 PANCREAS REPLACED BY TRANSPLANT (H): ICD-10-CM

## 2022-12-20 LAB
BKV DNA # SPEC NAA+PROBE: <500 COPIES/ML
BKV DNA SPEC NAA+PROBE-LOG#: <2.7 {LOG_COPIES}/ML
MYCOPHENOLATE SERPL LC/MS/MS-MCNC: 3.06 MG/L (ref 1–3.5)
MYCOPHENOLATE-G SERPL LC/MS/MS-MCNC: 54.4 MG/L (ref 30–95)
TME LAST DOSE: NORMAL H
TME LAST DOSE: NORMAL H

## 2022-12-20 RX ORDER — TACROLIMUS 0.75 MG/1
1.5 TABLET, EXTENDED RELEASE ORAL
Qty: 60 TABLET | Refills: 11 | Status: SHIPPED | OUTPATIENT
Start: 2022-12-20 | End: 2023-08-22

## 2022-12-20 RX ORDER — TACROLIMUS 4 MG/1
8 TABLET, EXTENDED RELEASE ORAL
Qty: 60 TABLET | Refills: 11 | Status: SHIPPED | OUTPATIENT
Start: 2022-12-20 | End: 2023-02-07

## 2022-12-20 NOTE — TELEPHONE ENCOUNTER
ISSUE:   Tacrolimus IR level 8 on 12/19, goal 5-8, dose 8.5 mg daily.    PLAN:   Please call patient and confirm this was an accurate 24-hour trough. Verify Tacrolimus XR dose 8.5 mg daily. Confirm no new medications or illness. Confirm no missed doses. If accurate trough and accurate dose, decrease Tacrolimus XR dose to 8 mg daily and repeat labs in 1 weeks    OUTCOME:   Spoke with patient, they confirm accurate trough level and current dose 8.5 mg daily. Patient confirmed dose change to 8 mg daily and to repeat labs in 1 weeks. Orders sent to preferred pharmacy for dose change and lab for repeat labs. Patient voiced understanding of plan.      Normal

## 2022-12-26 ENCOUNTER — LAB (OUTPATIENT)
Dept: LAB | Facility: CLINIC | Age: 37
End: 2022-12-26
Payer: COMMERCIAL

## 2022-12-26 DIAGNOSIS — Z94.0 KIDNEY REPLACED BY TRANSPLANT: ICD-10-CM

## 2022-12-26 DIAGNOSIS — Z79.899 ENCOUNTER FOR LONG-TERM CURRENT USE OF MEDICATION: ICD-10-CM

## 2022-12-26 DIAGNOSIS — Z48.298 AFTERCARE FOLLOWING ORGAN TRANSPLANT: ICD-10-CM

## 2022-12-26 LAB
AMYLASE SERPL-CCNC: 53 U/L (ref 30–110)
ANION GAP SERPL CALCULATED.3IONS-SCNC: 7 MMOL/L (ref 3–14)
BUN SERPL-MCNC: 35 MG/DL (ref 7–30)
CALCIUM SERPL-MCNC: 9 MG/DL (ref 8.5–10.1)
CHLORIDE BLD-SCNC: 108 MMOL/L (ref 94–109)
CO2 SERPL-SCNC: 20 MMOL/L (ref 20–32)
CREAT SERPL-MCNC: 1.22 MG/DL (ref 0.52–1.04)
ERYTHROCYTE [DISTWIDTH] IN BLOOD BY AUTOMATED COUNT: 13.3 % (ref 10–15)
GFR SERPL CREATININE-BSD FRML MDRD: 58 ML/MIN/1.73M2
GLUCOSE BLD-MCNC: 90 MG/DL (ref 70–99)
HCT VFR BLD AUTO: 32.7 % (ref 35–47)
HGB BLD-MCNC: 10.7 G/DL (ref 11.7–15.7)
LIPASE SERPL-CCNC: 135 U/L (ref 73–393)
MCH RBC QN AUTO: 28.5 PG (ref 26.5–33)
MCHC RBC AUTO-ENTMCNC: 32.7 G/DL (ref 31.5–36.5)
MCV RBC AUTO: 87 FL (ref 78–100)
PLATELET # BLD AUTO: 244 10E3/UL (ref 150–450)
POTASSIUM BLD-SCNC: 4 MMOL/L (ref 3.4–5.3)
RBC # BLD AUTO: 3.76 10E6/UL (ref 3.8–5.2)
SODIUM SERPL-SCNC: 135 MMOL/L (ref 133–144)
TACROLIMUS BLD-MCNC: 5.1 UG/L (ref 5–15)
TME LAST DOSE: NORMAL H
TME LAST DOSE: NORMAL H
WBC # BLD AUTO: 5.5 10E3/UL (ref 4–11)

## 2022-12-26 PROCEDURE — 80048 BASIC METABOLIC PNL TOTAL CA: CPT

## 2022-12-26 PROCEDURE — 83690 ASSAY OF LIPASE: CPT

## 2022-12-26 PROCEDURE — 82150 ASSAY OF AMYLASE: CPT

## 2022-12-26 PROCEDURE — 36415 COLL VENOUS BLD VENIPUNCTURE: CPT

## 2022-12-26 PROCEDURE — 80197 ASSAY OF TACROLIMUS: CPT

## 2022-12-26 PROCEDURE — 85027 COMPLETE CBC AUTOMATED: CPT

## 2023-01-02 ENCOUNTER — LAB (OUTPATIENT)
Dept: LAB | Facility: CLINIC | Age: 38
End: 2023-01-02
Payer: COMMERCIAL

## 2023-01-02 DIAGNOSIS — Z48.298 AFTERCARE FOLLOWING ORGAN TRANSPLANT: ICD-10-CM

## 2023-01-02 DIAGNOSIS — Z79.899 ENCOUNTER FOR LONG-TERM CURRENT USE OF MEDICATION: ICD-10-CM

## 2023-01-02 DIAGNOSIS — Z94.0 KIDNEY REPLACED BY TRANSPLANT: ICD-10-CM

## 2023-01-02 LAB
AMYLASE SERPL-CCNC: 51 U/L (ref 30–110)
ANION GAP SERPL CALCULATED.3IONS-SCNC: 6 MMOL/L (ref 3–14)
BUN SERPL-MCNC: 22 MG/DL (ref 7–30)
CALCIUM SERPL-MCNC: 9.3 MG/DL (ref 8.5–10.1)
CHLORIDE BLD-SCNC: 108 MMOL/L (ref 94–109)
CO2 SERPL-SCNC: 25 MMOL/L (ref 20–32)
CREAT SERPL-MCNC: 1.09 MG/DL (ref 0.52–1.04)
ERYTHROCYTE [DISTWIDTH] IN BLOOD BY AUTOMATED COUNT: 13.2 % (ref 10–15)
GFR SERPL CREATININE-BSD FRML MDRD: 67 ML/MIN/1.73M2
GLUCOSE BLD-MCNC: 84 MG/DL (ref 70–99)
HCT VFR BLD AUTO: 32.6 % (ref 35–47)
HGB BLD-MCNC: 10.4 G/DL (ref 11.7–15.7)
LIPASE SERPL-CCNC: 112 U/L (ref 73–393)
MCH RBC QN AUTO: 29 PG (ref 26.5–33)
MCHC RBC AUTO-ENTMCNC: 31.9 G/DL (ref 31.5–36.5)
MCV RBC AUTO: 91 FL (ref 78–100)
PLATELET # BLD AUTO: 278 10E3/UL (ref 150–450)
POTASSIUM BLD-SCNC: 3.9 MMOL/L (ref 3.4–5.3)
RBC # BLD AUTO: 3.59 10E6/UL (ref 3.8–5.2)
SODIUM SERPL-SCNC: 139 MMOL/L (ref 133–144)
TACROLIMUS BLD-MCNC: 4.9 UG/L (ref 5–15)
TME LAST DOSE: ABNORMAL H
TME LAST DOSE: ABNORMAL H
WBC # BLD AUTO: 3.5 10E3/UL (ref 4–11)

## 2023-01-02 PROCEDURE — 85027 COMPLETE CBC AUTOMATED: CPT

## 2023-01-02 PROCEDURE — 80048 BASIC METABOLIC PNL TOTAL CA: CPT

## 2023-01-02 PROCEDURE — 80197 ASSAY OF TACROLIMUS: CPT

## 2023-01-02 PROCEDURE — 83690 ASSAY OF LIPASE: CPT

## 2023-01-02 PROCEDURE — 36415 COLL VENOUS BLD VENIPUNCTURE: CPT

## 2023-01-02 PROCEDURE — 82150 ASSAY OF AMYLASE: CPT

## 2023-01-09 ENCOUNTER — LAB (OUTPATIENT)
Dept: LAB | Facility: CLINIC | Age: 38
End: 2023-01-09
Payer: COMMERCIAL

## 2023-01-09 DIAGNOSIS — Z94.0 KIDNEY REPLACED BY TRANSPLANT: ICD-10-CM

## 2023-01-09 DIAGNOSIS — Z79.899 ENCOUNTER FOR LONG-TERM CURRENT USE OF MEDICATION: ICD-10-CM

## 2023-01-09 DIAGNOSIS — Z48.298 AFTERCARE FOLLOWING ORGAN TRANSPLANT: ICD-10-CM

## 2023-01-09 LAB
AMYLASE SERPL-CCNC: 50 U/L (ref 30–110)
ANION GAP SERPL CALCULATED.3IONS-SCNC: 7 MMOL/L (ref 3–14)
BUN SERPL-MCNC: 28 MG/DL (ref 7–30)
CALCIUM SERPL-MCNC: 9.3 MG/DL (ref 8.5–10.1)
CHLORIDE BLD-SCNC: 109 MMOL/L (ref 94–109)
CO2 SERPL-SCNC: 24 MMOL/L (ref 20–32)
CREAT SERPL-MCNC: 1.03 MG/DL (ref 0.52–1.04)
ERYTHROCYTE [DISTWIDTH] IN BLOOD BY AUTOMATED COUNT: 13.4 % (ref 10–15)
GFR SERPL CREATININE-BSD FRML MDRD: 71 ML/MIN/1.73M2
GLUCOSE BLD-MCNC: 87 MG/DL (ref 70–99)
HCT VFR BLD AUTO: 34 % (ref 35–47)
HGB BLD-MCNC: 10.8 G/DL (ref 11.7–15.7)
LIPASE SERPL-CCNC: 123 U/L (ref 73–393)
MCH RBC QN AUTO: 28.4 PG (ref 26.5–33)
MCHC RBC AUTO-ENTMCNC: 31.8 G/DL (ref 31.5–36.5)
MCV RBC AUTO: 90 FL (ref 78–100)
PLATELET # BLD AUTO: 302 10E3/UL (ref 150–450)
POTASSIUM BLD-SCNC: 4.1 MMOL/L (ref 3.4–5.3)
RBC # BLD AUTO: 3.8 10E6/UL (ref 3.8–5.2)
SODIUM SERPL-SCNC: 140 MMOL/L (ref 133–144)
TACROLIMUS BLD-MCNC: 5.7 UG/L (ref 5–15)
TME LAST DOSE: NORMAL H
TME LAST DOSE: NORMAL H
WBC # BLD AUTO: 3.2 10E3/UL (ref 4–11)

## 2023-01-09 PROCEDURE — 80197 ASSAY OF TACROLIMUS: CPT

## 2023-01-09 PROCEDURE — 85048 AUTOMATED LEUKOCYTE COUNT: CPT

## 2023-01-09 PROCEDURE — 36415 COLL VENOUS BLD VENIPUNCTURE: CPT

## 2023-01-09 PROCEDURE — 82150 ASSAY OF AMYLASE: CPT

## 2023-01-09 PROCEDURE — 80048 BASIC METABOLIC PNL TOTAL CA: CPT

## 2023-01-09 PROCEDURE — 83690 ASSAY OF LIPASE: CPT

## 2023-01-16 ENCOUNTER — LAB (OUTPATIENT)
Dept: LAB | Facility: CLINIC | Age: 38
End: 2023-01-16
Payer: COMMERCIAL

## 2023-01-16 DIAGNOSIS — Z48.298 AFTERCARE FOLLOWING ORGAN TRANSPLANT: ICD-10-CM

## 2023-01-16 DIAGNOSIS — Z79.899 ENCOUNTER FOR LONG-TERM CURRENT USE OF MEDICATION: ICD-10-CM

## 2023-01-16 DIAGNOSIS — Z94.0 KIDNEY REPLACED BY TRANSPLANT: ICD-10-CM

## 2023-01-16 LAB
AMYLASE SERPL-CCNC: 52 U/L (ref 30–110)
ANION GAP SERPL CALCULATED.3IONS-SCNC: 7 MMOL/L (ref 3–14)
BUN SERPL-MCNC: 29 MG/DL (ref 7–30)
CALCIUM SERPL-MCNC: 9.4 MG/DL (ref 8.5–10.1)
CHLORIDE BLD-SCNC: 107 MMOL/L (ref 94–109)
CO2 SERPL-SCNC: 24 MMOL/L (ref 20–32)
CREAT SERPL-MCNC: 1.2 MG/DL (ref 0.52–1.04)
ERYTHROCYTE [DISTWIDTH] IN BLOOD BY AUTOMATED COUNT: 13.6 % (ref 10–15)
GFR SERPL CREATININE-BSD FRML MDRD: 59 ML/MIN/1.73M2
GLUCOSE BLD-MCNC: 83 MG/DL (ref 70–99)
HCT VFR BLD AUTO: 34.9 % (ref 35–47)
HGB BLD-MCNC: 11 G/DL (ref 11.7–15.7)
LIPASE SERPL-CCNC: 135 U/L (ref 73–393)
MAGNESIUM SERPL-MCNC: 1.8 MG/DL (ref 1.7–2.3)
MCH RBC QN AUTO: 28.4 PG (ref 26.5–33)
MCHC RBC AUTO-ENTMCNC: 31.5 G/DL (ref 31.5–36.5)
MCV RBC AUTO: 90 FL (ref 78–100)
PHOSPHATE SERPL-MCNC: 3.6 MG/DL (ref 2.5–4.5)
PLATELET # BLD AUTO: 287 10E3/UL (ref 150–450)
POTASSIUM BLD-SCNC: 4 MMOL/L (ref 3.4–5.3)
RBC # BLD AUTO: 3.88 10E6/UL (ref 3.8–5.2)
SODIUM SERPL-SCNC: 138 MMOL/L (ref 133–144)
WBC # BLD AUTO: 5.8 10E3/UL (ref 4–11)

## 2023-01-16 PROCEDURE — 83690 ASSAY OF LIPASE: CPT

## 2023-01-16 PROCEDURE — 80048 BASIC METABOLIC PNL TOTAL CA: CPT

## 2023-01-16 PROCEDURE — 82150 ASSAY OF AMYLASE: CPT

## 2023-01-16 PROCEDURE — 84100 ASSAY OF PHOSPHORUS: CPT

## 2023-01-16 PROCEDURE — 36415 COLL VENOUS BLD VENIPUNCTURE: CPT

## 2023-01-16 PROCEDURE — 85041 AUTOMATED RBC COUNT: CPT

## 2023-01-16 PROCEDURE — 80197 ASSAY OF TACROLIMUS: CPT

## 2023-01-16 PROCEDURE — 83735 ASSAY OF MAGNESIUM: CPT

## 2023-01-17 ENCOUNTER — TELEPHONE (OUTPATIENT)
Dept: TRANSPLANT | Facility: CLINIC | Age: 38
End: 2023-01-17
Payer: COMMERCIAL

## 2023-01-17 DIAGNOSIS — Z94.0 KIDNEY REPLACED BY TRANSPLANT: Primary | ICD-10-CM

## 2023-01-17 NOTE — TELEPHONE ENCOUNTER
Patient Call: General  Route to LPN    Reason for call: Pt tested positive to COVID  Just had it about 2 months ago and received antibody infusion      Call back needed? Yes    Return Call Needed  Same as documented in contacts section  When to return call?: Same day: Route High Priority

## 2023-01-18 DIAGNOSIS — Z48.298 AFTERCARE FOLLOWING ORGAN TRANSPLANT: Primary | ICD-10-CM

## 2023-01-18 LAB
TACROLIMUS BLD-MCNC: 7.5 UG/L (ref 5–15)
TME LAST DOSE: NORMAL H
TME LAST DOSE: NORMAL H

## 2023-01-20 NOTE — TELEPHONE ENCOUNTER
Called Marilyn      Who reports tested positive for COVID-19 (home)   She reports cough and fatigue -  No fever , non productive cough,denies severe symptoms   Continues to report tested positive 2 months ago   Received IV infusion     Discussed Remdesivir IV but currently not available   Outpatient setting     2nd phone call to Marilyn reviewed no adjustment with immunosuppression at this time   Patient to agreed with plan and will contact the transplant office        Reanna Cueto MD Huepfel, Lizette WARNER RN  If only symptom is cough monitor, if more symptoms would consider IS reduction

## 2023-01-23 DIAGNOSIS — Z48.298 AFTERCARE FOLLOWING ORGAN TRANSPLANT: Primary | ICD-10-CM

## 2023-01-23 DIAGNOSIS — Z94.0 KIDNEY REPLACED BY TRANSPLANT: ICD-10-CM

## 2023-01-24 ENCOUNTER — LAB (OUTPATIENT)
Dept: LAB | Facility: CLINIC | Age: 38
End: 2023-01-24
Payer: COMMERCIAL

## 2023-01-24 DIAGNOSIS — Z79.899 ENCOUNTER FOR LONG-TERM CURRENT USE OF MEDICATION: ICD-10-CM

## 2023-01-24 DIAGNOSIS — Z48.298 AFTERCARE FOLLOWING ORGAN TRANSPLANT: ICD-10-CM

## 2023-01-24 DIAGNOSIS — Z94.0 KIDNEY REPLACED BY TRANSPLANT: ICD-10-CM

## 2023-01-24 LAB
AMYLASE SERPL-CCNC: 59 U/L (ref 28–100)
ANION GAP SERPL CALCULATED.3IONS-SCNC: 14 MMOL/L (ref 7–15)
BUN SERPL-MCNC: 26.7 MG/DL (ref 6–20)
CALCIUM SERPL-MCNC: 9.3 MG/DL (ref 8.6–10)
CHLORIDE SERPL-SCNC: 99 MMOL/L (ref 98–107)
CREAT SERPL-MCNC: 1.16 MG/DL (ref 0.51–0.95)
DEPRECATED HCO3 PLAS-SCNC: 24 MMOL/L (ref 22–29)
ERYTHROCYTE [DISTWIDTH] IN BLOOD BY AUTOMATED COUNT: 13.2 % (ref 10–15)
GFR SERPL CREATININE-BSD FRML MDRD: 62 ML/MIN/1.73M2
GLUCOSE SERPL-MCNC: 83 MG/DL (ref 70–99)
HCT VFR BLD AUTO: 35.6 % (ref 35–47)
HGB BLD-MCNC: 11.3 G/DL (ref 11.7–15.7)
LIPASE SERPL-CCNC: 35 U/L (ref 13–60)
MCH RBC QN AUTO: 28.3 PG (ref 26.5–33)
MCHC RBC AUTO-ENTMCNC: 31.7 G/DL (ref 31.5–36.5)
MCV RBC AUTO: 89 FL (ref 78–100)
PLATELET # BLD AUTO: 261 10E3/UL (ref 150–450)
POTASSIUM SERPL-SCNC: 4.3 MMOL/L (ref 3.4–5.3)
RBC # BLD AUTO: 3.99 10E6/UL (ref 3.8–5.2)
SODIUM SERPL-SCNC: 137 MMOL/L (ref 136–145)
TACROLIMUS BLD-MCNC: 6.5 UG/L (ref 5–15)
TME LAST DOSE: NORMAL H
TME LAST DOSE: NORMAL H
WBC # BLD AUTO: 5.2 10E3/UL (ref 4–11)

## 2023-01-24 PROCEDURE — 80180 DRUG SCRN QUAN MYCOPHENOLATE: CPT

## 2023-01-24 PROCEDURE — 80197 ASSAY OF TACROLIMUS: CPT

## 2023-01-24 PROCEDURE — 83690 ASSAY OF LIPASE: CPT

## 2023-01-24 PROCEDURE — 36415 COLL VENOUS BLD VENIPUNCTURE: CPT

## 2023-01-24 PROCEDURE — 82947 ASSAY GLUCOSE BLOOD QUANT: CPT

## 2023-01-24 PROCEDURE — 85027 COMPLETE CBC AUTOMATED: CPT

## 2023-01-24 PROCEDURE — 82150 ASSAY OF AMYLASE: CPT

## 2023-01-25 LAB
MYCOPHENOLATE SERPL LC/MS/MS-MCNC: 5.7 MG/L (ref 1–3.5)
MYCOPHENOLATE-G SERPL LC/MS/MS-MCNC: 44.6 MG/L (ref 30–95)
TME LAST DOSE: ABNORMAL H
TME LAST DOSE: ABNORMAL H

## 2023-01-31 ENCOUNTER — TELEPHONE (OUTPATIENT)
Dept: TRANSPLANT | Facility: CLINIC | Age: 38
End: 2023-01-31
Payer: COMMERCIAL

## 2023-01-31 NOTE — TELEPHONE ENCOUNTER
Patient Call: General  Route to LPN    Reason for call: called in regards of touch base on over the counter medications. Call back number is [phone].    Call back needed? Yes    Return Call Needed  Same as documented in contacts section  When to return call?: Same day: Route High [facility]

## 2023-01-31 NOTE — TELEPHONE ENCOUNTER
Patient called w sinus infection symptoms, for the past week. She has been taking transplant recommended OTC medications with no relief. Patient complains of face pain, teeth pain.     RNCC recommended patient go to PCP to be seen for treatment for possible sinus infection, antibiotics. Notify RNCC of any rx given for infection.     Patient v/u.

## 2023-02-03 DIAGNOSIS — Z94.83 PANCREAS REPLACED BY TRANSPLANT (H): ICD-10-CM

## 2023-02-03 DIAGNOSIS — Z94.0 KIDNEY REPLACED BY TRANSPLANT: Primary | ICD-10-CM

## 2023-02-06 ENCOUNTER — TELEPHONE (OUTPATIENT)
Dept: OTHER | Facility: CLINIC | Age: 38
End: 2023-02-06
Payer: COMMERCIAL

## 2023-02-06 ENCOUNTER — MYC MEDICAL ADVICE (OUTPATIENT)
Dept: OTHER | Age: 38
End: 2023-02-06

## 2023-02-06 ENCOUNTER — TELEPHONE (OUTPATIENT)
Dept: TRANSPLANT | Facility: CLINIC | Age: 38
End: 2023-02-06
Payer: COMMERCIAL

## 2023-02-06 DIAGNOSIS — I99.8 VASCULAR INSUFFICIENCY: Primary | ICD-10-CM

## 2023-02-06 DIAGNOSIS — Z94.0 KIDNEY REPLACED BY TRANSPLANT: ICD-10-CM

## 2023-02-06 DIAGNOSIS — Z94.83 PANCREAS REPLACED BY TRANSPLANT (H): ICD-10-CM

## 2023-02-06 DIAGNOSIS — Z94.0 KIDNEY REPLACED BY TRANSPLANT: Primary | ICD-10-CM

## 2023-02-06 NOTE — TELEPHONE ENCOUNTER
Patient saw vascular at [facility] for referral for LE swelling. Dx w/ severe vasc insufficiency. Patient requested a referral to vasc surgery at the , referral placed.

## 2023-02-06 NOTE — TELEPHONE ENCOUNTER
Pt referred to [facility] by Reanan Cueto MD for venous insufficiency.     2/6/23 Venous lower extremity study in [facility]. Medical records in [facility].     Pt needs to be scheduled for new pt in clinic consult with vascular medicine.  Will route to scheduling to coordinate an appointment at next available.     WALESKA PandaN, RN  [facility]

## 2023-02-06 NOTE — TELEPHONE ENCOUNTER
[facility]    Who is the name of the provider?:  New    What is the location you see this provider at/preferred location?: [place]  Person calling / Facility: Self  Phone number:  [phone]    Nurse call back needed:  Not Applicable     Reason for call:   Diagnosed by [facility] vascular with venous insufficiency.  Doppler US and duplex scan done today at [facility]. Wants to establish care [facility] vascular provider.  [facility] transplant patient of [facility]      Pharmacy location:  NA  Outside Imaging: Not Applicable   Can we leave a detailed message on this number?  YES

## 2023-02-06 NOTE — TELEPHONE ENCOUNTER
Future Appointments   Date Time Provider Department Center   2/24/2023 11:10 AM Leighton Cage MD [facility]

## 2023-02-07 ENCOUNTER — TELEPHONE (OUTPATIENT)
Dept: TRANSPLANT | Facility: CLINIC | Age: 38
End: 2023-02-07

## 2023-02-07 ENCOUNTER — LAB (OUTPATIENT)
Dept: LAB | Facility: CLINIC | Age: 38
End: 2023-02-07
Payer: COMMERCIAL

## 2023-02-07 DIAGNOSIS — Z94.0 KIDNEY REPLACED BY TRANSPLANT: ICD-10-CM

## 2023-02-07 DIAGNOSIS — Z94.83 PANCREAS REPLACED BY TRANSPLANT (H): ICD-10-CM

## 2023-02-07 DIAGNOSIS — Z94.83 PANCREAS TRANSPLANTED (H): ICD-10-CM

## 2023-02-07 DIAGNOSIS — Z79.899 ENCOUNTER FOR LONG-TERM CURRENT USE OF MEDICATION: ICD-10-CM

## 2023-02-07 DIAGNOSIS — Z48.298 AFTERCARE FOLLOWING ORGAN TRANSPLANT: ICD-10-CM

## 2023-02-07 LAB
AMYLASE SERPL-CCNC: 57 U/L (ref 28–100)
ANION GAP SERPL CALCULATED.3IONS-SCNC: 11 MMOL/L (ref 7–15)
BUN SERPL-MCNC: 19.7 MG/DL (ref 6–20)
CALCIUM SERPL-MCNC: 9.7 MG/DL (ref 8.6–10)
CHLORIDE SERPL-SCNC: 99 MMOL/L (ref 98–107)
CREAT SERPL-MCNC: 1.02 MG/DL (ref 0.51–0.95)
DEPRECATED HCO3 PLAS-SCNC: 26 MMOL/L (ref 22–29)
ERYTHROCYTE [DISTWIDTH] IN BLOOD BY AUTOMATED COUNT: 13.2 % (ref 10–15)
GFR SERPL CREATININE-BSD FRML MDRD: 72 ML/MIN/1.73M2
GLUCOSE SERPL-MCNC: 81 MG/DL (ref 70–99)
HCT VFR BLD AUTO: 35 % (ref 35–47)
HGB BLD-MCNC: 10.9 G/DL (ref 11.7–15.7)
LIPASE SERPL-CCNC: 32 U/L (ref 13–60)
MCH RBC QN AUTO: 28.1 PG (ref 26.5–33)
MCHC RBC AUTO-ENTMCNC: 31.1 G/DL (ref 31.5–36.5)
MCV RBC AUTO: 90 FL (ref 78–100)
PLATELET # BLD AUTO: 332 10E3/UL (ref 150–450)
POTASSIUM SERPL-SCNC: 4.2 MMOL/L (ref 3.4–5.3)
RBC # BLD AUTO: 3.88 10E6/UL (ref 3.8–5.2)
SODIUM SERPL-SCNC: 136 MMOL/L (ref 136–145)
TACROLIMUS BLD-MCNC: 7.7 UG/L (ref 5–15)
TME LAST DOSE: NORMAL H
TME LAST DOSE: NORMAL H
TSH SERPL DL<=0.005 MIU/L-ACNC: 1.75 UIU/ML (ref 0.3–4.2)
WBC # BLD AUTO: 6.3 10E3/UL (ref 4–11)

## 2023-02-07 PROCEDURE — 80180 DRUG SCRN QUAN MYCOPHENOLATE: CPT

## 2023-02-07 PROCEDURE — 83690 ASSAY OF LIPASE: CPT

## 2023-02-07 PROCEDURE — 36415 COLL VENOUS BLD VENIPUNCTURE: CPT

## 2023-02-07 PROCEDURE — 84443 ASSAY THYROID STIM HORMONE: CPT

## 2023-02-07 PROCEDURE — 85014 HEMATOCRIT: CPT

## 2023-02-07 PROCEDURE — 82150 ASSAY OF AMYLASE: CPT

## 2023-02-07 PROCEDURE — 80197 ASSAY OF TACROLIMUS: CPT

## 2023-02-07 PROCEDURE — 80048 BASIC METABOLIC PNL TOTAL CA: CPT

## 2023-02-07 RX ORDER — TACROLIMUS 1 MG/1
2 TABLET, EXTENDED RELEASE ORAL
Qty: 60 TABLET | Refills: 11 | Status: SHIPPED | OUTPATIENT
Start: 2023-02-07 | End: 2023-08-01

## 2023-02-07 RX ORDER — TACROLIMUS 4 MG/1
4 TABLET, EXTENDED RELEASE ORAL
Qty: 30 TABLET | Refills: 11 | Status: SHIPPED | OUTPATIENT
Start: 2023-02-07 | End: 2023-08-01

## 2023-02-07 NOTE — TELEPHONE ENCOUNTER
"Called pt back, pt reports has had symptoms for 15 years  Has \"used every compression stocking knowing to man\" and has done the medical management but now needs to see provider for likely intervention.  Thus, we agreed pt referral to go to [facility].   Pt hx of renal transplant.   Med recs from [facility] in [facility].   Routing to [facility] to call pt and coordinate referral asap.     NED Panda, RN  [facility]  Office:  [phone] Fax: [phone]       "

## 2023-02-07 NOTE — TELEPHONE ENCOUNTER
[facility] VASCULAR [facility] CENTER    Who is the name of the provider?:      What is the location you see this provider at/preferred location?:    Person calling / Facility: Marilyn JANAY Ortega  Phone number:[phone]     Nurse call back needed:  YES     Reason for call:   Patient stated that she spoke with her transplant team yesterday 2/6/23 and that a new referral was placed for her to see a Vascular Surgeon instead of Vascular Medicine.    She said this is based on results from [place] and also from speaking with the transplant team.    She is requesting to speak with a nurse if necessary.    Pharmacy location:    Outside Imaging: NO   Can we leave a detailed message on this number?  YES

## 2023-02-08 LAB
MYCOPHENOLATE SERPL LC/MS/MS-MCNC: 1.86 MG/L (ref 1–3.5)
MYCOPHENOLATE-G SERPL LC/MS/MS-MCNC: 42.1 MG/L (ref 30–95)
TME LAST DOSE: NORMAL H
TME LAST DOSE: NORMAL H

## 2023-02-09 ENCOUNTER — OFFICE VISIT (OUTPATIENT)
Dept: VASCULAR SURGERY | Facility: CLINIC | Age: 38
End: 2023-02-09
Attending: INTERNAL MEDICINE
Payer: COMMERCIAL

## 2023-02-09 ENCOUNTER — TELEPHONE (OUTPATIENT)
Dept: OPHTHALMOLOGY | Facility: CLINIC | Age: 38
End: 2023-02-09

## 2023-02-09 DIAGNOSIS — I87.2 VENOUS (PERIPHERAL) INSUFFICIENCY: Primary | ICD-10-CM

## 2023-02-09 DIAGNOSIS — I99.8 VASCULAR INSUFFICIENCY: ICD-10-CM

## 2023-02-09 PROCEDURE — 99214 OFFICE O/P EST MOD 30 MIN: CPT | Performed by: SURGERY

## 2023-02-09 NOTE — NURSING NOTE
Patient Reported symptoms:    Right leg   Heaviness Most of the time   Achiness A good bit of the time   Swelling Most of the time   Throbbing Some of the time   Itching None of the time   Appearance Extremely noticeable   Impact on work/activities Moderately reduced    Left Leg   Heaviness Most of the time   Achiness A good bit of the time   Swelling Most of the time   Throbbing Some of the time   Itching None of the time   Appearance Extremely noticeable   Impact on work/activities Moderately reduced

## 2023-02-09 NOTE — PROGRESS NOTES
VEINSOLUTIONS CONSULTATION    HPI:    Marilyn Ortega is a pleasant 37 year old female referred by Reanna Villalta for evaluation of bilateral lower extremity swelling and pain.  The patient states that she has had swelling and pain in both of her lower extremities for years and that her father has similar swelling of his legs.  The patient has no history of lower extremity deep vein thrombosis, superficial thrombophlebitis or hemorrhage.  She has worn compression hose for years, has had leg massage and some attempts at lymph drainage but not by a lymphedema therapist.  She has used many different diuretics over the years, none of them providing any significant relief of her symptoms.    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  The pancreas was transplanted into the right pelvis with anastomoses to the right external iliac artery and external iliac vein while the kidney was transplanted into the left pelvis the anastomosis to the left external iliac artery and left external iliac vein.  Of note, the patient states that her lower extremity edema has not worsened since her transplant.    The patient has no history of deep vein thrombosis, superficial thrombophlebitis or hemorrhage but does admit that she is heterozygous for factor V Leiden mutation.    She states that she gets pain after sitting too long and that the skin gets very shiny due to the tightness from the edema.  The symptoms interfere with actives of daily living because it makes it difficult for her to be on her feet or to be active toward the end of the day.  The pain is described as an ache, tightness, heaviness, sometimes feeling as though they will fall asleep.  She says she has got to the point where she simply cannot tolerate the pain much longer.    Family history is significant for edema in her father but she does not give a family history of varicose veins.    PAST MEDICAL HISTORY:   Past Medical History:   Diagnosis  Date     Anemia 2021     C. difficile diarrhea      CKD (chronic kidney disease) stage 4, GFR 15-29 ml/min (H)      Complication of transplanted kidney 2022     Gastroparesis      Gluten intolerance      Heterozygous factor V Leiden mutation (H)      HTN (hypertension)      Infection due to 2019 novel coronavirus 2020     Osteomyelitis (H) 2013     Shingles 2013     Type 1 diabetes mellitus (H) 2000       PAST SURGICAL HISTORY:   Past Surgical History:   Procedure Laterality Date     BENCH KIDNEY N/A 11/15/2021    Procedure: Bench kidney;  Surgeon: Branden Aranda MD;  Location:  OR     BENCH PANCREAS N/A 11/15/2021    Procedure: Bench pancreas;  Surgeon: Branden Aranda MD;  Location: [facility] OR     CATARACT EXTRACTION  2021     CV CORONARY ANGIOGRAM N/A 2022    Procedure: Coronary Angiogram;  Surgeon: Bebeto Ventura MD;  Location:  [facility]ARD[facility] CATH [facility]     CV RIGHT HEART CATH MEASUREMENTS RECORDED N/A 2022    Procedure: Right Heart Catheterization;  Surgeon: Bebeto Ventura MD;  [facility]     CV RIGHT HEART CATH MEASUREMENTS RECORDED N/A 2022    Procedure: Right Heart Catheterization [[id number]];  Surgeon: Trevor Jasmine MD;  Location:  HEART CARD[facility] CATH LAB     IR RENAL BIOPSY LEFT  2022     IR RENAL BIOPSY LEFT  2022     ORIF HIP FRACTURE Left      ORTHOPEDIC SURGERY Bilateral     Repair of labral tear     TRANSPLANT PANCREAS, KIDNEY  DONOR, COMBINED Left 11/15/2021    Procedure: TRANSPLANT, KIDNEY AND PANCREAS,  DONOR;  Surgeon: Branden Aranda MD;  Location:  OR       FAMILY HISTORY:   Family History   Problem Relation Age of Onset     Diabetes Paternal Grandfather      Macular Degeneration Mother      Arthritis Maternal Grandmother      Hypertension Maternal Grandmother      Cancer - colorectal Maternal Grandfather      Glaucoma No  family hx of        SOCIAL HISTORY:   Social History     Tobacco Use     Smoking status: Never     Smokeless tobacco: Never   Substance Use Topics     Alcohol use: Yes     Comment: socially       REVIEW OF SYSTEMS: Review Of Systems  Skin: Skin lesions  Eyes: Vision loss  Ears/Nose/Throat: Sinus infection  Respiratory: No shortness of breath, dyspnea on exertion, cough, or hemoptysis  Cardiovascular: Irregular heartbeat, racing heart  Gastrointestinal: Constipation  Genitourinary: negative  Musculoskeletal: Leg swelling  Neurologic: Headaches  Psychiatric: negative  Hematologic/Lymphatic/Immunologic: negative  Endocrine: Hypothyroidism      Vital signs:  There were no vitals taken for this visit.    Current Outpatient Medications   Medication Sig Dispense Refill     aspirin (ASA) 81 MG chewable tablet Take 81 mg by mouth daily       cetirizine (ZYRTEC) 10 MG tablet Take 1 tablet (10 mg) by mouth every other day       cholecalciferol 25 MCG (1000 UT) TABS Take 2,000 Units by mouth every other day        docusate sodium (COLACE) 100 MG capsule Take 200 mg by mouth daily before breakfast       furosemide (LASIX) 20 MG tablet Take 1 tablet (20 mg) by mouth daily 45 tablet 0     loratadine (CLARITIN) 10 MG tablet Take 10 mg by mouth daily       mycophenolic acid (GENERIC EQUIVALENT) 360 MG EC tablet Take 1 tablet (360 mg) by mouth 2 times daily 60 tablet 11     ondansetron (ZOFRAN) 4 MG tablet Take 1 tablet (4 mg) by mouth every 8 hours as needed for nausea 30 tablet 0     Prucalopride Succinate (MOTEGRITY) 2 MG TABS Take 1 tablet (2 mg) by mouth daily 90 tablet 1     tacrolimus (ENVARSUS XR) 0.75 MG 24 hr tablet Take 2 tablets (1.5 mg) by mouth every morning (before breakfast) Hold for dose change. 60 tablet 11     tacrolimus (ENVARSUS XR) 1 MG 24 hr tablet Take 2 tablets (2 mg) by mouth every morning (before breakfast) Total dose=6mg daily 60 tablet 11     tacrolimus (ENVARSUS XR) 4 MG 24 hr tablet Take 1 tablet (4 mg)  by mouth every morning (before breakfast) Total dose=6mg daily 30 tablet 11       PHYSICAL EXAM:  General: Pleasant, NAD.   HEENT: Normocephalic, atraumatic, external ears and nose normal.   Respiratory: Normal respiratory effort.   Cardiovascular: Pulse is regular.   Musculoskeletal: Gait and station normal.  The joints of her fingers and toes without deformity.  There is no cyanosis of her nailbeds.   EXTREMITIES: Right extremity: No varicose veins or stasis changes.  Right calf is somewhat tight though only trace edema is present of ankle.  No skin changes or lipodermatosclerosis.    Left lower extremity: Small cluster of telangiectasias distal anteromedial left leg with a reticular vein on the proximal anteromedial left calf.  No significant varicose veins.  Left calf is slightly larger than right, somewhat tight/firm.  No stasis changes or lipodermatosclerosis  PULSES: R/L (3=normal pulse, 0=no palpable pulse) dorsalis pedis: 3/3; posterior tibial: 3/3.      Neurologic: Grossly normal  Psychiatric: Mood, affect, judgment and insight are normal     Venous Lower Extremity - Hemodynamic Study ([facility]) February 6, 2023    Anatomical Region Laterality Modality   -- -- Other     Narrative    Right: OUTFLOW PLETHYSMOGRAPHY:     Patent venous outflow.  PASSIVE REFILLING:     Severe venous insufficiency.  EXERCISE  PLETHYSMOGRAPHY:     Rapid post exercise refilling time.  CALF PUMP FUNCTION:     Normal ejection fraction.  CONTINUOUS WAVE   DOPPLER:     Signals are patent, spontaneous, phasic and augment normally where evaluated. (Absent spontaneous and phasic Posterior Tibial signals may be a normal variant).  Pulsatile signals are present.   Deep Venous Incompetence:    No deep venous   incompetence detected.     Left: OUTFLOW PLETHYSMOGRAPHY:     Patent venous outflow.  PASSIVE REFILLING:     Severe venous insufficiency.  EXERCISE  PLETHYSMOGRAPHY:     Rapid post exercise refilling time.  CALF PUMP  FUNCTION:     Normal ejection fraction.  CONTINUOUS WAVE   DOPPLER:     Signals are patent, spontaneous, phasic and augment normally where evaluated. (Absent spontaneous and phasic Posterior Tibial signals may be a normal variant).  Pulsatile signals are present.   Deep Venous Incompetence:    No deep venous   incompetence detected.     Conclusions: No venous outflow obstruction bilaterally. Right- severely increased venous refilling. Normal calf pump function. Left- severely increased venous refilling. Normal calf pump function. Severe bilateral venous pulsatility.Bilateral multilevel   venous pulsatility may reflect elevated central venous pressure, right heart failure, tricuspid regurgitation, pulmonary hypertension, or other factors including normal variation.  No prior study for comparison.   Procedure Note    Joel Burns M.D. - 02/06/2023   Formatting of this note might be different from the original.   Right: OUTFLOW PLETHYSMOGRAPHY:     Patent venous outflow.  PASSIVE REFILLING:     Severe venous insufficiency.  EXERCISE  PLETHYSMOGRAPHY:     Rapid post exercise refilling time.  CALF PUMP FUNCTION:     Normal ejection fraction.  CONTINUOUS WAVE   DOPPLER:     Signals are patent, spontaneous, phasic and augment normally where evaluated. (Absent spontaneous and phasic Posterior Tibial signals may be a normal variant).  Pulsatile signals are present.   Deep Venous Incompetence:    No deep venous   incompetence detected.     Left: OUTFLOW PLETHYSMOGRAPHY:     Patent venous outflow.  PASSIVE REFILLING:     Severe venous insufficiency.  EXERCISE  PLETHYSMOGRAPHY:     Rapid post exercise refilling time.  CALF PUMP FUNCTION:     Normal ejection fraction.  CONTINUOUS WAVE   DOPPLER:     Signals are patent, spontaneous, phasic and augment normally where evaluated. (Absent spontaneous and phasic Posterior Tibial signals may be a normal variant).  Pulsatile signals are present.   Deep Venous Incompetence:    No deep  venous   incompetence detected.     Conclusions: No venous outflow obstruction bilaterally. Right- severely increased venous refilling. Normal calf pump function. Left- severely increased venous refilling. Normal calf pump function. Severe bilateral venous pulsatility.Bilateral multilevel   venous pulsatility may reflect elevated central venous pressure, right heart failure, tricuspid regurgitation, pulmonary hypertension, or other factors including normal variation.  No prior study for comparison.   Images        ASSESSMENT:  Bilateral lower extremity pain with swelling without significant varicose veins.  Her hemodynamic study suggests increased venous filling in both lower extremities but no deep venous incompetence was detected.    We discussed lower extremity vein anatomy and how venous insufficiency can contribute to lower extremity edema.  We also discussed the fact that there are many, nonreflux related causes of lower extremity edema.  The fact that she has had this for many years and that her father has similar findings of his legs suggest there may be a genetic component.    A bilateral lower extremity venous competency study will be necessary to assess for significant, correctable superficial venous insufficiency.  We briefly discussed options for treating superficial venous insufficiency utilizing office-based endovenous ablation.  Risks of the vein thrombosis, bleeding, infection and nerve injury were discussed.    Clearly, given her pancreas and kidney transplant, treatment is considered much more high risk.  This was discussed frankly with the patient.  All noninvasive measures to control the edema must be addressed prior to addressing any superficial insufficiency that might be demonstrated on ultrasound.  The patient voiced understanding of our discussion and her questions were answered.    PLAN:  Bilateral extremity venous competency study with video visit to discuss results     Yasir Corrigan  MD [facility]    Dictated using Dragon voice recognition software which may result in transcription errors

## 2023-02-09 NOTE — PATIENT INSTRUCTIONS
Varicose Veins and Spider Veins    Varicose veins are swollen, enlarged veins most often found in the legs. They are usually blue or purple in color and may bulge, twist, and stand out under the skin. Spider veins are small veins just under your skin that can look red, blue or purple.        Normally, veins return blood from the body to the heart. The leg veins have one-way valves that prevent blood from flowing backward in the vein. When the valves are weak or damaged, blood backs up in the veins. This may cause some of the veins to swell and bulge and become varicose veins.     Symptoms  Varicose veins may or may not cause symptoms. If symptoms do occur, they can include:  Legs that feel tired, achy, heavy, or itchy  Leg swelling  Leg muscle cramps  Skin changes, such as discoloration, dryness, redness, or rash (in more severe cases, you may also have sores on the skin called venous leg ulcers)    Risk factors  There are a number of factors that increase the risk for varicose veins. These can include:   Being a woman  Being older  Sitting or standing for long periods  Being overweight  Being pregnant  Having a family history of varicose veins  Hormones, birth control pills    Treatment starts with simple self-help measures (see below). If these don't help, there are many procedures that can be done to shrink or remove varicose veins. Your healthcare provider can tell you more about these options, if needed.     Home care  Support or compression stockings will likely be prescribed. If so, be sure to wear them as directed. They may help improve blood flow.  Exercising helps strengthen your leg muscles and improve blood flow. To get the most benefit, choose exercises such as walking, swimming, or cycling. Also try to exercise for at least 30 minutes on most days.  Raising your legs above heart level will help relieve swelling and keep blood from pooling in veins. Try to elevate your legs for 15 to 20 minutes at  the end of the day, and whenever you're relaxing. To make sure your legs are raised above heart level, prop them up on cushions or large pillows.  To keep blood moving when you have to sit or stand for long periods, try these tips:  At work, take walking breaks instead of coffee breaks. Walk during your lunch hour. Or try flexing your feet up and down 10 times each hour.  When standing, raise yourself up and down on your toes, or rock back and forth on your heels.  If you are overweight, talk with your healthcare provider about setting up a weight-loss plan. Maintaining a healthy weight can help reduce the strain on your veins. It may also improve symptoms, such as swelling and aching.  If you have dryness and itching, ask your provider about special lotions that can be applied to the skin to help improve symptoms.     Follow-up care  Follow up with your healthcare provider, or as directed. If imaging tests were done, you'll be told the results and if there are any new findings that affect your care.     When to seek medical advice  Call your healthcare provider right away if any of these occur:  Sudden, severe leg swelling, pain, or redness  Symptoms worsen, or they don't improve with self-care  Bleeding from any affected veins  Ulcers form on the legs, ankles, or feet  Fever of 100.4 F (38 C) or higher, or as advised by your provider    [place] last reviewed this educational content on 4/1/2018 2000-2021 [employer]. All rights reserved. This information is not intended as a substitute for professional medical care. Always follow your healthcare professional's instructions.    Self-Care for Spider and Varicose Veins    Your healthcare provider may suggest that you try self-care. Exercising and maintaining a healthy weight may keep problem veins from getting worse. Wearing elastic stockings and elevating your legs can help improve blood flow. Taking breaks when you sit or stand helps,  too.        Wearing compression stockings  Compression stockings gently squeeze veins so blood flows upward. If you need compression stockings, your healthcare provider can prescribe them for you. Follow your healthcare provider's advice about how and when to wear them. Compression stockings come in several different levels of pressure. Ask your healthcare provider which level of pressure would help you the most.     Raising your legs above heart level will help relieve swelling and keep blood from pooling in veins. Try to elevate your legs for 15 to 20 minutes at the end of the day, and whenever you're relaxing. To make sure your legs are raised above heart level, prop them up on cushions or large pillows.    To keep blood moving when you have to sit or stand for long periods, try these tips:  At work, take walking breaks instead of coffee breaks. Walk during your lunch hour. Or try flexing your feet up and down 10 times each hour.  When standing, raise yourself up and down on your toes, or rock back and forth on your heels.    [email or web address] last reviewed this educational content on 11/1/2019 2000-2021 [employer]. All rights reserved. This information is not intended as a substitute for professional medical care. Always follow your healthcare professional's instructions.    Treatment Options    Sclerotherapy  Your healthcare provider will inject the vein with a special chemical that will quickly close the vein from the inside. This is particularly useful for spider veins and smaller varicose veins.      If you have large varicose veins, surgery may be the best choice. But it will not prevent new varicose veins from forming. Surgery is most often done in a surgery center or in the office.    If surgery is recommended for you, your surgery will be tailored to your needs. Varicose veins may be tied off (ligation), destroyed, or removed. Blood will then flow through the healthy veins. One or more of the  following techniques may be used:    Ablation (laser or radiofrequency)  A tiny cut in the skin is made near the varicose vein. A small tube called a catheter is inserted into the vein. Energy or heat released from the catheter tip will make the vein walls collapse and stick together, stopping all blood flow through the vein.         Ablation (glue)  A tiny cut in the skin is made near the varicose vein. A small tube called a catheter is inserted into the vein. Droplets of glue are deposited into the vein to make vein walls collapse and stick together stopping blood flow through the vein.    Microphlebectomy or ambulatory phlebectomy  A special hook is used to gently take out a varicose vein through tiny incisions. Microphlebectomy may be done in your healthcare provider's office.      Vein stripping and ligation (rare)  In more severe cases, the surgeon may tie off and remove veins by making smaller cuts in the skin. Smaller branching veins may also be tied off or removed.    Know about the risks  Your healthcare provider will talk with you about the risks of surgery. These include:  Bleeding or swelling  A sense of numbness, burning, or tingling in areas near the procedure  Edema or swelling in the legs  Clots in the deep veins that may travel to the lungs  Infection  Scarring  Inflammation related to the glue    [employer] last reviewed this educational content on 11/1/2019 (Sclerotherapy image) 12/1/2019 (Radiofrequency ablation image, Microphlebectomy image)    0582-4798 [employer]. All rights reserved. This information is not intended as a substitute for professional medical care. Always follow your healthcare professional's instructions.

## 2023-02-09 NOTE — LETTER
2/9/2023         RE: Marilyn Ortega  [address]  [facility] [address]        Dear Colleague,    Thank you for referring your patient, Marilyn Ortega, to the [facility] [place]. Please see a copy of my visit note below.        VEINSOLUTIONS CONSULTATION    HPI:    Marilyn Ortega is a pleasant 37 year old female referred by Reanna Villalta for evaluation of bilateral lower extremity swelling and pain.  The patient states that she has had swelling and pain in both of her lower extremities for years and that her father has similar swelling of his legs.  The patient has no history of lower extremity deep vein thrombosis, superficial thrombophlebitis or hemorrhage.  She has worn compression hose for years, has had leg massage and some attempts at lymph drainage but not by a lymphedema therapist.  She has used many different diuretics over the years, none of them providing any significant relief of her symptoms.    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  The pancreas was transplanted into the right pelvis with anastomoses to the right external iliac artery and external iliac vein while the kidney was transplanted into the left pelvis the anastomosis to the left external iliac artery and left external iliac vein.  Of note, the patient states that her lower extremity edema has not worsened since her transplant.    The patient has no history of deep vein thrombosis, superficial thrombophlebitis or hemorrhage but does admit that she is heterozygous for factor V Leiden mutation.    She states that she gets pain after sitting too long and that the skin gets very shiny due to the tightness from the edema.  The symptoms interfere with actives of daily living because it makes it difficult for her to be on her feet or to be active toward the end of the day.  The pain is described as an ache, tightness, heaviness, sometimes feeling as though they will fall asleep.   She says she has got to the point where she simply cannot tolerate the pain much longer.    Family history is significant for edema in her father but she does not give a family history of varicose veins.    PAST MEDICAL HISTORY:   Past Medical History:   Diagnosis Date     Anemia 2021     C. difficile diarrhea      CKD (chronic kidney disease) stage 4, GFR 15-29 ml/min (H)      Complication of transplanted kidney 2022     Gastroparesis      Gluten intolerance      Heterozygous factor V Leiden mutation (H)      HTN (hypertension)      Infection due to 2019 novel coronavirus 2020     Osteomyelitis (H) 2013     Shingles 2013     Type 1 diabetes mellitus (H)        PAST SURGICAL HISTORY:   Past Surgical History:   Procedure Laterality Date     BENCH KIDNEY N/A 11/15/2021    Procedure: Bench kidney;  Surgeon: Branden Aranda MD;  Location:  OR     BENCH PANCREAS N/A 11/15/2021    Procedure: Bench pancreas;  Surgeon: Branden Aranda MD;  Location: [facility] OR     CATARACT EXTRACTION  2021     CV CORONARY ANGIOGRAM N/A 2022    Procedure: Coronary Angiogram;  Surgeon: Bebeto Ventura MD;  Location:  HEART CARDIAC CATH LAB     CV RIGHT HEART CATH MEASUREMENTS RECORDED N/A 2022    Procedure: Right Heart Catheterization;  Surgeon: Bebeto Ventura MD;  Location:  HEART CARDIAC CATH LAB     CV RIGHT HEART CATH MEASUREMENTS RECORDED N/A 2022    Procedure: Right Heart Catheterization [[id number]];  Surgeon: Trevor Jasmine MD;  Location:  HEART CARDIAC CATH LAB     IR RENAL BIOPSY LEFT  2022     IR RENAL BIOPSY LEFT  2022     ORIF HIP FRACTURE Left      ORTHOPEDIC SURGERY Bilateral     Repair of labral tear     TRANSPLANT PANCREAS, KIDNEY  DONOR, COMBINED Left 11/15/2021    Procedure: TRANSPLANT, KIDNEY AND PANCREAS,  DONOR;  Surgeon: Branden Aranda MD;  Location: [facility] OR       FAMILY  HISTORY:   Family History   Problem Relation Age of Onset     Diabetes Paternal Grandfather      Macular Degeneration Mother      Arthritis Maternal Grandmother      Hypertension Maternal Grandmother      Cancer - colorectal Maternal Grandfather      Glaucoma No family hx of        SOCIAL HISTORY:   Social History     Tobacco Use     Smoking status: Never     Smokeless tobacco: Never   Substance Use Topics     Alcohol use: Yes     Comment: socially       REVIEW OF SYSTEMS: Review Of Systems  Skin: Skin lesions  Eyes: Vision loss  Ears/Nose/Throat: Sinus infection  Respiratory: No shortness of breath, dyspnea on exertion, cough, or hemoptysis  Cardiovascular: Irregular heartbeat, racing heart  Gastrointestinal: Constipation  Genitourinary: negative  Musculoskeletal: Leg swelling  Neurologic: Headaches  Psychiatric: negative  Hematologic/Lymphatic/Immunologic: negative  Endocrine: Hypothyroidism      Vital signs:  There were no vitals taken for this visit.    Current Outpatient Medications   Medication Sig Dispense Refill     aspirin (ASA) 81 MG chewable tablet Take 81 mg by mouth daily       cetirizine (ZYRTEC) 10 MG tablet Take 1 tablet (10 mg) by mouth every other day       cholecalciferol 25 MCG (1000 UT) TABS Take 2,000 Units by mouth every other day        docusate sodium (COLACE) 100 MG capsule Take 200 mg by mouth daily before breakfast       furosemide (LASIX) 20 MG tablet Take 1 tablet (20 mg) by mouth daily 45 tablet 0     loratadine (CLARITIN) 10 MG tablet Take 10 mg by mouth daily       mycophenolic acid (GENERIC EQUIVALENT) 360 MG EC tablet Take 1 tablet (360 mg) by mouth 2 times daily 60 tablet 11     ondansetron (ZOFRAN) 4 MG tablet Take 1 tablet (4 mg) by mouth every 8 hours as needed for nausea 30 tablet 0     Prucalopride Succinate (MOTEGRITY) 2 MG TABS Take 1 tablet (2 mg) by mouth daily 90 tablet 1     tacrolimus (ENVARSUS XR) 0.75 MG 24 hr tablet Take 2 tablets (1.5 mg) by mouth every morning  (before breakfast) Hold for dose change. 60 tablet 11     tacrolimus (ENVARSUS XR) 1 MG 24 hr tablet Take 2 tablets (2 mg) by mouth every morning (before breakfast) Total dose=6mg daily 60 tablet 11     tacrolimus (ENVARSUS XR) 4 MG 24 hr tablet Take 1 tablet (4 mg) by mouth every morning (before breakfast) Total dose=6mg daily 30 tablet 11       PHYSICAL EXAM:  General: Pleasant, NAD.   HEENT: Normocephalic, atraumatic, external ears and nose normal.   Respiratory: Normal respiratory effort.   Cardiovascular: Pulse is regular.   Musculoskeletal: Gait and station normal.  The joints of her fingers and toes without deformity.  There is no cyanosis of her nailbeds.   EXTREMITIES: Right extremity: No varicose veins or stasis changes.  Right calf is somewhat tight though only trace edema is present of ankle.  No skin changes or lipodermatosclerosis.    Left lower extremity: Small cluster of telangiectasias distal anteromedial left leg with a reticular vein on the proximal anteromedial left calf.  No significant varicose veins.  Left calf is slightly larger than right, somewhat tight/firm.  No stasis changes or lipodermatosclerosis  PULSES: R/L (3=normal pulse, 0=no palpable pulse) dorsalis pedis: 3/3; posterior tibial: 3/3.      Neurologic: Grossly normal  Psychiatric: Mood, affect, judgment and insight are normal     Venous Lower Extremity - Hemodynamic Study ([facility]) February 6, 2023    Anatomical Region Laterality Modality   -- -- Other     Narrative    Right: OUTFLOW PLETHYSMOGRAPHY:     Patent venous outflow.  PASSIVE REFILLING:     Severe venous insufficiency.  EXERCISE  PLETHYSMOGRAPHY:     Rapid post exercise refilling time.  CALF PUMP FUNCTION:     Normal ejection fraction.  CONTINUOUS WAVE   DOPPLER:     Signals are patent, spontaneous, phasic and augment normally where evaluated. (Absent spontaneous and phasic Posterior Tibial signals may be a normal variant).  Pulsatile signals are present.    Deep Venous Incompetence:    No deep venous   incompetence detected.     Left: OUTFLOW PLETHYSMOGRAPHY:     Patent venous outflow.  PASSIVE REFILLING:     Severe venous insufficiency.  EXERCISE  PLETHYSMOGRAPHY:     Rapid post exercise refilling time.  CALF PUMP FUNCTION:     Normal ejection fraction.  CONTINUOUS WAVE   DOPPLER:     Signals are patent, spontaneous, phasic and augment normally where evaluated. (Absent spontaneous and phasic Posterior Tibial signals may be a normal variant).  Pulsatile signals are present.   Deep Venous Incompetence:    No deep venous   incompetence detected.     Conclusions: No venous outflow obstruction bilaterally. Right- severely increased venous refilling. Normal calf pump function. Left- severely increased venous refilling. Normal calf pump function. Severe bilateral venous pulsatility.Bilateral multilevel   venous pulsatility may reflect elevated central venous pressure, right heart failure, tricuspid regurgitation, pulmonary hypertension, or other factors including normal variation.  No prior study for comparison.   Procedure Note    Joel Burns M.D. - 02/06/2023   Formatting of this note might be different from the original.   Right: OUTFLOW PLETHYSMOGRAPHY:     Patent venous outflow.  PASSIVE REFILLING:     Severe venous insufficiency.  EXERCISE  PLETHYSMOGRAPHY:     Rapid post exercise refilling time.  CALF PUMP FUNCTION:     Normal ejection fraction.  CONTINUOUS WAVE   DOPPLER:     Signals are patent, spontaneous, phasic and augment normally where evaluated. (Absent spontaneous and phasic Posterior Tibial signals may be a normal variant).  Pulsatile signals are present.   Deep Venous Incompetence:    No deep venous   incompetence detected.     Left: OUTFLOW PLETHYSMOGRAPHY:     Patent venous outflow.  PASSIVE REFILLING:     Severe venous insufficiency.  EXERCISE  PLETHYSMOGRAPHY:     Rapid post exercise refilling time.  CALF PUMP FUNCTION:     Normal ejection fraction.   CONTINUOUS WAVE   DOPPLER:     Signals are patent, spontaneous, phasic and augment normally where evaluated. (Absent spontaneous and phasic Posterior Tibial signals may be a normal variant).  Pulsatile signals are present.   Deep Venous Incompetence:    No deep venous   incompetence detected.     Conclusions: No venous outflow obstruction bilaterally. Right- severely increased venous refilling. Normal calf pump function. Left- severely increased venous refilling. Normal calf pump function. Severe bilateral venous pulsatility.Bilateral multilevel   venous pulsatility may reflect elevated central venous pressure, right heart failure, tricuspid regurgitation, pulmonary hypertension, or other factors including normal variation.  No prior study for comparison.   Images        ASSESSMENT:  Bilateral lower extremity pain with swelling without significant varicose veins.  Her hemodynamic study suggests increased venous filling in both lower extremities but no deep venous incompetence was detected.    We discussed lower extremity vein anatomy and how venous insufficiency can contribute to lower extremity edema.  We also discussed the fact that there are many, nonreflux related causes of lower extremity edema.  The fact that she has had this for many years and that her father has similar findings of his legs suggest there may be a genetic component.    A bilateral lower extremity venous competency study will be necessary to assess for significant, correctable superficial venous insufficiency.  We briefly discussed options for treating superficial venous insufficiency utilizing office-based endovenous ablation.  Risks of the vein thrombosis, bleeding, infection and nerve injury were discussed.    Clearly, given her pancreas and kidney transplant, treatment is considered much more high risk.  This was discussed frankly with the patient.  All noninvasive measures to control the edema must be addressed prior to addressing any  superficial insufficiency that might be demonstrated on ultrasound.  The patient voiced understanding of our discussion and her questions were answered.    PLAN:  Bilateral extremity venous competency study with video visit to discuss results     Yasir Wilks MD    Dictated using Dragon voice recognition software which may result in transcription errors            Again, thank you for allowing me to participate in the care of your patient.        Sincerely,        Yasir Wilks MD

## 2023-02-09 NOTE — TELEPHONE ENCOUNTER
"Spoke with patient regarding referral and schuedling with either  or  for: \"Eyelid puffiness/edema/TEDphotos, malcolm, referred by Dr. العلي\". Scheduled patient accordingly and sent appointment letter and map to confirmed address.-Per Patient   "

## 2023-02-13 ENCOUNTER — ANCILLARY PROCEDURE (OUTPATIENT)
Dept: ULTRASOUND IMAGING | Facility: CLINIC | Age: 38
End: 2023-02-13
Attending: SURGERY
Payer: COMMERCIAL

## 2023-02-13 DIAGNOSIS — I99.8 VASCULAR INSUFFICIENCY: ICD-10-CM

## 2023-02-13 PROCEDURE — 93970 EXTREMITY STUDY: CPT | Performed by: SURGERY

## 2023-02-16 ENCOUNTER — TELEPHONE (OUTPATIENT)
Dept: TRANSPLANT | Facility: CLINIC | Age: 38
End: 2023-02-16
Payer: COMMERCIAL

## 2023-02-16 NOTE — TELEPHONE ENCOUNTER
Patient Call: General  Route to LPN    Reason for call: called in regards of having a specific questions on a doctors appointment that patient has tomorrow. Patient would like to get those questions answered today. Call back number is [phone].     Call back needed? Yes    Return Call Needed  Same as documented in contacts section  When to return call?: Same day: Route High Priority

## 2023-02-16 NOTE — TELEPHONE ENCOUNTER
Issue     Following up with Vascular Surgeon 2/17/2023 reason  lower leg edema     She asked if she  is limited with surgery or procedures due to her transplant     Encouraged to discuss with  Vascular Surgeon

## 2023-02-17 ENCOUNTER — VIRTUAL VISIT (OUTPATIENT)
Dept: VASCULAR SURGERY | Facility: CLINIC | Age: 38
End: 2023-02-17
Attending: SURGERY
Payer: COMMERCIAL

## 2023-02-17 ENCOUNTER — TELEPHONE (OUTPATIENT)
Dept: WOUND CARE | Facility: CLINIC | Age: 38
End: 2023-02-17

## 2023-02-17 DIAGNOSIS — I87.2 VENOUS (PERIPHERAL) INSUFFICIENCY: Primary | ICD-10-CM

## 2023-02-17 PROCEDURE — 99213 OFFICE O/P EST LOW 20 MIN: CPT | Mod: VID | Performed by: SURGERY

## 2023-02-17 NOTE — LETTER
2/17/2023         RE: Marilyn Ortega  [address]  [facility] [address]        Dear Colleague,    Thank you for referring your patient, Marilyn Ortega, to the [facility] [place]. Please see a copy of my visit note below.    Marilyn is a 37 year old who is being evaluated via a billable video visit.      How would you like to obtain your AVS? MyChart  If the video visit is dropped, the invitation should be resent by: Text to cell phone: [phone]  Will anyone else be joining your video visit? No    Video-Visit Details    Type of service:  Video Visit    Video visit start time: 9:01 AM    Video visit end time: 9:15 AM    Originating Location (pt. Location): Home    Distant Location (provider location):  On-site    Platform used for Video Visit: BarEye     [facility] [place] Progress Note    Marilyn Ortega presents in follow-up of bilateral lower extremity pain and swelling.  Please see my consultation of 2/9/2023 for details.  She returned on 2/13/2023 for a bilateral lower extremity venous competency study, the results of which we will discuss on today's video visit.    The patient states that she awoke this morning with quite swollen ankles, even after sleeping last night.  She was quite emotional and somewhat tearful about this problem despite her best efforts at exercise and being active as well as wearing compression.    Physical Exam  General: Pleasant female in no acute distress.  Blood pressure 106/74, pulse 84  Extremities: Right extremity: No varicose veins or stasis changes.  Right calf is somewhat tight though only trace edema is present of ankle.  No skin changes or lipodermatosclerosis.     Left lower extremity: Small cluster of telangiectasias distal anteromedial left leg with a reticular vein on the proximal anteromedial left calf.  No significant varicose veins.  Left calf is slightly larger than right, somewhat tight/firm.  No  stasis changes or lipodermatosclerosis    Ultrasound:  Name:  Marilyn Ortega                                                     Patient ID: [id number]  Date: 2023                                                         : 1985  Sex: female                                                                 Examined by: HIWOT James RVT  Age:  37 year old                                                         Reading MD: OJ Wilks MD     INDICATION:  Bilateral leg swelling and pain L>R.      EXAM TYPE  BILATERAL LOWER EXTREMITY VENOUS DUPLEX FOR VENOUS INSUFFICIENCY  TECHNICAL SUMMARY     A duplex ultrasound study using color flow was performed, to evaluate the bilateral lower extremity veins for valvular incompetence with the patient in a steep reversed trendelenberg.      RIGHT:     The deep veins demonstrate phasic flow, compress and respond to augmentations.  There is no DVT.  The common femoral vein is incompetent and free of thrombus. The remaining deep veins are competent and free of thrombus.      The GSV demonstrates phasic flow, compresses and responds to augmentations from the saphenofemoral junction to the ankle with no evidence of reflux or thrombus. The great saphenous vein measures 5.1 mm at the saphenofemoral junction, 4.2 mm at the proximal thigh and 4.3 mm at the knee.      The AASV is not visualized.      The Giacomini vein is competent( 1.3 mm) communicating with the small saphenous vein at the knee level.      The SSV demonstrates phasic flow, compresses and responds to augmentations from the popliteal space to the ankle.  No reflux or thrombus is seen. The saphenopopliteal junction is competent (2.3 mm).      Perforators: there is no evidence of incompetent  veins at any level.      LEFT:     The deep veins demonstrate phasic flow, compress and respond to augmentations.  There is no  DVT.  The common femoral and proximal femoral veins ae incompetent and  free of thrombus. The remaining deep veins are competent and free of thrombus.      The GSV demonstrates phasic flow, compresses and responds to augmentations from the saphenofemoral junction to the knee with no evidence of thrombus. The GSV is too small to assess flow in the calf, but is compressible and appears patent. The great saphenous vein measures 5.4 mm at the saphenofemoral junction, 4.1 mm at the proximal thigh and 4.0 mm at the knee.  The GSV is incompetent from Proximal Thigh to Mid Thigh, with the greatest reflux time of 3052 milliseconds.       The AASV is competent( 2.3 mm) draining into the saphenofemoral junction.      The Giacomini vein is incompetent ( 1.7 mm) communicating with the small saphenous vein at the knee level. The Giacomini vein is incompetent from the distal thigh to proximal thigh with a reflux time of 4058 milliseconds.      The SSV demonstrates phasic flow, compresses and responds to augmentations from the popliteal space to the ankle.  No reflux or thrombus is seen. The saphenopopliteal junction is competent ( 2.8 mm).       Perforators: there is no evidence of incompetent  veins at any level.      FINAL SUMMARY:  1.  No evidence of deep or superficial vein thrombosis in either lower extremity  2.  Right common femoral vein incompetence  3.  Left common femoral and proximal femoral vein incompetence  4.  Left proximal and mid thigh great saphenous vein incompetence  5.  Left Vein of Giacomini incompetence                Incompetence Criteria: Greater than 500 milliseconds reflux in the superficial and  veins and greater than than 1000 milliseconds reflux in the deep veins.      OJ Wilks MD, FACS    Assessment:  The pain and swelling of her lower extremities is not on the basis of significant venous insufficiency.  She has bilateral common femoral vein incompetence and left proximal femoral vein incompetence, otherwise her deep veins are competent.   This is not of significance and is not contributing to swelling.    Superficial veins of her right lower extremity are completely normal.    She has proximal and mid thigh left great saphenous vein incompetence but the vein is normal size and has no varicosities coursing from it.  The remainder of her left great saphenous vein is competent and very small.  This is not clinically significant.    Her left Vein of Giacomini is incompetent but measures 1.7 mm in diameter maximally with no varicosities noted coursing from it.  This is not clinically significant.    I had a nancy discussion with her regarding the the findings of her ultrasound utilizing a leg vein drawing.  No treatment of these minimal findings is indicated as it would be of no clinical benefit.    We discussed nonsurgical management options utilizing possible micro purified flavonoid fraction, edema wear and lymphedema therapy with manual lymph drainage/massage.  I will ask one of our vascular medicine specialist to see her for a more detailed discussion and to direct her care in this regard.    The patient is quite emotional and felt somewhat hopeless following our discussion.  I tried to explain that the above measures could be of clinical benefit in helping her with her pain and swelling.    Plan:  Vascular medicine consultation with lymphedema therapy consultation for manual lymph drainage/massage and other noninvasive measures of controlling her swelling and pain.    Yasir Wilks MD    Dictated using Dragon voice recognition software which may result in transcription errors            Again, thank you for allowing me to participate in the care of your patient.        Sincerely,        Yasir Wilks MD

## 2023-02-17 NOTE — PROGRESS NOTES
Marilyn is a 37 year old who is being evaluated via a billable video visit.      How would you like to obtain your AVS? MyChart  If the video visit is dropped, the invitation should be resent by: Text to cell phone: [phone]  Will anyone else be joining your video visit? No    Video-Visit Details    Type of service:  Video Visit    Video visit start time: 9:01 AM    Video visit end time: 9:15 AM    Originating Location (pt. Location): Home    Distant Location (provider location):  On-site    Platform used for Video Visit: [facility]     [facility] [place] Progress Note    Marilyn Ortega presents in follow-up of bilateral lower extremity pain and swelling.  Please see my consultation of 2023 for details.  She returned on 2023 for a bilateral lower extremity venous competency study, the results of which we will discuss on today's video visit.    The patient states that she awoke this morning with quite swollen ankles, even after sleeping last night.  She was quite emotional and somewhat tearful about this problem despite her best efforts at exercise and being active as well as wearing compression.    Physical Exam  General: Pleasant female in no acute distress.  Blood pressure 106/74, pulse 84  Extremities: Right extremity: No varicose veins or stasis changes.  Right calf is somewhat tight though only trace edema is present of ankle.  No skin changes or lipodermatosclerosis.     Left lower extremity: Small cluster of telangiectasias distal anteromedial left leg with a reticular vein on the proximal anteromedial left calf.  No significant varicose veins.  Left calf is slightly larger than right, somewhat tight/firm.  No stasis changes or lipodermatosclerosis    Ultrasound:  Name:  aMrilyn Ortega                                                     Patient ID: [id number]  Date: 2023                                                         : 1985  Sex:  female                                                                 Examined by: HIWOT James RVT  Age:  37 year old                                                         Reading MD: OJ Wilks MD     INDICATION:  Bilateral leg swelling and pain L>R.      EXAM TYPE  BILATERAL LOWER EXTREMITY VENOUS DUPLEX FOR VENOUS INSUFFICIENCY  TECHNICAL SUMMARY     A duplex ultrasound study using color flow was performed, to evaluate the bilateral lower extremity veins for valvular incompetence with the patient in a steep reversed trendelenberg.      RIGHT:     The deep veins demonstrate phasic flow, compress and respond to augmentations.  There is no DVT.  The common femoral vein is incompetent and free of thrombus. The remaining deep veins are competent and free of thrombus.      The GSV demonstrates phasic flow, compresses and responds to augmentations from the saphenofemoral junction to the ankle with no evidence of reflux or thrombus. The great saphenous vein measures 5.1 mm at the saphenofemoral junction, 4.2 mm at the proximal thigh and 4.3 mm at the knee.      The AASV is not visualized.      The Giacomini vein is competent( 1.3 mm) communicating with the small saphenous vein at the knee level.      The SSV demonstrates phasic flow, compresses and responds to augmentations from the popliteal space to the ankle.  No reflux or thrombus is seen. The saphenopopliteal junction is competent (2.3 mm).      Perforators: there is no evidence of incompetent  veins at any level.      LEFT:     The deep veins demonstrate phasic flow, compress and respond to augmentations.  There is no  DVT.  The common femoral and proximal femoral veins ae incompetent and free of thrombus. The remaining deep veins are competent and free of thrombus.      The GSV demonstrates phasic flow, compresses and responds to augmentations from the saphenofemoral junction to the knee with no evidence of thrombus. The GSV is too small to  assess flow in the calf, but is compressible and appears patent. The great saphenous vein measures 5.4 mm at the saphenofemoral junction, 4.1 mm at the proximal thigh and 4.0 mm at the knee.  The GSV is incompetent from Proximal Thigh to Mid Thigh, with the greatest reflux time of 3052 milliseconds.       The AASV is competent( 2.3 mm) draining into the saphenofemoral junction.      The Giacomini vein is incompetent ( 1.7 mm) communicating with the small saphenous vein at the knee level. The Giacomini vein is incompetent from the distal thigh to proximal thigh with a reflux time of 4058 milliseconds.      The SSV demonstrates phasic flow, compresses and responds to augmentations from the popliteal space to the ankle.  No reflux or thrombus is seen. The saphenopopliteal junction is competent ( 2.8 mm).       Perforators: there is no evidence of incompetent  veins at any level.      FINAL SUMMARY:  1.  No evidence of deep or superficial vein thrombosis in either lower extremity  2.  Right common femoral vein incompetence  3.  Left common femoral and proximal femoral vein incompetence  4.  Left proximal and mid thigh great saphenous vein incompetence  5.  Left Vein of Giacomini incompetence                Incompetence Criteria: Greater than 500 milliseconds reflux in the superficial and  veins and greater than than 1000 milliseconds reflux in the deep veins.      OJ Wilks MD, FACS    Assessment:  The pain and swelling of her lower extremities is not on the basis of significant venous insufficiency.  She has bilateral common femoral vein incompetence and left proximal femoral vein incompetence, otherwise her deep veins are competent.  This is not of significance and is not contributing to swelling.    Superficial veins of her right lower extremity are completely normal.    She has proximal and mid thigh left great saphenous vein incompetence but the vein is normal size and has no  varicosities coursing from it.  The remainder of her left great saphenous vein is competent and very small.  This is not clinically significant.    Her left Vein of Giacomini is incompetent but measures 1.7 mm in diameter maximally with no varicosities noted coursing from it.  This is not clinically significant.    I had a nancy discussion with her regarding the the findings of her ultrasound utilizing a leg vein drawing.  No treatment of these minimal findings is indicated as it would be of no clinical benefit.    We discussed nonsurgical management options utilizing possible micro purified flavonoid fraction, edema wear and lymphedema therapy with manual lymph drainage/massage.  I will ask one of our vascular medicine specialist to see her for a more detailed discussion and to direct her care in this regard.    The patient is quite emotional and felt somewhat hopeless following our discussion.  I tried to explain that the above measures could be of clinical benefit in helping her with her pain and swelling.    Plan:  Vascular medicine consultation with lymphedema therapy consultation for manual lymph drainage/massage and other noninvasive measures of controlling her swelling and pain.    Yasir Wilks MD    Dictated using Dragon voice recognition software which may result in transcription errors

## 2023-02-17 NOTE — TELEPHONE ENCOUNTER
Call placed to patient to discuss leg issues. The patient states she does not have wounds. She states Dr. Wilks discussed with Dr. Plunkett to see patient for leg concerns. Clinic  contacted to determine if patient can be seen at [facility] with the absence of wounds and will assess.

## 2023-02-20 ENCOUNTER — TELEPHONE (OUTPATIENT)
Dept: TRANSPLANT | Facility: CLINIC | Age: 38
End: 2023-02-20

## 2023-02-20 ENCOUNTER — TELEPHONE (OUTPATIENT)
Dept: OTHER | Facility: CLINIC | Age: 38
End: 2023-02-20

## 2023-02-20 ENCOUNTER — LAB (OUTPATIENT)
Dept: LAB | Facility: CLINIC | Age: 38
End: 2023-02-20
Payer: COMMERCIAL

## 2023-02-20 DIAGNOSIS — Z79.899 ENCOUNTER FOR LONG-TERM CURRENT USE OF MEDICATION: ICD-10-CM

## 2023-02-20 DIAGNOSIS — Z94.0 KIDNEY REPLACED BY TRANSPLANT: ICD-10-CM

## 2023-02-20 DIAGNOSIS — Z94.83 PANCREAS REPLACED BY TRANSPLANT (H): ICD-10-CM

## 2023-02-20 DIAGNOSIS — Z48.298 AFTERCARE FOLLOWING ORGAN TRANSPLANT: ICD-10-CM

## 2023-02-20 LAB
AMYLASE SERPL-CCNC: 45 U/L (ref 28–100)
ANION GAP SERPL CALCULATED.3IONS-SCNC: 9 MMOL/L (ref 7–15)
BUN SERPL-MCNC: 19.3 MG/DL (ref 6–20)
CALCIUM SERPL-MCNC: 9.4 MG/DL (ref 8.6–10)
CHLORIDE SERPL-SCNC: 104 MMOL/L (ref 98–107)
CREAT SERPL-MCNC: 0.98 MG/DL (ref 0.51–0.95)
DEPRECATED HCO3 PLAS-SCNC: 25 MMOL/L (ref 22–29)
ERYTHROCYTE [DISTWIDTH] IN BLOOD BY AUTOMATED COUNT: 13.3 % (ref 10–15)
GFR SERPL CREATININE-BSD FRML MDRD: 76 ML/MIN/1.73M2
GLUCOSE SERPL-MCNC: 91 MG/DL (ref 70–99)
HCT VFR BLD AUTO: 31.8 % (ref 35–47)
HGB BLD-MCNC: 10.1 G/DL (ref 11.7–15.7)
LIPASE SERPL-CCNC: 22 U/L (ref 13–60)
MCH RBC QN AUTO: 27.9 PG (ref 26.5–33)
MCHC RBC AUTO-ENTMCNC: 31.8 G/DL (ref 31.5–36.5)
MCV RBC AUTO: 88 FL (ref 78–100)
PLATELET # BLD AUTO: 218 10E3/UL (ref 150–450)
POTASSIUM SERPL-SCNC: 4.3 MMOL/L (ref 3.4–5.3)
RBC # BLD AUTO: 3.62 10E6/UL (ref 3.8–5.2)
SODIUM SERPL-SCNC: 138 MMOL/L (ref 136–145)
TACROLIMUS BLD-MCNC: 7.1 UG/L (ref 5–15)
TME LAST DOSE: NORMAL H
TME LAST DOSE: NORMAL H
WBC # BLD AUTO: 3.1 10E3/UL (ref 4–11)

## 2023-02-20 PROCEDURE — 80048 BASIC METABOLIC PNL TOTAL CA: CPT

## 2023-02-20 PROCEDURE — 36415 COLL VENOUS BLD VENIPUNCTURE: CPT

## 2023-02-20 PROCEDURE — 83690 ASSAY OF LIPASE: CPT

## 2023-02-20 PROCEDURE — 87799 DETECT AGENT NOS DNA QUANT: CPT

## 2023-02-20 PROCEDURE — 85027 COMPLETE CBC AUTOMATED: CPT

## 2023-02-20 PROCEDURE — 82150 ASSAY OF AMYLASE: CPT

## 2023-02-20 PROCEDURE — 80197 ASSAY OF TACROLIMUS: CPT

## 2023-02-20 NOTE — TELEPHONE ENCOUNTER
Dr. Wilks is referring patient for leg swelling.  Patient also has facial swelling.  Patient has history of pancreas and kidney transplant.  All records should be in Care Everywhere from the [facility].  Patient was seen at [place] for 1 scan.  I am routing to the nurse to review, but patient is scheduled with Dr. Cage on 03/22/23.

## 2023-02-20 NOTE — LETTER
5/4/2022         RE: Marilyn Ortega  [address]  [place] [address]        Dear Colleague,    Thank you for referring your patient, Marilyn Ortega, to the [facility]. Please see a copy of my visit note below.    Infusion Nursing Note:  Marilyn Ortega presents today for solu-medrol.    Patient seen by provider today: No   present during visit today: Not Applicable.    Note: dose 1/3.    Intravenous Access:  Labs drawn without difficulty.  Peripheral IV placed by vascular access    Treatment Conditions:  Glucose WNL    Post Infusion Assessment:  Patient tolerated infusion without incident.  Blood return noted pre and post infusion.  Site patent and intact, free from redness, edema or discomfort.  No evidence of extravasations.  Access discontinued per protocol.     Discharge Plan:   Discharge instructions reviewed with: Patient.  Patient and/or family verbalized understanding of discharge instructions and all questions answered.  AVS to patient via Tier 3HART.  Patient will return tomorrow for next appointment.   Patient discharged in stable condition accompanied by: self.  Departure Mode: Ambulatory.    Administrations This Visit     methylPREDNISolone sodium succinate (solu-MEDROL) 500 mg in sodium chloride 0.9 % 100 mL intermittent infusion     Admin Date  05/04/2022 Action  New Bag Dose  500 mg Rate  228 mL/hr Route  Intravenous Administered By  Carolyn Ramirez, RN                Carolyn Ramirez, EDWARD                        Again, thank you for allowing me to participate in the care of your patient.        Sincerely,        [facility]     clear bilaterally.  Pupils equal, round, and reactive to light.

## 2023-02-20 NOTE — TELEPHONE ENCOUNTER
Marilyn calling to discuss Tacrolimus dose change and talk about some appointments needing to be scheduled    OUTCOME:   Called Marilyn back at 0930:  She states she is planning to decrease her Tacrolimus dose to 5mg daily because her level was 7.1 (goal 5-8 per Dr. Stan Villalta note 10/12/22) then the phone call cut off.   I called her back at 0936: no answer

## 2023-02-21 LAB
BKV DNA # SPEC NAA+PROBE: <500 COPIES/ML
BKV DNA SPEC NAA+PROBE-LOG#: <2.7 {LOG_COPIES}/ML

## 2023-02-28 ENCOUNTER — TELEPHONE (OUTPATIENT)
Dept: TRANSPLANT | Facility: CLINIC | Age: 38
End: 2023-02-28
Payer: COMMERCIAL

## 2023-03-01 NOTE — TELEPHONE ENCOUNTER
Dicussed referral with Dr. Cage.  Appropriate for Vascular Medicine.  Lisandra Blanca RN BSN  [facility]  [phone]

## 2023-03-03 ENCOUNTER — TELEPHONE (OUTPATIENT)
Dept: GASTROENTEROLOGY | Facility: CLINIC | Age: 38
End: 2023-03-03
Payer: COMMERCIAL

## 2023-03-03 NOTE — TELEPHONE ENCOUNTER
[facility] Call Center    Phone Message    May a detailed message be left on voicemail: yes     Reason for Call: Medication Refill Request    Has the patient contacted the pharmacy for the refill? Yes   Name of medication being requested: Prucalopride Succinate (MOTEGRITY) 2 MG TABS  Provider who prescribed the medication: Dr. Bocanegra  Pharmacy: [facility] [address] [address]  Date medication is needed: ASAP   Pt is also wanting to know who she can follow up with in the future as Dr. Bocanegra is no longer with the clinic. Please advise. Thank you!      Action Taken: Message routed to:  [facility] GI    Travel Screening: Not Applicable

## 2023-03-04 DIAGNOSIS — K59.09 CHRONIC CONSTIPATION: ICD-10-CM

## 2023-03-06 ENCOUNTER — LAB (OUTPATIENT)
Dept: LAB | Facility: CLINIC | Age: 38
End: 2023-03-06
Payer: COMMERCIAL

## 2023-03-06 ENCOUNTER — OFFICE VISIT (OUTPATIENT)
Dept: OPHTHALMOLOGY | Facility: CLINIC | Age: 38
End: 2023-03-06
Attending: OPHTHALMOLOGY
Payer: COMMERCIAL

## 2023-03-06 DIAGNOSIS — H02.849 EDEMA OF EYELID, UNSPECIFIED LATERALITY: ICD-10-CM

## 2023-03-06 DIAGNOSIS — Z94.0 KIDNEY REPLACED BY TRANSPLANT: ICD-10-CM

## 2023-03-06 DIAGNOSIS — Z79.899 ENCOUNTER FOR LONG-TERM CURRENT USE OF MEDICATION: ICD-10-CM

## 2023-03-06 DIAGNOSIS — Z48.298 AFTERCARE FOLLOWING ORGAN TRANSPLANT: ICD-10-CM

## 2023-03-06 DIAGNOSIS — H02.849 EDEMA OF EYELID, UNSPECIFIED LATERALITY: Primary | ICD-10-CM

## 2023-03-06 DIAGNOSIS — Z94.83 PANCREAS REPLACED BY TRANSPLANT (H): ICD-10-CM

## 2023-03-06 LAB
AMYLASE SERPL-CCNC: 63 U/L (ref 28–100)
ANION GAP SERPL CALCULATED.3IONS-SCNC: 10 MMOL/L (ref 7–15)
BUN SERPL-MCNC: 21.8 MG/DL (ref 6–20)
CALCIUM SERPL-MCNC: 9.8 MG/DL (ref 8.6–10)
CHLORIDE SERPL-SCNC: 104 MMOL/L (ref 98–107)
CREAT SERPL-MCNC: 0.96 MG/DL (ref 0.51–0.95)
DEPRECATED HCO3 PLAS-SCNC: 24 MMOL/L (ref 22–29)
ERYTHROCYTE [DISTWIDTH] IN BLOOD BY AUTOMATED COUNT: 13.1 % (ref 10–15)
GFR SERPL CREATININE-BSD FRML MDRD: 78 ML/MIN/1.73M2
GLUCOSE SERPL-MCNC: 92 MG/DL (ref 70–99)
HCT VFR BLD AUTO: 32.9 % (ref 35–47)
HGB BLD-MCNC: 10.5 G/DL (ref 11.7–15.7)
LIPASE SERPL-CCNC: 34 U/L (ref 13–60)
MAGNESIUM SERPL-MCNC: 1.8 MG/DL (ref 1.7–2.3)
MCH RBC QN AUTO: 28.5 PG (ref 26.5–33)
MCHC RBC AUTO-ENTMCNC: 31.9 G/DL (ref 31.5–36.5)
MCV RBC AUTO: 89 FL (ref 78–100)
MYCOPHENOLATE SERPL LC/MS/MS-MCNC: 2.83 MG/L (ref 1–3.5)
MYCOPHENOLATE SERPL LC/MS/MS-MCNC: 29.22 MG/L (ref 1–3.5)
MYCOPHENOLATE SERPL LC/MS/MS-MCNC: 3.19 MG/L (ref 1–3.5)
MYCOPHENOLATE SERPL LC/MS/MS-MCNC: 4.16 MG/L (ref 1–3.5)
MYCOPHENOLATE-G SERPL LC/MS/MS-MCNC: 36.1 MG/L (ref 30–95)
MYCOPHENOLATE-G SERPL LC/MS/MS-MCNC: 42.3 MG/L (ref 30–95)
MYCOPHENOLATE-G SERPL LC/MS/MS-MCNC: 47.3 MG/L (ref 30–95)
MYCOPHENOLATE-G SERPL LC/MS/MS-MCNC: 65.1 MG/L (ref 30–95)
PHOSPHATE SERPL-MCNC: 2.6 MG/DL (ref 2.5–4.5)
PLATELET # BLD AUTO: 270 10E3/UL (ref 150–450)
POTASSIUM SERPL-SCNC: 4.1 MMOL/L (ref 3.4–5.3)
RBC # BLD AUTO: 3.68 10E6/UL (ref 3.8–5.2)
SODIUM SERPL-SCNC: 138 MMOL/L (ref 136–145)
TACROLIMUS BLD-MCNC: 5.2 UG/L (ref 5–15)
TME LAST DOSE: ABNORMAL H
TME LAST DOSE: NORMAL H
WBC # BLD AUTO: 4.9 10E3/UL (ref 4–11)

## 2023-03-06 PROCEDURE — 36415 COLL VENOUS BLD VENIPUNCTURE: CPT | Performed by: PATHOLOGY

## 2023-03-06 PROCEDURE — 82150 ASSAY OF AMYLASE: CPT | Performed by: PATHOLOGY

## 2023-03-06 PROCEDURE — 84100 ASSAY OF PHOSPHORUS: CPT | Performed by: PATHOLOGY

## 2023-03-06 PROCEDURE — 86161 COMPLEMENT/FUNCTION ACTIVITY: CPT

## 2023-03-06 PROCEDURE — 86160 COMPLEMENT ANTIGEN: CPT

## 2023-03-06 PROCEDURE — 99213 OFFICE O/P EST LOW 20 MIN: CPT | Mod: GC | Performed by: OPHTHALMOLOGY

## 2023-03-06 PROCEDURE — 80197 ASSAY OF TACROLIMUS: CPT | Mod: 90 | Performed by: PATHOLOGY

## 2023-03-06 PROCEDURE — 80180 DRUG SCRN QUAN MYCOPHENOLATE: CPT | Mod: 90 | Performed by: PATHOLOGY

## 2023-03-06 PROCEDURE — 84445 ASSAY OF TSI GLOBULIN: CPT

## 2023-03-06 PROCEDURE — 87799 DETECT AGENT NOS DNA QUANT: CPT | Mod: 90 | Performed by: PATHOLOGY

## 2023-03-06 PROCEDURE — 83735 ASSAY OF MAGNESIUM: CPT | Performed by: PATHOLOGY

## 2023-03-06 PROCEDURE — 36415 COLL VENOUS BLD VENIPUNCTURE: CPT

## 2023-03-06 PROCEDURE — 85027 COMPLETE CBC AUTOMATED: CPT | Performed by: PATHOLOGY

## 2023-03-06 PROCEDURE — 99000 SPECIMEN HANDLING OFFICE-LAB: CPT | Performed by: PATHOLOGY

## 2023-03-06 PROCEDURE — 83690 ASSAY OF LIPASE: CPT | Performed by: PATHOLOGY

## 2023-03-06 PROCEDURE — G0463 HOSPITAL OUTPT CLINIC VISIT: HCPCS

## 2023-03-06 PROCEDURE — 80048 BASIC METABOLIC PNL TOTAL CA: CPT | Performed by: PATHOLOGY

## 2023-03-06 ASSESSMENT — VISUAL ACUITY
OS_SC+: -2
OD_SC: 20/20
METHOD: SNELLEN - LINEAR
OS_SC: 20/20

## 2023-03-06 ASSESSMENT — LAGOPHTHALMOS
OD_LAGOPHTHALMOS: 0
OS_LAGOPHTHALMOS: 0

## 2023-03-06 ASSESSMENT — EXTERNAL EXAM - RIGHT EYE: OD_EXAM: NORMAL

## 2023-03-06 ASSESSMENT — EXTERNAL EXAM - LEFT EYE: OS_EXAM: NORMAL

## 2023-03-06 ASSESSMENT — SLIT LAMP EXAM - LIDS
COMMENTS: MILD MGD
COMMENTS: MILD MGD

## 2023-03-06 ASSESSMENT — TONOMETRY
OS_IOP_MMHG: 20
IOP_METHOD: ICARE
OD_IOP_MMHG: 19

## 2023-03-06 ASSESSMENT — MARGIN REFLEX DISTANCE
OS_MRD1: 5
OD_MRD1: 5

## 2023-03-06 NOTE — PROGRESS NOTES
"     Chief Complaint(s) and History of Present Illness(es)     Swelling Around Both Eyes            Laterality: right upper lid, right lower lid, left upper lid and left   lower lid          Comments    Here for eyelid swelling. Wakes up with very swollen eyelids. Feels   discomfort, dryness and has excessive tearing. Tried diet changes,   massages, and had allergy testing. Swelling even affects her visual field.   Feels like problem is worsening since medications for her pancreas   transplant medications.  Impacts her life, feels self conscious about it, and also uncomfortable.   Thought about CHI, had TSH labs, which were normal.  Legs also swell, thought to be due to kidney malfunction, but legs are   still swelling even though kidneys function are normal. Diagnosed with   bilateral lower extremity swelling with venous insufficiency      Patient here for evaluation of eyelid puffiness, irritation, fullness of both eyes. She states her face as a whole looks very different than it used to - has more swelling, bags. All started about 9mo ago. Had cataract surgery 11/2021. 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 Notices redness on the whites of her eyes - uses a lot of drops and compresses. The redness goes away but the feeling of it all does not resolve. No diplopia. She states she \"went blind\" in 01/2022 and was found to have macular edema and has been receiving avastin for CRVO vs venous stasis in both eyes. No eye wandering or crossing. No sharp pain in her eyes, or bulging. Patient is concerned about CHI. She also gets significant swelling of edema in her legs as well. The swelling improves throughout the day.     TSH   Date Value Ref Range Status   02/07/2023 1.75 0.30 - 4.20 uIU/mL Final   04/18/2022 2.37 0.40 - 4.00 mU/L Final   10/16/2012 3.30 0.4 - 5.0 mU/L Final       Assessment & Plan     Marilyn Ortega is a 37 year old female with the following diagnoses:   Encounter Diagnosis "   Name Primary?     Edema of eyelid, unspecified laterality Yes      Patient with hx of diabetes, s/p renal and pancreas transplant 11/2021  Noting ~9 months of facial and periorbital edema  Normal TSH, no TSI checked. KAVITA 0 today  Images provided of swelling appears consistent with dependent edema    Agree with above. Likely multifactorial facial edema (meds, renal transplant...), in additional lower extremity edema. I will obtain labs to rule out thyroid eye disease and angioedema syndromes, though my suspicion for these is low.   C1 esterase inhibitor functional and total  C3, C4  TSI  We discussed management options including sleeping elevated, ice eye masks which would cool and apply gentle pressure. She is already doing many of these. Additionally we did discuss this is really impacting her life - she fortunately has a very strong social support and mental health care.     If labs reveal an etiology I will let her know and we can go from there, otherwise f/u as needed.   Patient disposition:   No follow-ups on file.       Tasha Gautam MD  Resident Physician, PGY-2  Department of Ophthalmology       Attending Physician Attestation: Complete documentation of historical and exam elements from today's encounter can be found in the full encounter summary report (not reduplicated in this progress note). I personally obtained the chief complaint(s) and history of present illness. I confirmed and edited as necessary the review of systems, past medical/surgical history, family history, social history, and examination findings as documented by others; and I examined the patient myself. I personally reviewed the relevant tests, images, and reports as documented above. I formulated and edited as necessary the assessment and plan and discussed the findings and management plan with the patient.  -Maribel Hood MD

## 2023-03-06 NOTE — TELEPHONE ENCOUNTER
[facility] Call Center    Phone Message    May a detailed message be left on voicemail: yes     Reason for Call: Other: Pt is calling in once more in regards to her medication, as she is going to be going out of town and needs this medication before then. Please call back as soon as possible to discuss the status of this request.     Action Taken: Message routed to:  [facility] GI    Travel Screening: Not Applicable

## 2023-03-06 NOTE — NURSING NOTE
Chief Complaint(s) and History of Present Illness(es)     Swelling Around Both Eyes            Laterality: right upper lid, right lower lid, left upper lid and left lower lid          Comments    Here for eyelid swelling. Wakes up with very swollen eyelids. Feels discomfort, dryness and has excessive tearing. Tried diet changes, massages, and had allergy testing. Swelling even affects her visual field. Feels like problem is worsening since medications for her pancreas transplant medications.  Impacts her life, feels self conscious about it, and also uncomfortable.   Thought about CHI, had TSH labs, which were normal.  Legs also swell, thought to be due to kidney malfunction, but legs are still swelling even though kidneys function are normal. 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  - was followed at  then at [facility]  - moderate ischemia OU on FA 6/2022 at [facility]    - repeat FA at     # Venous stasis vs CRVO OU  - initial FA at [facility] 2/22/22 suggestive of stasis vs CRVO OU  - would be unusual to get simultaneous CRVO OU  - has Factor V Leiden trait (heterozygous) atypical to cause RVO  - Dr Marin contacted transplant coordinator 2/2022 to assess CBC and PT/PTT & coagulopathy  - had MRI at  (2/2022)  - started Eloquis approx 3/2022 - 5/2022  - per patient saw hematology at [facility] stopped anticoag   - has had chronic anemia, risk factor for RVO    - FA 6/30/22 at [facility] showed significant improvement  - repeat FA at     # DME OD  - initially noted 2/2022  - given Avastin at [facility] x 3    - last injection Avastin (#3) at [facility] 4/19/22  - OCT today minimal edema  - observe    # DME OS  - noted 2/2022  - started Avastin 2/22/22 at [facility]    - last injection Avastin (#5) 12/6/22 (9 weeks)  - OCT DME significant  - [facility] good  - inject Avastin today   - RTC 4 weeks    - check Eylea coverage  (2/8/23)  - may be exacerbated by prior venous stasis/RVO    # ONH drusen OU  - disks full, hyperFAF    # facial edema/puffiness  - per patient developed after Txp and worsening  - no proptosis, no scleral show, normal retropulsion on exam  - no strabismus    - will refer to oculoplastics  - patient also concerned about possibility of CHI  - will assess with oculoplastics    # KULDEEP  - eye fatigue and light sensitivity  - suspect KULDEEP contributing to symptoms  - refer to LOY Franco at  for eval

## 2023-03-06 NOTE — LETTER
" 3/6/2023         RE:  :  MRN: Marilyn Ortega  1985  [id number]     Dear Dr. العلي,    Thank you for asking me to see your patient, Marilyn Ortega, for an oculoplastic   consultation.  My assessment and plan are below.  For further details, please see my attached clinic note.      Chief Complaint(s) and History of Present Illness(es)     Swelling Around Both Eyes            Laterality: right upper lid, right lower lid, left upper lid and left   lower lid          Comments    Here for eyelid swelling. Wakes up with very swollen eyelids. Feels   discomfort, dryness and has excessive tearing. Tried diet changes,   massages, and had allergy testing. Swelling even affects her visual field.   Feels like problem is worsening since medications for her pancreas   transplant medications.  Impacts her life, feels self conscious about it, and also uncomfortable.   Thought about CHI, had TSH labs, which were normal.  Legs also swell, thought to be due to kidney malfunction, but legs are   still swelling even though kidneys function are normal. Diagnosed with   bilateral lower extremity swelling with venous insufficiency      Patient here for evaluation of eyelid puffiness, irritation, fullness of both eyes. She states her face as a whole looks very different than it used to - has more swelling, bags. All started about 9mo ago. Had cataract surgery 2021. Had renal and pancreas transplant due to T1DM sequelae 2021. Notices redness on the whites of her eyes - uses a lot of drops and compresses. The redness goes away but the feeling of it all does not resolve. No diplopia. She states she \"went blind\" in 2022 and was found to have macular edema and has been receiving avastin for CRVO vs venous stasis in both eyes. No eye wandering or crossing. No sharp pain in her eyes, or bulging. Patient is concerned about CHI. She also gets significant swelling of edema in her legs as well. The swelling improves " throughout the day.     TSH   Date Value Ref Range Status   02/07/2023 1.75 0.30 - 4.20 uIU/mL Final   04/18/2022 2.37 0.40 - 4.00 mU/L Final   10/16/2012 3.30 0.4 - 5.0 mU/L Final       Assessment & Plan     Marilyn Ortega is a 37 year old female with the following diagnoses:   Encounter Diagnosis   Name Primary?     Edema of eyelid, unspecified laterality Yes      Patient with hx of diabetes, s/p renal and pancreas transplant 11/2021  Noting ~9 months of facial and periorbital edema  Normal TSH, no TSI checked. KAVITA 0 today  Images provided of swelling appears consistent with dependent edema    Agree with above. Likely multifactorial facial edema (meds, renal transplant...), in additional lower extremity edema. I will obtain labs to rule out thyroid eye disease and angioedema syndromes, though my suspicion for these is low.   C1 esterase inhibitor functional and total  C3, C4  TSI  We discussed management options including sleeping elevated, ice eye masks which would cool and apply gentle pressure. She is already doing many of these. Additionally we did discuss this is really impacting her life - she fortunately has a very strong social support and mental health care.     If labs reveal an etiology I will let her know and we can go from there, otherwise f/u as needed.   Patient disposition:   No follow-ups on file.       Tasha Gautam MD  Resident Physician, PGY-2  Department of Ophthalmology        Again, thank you for allowing me to participate in the care of your patient.      Sincerely,    Maribel Hood MD  Department of Ophthalmology and Visual Neurosciences  [facility]    CC: Sujata العلي MD  [address] [place] [id number]  Via In Basket

## 2023-03-06 NOTE — TELEPHONE ENCOUNTER
[facility] Call Center    Phone Message    May a detailed message be left on voicemail: yes     Reason for Call: Other: Patient called for status of refill, as she has been out of medication since 3/4/23 and pharmacy has been trying to get a hold of clinic for 2 weeks, per patient.  Please follow up with patient.     Action Taken: Message routed to:  [facility]    Travel Screening: Not Applicable

## 2023-03-07 LAB
BKV DNA # SPEC NAA+PROBE: <500 COPIES/ML
BKV DNA SPEC NAA+PROBE-LOG#: <2.7 {LOG_COPIES}/ML
C1INH SERPL-MCNC: 28 MG/DL
C3 SERPL-MCNC: 85 MG/DL (ref 81–157)
C4 SERPL-MCNC: 17 MG/DL (ref 13–39)
MYCOPHENOLATE SERPL LC/MS/MS-MCNC: 5.41 MG/L (ref 1–3.5)
MYCOPHENOLATE-G SERPL LC/MS/MS-MCNC: 54.1 MG/L (ref 30–95)
TME LAST DOSE: ABNORMAL H
TME LAST DOSE: ABNORMAL H

## 2023-03-07 RX ORDER — PRUCALOPRIDE 2 MG/1
TABLET, FILM COATED ORAL
Qty: 90 TABLET | Refills: 0 | Status: SHIPPED | OUTPATIENT
Start: 2023-03-07 | End: 2023-04-05

## 2023-03-07 NOTE — TELEPHONE ENCOUNTER
"Spoke with Marilyn, pt called been waiting for a return call. She states she has made a few attempts to reach someone in the GI to discuss her Motegrity medication. She states. I am \"pretty frustrated with lack of communication.\" she also states no one told her Dr. Lowery was taking over for Dr. Putnam only that Dr. Lowery was taking over for her Pelvic Floor issue. She did not feel that she wanted to pursue the Pelvic Floor so she cancelled her appointment.    She states sh ran out of her medication since last Wednesday. She is going to [place] tomorrow and need a resolution to her medication.    RN order rx for Motegrity to cover 1 month with no refills. This will give pt a donna period to schedule an appointment or discuss next step.    CLINIC COORDINATORS,  Please call pt to schedule Return/follow up visit for soonest with Dr. Lowery or TIARRA Mo.    Please let RN know when scheduled.    Thank you.  Vipul"

## 2023-03-07 NOTE — TELEPHONE ENCOUNTER
[facility] Call Center    Phone Message    May a detailed message be left on voicemail: yes     Reason for Call: Other: Patient and her pharmacy have been working for over 2 weeks for the refill of Motegrity.  Patient is going out of town tomorrow, 3/7/23, at 7:30am; therefore, she needs to have refill done today.  If not, she will have to go elsewhere ASAP.  Please contact patient and pharmacy TODAY.     Action Taken: Message routed to:  [facility]    Travel Screening: Not Applicable

## 2023-03-08 ENCOUNTER — TELEPHONE (OUTPATIENT)
Dept: GASTROENTEROLOGY | Facility: CLINIC | Age: 38
End: 2023-03-08
Payer: COMMERCIAL

## 2023-03-08 LAB — TSI SER-ACNC: <1 TSI INDEX

## 2023-03-08 NOTE — TELEPHONE ENCOUNTER
THAIM, sent mychart    Per Dr. Lowery, patient should follow up with a General GI PA, next available appt. And then if pt wants, follow up with Dr. Lowery sometime in late September (she's booked for returns until then). Return GI, in person or virtual. Left L pod #

## 2023-03-09 LAB — C1INH ACT/NOR SERPL: 99 %

## 2023-03-13 NOTE — TELEPHONE ENCOUNTER
March 13, 2023      [facility] [place]  [address]  [place] [address]  Phone: [phone]  Fax: [phone]       Patient: Michelle Dutton   YOB: 2013  Date of Visit: 03/13/2023    To Whom It May Concern:    Sandra Dutton  was at [facility] on 03/13/2023. The patient may return to work/school on 03/14/2023 with no restrictions. If you have any questions or concerns, or if I can be of further assistance, please do not hesitate to contact me.    Sincerely,    Mini RutherfordNP      ISSUE:  Borderline acute rejection  Pain over kidney, near spot of biopsy    PLAN:  Solumedrol 500mg daily x3 days  Kidney US    OUTCOME:  Solumedrol ordered and scheduled starting tomorrow 5/4.  US scheduled for 5/4 @ 4pm. Patient notified of timing of appts.

## 2023-03-17 NOTE — CONFIDENTIAL NOTE
Alternate telephone encounter opened and questions regarding medication was resolved. Closing encounter.

## 2023-03-20 ENCOUNTER — LAB (OUTPATIENT)
Dept: LAB | Facility: CLINIC | Age: 38
End: 2023-03-20
Payer: COMMERCIAL

## 2023-03-20 DIAGNOSIS — Z94.0 KIDNEY REPLACED BY TRANSPLANT: Primary | ICD-10-CM

## 2023-03-20 DIAGNOSIS — Z94.83 PANCREAS REPLACED BY TRANSPLANT (H): ICD-10-CM

## 2023-03-20 DIAGNOSIS — Z79.899 ENCOUNTER FOR LONG-TERM CURRENT USE OF MEDICATION: ICD-10-CM

## 2023-03-20 DIAGNOSIS — Z94.0 KIDNEY REPLACED BY TRANSPLANT: ICD-10-CM

## 2023-03-20 DIAGNOSIS — Z48.298 AFTERCARE FOLLOWING ORGAN TRANSPLANT: ICD-10-CM

## 2023-03-20 LAB
AMYLASE SERPL-CCNC: 57 U/L (ref 28–100)
ANION GAP SERPL CALCULATED.3IONS-SCNC: 9 MMOL/L (ref 7–15)
BUN SERPL-MCNC: 23.1 MG/DL (ref 6–20)
CALCIUM SERPL-MCNC: 9.6 MG/DL (ref 8.6–10)
CHLORIDE SERPL-SCNC: 106 MMOL/L (ref 98–107)
CREAT SERPL-MCNC: 1.01 MG/DL (ref 0.51–0.95)
DEPRECATED HCO3 PLAS-SCNC: 25 MMOL/L (ref 22–29)
ERYTHROCYTE [DISTWIDTH] IN BLOOD BY AUTOMATED COUNT: 13.3 % (ref 10–15)
GFR SERPL CREATININE-BSD FRML MDRD: 73 ML/MIN/1.73M2
GLUCOSE SERPL-MCNC: 89 MG/DL (ref 70–99)
HCT VFR BLD AUTO: 32.6 % (ref 35–47)
HGB BLD-MCNC: 10.5 G/DL (ref 11.7–15.7)
LIPASE SERPL-CCNC: 29 U/L (ref 13–60)
MCH RBC QN AUTO: 29 PG (ref 26.5–33)
MCHC RBC AUTO-ENTMCNC: 32.2 G/DL (ref 31.5–36.5)
MCV RBC AUTO: 90 FL (ref 78–100)
PLATELET # BLD AUTO: 238 10E3/UL (ref 150–450)
POTASSIUM SERPL-SCNC: 4.3 MMOL/L (ref 3.4–5.3)
RBC # BLD AUTO: 3.62 10E6/UL (ref 3.8–5.2)
SODIUM SERPL-SCNC: 140 MMOL/L (ref 136–145)
TACROLIMUS BLD-MCNC: 5.6 UG/L (ref 5–15)
TME LAST DOSE: NORMAL H
TME LAST DOSE: NORMAL H
WBC # BLD AUTO: 3.9 10E3/UL (ref 4–11)

## 2023-03-20 PROCEDURE — 36415 COLL VENOUS BLD VENIPUNCTURE: CPT

## 2023-03-20 PROCEDURE — 80197 ASSAY OF TACROLIMUS: CPT

## 2023-03-20 PROCEDURE — 85027 COMPLETE CBC AUTOMATED: CPT

## 2023-03-20 PROCEDURE — 83690 ASSAY OF LIPASE: CPT

## 2023-03-20 PROCEDURE — 82310 ASSAY OF CALCIUM: CPT

## 2023-03-20 PROCEDURE — 82150 ASSAY OF AMYLASE: CPT

## 2023-03-20 PROCEDURE — 80180 DRUG SCRN QUAN MYCOPHENOLATE: CPT

## 2023-03-21 LAB
MYCOPHENOLATE SERPL LC/MS/MS-MCNC: 4.48 MG/L (ref 1–3.5)
MYCOPHENOLATE-G SERPL LC/MS/MS-MCNC: 39.6 MG/L (ref 30–95)
TME LAST DOSE: ABNORMAL H
TME LAST DOSE: ABNORMAL H

## 2023-03-22 ENCOUNTER — TELEPHONE (OUTPATIENT)
Dept: OTHER | Facility: CLINIC | Age: 38
End: 2023-03-22

## 2023-03-22 ENCOUNTER — OFFICE VISIT (OUTPATIENT)
Dept: OTHER | Facility: CLINIC | Age: 38
End: 2023-03-22
Attending: INTERNAL MEDICINE
Payer: COMMERCIAL

## 2023-03-22 VITALS
OXYGEN SATURATION: 99 % | DIASTOLIC BLOOD PRESSURE: 87 MMHG | WEIGHT: 123.8 LBS | SYSTOLIC BLOOD PRESSURE: 137 MMHG | BODY MASS INDEX: 19.89 KG/M2 | HEIGHT: 66 IN | HEART RATE: 83 BPM

## 2023-03-22 DIAGNOSIS — M79.89 LEG SWELLING: Primary | ICD-10-CM

## 2023-03-22 PROCEDURE — 99205 OFFICE O/P NEW HI 60 MIN: CPT | Performed by: INTERNAL MEDICINE

## 2023-03-22 PROCEDURE — G0463 HOSPITAL OUTPT CLINIC VISIT: HCPCS

## 2023-03-22 NOTE — PROGRESS NOTES
INITIAL VASCULAR MEDICAL ASSESSMENT  REFERRAL SOURCE: Dr. Wilks  REASON FOR CONSULT:  for leg swelling.  Patient also has facial swelling.  Patient has history of pancreas and kidney transplant.    HPI: Marilyn Ortega is a 37 y.o. female referred for venous evaluation of lower extremity swelling. Details of her history are nicely documented in Dr. Wilks's note. She has a history of type I DM diagnosed at age 14 complicated by diabetic nephropathy for which she is now  status post left lower quadrant renal transplantation (with  anastomosis to the left external iliac artery and left external iliac vein) and right lower quadrant pancreas transplantation (with anastomoses to the right external iliac artery and external iliac vein) in November 2021. Ms. Ortega has struggled with chronic left greater than right lower extremity swelling. This dates back prior to her transplant surgery and has continued after the surgery. The swelling is variable and can be quite pronounced resulting in clothes not fitting and daily weight fluctuations of 10 lbs. She has worn compression stockings for greater than 15 years and implements a variety of length and compression strength tailored to her needs for the day. She also uses a pneumatic venous pump on a regular basis to help with the swelling. Her lower extremity swelling has been a significant burden and she has had an extensive workup elsewhere that included transthoracic echocardiogram, cardiac MRI, and right heart catheterization with exercise which were normal.    Dr. Wilks did perform a juan competency ultrasound which revealed bilateral common femoral vein incompetence and left proximal femoral vein incompetence, otherwise her deep veins are competent.  This is not of significance and is not contributing to swelling. Superficial veins of her right lower extremity are completely normal. She has proximal and mid thigh left great saphenous vein incompetence but  the vein is normal size and has no varicosities coursing from it.  The remainder of her left great saphenous vein is competent and very small.  This is not clinically significant. Her left Vein of Giacomini is incompetent but measures 1.7 mm in diameter maximally with no varicosities noted coursing from it.  This is not clinically significant.    She ws referred to us to search for other etiologies for her complaints          Review Of Systems  Skin: negative  Eyes: negative  Ears/Nose/Throat: negative  Respiratory: Shortness of breath- none presently, but she has had WEI while climbing stairs or running on a treadmill which is  inappropriate to her level of physical conditioning.  Cardiovascular: negative  Gastrointestinal: negative  Genitourinary: negative  Musculoskeletal: as above  Neurologic: negative  Psychiatric: negative  Hematologic/Lymphatic/Immunologic: negative  Endocrine: negative      PAST MEDICAL HISTORY:                  Past Medical History:   Diagnosis Date     Anemia 12/9/2021     C. difficile diarrhea 2013     CKD (chronic kidney disease) stage 4, GFR 15-29 ml/min (H)      Complication of transplanted kidney 5/23/2022     Gastroparesis      Gluten intolerance      Heterozygous factor V Leiden mutation (H)      HTN (hypertension)      Infection due to 2019 novel coronavirus 11/2020     Osteomyelitis (H) 2013     Shingles 2013     Type 1 diabetes mellitus (H) 2000       PAST SURGICAL HISTORY:                  Past Surgical History:   Procedure Laterality Date     BENCH KIDNEY N/A 11/15/2021    Procedure: Bench kidney;  Surgeon: Branden Aranda MD;  Location:  OR     BENCH PANCREAS N/A 11/15/2021    Procedure: Bench pancreas;  Surgeon: Branden Aranda MD;  Location:  OR     CATARACT EXTRACTION  11/2021     CV CORONARY ANGIOGRAM N/A 9/8/2022    Procedure: Coronary Angiogram;  Surgeon: Bebeto Ventura MD;  Location:  HEART CARDIAC CATH LAB      CV RIGHT HEART CATH MEASUREMENTS RECORDED N/A 2022    Procedure: Right Heart Catheterization;  Surgeon: Bebeto Ventura MD;  Location:  HEART CARDIAC CATH LAB     CV RIGHT HEART CATH MEASUREMENTS RECORDED N/A 2022    Procedure: Right Heart Catheterization [[id number]];  Surgeon: Trevor Jasmine MD;  Location:  HEART CARDIAC CATH LAB     IR RENAL BIOPSY LEFT  2022     IR RENAL BIOPSY LEFT  2022     ORIF HIP FRACTURE Left      ORTHOPEDIC SURGERY Bilateral     Repair of labral tear     TRANSPLANT PANCREAS, KIDNEY  DONOR, COMBINED Left 11/15/2021    Procedure: TRANSPLANT, KIDNEY AND PANCREAS,  DONOR;  Surgeon: Branden Aranda MD;  Location:  OR       CURRENT MEDICATIONS:                  Current Outpatient Medications   Medication Sig Dispense Refill     aspirin (ASA) 81 MG chewable tablet Take 81 mg by mouth daily       cetirizine (ZYRTEC) 10 MG tablet Take 1 tablet (10 mg) by mouth every other day       cholecalciferol 25 MCG (1000 UT) TABS Take 2,000 Units by mouth every other day        docusate sodium (COLACE) 100 MG capsule Take 200 mg by mouth daily before breakfast       furosemide (LASIX) 20 MG tablet Take 1 tablet (20 mg) by mouth daily 45 tablet 0     loratadine (CLARITIN) 10 MG tablet Take 10 mg by mouth daily       MOTEGRITY 2 MG tablet TAKE 1 TABLET BY MOUTH EVERY DAY 90 tablet 0     mycophenolic acid (GENERIC EQUIVALENT) 360 MG EC tablet Take 1 tablet (360 mg) by mouth 2 times daily 60 tablet 11     ondansetron (ZOFRAN) 4 MG tablet Take 1 tablet (4 mg) by mouth every 8 hours as needed for nausea 30 tablet 0     tacrolimus (ENVARSUS XR) 0.75 MG 24 hr tablet Take 2 tablets (1.5 mg) by mouth every morning (before breakfast) Hold for dose change. 60 tablet 11     tacrolimus (ENVARSUS XR) 1 MG 24 hr tablet Take 2 tablets (2 mg) by mouth every morning (before breakfast) Total dose=6mg daily 60 tablet 11     tacrolimus (ENVARSUS XR) 4 MG  24 hr tablet Take 1 tablet (4 mg) by mouth every morning (before breakfast) Total dose=6mg daily 30 tablet 11       ALLERGIES:                  Allergies   Allergen Reactions     Chlorhexidine Hives, Itching and Rash     PN: Patient had swelling/rash that was very itchy with chlorprep.     Ceclor [Cefaclor Monohydrate]      Metoclopramide Other (See Comments)     Noted mouth/facial twitching with reglan in 2022 when it was tried for gastroparesis.     Cefaclor Rash       SOCIAL HISTORY:                  Social History     Socioeconomic History     Marital status: Single     Spouse name: Not on file     Number of children: Not on file     Years of education: Not on file     Highest education level: Not on file   Occupational History     Not on file   Tobacco Use     Smoking status: Never     Smokeless tobacco: Never   Substance and Sexual Activity     Alcohol use: Yes     Comment: socially     Drug use: No     Sexual activity: Yes   Other Topics Concern     Parent/sibling w/ CABG, MI or angioplasty before 65F 55M? Not Asked   Social History Narrative     Not on file     Social Determinants of Health     Financial Resource Strain: Not on file   Food Insecurity: Not on file   Transportation Needs: Not on file   Physical Activity: Not on file   Stress: Not on file   Social Connections: Not on file   Intimate Partner Violence: Not on file   Housing Stability: Not on file       FAMILY HISTORY:                   Family History   Problem Relation Age of Onset     Diabetes Paternal Grandfather      Macular Degeneration Mother      Arthritis Maternal Grandmother      Hypertension Maternal Grandmother      Cancer - colorectal Maternal Grandfather      Glaucoma No family hx of          Physical exam Reveals:    O/P: WNL  HEENT: WNL  NECK: No JVD, thyromegaly, or lymphadenopathy  HEART: RRR, no murmurs, gallops, or rubs  LUNGS: CTA bilaterally without rales, wheezes, or rhonchi  GI: NABS, nondistended, nontender,  soft  EXT:without cyanosis, clubbing, or edema; No lipodermatosclerosis. Negative Stemmer signs.  NEURO: nonfocal  : no flank tenderness      Rt femoral: 3 plus palpable   Rt popliteal: 3 plus palpable    Rt DP:  3 plus palpable   Rt PT:   3 plus palpable       Lt femoral: 3 plus palpable   Lt popliteal: 3 plus palpable  Lt DP:  3 plus palpable   Lt PT:   3 plus palpable     Name:  Marilyn Ortega                                                     Patient ID: [id number]  Date: 2023                                                         : 1985  Sex: female                                                                 Examined by: HIWOT James RVT  Age:  37 year old                                                         Reading MD: OJ Wilks MD     INDICATION:  Bilateral leg swelling and pain L>R.      EXAM TYPE  BILATERAL LOWER EXTREMITY VENOUS DUPLEX FOR VENOUS INSUFFICIENCY  TECHNICAL SUMMARY     A duplex ultrasound study using color flow was performed, to evaluate the bilateral lower extremity veins for valvular incompetence with the patient in a steep reversed trendelenberg.      RIGHT:     The deep veins demonstrate phasic flow, compress and respond to augmentations.  There is no DVT.  The common femoral vein is incompetent and free of thrombus. The remaining deep veins are competent and free of thrombus.      The GSV demonstrates phasic flow, compresses and responds to augmentations from the saphenofemoral junction to the ankle with no evidence of reflux or thrombus. The great saphenous vein measures 5.1 mm at the saphenofemoral junction, 4.2 mm at the proximal thigh and 4.3 mm at the knee.      The AASV is not visualized.      The Giacomini vein is competent( 1.3 mm) communicating with the small saphenous vein at the knee level.      The SSV demonstrates phasic flow, compresses and responds to augmentations from the popliteal space to the ankle.  No reflux or thrombus is  seen. The saphenopopliteal junction is competent (2.3 mm).      Perforators: there is no evidence of incompetent  veins at any level.      LEFT:     The deep veins demonstrate phasic flow, compress and respond to augmentations.  There is no  DVT.  The common femoral and proximal femoral veins ae incompetent and free of thrombus. The remaining deep veins are competent and free of thrombus.      The GSV demonstrates phasic flow, compresses and responds to augmentations from the saphenofemoral junction to the knee with no evidence of thrombus. The GSV is too small to assess flow in the calf, but is compressible and appears patent. The great saphenous vein measures 5.4 mm at the saphenofemoral junction, 4.1 mm at the proximal thigh and 4.0 mm at the knee.  The GSV is incompetent from Proximal Thigh to Mid Thigh, with the greatest reflux time of 3052 milliseconds.       The AASV is competent( 2.3 mm) draining into the saphenofemoral junction.      The Giacomini vein is incompetent ( 1.7 mm) communicating with the small saphenous vein at the knee level. The Giacomini vein is incompetent from the distal thigh to proximal thigh with a reflux time of 4058 milliseconds.      The SSV demonstrates phasic flow, compresses and responds to augmentations from the popliteal space to the ankle.  No reflux or thrombus is seen. The saphenopopliteal junction is competent ( 2.8 mm).       Perforators: there is no evidence of incompetent  veins at any level.      FINAL SUMMARY:  1.  No evidence of deep or superficial vein thrombosis in either lower extremity  2.  Right common femoral vein incompetence  3.  Left common femoral and proximal femoral vein incompetence  4.  Left proximal and mid thigh great saphenous vein incompetence  5.  Left Vein of Giacomini incompetence                Incompetence Criteria: Greater than 500 milliseconds reflux in the superficial and  veins and greater than than 1000  milliseconds reflux in the deep veins.      OJ Wilks MD, [facility]                                                      CMR Report       MRN:                  [id number]                                  Name:         NORY CAMARGO                                  :                  1985                                  Scan Date:   2022-10-31 07:59:08                                   Electronically signed by Dev Oneil 2022-Oct-31 10:42:56     SUMMARY   ==========================================================================================================     Clinical history: 36 yo F with PMH of T1DM s/p kidney/pancreas transplant who presented with dyspnea on  exertion and fatigue with a negative cardiac workup thus far. Presents for CMR to evaluate biventricular  function and LGE assessment.  Comparison CMR: No prior     1. The LV is normal in cavity size and wall thickness. The global systolic function is normal. The LVEF is  61%. There are no regional wall motion abnormalities.     2. The RV is normal in cavity size. The global systolic function is normal. The RVEF is 56%.      3. Both atria are normal in size.     4. There is no significant valvular disease.      5. Late gadolinium enhancement imaging shows no MI, fibrosis or infiltrative disease.      6. There is no pericardial effusion or thickening.     CONCLUSIONS:  Normal biventricular function.  No significant valvular disease.  No LGE enhancement to suggest an underlying cardiomyopathy.     CORE EXAM   ==========================================================================================================     MEASUREMENTS   ----------------------------------------------------------------------------------------      VOLUMETRIC ANALYSIS       ----------------------------------------------  .-------------------------------------------------------.                    LV    Reference  RV    Reference   +-----+-----------+------+-----------+------+-----------+   EDV  ml          103   ()    91   ()         ml/m^2       63   (62-96)     55   (61-98)     ESV  ml           41   (27-64)     40   (25-77)          ml/m^2       25   (17-36)     24   (17-43)     CO   L/min      4.99             4.09                    L/min/m^2  3.03             2.49               SV   ml           62   ()    51   ()         ml/m^2       38   (40-65)     31   (38-62)     EF   %            61   (57-75)     56   (51-75)    '-----+-----------+------+-----------+------+-----------'             VISUAL EJECTION FRACTION:  60 %          CARDIAC OUTPUT HR:  80 BPM      LV DIMENSIONS       ----------------------------------------------          WALL THICKNESS - ANTEROSEPTAL:  0.8 cm          WALL THICKNESS - INFEROLATERAL:  0.7 cm          WALL THICKNESS - MAXIMUM:  0.88 cm          LV MARGA:  4.8 cm          LV ESD:  2.8 cm         LA DIMENSIONS (LV SYSTOLE)       ----------------------------------------------          DIAMETER:  3.6 cm          LENGTH - 2 CHAMBER:  4.6 cm          LENGTH - 4 CHAMBER:  4.9 cm         AORTIC ROOT DIMENSIONS       ----------------------------------------------          ANNULUS:  2.6 cm          AORTIC ROOT SIZE:  Normal        ANATOMY   ----------------------------------------------------------------------------------------      LEFT VENTRICLE       ----------------------------------------------          WALL THICKNESS:  Normal          CAVITY SIZE:  Normal         RIGHT VENTRICLE       ----------------------------------------------          WALL THICKNESS:  Normal          CONTRACTILITY:  Normal          CAVITY SIZE:  Normal          MASS/THROMBUS:  None         INTERVENTRICULAR SEPTUM       ----------------------------------------------               VENTRICULAR SEPTUM:  Normal           INTERATRIAL SEPTUM       ----------------------------------------------               ATRIAL SEPTUM:          Normal          LEFT ATRIUM       ----------------------------------------------          CAVITY SIZE:  Normal         RIGHT ATRIUM       ----------------------------------------------          CAVITY SIZE:  Normal         PERICARDIUM       ----------------------------------------------          Normal          EFFUSION:  None         PLEURAL EFFUSION       ----------------------------------------------               None         VALVES   ----------------------------------------------------------------------------------------      AORTIC VALVE       ----------------------------------------------          AORTIC VALVE LEAFLETS:  Trileaflet         MITRAL VALVE       ----------------------------------------------          MITRAL VALVE LEAFLETS:  Normal Leaflets         TRICUSPID VALVE       ----------------------------------------------          TRICUSPID VALVE LEAFLETS:  Normal Leaflets         PULMONIC VALVE       ----------------------------------------------          PULMONIC VALVE LEAFLETS:  Normal Leaflets        17 SEGMENT   ----------------------------------------------------------------------------------------  .------------------------------------------------------------------------------------------.   Segments            Wall Motion   Hyperenhancement  Stress Perfusion  Interpretation   +--------------------+--------------+------------------+------------------+----------------+   Base Anterior       Normal/Hyper  None                                Normal            Base Anteroseptal   Normal/Hyper  None                                Normal            Base Inferoseptal   Normal/Hyper  None                                Normal            Base Inferior       Normal/Hyper  None                                Normal            Base Inferolateral  Normal/Hyper  None                                 Normal            Base Anterolateral  Normal/Hyper  None                                Normal            Mid Anterior        Normal/Hyper  None                                Normal            Mid Anteroseptal    Normal/Hyper  None                                Normal            Mid Inferoseptal    Normal/Hyper  None                                Normal            Mid Inferior        Normal/Hyper  None                                Normal            Mid Inferolateral   Normal/Hyper  None                                Normal            Mid Anterolateral   Normal/Hyper  None                                Normal            Apical Anterior     Normal/Hyper  None                                Normal            Apical Septal       Normal/Hyper  None                                Normal            Apical Inferior     Normal/Hyper  None                                Normal            Apical Lateral      Normal/Hyper  None                                Normal            [place]                Normal/Hyper  None                                Normal           +--------------------+--------------+------------------+------------------+----------------+   RV Segments         Wall Motion   Hyperenhancement                    Interpretation   +--------------------+--------------+------------------+------------------+----------------+   RV Basal Anterior   Normal/Hyper  None                                Normal            RV Basal Inferior   Normal/Hyper  None                                Normal            RV Mid              Normal/Hyper  None                                Normal            RV Apical           Normal/Hyper  None                                Normal           '--------------------+--------------+------------------+------------------+----------------'         FINDINGS        ----------------------------------------------          LV SCAR SIZE (17 SEGMENT):  0 %        SCAN INFO   ==========================================================================================================     GENERAL   ----------------------------------------------------------------------------------------      CONTRAST AGENT       ----------------------------------------------          TYPE:  Gadavist          GD CONCENTRATION:  0.5 M          VOLUME ADMINISTERED:  6 ml          DOSAGE:  0.05 mmol/kg         VITALS       ----------------------------------------------          HEIGHT:  66.00 in          HEIGHT:  167.64 cm          WEIGHT:  125.99 lbs          WEIGHT:  57.15 kgs          BSA:  1.64 m^2         SETUP       ----------------------------------------------          REFERRING PHYSICIAN:  ANTHONY ELIZALDE          ATTENDING PHYSICIAN:  ANTHONY ORO   ----------------------------------------------------------------------------------------      CPT Codes      ICD10 Codes          R06.02        Report generated by [employer], a product of [email or web address]          EXAMINATION: CT ABDOMEN PELVIS W/O CONTRAST, 2/1/2022 5:31 PM     TECHNIQUE:  Helical CT images from the lower thorax through the  symphysis pubis were obtained without IV contrast.      COMPARISON: CT abdomen and pelvis without contrast 12/8/2021     HISTORY: Abdominal pain, unspecified abdominal location     FINDINGS:     Lower thorax: Visualized lower lung fields are unremarkable with a  right lower lobe calcified granuloma which dates back to at least  7/21/2021.     ABDOMEN/PELVIS:  LIVER: No suspicious focal hepatic lesion.     BILIARY: No calcified intraluminal gallstone. No intrahepatic or  extrahepatic biliary ductal dilation.     PANCREAS: Atrophic native pancreas. Right lower quadrant pancreatic  transplant.     SPLEEN: Within normal limits. Splenic calcifications likely secondary  to previous granulomatous  disease.     ADRENAL GLANDS: No focal adrenal nodule.     URINARY TRACT: Native kidneys and left lower quadrant renal  transplant. No nephrolithiasis, suspicious mass.     REPRODUCTIVE ORGANS: Within normal limits.     STOMACH: Images distended with contents including dense layering  contents in the antrum which may represent prior ingested contrast  material.     BOWEL: Cecum and ascending colon is distended with fluid. There is a  large stool burden in the transverse descending and sigmoid colon. No  abnormal bowel wall thickening or enhancement. Appendix is surgically  absent.     PERITONEUM/FLUID: No ascites or pelvic free fluid.     VESSELS: No aneurysmal dilatation of the abdominal aorta.      LYMPH NODES: Diffuse mesenteric and retroperitoneal lymphadenopathy,  likely reactive.     BONES/SOFT TISSUES: No aggressive osseous lesions. Left femoral  intramedullary nails. Mild degenerative changes lumbar spine.  Unchanged heterotopic calcifications the right femoral head.                                                                      IMPRESSION:   1. Postoperative changes of left lower quadrant renal and right pelvic  pancreatic transplant.  2. Moderate colonic stool with fluid-filled descending colon and  distended stomach with copious gastric contents to suggest slow  transit/constipation. An adynamic ileus is also a possibility.     I have personally reviewed the examination and initial interpretation  and I agree with the findings.     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 Her sxs sound of venous origin, yet she does not anatomically have significant enough insufficiency on competency study for me to believe this is the etiology of her sxs. She has negative Stemmer signs. Lymphedema is  therefore not the etiology. Thyroid function is normal. Check urinary albumin and osmolal gap to rule out osmotic etiologies. Check CTV to earline out extrinsic iliac vein compression (doubtful as no DVT hx, and sxs predated transplantation). If all the above are normal, I wonder if she could have autonomic dysfunction as the etiology of her sxs.   Plan: Albumin Random Urine Quantitative with Creat         Ratio, CTV Abdomen Pelvis w Contrast            75 minutes total medical care on today's date due to need to review a very large volume of medical data in this patient I have seen for the first time today.

## 2023-03-22 NOTE — PROGRESS NOTES
"Patient is here to discuss follow up    /85 (BP Location: Right arm, Patient Position: Chair, Cuff Size: Adult Regular)   Pulse 87   Ht 5' 6\" (1.676 m)   Wt 123 lb 12.8 oz (56.2 kg)   SpO2 99%   BMI 19.98 kg/m      Questions patient would like addressed today are: dizziness and lightheaded periodically.     Refills are needed: No    Has homecare services and agency name:  [facility]    "

## 2023-03-22 NOTE — TELEPHONE ENCOUNTER
Follow-up to 03/22/23      CTV abdomen/pelvis with contrast    Follow up has been scheduled 4/4/23 at 4:10.    PLEASE CONFIRM FOLLOW UP DATE AND TIME WITH PATIENT.  Dr. Cage may have told her the wrong date.    Thank you!!

## 2023-03-24 ENCOUNTER — LAB (OUTPATIENT)
Dept: LAB | Facility: CLINIC | Age: 38
End: 2023-03-24
Attending: INTERNAL MEDICINE
Payer: COMMERCIAL

## 2023-03-24 ENCOUNTER — TELEPHONE (OUTPATIENT)
Dept: TRANSPLANT | Facility: CLINIC | Age: 38
End: 2023-03-24
Payer: COMMERCIAL

## 2023-03-24 ENCOUNTER — HOSPITAL ENCOUNTER (OUTPATIENT)
Dept: CT IMAGING | Facility: CLINIC | Age: 38
Discharge: HOME | End: 2023-03-24
Attending: INTERNAL MEDICINE
Payer: COMMERCIAL

## 2023-03-24 DIAGNOSIS — M79.89 LEG SWELLING: ICD-10-CM

## 2023-03-24 LAB
CREAT UR-MCNC: 61.7 MG/DL
MICROALBUMIN UR-MCNC: <12 MG/L
MICROALBUMIN/CREAT UR: NORMAL MG/G{CREAT}

## 2023-03-24 PROCEDURE — 82570 ASSAY OF URINE CREATININE: CPT

## 2023-03-24 PROCEDURE — 250N000009 HC RX 250: Performed by: INTERNAL MEDICINE

## 2023-03-24 PROCEDURE — 250N000011 HC RX IP 250 OP 636: Performed by: INTERNAL MEDICINE

## 2023-03-24 PROCEDURE — 74174 CTA ABD&PLVS W/CONTRAST: CPT

## 2023-03-24 RX ORDER — IOPAMIDOL 755 MG/ML
80 INJECTION, SOLUTION INTRAVASCULAR ONCE
Status: COMPLETED | OUTPATIENT
Start: 2023-03-24 | End: 2023-03-24

## 2023-03-24 RX ADMIN — SODIUM CHLORIDE 80 ML: 9 INJECTION, SOLUTION INTRAVENOUS at 18:34

## 2023-03-24 RX ADMIN — IOPAMIDOL 80 ML: 755 INJECTION, SOLUTION INTRAVENOUS at 18:33

## 2023-03-24 NOTE — TELEPHONE ENCOUNTER
Patient calling as she has a CT sched for today and she was wondering does she really need to drink all the fluids as instructed due to her kidney issues or is she getting IV fluids too?  Please call her back.

## 2023-03-24 NOTE — TELEPHONE ENCOUNTER
Left voicemail for patient. OK for CT scan, advised patient to push fluids before and after CT scan, ideally an extra half liter before and after scan.

## 2023-04-03 ENCOUNTER — LAB (OUTPATIENT)
Dept: LAB | Facility: CLINIC | Age: 38
End: 2023-04-03
Payer: COMMERCIAL

## 2023-04-03 DIAGNOSIS — Z94.83 PANCREAS REPLACED BY TRANSPLANT (H): ICD-10-CM

## 2023-04-03 DIAGNOSIS — Z94.0 KIDNEY REPLACED BY TRANSPLANT: ICD-10-CM

## 2023-04-03 DIAGNOSIS — Z48.298 AFTERCARE FOLLOWING ORGAN TRANSPLANT: ICD-10-CM

## 2023-04-03 DIAGNOSIS — Z79.899 ENCOUNTER FOR LONG-TERM CURRENT USE OF MEDICATION: ICD-10-CM

## 2023-04-03 DIAGNOSIS — Z94.0 KIDNEY REPLACED BY TRANSPLANT: Primary | ICD-10-CM

## 2023-04-03 LAB
AMYLASE SERPL-CCNC: 61 U/L (ref 28–100)
ANION GAP SERPL CALCULATED.3IONS-SCNC: 12 MMOL/L (ref 7–15)
BUN SERPL-MCNC: 21.3 MG/DL (ref 6–20)
CALCIUM SERPL-MCNC: 9.4 MG/DL (ref 8.6–10)
CHLORIDE SERPL-SCNC: 104 MMOL/L (ref 98–107)
CREAT SERPL-MCNC: 1 MG/DL (ref 0.51–0.95)
DEPRECATED HCO3 PLAS-SCNC: 21 MMOL/L (ref 22–29)
ERYTHROCYTE [DISTWIDTH] IN BLOOD BY AUTOMATED COUNT: 13.2 % (ref 10–15)
GFR SERPL CREATININE-BSD FRML MDRD: 74 ML/MIN/1.73M2
GLUCOSE SERPL-MCNC: 83 MG/DL (ref 70–99)
HCT VFR BLD AUTO: 32.3 % (ref 35–47)
HGB BLD-MCNC: 10.3 G/DL (ref 11.7–15.7)
LIPASE SERPL-CCNC: 34 U/L (ref 13–60)
MCH RBC QN AUTO: 28.4 PG (ref 26.5–33)
MCHC RBC AUTO-ENTMCNC: 31.9 G/DL (ref 31.5–36.5)
MCV RBC AUTO: 89 FL (ref 78–100)
MYCOPHENOLATE SERPL LC/MS/MS-MCNC: 5.65 MG/L (ref 1–3.5)
MYCOPHENOLATE-G SERPL LC/MS/MS-MCNC: 61.8 MG/L (ref 30–95)
PLATELET # BLD AUTO: 237 10E3/UL (ref 150–450)
POTASSIUM SERPL-SCNC: 4 MMOL/L (ref 3.4–5.3)
RBC # BLD AUTO: 3.63 10E6/UL (ref 3.8–5.2)
SODIUM SERPL-SCNC: 137 MMOL/L (ref 136–145)
TACROLIMUS BLD-MCNC: 5.3 UG/L (ref 5–15)
TME LAST DOSE: ABNORMAL H
TME LAST DOSE: ABNORMAL H
TME LAST DOSE: NORMAL H
TME LAST DOSE: NORMAL H
WBC # BLD AUTO: 4.4 10E3/UL (ref 4–11)

## 2023-04-03 PROCEDURE — 82310 ASSAY OF CALCIUM: CPT

## 2023-04-03 PROCEDURE — 80197 ASSAY OF TACROLIMUS: CPT

## 2023-04-03 PROCEDURE — 83690 ASSAY OF LIPASE: CPT

## 2023-04-03 PROCEDURE — 80180 DRUG SCRN QUAN MYCOPHENOLATE: CPT

## 2023-04-03 PROCEDURE — 85027 COMPLETE CBC AUTOMATED: CPT

## 2023-04-03 PROCEDURE — 82150 ASSAY OF AMYLASE: CPT

## 2023-04-03 PROCEDURE — 36415 COLL VENOUS BLD VENIPUNCTURE: CPT

## 2023-04-04 ENCOUNTER — OFFICE VISIT (OUTPATIENT)
Dept: OTHER | Facility: CLINIC | Age: 38
End: 2023-04-04
Attending: INTERNAL MEDICINE
Payer: COMMERCIAL

## 2023-04-04 VITALS
HEART RATE: 87 BPM | OXYGEN SATURATION: 100 % | SYSTOLIC BLOOD PRESSURE: 124 MMHG | BODY MASS INDEX: 20.31 KG/M2 | HEIGHT: 66 IN | DIASTOLIC BLOOD PRESSURE: 84 MMHG | WEIGHT: 126.4 LBS

## 2023-04-04 DIAGNOSIS — I87.2 VENOUS (PERIPHERAL) INSUFFICIENCY: Primary | ICD-10-CM

## 2023-04-04 PROCEDURE — 99214 OFFICE O/P EST MOD 30 MIN: CPT | Performed by: INTERNAL MEDICINE

## 2023-04-04 PROCEDURE — G0463 HOSPITAL OUTPT CLINIC VISIT: HCPCS

## 2023-04-04 PROCEDURE — G0463 HOSPITAL OUTPT CLINIC VISIT: HCPCS | Performed by: INTERNAL MEDICINE

## 2023-04-04 NOTE — PROGRESS NOTES
HPI: Marilyn Ortega is a 37 y.o. female referred for venous evaluation of lower extremity swelling. Details of her history are nicely documented in Dr. Wilks's note. She has a history of type I DM diagnosed at age 14 complicated by diabetic nephropathy for which she is now  status post left lower quadrant renal transplantation (with  anastomosis to the left external iliac artery and left external iliac vein) and right lower quadrant pancreas transplantation (with anastomoses to the right external iliac artery and external iliac vein) in November 2021. Ms. Ortega has struggled with chronic left greater than right lower extremity swelling. This dates back prior to her transplant surgery and has continued after the surgery. The swelling is variable and can be quite pronounced resulting in clothes not fitting and daily weight fluctuations of up to 10 lbs. She has worn compression stockings for greater than 15 years and implements a variety of length and compression strength tailored to her needs for the day. She also uses a pneumatic venous pump on a regular basis to help with the swelling. Her lower extremity swelling has been a significant burden and she has had an extensive workup elsewhere that included transthoracic echocardiogram, cardiac MRI, and right heart catheterization with exercise which were normal.     Dr. Wilks did perform a juan competency ultrasound which revealed bilateral common femoral vein incompetence and left proximal femoral vein incompetence, otherwise her deep veins are competent.  This is not of significance and is not contributing to swelling. Superficial veins of her right lower extremity are completely normal. She has proximal and mid thigh left great saphenous vein incompetence but the vein is normal size and has no varicosities coursing from it.  The remainder of her left great saphenous vein is competent and very small.  This is not clinically significant. Her left  Vein of Giacomini is incompetent but measures 1.7 mm in diameter maximally with no varicosities noted coursing from it.  This is not clinically significant.     She was referred to us to search for other etiologies for her complaints.      Her sxs sound of venous origin, yet she does not anatomically have significant enough insufficiency on competency study for me to believe this is the etiology of her sxs. She has negative Stemmer signs. Lymphedema is therefore not the etiology. Thyroid function is normal. Urinary albumin is normal therefore ruling out osmotic etiologies of swelling. CTV to ruled out extrinsic iliac vein compression (doubtful as no DVT hx, and sxs predated transplantation).                Review Of Systems  Skin: negative  Eyes: negative  Ears/Nose/Throat: negative  Respiratory: Shortness of breath- none presently, but she has had WEI while climbing stairs or running on a treadmill which is  inappropriate to her level of physical conditioning.  Cardiovascular: negative  Gastrointestinal: negative  Genitourinary: negative  Musculoskeletal: as above  Neurologic: negative  Psychiatric: negative  Hematologic/Lymphatic/Immunologic: negative  Endocrine: negative        PAST MEDICAL HISTORY:                  Past Medical History        Past Medical History:   Diagnosis Date     Anemia 12/9/2021     C. difficile diarrhea 2013     CKD (chronic kidney disease) stage 4, GFR 15-29 ml/min (H)       Complication of transplanted kidney 5/23/2022     Gastroparesis       Gluten intolerance       Heterozygous factor V Leiden mutation (H)       HTN (hypertension)       Infection due to 2019 novel coronavirus 11/2020     Osteomyelitis (H) 2013     Shingles 2013     Type 1 diabetes mellitus (H) 2000            PAST SURGICAL HISTORY:                  Past Surgical History         Past Surgical History:   Procedure Laterality Date     BENCH KIDNEY N/A 11/15/2021     Procedure: Bench kidney;  Surgeon: Branden Aranda  MD Alexander;  Location: [facility] OR     BENCH PANCREAS N/A 11/15/2021     Procedure: Bench pancreas;  Surgeon: Branden Aranda MD;  Location: [facility] OR     CATARACT EXTRACTION   2021     CV CORONARY ANGIOGRAM N/A 2022     Procedure: Coronary Angiogram;  Surgeon: Bebeto Ventura MD;  Location: [facility]     CV RIGHT HEART CATH MEASUREMENTS RECORDED N/A 2022     Procedure: Right Heart Catheterization;  Surgeon: Bebeto Ventura MD;  Location: [facility]     CV RIGHT HEART CATH MEASUREMENTS RECORDED N/A 2022     Procedure: Right Heart Catheterization [[id number]];  Surgeon: Trevor Jasmine MD;  [facility]     IR RENAL BIOPSY LEFT   2022     IR RENAL BIOPSY LEFT   2022     ORIF HIP FRACTURE Left       ORTHOPEDIC SURGERY Bilateral       Repair of labral tear     TRANSPLANT PANCREAS, KIDNEY  DONOR, COMBINED Left 11/15/2021     Procedure: TRANSPLANT, KIDNEY AND PANCREAS,  DONOR;  Surgeon: Branden Aranda MD;  Location: [facility] OR            CURRENT MEDICATIONS:                  Current Outpatient Prescriptions          Current Outpatient Medications   Medication Sig Dispense Refill     aspirin (ASA) 81 MG chewable tablet Take 81 mg by mouth daily         cetirizine (ZYRTEC) 10 MG tablet Take 1 tablet (10 mg) by mouth every other day         cholecalciferol 25 MCG (1000 UT) TABS Take 2,000 Units by mouth every other day          docusate sodium (COLACE) 100 MG capsule Take 200 mg by mouth daily before breakfast         furosemide (LASIX) 20 MG tablet Take 1 tablet (20 mg) by mouth daily 45 tablet 0     loratadine (CLARITIN) 10 MG tablet Take 10 mg by mouth daily         MOTEGRITY 2 MG tablet TAKE 1 TABLET BY MOUTH EVERY DAY 90 tablet 0     mycophenolic acid (GENERIC EQUIVALENT) 360 MG EC tablet Take 1 tablet (360 mg) by mouth 2 times daily 60 tablet 11     ondansetron  (ZOFRAN) 4 MG tablet Take 1 tablet (4 mg) by mouth every 8 hours as needed for nausea 30 tablet 0     tacrolimus (ENVARSUS XR) 0.75 MG 24 hr tablet Take 2 tablets (1.5 mg) by mouth every morning (before breakfast) Hold for dose change. 60 tablet 11     tacrolimus (ENVARSUS XR) 1 MG 24 hr tablet Take 2 tablets (2 mg) by mouth every morning (before breakfast) Total dose=6mg daily 60 tablet 11     tacrolimus (ENVARSUS XR) 4 MG 24 hr tablet Take 1 tablet (4 mg) by mouth every morning (before breakfast) Total dose=6mg daily 30 tablet 11            ALLERGIES:                        Allergies   Allergen Reactions     Chlorhexidine Hives, Itching and Rash       PN: Patient had swelling/rash that was very itchy with chlorprep.     Ceclor [Cefaclor Monohydrate]       Metoclopramide Other (See Comments)       Noted mouth/facial twitching with reglan in 2022 when it was tried for gastroparesis.     Cefaclor Rash         SOCIAL HISTORY:                  Social History   Social History            Socioeconomic History     Marital status: Single       Spouse name: Not on file     Number of children: Not on file     Years of education: Not on file     Highest education level: Not on file   Occupational History     Not on file   Tobacco Use     Smoking status: Never     Smokeless tobacco: Never   Substance and Sexual Activity     Alcohol use: Yes       Comment: socially     Drug use: No     Sexual activity: Yes   Other Topics Concern     Parent/sibling w/ CABG, MI or angioplasty before 65F 55M? Not Asked   Social History Narrative     Not on file      Social Determinants of Health      Financial Resource Strain: Not on file   Food Insecurity: Not on file   Transportation Needs: Not on file   Physical Activity: Not on file   Stress: Not on file   Social Connections: Not on file   Intimate Partner Violence: Not on file   Housing Stability: Not on file            FAMILY HISTORY:                   Family History         Family History    Problem Relation Age of Onset     Diabetes Paternal Grandfather       Macular Degeneration Mother       Arthritis Maternal Grandmother       Hypertension Maternal Grandmother       Cancer - colorectal Maternal Grandfather       Glaucoma No family hx of                 Physical exam Reveals:     O/P: WNL  HEENT: WNL  NECK: No JVD, thyromegaly, or lymphadenopathy  HEART: RRR, no murmurs, gallops, or rubs  LUNGS: CTA bilaterally without rales, wheezes, or rhonchi  GI: NABS, nondistended, nontender, soft  EXT:without cyanosis, clubbing, or edema; No lipodermatosclerosis. Negative Stemmer signs.  NEURO: nonfocal  : no flank tenderness        Rt femoral:      3 plus palpable   Rt popliteal:     3 plus palpable    Rt DP:              3 plus palpable   Rt PT:              3 plus palpable         Lt femoral:       3 plus palpable   Lt popliteal:      3 plus palpable  Lt DP:              3 plus palpable   Lt PT:               3 plus palpable      Name:  Marilyn Ortega                                                     Patient ID: [id number]  Date: 2023                                                         : 1985  Sex: female                                                                 Examined by: HIWOT James RVT  Age:  37 year old                                                         Reading MD: OJ Wilks MD     INDICATION:  Bilateral leg swelling and pain L>R.      EXAM TYPE  BILATERAL LOWER EXTREMITY VENOUS DUPLEX FOR VENOUS INSUFFICIENCY  TECHNICAL SUMMARY     A duplex ultrasound study using color flow was performed, to evaluate the bilateral lower extremity veins for valvular incompetence with the patient in a steep reversed trendelenberg.      RIGHT:     The deep veins demonstrate phasic flow, compress and respond to augmentations.  There is no DVT.  The common femoral vein is incompetent and free of thrombus. The remaining deep veins are competent and free of thrombus.       The GSV demonstrates phasic flow, compresses and responds to augmentations from the saphenofemoral junction to the ankle with no evidence of reflux or thrombus. The great saphenous vein measures 5.1 mm at the saphenofemoral junction, 4.2 mm at the proximal thigh and 4.3 mm at the knee.      The AASV is not visualized.      The Giacomini vein is competent( 1.3 mm) communicating with the small saphenous vein at the knee level.      The SSV demonstrates phasic flow, compresses and responds to augmentations from the popliteal space to the ankle.  No reflux or thrombus is seen. The saphenopopliteal junction is competent (2.3 mm).      Perforators: there is no evidence of incompetent  veins at any level.      LEFT:     The deep veins demonstrate phasic flow, compress and respond to augmentations.  There is no  DVT.  The common femoral and proximal femoral veins ae incompetent and free of thrombus. The remaining deep veins are competent and free of thrombus.      The GSV demonstrates phasic flow, compresses and responds to augmentations from the saphenofemoral junction to the knee with no evidence of thrombus. The GSV is too small to assess flow in the calf, but is compressible and appears patent. The great saphenous vein measures 5.4 mm at the saphenofemoral junction, 4.1 mm at the proximal thigh and 4.0 mm at the knee.  The GSV is incompetent from Proximal Thigh to Mid Thigh, with the greatest reflux time of 3052 milliseconds.       The AASV is competent( 2.3 mm) draining into the saphenofemoral junction.      The Giacomini vein is incompetent ( 1.7 mm) communicating with the small saphenous vein at the knee level. The Giacomini vein is incompetent from the distal thigh to proximal thigh with a reflux time of 4058 milliseconds.      The SSV demonstrates phasic flow, compresses and responds to augmentations from the popliteal space to the ankle.  No reflux or thrombus is seen. The saphenopopliteal junction  is competent ( 2.8 mm).       Perforators: there is no evidence of incompetent  veins at any level.      FINAL SUMMARY:  1.  No evidence of deep or superficial vein thrombosis in either lower extremity  2.  Right common femoral vein incompetence  3.  Left common femoral and proximal femoral vein incompetence  4.  Left proximal and mid thigh great saphenous vein incompetence  5.  Left Vein of Giacomini incompetence                Incompetence Criteria: Greater than 500 milliseconds reflux in the superficial and  veins and greater than than 1000 milliseconds reflux in the deep veins.      OJ Wilks MD, [facility]                                                      CMR Report       MRN:                  [id number]                                  Name:         NORY CAMARGO                                  :                  1985                                  Scan Date:   2022-10-31 07:59:08                                   Electronically signed by Dev Oneil 2022-Oct-31 10:42:56     SUMMARY   ==========================================================================================================     Clinical history: 38 yo F with PMH of T1DM s/p kidney/pancreas transplant who presented with dyspnea on  exertion and fatigue with a negative cardiac workup thus far. Presents for CMR to evaluate biventricular  function and LGE assessment.  Comparison CMR: No prior     1. The LV is normal in cavity size and wall thickness. The global systolic function is normal. The LVEF is  61%. There are no regional wall motion abnormalities.     2. The RV is normal in cavity size. The global systolic function is normal. The RVEF is 56%.      3. Both atria are normal in size.     4. There is no significant valvular disease.      5. Late gadolinium enhancement imaging shows no MI, fibrosis or infiltrative disease.      6.  There is no pericardial effusion or thickening.     CONCLUSIONS:  Normal biventricular function.  No significant valvular disease.  No LGE enhancement to suggest an underlying cardiomyopathy.     CORE EXAM   ==========================================================================================================     MEASUREMENTS   ----------------------------------------------------------------------------------------      VOLUMETRIC ANALYSIS       ----------------------------------------------  .-------------------------------------------------------.                   LV    Reference  RV    Reference   +-----+-----------+------+-----------+------+-----------+   EDV  ml          103   ()    91   ()         ml/m^2       63   (62-96)     55   (61-98)     ESV  ml           41   (27-64)     40   (25-77)          ml/m^2       25   (17-36)     24   (17-43)     CO   L/min      4.99             4.09                    L/min/m^2  3.03             2.49               SV   ml           62   ()    51   ()         ml/m^2       38   (40-65)     31   (38-62)     EF   %            61   (57-75)     56   (51-75)    '-----+-----------+------+-----------+------+-----------'             VISUAL EJECTION FRACTION:  60 %          CARDIAC OUTPUT HR:  80 BPM      LV DIMENSIONS       ----------------------------------------------          WALL THICKNESS - ANTEROSEPTAL:  0.8 cm          WALL THICKNESS - INFEROLATERAL:  0.7 cm          WALL THICKNESS - MAXIMUM:  0.88 cm          LV MARGA:  4.8 cm          LV ESD:  2.8 cm         LA DIMENSIONS (LV SYSTOLE)       ----------------------------------------------          DIAMETER:  3.6 cm          LENGTH - 2 CHAMBER:  4.6 cm          LENGTH - 4 CHAMBER:  4.9 cm         AORTIC ROOT DIMENSIONS       ----------------------------------------------          ANNULUS:  2.6 cm          AORTIC ROOT SIZE:   Normal        ANATOMY   ----------------------------------------------------------------------------------------      LEFT VENTRICLE       ----------------------------------------------          WALL THICKNESS:  Normal          CAVITY SIZE:  Normal         RIGHT VENTRICLE       ----------------------------------------------          WALL THICKNESS:  Normal          CONTRACTILITY:  Normal          CAVITY SIZE:  Normal          MASS/THROMBUS:  None         INTERVENTRICULAR SEPTUM       ----------------------------------------------               VENTRICULAR SEPTUM:  Normal          INTERATRIAL SEPTUM       ----------------------------------------------               ATRIAL SEPTUM:          Normal          LEFT ATRIUM       ----------------------------------------------          CAVITY SIZE:  Normal         RIGHT ATRIUM       ----------------------------------------------          CAVITY SIZE:  Normal         PERICARDIUM       ----------------------------------------------          Normal          EFFUSION:  None         PLEURAL EFFUSION       ----------------------------------------------               None         VALVES   ----------------------------------------------------------------------------------------      AORTIC VALVE       ----------------------------------------------          AORTIC VALVE LEAFLETS:  Trileaflet         MITRAL VALVE       ----------------------------------------------          MITRAL VALVE LEAFLETS:  Normal Leaflets         TRICUSPID VALVE       ----------------------------------------------          TRICUSPID VALVE LEAFLETS:  Normal Leaflets         PULMONIC VALVE       ----------------------------------------------          PULMONIC VALVE LEAFLETS:  Normal Leaflets        17 SEGMENT   ----------------------------------------------------------------------------------------  .------------------------------------------------------------------------------------------.   Segments             Wall Motion   Hyperenhancement  Stress Perfusion  Interpretation   +--------------------+--------------+------------------+------------------+----------------+   Base Anterior       Normal/Hyper  None                                Normal            Base Anteroseptal   Normal/Hyper  None                                Normal            Base Inferoseptal   Normal/Hyper  None                                Normal            Base Inferior       Normal/Hyper  None                                Normal            Base Inferolateral  Normal/Hyper  None                                Normal            Base Anterolateral  Normal/Hyper  None                                Normal            Mid Anterior        Normal/Hyper  None                                Normal            Mid Anteroseptal    Normal/Hyper  None                                Normal            Mid Inferoseptal    Normal/Hyper  None                                Normal            Mid Inferior        Normal/Hyper  None                                Normal            Mid Inferolateral   Normal/Hyper  None                                Normal            Mid Anterolateral   Normal/Hyper  None                                Normal            Apical Anterior     Normal/Hyper  None                                Normal            Apical Septal       Normal/Hyper  None                                Normal            Apical Inferior     Normal/Hyper  None                                Normal            Apical Lateral      Normal/Hyper  None                                Normal            [place]                Normal/Hyper  None                                Normal           +--------------------+--------------+------------------+------------------+----------------+   RV Segments         Wall Motion   Hyperenhancement                     Interpretation   +--------------------+--------------+------------------+------------------+----------------+   RV Basal Anterior   Normal/Hyper  None                                Normal            RV Basal Inferior   Normal/Hyper  None                                Normal            RV Mid              Normal/Hyper  None                                Normal            RV Apical           Normal/Hyper  None                                Normal           '--------------------+--------------+------------------+------------------+----------------'         FINDINGS       ----------------------------------------------          LV SCAR SIZE (17 SEGMENT):  0 %        SCAN INFO   ==========================================================================================================     GENERAL   ----------------------------------------------------------------------------------------      CONTRAST AGENT       ----------------------------------------------          TYPE:  Gadavist          GD CONCENTRATION:  0.5 M          VOLUME ADMINISTERED:  6 ml          DOSAGE:  0.05 mmol/kg         VITALS       ----------------------------------------------          HEIGHT:  66.00 in          HEIGHT:  167.64 cm          WEIGHT:  125.99 lbs          WEIGHT:  57.15 kgs          BSA:  1.64 m^2         SETUP       ----------------------------------------------          REFERRING PHYSICIAN:  ANTHONY ELIZALDE          ATTENDING PHYSICIAN:  ANTHONY ORO   ----------------------------------------------------------------------------------------      CPT Codes      ICD10 Codes          R06.02        Report generated by [employer], a product of [facility] [employer]              EXAMINATION: CT ABDOMEN PELVIS W/O CONTRAST, 2/1/2022 5:31 PM     TECHNIQUE:  Helical CT images from the lower thorax through the  symphysis pubis were obtained without IV contrast.      COMPARISON: CT abdomen and pelvis without  contrast 12/8/2021     HISTORY: Abdominal pain, unspecified abdominal location     FINDINGS:     Lower thorax: Visualized lower lung fields are unremarkable with a  right lower lobe calcified granuloma which dates back to at least  7/21/2021.     ABDOMEN/PELVIS:  LIVER: No suspicious focal hepatic lesion.     BILIARY: No calcified intraluminal gallstone. No intrahepatic or  extrahepatic biliary ductal dilation.     PANCREAS: Atrophic native pancreas. Right lower quadrant pancreatic  transplant.     SPLEEN: Within normal limits. Splenic calcifications likely secondary  to previous granulomatous disease.     ADRENAL GLANDS: No focal adrenal nodule.     URINARY TRACT: Native kidneys and left lower quadrant renal  transplant. No nephrolithiasis, suspicious mass.     REPRODUCTIVE ORGANS: Within normal limits.     STOMACH: Images distended with contents including dense layering  contents in the antrum which may represent prior ingested contrast  material.     BOWEL: Cecum and ascending colon is distended with fluid. There is a  large stool burden in the transverse descending and sigmoid colon. No  abnormal bowel wall thickening or enhancement. Appendix is surgically  absent.     PERITONEUM/FLUID: No ascites or pelvic free fluid.     VESSELS: No aneurysmal dilatation of the abdominal aorta.      LYMPH NODES: Diffuse mesenteric and retroperitoneal lymphadenopathy,  likely reactive.     BONES/SOFT TISSUES: No aggressive osseous lesions. Left femoral  intramedullary nails. Mild degenerative changes lumbar spine.  Unchanged heterotopic calcifications the right femoral head.                                                                      IMPRESSION:   1. Postoperative changes of left lower quadrant renal and right pelvic  pancreatic transplant.  2. Moderate colonic stool with fluid-filled descending colon and  distended stomach with copious gastric contents to suggest slow  transit/constipation. An adynamic ileus is also a  possibility.     I have personally reviewed the examination and initial interpretation  and I agree with the findings.     ALEXANDER BARRAZA MD         Component      Latest Ref Rng 3/20/2023 3/24/2023 4/3/2023   Sodium      136 - 145 mmol/L 140   137    Potassium      3.4 - 5.3 mmol/L 4.3   4.0    Chloride      98 - 107 mmol/L 106   104    Carbon Dioxide (CO2)      22 - 29 mmol/L 25   21 (L)    Anion Gap      7 - 15 mmol/L 9   12    Urea Nitrogen      6.0 - 20.0 mg/dL 23.1 (H)   21.3 (H)    Creatinine      0.51 - 0.95 mg/dL 1.01 (H)   1.00 (H)    Calcium      8.6 - 10.0 mg/dL 9.6   9.4    Glucose      70 - 99 mg/dL 89   83    GFR Estimate      >60 mL/min/1.73m2 73   74    Creatinine Urine      mg/dL  61.7     Albumin Urine mg/L      mg/L  <12.0     Albumin Urine mg/g Cr  --        Legend:  (H) High  (L) Low         EXAM: CTV ABDOMEN PELVIS W CONTRAST  LOCATION: [facility]  DATE/TIME: 3/24/2023 6:50 PM     INDICATION: Leg swelling  COMPARISON: None.  TECHNIQUE: CT angiogram through the abdomen and pelvis was performed during the venous phase of contrast enhancement. 2D and 3D reconstructions performed by the CT technologist. Dose reduction techniques were used.  CONTRAST: 80 mL Isovue 370     FINDINGS:   VASCULAR FINDINGS: Common femoral, external iliac and common iliac veins are patent. No significant compression of the common iliac veins. The inferior vena cava is patent.     NONVASCULAR FINDINGS:  LUNG BASES: Normal     ABDOMEN: The liver, gallbladder, pancreas, spleen and adrenals appear unremarkable. Minimal enhancement of the native kidneys.     PELVIS: Transplant kidney left lower quadrant. No evidence of hydronephrosis. Arterial anastomosis to the mid left external iliac artery. Venous anastomosis to the distal left external iliac vein.     MUSCULOSKELETAL: Normal                                                                      IMPRESSION:  1.  No evidence of May Thurner  anatomy.  2.  Transplant kidney left lower quadrant.           A/P:        (M79.89) Leg swelling  (primary encounter diagnosis)  Comment: The patient is an otherwise healthy appearing thin female with a functional kidney pancreas transplant and years of swelling predating her transplant. Her sxs sound of venous origin, yet she does not anatomically have significant enough insufficiency on competency study for me to believe this is the etiology of her sxs. She has negative Stemmer signs. Lymphedema is therefore not the etiology. Thyroid function is normal. Urinary albumin is normal therefore ruling out osmotic etiologies of swelling. CTV to ruled out extrinsic iliac vein compression (doubtful as no DVT hx, and sxs predated transplantation).  Plan:  There is no overt venous or lymphatic vascular etiology for her sxs.  As all the above are normal, I wonder if she could have autonomic dysfunction as the etiology of her sxs. I would advise she treat the symtpomatic swelling with compression and seek input form her PCP for a neurology consult to ascertain the likelihood of an autonomic etiology for her sxs if this continues to bother her. RTC prn.                38 minutes total medical care on today's date due to need to review a very large volume of medical data in this patient I have seen for the first time today.

## 2023-04-04 NOTE — PROGRESS NOTES
"Patient is here to discuss follow up    /84 (BP Location: Right arm, Patient Position: Chair, Cuff Size: Adult Regular)   Pulse 87   Ht 5' 6\" (1.676 m)   Wt 126 lb 6.4 oz (57.3 kg)   SpO2 100%   BMI 20.40 kg/m      Questions patient would like addressed today are: N/A.    Refills are needed: No    Has homecare services and agency name:  [facility] QUINONE[facility]    "

## 2023-04-05 ENCOUNTER — VIRTUAL VISIT (OUTPATIENT)
Dept: GASTROENTEROLOGY | Facility: CLINIC | Age: 38
End: 2023-04-05
Payer: COMMERCIAL

## 2023-04-05 ENCOUNTER — MYC MEDICAL ADVICE (OUTPATIENT)
Dept: ALLERGY | Facility: CLINIC | Age: 38
End: 2023-04-05

## 2023-04-05 DIAGNOSIS — K59.09 CHRONIC CONSTIPATION: ICD-10-CM

## 2023-04-05 DIAGNOSIS — Z94.0 KIDNEY REPLACED BY TRANSPLANT: Primary | ICD-10-CM

## 2023-04-05 DIAGNOSIS — Z94.83 PANCREAS REPLACED BY TRANSPLANT (H): ICD-10-CM

## 2023-04-05 PROCEDURE — 99215 OFFICE O/P EST HI 40 MIN: CPT | Mod: VID | Performed by: PHYSICIAN ASSISTANT

## 2023-04-05 RX ORDER — PRUCALOPRIDE 2 MG/1
2 TABLET, FILM COATED ORAL DAILY
Qty: 90 TABLET | Refills: 0 | Status: SHIPPED | OUTPATIENT
Start: 2023-04-05 | End: 2023-06-06

## 2023-04-05 ASSESSMENT — PAIN SCALES - GENERAL: PAINLEVEL: NO PAIN (0)

## 2023-04-05 NOTE — NURSING NOTE
Is the patient currently in the state of [place]? YES    Visit mode:VIDEO    If the visit is dropped, the patient can be reconnected by: VIDEO VISIT: Send to e-mail at: [email or web address]    Will anyone else be joining the visit? NO      How would you like to obtain your AVS? MyChart    Are changes needed to the allergy or medication list? NO    Reason for visit: follow up

## 2023-04-05 NOTE — LETTER
4/5/2023         RE: Marilyn Ortega  [address]  [facility] [address]        Dear Colleague,    Thank you for referring your patient, Marilyn Ortega, to the [facility] [place]. Please see a copy of my visit note below.    Gastroenterology Visit for: Marilyn Ortega 1985   MRN: [id number]     Reason for Visit:  chief complaint    Referred by: No ref. provider found  /   Patient Care Team:  No Ref-Primary, Physician as PCP - General  Martha Christianson NP as Nurse Practitioner (Acupuncture)  Venice Banegas MD as MD (Endocrinology, Diabetes, and Metabolism)  Ricarda Ravi as Transplant Coordinator (Transplant)  Naz Sierra as LPN (Transplant)  Bulmaro Weiner [facility] as Pharmacist (Pharmacist)  Patricia Gonzales RN as Registered Nurse  Flex Bocanegra MD as MD (Gastroenterology)  Kobe Yip MD as MD (Neurology)  Kamran Corona MD as MD (Nephrology)  Pio Zamorano MD as Resident (Ophthalmology)  Amando Flores DO as Assigned Neuroscience Provider  Ochoa Barnes MD as Assigned Surgical Provider  Luis A Shrestha PA-C as Assigned Cancer Care Provider  Flex Bocanegra MD as Assigned Gastroenterology Provider  Reanna Cueto MD as Assigned Nephrology Provider  Guille López MD as MD (Cardiovascular Disease)  Osiel Raphael MD as MD (Cardiovascular Disease)  Mallory Franco, RN as Specialty Care Coordinator (Cardiology)  Bulmaro Weiner [facility] as Assigned MTM Pharmacist  George Munson MD as MD (Allergy & Immunology)  George Munson MD as Assigned Allergy Provider  Yasir Wilks MD as Assigned Heart and Vascular Provider    History of Present Illness:   Marilyn Ortega is a 37 year old female with significant past medical history pertinent for history of 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 difficile 201 who is presenting as follow-up for constipation/gastroparesis.      Interval History 4/5/2023:    Today  Kami reports that she has a history of Type 1 DM for 25 years.  Notes that she adheres to a gluten free diet and been tested for celiac that has been negative.     Overall she is doing well.  She does have some increased bloating/abdominal discomfort with the consumption of fiber. Triggers include chickpeas, merlyn seeds, cassava flour, and inulin. States \"I have learned my safe foods.\"  Symptoms are well controlled with dietary adjustments.    As for her bowel habits she reports a remote history of 2-3 years of diarrhea for which she underwent extensive work-up. At this time was having incontinence however this has since subsided.  Now she struggles with constipation for which she takes Motegrity 2 mg once daily with consistent bowel habits. Marilyn will have a stool daily if not every other day consistent with [place] Stool Type 4/5. Continues to have to rectally digitate.     Had gone to physical therapy/biofeedback however with minimal improvement.    Denies weight loss, nausea, emesis, dysphagia, odynophagia, heartburn, regurgitation, bloating/fullness, diarrhea, constipation, nocturnal stooling, incontinence, melena, hematochezia and BRBR.       --------------------------------------------------------------------------------------------------  5/20/2022 Dr. Bocanegra     HPI:   38 yo woman with type 1 DM (not previously on dialysis) s/p panc-kidney transplant Nov 2021, gastroparesis but not retinopathy, neuropathy who presents for follow up of gastroparesis and change in bowel habits.      Had face, mouth twitching with Reglan so stopped this. Symptoms have improved, also wondering if this is related to tacrolimus as she had this a little before Reglan. Feels like the reglan amplified it though.      Alternating constipation and regular bowel movements. No " diarrhea. Essentially unchanged since our last visit in February.      Schedule for pelvic floor testing on 5/31. We discussed confounders to gastric emptying study including medications, fatty meals, constipation.      A 10 point ROS is otherwise negative.    ----------------------------------------------------------------------------------------------------  2/18/2022 Dr. Bocanegra:     HPI:   35 yo woman with type 1 DM (not previously on dialysis) s/p panc-kidney transplant Nov 2021, gastroparesis but not retinopathy, neuropathy who presents for gastroparesis and change in bowel habits.      Not great GI related. Years of GI issues, starting in college. Tried gluten free diet which improved symptoms 100%. Gluten free since 2002. Varies between diarrhea and constipation.      Had kidney/pancreas transplant in Nov 2021. Had severe diarrhea for 2 weeks about 6 weeks ago, lost 10 pounds. Needed infusions. Enteric pathogens were negative. All of a sudden it stopped and she became very constipated. Needed laxatives, enemas and everything became straight liquid. Drug levels have varied, which her transplant team has been worried has been related to dysmotility.      After transplant and prednisone course she was fairly regular for about 1 month. Didn't have other infections around that time. Before transplant things were ok, more constipation than diarrhea but was overall ok.     Started Prucalopride approximately 2 years ago, 2mg daily. This was helpful but now hasn't been. Tried Benefiber which caused a lot of bloating. Takes docusate daily in the morning. No opiates. She has tried gastroparesis diet. She is taking [employer] brand magnesium. She doesn't notice improvement with prunes but she can increase the number she eats. Senna doesn't work as well as dulcolax.        Esophageal Questionnaire(s)    BEDQ Questionnaire       View : No data to display.                   View : No data to display.                Celi  Questionnaire       View : No data to display.                Promis 10 Questionnaire       View : No data to display.                    STUDIES & PROCEDURES:    EGD:     12/18/2018 [employer] [facility]   Findings:        The examined esophagus was normal.        A large amount of food (residue) was found in the        gastric body.        The examined duodenum was normal.   Impression:          - Normal esophagus.                        - A large amount of food (residue) in                        the stomach.                        - Due to the amount of food in the                        stomach large portions of the stomach                        mucosa could not be evaluated.                        - Normal examined duodenum.                        - No specimens collected.       Colonoscopy:    11/23/2015 [facility]   ASSESSMENT:   Terminal ileum and entire colon mucosa was normal   Random biopsies taken to rule out microscopic pathology.     RECOMMENDATIONS:     Other:     5/31/2022 [facility]          EndoFLIP directed at the [facility] or LES (8cm (EF-325) balloon length or 16cm (EF-322) balloon length):   Date:  8cm balloon  Balloon inflation Balloon pressure CSA (mm^2) DI (mm^2/mmHg) Dmin (mm) Compliance   20 (ladmark ID)        30        40        50           16cm balloon  Balloon inflation Balloon pressure CSA (mm^2) DI (mm^2/mmHg) Dmin (mm) Compliance   30 (ladmark ID)        40        50        60        70           High Resolution Manometry:    PH/Impedance:     Bravo:    CT:    3/24/2023 CTV AP W Contrast   IMPRESSION:  1.  No evidence of May Thurner anatomy.  2.  Transplant kidney left lower quadrant.    2/1/2022  CT AP  WO Contrast                                                      IMPRESSION:   1. Postoperative changes of left lower quadrant renal and right pelvic  pancreatic transplant.  2. Moderate colonic stool with fluid-filled descending colon and  distended stomach with  copious gastric contents to suggest slow  transit/constipation. An adynamic ileus is also a possibility.    Esophagram:    FL VSS:     GES:    8/21/2019 [facility]   FINDINGS:   Percent remaining activity within the stomach:   Immediate: 100% (normal greater than 70%)   1 hour: 94% (normal 30-90%)   2 hours: 93% (normal less than 60%)   3 hours: 81% (normal less than 30%)   4 hours: 61% (normal less than 10%)       U/S:     XRAY:    5/11/2022  IMPRESSION: Moderate amount of stool.  Nonobstructed bowel gas  pattern.         Prior medical records were reviewed including, but not limited to, notes from referring providers, lab work, radiographic tests, and other diagnostic tests. Pertinent results were summarized above.     History     Past Medical History:   Diagnosis Date    Anemia 12/9/2021    C. difficile diarrhea 2013    CKD (chronic kidney disease) stage 4, GFR 15-29 ml/min (H)     Complication of transplanted kidney 5/23/2022    Gastroparesis     Gluten intolerance     Heterozygous factor V Leiden mutation (H)     HTN (hypertension)     Infection due to 2019 novel coronavirus 11/2020    Osteomyelitis (H) 2013    Shingles 2013    Type 1 diabetes mellitus (H) 2000       Past Surgical History:   Procedure Laterality Date    BENCH KIDNEY N/A 11/15/2021    Procedure: Bench kidney;  Surgeon: Branden Aranda MD;  Location:  OR    BENCH PANCREAS N/A 11/15/2021    Procedure: Bench pancreas;  Surgeon: Branden Aranda MD;  Location: [facility] OR    CATARACT EXTRACTION  11/2021    CV CORONARY ANGIOGRAM N/A 9/8/2022    Procedure: Coronary Angiogram;  Surgeon: Bebeto Ventura MD;  Location:  HEART CARDIAC CATH LAB    CV RIGHT HEART CATH MEASUREMENTS RECORDED N/A 9/8/2022    Procedure: Right Heart Catheterization;  Surgeon: Bebeto Ventura MD;  Location:  HEART CARDIAC CATH LAB    CV RIGHT HEART CATH MEASUREMENTS RECORDED N/A 11/23/2022    Procedure: Right  Heart Catheterization [[id number]];  Surgeon: Trevor Jasmine MD;  Location:  HEART CARDIAC CATH LAB    IR RENAL BIOPSY LEFT  2022    IR RENAL BIOPSY LEFT  2022    ORIF HIP FRACTURE Left     ORTHOPEDIC SURGERY Bilateral     Repair of labral tear    TRANSPLANT PANCREAS, KIDNEY  DONOR, COMBINED Left 11/15/2021    Procedure: TRANSPLANT, KIDNEY AND PANCREAS,  DONOR;  Surgeon: Branden Aranda MD;  Location:  OR       Social History     Socioeconomic History    Marital status: Single     Spouse name: Not on file    Number of children: Not on file    Years of education: Not on file    Highest education level: Not on file   Occupational History    Not on file   Tobacco Use    Smoking status: Never    Smokeless tobacco: Never   Vaping Use    Vaping status: Not on file   Substance and Sexual Activity    Alcohol use: Yes     Comment: socially    Drug use: No    Sexual activity: Yes   Other Topics Concern    Parent/sibling w/ CABG, MI or angioplasty before 65F 55M? Not Asked   Social History Narrative    Not on file     Social Determinants of Health     Financial Resource Strain: Not on file   Food Insecurity: Not on file   Transportation Needs: Not on file   Physical Activity: Not on file   Stress: Not on file   Social Connections: Not on file   Intimate Partner Violence: Not on file   Housing Stability: Not on file       Family History   Problem Relation Age of Onset    Diabetes Paternal Grandfather     Macular Degeneration Mother     Arthritis Maternal Grandmother     Hypertension Maternal Grandmother     Cancer - colorectal Maternal Grandfather     Glaucoma No family hx of      Family history reviewed and edited as appropriate    Medications and Allergies:     Outpatient Encounter Medications as of 2023   Medication Sig Dispense Refill    aspirin (ASA) 81 MG chewable tablet Take 81 mg by mouth daily      cetirizine (ZYRTEC) 10 MG tablet Take 1 tablet (10 mg) by mouth every  other day      cholecalciferol 25 MCG (1000 UT) TABS Take 2,000 Units by mouth every other day       docusate sodium (COLACE) 100 MG capsule Take 200 mg by mouth daily before breakfast      furosemide (LASIX) 20 MG tablet Take 1 tablet (20 mg) by mouth daily 45 tablet 0    loratadine (CLARITIN) 10 MG tablet Take 10 mg by mouth daily      MOTEGRITY 2 MG tablet TAKE 1 TABLET BY MOUTH EVERY DAY 90 tablet 0    mycophenolic acid (GENERIC EQUIVALENT) 360 MG EC tablet Take 1 tablet (360 mg) by mouth 2 times daily 60 tablet 11    ondansetron (ZOFRAN) 4 MG tablet Take 1 tablet (4 mg) by mouth every 8 hours as needed for nausea 30 tablet 0    tacrolimus (ENVARSUS XR) 0.75 MG 24 hr tablet Take 2 tablets (1.5 mg) by mouth every morning (before breakfast) Hold for dose change. 60 tablet 11    tacrolimus (ENVARSUS XR) 1 MG 24 hr tablet Take 2 tablets (2 mg) by mouth every morning (before breakfast) Total dose=6mg daily 60 tablet 11    tacrolimus (ENVARSUS XR) 4 MG 24 hr tablet Take 1 tablet (4 mg) by mouth every morning (before breakfast) Total dose=6mg daily 30 tablet 11     Facility-Administered Encounter Medications as of 4/5/2023   Medication Dose Route Frequency Provider Last Rate Last Admin    bevacizumab (AVASTIN) intravitreal inj 1.25 mg  1.25 mg Intravitreal Once Ochoa Barnes MD        bevacizumab (AVASTIN) intravitreal inj 1.25 mg  1.25 mg Intravitreal Q28 Days Pio Zamorano MD   1.25 mg at 04/19/22 1034    bevacizumab (AVASTIN) intravitreal inj 1.25 mg  1.25 mg Intravitreal Q28 Days Pio Zamorano MD   1.25 mg at 12/06/22 1037        Allergies   Allergen Reactions    Chlorhexidine Hives, Itching and Rash     PN: Patient had swelling/rash that was very itchy with chlorprep.    Ceclor [Cefaclor Monohydrate]     Metoclopramide Other (See Comments)     Noted mouth/facial twitching with reglan in 2022 when it was tried for gastroparesis.    Cefaclor Rash        Review of systems:  A full 10 point review of  systems was obtained and was negative except for the pertinent positives and negatives stated within the HPI.    Objective Findings:   Physical Exam:    Constitutional: There were no vitals taken for this visit.  General: Alert, cooperative, no distress, well-appearing  Head: Atraumatic, normocephalic, no obvious abnormalities   Eyes: Sclera anicteric, no obvious conjunctival hemorrhage   Nose: Nares normal, no obvious malformation, no obvious rhinorrhea   Respiratory: Resting comfortably, no apparent distress, no cough.   Skin: No jaundice, no obvious rash  Neurologic: AAOx3, no obvious neurologic abnormality  Psychiatric: Normal Affect, appropriate mood  Extremities: No obvious edema, no obvious malformation     Labs, Radiology, Pathology     Lab Results   Component Value Date    WBC 4.4 04/03/2023    WBC 3.9 (L) 03/20/2023    WBC 4.9 03/06/2023    HGB 10.3 (L) 04/03/2023    HGB 10.5 (L) 03/20/2023    HGB 10.5 (L) 03/06/2023     04/03/2023     03/20/2023     03/06/2023    CHOL 160 05/31/2022    CHOL 192 08/23/2011    TRIG 102 05/31/2022    TRIG 84 08/23/2011    HDL 79 05/31/2022    HDL 83 (H) 08/23/2011    ALT 20 05/23/2022    ALT 18 05/04/2022    ALT 23 11/15/2021    AST 13 05/23/2022    AST 19 05/04/2022    AST 16 11/15/2021     04/03/2023     03/20/2023     03/06/2023    BUN 21.3 (H) 04/03/2023    BUN 23.1 (H) 03/20/2023    BUN 21.8 (H) 03/06/2023    CO2 21 (L) 04/03/2023    CO2 25 03/20/2023    CO2 24 03/06/2023    TSH 1.75 02/07/2023    TSH 2.37 04/18/2022    TSH 3.30 10/16/2012    INR 0.97 09/08/2022    INR 1.01 06/09/2022    INR 0.96 05/02/2022        Liver Function Studies -   Recent Labs   Lab Test 07/14/22  0804 05/23/22  0819   PROTTOTAL  --  7.9   ALBUMIN 4.0 4.0   BILITOTAL  --  0.3   ALKPHOS  --  84   AST  --  13   ALT  --  20          Patient Active Problem List    Diagnosis Date Noted    Dyspnea on exertion 09/08/2022     Priority: Medium    Chest pain,  unspecified type 09/08/2022     Priority: Medium    Kidney transplant rejection 05/02/2022     Priority: Medium    Generalized edema 03/17/2022     Priority: Medium    Aftercare following organ transplant 12/19/2021     Priority: Medium    Anemia due to stage 3a chronic kidney disease (H) 12/09/2021     Priority: Medium    Kidney replaced by transplant 12/07/2021     Priority: Medium    Pancreas replaced by transplant (H) 12/07/2021     Priority: Medium    Immunosuppression (H) 11/22/2021     Priority: Medium    Heterozygous factor V Leiden mutation (H)      Priority: Medium    Stage 3a chronic kidney disease (H)      Priority: Medium    HTN, kidney transplant related      Priority: Medium    Gastroparesis      Priority: Medium    Chronic constipation 06/08/2012     Priority: Medium    Irritable bowel syndrome 06/08/2012     Priority: Medium    Osteopenia 04/25/2012     Priority: Medium      Assessment and Plan   Assessment/Plan:    Marilyn Ortega is a 37 year old female with significant past medical history pertinent for history of 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 difficile 201 who is presenting as follow-up for constipation/gastroparesis.      #Gastroparesis   Endoscopic evaluation 2018 with large amounts of food retained within the stomach.  Subsequently she underwent formal gastric emptying study 8/21/2019  with 61% retention at 4 hours. The etiology of her gastroparesis is likely secondary to history of diabetes.  Previously she has failed Reglan secondary to the development of tics/tremors. Currently she is well managed on Motegrity 2 mg once daily and dietary adjustments.  During her visit today alternative therapies were reviewed.  Kamilla does have a history of C. difficile and remains on tacrolimus as part of her immunosuppressive therapy for her recent transplant and therefore Erythromycin use should be  avoided.  Additional medication options that were discussed include Mirtazapine and Domperidone. However Domperidone is currently not approved within the [place] secondary to concerns regarding potential cardiac side effects.  Lastly conversation was had regarding surgical interventions specifically GPOEM.    - As continue to have stable symptoms please continue Motegrity 2 mg once daily and lifestyle modifications as management for your gastroparesis/constipation      #Chronic Constipation   With significant past medical history for multiple abdominal surgeries and pelvic orthopedic interventions.  Previously seen and evaluated with the pelvic floor specialists with findings consistent with dyssynergia and rectal intussusception for which she underwent biofeedback therapy with minimal improvement. Per prior documentation future consideration would be to obtain defecography to evaluate if she continues to have persistent inability to relax the pelvic floor, if symptoms are secondary to the rectal intussusception vs slow transit constipation.     Currently Marilyn has a bowel movement daily if not every other day with the use of  Motegrity 2 mg once daily that is consistent with [place] stool scale type 4/5 and the continued need to rectally digitate.  Unfortunately she reports increased in upper GI symptoms with the use of fiber.    Future considerations would be to again see the pelvic  for additional evaluation/intervention vs obtain consultation with colorectal surgeons.     - Continue Motegrity 2mg once daily as prescribed       Follow up plan:   Return to clinic 6 months and as needed.    The risks and benefits of my recommendations, as well as other treatment options were discussed with the patient and any available family today. All questions were answered.     Follow up: As planned above. Today, I personally spent 33 minutes in direct face to face time with the patient, of which greater than 50%  of the time was spent in patient education and counseling as described above. Approximately 22 minutes were spent on indirect care associated with the patient's consultation including but not limited to review of: patient medical records to date, clinic visits, hospital records, lab results, imaging studies, procedural documentation, and coordinating care with other providers. The findings from this review are summarized in the above note. All of the above accounted for a cumulative time of 55 minutes and was performed on the date of service.     The patient verbalized understanding of the plan and was appreciative for the time spent and information provided during the office visit.           Kelly Daniels PA-C  Division of Gastroenterology, Hepatology, and Nutrition  [facility]       Documentation assisted by voice recognition and documentation system.          Again, thank you for allowing me to participate in the care of your patient.      Sincerely,    Kelly Daniels PA-C

## 2023-04-05 NOTE — PATIENT INSTRUCTIONS
It was a pleasure taking care of you today.  I've included a brief summary of our discussion and care plan from today's visit below.  Please review this information with your primary care provider.  _______________________________________________________________________    My recommendations are summarized as follows:    - As continue to have stable symptoms please continue Motegrity 2 mg once daily and lifestyle modifications as management for your gastroparesis/constipation    To schedule endoscopic procedures you may call: [phone]  To schedule radiology (imaging) tests you may call: [phone]  To schedule an ENT appointment you may call: [phone]    Please call my nurse Randa ([phone]), Sharee ([phone]) with any questions or concerns.      Return to GI Clinic in 6 months to review your progress.    _______________________________________________________________________    Who do I call with any questions after my visit?  Please be in touch if there are any further questions that arise following today's visit.  There are multiple ways to contact your gastroenterology care team.      During business hours, you may reach a Gastroenterology nurse at [phone] and choose option 3.       To schedule or reschedule an appointment, please call [phone].     You can always send a secure message through Songtr[employer].  [employer] messages are answered by your nurse or doctor typically within 24 hours.  Please allow extra time on weekends and holidays.      For urgent/emergent questions after business hours, you may reach the on-call GI Fellow by contacting the [facility] at [phone].     How will I get the results of any tests ordered?    You will receive all of your results.  If you have signed up for MyChart, any tests ordered at your visit will be available to you after your physician reviews them.  Typically this takes 1-2 weeks.  If there are urgent results that require a  change in your care plan, your physician or nurse will call you to discuss the next steps.      What is [employer]?  [employer] is a secure way for you to access all of your healthcare records from the [facility].  It is a web based computer program, so you can sign on to it from any location.  It also allows you to send secure messages to your care team.  I recommend signing up for [employer] access if you have not already done so and are comfortable with using a computer.      How to I schedule a follow-up visit?  If you did not schedule a follow-up visit today, please call [phone] to schedule a follow-up office visit.      If you feel you received exceptional care and are interested in supporting the clinical and research goals of Kelly Daniels PA-C or the Division of Gastroenterology, Hepatology, and Nutrition please contact [email or web address] from the [facility] to discuss opportunities to donate.    Sincerely,    Kelly Daniels PA-C  Division of Gastroenterology, Hepatology, and Nutrition  [facility]

## 2023-04-05 NOTE — PROGRESS NOTES
Virtual Visit Details    Type of service:  Video Visit     Originating Location (pt. Location): Home    Distant Location (provider location):  Off-site  Platform used for Video Visit: Herman     Start: 7:02 AM   End 7:36 AM   Gastroenterology Visit for: Marilyn Ortega 1985   MRN: [id number]     Reason for Visit:  chief complaint    Referred by: No ref. provider found  /   Patient Care Team:  No Ref-Primary, Physician as PCP - General  Martha Christianson NP as Nurse Practitioner (Acupuncture)  Venice Banegas MD as MD (Endocrinology, Diabetes, and Metabolism)  Ricarda Ravi as Transplant Coordinator (Transplant)  Naz Sierra as LPN (Transplant)  Bulmaro Weiner [facility] as Pharmacist (Pharmacist)  Patricia Gonzales RN as Registered Nurse  Flex Bocanegra MD as MD (Gastroenterology)  Kobe Yip MD as MD (Neurology)  Kamran Corona MD as MD (Nephrology)  Pio Zamorano MD as Resident (Ophthalmology)  Amando Flores DO as Assigned Neuroscience Provider  Ochoa Barnes MD as Assigned Surgical Provider  Luis A Shrestha PA-C as Assigned Cancer Care Provider  Flex Bocanegra MD as Assigned Gastroenterology Provider  Reanna Cueto MD as Assigned Nephrology Provider  Guille López MD as MD (Cardiovascular Disease)  Osiel Raphael MD as MD (Cardiovascular Disease)  Mallory Franco, RN as Specialty Care Coordinator (Cardiology)  Bulmaro Weiner [facility] as Assigned MTM Pharmacist  George Munson MD as MD (Allergy & Immunology)  George Munson MD as Assigned Allergy Provider  Yasir Wilks MD as Assigned Heart and Vascular Provider    History of Present Illness:   Marilyn Ortega is a 37 year old female with significant past medical history pertinent for history of 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 difficile 201  "who is presenting as follow-up for constipation/gastroparesis.      Interval History 4/5/2023:    Today  Kami reports that she has a history of Type 1 DM for 25 years.  Notes that she adheres to a gluten free diet and been tested for celiac that has been negative.     Overall she is doing well.  She does have some increased bloating/abdominal discomfort with the consumption of fiber. Triggers include chickpeas, merlyn seeds, cassava flour, and inulin. States \"I have learned my safe foods.\"  Symptoms are well controlled with dietary adjustments.    As for her bowel habits she reports a remote history of 2-3 years of diarrhea for which she underwent extensive work-up. At this time was having incontinence however this has since subsided.  Now she struggles with constipation for which she takes Motegrity 2 mg once daily with consistent bowel habits. Marilyn will have a stool daily if not every other day consistent with Floyd Stool Type 4/5. Continues to have to rectally digitate.     Had gone to physical therapy/biofeedback however with minimal improvement.    Denies weight loss, nausea, emesis, dysphagia, odynophagia, heartburn, regurgitation, bloating/fullness, diarrhea, constipation, nocturnal stooling, incontinence, melena, hematochezia and BRBR.       --------------------------------------------------------------------------------------------------  5/20/2022 Dr. Bocanegra     HPI:   38 yo woman with type 1 DM (not previously on dialysis) s/p panc-kidney transplant Nov 2021, gastroparesis but not retinopathy, neuropathy who presents for follow up of gastroparesis and change in bowel habits.      Had face, mouth twitching with Reglan so stopped this. Symptoms have improved, also wondering if this is related to tacrolimus as she had this a little before Reglan. Feels like the reglan amplified it though.      Alternating constipation and regular bowel movements. No diarrhea. Essentially unchanged since our last visit " in February.      Schedule for pelvic floor testing on 5/31. We discussed confounders to gastric emptying study including medications, fatty meals, constipation.      A 10 point ROS is otherwise negative.    ----------------------------------------------------------------------------------------------------  2/18/2022 Dr. Bocanegra:     HPI:   35 yo woman with type 1 DM (not previously on dialysis) s/p panc-kidney transplant Nov 2021, gastroparesis but not retinopathy, neuropathy who presents for gastroparesis and change in bowel habits.      Not great GI related. Years of GI issues, starting in college. Tried gluten free diet which improved symptoms 100%. Gluten free since 2002. Varies between diarrhea and constipation.      Had kidney/pancreas transplant in Nov 2021. Had severe diarrhea for 2 weeks about 6 weeks ago, lost 10 pounds. Needed infusions. Enteric pathogens were negative. All of a sudden it stopped and she became very constipated. Needed laxatives, enemas and everything became straight liquid. Drug levels have varied, which her transplant team has been worried has been related to dysmotility.      After transplant and prednisone course she was fairly regular for about 1 month. Didn't have other infections around that time. Before transplant things were ok, more constipation than diarrhea but was overall ok.     Started Prucalopride approximately 2 years ago, 2mg daily. This was helpful but now hasn't been. Tried Benefiber which caused a lot of bloating. Takes docusate daily in the morning. No opiates. She has tried gastroparesis diet. She is taking [employer] brand magnesium. She doesn't notice improvement with prunes but she can increase the number she eats. Senna doesn't work as well as dulcolax.        Esophageal Questionnaire(s)    BEDQ Questionnaire       View : No data to display.                   View : No data to display.                Eckardt Questionnaire       View : No data to display.                 Promis 10 Questionnaire       View : No data to display.                    STUDIES & PROCEDURES:    EGD:     12/18/2018 [facility]   Findings:        The examined esophagus was normal.        A large amount of food (residue) was found in the        gastric body.        The examined duodenum was normal.   Impression:          - Normal esophagus.                        - A large amount of food (residue) in                        the stomach.                        - Due to the amount of food in the                        stomach large portions of the stomach                        mucosa could not be evaluated.                        - Normal examined duodenum.                        - No specimens collected.       Colonoscopy:    11/23/2015 [facility]   ASSESSMENT:   Terminal ileum and entire colon mucosa was normal   Random biopsies taken to rule out microscopic pathology.     RECOMMENDATIONS:     Other:     5/31/2022 [facility]          EndoFLIP directed at the [facility] or LES (8cm (EF-325) balloon length or 16cm (EF-322) balloon length):   Date:  8cm balloon  Balloon inflation Balloon pressure CSA (mm^2) DI (mm^2/mmHg) Dmin (mm) Compliance   20 (ladmark ID)        30        40        50           16cm balloon  Balloon inflation Balloon pressure CSA (mm^2) DI (mm^2/mmHg) Dmin (mm) Compliance   30 (ladmark ID)        40        50        60        70           High Resolution Manometry:    PH/Impedance:     Bravo:    CT:    3/24/2023 CTV AP W Contrast   IMPRESSION:  1.  No evidence of May Thurner anatomy.  2.  Transplant kidney left lower quadrant.    2/1/2022  CT AP  WO Contrast                                                      IMPRESSION:   1. Postoperative changes of left lower quadrant renal and right pelvic  pancreatic transplant.  2. Moderate colonic stool with fluid-filled descending colon and  distended stomach with copious gastric contents to suggest  slow  transit/constipation. An adynamic ileus is also a possibility.    Esophagram:    FL VSS:     [facility]   FINDINGS:   Percent remaining activity within the stomach:   Immediate: 100% (normal greater than 70%)   1 hour: 94% (normal 30-90%)   2 hours: 93% (normal less than 60%)   3 hours: 81% (normal less than 30%)   4 hours: 61% (normal less than 10%)       U/S:     XRAY:    5/11/2022  IMPRESSION: Moderate amount of stool.  Nonobstructed bowel gas  pattern.         Prior medical records were reviewed including, but not limited to, notes from referring providers, lab work, radiographic tests, and other diagnostic tests. Pertinent results were summarized above.     History     Past Medical History:   Diagnosis Date     Anemia 12/9/2021     C. difficile diarrhea 2013     CKD (chronic kidney disease) stage 4, GFR 15-29 ml/min (H)      Complication of transplanted kidney 5/23/2022     Gastroparesis      Gluten intolerance      Heterozygous factor V Leiden mutation (H)      HTN (hypertension)      Infection due to 2019 novel coronavirus 11/2020     Osteomyelitis (H) 2013     Shingles 2013     Type 1 diabetes mellitus (H) 2000       Past Surgical History:   Procedure Laterality Date     BENCH KIDNEY N/A 11/15/2021    Procedure: Bench kidney;  Surgeon: Branden Aranda MD;  Location:  OR     BENCH PANCREAS N/A 11/15/2021    Procedure: Bench pancreas;  Surgeon: Branden Aranda MD;  Location: [facility] OR     CATARACT EXTRACTION  11/2021     CV CORONARY ANGIOGRAM N/A 9/8/2022    Procedure: Coronary Angiogram;  Surgeon: Bebeto Ventura MD;  Location:  HEART CARDIAC CATH LAB     CV RIGHT HEART CATH MEASUREMENTS RECORDED N/A 9/8/2022    Procedure: Right Heart Catheterization;  Surgeon: Bebeto Ventura MD;  Location:  HEART CARDIAC CATH LAB     CV RIGHT HEART CATH MEASUREMENTS RECORDED N/A 11/23/2022    Procedure: Right Heart  Catheterization [[id number]];  Surgeon: Trevor Jasmine MD;  Location:  HEART CARDIAC CATH LAB     IR RENAL BIOPSY LEFT  2022     IR RENAL BIOPSY LEFT  2022     ORIF HIP FRACTURE Left      ORTHOPEDIC SURGERY Bilateral     Repair of labral tear     TRANSPLANT PANCREAS, KIDNEY  DONOR, COMBINED Left 11/15/2021    Procedure: TRANSPLANT, KIDNEY AND PANCREAS,  DONOR;  Surgeon: Branden Aranda MD;  Location:  OR       Social History     Socioeconomic History     Marital status: Single     Spouse name: Not on file     Number of children: Not on file     Years of education: Not on file     Highest education level: Not on file   Occupational History     Not on file   Tobacco Use     Smoking status: Never     Smokeless tobacco: Never   Vaping Use     Vaping status: Not on file   Substance and Sexual Activity     Alcohol use: Yes     Comment: socially     Drug use: No     Sexual activity: Yes   Other Topics Concern     Parent/sibling w/ CABG, MI or angioplasty before 65F 55M? Not Asked   Social History Narrative     Not on file     Social Determinants of Health     Financial Resource Strain: Not on file   Food Insecurity: Not on file   Transportation Needs: Not on file   Physical Activity: Not on file   Stress: Not on file   Social Connections: Not on file   Intimate Partner Violence: Not on file   Housing Stability: Not on file       Family History   Problem Relation Age of Onset     Diabetes Paternal Grandfather      Macular Degeneration Mother      Arthritis Maternal Grandmother      Hypertension Maternal Grandmother      Cancer - colorectal Maternal Grandfather      Glaucoma No family hx of      Family history reviewed and edited as appropriate    Medications and Allergies:     Outpatient Encounter Medications as of 2023   Medication Sig Dispense Refill     aspirin (ASA) 81 MG chewable tablet Take 81 mg by mouth daily       cetirizine (ZYRTEC) 10 MG tablet Take 1 tablet (10  mg) by mouth every other day       cholecalciferol 25 MCG (1000 UT) TABS Take 2,000 Units by mouth every other day        docusate sodium (COLACE) 100 MG capsule Take 200 mg by mouth daily before breakfast       furosemide (LASIX) 20 MG tablet Take 1 tablet (20 mg) by mouth daily 45 tablet 0     loratadine (CLARITIN) 10 MG tablet Take 10 mg by mouth daily       MOTEGRITY 2 MG tablet TAKE 1 TABLET BY MOUTH EVERY DAY 90 tablet 0     mycophenolic acid (GENERIC EQUIVALENT) 360 MG EC tablet Take 1 tablet (360 mg) by mouth 2 times daily 60 tablet 11     ondansetron (ZOFRAN) 4 MG tablet Take 1 tablet (4 mg) by mouth every 8 hours as needed for nausea 30 tablet 0     tacrolimus (ENVARSUS XR) 0.75 MG 24 hr tablet Take 2 tablets (1.5 mg) by mouth every morning (before breakfast) Hold for dose change. 60 tablet 11     tacrolimus (ENVARSUS XR) 1 MG 24 hr tablet Take 2 tablets (2 mg) by mouth every morning (before breakfast) Total dose=6mg daily 60 tablet 11     tacrolimus (ENVARSUS XR) 4 MG 24 hr tablet Take 1 tablet (4 mg) by mouth every morning (before breakfast) Total dose=6mg daily 30 tablet 11     Facility-Administered Encounter Medications as of 4/5/2023   Medication Dose Route Frequency Provider Last Rate Last Admin     bevacizumab (AVASTIN) intravitreal inj 1.25 mg  1.25 mg Intravitreal Once Ochoa Barnes MD         bevacizumab (AVASTIN) intravitreal inj 1.25 mg  1.25 mg Intravitreal Q28 Days Pio Zamorano MD   1.25 mg at 04/19/22 1034     bevacizumab (AVASTIN) intravitreal inj 1.25 mg  1.25 mg Intravitreal Q28 Days Pio Zamorano MD   1.25 mg at 12/06/22 1037        Allergies   Allergen Reactions     Chlorhexidine Hives, Itching and Rash     PN: Patient had swelling/rash that was very itchy with chlorprep.     Ceclor [Cefaclor Monohydrate]      Metoclopramide Other (See Comments)     Noted mouth/facial twitching with reglan in 2022 when it was tried for gastroparesis.     Cefaclor Rash        Review of  systems:  A full 10 point review of systems was obtained and was negative except for the pertinent positives and negatives stated within the HPI.    Objective Findings:   Physical Exam:    Constitutional: There were no vitals taken for this visit.  General: Alert, cooperative, no distress, well-appearing  Head: Atraumatic, normocephalic, no obvious abnormalities   Eyes: Sclera anicteric, no obvious conjunctival hemorrhage   Nose: Nares normal, no obvious malformation, no obvious rhinorrhea   Respiratory: Resting comfortably, no apparent distress, no cough.   Skin: No jaundice, no obvious rash  Neurologic: AAOx3, no obvious neurologic abnormality  Psychiatric: Normal Affect, appropriate mood  Extremities: No obvious edema, no obvious malformation     Labs, Radiology, Pathology     Lab Results   Component Value Date    WBC 4.4 04/03/2023    WBC 3.9 (L) 03/20/2023    WBC 4.9 03/06/2023    HGB 10.3 (L) 04/03/2023    HGB 10.5 (L) 03/20/2023    HGB 10.5 (L) 03/06/2023     04/03/2023     03/20/2023     03/06/2023    CHOL 160 05/31/2022    CHOL 192 08/23/2011    TRIG 102 05/31/2022    TRIG 84 08/23/2011    HDL 79 05/31/2022    HDL 83 (H) 08/23/2011    ALT 20 05/23/2022    ALT 18 05/04/2022    ALT 23 11/15/2021    AST 13 05/23/2022    AST 19 05/04/2022    AST 16 11/15/2021     04/03/2023     03/20/2023     03/06/2023    BUN 21.3 (H) 04/03/2023    BUN 23.1 (H) 03/20/2023    BUN 21.8 (H) 03/06/2023    CO2 21 (L) 04/03/2023    CO2 25 03/20/2023    CO2 24 03/06/2023    TSH 1.75 02/07/2023    TSH 2.37 04/18/2022    TSH 3.30 10/16/2012    INR 0.97 09/08/2022    INR 1.01 06/09/2022    INR 0.96 05/02/2022        Liver Function Studies -   Recent Labs   Lab Test 07/14/22  0804 05/23/22  0819   PROTTOTAL  --  7.9   ALBUMIN 4.0 4.0   BILITOTAL  --  0.3   ALKPHOS  --  84   AST  --  13   ALT  --  20          Patient Active Problem List    Diagnosis Date Noted     Dyspnea on exertion 09/08/2022      Priority: Medium     Chest pain, unspecified type 09/08/2022     Priority: Medium     Kidney transplant rejection 05/02/2022     Priority: Medium     Generalized edema 03/17/2022     Priority: Medium     Aftercare following organ transplant 12/19/2021     Priority: Medium     Anemia due to stage 3a chronic kidney disease (H) 12/09/2021     Priority: Medium     Kidney replaced by transplant 12/07/2021     Priority: Medium     Pancreas replaced by transplant (H) 12/07/2021     Priority: Medium     Immunosuppression (H) 11/22/2021     Priority: Medium     Heterozygous factor V Leiden mutation (H)      Priority: Medium     Stage 3a chronic kidney disease (H)      Priority: Medium     HTN, kidney transplant related      Priority: Medium     Gastroparesis      Priority: Medium     Chronic constipation 06/08/2012     Priority: Medium     Irritable bowel syndrome 06/08/2012     Priority: Medium     Osteopenia 04/25/2012     Priority: Medium      Assessment and Plan   Assessment/Plan:    Marilyn Ortega is a 37 year old female with significant past medical history pertinent for history of 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 difficile 201 who is presenting as follow-up for constipation/gastroparesis.      #Gastroparesis   Endoscopic evaluation 2018 with large amounts of food retained within the stomach.  Subsequently she underwent formal gastric emptying study 8/21/2019  with 61% retention at 4 hours. The etiology of her gastroparesis is likely secondary to history of diabetes.  Previously she has failed Reglan secondary to the development of tics/tremors. Currently she is well managed on Motegrity 2 mg once daily and dietary adjustments.  During her visit today alternative therapies were reviewed.  Kamilla does have a history of C. difficile and remains on tacrolimus as part of her immunosuppressive therapy for her recent  transplant and therefore Erythromycin use should be avoided.  Additional medication options that were discussed include Mirtazapine and Domperidone. However Domperidone is currently not approved within the [place] secondary to concerns regarding potential cardiac side effects.  Lastly conversation was had regarding surgical interventions specifically GPOEM.    - As continue to have stable symptoms please continue Motegrity 2 mg once daily and lifestyle modifications as management for your gastroparesis/constipation      #Chronic Constipation   With significant past medical history for multiple abdominal surgeries and pelvic orthopedic interventions.  Previously seen and evaluated with the pelvic floor specialists with findings consistent with dyssynergia and rectal intussusception for which she underwent biofeedback therapy with minimal improvement. Per prior documentation future consideration would be to obtain defecography to evaluate if she continues to have persistent inability to relax the pelvic floor, if symptoms are secondary to the rectal intussusception vs slow transit constipation.     Currently Marilyn has a bowel movement daily if not every other day with the use of  Motegrity 2 mg once daily that is consistent with Sanborn stool scale type 4/5 and the continued need to rectally digitate.  Unfortunately she reports increased in upper GI symptoms with the use of fiber.    Future considerations would be to again see the pelvic  for additional evaluation/intervention vs obtain consultation with colorectal surgeons.     - Continue Motegrity 2mg once daily as prescribed       Follow up plan:   Return to clinic 6 months and as needed.    The risks and benefits of my recommendations, as well as other treatment options were discussed with the patient and any available family today. All questions were answered.     o Follow up: As planned above. Today, I personally spent 33 minutes in direct face to  face time with the patient, of which greater than 50% of the time was spent in patient education and counseling as described above. Approximately 22 minutes were spent on indirect care associated with the patient's consultation including but not limited to review of: patient medical records to date, clinic visits, hospital records, lab results, imaging studies, procedural documentation, and coordinating care with other providers. The findings from this review are summarized in the above note. All of the above accounted for a cumulative time of 55 minutes and was performed on the date of service.     The patient verbalized understanding of the plan and was appreciative for the time spent and information provided during the office visit.           Kelly Daniels PA-C  Division of Gastroenterology, Hepatology, and Nutrition  [facility]       Documentation assisted by voice recognition and documentation system.

## 2023-04-17 ENCOUNTER — LAB (OUTPATIENT)
Dept: LAB | Facility: CLINIC | Age: 38
End: 2023-04-17
Payer: COMMERCIAL

## 2023-04-17 DIAGNOSIS — Z94.0 KIDNEY REPLACED BY TRANSPLANT: ICD-10-CM

## 2023-04-17 DIAGNOSIS — Z94.83 PANCREAS REPLACED BY TRANSPLANT (H): ICD-10-CM

## 2023-04-17 LAB
AMYLASE SERPL-CCNC: 63 U/L (ref 28–100)
ANION GAP SERPL CALCULATED.3IONS-SCNC: 11 MMOL/L (ref 7–15)
BUN SERPL-MCNC: 27.1 MG/DL (ref 6–20)
CALCIUM SERPL-MCNC: 9.5 MG/DL (ref 8.6–10)
CHLORIDE SERPL-SCNC: 104 MMOL/L (ref 98–107)
CREAT SERPL-MCNC: 1.09 MG/DL (ref 0.51–0.95)
DEPRECATED HCO3 PLAS-SCNC: 25 MMOL/L (ref 22–29)
ERYTHROCYTE [DISTWIDTH] IN BLOOD BY AUTOMATED COUNT: 13.5 % (ref 10–15)
GFR SERPL CREATININE-BSD FRML MDRD: 67 ML/MIN/1.73M2
GLUCOSE SERPL-MCNC: 72 MG/DL (ref 70–99)
HCT VFR BLD AUTO: 34.8 % (ref 35–47)
HGB BLD-MCNC: 11 G/DL (ref 11.7–15.7)
LIPASE SERPL-CCNC: 33 U/L (ref 13–60)
MCH RBC QN AUTO: 28.5 PG (ref 26.5–33)
MCHC RBC AUTO-ENTMCNC: 31.6 G/DL (ref 31.5–36.5)
MCV RBC AUTO: 90 FL (ref 78–100)
PLATELET # BLD AUTO: 278 10E3/UL (ref 150–450)
POTASSIUM SERPL-SCNC: 4 MMOL/L (ref 3.4–5.3)
RBC # BLD AUTO: 3.86 10E6/UL (ref 3.8–5.2)
SODIUM SERPL-SCNC: 140 MMOL/L (ref 136–145)
TACROLIMUS BLD-MCNC: 5.7 UG/L (ref 5–15)
TME LAST DOSE: NORMAL H
TME LAST DOSE: NORMAL H
WBC # BLD AUTO: 4.7 10E3/UL (ref 4–11)

## 2023-04-17 PROCEDURE — 85027 COMPLETE CBC AUTOMATED: CPT

## 2023-04-17 PROCEDURE — 83690 ASSAY OF LIPASE: CPT

## 2023-04-17 PROCEDURE — 82150 ASSAY OF AMYLASE: CPT

## 2023-04-17 PROCEDURE — 36415 COLL VENOUS BLD VENIPUNCTURE: CPT

## 2023-04-17 PROCEDURE — 80197 ASSAY OF TACROLIMUS: CPT

## 2023-04-17 PROCEDURE — 80048 BASIC METABOLIC PNL TOTAL CA: CPT

## 2023-04-17 PROCEDURE — 87799 DETECT AGENT NOS DNA QUANT: CPT

## 2023-04-18 LAB — BKV DNA # SPEC NAA+PROBE: NOT DETECTED COPIES/ML

## 2023-04-20 ENCOUNTER — OFFICE VISIT (OUTPATIENT)
Dept: ALLERGY | Facility: CLINIC | Age: 38
End: 2023-04-20
Payer: COMMERCIAL

## 2023-04-20 ENCOUNTER — TELEPHONE (OUTPATIENT)
Dept: ALLERGY | Facility: CLINIC | Age: 38
End: 2023-04-20

## 2023-04-20 VITALS
HEART RATE: 88 BPM | WEIGHT: 125.6 LBS | BODY MASS INDEX: 20.27 KG/M2 | SYSTOLIC BLOOD PRESSURE: 151 MMHG | DIASTOLIC BLOOD PRESSURE: 94 MMHG | OXYGEN SATURATION: 100 %

## 2023-04-20 DIAGNOSIS — J30.89 ALLERGIC RHINITIS DUE TO DUST MITE: Primary | ICD-10-CM

## 2023-04-20 DIAGNOSIS — J30.89 ALLERGIC RHINITIS DUE TO DUST MITE: ICD-10-CM

## 2023-04-20 PROCEDURE — 99214 OFFICE O/P EST MOD 30 MIN: CPT | Performed by: INTERNAL MEDICINE

## 2023-04-20 RX ORDER — DERMATOPHAGOIDES PTERONYSSINUS AND DERMATOPHAGOIDES FARINAE 6; 6 [ARB'U]/1; [ARB'U]/1
1 TABLET SUBLINGUAL DAILY
Qty: 30 TABLET | Refills: 11 | Status: SHIPPED | OUTPATIENT
Start: 2023-04-20

## 2023-04-20 RX ORDER — DERMATOPHAGOIDES PTERONYSSINUS AND DERMATOPHAGOIDES FARINAE 6; 6 [ARB'U]/1; [ARB'U]/1
1 TABLET SUBLINGUAL DAILY
Qty: 30 TABLET | Refills: 11 | Status: CANCELLED | OUTPATIENT
Start: 2023-04-20

## 2023-04-20 RX ORDER — EPINEPHRINE 0.3 MG/.3ML
0.3 INJECTION SUBCUTANEOUS PRN
Qty: 2 EACH | Refills: 2 | Status: SHIPPED | OUTPATIENT
Start: 2023-04-20

## 2023-04-20 NOTE — PATIENT INSTRUCTIONS
Odactra website for discount card if needed  First dose given in office  Bring Epipen  Will treat for 6 months, then change to shots if not improving

## 2023-04-20 NOTE — TELEPHONE ENCOUNTER
Prior Authorization Retail Medication Request    Medication/Dose: PRIOR AUTH   (ODACTRA) 12 SQ-HDM SUBL   ICD code (if different than what is on RX):    Previously Tried and Failed:    Rationale:            Pharmacy Information (if different than what is on RX)  Name:  [facility] [place] [id number] [address] [email or web address]  Phone:  [phone]

## 2023-04-20 NOTE — LETTER
4/20/2023         RE: Marilyn Ortega  [address]  [facility] [address]        Dear Colleague,    Thank you for referring your patient, Marilyn Ortega, to the [facility] [place]. Please see a copy of my visit note below.    Marilyn Ortega was seen in the Allergy Clinic at [facility].    Marilyn Ortega is a 37 year old female being seen today for ongoing evaluation of allergic rhinitis    Since the last visit the patient has been about the same.    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  She is receiving immunosuppression.    Over the last several months she has noticed that when she is sleeping at her own residence that she has significant symptoms of lethargy, fatigue, a brain fog sensation as well as eye swelling.  She has seen numerous eye specialists and no medical reason was identified.  When she went to a trip to [place] she felt great.  When she has slept at other peoples homes she feels good.  She has a new bed and does have hypoallergenic covers and mattress covers on pillows and the mattress.  She has air purifier's.  Recently, in the last week and a half she has had discoloration in both bathrooms.  Management is investigating if this is mold.    Most of her symptoms are only a problem when she sleeps at the her own residence.    Skin testing initially was negative and blood testing was only positive slightly to 1 to dust mites which was the D farinae.  It was 0.24 IgE level    Past Medical History:   Diagnosis Date     Anemia 12/9/2021     C. difficile diarrhea 2013     CKD (chronic kidney disease) stage 4, GFR 15-29 ml/min (H)      Complication of transplanted kidney 5/23/2022     Gastroparesis      Gluten intolerance      Heterozygous factor V Leiden mutation (H)      HTN (hypertension)      Infection due to 2019 novel coronavirus 11/2020     Osteomyelitis (H) 2013     Shingles 2013     Type 1 diabetes mellitus (H)       Family History   Problem Relation Age of Onset     Diabetes Paternal Grandfather      Macular Degeneration Mother      Arthritis Maternal Grandmother      Hypertension Maternal Grandmother      Cancer - colorectal Maternal Grandfather      Glaucoma No family hx of      Past Surgical History:   Procedure Laterality Date     BENCH KIDNEY N/A 11/15/2021    Procedure: Bench kidney;  Surgeon: Branden rAanda MD;  [facility]: [facility]     BENCH PANCREAS N/A 11/15/2021    Procedure: Bench pancreas;  Surgeon: Branden Aranda MD;  [facility]: [facility] OR     CATARACT EXTRACTION  2021     CV CORONARY ANGIOGRAM N/A 2022    Procedure: Coronary Angiogram;  Surgeon: Bebeto Ventura MD;  [facility]     CV RIGHT HEART CATH MEASUREMENTS RECORDED N/A 2022    Procedure: Right Heart Catheterization;  Surgeon: Bebeto Ventura MD;  [facility]     CV RIGHT HEART CATH MEASUREMENTS RECORDED N/A 2022    Procedure: Right Heart Catheterization [[id number]];  Surgeon: Trevor Jasmine MD;  [facility]     IR RENAL BIOPSY LEFT  2022     IR RENAL BIOPSY LEFT  2022     ORIF HIP FRACTURE Left      ORTHOPEDIC SURGERY Bilateral     Repair of labral tear     TRANSPLANT PANCREAS, KIDNEY  DONOR, COMBINED Left 11/15/2021    Procedure: TRANSPLANT, KIDNEY AND PANCREAS,  DONOR;  Surgeon: Branden Aranda MD;  Location: [facility] OR         Current Outpatient Medications:      aspirin (ASA) 81 MG chewable tablet, Take 81 mg by mouth daily, Disp: , Rfl:      cetirizine (ZYRTEC) 10 MG tablet, Take 1 tablet (10 mg) by mouth every other day, Disp: , Rfl:      cholecalciferol 25 MCG (1000 UT) TABS, Take 2,000 Units by mouth every other day , Disp: , Rfl:      docusate sodium (COLACE) 100 MG capsule, Take 200 mg by mouth daily before breakfast, Disp: , Rfl:      Dust Mite Mixed  Allergen Ext (ODACTRA) 12 SQ-HDM SUBL, Place 1 Dose under the tongue daily, Disp: 30 tablet, Rfl: 11     EPINEPHrine (ANY BX GENERIC EQUIV) 0.3 MG/0.3ML injection 2-pack, Inject 0.3 mLs (0.3 mg) into the muscle as needed for anaphylaxis, Disp: 2 each, Rfl: 2     furosemide (LASIX) 20 MG tablet, Take 1 tablet (20 mg) by mouth daily, Disp: 45 tablet, Rfl: 0     loratadine (CLARITIN) 10 MG tablet, Take 10 mg by mouth daily, Disp: , Rfl:      mycophenolic acid (GENERIC EQUIVALENT) 360 MG EC tablet, Take 1 tablet (360 mg) by mouth 2 times daily, Disp: 60 tablet, Rfl: 11     ondansetron (ZOFRAN) 4 MG tablet, Take 1 tablet (4 mg) by mouth every 8 hours as needed for nausea, Disp: 30 tablet, Rfl: 0     prucalopride succinate (MOTEGRITY) 2 MG tablet, Take 1 tablet (2 mg) by mouth daily, Disp: 90 tablet, Rfl: 0     tacrolimus (ENVARSUS XR) 0.75 MG 24 hr tablet, Take 2 tablets (1.5 mg) by mouth every morning (before breakfast) Hold for dose change., Disp: 60 tablet, Rfl: 11     tacrolimus (ENVARSUS XR) 1 MG 24 hr tablet, Take 2 tablets (2 mg) by mouth every morning (before breakfast) Total dose=6mg daily, Disp: 60 tablet, Rfl: 11     tacrolimus (ENVARSUS XR) 4 MG 24 hr tablet, Take 1 tablet (4 mg) by mouth every morning (before breakfast) Total dose=6mg daily, Disp: 30 tablet, Rfl: 11    Current Facility-Administered Medications:      bevacizumab (AVASTIN) intravitreal inj 1.25 mg, 1.25 mg, Intravitreal, Once, Ochoa Barnes MD     bevacizumab (AVASTIN) intravitreal inj 1.25 mg, 1.25 mg, Intravitreal, Q28 Days, Pio Zamorano MD, 1.25 mg at 04/19/22 1034     bevacizumab (AVASTIN) intravitreal inj 1.25 mg, 1.25 mg, Intravitreal, Q28 Days, Pio Zamorano MD, 1.25 mg at 12/06/22 1037  Allergies   Allergen Reactions     Chlorhexidine Hives, Itching and Rash     PN: Patient had swelling/rash that was very itchy with chlorprep.     Ceclor [Cefaclor Monohydrate]      Metoclopramide Other (See Comments)     Noted  mouth/facial twitching with reglan in 2022 when it was tried for gastroparesis.     Cefaclor Rash         EXAM:   BP (!) 151/94   Pulse 88   Wt 57 kg (125 lb 9.6 oz)   SpO2 100%   BMI 20.27 kg/m      Constitutional:       General: She is not in acute distress.     Appearance: Normal appearance. She is not ill-appearing.   HENT:      Head: Normocephalic and atraumatic.    Eyes:      General:         Right eye: No discharge.         Left eye: No discharge.   Neurological:      General: No focal deficit present.      Mental Status: She is alert. Mental status is at baseline.   Psychiatric:         Mood and Affect: Mood normal.         Behavior: Behavior normal.      Latest Reference Range & Units 10/17/22 08:27   Allergen A alternata <0.10 KU(A)/L <0.10   Allergen A fumigatus <0.10 KU(A)/L <0.10   Allergen C herbarum <0.10 KU(A)/L <0.10   Allergen Cat Dander <0.10 KU(A)/L <0.10   Allergen Cedar IgE <0.10 KU(A)/L <0.10   Allergen D farinae <0.10 KU(A)/L 0.24 (H)   Allergen, D Pteronyssinus <0.10 KU(A)/L <0.10   Allergen Dog Dander <0.10 KU(A)/L <0.10   Allergen Elm <0.10 KU(A)/L <0.10   Allergen Epicoccum purpurascens IgE <0.10 KU(A)/L <0.10   Allergen, Kochia/Firebush <0.10 KU(A)/L <0.10   Allergen Maple <0.10 KU(A)/L <0.10   Allergen Mugwort IgE <0.10 KU(A)/L <0.10   Allergen Oak(white) <0.10 KU(A)/L <0.10   Allergen P notatum <0.10 KU(A)/L <0.10   Allergen Red [place] IgE <0.10 KU(A)/L <0.10   Allergen Sagebrush [place] IgE <0.10 KU(A)/L <0.10   Allergen Lonnie <0.10 KU(A)/L <0.10   Allergen Tree White [place] IgE <0.10 KU(A)/L <0.10   Allergen [place] Tree <0.10 KU(A)/L <0.10   Allergen Weed Nettle IgE <0.10 KU(A)/L <0.10   Allergen [place] <0.10 KU(A)/L <0.10   Allergen [place] <0.10 KU(A)/L <0.10   Allergen, English Plantain <0.10 KU(A)/L <0.10   Allergen, Giant Ragweed <0.10 KU(A)/L <0.10   Allergen Salvador Grass <0.10 KU(A)/L <0.10   Allergen, Lamb's Quarters <0.10 KU(A)/L <0.10   Allergen, Ragweed  Short <0.10 KU(A)/L <0.10   Allergen Russian Thistle <0.10 KU(A)/L <0.10   Allergen Sheep Sorrel IgE <0.10 KU(A)/L <0.10   Allergen, Silver Birch <0.10 KU(A)/L <0.10   Allergen White Kristian <0.10 KU(A)/L <0.10   (H): Data is abnormally high    ASSESSMENT/PLAN:  Marilyn Ortega is a 37 year old female seen today for symptoms of fatigue, brain fog, eye swelling.  She does not having significant nasal symptoms.  She does have 1 positive blood test to dust mite.  She is wondering about immunotherapy.  She has symptoms only at her own residence.  Management is investigating if there is a mold problem in the bathrooms currently.    Discussed sublingual immunotherapy with Odactra versus allergy shots.  1 consideration is to start sublingual therapy due to the ease and convenience and if not improving within 3 to 6 months transition over to allergy shots.  Recommend continue discussing management if there is anything within the condo that needs remediation.    Follow-up in 1 month.  The first dose was given in the office.  An EpiPen was prescribed.      Thank you for allowing me to participate in the care of Marilyn Ortega.      I spent 30 minutes on the date of the encounter doing chart review, history and exam, documentation and further coordination as noted above exclusive of separately reported interpretations    George Munson MD  Allergy/Immunology  [facility]      Again, thank you for allowing me to participate in the care of your patient.        Sincerely,        George Munson MD

## 2023-04-20 NOTE — PROGRESS NOTES
Marilyn Ortega was seen in the Allergy Clinic at [facility].    Marilyn Ortega is a 37 year old female being seen today for ongoing evaluation of allergic rhinitis    Since the last visit the patient has been about the same.    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  She is receiving immunosuppression.    Over the last several months she has noticed that when she is sleeping at her own residence that she has significant symptoms of lethargy, fatigue, a brain fog sensation as well as eye swelling.  She has seen numerous eye specialists and no medical reason was identified.  When she went to a trip to [place] she felt great.  When she has slept at other peoples homes she feels good.  She has a new bed and does have hypoallergenic covers and mattress covers on pillows and the mattress.  She has air purifier's.  Recently, in the last week and a half she has had discoloration in both bathrooms.  Management is investigating if this is mold.    Most of her symptoms are only a problem when she sleeps at the her own residence.    Skin testing initially was negative and blood testing was only positive slightly to 1 to dust mites which was the D farinae.  It was 0.24 IgE level    Past Medical History:   Diagnosis Date     Anemia 12/9/2021     C. difficile diarrhea 2013     CKD (chronic kidney disease) stage 4, GFR 15-29 ml/min (H)      Complication of transplanted kidney 5/23/2022     Gastroparesis      Gluten intolerance      Heterozygous factor V Leiden mutation (H)      HTN (hypertension)      Infection due to 2019 novel coronavirus 11/2020     Osteomyelitis (H) 2013     Shingles 2013     Type 1 diabetes mellitus (H) 2000     Family History   Problem Relation Age of Onset     Diabetes Paternal Grandfather      Macular Degeneration Mother      Arthritis Maternal Grandmother      Hypertension Maternal Grandmother      Cancer - colorectal Maternal Grandfather      Glaucoma No  family hx of      Past Surgical History:   Procedure Laterality Date     BENCH KIDNEY N/A 11/15/2021    Procedure: Bench kidney;  Surgeon: Branden Aranda MD;  Location:  OR     BENCH PANCREAS N/A 11/15/2021    Procedure: Bench pancreas;  Surgeon: Branden Aranda MD;  [facility]     CATARACT EXTRACTION  2021     CV CORONARY ANGIOGRAM N/A 2022    Procedure: Coronary Angiogram;  Surgeon: Bebeto Ventura MD;  Location:  HEART CARDIAC CATH LAB     CV RIGHT HEART CATH MEASUREMENTS RECORDED N/A 2022    Procedure: Right Heart Catheterization;  Surgeon: Bebeto Ventura MD;  Location:  HEART CARDIAC CATH LAB     CV RIGHT HEART CATH MEASUREMENTS RECORDED N/A 2022    Procedure: Right Heart Catheterization [[id number]];  Surgeon: Trevor Jasmine MD;  Location:  HEART CARDIAC CATH LAB     IR RENAL BIOPSY LEFT  2022     IR RENAL BIOPSY LEFT  2022     ORIF HIP FRACTURE Left      ORTHOPEDIC SURGERY Bilateral     Repair of labral tear     TRANSPLANT PANCREAS, KIDNEY  DONOR, COMBINED Left 11/15/2021    Procedure: TRANSPLANT, KIDNEY AND PANCREAS,  DONOR;  Surgeon: Branden Aranda MD;  [facility]         Current Outpatient Medications:      aspirin (ASA) 81 MG chewable tablet, Take 81 mg by mouth daily, Disp: , Rfl:      cetirizine (ZYRTEC) 10 MG tablet, Take 1 tablet (10 mg) by mouth every other day, Disp: , Rfl:      cholecalciferol 25 MCG (1000 UT) TABS, Take 2,000 Units by mouth every other day , Disp: , Rfl:      docusate sodium (COLACE) 100 MG capsule, Take 200 mg by mouth daily before breakfast, Disp: , Rfl:      Dust Mite Mixed Allergen Ext (ODACTRA) 12 SQ-HDM SUBL, Place 1 Dose under the tongue daily, Disp: 30 tablet, Rfl: 11     EPINEPHrine (ANY BX GENERIC EQUIV) 0.3 MG/0.3ML injection 2-pack, Inject 0.3 mLs (0.3 mg) into the muscle as needed for anaphylaxis, Disp: 2 each, Rfl: 2      furosemide (LASIX) 20 MG tablet, Take 1 tablet (20 mg) by mouth daily, Disp: 45 tablet, Rfl: 0     loratadine (CLARITIN) 10 MG tablet, Take 10 mg by mouth daily, Disp: , Rfl:      mycophenolic acid (GENERIC EQUIVALENT) 360 MG EC tablet, Take 1 tablet (360 mg) by mouth 2 times daily, Disp: 60 tablet, Rfl: 11     ondansetron (ZOFRAN) 4 MG tablet, Take 1 tablet (4 mg) by mouth every 8 hours as needed for nausea, Disp: 30 tablet, Rfl: 0     prucalopride succinate (MOTEGRITY) 2 MG tablet, Take 1 tablet (2 mg) by mouth daily, Disp: 90 tablet, Rfl: 0     tacrolimus (ENVARSUS XR) 0.75 MG 24 hr tablet, Take 2 tablets (1.5 mg) by mouth every morning (before breakfast) Hold for dose change., Disp: 60 tablet, Rfl: 11     tacrolimus (ENVARSUS XR) 1 MG 24 hr tablet, Take 2 tablets (2 mg) by mouth every morning (before breakfast) Total dose=6mg daily, Disp: 60 tablet, Rfl: 11     tacrolimus (ENVARSUS XR) 4 MG 24 hr tablet, Take 1 tablet (4 mg) by mouth every morning (before breakfast) Total dose=6mg daily, Disp: 30 tablet, Rfl: 11    Current Facility-Administered Medications:      bevacizumab (AVASTIN) intravitreal inj 1.25 mg, 1.25 mg, Intravitreal, Once, Ochoa Barnes MD     bevacizumab (AVASTIN) intravitreal inj 1.25 mg, 1.25 mg, Intravitreal, Q28 Days, Pio Zamorano MD, 1.25 mg at 04/19/22 1034     bevacizumab (AVASTIN) intravitreal inj 1.25 mg, 1.25 mg, Intravitreal, Q28 Days, Pio Zamorano MD, 1.25 mg at 12/06/22 1037  Allergies   Allergen Reactions     Chlorhexidine Hives, Itching and Rash     PN: Patient had swelling/rash that was very itchy with chlorprep.     Ceclor [Cefaclor Monohydrate]      Metoclopramide Other (See Comments)     Noted mouth/facial twitching with reglan in 2022 when it was tried for gastroparesis.     Cefaclor Rash         EXAM:   BP (!) 151/94   Pulse 88   Wt 57 kg (125 lb 9.6 oz)   SpO2 100%   BMI 20.27 kg/m      Constitutional:       General: She is not in acute distress.      Appearance: Normal appearance. She is not ill-appearing.   HENT:      Head: Normocephalic and atraumatic.    Eyes:      General:         Right eye: No discharge.         Left eye: No discharge.   Neurological:      General: No focal deficit present.      Mental Status: She is alert. Mental status is at baseline.   Psychiatric:         Mood and Affect: Mood normal.         Behavior: Behavior normal.      Latest Reference Range & Units 10/17/22 08:27   Allergen A alternata <0.10 KU(A)/L <0.10   Allergen A fumigatus <0.10 KU(A)/L <0.10   Allergen C herbarum <0.10 KU(A)/L <0.10   Allergen Cat Dander <0.10 KU(A)/L <0.10   Allergen Cedar IgE <0.10 KU(A)/L <0.10   Allergen D farinae <0.10 KU(A)/L 0.24 (H)   Allergen, D Pteronyssinus <0.10 KU(A)/L <0.10   Allergen Dog Dander <0.10 KU(A)/L <0.10   Allergen Elm <0.10 KU(A)/L <0.10   Allergen Epicoccum purpurascens IgE <0.10 KU(A)/L <0.10   Allergen, Kochia/Firebush <0.10 KU(A)/L <0.10   Allergen Maple <0.10 KU(A)/L <0.10   Allergen Mugwort IgE <0.10 KU(A)/L <0.10   Allergen Oak(white) <0.10 KU(A)/L <0.10   Allergen P notatum <0.10 KU(A)/L <0.10   Allergen Red [place] IgE <0.10 KU(A)/L <0.10   Allergen Sagebrush [place] IgE <0.10 KU(A)/L <0.10   Allergen Lonnie <0.10 KU(A)/L <0.10   Allergen Tree White [place] IgE <0.10 KU(A)/L <0.10   Allergen [place] Tree <0.10 KU(A)/L <0.10   Allergen Weed Nettle IgE <0.10 KU(A)/L <0.10   Allergen [place] <0.10 KU(A)/L <0.10   Allergen [place] <0.10 KU(A)/L <0.10   Allergen, English Plantain <0.10 KU(A)/L <0.10   Allergen, Giant Ragweed <0.10 KU(A)/L <0.10   Allergen Salvador Grass <0.10 KU(A)/L <0.10   Allergen, Lamb's Quarters <0.10 KU(A)/L <0.10   Allergen, Ragweed Short <0.10 KU(A)/L <0.10   Allergen Russian Thistle <0.10 KU(A)/L <0.10   Allergen Sheep Sorrel IgE <0.10 KU(A)/L <0.10   Allergen, Silver Birch <0.10 KU(A)/L <0.10   Allergen White Kristian <0.10 KU(A)/L <0.10   (H): Data is abnormally high    ASSESSMENT/PLAN:  Marilyn BOSS  Jordan is a 37 year old female seen today for symptoms of fatigue, brain fog, eye swelling.  She does not having significant nasal symptoms.  She does have 1 positive blood test to dust mite.  She is wondering about immunotherapy.  She has symptoms only at her own residence.  Management is investigating if there is a mold problem in the bathrooms currently.    Discussed sublingual immunotherapy with Odactra versus allergy shots.  1 consideration is to start sublingual therapy due to the ease and convenience and if not improving within 3 to 6 months transition over to allergy shots.  Recommend continue discussing management if there is anything within the condo that needs remediation.    Follow-up in 1 month.  The first dose was given in the office.  An EpiPen was prescribed.      Thank you for allowing me to participate in the care of Marilyn Ortega.      I spent 30 minutes on the date of the encounter doing chart review, history and exam, documentation and further coordination as noted above exclusive of separately reported interpretations    George Munson MD  Allergy/Immunology  [facility]

## 2023-04-24 ENCOUNTER — TELEPHONE (OUTPATIENT)
Dept: TRANSPLANT | Facility: CLINIC | Age: 38
End: 2023-04-24
Payer: COMMERCIAL

## 2023-04-25 NOTE — TELEPHONE ENCOUNTER
PA Initiation    Medication: Dust Mite Mixed Allergen Ext (ODACTRA) 12 SQ-HDM SUBL - Initiated  Insurance Company: [employer] - Phone [phone] Fax [phone]  Pharmacy Filling the Rx: [id number] [place] [facility] - [place], [place] - [address]  Filling Pharmacy Phone: [phone]  Filling Pharmacy Fax: [phone]  Start Date: 4/25/2023

## 2023-04-25 NOTE — TELEPHONE ENCOUNTER
Reanna Cueto MD Rockers, Emily S, RN  If asymptomatic, no additional testing is indicated           Previous Messages       ----- Message -----   From: Yessica Becker RN   Sent: 4/24/2023   2:52 PM CDT   To: Reanna Villalta MD   Subject: black mold                                       Patient said her apartment (bedroom/bathroom) have tested positive for black mold (aspergillus). Her plan is to move. She think this has been causing a lot of her symptoms (eye swelling, fatigue, etc), because when she travels and gets out of the house for a few days, she tends to feel much better.     She is not sleeping in her bedroom anymore while she tries to find a new place to live. Patient is wondering if we need to test her at all for aspergillus infection? She denies any respiratory symptoms.       Patient notified, no testing needed at this time. Notify RNCC if she develops respiratory symptoms. Recommend moving out of apartment.

## 2023-05-02 ENCOUNTER — MYC MEDICAL ADVICE (OUTPATIENT)
Dept: ALLERGY | Facility: CLINIC | Age: 38
End: 2023-05-02

## 2023-05-02 ENCOUNTER — LAB (OUTPATIENT)
Dept: LAB | Facility: CLINIC | Age: 38
End: 2023-05-02
Payer: COMMERCIAL

## 2023-05-02 DIAGNOSIS — Z94.83 PANCREAS REPLACED BY TRANSPLANT (H): ICD-10-CM

## 2023-05-02 DIAGNOSIS — Z94.0 KIDNEY REPLACED BY TRANSPLANT: ICD-10-CM

## 2023-05-02 DIAGNOSIS — Z48.298 AFTERCARE FOLLOWING ORGAN TRANSPLANT: ICD-10-CM

## 2023-05-02 DIAGNOSIS — Z79.899 ENCOUNTER FOR LONG-TERM CURRENT USE OF MEDICATION: ICD-10-CM

## 2023-05-02 LAB
AMYLASE SERPL-CCNC: 48 U/L (ref 28–100)
ANION GAP SERPL CALCULATED.3IONS-SCNC: 14 MMOL/L (ref 7–15)
BUN SERPL-MCNC: 21.2 MG/DL (ref 6–20)
CALCIUM SERPL-MCNC: 9.7 MG/DL (ref 8.6–10)
CHLORIDE SERPL-SCNC: 102 MMOL/L (ref 98–107)
CREAT SERPL-MCNC: 1.03 MG/DL (ref 0.51–0.95)
DEPRECATED HCO3 PLAS-SCNC: 22 MMOL/L (ref 22–29)
ERYTHROCYTE [DISTWIDTH] IN BLOOD BY AUTOMATED COUNT: 13.5 % (ref 10–15)
GFR SERPL CREATININE-BSD FRML MDRD: 71 ML/MIN/1.73M2
GLUCOSE SERPL-MCNC: 104 MG/DL (ref 70–99)
HCT VFR BLD AUTO: 33.4 % (ref 35–47)
HGB BLD-MCNC: 10.8 G/DL (ref 11.7–15.7)
LIPASE SERPL-CCNC: 28 U/L (ref 13–60)
MCH RBC QN AUTO: 28.4 PG (ref 26.5–33)
MCHC RBC AUTO-ENTMCNC: 32.3 G/DL (ref 31.5–36.5)
MCV RBC AUTO: 88 FL (ref 78–100)
PLATELET # BLD AUTO: 243 10E3/UL (ref 150–450)
POTASSIUM SERPL-SCNC: 3.8 MMOL/L (ref 3.4–5.3)
RBC # BLD AUTO: 3.8 10E6/UL (ref 3.8–5.2)
SODIUM SERPL-SCNC: 138 MMOL/L (ref 136–145)
TACROLIMUS BLD-MCNC: 4.8 UG/L (ref 5–15)
TME LAST DOSE: ABNORMAL H
TME LAST DOSE: ABNORMAL H
WBC # BLD AUTO: 7.5 10E3/UL (ref 4–11)

## 2023-05-02 PROCEDURE — 80048 BASIC METABOLIC PNL TOTAL CA: CPT

## 2023-05-02 PROCEDURE — 36415 COLL VENOUS BLD VENIPUNCTURE: CPT

## 2023-05-02 PROCEDURE — 83690 ASSAY OF LIPASE: CPT

## 2023-05-02 PROCEDURE — 82150 ASSAY OF AMYLASE: CPT

## 2023-05-02 PROCEDURE — 85027 COMPLETE CBC AUTOMATED: CPT

## 2023-05-02 PROCEDURE — 80197 ASSAY OF TACROLIMUS: CPT

## 2023-05-02 PROCEDURE — 80180 DRUG SCRN QUAN MYCOPHENOLATE: CPT

## 2023-05-02 NOTE — TELEPHONE ENCOUNTER
PRIOR AUTHORIZATION DENIED    Medication: Dust Mite Mixed Allergen Ext (ODACTRA) 12 SQ-HDM SUBL - Denied    Denial Date: 4/28/2023    Denial Rational:         Appeal Information:

## 2023-05-03 LAB
MYCOPHENOLATE SERPL LC/MS/MS-MCNC: 1.75 MG/L (ref 1–3.5)
MYCOPHENOLATE-G SERPL LC/MS/MS-MCNC: 35.9 MG/L (ref 30–95)
TME LAST DOSE: NORMAL H
TME LAST DOSE: NORMAL H

## 2023-05-04 NOTE — TELEPHONE ENCOUNTER
Called patient to obtain telephone consent for allergy immunotherapy. Dr. Munson verified consent with patient. Shot agreement form reviewed with patient. Patient has no further questions at this time.    Alan Zabala RN, BSN  5/4/2023 10:53 AM

## 2023-05-05 DIAGNOSIS — J30.89 ALLERGIC RHINITIS DUE TO DUST MITE: Primary | ICD-10-CM

## 2023-05-05 NOTE — PROGRESS NOTES
ALLERGY SOLUTION NEW REQUEST    Marilyn Ortega 1985 MRN: [id number]    DATE NEEDED:  2 weeks  Vial Color Content   Top Dose         Vial Size  Green 1:1,000, Blue 1:100, Yellow 1:10 and Red 1:1 Dust Mite  Red 1:1 0.5 5mL          Shot Clinic Location:  [place]  Ship to Location: [place]  Billing Location: [place]  Special Instructions:  n/a        Requester Signature  George Munson MD

## 2023-05-09 ENCOUNTER — TELEPHONE (OUTPATIENT)
Dept: TRANSPLANT | Facility: CLINIC | Age: 38
End: 2023-05-09
Payer: COMMERCIAL

## 2023-05-09 DIAGNOSIS — Z94.0 KIDNEY REPLACED BY TRANSPLANT: Primary | ICD-10-CM

## 2023-05-10 ENCOUNTER — TRANSFERRED RECORDS (OUTPATIENT)
Dept: HEALTH INFORMATION MANAGEMENT | Facility: CLINIC | Age: 38
End: 2023-05-10
Payer: COMMERCIAL

## 2023-05-10 NOTE — TELEPHONE ENCOUNTER
Reviewed w [facility] pharmacist. Patient should avoid high dose aspirin, to prevent kidney damage. Patient notified, she will follow up w PCP or urgent care about pain control. Recommended taking scheduled acetaminophen as well for help w back pain.

## 2023-05-11 DIAGNOSIS — J30.89 ALLERGIC RHINITIS DUE TO DUST MITE: Primary | ICD-10-CM

## 2023-05-11 PROCEDURE — 95165 ANTIGEN THERAPY SERVICES: CPT | Performed by: INTERNAL MEDICINE

## 2023-05-11 NOTE — PROGRESS NOTES
Allergy serums billed to [facility].     Vials billed below:    Vial Color   Content                      Vial Size Expiration Date  Green 1:1,000, Blue 1:100, Yellow 1:10 and Red 1:1  Dust Mite  5mL each Green 8/11/23, Blue 11/11/23, Yellow & Red 5/11/24    Billed 30 units    Checked by Osiel Bazan / LPN        Signature  Osiel Bazna LPN

## 2023-05-15 ENCOUNTER — LAB (OUTPATIENT)
Dept: LAB | Facility: CLINIC | Age: 38
End: 2023-05-15
Payer: COMMERCIAL

## 2023-05-15 DIAGNOSIS — Z94.83 PANCREAS REPLACED BY TRANSPLANT (H): ICD-10-CM

## 2023-05-15 DIAGNOSIS — Z94.0 KIDNEY REPLACED BY TRANSPLANT: ICD-10-CM

## 2023-05-15 LAB
AMYLASE SERPL-CCNC: 64 U/L (ref 28–100)
ANION GAP SERPL CALCULATED.3IONS-SCNC: 12 MMOL/L (ref 7–15)
BUN SERPL-MCNC: 22.7 MG/DL (ref 6–20)
CALCIUM SERPL-MCNC: 9.6 MG/DL (ref 8.6–10)
CHLORIDE SERPL-SCNC: 100 MMOL/L (ref 98–107)
CREAT SERPL-MCNC: 0.99 MG/DL (ref 0.51–0.95)
DEPRECATED CALCIDIOL+CALCIFEROL SERPL-MC: 53 UG/L (ref 20–75)
DEPRECATED HCO3 PLAS-SCNC: 26 MMOL/L (ref 22–29)
ERYTHROCYTE [DISTWIDTH] IN BLOOD BY AUTOMATED COUNT: 13.1 % (ref 10–15)
GFR SERPL CREATININE-BSD FRML MDRD: 74 ML/MIN/1.73M2
GLUCOSE SERPL-MCNC: 90 MG/DL (ref 70–99)
HCT VFR BLD AUTO: 34.3 % (ref 35–47)
HGB BLD-MCNC: 10.8 G/DL (ref 11.7–15.7)
LIPASE SERPL-CCNC: 33 U/L (ref 13–60)
MAGNESIUM SERPL-MCNC: 2 MG/DL (ref 1.7–2.3)
MCH RBC QN AUTO: 28 PG (ref 26.5–33)
MCHC RBC AUTO-ENTMCNC: 31.5 G/DL (ref 31.5–36.5)
MCV RBC AUTO: 89 FL (ref 78–100)
MYCOPHENOLATE SERPL LC/MS/MS-MCNC: 2.15 MG/L (ref 1–3.5)
MYCOPHENOLATE-G SERPL LC/MS/MS-MCNC: 55.4 MG/L (ref 30–95)
PHOSPHATE SERPL-MCNC: 3 MG/DL (ref 2.5–4.5)
PLATELET # BLD AUTO: 389 10E3/UL (ref 150–450)
POTASSIUM SERPL-SCNC: 3.9 MMOL/L (ref 3.4–5.3)
PTH-INTACT SERPL-MCNC: 85 PG/ML (ref 15–65)
RBC # BLD AUTO: 3.86 10E6/UL (ref 3.8–5.2)
SODIUM SERPL-SCNC: 138 MMOL/L (ref 136–145)
TACROLIMUS BLD-MCNC: 5.2 UG/L (ref 5–15)
TME LAST DOSE: NORMAL H
WBC # BLD AUTO: 5.4 10E3/UL (ref 4–11)

## 2023-05-15 PROCEDURE — 83735 ASSAY OF MAGNESIUM: CPT

## 2023-05-15 PROCEDURE — 36415 COLL VENOUS BLD VENIPUNCTURE: CPT

## 2023-05-15 PROCEDURE — 84100 ASSAY OF PHOSPHORUS: CPT

## 2023-05-15 PROCEDURE — 85014 HEMATOCRIT: CPT

## 2023-05-15 PROCEDURE — 83970 ASSAY OF PARATHORMONE: CPT

## 2023-05-15 PROCEDURE — 82306 VITAMIN D 25 HYDROXY: CPT

## 2023-05-15 PROCEDURE — 80197 ASSAY OF TACROLIMUS: CPT

## 2023-05-15 PROCEDURE — 80180 DRUG SCRN QUAN MYCOPHENOLATE: CPT

## 2023-05-15 PROCEDURE — 87799 DETECT AGENT NOS DNA QUANT: CPT

## 2023-05-15 PROCEDURE — 82150 ASSAY OF AMYLASE: CPT

## 2023-05-15 PROCEDURE — 80048 BASIC METABOLIC PNL TOTAL CA: CPT

## 2023-05-15 PROCEDURE — 83690 ASSAY OF LIPASE: CPT

## 2023-05-16 ENCOUNTER — TELEPHONE (OUTPATIENT)
Dept: ALLERGY | Facility: CLINIC | Age: 38
End: 2023-05-16
Payer: COMMERCIAL

## 2023-05-16 LAB
BKV DNA # SPEC NAA+PROBE: <500 COPIES/ML
BKV DNA SPEC NAA+PROBE-LOG#: <2.7 {LOG_COPIES}/ML

## 2023-05-16 NOTE — PROGRESS NOTES
Allergy serums received at [facility].    Vials received below:    Vial Color                                                                               Content                      Vial Size                            Expiration Date  Green 1:1,000, Blue 1:100, Yellow 1:10 and Red 1:1             Dust Mite                     5mL                               each                            Green 8/11/23 Blue 11/11/23 Yellow 5/11/24 Red 5/11/24    Signature  Leighton Zabala RN

## 2023-05-16 NOTE — TELEPHONE ENCOUNTER
Patient called and scheduled for first allergy shot appointment. Informed patient to bring EpiPen and of 30 minute wait period after each shot. Patient verbalizes understanding. Patient has no further questions at this time.    Alan Zabala, RN, BSN  5/16/2023 1:35 PM

## 2023-05-19 ENCOUNTER — ALLIED HEALTH/NURSE VISIT (OUTPATIENT)
Dept: ALLERGY | Facility: CLINIC | Age: 38
End: 2023-05-19
Payer: COMMERCIAL

## 2023-05-19 DIAGNOSIS — J30.89 ALLERGIC RHINITIS DUE TO DUST MITE: Primary | ICD-10-CM

## 2023-05-19 DIAGNOSIS — J30.9 ALLERGIC RHINITIS: ICD-10-CM

## 2023-05-19 PROCEDURE — 95120 IMMUNOTHERAPY ONE INJECTION: CPT

## 2023-05-19 NOTE — PROGRESS NOTES
Marilyn Ortega presents to clinic today at the request of George Munson MD (ordering provider) for Allergy Immunotherapy injection(s).       This service provided today was under the care of George Munson MD; the supervising provider of the day; who was available if needed.      Patient presented after waiting 30 minutes with no reaction to  injections. Discharged from clinic.    Tari Escamilla, WALESKAN, RN

## 2023-05-22 ENCOUNTER — ALLIED HEALTH/NURSE VISIT (OUTPATIENT)
Dept: ALLERGY | Facility: CLINIC | Age: 38
End: 2023-05-22
Payer: COMMERCIAL

## 2023-05-22 DIAGNOSIS — J30.89 ALLERGIC RHINITIS DUE TO DUST MITE: Primary | ICD-10-CM

## 2023-05-22 DIAGNOSIS — J30.9 ALLERGIC RHINITIS: ICD-10-CM

## 2023-05-22 PROCEDURE — 95120 IMMUNOTHERAPY ONE INJECTION: CPT

## 2023-05-25 ENCOUNTER — TELEPHONE (OUTPATIENT)
Dept: ALLERGY | Facility: CLINIC | Age: 38
End: 2023-05-25
Payer: COMMERCIAL

## 2023-05-25 NOTE — TELEPHONE ENCOUNTER
Called patient to reschedule her allergy shot appointment to a different time on Friday. Appointment time changed. Patient confirmed date/time.     WALESKA JaquezN, RN

## 2023-05-25 NOTE — TELEPHONE ENCOUNTER
[facility] Call Center    Phone Message    May a detailed message be left on voicemail: yes     Reason for Call: Appointment Intake    Referring Provider Name: NA  Diagnosis and/or Symptoms: allergy shots  Pt is scheduled for allergy shots 05/26/23 at 8:00 am and would like to push back a couple hours, anytime after 11:30 would be great. Please call pt back to discuss. Thanks     Action Taken: Message routed to:  Other: [facility] ALLERY    Travel Screening: Not Applicable

## 2023-05-26 ENCOUNTER — ALLIED HEALTH/NURSE VISIT (OUTPATIENT)
Dept: ALLERGY | Facility: CLINIC | Age: 38
End: 2023-05-26
Payer: COMMERCIAL

## 2023-05-26 DIAGNOSIS — J30.89 ALLERGIC RHINITIS DUE TO DUST MITE: Primary | ICD-10-CM

## 2023-05-26 DIAGNOSIS — J30.9 ALLERGIC RHINITIS: ICD-10-CM

## 2023-05-26 PROCEDURE — 95120 IMMUNOTHERAPY ONE INJECTION: CPT

## 2023-05-30 ENCOUNTER — LAB (OUTPATIENT)
Dept: LAB | Facility: CLINIC | Age: 38
End: 2023-05-30
Payer: COMMERCIAL

## 2023-05-30 ENCOUNTER — ALLIED HEALTH/NURSE VISIT (OUTPATIENT)
Dept: ALLERGY | Facility: CLINIC | Age: 38
End: 2023-05-30
Payer: COMMERCIAL

## 2023-05-30 DIAGNOSIS — J30.89 ALLERGIC RHINITIS DUE TO DUST MITE: Primary | ICD-10-CM

## 2023-05-30 DIAGNOSIS — Z48.298 AFTERCARE FOLLOWING ORGAN TRANSPLANT: ICD-10-CM

## 2023-05-30 DIAGNOSIS — J30.9 ALLERGIC RHINITIS: ICD-10-CM

## 2023-05-30 DIAGNOSIS — Z79.899 ENCOUNTER FOR LONG-TERM CURRENT USE OF MEDICATION: ICD-10-CM

## 2023-05-30 DIAGNOSIS — Z94.83 PANCREAS REPLACED BY TRANSPLANT (H): ICD-10-CM

## 2023-05-30 DIAGNOSIS — Z94.0 KIDNEY REPLACED BY TRANSPLANT: ICD-10-CM

## 2023-05-30 LAB
AMYLASE SERPL-CCNC: 61 U/L (ref 28–100)
ANION GAP SERPL CALCULATED.3IONS-SCNC: 12 MMOL/L (ref 7–15)
BUN SERPL-MCNC: 29.5 MG/DL (ref 6–20)
CALCIUM SERPL-MCNC: 9.2 MG/DL (ref 8.6–10)
CHLORIDE SERPL-SCNC: 103 MMOL/L (ref 98–107)
CREAT SERPL-MCNC: 1.15 MG/DL (ref 0.51–0.95)
DEPRECATED HCO3 PLAS-SCNC: 22 MMOL/L (ref 22–29)
ERYTHROCYTE [DISTWIDTH] IN BLOOD BY AUTOMATED COUNT: 13.7 % (ref 10–15)
GFR SERPL CREATININE-BSD FRML MDRD: 62 ML/MIN/1.73M2
GLUCOSE SERPL-MCNC: 93 MG/DL (ref 70–99)
HCT VFR BLD AUTO: 31.4 % (ref 35–47)
HGB BLD-MCNC: 10.1 G/DL (ref 11.7–15.7)
LIPASE SERPL-CCNC: 30 U/L (ref 13–60)
MCH RBC QN AUTO: 28.1 PG (ref 26.5–33)
MCHC RBC AUTO-ENTMCNC: 32.2 G/DL (ref 31.5–36.5)
MCV RBC AUTO: 88 FL (ref 78–100)
PLATELET # BLD AUTO: 240 10E3/UL (ref 150–450)
POTASSIUM SERPL-SCNC: 4.3 MMOL/L (ref 3.4–5.3)
RBC # BLD AUTO: 3.59 10E6/UL (ref 3.8–5.2)
SODIUM SERPL-SCNC: 137 MMOL/L (ref 136–145)
WBC # BLD AUTO: 6.1 10E3/UL (ref 4–11)

## 2023-05-30 PROCEDURE — 83690 ASSAY OF LIPASE: CPT

## 2023-05-30 PROCEDURE — 85027 COMPLETE CBC AUTOMATED: CPT

## 2023-05-30 PROCEDURE — 80197 ASSAY OF TACROLIMUS: CPT

## 2023-05-30 PROCEDURE — 82150 ASSAY OF AMYLASE: CPT

## 2023-05-30 PROCEDURE — 95120 IMMUNOTHERAPY ONE INJECTION: CPT

## 2023-05-30 PROCEDURE — 80180 DRUG SCRN QUAN MYCOPHENOLATE: CPT

## 2023-05-30 PROCEDURE — 36415 COLL VENOUS BLD VENIPUNCTURE: CPT

## 2023-05-30 PROCEDURE — 80048 BASIC METABOLIC PNL TOTAL CA: CPT

## 2023-05-30 NOTE — PROGRESS NOTES
Marilyn Ortega presents to clinic today at the request of George Munson MD (ordering provider) for Allergy Immunotherapy injection(s).     This service provided today was under the care of George Munson MD; the supervising provider of the day; who was available if needed.    Patient presented after waiting 30 minutes with no reaction to  injections. Discharged from clinic.    Alan HOUSTON RN, BSN

## 2023-05-31 LAB
TACROLIMUS BLD-MCNC: 4.8 UG/L (ref 5–15)
TME LAST DOSE: ABNORMAL H
TME LAST DOSE: ABNORMAL H

## 2023-06-01 ENCOUNTER — TELEPHONE (OUTPATIENT)
Dept: ALLERGY | Facility: CLINIC | Age: 38
End: 2023-06-01
Payer: COMMERCIAL

## 2023-06-01 LAB
MYCOPHENOLATE SERPL LC/MS/MS-MCNC: 4.64 MG/L (ref 1–3.5)
MYCOPHENOLATE-G SERPL LC/MS/MS-MCNC: 51 MG/L (ref 30–95)
TME LAST DOSE: ABNORMAL H
TME LAST DOSE: ABNORMAL H

## 2023-06-01 NOTE — TELEPHONE ENCOUNTER
Called patient to change the time of her allergy shot. Will see patient tomorrow afternoon for her shot.     Tari Escamilla, WALESKAN, RN

## 2023-06-01 NOTE — TELEPHONE ENCOUNTER
[facility] Call Center    Phone Message    May a detailed message be left on voicemail: yes     Reason for Call: Appointment Intake    Referring Provider Name: Dr. Munson  Diagnosis and/or Symptoms: Pt needing change 06/02/2023 allergy shot appt.     Action Taken: Other: Allergy    Travel Screening: Not Applicable

## 2023-06-02 ENCOUNTER — ALLIED HEALTH/NURSE VISIT (OUTPATIENT)
Dept: ALLERGY | Facility: CLINIC | Age: 38
End: 2023-06-02
Payer: COMMERCIAL

## 2023-06-02 DIAGNOSIS — J30.9 ALLERGIC RHINITIS: ICD-10-CM

## 2023-06-02 DIAGNOSIS — J30.89 ALLERGIC RHINITIS DUE TO DUST MITE: Primary | ICD-10-CM

## 2023-06-02 PROCEDURE — 95120 IMMUNOTHERAPY ONE INJECTION: CPT

## 2023-06-05 ENCOUNTER — ALLIED HEALTH/NURSE VISIT (OUTPATIENT)
Dept: ALLERGY | Facility: CLINIC | Age: 38
End: 2023-06-05
Payer: COMMERCIAL

## 2023-06-05 DIAGNOSIS — J30.9 ALLERGIC RHINITIS: ICD-10-CM

## 2023-06-05 DIAGNOSIS — J30.89 ALLERGIC RHINITIS DUE TO DUST MITE: Primary | ICD-10-CM

## 2023-06-05 PROCEDURE — 95120 IMMUNOTHERAPY ONE INJECTION: CPT

## 2023-06-06 ENCOUNTER — MYC REFILL (OUTPATIENT)
Dept: GASTROENTEROLOGY | Facility: CLINIC | Age: 38
End: 2023-06-06
Payer: COMMERCIAL

## 2023-06-06 DIAGNOSIS — K59.09 CHRONIC CONSTIPATION: ICD-10-CM

## 2023-06-07 ENCOUNTER — TELEPHONE (OUTPATIENT)
Dept: GASTROENTEROLOGY | Facility: CLINIC | Age: 38
End: 2023-06-07
Payer: COMMERCIAL

## 2023-06-07 NOTE — TELEPHONE ENCOUNTER
[facility] Call Center    Phone Message    May a detailed message be left on voicemail: yes     Reason for Call: Medication Refill Request    Has the patient contacted the pharmacy for the refill? Yes   Name of medication being requested: prucalopride succinate (MOTEGRITY) 2 MG tablet  Provider who prescribed the medication: Kelly Daniels PA-C  Pharmacy: [place] [address] [address]  Date medication is needed: ASAP      Action Taken: Message routed to:  [facility]    Travel Screening: Not Applicable

## 2023-06-08 ENCOUNTER — TELEPHONE (OUTPATIENT)
Dept: GASTROENTEROLOGY | Facility: CLINIC | Age: 38
End: 2023-06-08
Payer: COMMERCIAL

## 2023-06-08 RX ORDER — PRUCALOPRIDE 2 MG/1
2 TABLET, FILM COATED ORAL DAILY
Qty: 90 TABLET | Refills: 3 | Status: SHIPPED | OUTPATIENT
Start: 2023-06-08 | End: 2024-06-07

## 2023-06-08 NOTE — TELEPHONE ENCOUNTER
Prior Authorization Retail Medication Request    Medication/Dose: prucalopride succinate (MOTEGRITY) 2 MG tablet  Take 1 tablet (2 mg) by mouth daily   ICD code (if different than what is on RX):    Previously Tried and Failed:    Rationale:    #Chronic Constipation   With significant past medical history for multiple abdominal surgeries and pelvic orthopedic interventions.  Previously seen and evaluated with the pelvic floor specialists with findings consistent with dyssynergia and rectal intussusception for which she underwent biofeedback therapy with minimal improvement. Per prior documentation future consideration would be to obtain defecography to evaluate if she continues to have persistent inability to relax the pelvic floor, if symptoms are secondary to the rectal intussusception vs slow transit constipation.      Currently Marilyn has a bowel movement daily if not every other day with the use of  Motegrity 2 mg once daily that is consistent with [place] stool scale type 4/5 and the continued need to rectally digitate.    - As continue to have stable symptoms please continue Motegrity 2 mg once daily and lifestyle modifications as management for your gastroparesis/constipation      Insurance Name: [employer]   Insurance ID:  [id number]       Pharmacy Information (if different than what is on RX)  Name:    Phone:

## 2023-06-08 NOTE — TELEPHONE ENCOUNTER
[facility] Call Center    Phone Message    May a detailed message be left on voicemail: yes     Reason for Call: Other:     Pt called to follow up on their previous requests for a refill prescription for their prucalopride succinate (MOTEGRITY). Pt stated they are out of the medication. Pt stated they need a prior auth for the medication and are requesting the matter be resolved ASAP.     Pt is also requesting the new prescription cover a year.    Action Taken: Message routed to:  [facility]    Travel Screening: Not Applicable

## 2023-06-09 ENCOUNTER — ALLIED HEALTH/NURSE VISIT (OUTPATIENT)
Dept: ALLERGY | Facility: CLINIC | Age: 38
End: 2023-06-09
Payer: COMMERCIAL

## 2023-06-09 DIAGNOSIS — J30.9 ALLERGIC RHINITIS: ICD-10-CM

## 2023-06-09 DIAGNOSIS — J30.89 ALLERGIC RHINITIS DUE TO DUST MITE: Primary | ICD-10-CM

## 2023-06-09 PROCEDURE — 95120 IMMUNOTHERAPY ONE INJECTION: CPT

## 2023-06-09 NOTE — TELEPHONE ENCOUNTER
Central Prior Authorization Team   Phone: [phone]    PA Initiation    Medication: prucalopride succinate (MOTEGRITY) 2 MG tablet  Insurance Company: [employer] - Phone [phone] Fax [phone]  Pharmacy Filling the Rx: [id number] [place] [facility] [place], [place] - [address] [address]  Filling Pharmacy Phone: [phone]  Filling Pharmacy Fax:    Start Date: 6/9/2023

## 2023-06-09 NOTE — TELEPHONE ENCOUNTER
Prior Authorization Approval    Authorization Effective Date: 5/9/2023  Authorization Expiration Date: 6/9/2024  Medication: prucalopride succinate (MOTEGRITY) 2 MG tablet-PA APPROVED   Approved Dose/Quantity:   Reference #:     Insurance Company: [employer] - Phone [phone] Fax [phone]  Expected CoPay: NO CO-PAY      CoPay Card Available:      Foundation Assistance Needed:    Which Pharmacy is filling the prescription (Not needed for infusion/clinic administered): [facility] [id number] [place] [address] [address]  Pharmacy Notified: Yes- **Instructed pharmacy to notify patient when script is ready to /ship.**  Patient Notified: Yes

## 2023-06-12 ENCOUNTER — ALLIED HEALTH/NURSE VISIT (OUTPATIENT)
Dept: ALLERGY | Facility: CLINIC | Age: 38
End: 2023-06-12
Payer: COMMERCIAL

## 2023-06-12 ENCOUNTER — TELEPHONE (OUTPATIENT)
Dept: TRANSPLANT | Facility: CLINIC | Age: 38
End: 2023-06-12

## 2023-06-12 ENCOUNTER — LAB (OUTPATIENT)
Dept: LAB | Facility: CLINIC | Age: 38
End: 2023-06-12
Payer: COMMERCIAL

## 2023-06-12 DIAGNOSIS — Z94.83 PANCREAS REPLACED BY TRANSPLANT (H): ICD-10-CM

## 2023-06-12 DIAGNOSIS — J30.89 ALLERGIC RHINITIS DUE TO DUST MITE: Primary | ICD-10-CM

## 2023-06-12 DIAGNOSIS — Z94.0 KIDNEY REPLACED BY TRANSPLANT: Primary | ICD-10-CM

## 2023-06-12 DIAGNOSIS — Z94.0 KIDNEY REPLACED BY TRANSPLANT: ICD-10-CM

## 2023-06-12 DIAGNOSIS — J30.9 ALLERGIC RHINITIS: ICD-10-CM

## 2023-06-12 LAB
AMYLASE SERPL-CCNC: 64 U/L (ref 28–100)
ANION GAP SERPL CALCULATED.3IONS-SCNC: 15 MMOL/L (ref 7–15)
BUN SERPL-MCNC: 20.7 MG/DL (ref 6–20)
CALCIUM SERPL-MCNC: 9.8 MG/DL (ref 8.6–10)
CHLORIDE SERPL-SCNC: 101 MMOL/L (ref 98–107)
CREAT SERPL-MCNC: 0.92 MG/DL (ref 0.51–0.95)
DEPRECATED HCO3 PLAS-SCNC: 21 MMOL/L (ref 22–29)
ERYTHROCYTE [DISTWIDTH] IN BLOOD BY AUTOMATED COUNT: 13.9 % (ref 10–15)
GFR SERPL CREATININE-BSD FRML MDRD: 81 ML/MIN/1.73M2
GLUCOSE SERPL-MCNC: 81 MG/DL (ref 70–99)
HCT VFR BLD AUTO: 35.1 % (ref 35–47)
HGB BLD-MCNC: 11.1 G/DL (ref 11.7–15.7)
LIPASE SERPL-CCNC: 32 U/L (ref 13–60)
MAGNESIUM SERPL-MCNC: 1.8 MG/DL (ref 1.7–2.3)
MCH RBC QN AUTO: 28.2 PG (ref 26.5–33)
MCHC RBC AUTO-ENTMCNC: 31.6 G/DL (ref 31.5–36.5)
MCV RBC AUTO: 89 FL (ref 78–100)
PHOSPHATE SERPL-MCNC: 3.1 MG/DL (ref 2.5–4.5)
PLATELET # BLD AUTO: 270 10E3/UL (ref 150–450)
POTASSIUM SERPL-SCNC: 3.8 MMOL/L (ref 3.4–5.3)
RBC # BLD AUTO: 3.94 10E6/UL (ref 3.8–5.2)
SODIUM SERPL-SCNC: 137 MMOL/L (ref 136–145)
TACROLIMUS BLD-MCNC: 5 UG/L (ref 5–15)
TME LAST DOSE: NORMAL H
TME LAST DOSE: NORMAL H
WBC # BLD AUTO: 7.9 10E3/UL (ref 4–11)

## 2023-06-12 PROCEDURE — 36415 COLL VENOUS BLD VENIPUNCTURE: CPT

## 2023-06-12 PROCEDURE — 95120 IMMUNOTHERAPY ONE INJECTION: CPT

## 2023-06-12 PROCEDURE — 85027 COMPLETE CBC AUTOMATED: CPT

## 2023-06-12 PROCEDURE — 83690 ASSAY OF LIPASE: CPT

## 2023-06-12 PROCEDURE — 82150 ASSAY OF AMYLASE: CPT

## 2023-06-12 PROCEDURE — 80048 BASIC METABOLIC PNL TOTAL CA: CPT

## 2023-06-12 PROCEDURE — 87799 DETECT AGENT NOS DNA QUANT: CPT

## 2023-06-12 PROCEDURE — 83735 ASSAY OF MAGNESIUM: CPT

## 2023-06-12 PROCEDURE — 84100 ASSAY OF PHOSPHORUS: CPT

## 2023-06-12 PROCEDURE — 80197 ASSAY OF TACROLIMUS: CPT

## 2023-06-13 DIAGNOSIS — Z94.0 KIDNEY REPLACED BY TRANSPLANT: ICD-10-CM

## 2023-06-13 DIAGNOSIS — D84.9 IMMUNOSUPPRESSED STATUS (H): ICD-10-CM

## 2023-06-13 DIAGNOSIS — Z94.83 PANCREAS REPLACED BY TRANSPLANT (H): Primary | ICD-10-CM

## 2023-06-13 LAB — BKV DNA # SPEC NAA+PROBE: NOT DETECTED COPIES/ML

## 2023-06-23 ENCOUNTER — ALLIED HEALTH/NURSE VISIT (OUTPATIENT)
Dept: ALLERGY | Facility: CLINIC | Age: 38
End: 2023-06-23
Payer: COMMERCIAL

## 2023-06-23 DIAGNOSIS — J30.89 ALLERGIC RHINITIS DUE TO DUST MITE: Primary | ICD-10-CM

## 2023-06-23 DIAGNOSIS — J30.9 ALLERGIC RHINITIS: ICD-10-CM

## 2023-06-23 PROCEDURE — 95120 IMMUNOTHERAPY ONE INJECTION: CPT

## 2023-06-23 NOTE — PROGRESS NOTES
Marilyn Ortega presents to clinic today at the request of George Munson MD (ordering provider) for Allergy Immunotherapy injection(s).     This service provided today was under the care of Chace Sagastume MD; the supervising provider of the day; who was available if needed.    Patient presented after waiting 30 minutes with no reaction to  injections. Discharged from clinic.    Alan HOUSTON RN, BSN

## 2023-06-26 ENCOUNTER — ALLIED HEALTH/NURSE VISIT (OUTPATIENT)
Dept: ALLERGY | Facility: CLINIC | Age: 38
End: 2023-06-26
Payer: COMMERCIAL

## 2023-06-26 DIAGNOSIS — J30.89 ALLERGIC RHINITIS DUE TO DUST MITE: Primary | ICD-10-CM

## 2023-06-26 DIAGNOSIS — J30.9 ALLERGIC RHINITIS: ICD-10-CM

## 2023-06-26 PROCEDURE — 95120 IMMUNOTHERAPY ONE INJECTION: CPT

## 2023-06-26 NOTE — PROGRESS NOTES
Marilyn Ortega presents to clinic today at the request of George Munson MD (ordering provider) for Allergy Immunotherapy injection(s).       This service provided today was under the care of Jolene Springer MD; the supervising provider of the day; who was available if needed.      Patient presented after waiting 30 minutes with no reaction to  injections. Discharged from clinic.    Tari Escamilla, WALESKAN, RN

## 2023-06-30 ENCOUNTER — ALLIED HEALTH/NURSE VISIT (OUTPATIENT)
Dept: ALLERGY | Facility: CLINIC | Age: 38
End: 2023-06-30
Payer: COMMERCIAL

## 2023-06-30 DIAGNOSIS — J30.9 ALLERGIC RHINITIS: ICD-10-CM

## 2023-06-30 DIAGNOSIS — J30.89 ALLERGIC RHINITIS DUE TO DUST MITE: Primary | ICD-10-CM

## 2023-06-30 PROCEDURE — 95120 IMMUNOTHERAPY ONE INJECTION: CPT

## 2023-06-30 NOTE — PROGRESS NOTES
Marilyn Ortega presents to clinic today at the request of George Munosn MD (ordering provider) for Allergy Immunotherapy injection(s).       This service provided today was under the care of George Munson MD; the supervising provider of the day; who was available if needed.      Patient presented after waiting 30 minutes with no reaction to  injections. Discharged from clinic.    Tari Escamilla, WALESKAN, RN

## 2023-07-07 ENCOUNTER — ALLIED HEALTH/NURSE VISIT (OUTPATIENT)
Dept: ALLERGY | Facility: CLINIC | Age: 38
End: 2023-07-07
Payer: COMMERCIAL

## 2023-07-07 ENCOUNTER — LAB (OUTPATIENT)
Dept: LAB | Facility: CLINIC | Age: 38
End: 2023-07-07
Payer: COMMERCIAL

## 2023-07-07 DIAGNOSIS — D84.9 IMMUNOSUPPRESSED STATUS (H): ICD-10-CM

## 2023-07-07 DIAGNOSIS — Z94.0 KIDNEY REPLACED BY TRANSPLANT: ICD-10-CM

## 2023-07-07 DIAGNOSIS — Z94.83 PANCREAS REPLACED BY TRANSPLANT (H): Primary | ICD-10-CM

## 2023-07-07 DIAGNOSIS — J30.9 ALLERGIC RHINITIS: ICD-10-CM

## 2023-07-07 DIAGNOSIS — J30.89 ALLERGIC RHINITIS DUE TO DUST MITE: Primary | ICD-10-CM

## 2023-07-07 DIAGNOSIS — Z94.83 PANCREAS REPLACED BY TRANSPLANT (H): ICD-10-CM

## 2023-07-07 LAB
AMYLASE SERPL-CCNC: 65 U/L (ref 28–100)
ANION GAP SERPL CALCULATED.3IONS-SCNC: 12 MMOL/L (ref 7–15)
BUN SERPL-MCNC: 26.1 MG/DL (ref 6–20)
CALCIUM SERPL-MCNC: 9.8 MG/DL (ref 8.6–10)
CHLORIDE SERPL-SCNC: 100 MMOL/L (ref 98–107)
CREAT SERPL-MCNC: 1.02 MG/DL (ref 0.51–0.95)
DEPRECATED HCO3 PLAS-SCNC: 23 MMOL/L (ref 22–29)
ERYTHROCYTE [DISTWIDTH] IN BLOOD BY AUTOMATED COUNT: 14.1 % (ref 10–15)
GFR SERPL CREATININE-BSD FRML MDRD: 72 ML/MIN/1.73M2
GLUCOSE SERPL-MCNC: 89 MG/DL (ref 70–99)
HBA1C MFR BLD: 5.1 %
HCT VFR BLD AUTO: 33.8 % (ref 35–47)
HGB BLD-MCNC: 10.8 G/DL (ref 11.7–15.7)
LIPASE SERPL-CCNC: 36 U/L (ref 13–60)
MAGNESIUM SERPL-MCNC: 1.8 MG/DL (ref 1.7–2.3)
MCH RBC QN AUTO: 28.2 PG (ref 26.5–33)
MCHC RBC AUTO-ENTMCNC: 32 G/DL (ref 31.5–36.5)
MCV RBC AUTO: 88 FL (ref 78–100)
MYCOPHENOLATE SERPL LC/MS/MS-MCNC: 9.14 MG/L (ref 1–3.5)
MYCOPHENOLATE-G SERPL LC/MS/MS-MCNC: 41.3 MG/L (ref 30–95)
PHOSPHATE SERPL-MCNC: 2.8 MG/DL (ref 2.5–4.5)
PLATELET # BLD AUTO: 264 10E3/UL (ref 150–450)
POTASSIUM SERPL-SCNC: 4 MMOL/L (ref 3.4–5.3)
RBC # BLD AUTO: 3.83 10E6/UL (ref 3.8–5.2)
SODIUM SERPL-SCNC: 135 MMOL/L (ref 136–145)
TACROLIMUS BLD-MCNC: 4.9 UG/L (ref 5–15)
TME LAST DOSE: ABNORMAL H
WBC # BLD AUTO: 6.8 10E3/UL (ref 4–11)

## 2023-07-07 PROCEDURE — 80180 DRUG SCRN QUAN MYCOPHENOLATE: CPT

## 2023-07-07 PROCEDURE — 83036 HEMOGLOBIN GLYCOSYLATED A1C: CPT

## 2023-07-07 PROCEDURE — 82150 ASSAY OF AMYLASE: CPT

## 2023-07-07 PROCEDURE — 84100 ASSAY OF PHOSPHORUS: CPT

## 2023-07-07 PROCEDURE — 85027 COMPLETE CBC AUTOMATED: CPT

## 2023-07-07 PROCEDURE — 95120 IMMUNOTHERAPY ONE INJECTION: CPT

## 2023-07-07 PROCEDURE — 83690 ASSAY OF LIPASE: CPT

## 2023-07-07 PROCEDURE — 83735 ASSAY OF MAGNESIUM: CPT

## 2023-07-07 PROCEDURE — 80048 BASIC METABOLIC PNL TOTAL CA: CPT

## 2023-07-07 PROCEDURE — 80197 ASSAY OF TACROLIMUS: CPT

## 2023-07-07 PROCEDURE — 36415 COLL VENOUS BLD VENIPUNCTURE: CPT

## 2023-07-07 PROCEDURE — 87799 DETECT AGENT NOS DNA QUANT: CPT

## 2023-07-10 ENCOUNTER — MYC MEDICAL ADVICE (OUTPATIENT)
Dept: ALLERGY | Facility: CLINIC | Age: 38
End: 2023-07-10

## 2023-07-10 ENCOUNTER — ALLIED HEALTH/NURSE VISIT (OUTPATIENT)
Dept: ALLERGY | Facility: CLINIC | Age: 38
End: 2023-07-10
Payer: COMMERCIAL

## 2023-07-10 ENCOUNTER — TELEPHONE (OUTPATIENT)
Dept: ALLERGY | Facility: CLINIC | Age: 38
End: 2023-07-10

## 2023-07-10 DIAGNOSIS — J30.89 ALLERGIC RHINITIS DUE TO DUST MITE: Primary | ICD-10-CM

## 2023-07-10 DIAGNOSIS — J30.9 ALLERGIC RHINITIS: ICD-10-CM

## 2023-07-10 LAB — BKV DNA # SPEC NAA+PROBE: NOT DETECTED COPIES/ML

## 2023-07-10 PROCEDURE — 95120 IMMUNOTHERAPY ONE INJECTION: CPT

## 2023-07-10 NOTE — TELEPHONE ENCOUNTER
[facility] Call Center    Phone Message    May a detailed message be left on voicemail: yes     Reason for Call: Other: Pt called and wanted to know if we had any earlier appt for allergy shots today.  Pt won't be able to make her 3:20. Pt is available any time between 2-3 today. Please go ahead and schedule her and call her back to inform her of the change. Thanks     Action Taken: Message routed to:  Other: CS allergy    Travel Screening: Not Applicable

## 2023-07-10 NOTE — TELEPHONE ENCOUNTER
Message left for patient informing that they can come in for their allergy shot today. Message sent on [place] informing that she has been scheduled.    Alan Zabala RN, BSN  7/10/2023 1:25 PM

## 2023-07-17 ENCOUNTER — ALLIED HEALTH/NURSE VISIT (OUTPATIENT)
Dept: ALLERGY | Facility: CLINIC | Age: 38
End: 2023-07-17
Payer: COMMERCIAL

## 2023-07-17 DIAGNOSIS — J30.89 ALLERGIC RHINITIS DUE TO DUST MITE: Primary | ICD-10-CM

## 2023-07-17 DIAGNOSIS — J30.9 ALLERGIC RHINITIS: ICD-10-CM

## 2023-07-17 PROCEDURE — 95120 IMMUNOTHERAPY ONE INJECTION: CPT

## 2023-07-17 NOTE — PROGRESS NOTES
Marilyn Ortega presents to clinic today at the request of George Munson MD (ordering provider) for Allergy Immunotherapy injection(s).       This service provided today was under the care of Jolene Springer MD; the supervising provider of the day; who was available if needed.      Patient presented after waiting 30 minutes with no reaction to  injections. Discharged from clinic.    Bety Sahni, WALESKA ParksN, RN

## 2023-07-21 ENCOUNTER — ALLIED HEALTH/NURSE VISIT (OUTPATIENT)
Dept: ALLERGY | Facility: CLINIC | Age: 38
End: 2023-07-21
Payer: COMMERCIAL

## 2023-07-21 DIAGNOSIS — J30.89 ALLERGIC RHINITIS DUE TO DUST MITE: Primary | ICD-10-CM

## 2023-07-21 DIAGNOSIS — J30.9 ALLERGIC RHINITIS: ICD-10-CM

## 2023-07-21 PROCEDURE — 95120 IMMUNOTHERAPY ONE INJECTION: CPT

## 2023-07-24 ENCOUNTER — ALLIED HEALTH/NURSE VISIT (OUTPATIENT)
Dept: ALLERGY | Facility: CLINIC | Age: 38
End: 2023-07-24
Payer: COMMERCIAL

## 2023-07-24 DIAGNOSIS — J30.9 ALLERGIC RHINITIS: ICD-10-CM

## 2023-07-24 DIAGNOSIS — J30.89 ALLERGIC RHINITIS DUE TO DUST MITE: Primary | ICD-10-CM

## 2023-07-24 PROCEDURE — 95120 IMMUNOTHERAPY ONE INJECTION: CPT

## 2023-07-28 ENCOUNTER — ALLIED HEALTH/NURSE VISIT (OUTPATIENT)
Dept: ALLERGY | Facility: CLINIC | Age: 38
End: 2023-07-28
Payer: COMMERCIAL

## 2023-07-28 DIAGNOSIS — J30.9 ALLERGIC RHINITIS: ICD-10-CM

## 2023-07-28 DIAGNOSIS — J30.89 ALLERGIC RHINITIS DUE TO DUST MITE: Primary | ICD-10-CM

## 2023-07-28 PROCEDURE — 95120 IMMUNOTHERAPY ONE INJECTION: CPT

## 2023-07-28 NOTE — PROGRESS NOTES
Marilyn Ortega presents to clinic today at the request of George Munson MD (ordering provider) for Allergy Immunotherapy injection(s).     This service provided today was under the care of eGorge Munson MD; the supervising provider of the day; who was available if needed.    Patient presented after waiting 30 minutes with no reaction to  injections. Discharged from clinic.    Alan HOUSTON RN, BSN

## 2023-07-31 ENCOUNTER — ALLIED HEALTH/NURSE VISIT (OUTPATIENT)
Dept: ALLERGY | Facility: CLINIC | Age: 38
End: 2023-07-31
Payer: COMMERCIAL

## 2023-07-31 ENCOUNTER — LAB (OUTPATIENT)
Dept: LAB | Facility: CLINIC | Age: 38
End: 2023-07-31
Payer: COMMERCIAL

## 2023-07-31 DIAGNOSIS — J30.9 ALLERGIC RHINITIS: ICD-10-CM

## 2023-07-31 DIAGNOSIS — D84.9 IMMUNOSUPPRESSED STATUS (H): ICD-10-CM

## 2023-07-31 DIAGNOSIS — Z94.0 KIDNEY REPLACED BY TRANSPLANT: ICD-10-CM

## 2023-07-31 DIAGNOSIS — Z94.83 PANCREAS REPLACED BY TRANSPLANT (H): ICD-10-CM

## 2023-07-31 DIAGNOSIS — J30.89 ALLERGIC RHINITIS DUE TO DUST MITE: Primary | ICD-10-CM

## 2023-07-31 LAB
AMYLASE SERPL-CCNC: 58 U/L (ref 28–100)
ANION GAP SERPL CALCULATED.3IONS-SCNC: 12 MMOL/L (ref 7–15)
BUN SERPL-MCNC: 24 MG/DL (ref 6–20)
CALCIUM SERPL-MCNC: 9.8 MG/DL (ref 8.6–10)
CHLORIDE SERPL-SCNC: 104 MMOL/L (ref 98–107)
CREAT SERPL-MCNC: 1.04 MG/DL (ref 0.51–0.95)
DEPRECATED HCO3 PLAS-SCNC: 24 MMOL/L (ref 22–29)
ERYTHROCYTE [DISTWIDTH] IN BLOOD BY AUTOMATED COUNT: 14.2 % (ref 10–15)
GFR SERPL CREATININE-BSD FRML MDRD: 70 ML/MIN/1.73M2
GLUCOSE SERPL-MCNC: 90 MG/DL (ref 70–99)
HCT VFR BLD AUTO: 34.1 % (ref 35–47)
HGB BLD-MCNC: 10.9 G/DL (ref 11.7–15.7)
LIPASE SERPL-CCNC: 35 U/L (ref 13–60)
MAGNESIUM SERPL-MCNC: 1.9 MG/DL (ref 1.7–2.3)
MCH RBC QN AUTO: 28.3 PG (ref 26.5–33)
MCHC RBC AUTO-ENTMCNC: 32 G/DL (ref 31.5–36.5)
MCV RBC AUTO: 89 FL (ref 78–100)
PHOSPHATE SERPL-MCNC: 3.4 MG/DL (ref 2.5–4.5)
PLATELET # BLD AUTO: 261 10E3/UL (ref 150–450)
POTASSIUM SERPL-SCNC: 4.2 MMOL/L (ref 3.4–5.3)
RBC # BLD AUTO: 3.85 10E6/UL (ref 3.8–5.2)
SODIUM SERPL-SCNC: 140 MMOL/L (ref 136–145)
TACROLIMUS BLD-MCNC: 4.3 UG/L (ref 5–15)
TME LAST DOSE: ABNORMAL H
TME LAST DOSE: ABNORMAL H
WBC # BLD AUTO: 5 10E3/UL (ref 4–11)

## 2023-07-31 PROCEDURE — 80197 ASSAY OF TACROLIMUS: CPT

## 2023-07-31 PROCEDURE — 36415 COLL VENOUS BLD VENIPUNCTURE: CPT

## 2023-07-31 PROCEDURE — 83735 ASSAY OF MAGNESIUM: CPT

## 2023-07-31 PROCEDURE — 87799 DETECT AGENT NOS DNA QUANT: CPT

## 2023-07-31 PROCEDURE — 85027 COMPLETE CBC AUTOMATED: CPT

## 2023-07-31 PROCEDURE — 82150 ASSAY OF AMYLASE: CPT

## 2023-07-31 PROCEDURE — 84100 ASSAY OF PHOSPHORUS: CPT

## 2023-07-31 PROCEDURE — 80048 BASIC METABOLIC PNL TOTAL CA: CPT

## 2023-07-31 PROCEDURE — 83690 ASSAY OF LIPASE: CPT

## 2023-07-31 PROCEDURE — 95120 IMMUNOTHERAPY ONE INJECTION: CPT

## 2023-07-31 PROCEDURE — 80180 DRUG SCRN QUAN MYCOPHENOLATE: CPT

## 2023-08-01 ENCOUNTER — OFFICE VISIT (OUTPATIENT)
Dept: TRANSPLANT | Facility: CLINIC | Age: 38
End: 2023-08-01
Attending: INTERNAL MEDICINE
Payer: COMMERCIAL

## 2023-08-01 VITALS
DIASTOLIC BLOOD PRESSURE: 82 MMHG | TEMPERATURE: 97.6 F | BODY MASS INDEX: 20.16 KG/M2 | OXYGEN SATURATION: 99 % | SYSTOLIC BLOOD PRESSURE: 138 MMHG | HEART RATE: 85 BPM | WEIGHT: 124.9 LBS

## 2023-08-01 DIAGNOSIS — Z94.0 HTN, KIDNEY TRANSPLANT RELATED: ICD-10-CM

## 2023-08-01 DIAGNOSIS — I15.1 HTN, KIDNEY TRANSPLANT RELATED: ICD-10-CM

## 2023-08-01 DIAGNOSIS — Z48.298 AFTERCARE FOLLOWING ORGAN TRANSPLANT: Primary | ICD-10-CM

## 2023-08-01 DIAGNOSIS — Z94.0 KIDNEY REPLACED BY TRANSPLANT: ICD-10-CM

## 2023-08-01 DIAGNOSIS — Z94.83 PANCREAS REPLACED BY TRANSPLANT (H): ICD-10-CM

## 2023-08-01 DIAGNOSIS — D84.9 IMMUNOSUPPRESSION (H): ICD-10-CM

## 2023-08-01 DIAGNOSIS — Z94.83 PANCREAS TRANSPLANTED (H): ICD-10-CM

## 2023-08-01 LAB
BKV DNA # SPEC NAA+PROBE: <500 COPIES/ML
BKV DNA SPEC NAA+PROBE-LOG#: <2.7 {LOG_COPIES}/ML

## 2023-08-01 PROCEDURE — 99417 PROLNG OP E/M EACH 15 MIN: CPT | Performed by: INTERNAL MEDICINE

## 2023-08-01 PROCEDURE — G0463 HOSPITAL OUTPT CLINIC VISIT: HCPCS | Performed by: INTERNAL MEDICINE

## 2023-08-01 PROCEDURE — 99215 OFFICE O/P EST HI 40 MIN: CPT | Performed by: INTERNAL MEDICINE

## 2023-08-01 RX ORDER — TACROLIMUS 4 MG/1
4 TABLET, EXTENDED RELEASE ORAL
Qty: 30 TABLET | Refills: 11 | Status: SHIPPED | OUTPATIENT
Start: 2023-08-01 | End: 2023-08-22

## 2023-08-01 RX ORDER — TACROLIMUS 1 MG/1
TABLET, EXTENDED RELEASE ORAL
Qty: 60 TABLET | Refills: 11 | Status: SHIPPED | OUTPATIENT
Start: 2023-08-01 | End: 2023-08-22

## 2023-08-01 ASSESSMENT — PAIN SCALES - GENERAL: PAINLEVEL: NO PAIN (0)

## 2023-08-01 NOTE — LETTER
8/1/2023         RE: Marilyn Ortega  [address]  [facility], [place] [address]        Dear Colleague,    Thank you for referring your patient, Marilyn Ortega, to the [facility]. Please see a copy of my visit note below.    TRANSPLANT NEPHROLOGY CHRONIC POST TRANSPLANT VISIT    Assessment & Plan   # DDKT (SPK): Stable               - Baseline Creatinine: ~ 1-1.3              - Proteinuria: Not checked post transplant              - Date DSA Last Checked: Nov/2021      Latest DSA: No DSA at time of transplant              - BK Viremia: Continue to monitor BK biweekly              - Kidney Tx Biopsy: yes   # kidney bx 5/2: read as borderline cellular rejection,isolated tubulitis i0t1, IF suggests possible IgA nephropathy MEST0    # closure Bx: 6/9 very limited sample, 2 glomeruli on EM, c4d not available, no signs of rejection       # Pancreas Tx (SPK):               - Pancreatic Exocrine Drainage: Enteric drained                     - Blood glucose: Euglycemia       On insulin: No              - HbA1c: Last checked at time of transplant      Latest HbA1c: 5%              - Pancreatic enzymes: Trend down              - Date DSA Last Checked: Nov/2021             Latest DSA: No DSA at time of transplant              - Pancreas Tx Biopsy: No     # Immunosuppression: Envarsus 5mg qd (goal trough 5-8),  mg po bid               - Induction with Recent Transplant:  Intermediate Intensity              - Continue with intensive monitoring of immunosuppression for efficacy and toxicity.              - Changes: yes  increase envarsus to 5.5 mg po every day (0.75x 2tabs+ 4x1tab)    # Infection Prophylaxis:   - PJP: Sulfa/TMP (Bactrim)  - CMV: Valganciclovir (Valcyte) completed 3 months              - BK <500, monitor every other week then monthly if negative     # Blood Pressure: Controlled;          Goal BP:<130/80              - Volume status: Euvolemic    EDW ~ 122-124bs              #  Anemia in Chronic Renal Disease: Hgb: stable     SYLVAIN: No              - Iron studies: mild iron deficiency but intolerant to po iron, monitor for now and update iron studies if downtrend in Hb,      # Mineral Bone Disorder:   - Secondary renal hyperparathyroidism; PTH level: Normal (18-80 pg/ml)        On treatment: None  - Vitamin D; level: High normal        On supplement: Yes  - Calcium; level: Normal        On supplement: No     # Electrolytes:   - Potassium; level: Normal        On supplement: No  - Magnesium; level: Normal        On supplement: no  - Bicarbonate; level: Normal        On supplement: No     # Medical Compliance: Yes     # Fatigue: slightly better    - nl CMV EBV TSH iron studies, stress test showed hypotensive response to exercise , exercise EKG didn't reveal inducible ischemia; angiogram with normal coronaries, ziopatch unremarkable,  cardiac MRI nl.     #  LE edema:   -  intermittent mostly upon standing and worse towards the end of the day, symptoms suggestive of dependent edema and  Not intravascular fluid overload, trial of various forms of diuretics led to BERNIE but currently tolerating low     dose of lasix 20 mg po every day for months with stable Cr~1-1.2    - possible vascular insufficiency, tried compression stockings with minimal improvement, seen by vascular surgery at [place]    - Most recent UPC normal.   - no evidence for CHF: Echo with nl ef, no valvular abnormalities. Angiogram nl coronaries. RHC mildly elevated PCWP. Cardiac MRI unremarkable,         # Skin Cancer: New lesions: none, chronic multiple nevi over the abdomen, no changes in size, color, texture              - Discussed sun protection and recommend regular follow up with Dermatology.      # Transplant History:  Etiology of Kidney Failure: Diabetes mellitus type 1  Tx: SPK  Transplant: 11/15/2021 (Kidney / Pancreas)  Donor Type: Donation after Brain Death Donor Class:   Crossmatch at time of Tx: negative  DSA at time of  "Tx: No  Significant changes in immunosuppression: None  CMV IgG Ab High Risk Discordance (D+/R-): No  EBV IgG Ab High Risk Discordance (D+/R-): No  Significant transplant-related complications: None    Transplant Office Phone Number: [phone]    Assessment and plan was discussed with the patient and she voiced her understanding and agreement.    Return visit: 6 months  Reanna Lee MD    Chief Complaint   Ms. Ortega is a 38 year old here for kidney transplant, pancreas transplant and immunosuppression management.    History of Present Illness   Marilyn Feels ok overall. She completed cardiac w/up for persistent leg edema and fluid retention as well as ongoing fatigue since her SPK including angiogram, echocardiogram, RHC, cardiac MRI and vascular evaluation for vascular insufficiencyi, infectious w/up including EBV, CMV, adenovirus, HHV all unrevealing. She has been managing her symptoms with lasix 20 mg po every day for a while now and renal function remains stable. She has been dealing with \"mold\" at her current house and is planning on moving to a new one next month. Her fatigue persists and fluctuates. No fevers, chills, or night sweats. No shortness of breath. She recently noted increased hair loss the past few weeks. No recent increase in her envarsus dose and her most recent trough has been subtherapeutic. She continues to follow up with ophthalmology for her diabetic retinopathy and macular edema and overall stable vision over the past several months.    Labs show stable Cr and lipase, slightly lower than goal FK and she denies any missed meds    Current IS Envarsus 5 /360   PCP: [facility]    Problem List   Patient Active Problem List   Diagnosis     Osteopenia     Chronic constipation     Irritable bowel syndrome     Stage 3a chronic kidney disease (H)     HTN, kidney transplant related     Gastroparesis     Heterozygous factor V Leiden mutation (H)     Immunosuppression (H)     Kidney " replaced by transplant     Pancreas replaced by transplant (H)     Anemia due to stage 3a chronic kidney disease (H)     Aftercare following organ transplant     Generalized edema     Kidney transplant rejection     Dyspnea on exertion     Chest pain, unspecified type       Allergies   Allergies   Allergen Reactions     Chlorhexidine Hives, Itching and Rash     PN: Patient had swelling/rash that was very itchy with chlorprep.     Ceclor [Cefaclor Monohydrate]      Metoclopramide Other (See Comments)     Noted mouth/facial twitching with reglan in 2022 when it was tried for gastroparesis.     Cefaclor Rash       Medications   Current Outpatient Medications   Medication Sig     aspirin (ASA) 81 MG chewable tablet Take 81 mg by mouth daily     cetirizine (ZYRTEC) 10 MG tablet Take 1 tablet (10 mg) by mouth every other day     cholecalciferol 25 MCG (1000 UT) TABS Take 2,000 Units by mouth every other day      docusate sodium (COLACE) 100 MG capsule Take 200 mg by mouth daily before breakfast     Dust Mite Mixed Allergen Ext (ODACTRA) 12 SQ-HDM SUBL Place 1 Dose under the tongue daily     EPINEPHrine (ANY BX GENERIC EQUIV) 0.3 MG/0.3ML injection 2-pack Inject 0.3 mLs (0.3 mg) into the muscle as needed for anaphylaxis     furosemide (LASIX) 20 MG tablet Take 1 tablet (20 mg) by mouth daily     loratadine (CLARITIN) 10 MG tablet Take 10 mg by mouth daily     mycophenolic acid (GENERIC EQUIVALENT) 360 MG EC tablet Take 1 tablet (360 mg) by mouth 2 times daily     ondansetron (ZOFRAN) 4 MG tablet Take 1 tablet (4 mg) by mouth every 8 hours as needed for nausea     ORDER FOR ALLERGEN IMMUNOTHERAPY Name of Mix: Mix #1  Dust Mite  Dust Mites DF. 10,000 AU/mL, HS  1.0 ml  Dust Mites DP. 10,000 AU/mL, HS  1.0 ml   Diluent: HSA 3.0 ml to 5ml     prucalopride succinate (MOTEGRITY) 2 MG tablet Take 1 tablet (2 mg) by mouth daily     tacrolimus (ENVARSUS XR) 0.75 MG 24 hr tablet Take 2 tablets (1.5 mg) by mouth every morning (before  breakfast) Hold for dose change.     tacrolimus (ENVARSUS XR) 1 MG 24 hr tablet Take 2 tablets (2 mg) by mouth every morning (before breakfast) Total dose=6mg daily     tacrolimus (ENVARSUS XR) 4 MG 24 hr tablet Take 1 tablet (4 mg) by mouth every morning (before breakfast) Total dose=6mg daily     Current Facility-Administered Medications   Medication     bevacizumab (AVASTIN) intravitreal inj 1.25 mg     bevacizumab (AVASTIN) intravitreal inj 1.25 mg     bevacizumab (AVASTIN) intravitreal inj 1.25 mg     There are no discontinued medications.    Physical Exam   Vital Signs: There were no vitals taken for this visit.    GENERAL APPEARANCE: alert and no distress  HENT: mouth without ulcers or lesions  LYMPHATICS: no cervical or supraclavicular nodes  RESP: lungs clear to auscultation - no rales, rhonchi or wheezes  CV: regular rhythm, normal rate, no rub, no murmur  EDEMA: no LE edema bilaterally  ABDOMEN: soft, nondistended, nontender, bowel sounds normal  MS: extremities normal - no gross deformities noted, no evidence of inflammation in joints, no muscle tenderness  SKIN: multiple nevi over arms, abdomen      Data         Latest Ref Rng & Units 7/31/2023     8:21 AM 7/7/2023     8:56 AM 6/12/2023     8:30 AM   Renal   Sodium 136 - 145 mmol/L 140  135  137    K 3.4 - 5.3 mmol/L 4.2  4.0  3.8    Cl 98 - 107 mmol/L 104  100  101    Cl (external) 98 - 107 mmol/L 104  100  101    CO2 22 - 29 mmol/L 24  23  21    Urea Nitrogen 6.0 - 20.0 mg/dL 24.0  26.1  20.7    Creatinine 0.51 - 0.95 mg/dL 1.04  1.02  0.92    Glucose 70 - 99 mg/dL 90  89  81    Calcium 8.6 - 10.0 mg/dL 9.8  9.8  9.8    Magnesium 1.7 - 2.3 mg/dL 1.9  1.8  1.8          Latest Ref Rng & Units 7/31/2023     8:21 AM 7/7/2023     8:56 AM 6/12/2023     8:30 AM   Bone Health   Phosphorus 2.5 - 4.5 mg/dL 3.4  2.8  3.1          Latest Ref Rng & Units 7/31/2023     8:21 AM 7/7/2023     8:56 AM 6/12/2023     8:30 AM   Heme   WBC 4.0 - 11.0 10e3/uL 5.0  6.8  7.9     Hgb 11.7 - 15.7 g/dL 10.9  10.8  11.1    Plt 150 - 450 10e3/uL 261  264  270          Latest Ref Rng & Units 7/14/2022     8:04 AM 5/23/2022     8:19 AM 5/4/2022     2:49 PM   Liver   AP 40 - 150 U/L  84  70    TBili 0.2 - 1.3 mg/dL  0.3  0.2    Bilirubin Direct 0.0 - 0.2 mg/dL  <0.1  <0.1    ALT 0 - 50 U/L  20  18    AST 0 - 45 U/L  13  19    Tot Protein 6.8 - 8.8 g/dL  7.9  7.6    Albumin 3.4 - 5.0 g/dL 4.0  4.0  4.1          Latest Ref Rng & Units 7/31/2023     8:21 AM 7/7/2023     8:56 AM 6/12/2023     8:30 AM   Pancreas   A1C <5.7 %  5.1     Amylase 28 - 100 U/L 58  65  64    Lipase (Roche) 13 - 60 U/L 35  36  32          Latest Ref Rng & Units 4/27/2022     5:45 PM 4/18/2022     8:05 AM 3/3/2022     7:51 AM   Iron studies   Iron 35 - 180 ug/dL 56  106  139    Iron Saturation Index 15 - 46 % 20  35  35    Ferritin 12 - 150 ng/mL 110  120  106          Latest Ref Rng & Units 4/18/2022     8:05 AM 1/28/2022     3:40 PM 12/13/2021     8:07 AM   [facility] Txp Virology   CMV QUANT IU/ML Not Detected IU/mL Not Detected  Not Detected  Not Detected    EBV DNA COPIES/ML Not Detected copies/mL Not Detected          Recent Labs   Lab Test 06/12/23  0830 07/07/23  0856 07/31/23  0821   DOSTAC 6/11/2023 7/6/2023 7/30/2023   TACROL 5.0 4.9* 4.3*     Recent Labs   Lab Test 05/15/23  0830 05/30/23  0908 07/07/23  0856   DOSMPA 5/14/2023   8:00 AM 5/29/2023   8:30 PM  --    MPACID 2.15 4.64* 9.14*   MPAG 55.4 51.0 41.3     I spent a total of 64 minutes on the date of the encounter doing chart review, performing a history and physical exam, completing documentation and any further activities as noted above.        Again, thank you for allowing me to participate in the care of your patient.        Sincerely,        Reanna Lee MD

## 2023-08-01 NOTE — NURSING NOTE
Chief Complaint   Patient presents with    RECHECK     Return visit.     Blood pressure 138/82, pulse 85, temperature 97.6  F (36.4  C), weight 56.7 kg (124 lb 14.4 oz), SpO2 99 %, not currently breastfeeding.  NATHANIEL BISHOP

## 2023-08-01 NOTE — PROGRESS NOTES
TRANSPLANT NEPHROLOGY CHRONIC POST TRANSPLANT VISIT    Assessment & Plan   # DDKT (SPK): Stable               - Baseline Creatinine: ~ 1-1.3              - Proteinuria: Not checked post transplant              - Date DSA Last Checked: Nov/2021      Latest DSA: No DSA at time of transplant              - BK Viremia: Continue to monitor BK biweekly              - Kidney Tx Biopsy: yes   # kidney bx 5/2: read as borderline cellular rejection,isolated tubulitis i0t1, IF suggests possible IgA nephropathy MEST0    # closure Bx: 6/9 very limited sample, 2 glomeruli on EM, c4d not available, no signs of rejection       # Pancreas Tx (SPK):               - Pancreatic Exocrine Drainage: Enteric drained                     - Blood glucose: Euglycemia       On insulin: No              - HbA1c: Last checked at time of transplant      Latest HbA1c: 5%              - Pancreatic enzymes: Trend down              - Date DSA Last Checked: Nov/2021             Latest DSA: No DSA at time of transplant              - Pancreas Tx Biopsy: No     # Immunosuppression: Envarsus 5mg qd (goal trough 5-8),  mg po bid               - Induction with Recent Transplant:  Intermediate Intensity              - Continue with intensive monitoring of immunosuppression for efficacy and toxicity.              - Changes: yes  increase envarsus to 5.5 mg po every day (0.75x 2tabs+ 4x1tab)    # Infection Prophylaxis:   - PJP: Sulfa/TMP (Bactrim)  - CMV: Valganciclovir (Valcyte) completed 3 months              - BK <500, monitor every other week then monthly if negative     # Blood Pressure: Controlled;          Goal BP:<130/80              - Volume status: Euvolemic    EDW ~ 122-124bs              # Anemia in Chronic Renal Disease: Hgb: stable     SYLVAIN: No              - Iron studies: mild iron deficiency but intolerant to po iron, monitor for now and update iron studies if downtrend in Hb,      # Mineral Bone Disorder:   - Secondary renal  hyperparathyroidism; PTH level: Normal (18-80 pg/ml)        On treatment: None  - Vitamin D; level: High normal        On supplement: Yes  - Calcium; level: Normal        On supplement: No     # Electrolytes:   - Potassium; level: Normal        On supplement: No  - Magnesium; level: Normal        On supplement: no  - Bicarbonate; level: Normal        On supplement: No     # Medical Compliance: Yes     # Fatigue: slightly better    - nl CMV EBV TSH iron studies, stress test showed hypotensive response to exercise , exercise EKG didn't reveal inducible ischemia; angiogram with normal coronaries, ziopatch unremarkable,  cardiac MRI nl.     #  LE edema:   -  intermittent mostly upon standing and worse towards the end of the day, symptoms suggestive of dependent edema and  Not intravascular fluid overload, trial of various forms of diuretics led to BERNIE but currently tolerating low     dose of lasix 20 mg po every day for months with stable Cr~1-1.2    - possible vascular insufficiency, tried compression stockings with minimal improvement, seen by vascular surgery at [place]    - Most recent UPC normal.   - no evidence for CHF: Echo with nl ef, no valvular abnormalities. Angiogram nl coronaries. RHC mildly elevated PCWP. Cardiac MRI unremarkable,         # Skin Cancer: New lesions: none, chronic multiple nevi over the abdomen, no changes in size, color, texture              - Discussed sun protection and recommend regular follow up with Dermatology.      # Transplant History:  Etiology of Kidney Failure: Diabetes mellitus type 1  Tx: SPK  Transplant: 11/15/2021 (Kidney / Pancreas)  Donor Type: Donation after Brain Death Donor Class:   Crossmatch at time of Tx: negative  DSA at time of Tx: No  Significant changes in immunosuppression: None  CMV IgG Ab High Risk Discordance (D+/R-): No  EBV IgG Ab High Risk Discordance (D+/R-): No  Significant transplant-related complications: None    Transplant Office Phone Number:  "[phone]    Assessment and plan was discussed with the patient and she voiced her understanding and agreement.    Return visit: 6 months  Reanna Lee MD    Chief Complaint   Ms. Ortega is a 38 year old here for kidney transplant, pancreas transplant and immunosuppression management.    History of Present Illness   Marilyn Feels ok overall. She completed cardiac w/up for persistent leg edema and fluid retention as well as ongoing fatigue since her SPK including angiogram, echocardiogram, RHC, cardiac MRI and vascular evaluation for vascular insufficiencyi, infectious w/up including EBV, CMV, adenovirus, HHV all unrevealing. She has been managing her symptoms with lasix 20 mg po every day for a while now and renal function remains stable. She has been dealing with \"mold\" at her current house and is planning on moving to a new one next month. Her fatigue persists and fluctuates. No fevers, chills, or night sweats. No shortness of breath. She recently noted increased hair loss the past few weeks. No recent increase in her envarsus dose and her most recent trough has been subtherapeutic. She continues to follow up with ophthalmology for her diabetic retinopathy and macular edema and overall stable vision over the past several months.    Labs show stable Cr and lipase, slightly lower than goal FK and she denies any missed meds    Current IS Envarsus 5 /360   PCP: [facility]    Problem List   Patient Active Problem List   Diagnosis     Osteopenia     Chronic constipation     Irritable bowel syndrome     Stage 3a chronic kidney disease (H)     HTN, kidney transplant related     Gastroparesis     Heterozygous factor V Leiden mutation (H)     Immunosuppression (H)     Kidney replaced by transplant     Pancreas replaced by transplant (H)     Anemia due to stage 3a chronic kidney disease (H)     Aftercare following organ transplant     Generalized edema     Kidney transplant rejection     Dyspnea on exertion "     Chest pain, unspecified type       Allergies   Allergies   Allergen Reactions     Chlorhexidine Hives, Itching and Rash     PN: Patient had swelling/rash that was very itchy with chlorprep.     Ceclor [Cefaclor Monohydrate]      Metoclopramide Other (See Comments)     Noted mouth/facial twitching with reglan in 2022 when it was tried for gastroparesis.     Cefaclor Rash       Medications   Current Outpatient Medications   Medication Sig     aspirin (ASA) 81 MG chewable tablet Take 81 mg by mouth daily     cetirizine (ZYRTEC) 10 MG tablet Take 1 tablet (10 mg) by mouth every other day     cholecalciferol 25 MCG (1000 UT) TABS Take 2,000 Units by mouth every other day      docusate sodium (COLACE) 100 MG capsule Take 200 mg by mouth daily before breakfast     Dust Mite Mixed Allergen Ext (ODACTRA) 12 SQ-HDM SUBL Place 1 Dose under the tongue daily     EPINEPHrine (ANY BX GENERIC EQUIV) 0.3 MG/0.3ML injection 2-pack Inject 0.3 mLs (0.3 mg) into the muscle as needed for anaphylaxis     furosemide (LASIX) 20 MG tablet Take 1 tablet (20 mg) by mouth daily     loratadine (CLARITIN) 10 MG tablet Take 10 mg by mouth daily     mycophenolic acid (GENERIC EQUIVALENT) 360 MG EC tablet Take 1 tablet (360 mg) by mouth 2 times daily     ondansetron (ZOFRAN) 4 MG tablet Take 1 tablet (4 mg) by mouth every 8 hours as needed for nausea     ORDER FOR ALLERGEN IMMUNOTHERAPY Name of Mix: Mix #1  Dust Mite  Dust Mites DF. 10,000 AU/mL, HS  1.0 ml  Dust Mites DP. 10,000 AU/mL, HS  1.0 ml   Diluent: HSA 3.0 ml to 5ml     prucalopride succinate (MOTEGRITY) 2 MG tablet Take 1 tablet (2 mg) by mouth daily     tacrolimus (ENVARSUS XR) 0.75 MG 24 hr tablet Take 2 tablets (1.5 mg) by mouth every morning (before breakfast) Hold for dose change.     tacrolimus (ENVARSUS XR) 1 MG 24 hr tablet Take 2 tablets (2 mg) by mouth every morning (before breakfast) Total dose=6mg daily     tacrolimus (ENVARSUS XR) 4 MG 24 hr tablet Take 1 tablet (4 mg) by  mouth every morning (before breakfast) Total dose=6mg daily     Current Facility-Administered Medications   Medication     bevacizumab (AVASTIN) intravitreal inj 1.25 mg     bevacizumab (AVASTIN) intravitreal inj 1.25 mg     bevacizumab (AVASTIN) intravitreal inj 1.25 mg     There are no discontinued medications.    Physical Exam   Vital Signs: There were no vitals taken for this visit.    GENERAL APPEARANCE: alert and no distress  HENT: mouth without ulcers or lesions  LYMPHATICS: no cervical or supraclavicular nodes  RESP: lungs clear to auscultation - no rales, rhonchi or wheezes  CV: regular rhythm, normal rate, no rub, no murmur  EDEMA: no LE edema bilaterally  ABDOMEN: soft, nondistended, nontender, bowel sounds normal  MS: extremities normal - no gross deformities noted, no evidence of inflammation in joints, no muscle tenderness  SKIN: multiple nevi over arms, abdomen      Data         Latest Ref Rng & Units 7/31/2023     8:21 AM 7/7/2023     8:56 AM 6/12/2023     8:30 AM   Renal   Sodium 136 - 145 mmol/L 140  135  137    K 3.4 - 5.3 mmol/L 4.2  4.0  3.8    Cl 98 - 107 mmol/L 104  100  101    Cl (external) 98 - 107 mmol/L 104  100  101    CO2 22 - 29 mmol/L 24  23  21    Urea Nitrogen 6.0 - 20.0 mg/dL 24.0  26.1  20.7    Creatinine 0.51 - 0.95 mg/dL 1.04  1.02  0.92    Glucose 70 - 99 mg/dL 90  89  81    Calcium 8.6 - 10.0 mg/dL 9.8  9.8  9.8    Magnesium 1.7 - 2.3 mg/dL 1.9  1.8  1.8          Latest Ref Rng & Units 7/31/2023     8:21 AM 7/7/2023     8:56 AM 6/12/2023     8:30 AM   Bone Health   Phosphorus 2.5 - 4.5 mg/dL 3.4  2.8  3.1          Latest Ref Rng & Units 7/31/2023     8:21 AM 7/7/2023     8:56 AM 6/12/2023     8:30 AM   Heme   WBC 4.0 - 11.0 10e3/uL 5.0  6.8  7.9    Hgb 11.7 - 15.7 g/dL 10.9  10.8  11.1    Plt 150 - 450 10e3/uL 261  264  270          Latest Ref Rng & Units 7/14/2022     8:04 AM 5/23/2022     8:19 AM 5/4/2022     2:49 PM   Liver   AP 40 - 150 U/L  84  70    TBili 0.2 - 1.3 mg/dL   0.3  0.2    Bilirubin Direct 0.0 - 0.2 mg/dL  <0.1  <0.1    ALT 0 - 50 U/L  20  18    AST 0 - 45 U/L  13  19    Tot Protein 6.8 - 8.8 g/dL  7.9  7.6    Albumin 3.4 - 5.0 g/dL 4.0  4.0  4.1          Latest Ref Rng & Units 7/31/2023     8:21 AM 7/7/2023     8:56 AM 6/12/2023     8:30 AM   Pancreas   A1C <5.7 %  5.1     Amylase 28 - 100 U/L 58  65  64    Lipase (Roche) 13 - 60 U/L 35  36  32          Latest Ref Rng & Units 4/27/2022     5:45 PM 4/18/2022     8:05 AM 3/3/2022     7:51 AM   Iron studies   Iron 35 - 180 ug/dL 56  106  139    Iron Saturation Index 15 - 46 % 20  35  35    Ferritin 12 - 150 ng/mL 110  120  106          Latest Ref Rng & Units 4/18/2022     8:05 AM 1/28/2022     3:40 PM 12/13/2021     8:07 AM   [facility] Virology   CMV QUANT IU/ML Not Detected IU/mL Not Detected  Not Detected  Not Detected    EBV DNA COPIES/ML Not Detected copies/mL Not Detected          Recent Labs   Lab Test 06/12/23  0830 07/07/23  0856 07/31/23  0821   DOSTA 6/11/2023 7/6/2023 7/30/2023   TACROL 5.0 4.9* 4.3*     Recent Labs   Lab Test 05/15/23  0830 05/30/23  0908 07/07/23  0856   [facility]   8:00 AM 5/29/2023   8:30 PM  --    MPACID 2.15 4.64* 9.14*   MPAG 55.4 51.0 41.3     I spent a total of 64 minutes on the date of the encounter doing chart review, performing a history and physical exam, completing documentation and any further activities as noted above.

## 2023-08-02 LAB
MYCOPHENOLATE SERPL LC/MS/MS-MCNC: 2.24 MG/L (ref 1–3.5)
MYCOPHENOLATE-G SERPL LC/MS/MS-MCNC: 43.4 MG/L (ref 30–95)
TME LAST DOSE: NORMAL H
TME LAST DOSE: NORMAL H

## 2023-08-02 NOTE — PATIENT INSTRUCTIONS
Increase envarsus to 5.5 mg po every day    Repeat labs next week     Labs every other week for 1 month then monthly if stable    Transplant Patient Information  Your Post Transplant Coordinator is: Kelly Atwodo  You and your care team can contact your transplant coordinator Monday - Friday, 8am - 5pm at [phone] (Option 2 to reach the coordinator or Option 4 to schedule an appointment).  You can also reach your care team online via [employer].  After hours for urgent matters, please call [facility] at [phone].

## 2023-08-04 ENCOUNTER — ALLIED HEALTH/NURSE VISIT (OUTPATIENT)
Dept: ALLERGY | Facility: CLINIC | Age: 38
End: 2023-08-04
Payer: COMMERCIAL

## 2023-08-04 DIAGNOSIS — J30.89 ALLERGIC RHINITIS DUE TO DUST MITE: Primary | ICD-10-CM

## 2023-08-04 DIAGNOSIS — J30.9 ALLERGIC RHINITIS: ICD-10-CM

## 2023-08-04 PROCEDURE — 95120 IMMUNOTHERAPY ONE INJECTION: CPT

## 2023-08-07 ENCOUNTER — LAB (OUTPATIENT)
Dept: LAB | Facility: CLINIC | Age: 38
End: 2023-08-07
Payer: COMMERCIAL

## 2023-08-07 DIAGNOSIS — Z94.83 PANCREAS REPLACED BY TRANSPLANT (H): ICD-10-CM

## 2023-08-07 DIAGNOSIS — Z48.298 AFTERCARE FOLLOWING ORGAN TRANSPLANT: ICD-10-CM

## 2023-08-07 DIAGNOSIS — Z94.0 KIDNEY REPLACED BY TRANSPLANT: ICD-10-CM

## 2023-08-07 DIAGNOSIS — D84.9 IMMUNOSUPPRESSED STATUS (H): ICD-10-CM

## 2023-08-07 LAB
ANION GAP SERPL CALCULATED.3IONS-SCNC: 13 MMOL/L (ref 7–15)
BUN SERPL-MCNC: 27.2 MG/DL (ref 6–20)
CALCIUM SERPL-MCNC: 9.4 MG/DL (ref 8.6–10)
CHLORIDE SERPL-SCNC: 101 MMOL/L (ref 98–107)
CREAT SERPL-MCNC: 0.99 MG/DL (ref 0.51–0.95)
DEPRECATED HCO3 PLAS-SCNC: 21 MMOL/L (ref 22–29)
GFR SERPL CREATININE-BSD FRML MDRD: 74 ML/MIN/1.73M2
GLUCOSE SERPL-MCNC: 75 MG/DL (ref 70–99)
POTASSIUM SERPL-SCNC: 3.9 MMOL/L (ref 3.4–5.3)
SODIUM SERPL-SCNC: 135 MMOL/L (ref 136–145)
TACROLIMUS BLD-MCNC: 6.1 UG/L (ref 5–15)
TME LAST DOSE: NORMAL H
TME LAST DOSE: NORMAL H

## 2023-08-07 PROCEDURE — 80197 ASSAY OF TACROLIMUS: CPT

## 2023-08-07 PROCEDURE — 80048 BASIC METABOLIC PNL TOTAL CA: CPT

## 2023-08-07 PROCEDURE — 36415 COLL VENOUS BLD VENIPUNCTURE: CPT

## 2023-08-11 ENCOUNTER — ALLIED HEALTH/NURSE VISIT (OUTPATIENT)
Dept: ALLERGY | Facility: CLINIC | Age: 38
End: 2023-08-11
Payer: COMMERCIAL

## 2023-08-11 DIAGNOSIS — J30.89 ALLERGIC RHINITIS DUE TO DUST MITE: Primary | ICD-10-CM

## 2023-08-11 DIAGNOSIS — J30.9 ALLERGIC RHINITIS: ICD-10-CM

## 2023-08-11 PROCEDURE — 95120 IMMUNOTHERAPY ONE INJECTION: CPT

## 2023-08-18 ENCOUNTER — ALLIED HEALTH/NURSE VISIT (OUTPATIENT)
Dept: ALLERGY | Facility: CLINIC | Age: 38
End: 2023-08-18
Payer: COMMERCIAL

## 2023-08-18 DIAGNOSIS — J30.9 ALLERGIC RHINITIS: ICD-10-CM

## 2023-08-18 DIAGNOSIS — J30.89 ALLERGIC RHINITIS DUE TO DUST MITE: Primary | ICD-10-CM

## 2023-08-18 PROCEDURE — 95120 IMMUNOTHERAPY ONE INJECTION: CPT

## 2023-08-22 ENCOUNTER — ALLIED HEALTH/NURSE VISIT (OUTPATIENT)
Dept: ALLERGY | Facility: CLINIC | Age: 38
End: 2023-08-22
Payer: COMMERCIAL

## 2023-08-22 DIAGNOSIS — Z94.83 PANCREAS TRANSPLANTED (H): ICD-10-CM

## 2023-08-22 DIAGNOSIS — J30.89 ALLERGIC RHINITIS DUE TO DUST MITE: Primary | ICD-10-CM

## 2023-08-22 DIAGNOSIS — Z94.83 PANCREAS REPLACED BY TRANSPLANT (H): ICD-10-CM

## 2023-08-22 DIAGNOSIS — J30.9 ALLERGIC RHINITIS: ICD-10-CM

## 2023-08-22 DIAGNOSIS — Z94.0 KIDNEY REPLACED BY TRANSPLANT: ICD-10-CM

## 2023-08-22 PROCEDURE — 95120 IMMUNOTHERAPY ONE INJECTION: CPT

## 2023-08-22 RX ORDER — TACROLIMUS 1 MG/1
TABLET, EXTENDED RELEASE ORAL
Qty: 60 TABLET | Refills: 0 | Status: SHIPPED | OUTPATIENT
Start: 2023-08-22 | End: 2023-09-06

## 2023-08-22 RX ORDER — TACROLIMUS 0.75 MG/1
1.5 TABLET, EXTENDED RELEASE ORAL
Qty: 60 TABLET | Refills: 11 | Status: SHIPPED | OUTPATIENT
Start: 2023-08-22 | End: 2023-09-06

## 2023-08-22 RX ORDER — TACROLIMUS 4 MG/1
4 TABLET, EXTENDED RELEASE ORAL
Qty: 30 TABLET | Refills: 11 | Status: SHIPPED | OUTPATIENT
Start: 2023-08-22 | End: 2023-09-06

## 2023-08-28 ENCOUNTER — ALLIED HEALTH/NURSE VISIT (OUTPATIENT)
Dept: ALLERGY | Facility: CLINIC | Age: 38
End: 2023-08-28
Payer: COMMERCIAL

## 2023-08-28 DIAGNOSIS — J30.9 ALLERGIC RHINITIS: ICD-10-CM

## 2023-08-28 DIAGNOSIS — J30.89 ALLERGIC RHINITIS DUE TO DUST MITE: Primary | ICD-10-CM

## 2023-08-28 PROCEDURE — 95115 IMMUNOTHERAPY ONE INJECTION: CPT

## 2023-08-28 PROCEDURE — 99207 PR DROP WITH A PROCEDURE: CPT

## 2023-09-02 ENCOUNTER — TELEPHONE (OUTPATIENT)
Dept: TRANSPLANT | Facility: CLINIC | Age: 38
End: 2023-09-02
Payer: COMMERCIAL

## 2023-09-05 ENCOUNTER — LAB (OUTPATIENT)
Dept: LAB | Facility: CLINIC | Age: 38
End: 2023-09-05
Payer: COMMERCIAL

## 2023-09-05 ENCOUNTER — ALLIED HEALTH/NURSE VISIT (OUTPATIENT)
Dept: ALLERGY | Facility: CLINIC | Age: 38
End: 2023-09-05
Payer: COMMERCIAL

## 2023-09-05 DIAGNOSIS — Z94.0 KIDNEY REPLACED BY TRANSPLANT: ICD-10-CM

## 2023-09-05 DIAGNOSIS — D84.9 IMMUNOSUPPRESSED STATUS (H): ICD-10-CM

## 2023-09-05 DIAGNOSIS — Z94.83 PANCREAS REPLACED BY TRANSPLANT (H): ICD-10-CM

## 2023-09-05 DIAGNOSIS — J30.9 ALLERGIC RHINITIS: ICD-10-CM

## 2023-09-05 DIAGNOSIS — J30.89 ALLERGIC RHINITIS DUE TO DUST MITE: Primary | ICD-10-CM

## 2023-09-05 LAB
AMYLASE SERPL-CCNC: 61 U/L (ref 28–100)
ANION GAP SERPL CALCULATED.3IONS-SCNC: 14 MMOL/L (ref 7–15)
BUN SERPL-MCNC: 19.8 MG/DL (ref 6–20)
CALCIUM SERPL-MCNC: 9.4 MG/DL (ref 8.6–10)
CHLORIDE SERPL-SCNC: 101 MMOL/L (ref 98–107)
CREAT SERPL-MCNC: 0.92 MG/DL (ref 0.51–0.95)
DEPRECATED HCO3 PLAS-SCNC: 22 MMOL/L (ref 22–29)
ERYTHROCYTE [DISTWIDTH] IN BLOOD BY AUTOMATED COUNT: 13.2 % (ref 10–15)
GFR SERPL CREATININE-BSD FRML MDRD: 81 ML/MIN/1.73M2
GLUCOSE SERPL-MCNC: 81 MG/DL (ref 70–99)
HCT VFR BLD AUTO: 36.2 % (ref 35–47)
HGB BLD-MCNC: 11.5 G/DL (ref 11.7–15.7)
LIPASE SERPL-CCNC: 36 U/L (ref 13–60)
MAGNESIUM SERPL-MCNC: 1.9 MG/DL (ref 1.7–2.3)
MCH RBC QN AUTO: 28.4 PG (ref 26.5–33)
MCHC RBC AUTO-ENTMCNC: 31.8 G/DL (ref 31.5–36.5)
MCV RBC AUTO: 89 FL (ref 78–100)
PHOSPHATE SERPL-MCNC: 3 MG/DL (ref 2.5–4.5)
PLATELET # BLD AUTO: 241 10E3/UL (ref 150–450)
POTASSIUM SERPL-SCNC: 4.1 MMOL/L (ref 3.4–5.3)
RBC # BLD AUTO: 4.05 10E6/UL (ref 3.8–5.2)
SODIUM SERPL-SCNC: 137 MMOL/L (ref 136–145)
TACROLIMUS BLD-MCNC: 4.3 UG/L (ref 5–15)
TME LAST DOSE: ABNORMAL H
TME LAST DOSE: ABNORMAL H
WBC # BLD AUTO: 4.8 10E3/UL (ref 4–11)

## 2023-09-05 PROCEDURE — 95120 IMMUNOTHERAPY ONE INJECTION: CPT

## 2023-09-05 PROCEDURE — 82150 ASSAY OF AMYLASE: CPT

## 2023-09-05 PROCEDURE — 80197 ASSAY OF TACROLIMUS: CPT

## 2023-09-05 PROCEDURE — 84100 ASSAY OF PHOSPHORUS: CPT

## 2023-09-05 PROCEDURE — 36415 COLL VENOUS BLD VENIPUNCTURE: CPT

## 2023-09-05 PROCEDURE — 80180 DRUG SCRN QUAN MYCOPHENOLATE: CPT

## 2023-09-05 PROCEDURE — 80048 BASIC METABOLIC PNL TOTAL CA: CPT

## 2023-09-05 PROCEDURE — 85027 COMPLETE CBC AUTOMATED: CPT

## 2023-09-05 PROCEDURE — 83690 ASSAY OF LIPASE: CPT

## 2023-09-05 PROCEDURE — 83735 ASSAY OF MAGNESIUM: CPT

## 2023-09-05 PROCEDURE — 87799 DETECT AGENT NOS DNA QUANT: CPT

## 2023-09-06 DIAGNOSIS — Z94.83 PANCREAS TRANSPLANTED (H): ICD-10-CM

## 2023-09-06 DIAGNOSIS — Z94.0 KIDNEY REPLACED BY TRANSPLANT: ICD-10-CM

## 2023-09-06 DIAGNOSIS — Z94.83 PANCREAS REPLACED BY TRANSPLANT (H): ICD-10-CM

## 2023-09-06 LAB — BKV DNA # SPEC NAA+PROBE: NOT DETECTED COPIES/ML

## 2023-09-06 RX ORDER — TACROLIMUS 1 MG/1
TABLET, EXTENDED RELEASE ORAL
Qty: 60 TABLET | Refills: 0 | Status: SHIPPED | OUTPATIENT
Start: 2023-09-06 | End: 2023-09-26

## 2023-09-06 RX ORDER — TACROLIMUS 0.75 MG/1
TABLET, EXTENDED RELEASE ORAL
Qty: 60 TABLET | Refills: 11 | Status: SHIPPED | OUTPATIENT
Start: 2023-09-06 | End: 2023-09-26

## 2023-09-06 RX ORDER — TACROLIMUS 4 MG/1
4 TABLET, EXTENDED RELEASE ORAL
Qty: 60 TABLET | Refills: 11 | Status: SHIPPED | OUTPATIENT
Start: 2023-09-06 | End: 2023-09-26

## 2023-09-07 LAB
MYCOPHENOLATE SERPL LC/MS/MS-MCNC: 6.18 MG/L (ref 1–3.5)
MYCOPHENOLATE-G SERPL LC/MS/MS-MCNC: 53.5 MG/L (ref 30–95)
TME LAST DOSE: ABNORMAL H
TME LAST DOSE: ABNORMAL H

## 2023-09-08 ENCOUNTER — ALLIED HEALTH/NURSE VISIT (OUTPATIENT)
Dept: ALLERGY | Facility: CLINIC | Age: 38
End: 2023-09-08
Payer: COMMERCIAL

## 2023-09-08 DIAGNOSIS — J30.9 ALLERGIC RHINITIS: ICD-10-CM

## 2023-09-08 DIAGNOSIS — J30.89 ALLERGIC RHINITIS DUE TO DUST MITE: Primary | ICD-10-CM

## 2023-09-08 PROCEDURE — 95120 IMMUNOTHERAPY ONE INJECTION: CPT

## 2023-09-11 ENCOUNTER — LAB (OUTPATIENT)
Dept: LAB | Facility: CLINIC | Age: 38
End: 2023-09-11
Payer: COMMERCIAL

## 2023-09-11 DIAGNOSIS — D84.9 IMMUNOSUPPRESSED STATUS (H): ICD-10-CM

## 2023-09-11 DIAGNOSIS — Z94.0 KIDNEY REPLACED BY TRANSPLANT: ICD-10-CM

## 2023-09-11 DIAGNOSIS — Z94.83 PANCREAS REPLACED BY TRANSPLANT (H): ICD-10-CM

## 2023-09-11 LAB
TACROLIMUS BLD-MCNC: 8.4 UG/L (ref 5–15)
TME LAST DOSE: NORMAL H
TME LAST DOSE: NORMAL H

## 2023-09-11 PROCEDURE — 80197 ASSAY OF TACROLIMUS: CPT

## 2023-09-11 PROCEDURE — 36415 COLL VENOUS BLD VENIPUNCTURE: CPT

## 2023-09-15 ENCOUNTER — TELEPHONE (OUTPATIENT)
Dept: ALLERGY | Facility: CLINIC | Age: 38
End: 2023-09-15

## 2023-09-15 DIAGNOSIS — J30.89 ALLERGIC RHINITIS DUE TO DUST MITE: ICD-10-CM

## 2023-09-15 NOTE — TELEPHONE ENCOUNTER
[facility] Call Center    Phone Message    May a detailed message be left on voicemail: yes     Reason for Call: Other: Pt called and thought that she had an allergy shot appt today but there was nothing scheduled. Pt has a question and concern about it and would like to speak to the care team. Thanks      Action Taken: Message routed to:  Other: CS allergy    Travel Screening: Not Applicable

## 2023-09-15 NOTE — TELEPHONE ENCOUNTER
Writer was able to schedule her shot appointment today 9/15/23 at 1:20. Okay double book per team.       Clay Valdez MA  9/15/2023 10:34 AM

## 2023-09-15 NOTE — TELEPHONE ENCOUNTER
ALLERGY SOLUTION RE-ORDER REQUEST    Marilyn Ortega 1985 MRN: [id number]    DATE NEEDED:  3 weeks  Vial Color Content    Vial Size  Red 1:1 Dust Mite    5 mL        Serum reorder consent signed and patient/parent was advised that new serums would be ordered through the pharmacy and billed to their insurance company when they arrive in clinic. Yes    Shot Clinic Location:  [facility].  Ship to Location: [facility].  Serum billed to:  [facility].    Special Instructions:  none        Requester Signature  NED Zarco, RN  9/15/2023 4:14 PM

## 2023-09-19 ENCOUNTER — OFFICE VISIT (OUTPATIENT)
Dept: ALLERGY | Facility: CLINIC | Age: 38
End: 2023-09-19
Payer: COMMERCIAL

## 2023-09-19 ENCOUNTER — LAB (OUTPATIENT)
Dept: LAB | Facility: CLINIC | Age: 38
End: 2023-09-19
Payer: COMMERCIAL

## 2023-09-19 DIAGNOSIS — J30.9 ALLERGIC RHINITIS: ICD-10-CM

## 2023-09-19 DIAGNOSIS — J30.89 ALLERGIC RHINITIS DUE TO DUST MITE: Primary | ICD-10-CM

## 2023-09-19 DIAGNOSIS — Z94.83 PANCREAS REPLACED BY TRANSPLANT (H): ICD-10-CM

## 2023-09-19 DIAGNOSIS — D84.9 IMMUNOSUPPRESSED STATUS (H): ICD-10-CM

## 2023-09-19 DIAGNOSIS — Z94.0 KIDNEY REPLACED BY TRANSPLANT: ICD-10-CM

## 2023-09-19 LAB
TACROLIMUS BLD-MCNC: 5.2 UG/L (ref 5–15)
TME LAST DOSE: NORMAL H
TME LAST DOSE: NORMAL H

## 2023-09-19 PROCEDURE — 95120 IMMUNOTHERAPY ONE INJECTION: CPT

## 2023-09-19 PROCEDURE — 36415 COLL VENOUS BLD VENIPUNCTURE: CPT

## 2023-09-19 PROCEDURE — 80197 ASSAY OF TACROLIMUS: CPT

## 2023-09-19 NOTE — PROGRESS NOTES
Marilyn Ortega presents to clinic today at the request of George Munson MD (ordering provider) for Allergy Immunotherapy injection(s).     This service provided today was under the care of George Munson MD; the supervising provider of the day; who was available if needed.    Today the patient received her first top dose of 0.5mL Red    Patient presented after waiting 30 minutes with no reaction to  injections. Discharged from clinic.    WALESKA DiazN, RN

## 2023-09-22 DIAGNOSIS — J30.89 ALLERGIC RHINITIS DUE TO DUST MITE: Primary | ICD-10-CM

## 2023-09-22 PROCEDURE — 95165 ANTIGEN THERAPY SERVICES: CPT | Performed by: INTERNAL MEDICINE

## 2023-09-22 NOTE — PROGRESS NOTES
Allergy serums billed to [facility].     Vials billed below:    Vial Color Content                      Vial Size Expiration Date  Red 1:1 Dust Mite 5mL  9/22/24      Billed 10 units    Checked by Osiel Bazan / LPN        Signature  Osiel Bazan LPN

## 2023-09-26 DIAGNOSIS — Z94.0 KIDNEY REPLACED BY TRANSPLANT: ICD-10-CM

## 2023-09-26 DIAGNOSIS — Z94.83 PANCREAS TRANSPLANTED (H): ICD-10-CM

## 2023-09-26 DIAGNOSIS — Z94.83 PANCREAS REPLACED BY TRANSPLANT (H): ICD-10-CM

## 2023-09-26 RX ORDER — TACROLIMUS 4 MG/1
4 TABLET, EXTENDED RELEASE ORAL
Qty: 90 TABLET | Refills: 3 | Status: SHIPPED | OUTPATIENT
Start: 2023-09-26 | End: 2023-11-28

## 2023-09-26 RX ORDER — MYCOPHENOLIC ACID 360 MG/1
360 TABLET, DELAYED RELEASE ORAL 2 TIMES DAILY
Qty: 60 TABLET | Refills: 11 | Status: SHIPPED | OUTPATIENT
Start: 2023-09-26 | End: 2024-09-13

## 2023-09-26 RX ORDER — TACROLIMUS 1 MG/1
1 TABLET, EXTENDED RELEASE ORAL
Qty: 90 TABLET | Refills: 3 | Status: SHIPPED | OUTPATIENT
Start: 2023-09-26 | End: 2023-11-28

## 2023-09-26 RX ORDER — TACROLIMUS 0.75 MG/1
0.75 TABLET, EXTENDED RELEASE ORAL
Qty: 90 TABLET | Refills: 3 | Status: SHIPPED | OUTPATIENT
Start: 2023-09-26 | End: 2023-11-28

## 2023-09-28 NOTE — PROGRESS NOTES
Allergy serums received at [facility].    Vials received below:    Vial Color Content                      Vial Size Expiration Date  Red 1:1 Dust Mite 5mL  09/22/2024      Signature  WALESKA ZarcoN, RN  9/28/2023 9:37 AM

## 2023-10-04 ENCOUNTER — VIRTUAL VISIT (OUTPATIENT)
Dept: GASTROENTEROLOGY | Facility: CLINIC | Age: 38
End: 2023-10-04
Attending: PHYSICIAN ASSISTANT
Payer: COMMERCIAL

## 2023-10-04 DIAGNOSIS — K59.09 CHRONIC CONSTIPATION: ICD-10-CM

## 2023-10-04 PROCEDURE — 99214 OFFICE O/P EST MOD 30 MIN: CPT | Mod: VID | Performed by: PHYSICIAN ASSISTANT

## 2023-10-04 NOTE — PROGRESS NOTES
Virtual Visit Details    Type of service:  Video Visit     Originating Location (pt. Location): Home    Distant Location (provider location):  Off-site  Platform used for Video Visit: Herman

## 2023-10-04 NOTE — PROGRESS NOTES
Gastroenterology Visit for: Marilyn Ortega 1985   MRN: [id number]     Reason for Visit:  chief complaint    Referred by: No ref. provider found  /   Patient Care Team:  No Ref-Primary, Physician as PCP - General  Martha Christianson NP as Nurse Practitioner (Acupuncture)  Venice Banegas MD as MD (Endocrinology, Diabetes, and Metabolism)  Ricarda Ravi as Transplant Coordinator (Transplant)  Naz Sierra as LPN (Transplant)  Bulmaro Weiner [facility] as Pharmacist (Pharmacist)  Patricia Gonzales RN as Registered Nurse  Flex Bocanegra MD as MD (Gastroenterology)  Kobe Yip MD as MD (Neurology)  Kamran Corona MD as MD (Nephrology)  Pio Zamorano MD as Resident (Ophthalmology)  Ochoa Barnes MD as Assigned Surgical Provider  Luis A Shrestha PA-C as Assigned Cancer Care Provider  Flex Bocanegra MD as Assigned Gastroenterology Provider  Reanna Cueto MD as Assigned Nephrology Provider  Guille López MD as MD (Cardiovascular Disease)  Osiel Raphael MD as MD (Cardiovascular Disease)  Mallory Franco, RN as Specialty Care Coordinator (Cardiology)  George Munson MD as MD (Allergy & Immunology)  George Munson MD as Assigned Allergy Provider  Leighton Cgae MD as Assigned Heart and Vascular Provider    History of Present Illness:   Marilyn Ortega is a 38 year old female with significant past medical history pertinent for history of DM Type 1 complicated by diabetic nephropathy for which she is now s/p pancreas/kidney transplant Novemeber 2021 on chronic immunosuppression tacrolimus/mycophenolic acid, gastroparesis, C. difficile 201 who is presenting as follow-up for constipation/gastroparesis.      Interval History October 4, 2023:    Today Kamilla reports that she is doing overall well.  She has no acute concerns.    Has traveled every week for the past 5 weeks. Last night returned from [place]. 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     Denies weight loss, nausea, emesis, dysphagia, odynophagia, heartburn, regurgitation, bloating/fullness, early satiety, early satiation, diarrhea, constipation, melena, hematochezia and BRBR.   -----------------------------------------------------------------------------------------------------------------------------------------------------------------------------------------------  Interval History 4/5/2023:    Today  Kami reports that she has a history of Type 1 DM for 25 years.  Notes that she adheres to a gluten free diet and been tested for celiac that has been negative.     Overall she is doing well.  She does have some increased bloating/abdominal discomfort with the consumption of fiber. Triggers include chickpeas, merlyn seeds, cassava flour, and inulin. States \"I have learned my safe foods.\"  Symptoms are well controlled with dietary adjustments.    As for her bowel habits she reports a remote history of 2-3 years of diarrhea for which she underwent extensive work-up. At this time was having incontinence however this has since subsided.  Now she struggles with constipation for which she takes Motegrity 2 mg once daily with consistent bowel habits. Marilyn will have a stool daily if not every other day consistent with [place] Stool Type 4/5. Continues to have to rectally digitate.     Had gone to physical therapy/biofeedback however with minimal improvement.    Denies weight loss, nausea, emesis, dysphagia, odynophagia, heartburn, regurgitation, bloating/fullness, diarrhea, constipation, nocturnal stooling, incontinence, melena, hematochezia and BRBR.     -----------------------------------------------------------------------------------------------------------------------------------------------------------------------------------------------------  5/20/2022 Dr. Bocanegra     HPI:   36 yo woman with type 1 DM (not previously on dialysis) s/p panc-kidney transplant Nov 2021, " gastroparesis but not retinopathy, neuropathy who presents for follow up of gastroparesis and change in bowel habits.      Had face, mouth twitching with Reglan so stopped this. Symptoms have improved, also wondering if this is related to tacrolimus as she had this a little before Reglan. Feels like the reglan amplified it though.      Alternating constipation and regular bowel movements. No diarrhea. Essentially unchanged since our last visit in February.      Schedule for pelvic floor testing on 5/31. We discussed confounders to gastric emptying study including medications, fatty meals, constipation.      A 10 point ROS is otherwise negative.    ---------------------------------------------------------------------------------------------------------------------------------------------------------------  2/18/2022 Dr. Bocanegra:     HPI:   37 yo woman with type 1 DM (not previously on dialysis) s/p panc-kidney transplant Nov 2021, gastroparesis but not retinopathy, neuropathy who presents for gastroparesis and change in bowel habits.      Not great GI related. Years of GI issues, starting in college. Tried gluten free diet which improved symptoms 100%. Gluten free since 2002. Varies between diarrhea and constipation.      Had kidney/pancreas transplant in Nov 2021. Had severe diarrhea for 2 weeks about 6 weeks ago, lost 10 pounds. Needed infusions. Enteric pathogens were negative. All of a sudden it stopped and she became very constipated. Needed laxatives, enemas and everything became straight liquid. Drug levels have varied, which her transplant team has been worried has been related to dysmotility.      After transplant and prednisone course she was fairly regular for about 1 month. Didn't have other infections around that time. Before transplant things were ok, more constipation than diarrhea but was overall ok.     Started Prucalopride approximately 2 years ago, 2mg daily. This was helpful but now hasn't been.  Tried Benefiber which caused a lot of bloating. Takes docusate daily in the morning. No opiates. She has tried gastroparesis diet. She is taking [employer] brand magnesium. She doesn't notice improvement with prunes but she can increase the number she eats. Senna doesn't work as well as dulcolax.        Esophageal Questionnaire(s)    BEDQ Questionnaire       No data to display                   No data to display                Eckardt Questionnaire       No data to display                Promis 10 Questionnaire       No data to display                  STUDIES & PROCEDURES:    EGD:     12/18/2018 [facility]   Findings:        The examined esophagus was normal.        A large amount of food (residue) was found in the        gastric body.        The examined duodenum was normal.   Impression:          - Normal esophagus.                        - A large amount of food (residue) in                        the stomach.                        - Due to the amount of food in the                        stomach large portions of the stomach                        mucosa could not be evaluated.                        - Normal examined duodenum.                        - No specimens collected.       Colonoscopy:    11/23/2015 [facility]   ASSESSMENT:   Terminal ileum and entire colon mucosa was normal   Random biopsies taken to rule out microscopic pathology.     RECOMMENDATIONS:     Other:     5/31/2022 [facility]          EndoFLIP directed at the [facility] or LES (8cm (EF-325) balloon length or 16cm (EF-322) balloon length):   Date:  8cm balloon  Balloon inflation Balloon pressure CSA (mm^2) DI (mm^2/mmHg) Dmin (mm) Compliance   20 (ladmark ID)        30        40        50           16cm balloon  Balloon inflation Balloon pressure CSA (mm^2) DI (mm^2/mmHg) Dmin (mm) Compliance   30 (ladmark ID)        40        50        60        70           High Resolution  Manometry:    PH/Impedance:     Bravo:    CT:    3/24/2023 CTV AP W Contrast   IMPRESSION:  1.  No evidence of May Thurner anatomy.  2.  Transplant kidney left lower quadrant.    2/1/2022  CT AP  WO Contrast                                                      IMPRESSION:   1. Postoperative changes of left lower quadrant renal and right pelvic  pancreatic transplant.  2. Moderate colonic stool with fluid-filled descending colon and  distended stomach with copious gastric contents to suggest slow  transit/constipation. An adynamic ileus is also a possibility.    Esophagram:    FL VSS:     GES:    8/21/2019 [facility]   FINDINGS:   Percent remaining activity within the stomach:   Immediate: 100% (normal greater than 70%)   1 hour: 94% (normal 30-90%)   2 hours: 93% (normal less than 60%)   3 hours: 81% (normal less than 30%)   4 hours: 61% (normal less than 10%)     U/S:     XRAY:    5/11/2022  IMPRESSION: Moderate amount of stool.  Nonobstructed bowel gas  pattern.         Prior medical records were reviewed including, but not limited to, notes from referring providers, lab work, radiographic tests, and other diagnostic tests. Pertinent results were summarized above.     History     Past Medical History:   Diagnosis Date     Anemia 12/9/2021     C. difficile diarrhea 2013     CKD (chronic kidney disease) stage 4, GFR 15-29 ml/min (H)      Complication of transplanted kidney 5/23/2022     Gastroparesis      Gluten intolerance      Heterozygous factor V Leiden mutation (H24)      HTN (hypertension)      Infection due to 2019 novel coronavirus 11/2020     Osteomyelitis (H) 2013     Shingles 2013     Type 1 diabetes mellitus (H) 2000       Past Surgical History:   Procedure Laterality Date     BENCH KIDNEY N/A 11/15/2021    Procedure: Bench kidney;  Surgeon: Branden Aranda MD;  Location: [facility] OR     BENCH PANCREAS N/A 11/15/2021    Procedure: Bench pancreas;  Surgeon: Branden Aranda  MD Alexander;  Location: [facility] OR     CATARACT EXTRACTION  2021     CV CORONARY ANGIOGRAM N/A 2022    Procedure: Coronary Angiogram;  Surgeon: Bebeto Ventura MD;  Location: [facility]ATH [facility]     CV RIGHT HEART CATH MEASUREMENTS RECORDED N/A 2022    Procedure: Right Heart Catheterization;  Surgeon: Bebeto Ventura MD;  Location: [facility]     CV RIGHT HEART CATH MEASUREMENTS RECORDED N/A 2022    Procedure: Right Heart Catheterization [[id number]];  Surgeon: Trevor Jasmine MD;  [facility]     IR RENAL BIOPSY LEFT  2022     IR RENAL BIOPSY LEFT  2022     ORIF HIP FRACTURE Left      ORTHOPEDIC SURGERY Bilateral     Repair of labral tear     TRANSPLANT PANCREAS, KIDNEY  DONOR, COMBINED Left 11/15/2021    Procedure: TRANSPLANT, KIDNEY AND PANCREAS,  DONOR;  Surgeon: Branden Aranda MD;  Location: [facility] OR       Social History     Socioeconomic History     Marital status: Single     Spouse name: Not on file     Number of children: Not on file     Years of education: Not on file     Highest education level: Not on file   Occupational History     Not on file   Tobacco Use     Smoking status: Never     Smokeless tobacco: Never   Substance and Sexual Activity     Alcohol use: Yes     Comment: socially     Drug use: No     Sexual activity: Yes   Other Topics Concern     Parent/sibling w/ CABG, MI or angioplasty before 65F 55M? Not Asked   Social History Narrative     Not on file     Social Determinants of Health     Financial Resource Strain: Not on file   Food Insecurity: Not on file   Transportation Needs: Not on file   Physical Activity: Not on file   Stress: Not on file   Social Connections: Not on file   Interpersonal Safety: Not on file   Housing Stability: Not on file       Family History   Problem Relation Age of Onset     Diabetes Paternal Grandfather      Macular Degeneration  Mother      Arthritis Maternal Grandmother      Hypertension Maternal Grandmother      Cancer - colorectal Maternal Grandfather      Glaucoma No family hx of      Family history reviewed and edited as appropriate    Medications and Allergies:     Outpatient Encounter Medications as of 10/4/2023   Medication Sig Dispense Refill     aspirin (ASA) 81 MG chewable tablet Take 81 mg by mouth daily       cetirizine (ZYRTEC) 10 MG tablet Take 1 tablet (10 mg) by mouth every other day       docusate sodium (COLACE) 100 MG capsule Take 200 mg by mouth daily before breakfast       Dust Mite Mixed Allergen Ext (ODACTRA) 12 SQ-HDM SUBL Place 1 Dose under the tongue daily 30 tablet 11     EPINEPHrine (ANY BX GENERIC EQUIV) 0.3 MG/0.3ML injection 2-pack Inject 0.3 mLs (0.3 mg) into the muscle as needed for anaphylaxis 2 each 2     furosemide (LASIX) 20 MG tablet Take 1 tablet (20 mg) by mouth daily 45 tablet 0     mycophenolic acid (GENERIC EQUIVALENT) 360 MG EC tablet Take 1 tablet (360 mg) by mouth 2 times daily 60 tablet 11     ORDER FOR ALLERGEN IMMUNOTHERAPY Name of Mix: Mix #1  Dust Mite  Dust Mites DF. 10,000 AU/mL, HS  1.0 ml  Dust Mites DP. 10,000 AU/mL, HS  1.0 ml   Diluent: HSA 3.0 ml to 5ml 5 mL PRN     prucalopride succinate (MOTEGRITY) 2 MG tablet Take 1 tablet (2 mg) by mouth daily 90 tablet 3     tacrolimus (ENVARSUS XR) 0.75 MG 24 hr tablet Take 1 tablet (0.75 mg) by mouth every morning (before breakfast) Take in addition to 1 mg tablet and 4 mg tablet for total dose of 5.75 mg daily 90 tablet 3     tacrolimus (ENVARSUS XR) 1 MG 24 hr tablet Take 1 tablet (1 mg) by mouth every morning (before breakfast) Take in addition to 4 mg tablet and 0.75 mg tablet for total dose of 5.75 mg daily 90 tablet 3     tacrolimus (ENVARSUS XR) 4 MG 24 hr tablet Take 1 tablet (4 mg) by mouth every morning (before breakfast) Take in addition to 1 mg tablet and 0.75 mg tablet for total dose of 5.75 mg daily 90 tablet 3      Facility-Administered Encounter Medications as of 10/4/2023   Medication Dose Route Frequency Provider Last Rate Last Admin     bevacizumab (AVASTIN) intravitreal inj 1.25 mg  1.25 mg Intravitreal Once Ochoa Barnes MD         bevacizumab (AVASTIN) intravitreal inj 1.25 mg  1.25 mg Intravitreal Q28 Days Pio Zamorano MD   1.25 mg at 04/19/22 1034     bevacizumab (AVASTIN) intravitreal inj 1.25 mg  1.25 mg Intravitreal Q28 Days Pio Zamorano MD   1.25 mg at 12/06/22 1037        Allergies   Allergen Reactions     Chlorhexidine Hives, Itching and Rash     PN: Patient had swelling/rash that was very itchy with chlorprep.     Ceclor [Cefaclor Monohydrate]      Metoclopramide Other (See Comments)     Noted mouth/facial twitching with reglan in 2022 when it was tried for gastroparesis.     Cefaclor Rash        Review of systems:  A full 10 point review of systems was obtained and was negative except for the pertinent positives and negatives stated within the HPI.    Objective Findings:   Physical Exam:    Constitutional: There were no vitals taken for this visit.  General: Alert, cooperative, no distress, well-appearing  Head: Atraumatic, normocephalic, no obvious abnormalities   Eyes: Sclera anicteric, no obvious conjunctival hemorrhage   Nose: Nares normal, no obvious malformation, no obvious rhinorrhea   Respiratory: Resting comfortably, no apparent distress, no cough.   Skin: No jaundice, no obvious rash  Neurologic: AAOx3, no obvious neurologic abnormality  Psychiatric: Normal Affect, appropriate mood  Extremities: No obvious edema, no obvious malformation     Labs, Radiology, Pathology     Lab Results   Component Value Date    WBC 4.8 09/05/2023    WBC 5.0 07/31/2023    WBC 6.8 07/07/2023    HGB 11.5 (L) 09/05/2023    HGB 10.9 (L) 07/31/2023    HGB 10.8 (L) 07/07/2023     09/05/2023     07/31/2023     07/07/2023    CHOL 160 05/31/2022    CHOL 192 08/23/2011    TRIG 102  05/31/2022    TRIG 84 08/23/2011    HDL 79 05/31/2022    HDL 83 (H) 08/23/2011    ALT 20 05/23/2022    ALT 18 05/04/2022    ALT 23 11/15/2021    AST 13 05/23/2022    AST 19 05/04/2022    AST 16 11/15/2021     09/05/2023     (L) 08/07/2023     07/31/2023    BUN 19.8 09/05/2023    BUN 27.2 (H) 08/07/2023    BUN 24.0 (H) 07/31/2023    CO2 22 09/05/2023    CO2 21 (L) 08/07/2023    CO2 24 07/31/2023    TSH 1.75 02/07/2023    TSH 2.37 04/18/2022    TSH 3.30 10/16/2012    INR 0.97 09/08/2022    INR 1.01 06/09/2022    INR 0.96 05/02/2022        Liver Function Studies -   Recent Labs   Lab Test 07/14/22  0804 05/23/22  0819   PROTTOTAL  --  7.9   ALBUMIN 4.0 4.0   BILITOTAL  --  0.3   ALKPHOS  --  84   AST  --  13   ALT  --  20          Patient Active Problem List    Diagnosis Date Noted     Dyspnea on exertion 09/08/2022     Priority: Medium     Chest pain, unspecified type 09/08/2022     Priority: Medium     Kidney transplant rejection 05/02/2022     Priority: Medium     Generalized edema 03/17/2022     Priority: Medium     Aftercare following organ transplant 12/19/2021     Priority: Medium     Anemia due to stage 3a chronic kidney disease (H) 12/09/2021     Priority: Medium     Kidney replaced by transplant 12/07/2021     Priority: Medium     Pancreas replaced by transplant (H) 12/07/2021     Priority: Medium     Immunosuppression (H24) 11/22/2021     Priority: Medium     Heterozygous factor V Leiden mutation (H24)      Priority: Medium     Stage 3a chronic kidney disease (H)      Priority: Medium     HTN, kidney transplant related      Priority: Medium     Gastroparesis      Priority: Medium     Chronic constipation 06/08/2012     Priority: Medium     Irritable bowel syndrome 06/08/2012     Priority: Medium     Osteopenia 04/25/2012     Priority: Medium      Assessment and Plan   Assessment/Plan:    Marilyn Ortega is a 38 year old female with significant past medical history pertinent for history of  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 difficile 201 who is presenting as follow-up for constipation/gastroparesis.      #Gastroparesis   Endoscopic evaluation 2018 with large amounts of food retained within the stomach.  Subsequently she underwent formal gastric emptying study 8/21/2019  with 61% retention at 4 hours. The etiology of her gastroparesis is likely secondary to history of diabetes.  Previously she has failed Reglan secondary to the development of tics/tremors. Currently she is well managed on Motegrity 2 mg once daily and dietary adjustments.      At her initial visit with myself alternative therapies were reviewed. Kamilla does have a history of C. difficile and remains on tacrolimus as part of her immunosuppressive therapy for her recent transplant and therefore Erythromycin use should be avoided.  Additional medication options that were discussed include Mirtazapine and Domperidone. However Domperidone is currently not approved within the [place] secondary to concerns regarding potential cardiac side effects. Lastly conversation was had regarding surgical interventions specifically GPOEM.    As she has remained asymptomatic for significant period of time and her gastric emptying study is remote prior to any additional intervention/medication changes would recommend repeat study.    - As continue to have stable symptoms please continue Motegrity 2 mg once daily and lifestyle modifications as management for your gastroparesis/constipation      #Chronic Constipation   With significant past medical history for multiple abdominal surgeries and pelvic orthopedic interventions.  Previously seen and evaluated with the pelvic floor specialists with findings consistent with dyssynergia and rectal intussusception for which she underwent biofeedback therapy with minimal improvement. Per prior documentation future  consideration would be to obtain defecography to evaluate if she continues to have persistent inability to relax the pelvic floor, if symptoms are secondary to the rectal intussusception vs slow transit constipation.     Currently Marilyn has a bowel movement daily if not twice daily with the use of  Motegrity 2 mg every day that is consistent with [place] stool scale type 4/5.  She has previously reported increase in upper GI symptoms with the use of fiber.    Future considerations would be to again see the pelvic  for additional evaluation/intervention vs obtain consultation with colorectal surgeons.     - Continue Motegrity 2mg once daily as prescribed       Follow up plan:   Return to clinic 1 year or as needed.    The risks and benefits of my recommendations, as well as other treatment options were discussed with the patient and any available family today. All questions were answered.     o Follow up: As planned above. Today, I personally spent 16 minutes in direct face to face time with the patient, of which greater than 50% of the time was spent in patient education and counseling as described above. Approximately 15 minutes were spent on indirect care associated with the patient's consultation including but not limited to review of: patient medical records to date, clinic visits, hospital records, lab results, imaging studies, procedural documentation, and coordinating care with other providers. The findings from this review are summarized in the above note. All of the above accounted for a cumulative time of 31 minutes and was performed on the date of service.     The patient verbalized understanding of the plan and was appreciative for the time spent and information provided during the office visit.           Kelly Daniels PA-C  Division of Gastroenterology, Hepatology, and Nutrition  [facility]       Documentation assisted by voice recognition and documentation  system.

## 2023-10-04 NOTE — LETTER
10/4/2023         RE: Marilyn Ortega  [address]  [facility] [address]        Dear Colleague,    Thank you for referring your patient, Marilyn Ortega, to the [facility] GASTROENTEROLOG[facility]IC [place]. Please see a copy of my visit note below.      Gastroenterology Visit for: Marilyn Ortega 1985   MRN: [id number]     Reason for Visit:  chief complaint    Referred by: No ref. provider found  /   Patient Care Team:  No Ref-Primary, Physician as PCP - General  Martha Christianson NP as Nurse Practitioner (Acupuncture)  Venice Banegas MD as MD (Endocrinology, Diabetes, and Metabolism)  Ricarda Ravi as Transplant Coordinator (Transplant)  Naz Sierra as LPN (Transplant)  Bulmaro Weiner [facility] as Pharmacist (Pharmacist)  Patricia Gonzales RN as Registered Nurse  Flex Bocanegra MD as MD (Gastroenterology)  Kobe Yip MD as MD (Neurology)  Kamran Corona MD as MD (Nephrology)  Pio Zamorano MD as Resident (Ophthalmology)  Ochoa Barnes MD as Assigned Surgical Provider  Luis A Shrestha PA-C as Assigned Cancer Care Provider  Flex Bocanegra MD as Assigned Gastroenterology Provider  Reanna Cueto MD as Assigned Nephrology Provider  Guille López MD as MD (Cardiovascular Disease)  Osiel Raphael MD as MD (Cardiovascular Disease)  Mallory Franco, RN as Specialty Care Coordinator (Cardiology)  George Munson MD as MD (Allergy & Immunology)  George Munson MD as Assigned Allergy Provider  Leighton Cage MD as Assigned Heart and Vascular Provider    History of Present Illness:   Marilyn Ortega is a 38 year old female with significant past medical history pertinent for history of DM Type 1 complicated by diabetic nephropathy for which she is now s/p pancreas/kidney transplant Novemeber 2021 on chronic immunosuppression tacrolimus/mycophenolic acid, gastroparesis, C. difficile 201 who is  "presenting as follow-up for constipation/gastroparesis.      Interval History October 4, 2023:    Today Kamilla reports that she is doing overall well.  She has no acute concerns.    Has traveled every week for the past 5 weeks. Last night returned from [place]. 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.     Denies weight loss, nausea, emesis, dysphagia, odynophagia, heartburn, regurgitation, bloating/fullness, early satiety, early satiation, diarrhea, constipation, melena, hematochezia and BRBR.   -----------------------------------------------------------------------------------------------------------------------------------------------------------------------------------------------  Interval History 4/5/2023:    Today  Kami reports that she has a history of Type 1 DM for 25 years.  Notes that she adheres to a gluten free diet and been tested for celiac that has been negative.     Overall she is doing well.  She does have some increased bloating/abdominal discomfort with the consumption of fiber. Triggers include chickpeas, merlyn seeds, cassava flour, and inulin. States \"I have learned my safe foods.\"  Symptoms are well controlled with dietary adjustments.    As for her bowel habits she reports a remote history of 2-3 years of diarrhea for which she underwent extensive work-up. At this time was having incontinence however this has since subsided.  Now she struggles with constipation for which she takes Motegrity 2 mg once daily with consistent bowel habits. Marilyn will have a stool daily if not every other day consistent with [place] Stool Type 4/5. Continues to have to rectally digitate.     Had gone to physical therapy/biofeedback however with minimal improvement.    Denies weight loss, nausea, emesis, dysphagia, odynophagia, heartburn, regurgitation, bloating/fullness, diarrhea, constipation, nocturnal stooling, incontinence, melena, hematochezia and BRBR. "     -----------------------------------------------------------------------------------------------------------------------------------------------------------------------------------------------------  5/20/2022 Dr. Bocanegra     HPI:   38 yo woman with type 1 DM (not previously on dialysis) s/p panc-kidney transplant Nov 2021, gastroparesis but not retinopathy, neuropathy who presents for follow up of gastroparesis and change in bowel habits.      Had face, mouth twitching with Reglan so stopped this. Symptoms have improved, also wondering if this is related to tacrolimus as she had this a little before Reglan. Feels like the reglan amplified it though.      Alternating constipation and regular bowel movements. No diarrhea. Essentially unchanged since our last visit in February.      Schedule for pelvic floor testing on 5/31. We discussed confounders to gastric emptying study including medications, fatty meals, constipation.      A 10 point ROS is otherwise negative.    ---------------------------------------------------------------------------------------------------------------------------------------------------------------  2/18/2022 Dr. Bocanegra:     HPI:   37 yo woman with type 1 DM (not previously on dialysis) s/p panc-kidney transplant Nov 2021, gastroparesis but not retinopathy, neuropathy who presents for gastroparesis and change in bowel habits.      Not great GI related. Years of GI issues, starting in college. Tried gluten free diet which improved symptoms 100%. Gluten free since 2002. Varies between diarrhea and constipation.      Had kidney/pancreas transplant in Nov 2021. Had severe diarrhea for 2 weeks about 6 weeks ago, lost 10 pounds. Needed infusions. Enteric pathogens were negative. All of a sudden it stopped and she became very constipated. Needed laxatives, enemas and everything became straight liquid. Drug levels have varied, which her transplant team has been worried has been related to  dysmotility.      After transplant and prednisone course she was fairly regular for about 1 month. Didn't have other infections around that time. Before transplant things were ok, more constipation than diarrhea but was overall ok.     Started Prucalopride approximately 2 years ago, 2mg daily. This was helpful but now hasn't been. Tried Benefiber which caused a lot of bloating. Takes docusate daily in the morning. No opiates. She has tried gastroparesis diet. She is taking [employer] brand magnesium. She doesn't notice improvement with prunes but she can increase the number she eats. Senna doesn't work as well as dulcolax.        Esophageal Questionnaire(s)    BEDQ Questionnaire       No data to display                   No data to display                Eckardt Questionnaire       No data to display                Promis 10 Questionnaire       No data to display                  STUDIES & PROCEDURES:    EGD:     12/18/2018 [facility]   Findings:        The examined esophagus was normal.        A large amount of food (residue) was found in the        gastric body.        The examined duodenum was normal.   Impression:          - Normal esophagus.                        - A large amount of food (residue) in                        the stomach.                        - Due to the amount of food in the                        stomach large portions of the stomach                        mucosa could not be evaluated.                        - Normal examined duodenum.                        - No specimens collected.       Colonoscopy:    11/23/2015 [facility]   ASSESSMENT:   Terminal ileum and entire colon mucosa was normal   Random biopsies taken to rule out microscopic pathology.     RECOMMENDATIONS:     Other:     5/31/2022 [facility]          EndoFLIP directed at the U[facility] or LES (8cm (EF-325) balloon length or 16cm (EF-322) balloon length):   Date:  8cm balloon  Balloon inflation Balloon  pressure CSA (mm^2) DI (mm^2/mmHg) Dmin (mm) Compliance   20 (ladmark ID)        30        40        50           16cm balloon  Balloon inflation Balloon pressure CSA (mm^2) DI (mm^2/mmHg) Dmin (mm) Compliance   30 (ladmark ID)        40        50        60        70           High Resolution Manometry:    PH/Impedance:     Bravo:    CT:    3/24/2023 CTV AP W Contrast   IMPRESSION:  1.  No evidence of May Thurner anatomy.  2.  Transplant kidney left lower quadrant.    2/1/2022  CT AP  WO Contrast                                                      IMPRESSION:   1. Postoperative changes of left lower quadrant renal and right pelvic  pancreatic transplant.  2. Moderate colonic stool with fluid-filled descending colon and  distended stomach with copious gastric contents to suggest slow  transit/constipation. An adynamic ileus is also a possibility.    Esophagram:    FL VSS:     GES:    8/21/2019 [facility]   FINDINGS:   Percent remaining activity within the stomach:   Immediate: 100% (normal greater than 70%)   1 hour: 94% (normal 30-90%)   2 hours: 93% (normal less than 60%)   3 hours: 81% (normal less than 30%)   4 hours: 61% (normal less than 10%)     U/S:     XRAY:    5/11/2022  IMPRESSION: Moderate amount of stool.  Nonobstructed bowel gas  pattern.         Prior medical records were reviewed including, but not limited to, notes from referring providers, lab work, radiographic tests, and other diagnostic tests. Pertinent results were summarized above.     History     Past Medical History:   Diagnosis Date    Anemia 12/9/2021    C. difficile diarrhea 2013    CKD (chronic kidney disease) stage 4, GFR 15-29 ml/min (H)     Complication of transplanted kidney 5/23/2022    Gastroparesis     Gluten intolerance     Heterozygous factor V Leiden mutation (H24)     HTN (hypertension)     Infection due to 2019 novel coronavirus 11/2020    Osteomyelitis (H) 2013    Shingles 2013    Type 1 diabetes mellitus (H) 2000        Past Surgical History:   Procedure Laterality Date    BENCH KIDNEY N/A 11/15/2021    Procedure: Bench kidney;  Surgeon: Branden Aranda MD;  Location:  OR    BENCH PANCREAS N/A 11/15/2021    Procedure: Bench pancreas;  Surgeon: Branden Aranda MD;  Location: [facility] OR    CATARACT EXTRACTION  2021    CV CORONARY ANGIOGRAM N/A 2022    Procedure: Coronary Angiogram;  Surgeon: Bebeto Ventura MD;  [facility]    CV RIGHT HEART CATH MEASUREMENTS RECORDED N/A 2022    Procedure: Right Heart Catheterization;  Surgeon: Bebeto Ventura MD;  Location: [facility]    CV RIGHT HEART CATH MEASUREMENTS RECORDED N/A 2022    Procedure: Right Heart Catheterization [[id number]];  Surgeon: Trevor Jasmine MD;  Location:  HEART CARDIAC CATH LAB    IR RENAL BIOPSY LEFT  2022    IR RENAL BIOPSY LEFT  2022    ORIF HIP FRACTURE Left     ORTHOPEDIC SURGERY Bilateral     Repair of labral tear    TRANSPLANT PANCREAS, KIDNEY  DONOR, COMBINED Left 11/15/2021    Procedure: TRANSPLANT, KIDNEY AND PANCREAS,  DONOR;  Surgeon: Branden Aranda MD;  Location:  OR       Social History     Socioeconomic History    Marital status: Single     Spouse name: Not on file    Number of children: Not on file    Years of education: Not on file    Highest education level: Not on file   Occupational History    Not on file   Tobacco Use    Smoking status: Never    Smokeless tobacco: Never   Substance and Sexual Activity    Alcohol use: Yes     Comment: socially    Drug use: No    Sexual activity: Yes   Other Topics Concern    Parent/sibling w/ CABG, MI or angioplasty before 65F 55M? Not Asked   Social History Narrative    Not on file     Social Determinants of Health     Financial Resource Strain: Not on file   Food Insecurity: Not on file   Transportation Needs: Not on file   Physical Activity:  Not on file   Stress: Not on file   Social Connections: Not on file   Interpersonal Safety: Not on file   Housing Stability: Not on file       Family History   Problem Relation Age of Onset    Diabetes Paternal Grandfather     Macular Degeneration Mother     Arthritis Maternal Grandmother     Hypertension Maternal Grandmother     Cancer - colorectal Maternal Grandfather     Glaucoma No family hx of      Family history reviewed and edited as appropriate    Medications and Allergies:     Outpatient Encounter Medications as of 10/4/2023   Medication Sig Dispense Refill    aspirin (ASA) 81 MG chewable tablet Take 81 mg by mouth daily      cetirizine (ZYRTEC) 10 MG tablet Take 1 tablet (10 mg) by mouth every other day      docusate sodium (COLACE) 100 MG capsule Take 200 mg by mouth daily before breakfast      Dust Mite Mixed Allergen Ext (ODACTRA) 12 SQ-HDM SUBL Place 1 Dose under the tongue daily 30 tablet 11    EPINEPHrine (ANY BX GENERIC EQUIV) 0.3 MG/0.3ML injection 2-pack Inject 0.3 mLs (0.3 mg) into the muscle as needed for anaphylaxis 2 each 2    furosemide (LASIX) 20 MG tablet Take 1 tablet (20 mg) by mouth daily 45 tablet 0    mycophenolic acid (GENERIC EQUIVALENT) 360 MG EC tablet Take 1 tablet (360 mg) by mouth 2 times daily 60 tablet 11    ORDER FOR ALLERGEN IMMUNOTHERAPY Name of Mix: Mix #1  Dust Mite  Dust Mites DF. 10,000 AU/mL, HS  1.0 ml  Dust Mites DP. 10,000 AU/mL, HS  1.0 ml   Diluent: HSA 3.0 ml to 5ml 5 mL PRN    prucalopride succinate (MOTEGRITY) 2 MG tablet Take 1 tablet (2 mg) by mouth daily 90 tablet 3    tacrolimus (ENVARSUS XR) 0.75 MG 24 hr tablet Take 1 tablet (0.75 mg) by mouth every morning (before breakfast) Take in addition to 1 mg tablet and 4 mg tablet for total dose of 5.75 mg daily 90 tablet 3    tacrolimus (ENVARSUS XR) 1 MG 24 hr tablet Take 1 tablet (1 mg) by mouth every morning (before breakfast) Take in addition to 4 mg tablet and 0.75 mg tablet for total dose of 5.75 mg daily  90 tablet 3    tacrolimus (ENVARSUS XR) 4 MG 24 hr tablet Take 1 tablet (4 mg) by mouth every morning (before breakfast) Take in addition to 1 mg tablet and 0.75 mg tablet for total dose of 5.75 mg daily 90 tablet 3     Facility-Administered Encounter Medications as of 10/4/2023   Medication Dose Route Frequency Provider Last Rate Last Admin    bevacizumab (AVASTIN) intravitreal inj 1.25 mg  1.25 mg Intravitreal Once Ochoa Barnes MD        bevacizumab (AVASTIN) intravitreal inj 1.25 mg  1.25 mg Intravitreal Q28 Days Pio Zamorano MD   1.25 mg at 04/19/22 1034    bevacizumab (AVASTIN) intravitreal inj 1.25 mg  1.25 mg Intravitreal Q28 Days Pio Zamorano MD   1.25 mg at 12/06/22 1037        Allergies   Allergen Reactions    Chlorhexidine Hives, Itching and Rash     PN: Patient had swelling/rash that was very itchy with chlorprep.    Ceclor [Cefaclor Monohydrate]     Metoclopramide Other (See Comments)     Noted mouth/facial twitching with reglan in 2022 when it was tried for gastroparesis.    Cefaclor Rash        Review of systems:  A full 10 point review of systems was obtained and was negative except for the pertinent positives and negatives stated within the HPI.    Objective Findings:   Physical Exam:    Constitutional: There were no vitals taken for this visit.  General: Alert, cooperative, no distress, well-appearing  Head: Atraumatic, normocephalic, no obvious abnormalities   Eyes: Sclera anicteric, no obvious conjunctival hemorrhage   Nose: Nares normal, no obvious malformation, no obvious rhinorrhea   Respiratory: Resting comfortably, no apparent distress, no cough.   Skin: No jaundice, no obvious rash  Neurologic: AAOx3, no obvious neurologic abnormality  Psychiatric: Normal Affect, appropriate mood  Extremities: No obvious edema, no obvious malformation     Labs, Radiology, Pathology     Lab Results   Component Value Date    WBC 4.8 09/05/2023    WBC 5.0 07/31/2023    WBC 6.8 07/07/2023     HGB 11.5 (L) 09/05/2023    HGB 10.9 (L) 07/31/2023    HGB 10.8 (L) 07/07/2023     09/05/2023     07/31/2023     07/07/2023    CHOL 160 05/31/2022    CHOL 192 08/23/2011    TRIG 102 05/31/2022    TRIG 84 08/23/2011    HDL 79 05/31/2022    HDL 83 (H) 08/23/2011    ALT 20 05/23/2022    ALT 18 05/04/2022    ALT 23 11/15/2021    AST 13 05/23/2022    AST 19 05/04/2022    AST 16 11/15/2021     09/05/2023     (L) 08/07/2023     07/31/2023    BUN 19.8 09/05/2023    BUN 27.2 (H) 08/07/2023    BUN 24.0 (H) 07/31/2023    CO2 22 09/05/2023    CO2 21 (L) 08/07/2023    CO2 24 07/31/2023    TSH 1.75 02/07/2023    TSH 2.37 04/18/2022    TSH 3.30 10/16/2012    INR 0.97 09/08/2022    INR 1.01 06/09/2022    INR 0.96 05/02/2022        Liver Function Studies -   Recent Labs   Lab Test 07/14/22  0804 05/23/22  0819   PROTTOTAL  --  7.9   ALBUMIN 4.0 4.0   BILITOTAL  --  0.3   ALKPHOS  --  84   AST  --  13   ALT  --  20          Patient Active Problem List    Diagnosis Date Noted    Dyspnea on exertion 09/08/2022     Priority: Medium    Chest pain, unspecified type 09/08/2022     Priority: Medium    Kidney transplant rejection 05/02/2022     Priority: Medium    Generalized edema 03/17/2022     Priority: Medium    Aftercare following organ transplant 12/19/2021     Priority: Medium    Anemia due to stage 3a chronic kidney disease (H) 12/09/2021     Priority: Medium    Kidney replaced by transplant 12/07/2021     Priority: Medium    Pancreas replaced by transplant (H) 12/07/2021     Priority: Medium    Immunosuppression (H24) 11/22/2021     Priority: Medium    Heterozygous factor V Leiden mutation (H24)      Priority: Medium    Stage 3a chronic kidney disease (H)      Priority: Medium    HTN, kidney transplant related      Priority: Medium    Gastroparesis      Priority: Medium    Chronic constipation 06/08/2012     Priority: Medium    Irritable bowel syndrome 06/08/2012     Priority: Medium     Osteopenia 04/25/2012     Priority: Medium      Assessment and Plan   Assessment/Plan:    Marilyn Ortega is a 38 year old female with significant past medical history pertinent for history of 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 difficile 201 who is presenting as follow-up for constipation/gastroparesis.      #Gastroparesis   Endoscopic evaluation 2018 with large amounts of food retained within the stomach.  Subsequently she underwent formal gastric emptying study 8/21/2019  with 61% retention at 4 hours. The etiology of her gastroparesis is likely secondary to history of diabetes.  Previously she has failed Reglan secondary to the development of tics/tremors. Currently she is well managed on Motegrity 2 mg once daily and dietary adjustments.      At her initial visit with myself alternative therapies were reviewed. Kamilla does have a history of C. difficile and remains on tacrolimus as part of her immunosuppressive therapy for her recent transplant and therefore Erythromycin use should be avoided.  Additional medication options that were discussed include Mirtazapine and Domperidone. However Domperidone is currently not approved within the [place] secondary to concerns regarding potential cardiac side effects. Lastly conversation was had regarding surgical interventions specifically GPOEM.    As she has remained asymptomatic for significant period of time and her gastric emptying study is remote prior to any additional intervention/medication changes would recommend repeat study.    - As continue to have stable symptoms please continue Motegrity 2 mg once daily and lifestyle modifications as management for your gastroparesis/constipation      #Chronic Constipation   With significant past medical history for multiple abdominal surgeries and pelvic orthopedic interventions.  Previously seen and evaluated with the pelvic  floor specialists with findings consistent with dyssynergia and rectal intussusception for which she underwent biofeedback therapy with minimal improvement. Per prior documentation future consideration would be to obtain defecography to evaluate if she continues to have persistent inability to relax the pelvic floor, if symptoms are secondary to the rectal intussusception vs slow transit constipation.     Currently Marilyn has a bowel movement daily if not twice daily with the use of  Motegrity 2 mg every day that is consistent with [place] stool scale type 4/5.  She has previously reported increase in upper GI symptoms with the use of fiber.    Future considerations would be to again see the pelvic  for additional evaluation/intervention vs obtain consultation with colorectal surgeons.     - Continue Motegrity 2mg once daily as prescribed       Follow up plan:   Return to clinic 1 year or as needed.    The risks and benefits of my recommendations, as well as other treatment options were discussed with the patient and any available family today. All questions were answered.     Follow up: As planned above. Today, I personally spent 16 minutes in direct face to face time with the patient, of which greater than 50% of the time was spent in patient education and counseling as described above. Approximately 15 minutes were spent on indirect care associated with the patient's consultation including but not limited to review of: patient medical records to date, clinic visits, hospital records, lab results, imaging studies, procedural documentation, and coordinating care with other providers. The findings from this review are summarized in the above note. All of the above accounted for a cumulative time of 31 minutes and was performed on the date of service.     The patient verbalized understanding of the plan and was appreciative for the time spent and information provided during the office visit.        Documentation assisted by voice recognition and documentation system.            Again, thank you for allowing me to participate in the care of your patient.      Sincerely,    Kelly Daniels PA-C

## 2023-10-04 NOTE — NURSING NOTE
Is the patient currently in the state of [place]? YES    Visit mode:VIDEO    If the visit is dropped, the patient can be reconnected by: VIDEO VISIT: Send to e-mail at: [email or web address]    Will anyone else be joining the visit? NO  (If patient encounters technical issues they should call [phone]    How would you like to obtain your AVS? MyChart    Are changes needed to the allergy or medication list? No    Reason for visit: RECHECK    Alberto OLIVER

## 2023-10-05 ENCOUNTER — MYC MEDICAL ADVICE (OUTPATIENT)
Dept: ALLERGY | Facility: CLINIC | Age: 38
End: 2023-10-05
Payer: COMMERCIAL

## 2023-10-05 ENCOUNTER — TELEPHONE (OUTPATIENT)
Dept: ALLERGY | Facility: CLINIC | Age: 38
End: 2023-10-05
Payer: COMMERCIAL

## 2023-10-05 NOTE — TELEPHONE ENCOUNTER
[facility] Call Center    Phone Message    May a detailed message be left on voicemail: yes     Reason for Call: Other: Pt would like to come in for her allergy shot later in the afternoon, around 2 or later, on 10/6/23.  Pt is currently scheduled for 8:40.     There were no later templates available.     Pt states she hasn't had a problem in the past showing up at an unscheduled time for the allergy shot.     Please respond to Pt in her MyChart per her request.     Thank you.     Action Taken: Other: CS Allergy    Travel Screening: Not Applicable

## 2023-10-09 ENCOUNTER — LAB (OUTPATIENT)
Dept: LAB | Facility: CLINIC | Age: 38
End: 2023-10-09
Payer: COMMERCIAL

## 2023-10-09 ENCOUNTER — OFFICE VISIT (OUTPATIENT)
Dept: ALLERGY | Facility: CLINIC | Age: 38
End: 2023-10-09
Payer: COMMERCIAL

## 2023-10-09 DIAGNOSIS — Z48.298 AFTERCARE FOLLOWING ORGAN TRANSPLANT: ICD-10-CM

## 2023-10-09 DIAGNOSIS — Z94.83 PANCREAS REPLACED BY TRANSPLANT (H): ICD-10-CM

## 2023-10-09 DIAGNOSIS — Z94.0 KIDNEY REPLACED BY TRANSPLANT: ICD-10-CM

## 2023-10-09 DIAGNOSIS — J30.89 ALLERGIC RHINITIS DUE TO DUST MITE: Primary | ICD-10-CM

## 2023-10-09 DIAGNOSIS — Z48.298 AFTERCARE FOLLOWING ORGAN TRANSPLANT: Primary | ICD-10-CM

## 2023-10-09 DIAGNOSIS — Z86.39 HISTORY OF DIABETES MELLITUS, TYPE I: ICD-10-CM

## 2023-10-09 DIAGNOSIS — D84.9 IMMUNOSUPPRESSED STATUS (H): ICD-10-CM

## 2023-10-09 DIAGNOSIS — J30.9 ALLERGIC RHINITIS: ICD-10-CM

## 2023-10-09 LAB
AMYLASE SERPL-CCNC: 59 U/L (ref 28–100)
ANION GAP SERPL CALCULATED.3IONS-SCNC: 14 MMOL/L (ref 7–15)
BUN SERPL-MCNC: 19.7 MG/DL (ref 6–20)
CALCIUM SERPL-MCNC: 9.5 MG/DL (ref 8.6–10)
CHLORIDE SERPL-SCNC: 103 MMOL/L (ref 98–107)
CREAT SERPL-MCNC: 0.98 MG/DL (ref 0.51–0.95)
DEPRECATED HCO3 PLAS-SCNC: 21 MMOL/L (ref 22–29)
EGFRCR SERPLBLD CKD-EPI 2021: 75 ML/MIN/1.73M2
ERYTHROCYTE [DISTWIDTH] IN BLOOD BY AUTOMATED COUNT: 13.7 % (ref 10–15)
GLUCOSE SERPL-MCNC: 93 MG/DL (ref 70–99)
HBA1C MFR BLD: 5 %
HCT VFR BLD AUTO: 35.9 % (ref 35–47)
HGB BLD-MCNC: 11.5 G/DL (ref 11.7–15.7)
LIPASE SERPL-CCNC: 31 U/L (ref 13–60)
MAGNESIUM SERPL-MCNC: 1.7 MG/DL (ref 1.7–2.3)
MCH RBC QN AUTO: 28.5 PG (ref 26.5–33)
MCHC RBC AUTO-ENTMCNC: 32 G/DL (ref 31.5–36.5)
MCV RBC AUTO: 89 FL (ref 78–100)
PHOSPHATE SERPL-MCNC: 2.8 MG/DL (ref 2.5–4.5)
PLATELET # BLD AUTO: 263 10E3/UL (ref 150–450)
POTASSIUM SERPL-SCNC: 4.1 MMOL/L (ref 3.4–5.3)
RBC # BLD AUTO: 4.03 10E6/UL (ref 3.8–5.2)
SODIUM SERPL-SCNC: 138 MMOL/L (ref 135–145)
TACROLIMUS BLD-MCNC: 5.7 UG/L (ref 5–15)
TME LAST DOSE: NORMAL H
TME LAST DOSE: NORMAL H
WBC # BLD AUTO: 9.3 10E3/UL (ref 4–11)

## 2023-10-09 PROCEDURE — 87799 DETECT AGENT NOS DNA QUANT: CPT

## 2023-10-09 PROCEDURE — 83690 ASSAY OF LIPASE: CPT

## 2023-10-09 PROCEDURE — 80180 DRUG SCRN QUAN MYCOPHENOLATE: CPT

## 2023-10-09 PROCEDURE — 95120 IMMUNOTHERAPY ONE INJECTION: CPT

## 2023-10-09 PROCEDURE — 83735 ASSAY OF MAGNESIUM: CPT

## 2023-10-09 PROCEDURE — 82150 ASSAY OF AMYLASE: CPT

## 2023-10-09 PROCEDURE — 85027 COMPLETE CBC AUTOMATED: CPT

## 2023-10-09 PROCEDURE — 84100 ASSAY OF PHOSPHORUS: CPT

## 2023-10-09 PROCEDURE — 83036 HEMOGLOBIN GLYCOSYLATED A1C: CPT

## 2023-10-09 PROCEDURE — 80048 BASIC METABOLIC PNL TOTAL CA: CPT

## 2023-10-09 PROCEDURE — 36415 COLL VENOUS BLD VENIPUNCTURE: CPT

## 2023-10-09 PROCEDURE — 80197 ASSAY OF TACROLIMUS: CPT

## 2023-10-10 LAB — BKV DNA # SPEC NAA+PROBE: NOT DETECTED COPIES/ML

## 2023-10-11 LAB
MYCOPHENOLATE SERPL LC/MS/MS-MCNC: 2.25 MG/L (ref 1–3.5)
MYCOPHENOLATE-G SERPL LC/MS/MS-MCNC: 29.9 MG/L (ref 30–95)
TME LAST DOSE: ABNORMAL H
TME LAST DOSE: ABNORMAL H

## 2023-10-30 ENCOUNTER — OFFICE VISIT (OUTPATIENT)
Dept: ALLERGY | Facility: CLINIC | Age: 38
End: 2023-10-30
Payer: COMMERCIAL

## 2023-10-30 ENCOUNTER — LAB (OUTPATIENT)
Dept: LAB | Facility: CLINIC | Age: 38
End: 2023-10-30
Payer: COMMERCIAL

## 2023-10-30 DIAGNOSIS — D84.9 IMMUNOSUPPRESSED STATUS (H): ICD-10-CM

## 2023-10-30 DIAGNOSIS — Z94.0 KIDNEY REPLACED BY TRANSPLANT: ICD-10-CM

## 2023-10-30 DIAGNOSIS — J30.89 ALLERGIC RHINITIS DUE TO DUST MITE: Primary | ICD-10-CM

## 2023-10-30 DIAGNOSIS — J30.9 ALLERGIC RHINITIS: ICD-10-CM

## 2023-10-30 DIAGNOSIS — Z94.83 PANCREAS REPLACED BY TRANSPLANT (H): ICD-10-CM

## 2023-10-30 LAB
AMYLASE SERPL-CCNC: 59 U/L (ref 28–100)
ANION GAP SERPL CALCULATED.3IONS-SCNC: 14 MMOL/L (ref 7–15)
BUN SERPL-MCNC: 18.6 MG/DL (ref 6–20)
CALCIUM SERPL-MCNC: 9.6 MG/DL (ref 8.6–10)
CHLORIDE SERPL-SCNC: 102 MMOL/L (ref 98–107)
CREAT SERPL-MCNC: 1.04 MG/DL (ref 0.51–0.95)
DEPRECATED HCO3 PLAS-SCNC: 21 MMOL/L (ref 22–29)
EGFRCR SERPLBLD CKD-EPI 2021: 70 ML/MIN/1.73M2
ERYTHROCYTE [DISTWIDTH] IN BLOOD BY AUTOMATED COUNT: 13.4 % (ref 10–15)
GLUCOSE SERPL-MCNC: 86 MG/DL (ref 70–99)
HCT VFR BLD AUTO: 34.2 % (ref 35–47)
HGB BLD-MCNC: 11.1 G/DL (ref 11.7–15.7)
LIPASE SERPL-CCNC: 34 U/L (ref 13–60)
MCH RBC QN AUTO: 28.7 PG (ref 26.5–33)
MCHC RBC AUTO-ENTMCNC: 32.5 G/DL (ref 31.5–36.5)
MCV RBC AUTO: 88 FL (ref 78–100)
PLATELET # BLD AUTO: 261 10E3/UL (ref 150–450)
POTASSIUM SERPL-SCNC: 4.2 MMOL/L (ref 3.4–5.3)
RBC # BLD AUTO: 3.87 10E6/UL (ref 3.8–5.2)
SODIUM SERPL-SCNC: 137 MMOL/L (ref 135–145)
TACROLIMUS BLD-MCNC: 6 UG/L (ref 5–15)
TME LAST DOSE: NORMAL H
TME LAST DOSE: NORMAL H
WBC # BLD AUTO: 7.7 10E3/UL (ref 4–11)

## 2023-10-30 PROCEDURE — 95120 IMMUNOTHERAPY ONE INJECTION: CPT

## 2023-10-30 PROCEDURE — 85014 HEMATOCRIT: CPT

## 2023-10-30 PROCEDURE — 36415 COLL VENOUS BLD VENIPUNCTURE: CPT

## 2023-10-30 PROCEDURE — 82150 ASSAY OF AMYLASE: CPT

## 2023-10-30 PROCEDURE — 80197 ASSAY OF TACROLIMUS: CPT

## 2023-10-30 PROCEDURE — 83690 ASSAY OF LIPASE: CPT

## 2023-10-30 PROCEDURE — 80048 BASIC METABOLIC PNL TOTAL CA: CPT

## 2023-10-30 NOTE — PROGRESS NOTES
Marilyn Ortega presents to clinic today at the request of George Munson MD (ordering provider) for Allergy Immunotherapy injection(s).     This service provided today was under the care of George Munson MD; the supervising provider of the day; who was available if needed.    Patient presented after waiting 30 minutes with no reaction to  injections. Discharged from clinic.    WALESKA DiazN, RN

## 2023-11-21 ENCOUNTER — ALLIED HEALTH/NURSE VISIT (OUTPATIENT)
Dept: ALLERGY | Facility: CLINIC | Age: 38
End: 2023-11-21
Payer: COMMERCIAL

## 2023-11-21 DIAGNOSIS — J30.9 ALLERGIC RHINITIS: ICD-10-CM

## 2023-11-21 DIAGNOSIS — J30.89 ALLERGIC RHINITIS DUE TO DUST MITE: Primary | ICD-10-CM

## 2023-11-21 PROCEDURE — 95120 IMMUNOTHERAPY ONE INJECTION: CPT

## 2023-11-24 ENCOUNTER — LAB (OUTPATIENT)
Dept: LAB | Facility: CLINIC | Age: 38
End: 2023-11-24
Payer: COMMERCIAL

## 2023-11-24 DIAGNOSIS — D84.9 IMMUNOSUPPRESSED STATUS (H): ICD-10-CM

## 2023-11-24 DIAGNOSIS — Z94.0 KIDNEY REPLACED BY TRANSPLANT: ICD-10-CM

## 2023-11-24 DIAGNOSIS — Z48.298 AFTERCARE FOLLOWING ORGAN TRANSPLANT: ICD-10-CM

## 2023-11-24 DIAGNOSIS — Z94.83 PANCREAS REPLACED BY TRANSPLANT (H): ICD-10-CM

## 2023-11-24 LAB
AMYLASE SERPL-CCNC: 70 U/L (ref 28–100)
ANION GAP SERPL CALCULATED.3IONS-SCNC: 10 MMOL/L (ref 7–15)
BUN SERPL-MCNC: 25 MG/DL (ref 6–20)
CALCIUM SERPL-MCNC: 9.9 MG/DL (ref 8.6–10)
CHLORIDE SERPL-SCNC: 104 MMOL/L (ref 98–107)
CREAT SERPL-MCNC: 1.04 MG/DL (ref 0.51–0.95)
DEPRECATED HCO3 PLAS-SCNC: 24 MMOL/L (ref 22–29)
EGFRCR SERPLBLD CKD-EPI 2021: 70 ML/MIN/1.73M2
ERYTHROCYTE [DISTWIDTH] IN BLOOD BY AUTOMATED COUNT: 13.2 % (ref 10–15)
GLUCOSE SERPL-MCNC: 84 MG/DL (ref 70–99)
HCT VFR BLD AUTO: 35.1 % (ref 35–47)
HGB BLD-MCNC: 11.3 G/DL (ref 11.7–15.7)
LIPASE SERPL-CCNC: 46 U/L (ref 13–60)
MAGNESIUM SERPL-MCNC: 1.8 MG/DL (ref 1.7–2.3)
MCH RBC QN AUTO: 29 PG (ref 26.5–33)
MCHC RBC AUTO-ENTMCNC: 32.2 G/DL (ref 31.5–36.5)
MCV RBC AUTO: 90 FL (ref 78–100)
PHOSPHATE SERPL-MCNC: 3.4 MG/DL (ref 2.5–4.5)
PLATELET # BLD AUTO: 265 10E3/UL (ref 150–450)
POTASSIUM SERPL-SCNC: 5.4 MMOL/L (ref 3.4–5.3)
RBC # BLD AUTO: 3.9 10E6/UL (ref 3.8–5.2)
SODIUM SERPL-SCNC: 138 MMOL/L (ref 135–145)
TACROLIMUS BLD-MCNC: 6.6 UG/L (ref 5–15)
TME LAST DOSE: NORMAL H
TME LAST DOSE: NORMAL H
WBC # BLD AUTO: 6.1 10E3/UL (ref 4–11)

## 2023-11-24 PROCEDURE — 80048 BASIC METABOLIC PNL TOTAL CA: CPT

## 2023-11-24 PROCEDURE — 85027 COMPLETE CBC AUTOMATED: CPT

## 2023-11-24 PROCEDURE — 80197 ASSAY OF TACROLIMUS: CPT

## 2023-11-24 PROCEDURE — 84100 ASSAY OF PHOSPHORUS: CPT

## 2023-11-24 PROCEDURE — 36415 COLL VENOUS BLD VENIPUNCTURE: CPT

## 2023-11-24 PROCEDURE — 83690 ASSAY OF LIPASE: CPT

## 2023-11-24 PROCEDURE — 87799 DETECT AGENT NOS DNA QUANT: CPT

## 2023-11-24 PROCEDURE — 83735 ASSAY OF MAGNESIUM: CPT

## 2023-11-24 PROCEDURE — 82150 ASSAY OF AMYLASE: CPT

## 2023-11-25 LAB — CMV DNA SPEC NAA+PROBE-ACNC: NOT DETECTED IU/ML

## 2023-11-26 ENCOUNTER — HEALTH MAINTENANCE LETTER (OUTPATIENT)
Age: 38
End: 2023-11-26

## 2023-11-27 ENCOUNTER — ALLIED HEALTH/NURSE VISIT (OUTPATIENT)
Dept: ALLERGY | Facility: CLINIC | Age: 38
End: 2023-11-27
Payer: COMMERCIAL

## 2023-11-27 DIAGNOSIS — J30.89 ALLERGIC RHINITIS DUE TO DUST MITE: Primary | ICD-10-CM

## 2023-11-27 DIAGNOSIS — J30.9 ALLERGIC RHINITIS: ICD-10-CM

## 2023-11-27 LAB — BKV DNA # SPEC NAA+PROBE: NOT DETECTED COPIES/ML

## 2023-11-27 PROCEDURE — 95120 IMMUNOTHERAPY ONE INJECTION: CPT

## 2023-11-28 DIAGNOSIS — Z94.83 PANCREAS TRANSPLANTED (H): ICD-10-CM

## 2023-11-28 DIAGNOSIS — Z94.83 PANCREAS REPLACED BY TRANSPLANT (H): ICD-10-CM

## 2023-11-28 DIAGNOSIS — Z94.0 KIDNEY REPLACED BY TRANSPLANT: ICD-10-CM

## 2023-11-28 RX ORDER — TACROLIMUS 0.75 MG/1
1.5 TABLET, EXTENDED RELEASE ORAL
Qty: 180 TABLET | Refills: 3 | Status: SHIPPED | OUTPATIENT
Start: 2023-11-28 | End: 2024-09-25

## 2023-11-28 RX ORDER — TACROLIMUS 1 MG/1
TABLET, EXTENDED RELEASE ORAL
Qty: 90 TABLET | Refills: 3 | Status: SHIPPED | OUTPATIENT
Start: 2023-11-28 | End: 2024-09-25

## 2023-11-28 RX ORDER — TACROLIMUS 4 MG/1
4 TABLET, EXTENDED RELEASE ORAL
Qty: 90 TABLET | Refills: 3 | Status: SHIPPED | OUTPATIENT
Start: 2023-11-28 | End: 2024-09-25

## 2023-11-29 ENCOUNTER — TELEPHONE (OUTPATIENT)
Dept: PHARMACY | Facility: CLINIC | Age: 38
End: 2023-11-29
Payer: COMMERCIAL

## 2023-11-29 NOTE — TELEPHONE ENCOUNTER
Clinical Pharmacy Consult:                                                      Transplant Specific: 24 Month Post Transplant Call  Date of Transplant: 11/15/2021  Type of Transplant: kidney and pancreas  First Transplant: yes  History of rejection: no    Immunosuppression Regimen   TAC XR 5.5mg qAM and Myforitic 360mg qAM & 360mg qPM  Patient specific goal: 5-8  Most recent level: 6.6, date 11/24/23   Immunosuppressant Levels:  therapeutic  Pt adherent to lab draws: yes  Scr:   Lab Results   Component Value Date    CR 1.42 12/27/2021    CR 2.69 05/05/2021     Side effects: none reported    Prophylactic Medications  Antibacterial:  Bactrim SS daily  Scheduled Discontinue Date: completed    Antifungal: Not needed thus far  Scheduled Discontinue Date: N/A    Antiviral: CrCl >/=60 mL/minute: Valcyte 900mg daily   Scheduled Discontinue Date: completed    Acid Reducer: Pepcid (famotidine 20mg)  Scheduled Reviewed Date: completed    Thrombosis Prevention: Aspirin 81 mg PO daily  Scheduled Discontinue Date:  MD to determine    Blood Pressure Management  Frequency of home Blood Pressure checks: pt is not checking at home  Most recent home BP: last clinic BP: 138/82 on 8/1/23  Patient Blood pressure goal: <140/90  Patient blood pressure at goal:  yes  Hospitalizations/ER visits since last assessment: 0    Med rec/DUR performed: yes  Med Rec Discrepancies: no      Marilyn reported doing ok.  She stated she has an upcoming appointment with her transplant provider for a two year post transplant review.  No side effects reported today.  She knows her medications well, no missed doses to report and no troubles getting her medications refilled.      Marilyn is not checking her blood pressure at home as it gets checked in clinic.  Her last BP in clinic was at goal.  Last tacro level was at goal.      She has no other questions or concerns at this time. Follow up in 1 year.    Xenia Duke, PharmD  [facility]

## 2023-11-30 ENCOUNTER — TELEPHONE (OUTPATIENT)
Dept: TRANSPLANT | Facility: CLINIC | Age: 38
End: 2023-11-30
Payer: COMMERCIAL

## 2023-11-30 ASSESSMENT — ENCOUNTER SYMPTOMS: NEW SYMPTOMS OF CORONARY ARTERY DISEASE: 0

## 2023-12-01 ENCOUNTER — TELEPHONE (OUTPATIENT)
Dept: ALLERGY | Facility: CLINIC | Age: 38
End: 2023-12-01

## 2023-12-01 NOTE — TELEPHONE ENCOUNTER
[facility] Call Center    Phone Message    May a detailed message be left on voicemail: no     Reason for Call: Other: Marilyn Keenan calling to get allergy shot rescheduled.  Please call: [phone]     Action Taken: Other: CS Allergy    Travel Screening: Not Applicable

## 2023-12-01 NOTE — TELEPHONE ENCOUNTER
Called and spoke with patient to reschedule allergy shot from today. Patient was able to get scheduled on Monday and will be within her 3-14 day window for her new vial cutback.    Tari Escamilla, WALESKAN, RN

## 2023-12-04 ENCOUNTER — ALLIED HEALTH/NURSE VISIT (OUTPATIENT)
Dept: ALLERGY | Facility: CLINIC | Age: 38
End: 2023-12-04
Payer: COMMERCIAL

## 2023-12-04 DIAGNOSIS — J30.89 ALLERGIC RHINITIS DUE TO DUST MITE: Primary | ICD-10-CM

## 2023-12-04 DIAGNOSIS — J30.9 ALLERGIC RHINITIS: ICD-10-CM

## 2023-12-04 PROCEDURE — 95120 IMMUNOTHERAPY ONE INJECTION: CPT

## 2023-12-12 ENCOUNTER — VIRTUAL VISIT (OUTPATIENT)
Dept: TRANSPLANT | Facility: CLINIC | Age: 38
End: 2023-12-12
Attending: INTERNAL MEDICINE
Payer: COMMERCIAL

## 2023-12-12 DIAGNOSIS — N18.31 STAGE 3A CHRONIC KIDNEY DISEASE (H): ICD-10-CM

## 2023-12-12 DIAGNOSIS — D63.1 ANEMIA DUE TO STAGE 3A CHRONIC KIDNEY DISEASE (H): ICD-10-CM

## 2023-12-12 DIAGNOSIS — N18.31 ANEMIA DUE TO STAGE 3A CHRONIC KIDNEY DISEASE (H): ICD-10-CM

## 2023-12-12 DIAGNOSIS — Z94.83 PANCREAS REPLACED BY TRANSPLANT (H): ICD-10-CM

## 2023-12-12 DIAGNOSIS — Z94.0 KIDNEY REPLACED BY TRANSPLANT: ICD-10-CM

## 2023-12-12 DIAGNOSIS — I15.1 HTN, KIDNEY TRANSPLANT RELATED: ICD-10-CM

## 2023-12-12 DIAGNOSIS — Z86.39 HISTORY OF DIABETES MELLITUS, TYPE I: ICD-10-CM

## 2023-12-12 DIAGNOSIS — T86.11 KIDNEY TRANSPLANT REJECTION: ICD-10-CM

## 2023-12-12 DIAGNOSIS — Z48.298 AFTERCARE FOLLOWING ORGAN TRANSPLANT: Primary | ICD-10-CM

## 2023-12-12 DIAGNOSIS — D84.9 IMMUNOSUPPRESSION (H): ICD-10-CM

## 2023-12-12 DIAGNOSIS — Z94.0 HTN, KIDNEY TRANSPLANT RELATED: ICD-10-CM

## 2023-12-12 PROCEDURE — 99214 OFFICE O/P EST MOD 30 MIN: CPT | Mod: VID | Performed by: INTERNAL MEDICINE

## 2023-12-12 NOTE — PROGRESS NOTES
Virtual Visit Details    Type of service:  Video Visit     Originating Location (pt. Location): Home    Distant Location (provider location):  On-site  Platform used for Video Visit: [facility]    TRANSPLANT NEPHROLOGY CHRONIC POST TRANSPLANT VISIT    Assessment & Plan   # DDKT (SPK): Stable               - Baseline Creatinine: ~ 1-1.3              - Proteinuria: Not checked post transplant              - Date DSA Last Checked: June/2022      Latest DSA: No              - BK Viremia: detectable, but not quantifiable in the past              - Kidney Tx Biopsy: yes   # kidney bx 5/2/22: read as borderline cellular rejection, isolated tubulitis i0t1, IF suggests possible IgA nephropathy MEST0    # closure Bx: 6/9/22 very limited sample, 2 glomeruli on EM, c4d not available, no signs of rejection       # Pancreas Tx (SPK):               - Pancreatic Exocrine Drainage: Enteric drained                     - Blood glucose: Euglycemia       On insulin: No              - HbA1c: 10/2023     Latest HbA1c: 5%              - Pancreatic enzymes: Stable, normal              - Date DSA Last Checked: June/2022             Latest DSA: No              - Pancreas Tx Biopsy: No     # Immunosuppression: Envarsus 5mg qd (goal trough 5-8),  mg po bid               - Induction with Recent Transplant:  Intermediate Intensity              - Continue with intensive monitoring of immunosuppression for efficacy and toxicity.              - Changes: Not at this time.    # Infection Prophylaxis:   - PJP: None  - CMV: Valganciclovir (Valcyte) completed 3 months     # Blood Pressure: Controlled;          Goal BP:<130/80              - Volume status: Euvolemic    EDW ~ 122-124bs              # Anemia in Chronic Renal Disease: Hgb: stable     SYLVAIN: No              - Iron studies: mild iron deficiency but intolerant to po iron, monitor for now and update iron studies if downtrend in Hb     # Mineral Bone Disorder:   - Secondary renal  hyperparathyroidism; PTH level: Normal (18-80 pg/ml)        On treatment: None  - Vitamin D; level: High normal        On supplement: Yes  - Calcium; level: Normal        On supplement: No     # Electrolytes:   - Potassium; level: Normal (last sample hemolyzed)        On supplement: No  - Magnesium; level: Normal        On supplement: no  - Bicarbonate; level: Normal        On supplement: No     # Medical Compliance: Yes     # Fatigue: slightly better, possibly related to mold exposure in prior apartment.    - nl CMV EBV TSH iron studies, stress test showed hypotensive response to exercise , exercise EKG didn't reveal inducible ischemia; angiogram with normal coronaries, ziopatch unremarkable, cardiac MRI nl.     #  LE edema:   -  intermittent mostly upon standing and worse towards the end of the day, symptoms suggestive of dependent edema and  Not intravascular fluid overload, trial of various forms of diuretics led to BERNIE but currently tolerating low dose of lasix 20 mg po every day for months with stable Cr~1-1.2    - possible vascular insufficiency, tried compression stockings with minimal improvement, seen by vascular surgery at [facility]    - Most recent UPC normal.   - no evidence for CHF: Echo with nl ef, no valvular abnormalities. Angiogram nl coronaries. RHC mildly elevated PCWP. Cardiac MRI unremarkable     # Skin Cancer: New lesions: none, chronic multiple nevi over the abdomen, no changes in size, color, texture   - Follows with dermatology annually              - Discussed sun protection and recommend regular follow up with Dermatology.      # Transplant History:  Etiology of Kidney Failure: Diabetes mellitus type 1  Tx: [facility]  Transplant: 11/15/2021 (Kidney / Pancreas)  Donor Type: Donation after Brain Death Donor Class:   Crossmatch at time of Tx: negative  DSA at time of Tx: No  Significant changes in immunosuppression: None  CMV IgG Ab High Risk Discordance (D+/R-): No  EBV IgG Ab High Risk Discordance  (D+/R-): No  Significant transplant-related complications: None    Transplant Office Phone Number: [phone]    Assessment and plan was discussed with the patient and she voiced her understanding and agreement.    Return visit: 6 months    Patient was seen and discussed with Dr. Stan Martin MD  Division of Renal Disease and Hypertension  [facility]  [email or web address]  Vocera Web Console        Chief Complaint   Ms. Ortega is a 38 year old here for kidney transplant, pancreas transplant and immunosuppression management.    History of Present Illness   Marilyn Feels well overall. Today she has no concerns. She states that her fatigue has improved over recent months. Suspects this may have been related to mold in her prior apartment. Has since improved since moving out. Also reports that her lower extremity swelling is stable. She reports using the lasix daily and it does help with symptoms. She continues to follow up with ophthalmology for her diabetic retinopathy and macular edema and overall stable vision over the past several months.    No fevers, chills, or night sweats. No shortness of breath.     Labs show stable Cr and lipase, FK in range    Current IS Envarsus 5 /360   PCP: [facility]    Problem List   Patient Active Problem List   Diagnosis    Osteopenia    Chronic constipation    Irritable bowel syndrome    Stage 3a chronic kidney disease (H)    HTN, kidney transplant related    Gastroparesis    Heterozygous factor V Leiden mutation (H24)    Immunosuppression (H24)    Kidney replaced by transplant    Pancreas replaced by transplant (H)    Anemia due to stage 3a chronic kidney disease (H)    Aftercare following organ transplant    Generalized edema    Kidney transplant rejection    Dyspnea on exertion    Chest pain, unspecified type       Allergies   Allergies   Allergen Reactions    Chlorhexidine Hives, Itching and Rash     PN: Patient had swelling/rash that was very itchy with chlorprep.     Ceclor [Cefaclor Monohydrate]     Metoclopramide Other (See Comments)     Noted mouth/facial twitching with reglan in 2022 when it was tried for gastroparesis.    Cefaclor Rash       Medications   Current Outpatient Medications   Medication Sig    aspirin (ASA) 81 MG chewable tablet Take 81 mg by mouth daily    cetirizine (ZYRTEC) 10 MG tablet Take 1 tablet (10 mg) by mouth every other day    docusate sodium (COLACE) 100 MG capsule Take 200 mg by mouth daily before breakfast    Dust Mite Mixed Allergen Ext (ODACTRA) 12 SQ-HDM SUBL Place 1 Dose under the tongue daily    EPINEPHrine (ANY BX GENERIC EQUIV) 0.3 MG/0.3ML injection 2-pack Inject 0.3 mLs (0.3 mg) into the muscle as needed for anaphylaxis    furosemide (LASIX) 20 MG tablet Take 1 tablet (20 mg) by mouth daily    mycophenolic acid (GENERIC EQUIVALENT) 360 MG EC tablet Take 1 tablet (360 mg) by mouth 2 times daily    ORDER FOR ALLERGEN IMMUNOTHERAPY Name of Mix: Mix #1  Dust Mite  Dust Mites DF. 10,000 AU/mL, HS  1.0 ml  Dust Mites DP. 10,000 AU/mL, HS  1.0 ml   Diluent: HSA 3.0 ml to 5ml    prucalopride succinate (MOTEGRITY) 2 MG tablet Take 1 tablet (2 mg) by mouth daily    tacrolimus (ENVARSUS XR) 0.75 MG 24 hr tablet Take 2 tablets (1.5 mg) by mouth every morning (before breakfast) Total dose: 5.5 mg daily    tacrolimus (ENVARSUS XR) 1 MG 24 hr tablet Hold for dose change Profile Rx: patient will contact pharmacy when needed    tacrolimus (ENVARSUS XR) 4 MG 24 hr tablet Take 1 tablet (4 mg) by mouth every morning (before breakfast) Total dose: 5.5 mg daily     Current Facility-Administered Medications   Medication    bevacizumab (AVASTIN) intravitreal inj 1.25 mg    bevacizumab (AVASTIN) intravitreal inj 1.25 mg    bevacizumab (AVASTIN) intravitreal inj 1.25 mg     There are no discontinued medications.    Physical Exam   Vital Signs: There were no vitals taken for this visit.    GENERAL APPEARANCE: alert and no distress  HENT: mouth without ulcers or  lesions  LYMPHATICS: no cervical or supraclavicular nodes  RESP: lungs clear to auscultation - no rales, rhonchi or wheezes  CV: regular rhythm, normal rate, no rub, no murmur  EDEMA: no LE edema bilaterally  ABDOMEN: soft, nondistended, nontender  MS: extremities normal - no gross deformities noted, no evidence of inflammation in joints, no muscle tenderness  SKIN: multiple nevi over arms, abdomen      Data         Latest Ref Rng & Units 11/24/2023     8:55 AM 10/30/2023     8:29 AM 10/9/2023     8:48 AM   Renal   Sodium 135 - 145 mmol/L 138  137  138    K 3.4 - 5.3 mmol/L 5.4  4.2  4.1    Cl 98 - 107 mmol/L 104  102  103    Cl (external) 98 - 107 mmol/L 104  102  103    CO2 22 - 29 mmol/L 24  21  21    Urea Nitrogen 6.0 - 20.0 mg/dL 25.0  18.6  19.7    Creatinine 0.51 - 0.95 mg/dL 1.04  1.04  0.98    Glucose 70 - 99 mg/dL 84  86  93    Calcium 8.6 - 10.0 mg/dL 9.9  9.6  9.5    Magnesium 1.7 - 2.3 mg/dL 1.8   1.7          Latest Ref Rng & Units 11/24/2023     8:55 AM 10/9/2023     8:48 AM 9/5/2023     8:35 AM   Bone Health   Phosphorus 2.5 - 4.5 mg/dL 3.4  2.8  3.0          Latest Ref Rng & Units 11/24/2023     8:55 AM 10/30/2023     8:29 AM 10/9/2023     8:48 AM   Heme   WBC 4.0 - 11.0 10e3/uL 6.1  7.7  9.3    Hgb 11.7 - 15.7 g/dL 11.3  11.1  11.5    Plt 150 - 450 10e3/uL 265  261  263          Latest Ref Rng & Units 7/14/2022     8:04 AM 5/23/2022     8:19 AM 5/4/2022     2:49 PM   Liver   AP 40 - 150 U/L  84  70    TBili 0.2 - 1.3 mg/dL  0.3  0.2    Bilirubin Direct 0.0 - 0.2 mg/dL  <0.1  <0.1    ALT 0 - 50 U/L  20  18    AST 0 - 45 U/L  13  19    Tot Protein 6.8 - 8.8 g/dL  7.9  7.6    Albumin 3.4 - 5.0 g/dL 4.0  4.0  4.1          Latest Ref Rng & Units 11/24/2023     8:55 AM 10/30/2023     8:29 AM 10/9/2023     8:48 AM   Pancreas   A1C <5.7 %   5.0    Amylase 28 - 100 U/L 70  59  59    Lipase (Roche) 13 - 60 U/L 46  34  31          Latest Ref Rng & Units 4/27/2022     5:45 PM 4/18/2022     8:05 AM 3/3/2022      7:51 AM   Iron studies   Iron 35 - 180 ug/dL 56  106  139    Iron Saturation Index 15 - 46 % 20  35  35    Ferritin 12 - 150 ng/mL 110  120  106          Latest Ref Rng & Units 4/18/2022     8:05 AM 1/28/2022     3:40 PM 12/13/2021     8:07 AM   [facility] Txp Virology   CMV QUANT IU/ML Not Detected IU/mL Not Detected  Not Detected  Not Detected    EBV DNA COPIES/ML Not Detected copies/mL Not Detected          Recent Labs   Lab Test 10/09/23  0848 10/30/23  0829 11/24/23  0855   DOSTAC 10/8/2023 10/29/2023 11/23/2023   TACROL 5.7 6.0 6.6     Recent Labs   Lab Test 05/30/23  0908 07/07/23  0856 07/31/23  0821 09/05/23  0835 10/09/23  0848   DOSMPA 5/29/2023   8:30 PM  --   --  9/4/2023   8:00 PM  --    MPACID 4.64*   < > 2.24 6.18* 2.25   MPAG 51.0   < > 43.4 53.5 29.9*    < > = values in this interval not displayed.     This patient has been seen and evaluated by me, Reanna Lee MD.  I have reviewed the note and agree with plan of care as documented by the fellow.    Reanna Lee MD

## 2023-12-12 NOTE — NURSING NOTE
Is the patient currently in the state of [place]? YES    Visit mode:VIDEO    If the visit is dropped, the patient can be reconnected by: VIDEO VISIT: Send to e-mail at: [email or web address]    Will anyone else be joining the visit? NO  (If patient encounters technical issues they should call [phone])    How would you like to obtain your AVS? MyChart    Are changes needed to the allergy or medication list? No    Reason for visit: RECHECK    Alberto OLIVER

## 2023-12-12 NOTE — PATIENT INSTRUCTIONS
Patient Recommendations:  - No new recommendations at this time.  -We will plan to follow-up in person in 6 months    Transplant Patient Information  Your Post Transplant Coordinator is: Kelly Atwood  For non urgent items, we encourage you to contact your coordinator/care team online via Pre[employer]  You and your care team can also contact your transplant coordinator Monday - Friday, 8am - 5pm at [phone] (Option 2 to reach the coordinator or Option 4 to schedule an appointment).  After hours for urgent matters, please call [facility] at [phone].

## 2023-12-12 NOTE — LETTER
12/12/2023         RE: Marilyn Ortega  [address]  [facility] [address]        Dear Colleague,    Thank you for referring your patient, Marilyn Ortega, to the [facility] TRANSPLANT [facility]. Please see a copy of my visit note below.    Virtual Visit Details    Type of service:  Video Visit     Originating Location (pt. Location): Home    Distant Location (provider location):  On-site  Platform used for Video Visit: [facility]    TRANSPLANT NEPHROLOGY CHRONIC POST TRANSPLANT VISIT    Assessment & Plan  # DDKT (SPK): Stable               - Baseline Creatinine: ~ 1-1.3              - Proteinuria: Not checked post transplant              - Date DSA Last Checked: June/2022      Latest DSA: No              - BK Viremia: detectable, but not quantifiable in the past              - Kidney Tx Biopsy: yes   # kidney bx 5/2/22: read as borderline cellular rejection, isolated tubulitis i0t1, IF suggests possible IgA nephropathy MEST0    # closure Bx: 6/9/22 very limited sample, 2 glomeruli on EM, c4d not available, no signs of rejection       # Pancreas Tx (SPK):               - Pancreatic Exocrine Drainage: Enteric drained                     - Blood glucose: Euglycemia       On insulin: No              - HbA1c: 10/2023     Latest HbA1c: 5%              - Pancreatic enzymes: Stable, normal              - Date DSA Last Checked: June/2022             Latest DSA: No              - Pancreas Tx Biopsy: No     # Immunosuppression: Envarsus 5mg qd (goal trough 5-8),  mg po bid               - Induction with Recent Transplant:  Intermediate Intensity              - Continue with intensive monitoring of immunosuppression for efficacy and toxicity.              - Changes: Not at this time.    # Infection Prophylaxis:   - PJP: None  - CMV: Valganciclovir (Valcyte) completed 3 months     # Blood Pressure: Controlled;          Goal BP:<130/80              - Volume status: Euvolemic    EDW ~ 122-124bs               # Anemia in Chronic Renal Disease: Hgb: stable     SYLVAIN: No              - Iron studies: mild iron deficiency but intolerant to po iron, monitor for now and update iron studies if downtrend in Hb     # Mineral Bone Disorder:   - Secondary renal hyperparathyroidism; PTH level: Normal (18-80 pg/ml)        On treatment: None  - Vitamin D; level: High normal        On supplement: Yes  - Calcium; level: Normal        On supplement: No     # Electrolytes:   - Potassium; level: Normal (last sample hemolyzed)        On supplement: No  - Magnesium; level: Normal        On supplement: no  - Bicarbonate; level: Normal        On supplement: No     # Medical Compliance: Yes     # Fatigue: slightly better, possibly related to mold exposure in prior apartment.    - nl CMV EBV TSH iron studies, stress test showed hypotensive response to exercise , exercise EKG didn't reveal inducible ischemia; angiogram with normal coronaries, ziopatch unremarkable, cardiac MRI nl.     #  LE edema:   -  intermittent mostly upon standing and worse towards the end of the day, symptoms suggestive of dependent edema and  Not intravascular fluid overload, trial of various forms of diuretics led to BERNIE but currently tolerating low dose of lasix 20 mg po every day for months with stable Cr~1-1.2    - possible vascular insufficiency, tried compression stockings with minimal improvement, seen by vascular surgery at [place]    - Most recent UPC normal.   - no evidence for CHF: Echo with nl ef, no valvular abnormalities. Angiogram nl coronaries. RHC mildly elevated PCWP. Cardiac MRI unremarkable     # Skin Cancer: New lesions: none, chronic multiple nevi over the abdomen, no changes in size, color, texture   - Follows with dermatology annually              - Discussed sun protection and recommend regular follow up with Dermatology.      # Transplant History:  Etiology of Kidney Failure: Diabetes mellitus type 1  Tx: SPK  Transplant: 11/15/2021 (Kidney /  Pancreas)  Donor Type: Donation after Brain Death Donor Class:   Crossmatch at time of Tx: negative  DSA at time of Tx: No  Significant changes in immunosuppression: None  CMV IgG Ab High Risk Discordance (D+/R-): No  EBV IgG Ab High Risk Discordance (D+/R-): No  Significant transplant-related complications: None    Transplant Office Phone Number: [phone]    Assessment and plan was discussed with the patient and she voiced her understanding and agreement.    Return visit: 6 months    Patient was seen and discussed with Dr. Stan Martin MD  Division of Renal Disease and Hypertension  [facility]  [email or web address]  Vocera Web Console        Chief Complaint  Ms. Ortega is a 38 year old here for kidney transplant, pancreas transplant and immunosuppression management.    History of Present Illness  Marilyn Feels well overall. Today she has no concerns. She states that her fatigue has improved over recent months. Suspects this may have been related to mold in her prior apartment. Has since improved since moving out. Also reports that her lower extremity swelling is stable. She reports using the lasix daily and it does help with symptoms. She continues to follow up with ophthalmology for her diabetic retinopathy and macular edema and overall stable vision over the past several months.    No fevers, chills, or night sweats. No shortness of breath.     Labs show stable Cr and lipase, FK in range    Current IS Envarsus 5 /360   PCP: [facility]    Problem List  Patient Active Problem List   Diagnosis    Osteopenia    Chronic constipation    Irritable bowel syndrome    Stage 3a chronic kidney disease (H)    HTN, kidney transplant related    Gastroparesis    Heterozygous factor V Leiden mutation (H24)    Immunosuppression (H24)    Kidney replaced by transplant    Pancreas replaced by transplant (H)    Anemia due to stage 3a chronic kidney disease (H)    Aftercare following organ transplant    Generalized edema     Kidney transplant rejection    Dyspnea on exertion    Chest pain, unspecified type       Allergies  Allergies   Allergen Reactions    Chlorhexidine Hives, Itching and Rash     PN: Patient had swelling/rash that was very itchy with chlorprep.    Ceclor [Cefaclor Monohydrate]     Metoclopramide Other (See Comments)     Noted mouth/facial twitching with reglan in 2022 when it was tried for gastroparesis.    Cefaclor Rash       Medications  Current Outpatient Medications   Medication Sig    aspirin (ASA) 81 MG chewable tablet Take 81 mg by mouth daily    cetirizine (ZYRTEC) 10 MG tablet Take 1 tablet (10 mg) by mouth every other day    docusate sodium (COLACE) 100 MG capsule Take 200 mg by mouth daily before breakfast    Dust Mite Mixed Allergen Ext (ODACTRA) 12 SQ-HDM SUBL Place 1 Dose under the tongue daily    EPINEPHrine (ANY BX GENERIC EQUIV) 0.3 MG/0.3ML injection 2-pack Inject 0.3 mLs (0.3 mg) into the muscle as needed for anaphylaxis    furosemide (LASIX) 20 MG tablet Take 1 tablet (20 mg) by mouth daily    mycophenolic acid (GENERIC EQUIVALENT) 360 MG EC tablet Take 1 tablet (360 mg) by mouth 2 times daily    ORDER FOR ALLERGEN IMMUNOTHERAPY Name of Mix: Mix #1  Dust Mite  Dust Mites DF. 10,000 AU/mL, HS  1.0 ml  Dust Mites DP. 10,000 AU/mL, HS  1.0 ml   Diluent: HSA 3.0 ml to 5ml    prucalopride succinate (MOTEGRITY) 2 MG tablet Take 1 tablet (2 mg) by mouth daily    tacrolimus (ENVARSUS XR) 0.75 MG 24 hr tablet Take 2 tablets (1.5 mg) by mouth every morning (before breakfast) Total dose: 5.5 mg daily    tacrolimus (ENVARSUS XR) 1 MG 24 hr tablet Hold for dose change Profile Rx: patient will contact pharmacy when needed    tacrolimus (ENVARSUS XR) 4 MG 24 hr tablet Take 1 tablet (4 mg) by mouth every morning (before breakfast) Total dose: 5.5 mg daily     Current Facility-Administered Medications   Medication    bevacizumab (AVASTIN) intravitreal inj 1.25 mg    bevacizumab (AVASTIN) intravitreal inj 1.25 mg     bevacizumab (AVASTIN) intravitreal inj 1.25 mg     There are no discontinued medications.    Physical Exam  Vital Signs: There were no vitals taken for this visit.    GENERAL APPEARANCE: alert and no distress  HENT: mouth without ulcers or lesions  LYMPHATICS: no cervical or supraclavicular nodes  RESP: lungs clear to auscultation - no rales, rhonchi or wheezes  CV: regular rhythm, normal rate, no rub, no murmur  EDEMA: no LE edema bilaterally  ABDOMEN: soft, nondistended, nontender  MS: extremities normal - no gross deformities noted, no evidence of inflammation in joints, no muscle tenderness  SKIN: multiple nevi over arms, abdomen      Data        Latest Ref Rng & Units 11/24/2023     8:55 AM 10/30/2023     8:29 AM 10/9/2023     8:48 AM   Renal   Sodium 135 - 145 mmol/L 138  137  138    K 3.4 - 5.3 mmol/L 5.4  4.2  4.1    Cl 98 - 107 mmol/L 104  102  103    Cl (external) 98 - 107 mmol/L 104  102  103    CO2 22 - 29 mmol/L 24  21  21    Urea Nitrogen 6.0 - 20.0 mg/dL 25.0  18.6  19.7    Creatinine 0.51 - 0.95 mg/dL 1.04  1.04  0.98    Glucose 70 - 99 mg/dL 84  86  93    Calcium 8.6 - 10.0 mg/dL 9.9  9.6  9.5    Magnesium 1.7 - 2.3 mg/dL 1.8   1.7          Latest Ref Rng & Units 11/24/2023     8:55 AM 10/9/2023     8:48 AM 9/5/2023     8:35 AM   Bone Health   Phosphorus 2.5 - 4.5 mg/dL 3.4  2.8  3.0          Latest Ref Rng & Units 11/24/2023     8:55 AM 10/30/2023     8:29 AM 10/9/2023     8:48 AM   Heme   WBC 4.0 - 11.0 10e3/uL 6.1  7.7  9.3    Hgb 11.7 - 15.7 g/dL 11.3  11.1  11.5    Plt 150 - 450 10e3/uL 265  261  263          Latest Ref Rng & Units 7/14/2022     8:04 AM 5/23/2022     8:19 AM 5/4/2022     2:49 PM   Liver   AP 40 - 150 U/L  84  70    TBili 0.2 - 1.3 mg/dL  0.3  0.2    Bilirubin Direct 0.0 - 0.2 mg/dL  <0.1  <0.1    ALT 0 - 50 U/L  20  18    AST 0 - 45 U/L  13  19    Tot Protein 6.8 - 8.8 g/dL  7.9  7.6    Albumin 3.4 - 5.0 g/dL 4.0  4.0  4.1          Latest Ref Rng & Units 11/24/2023     8:55  AM 10/30/2023     8:29 AM 10/9/2023     8:48 AM   Pancreas   A1C <5.7 %   5.0    Amylase 28 - 100 U/L 70  59  59    Lipase (Roche) 13 - 60 U/L 46  34  31          Latest Ref Rng & Units 4/27/2022     5:45 PM 4/18/2022     8:05 AM 3/3/2022     7:51 AM   Iron studies   Iron 35 - 180 ug/dL 56  106  139    Iron Saturation Index 15 - 46 % 20  35  35    Ferritin 12 - 150 ng/mL 110  120  106          Latest Ref Rng & Units 4/18/2022     8:05 AM 1/28/2022     3:40 PM 12/13/2021     8:07 AM   [facility] Txp Virology   CMV QUANT IU/ML Not Detected IU/mL Not Detected  Not Detected  Not Detected    EBV DNA COPIES/ML Not Detected copies/mL Not Detected          Recent Labs   Lab Test 10/09/23  0848 10/30/23  0829 11/24/23  0855   DOSTAC 10/8/2023 10/29/2023 11/23/2023   TACROL 5.7 6.0 6.6     Recent Labs   Lab Test 05/30/23  0908 07/07/23  0856 07/31/23  0821 09/05/23  0835 10/09/23  0848   DOSMPA 5/29/2023   8:30 PM  --   --  9/4/2023   8:00 PM  --    MPACID 4.64*   < > 2.24 6.18* 2.25   MPAG 51.0   < > 43.4 53.5 29.9*    < > = values in this interval not displayed.     This patient has been seen and evaluated by me, Reanna Lee MD.  I have reviewed the note and agree with plan of care as documented by the fellow.    Reanna Lee MD

## 2023-12-20 ENCOUNTER — LAB (OUTPATIENT)
Dept: LAB | Facility: CLINIC | Age: 38
End: 2023-12-20
Payer: COMMERCIAL

## 2023-12-20 DIAGNOSIS — Z94.0 KIDNEY REPLACED BY TRANSPLANT: ICD-10-CM

## 2023-12-20 DIAGNOSIS — D84.9 IMMUNOSUPPRESSED STATUS (H): ICD-10-CM

## 2023-12-20 DIAGNOSIS — Z94.83 PANCREAS REPLACED BY TRANSPLANT (H): ICD-10-CM

## 2023-12-20 LAB
TACROLIMUS BLD-MCNC: 5.5 UG/L (ref 5–15)
TME LAST DOSE: NORMAL H
TME LAST DOSE: NORMAL H

## 2023-12-20 PROCEDURE — 36415 COLL VENOUS BLD VENIPUNCTURE: CPT

## 2023-12-20 PROCEDURE — 80197 ASSAY OF TACROLIMUS: CPT

## 2024-01-02 ENCOUNTER — LAB (OUTPATIENT)
Dept: LAB | Facility: CLINIC | Age: 39
End: 2024-01-02
Payer: COMMERCIAL

## 2024-01-02 DIAGNOSIS — D84.9 IMMUNOSUPPRESSED STATUS (H): ICD-10-CM

## 2024-01-02 DIAGNOSIS — Z94.0 KIDNEY REPLACED BY TRANSPLANT: ICD-10-CM

## 2024-01-02 DIAGNOSIS — Z94.83 PANCREAS REPLACED BY TRANSPLANT (H): ICD-10-CM

## 2024-01-02 LAB
AMYLASE SERPL-CCNC: 54 U/L (ref 28–100)
ANION GAP SERPL CALCULATED.3IONS-SCNC: 8 MMOL/L (ref 7–15)
BUN SERPL-MCNC: 23 MG/DL (ref 6–20)
CALCIUM SERPL-MCNC: 9.8 MG/DL (ref 8.6–10)
CHLORIDE SERPL-SCNC: 105 MMOL/L (ref 98–107)
CREAT SERPL-MCNC: 0.94 MG/DL (ref 0.51–0.95)
DEPRECATED HCO3 PLAS-SCNC: 27 MMOL/L (ref 22–29)
EGFRCR SERPLBLD CKD-EPI 2021: 79 ML/MIN/1.73M2
ERYTHROCYTE [DISTWIDTH] IN BLOOD BY AUTOMATED COUNT: 13.4 % (ref 10–15)
GLUCOSE SERPL-MCNC: 84 MG/DL (ref 70–99)
HCT VFR BLD AUTO: 33 % (ref 35–47)
HGB BLD-MCNC: 10.6 G/DL (ref 11.7–15.7)
LIPASE SERPL-CCNC: 33 U/L (ref 13–60)
MAGNESIUM SERPL-MCNC: 1.8 MG/DL (ref 1.7–2.3)
MCH RBC QN AUTO: 28.3 PG (ref 26.5–33)
MCHC RBC AUTO-ENTMCNC: 32.1 G/DL (ref 31.5–36.5)
MCV RBC AUTO: 88 FL (ref 78–100)
PHOSPHATE SERPL-MCNC: 3.1 MG/DL (ref 2.5–4.5)
PLATELET # BLD AUTO: 260 10E3/UL (ref 150–450)
POTASSIUM SERPL-SCNC: 4 MMOL/L (ref 3.4–5.3)
RBC # BLD AUTO: 3.74 10E6/UL (ref 3.8–5.2)
SODIUM SERPL-SCNC: 140 MMOL/L (ref 135–145)
TACROLIMUS BLD-MCNC: 4.7 UG/L (ref 5–15)
TME LAST DOSE: ABNORMAL H
TME LAST DOSE: ABNORMAL H
WBC # BLD AUTO: 4.6 10E3/UL (ref 4–11)

## 2024-01-02 PROCEDURE — 82150 ASSAY OF AMYLASE: CPT

## 2024-01-02 PROCEDURE — 85027 COMPLETE CBC AUTOMATED: CPT

## 2024-01-02 PROCEDURE — 80197 ASSAY OF TACROLIMUS: CPT

## 2024-01-02 PROCEDURE — 83735 ASSAY OF MAGNESIUM: CPT

## 2024-01-02 PROCEDURE — 84100 ASSAY OF PHOSPHORUS: CPT

## 2024-01-02 PROCEDURE — 83690 ASSAY OF LIPASE: CPT

## 2024-01-02 PROCEDURE — 36415 COLL VENOUS BLD VENIPUNCTURE: CPT

## 2024-01-02 PROCEDURE — 87799 DETECT AGENT NOS DNA QUANT: CPT

## 2024-01-02 PROCEDURE — 80048 BASIC METABOLIC PNL TOTAL CA: CPT

## 2024-01-03 LAB — BKV DNA # SPEC NAA+PROBE: NOT DETECTED COPIES/ML

## 2024-01-04 DIAGNOSIS — R59.9 SWOLLEN LYMPH NODES: ICD-10-CM

## 2024-01-04 DIAGNOSIS — Z48.298 AFTERCARE FOLLOWING ORGAN TRANSPLANT: Primary | ICD-10-CM

## 2024-01-08 ENCOUNTER — ALLIED HEALTH/NURSE VISIT (OUTPATIENT)
Dept: ALLERGY | Facility: CLINIC | Age: 39
End: 2024-01-08
Payer: COMMERCIAL

## 2024-01-08 DIAGNOSIS — J30.89 ALLERGIC RHINITIS DUE TO DUST MITE: Primary | ICD-10-CM

## 2024-01-08 DIAGNOSIS — J30.9 ALLERGIC RHINITIS: ICD-10-CM

## 2024-01-08 PROCEDURE — 95120 IMMUNOTHERAPY ONE INJECTION: CPT

## 2024-01-09 ENCOUNTER — ANCILLARY PROCEDURE (OUTPATIENT)
Dept: ULTRASOUND IMAGING | Facility: CLINIC | Age: 39
End: 2024-01-09
Attending: INTERNAL MEDICINE
Payer: COMMERCIAL

## 2024-01-09 DIAGNOSIS — Z48.298 AFTERCARE FOLLOWING ORGAN TRANSPLANT: ICD-10-CM

## 2024-01-09 DIAGNOSIS — R59.9 SWOLLEN LYMPH NODES: ICD-10-CM

## 2024-01-09 PROCEDURE — 76536 US EXAM OF HEAD AND NECK: CPT | Performed by: RADIOLOGY

## 2024-01-12 ENCOUNTER — LAB (OUTPATIENT)
Dept: LAB | Facility: CLINIC | Age: 39
End: 2024-01-12
Payer: COMMERCIAL

## 2024-01-12 DIAGNOSIS — Z94.0 KIDNEY REPLACED BY TRANSPLANT: ICD-10-CM

## 2024-01-12 DIAGNOSIS — Z48.298 AFTERCARE FOLLOWING ORGAN TRANSPLANT: ICD-10-CM

## 2024-01-12 DIAGNOSIS — Z94.83 PANCREAS REPLACED BY TRANSPLANT (H): ICD-10-CM

## 2024-01-12 DIAGNOSIS — D84.9 IMMUNOSUPPRESSED STATUS (H): ICD-10-CM

## 2024-01-12 LAB
ERYTHROCYTE [DISTWIDTH] IN BLOOD BY AUTOMATED COUNT: 13.2 % (ref 10–15)
HCT VFR BLD AUTO: 32.6 % (ref 35–47)
HGB BLD-MCNC: 10.5 G/DL (ref 11.7–15.7)
LDH SERPL L TO P-CCNC: 153 U/L (ref 0–250)
MCH RBC QN AUTO: 28.5 PG (ref 26.5–33)
MCHC RBC AUTO-ENTMCNC: 32.2 G/DL (ref 31.5–36.5)
MCV RBC AUTO: 89 FL (ref 78–100)
PLATELET # BLD AUTO: 263 10E3/UL (ref 150–450)
RBC # BLD AUTO: 3.68 10E6/UL (ref 3.8–5.2)
TACROLIMUS BLD-MCNC: 5.3 UG/L (ref 5–15)
TME LAST DOSE: NORMAL H
TME LAST DOSE: NORMAL H
WBC # BLD AUTO: 6.5 10E3/UL (ref 4–11)

## 2024-01-12 PROCEDURE — 83615 LACTATE (LD) (LDH) ENZYME: CPT

## 2024-01-12 PROCEDURE — 36415 COLL VENOUS BLD VENIPUNCTURE: CPT

## 2024-01-12 PROCEDURE — 80197 ASSAY OF TACROLIMUS: CPT

## 2024-01-12 PROCEDURE — 85027 COMPLETE CBC AUTOMATED: CPT

## 2024-01-12 PROCEDURE — 87799 DETECT AGENT NOS DNA QUANT: CPT

## 2024-01-15 LAB — EBV DNA # SPEC NAA+PROBE: NOT DETECTED COPIES/ML

## 2024-01-24 ENCOUNTER — LAB (OUTPATIENT)
Dept: LAB | Facility: CLINIC | Age: 39
End: 2024-01-24
Payer: COMMERCIAL

## 2024-01-24 DIAGNOSIS — Z48.298 AFTERCARE FOLLOWING ORGAN TRANSPLANT: Primary | ICD-10-CM

## 2024-01-24 DIAGNOSIS — R19.7 DIARRHEA: ICD-10-CM

## 2024-01-24 DIAGNOSIS — Z48.298 AFTERCARE FOLLOWING ORGAN TRANSPLANT: ICD-10-CM

## 2024-01-24 LAB
ANION GAP SERPL CALCULATED.3IONS-SCNC: 14 MMOL/L (ref 7–15)
BUN SERPL-MCNC: 20.7 MG/DL (ref 6–20)
CALCIUM SERPL-MCNC: 9.4 MG/DL (ref 8.6–10)
CHLORIDE SERPL-SCNC: 106 MMOL/L (ref 98–107)
CREAT SERPL-MCNC: 1.23 MG/DL (ref 0.51–0.95)
DEPRECATED HCO3 PLAS-SCNC: 22 MMOL/L (ref 22–29)
EGFRCR SERPLBLD CKD-EPI 2021: 57 ML/MIN/1.73M2
ERYTHROCYTE [DISTWIDTH] IN BLOOD BY AUTOMATED COUNT: 13.1 % (ref 10–15)
GLUCOSE SERPL-MCNC: 107 MG/DL (ref 70–99)
HCT VFR BLD AUTO: 31.6 % (ref 35–47)
HGB BLD-MCNC: 10.2 G/DL (ref 11.7–15.7)
MCH RBC QN AUTO: 28.5 PG (ref 26.5–33)
MCHC RBC AUTO-ENTMCNC: 32.3 G/DL (ref 31.5–36.5)
MCV RBC AUTO: 88 FL (ref 78–100)
PLATELET # BLD AUTO: 248 10E3/UL (ref 150–450)
POTASSIUM SERPL-SCNC: 3.9 MMOL/L (ref 3.4–5.3)
RBC # BLD AUTO: 3.58 10E6/UL (ref 3.8–5.2)
SODIUM SERPL-SCNC: 142 MMOL/L (ref 135–145)
WBC # BLD AUTO: 7 10E3/UL (ref 4–11)

## 2024-01-24 PROCEDURE — 85014 HEMATOCRIT: CPT

## 2024-01-24 PROCEDURE — 36415 COLL VENOUS BLD VENIPUNCTURE: CPT

## 2024-01-24 PROCEDURE — 80048 BASIC METABOLIC PNL TOTAL CA: CPT

## 2024-01-25 ENCOUNTER — LAB (OUTPATIENT)
Dept: LAB | Facility: CLINIC | Age: 39
End: 2024-01-25
Payer: COMMERCIAL

## 2024-01-25 DIAGNOSIS — R19.7 DIARRHEA: ICD-10-CM

## 2024-01-25 DIAGNOSIS — Z48.298 AFTERCARE FOLLOWING ORGAN TRANSPLANT: ICD-10-CM

## 2024-01-25 LAB
ADV 40+41 DNA STL QL NAA+NON-PROBE: NEGATIVE
ASTRO TYP 1-8 RNA STL QL NAA+NON-PROBE: POSITIVE
C CAYETANENSIS DNA STL QL NAA+NON-PROBE: NEGATIVE
C DIFF TOX B STL QL: NEGATIVE
CAMPYLOBACTER DNA SPEC NAA+PROBE: NEGATIVE
CMV DNA SPEC NAA+PROBE-ACNC: NOT DETECTED IU/ML
CRYPTOSP DNA STL QL NAA+NON-PROBE: NEGATIVE
E COLI O157 DNA STL QL NAA+NON-PROBE: ABNORMAL
E HISTOLYT DNA STL QL NAA+NON-PROBE: NEGATIVE
EAEC ASTA GENE ISLT QL NAA+PROBE: NEGATIVE
EC STX1+STX2 GENES STL QL NAA+NON-PROBE: NEGATIVE
EPEC EAE GENE STL QL NAA+NON-PROBE: NEGATIVE
ETEC LTA+ST1A+ST1B TOX ST NAA+NON-PROBE: NEGATIVE
G LAMBLIA DNA STL QL NAA+NON-PROBE: NEGATIVE
NOROVIRUS GI+II RNA STL QL NAA+NON-PROBE: NEGATIVE
P SHIGELLOIDES DNA STL QL NAA+NON-PROBE: NEGATIVE
RVA RNA STL QL NAA+NON-PROBE: NEGATIVE
SALMONELLA SP RPOD STL QL NAA+PROBE: NEGATIVE
SAPO I+II+IV+V RNA STL QL NAA+NON-PROBE: NEGATIVE
SHIGELLA SP+EIEC IPAH ST NAA+NON-PROBE: NEGATIVE
V CHOLERAE DNA SPEC QL NAA+PROBE: NEGATIVE
VIBRIO DNA SPEC NAA+PROBE: NEGATIVE
Y ENTEROCOL DNA STL QL NAA+PROBE: NEGATIVE

## 2024-01-25 PROCEDURE — 87493 C DIFF AMPLIFIED PROBE: CPT | Mod: XU

## 2024-01-25 PROCEDURE — 87507 IADNA-DNA/RNA PROBE TQ 12-25: CPT

## 2024-01-26 ENCOUNTER — TELEPHONE (OUTPATIENT)
Dept: ONCOLOGY | Facility: CLINIC | Age: 39
End: 2024-01-26

## 2024-01-26 ENCOUNTER — INFUSION THERAPY VISIT (OUTPATIENT)
Dept: INFUSION THERAPY | Facility: CLINIC | Age: 39
End: 2024-01-26
Attending: INTERNAL MEDICINE
Payer: COMMERCIAL

## 2024-01-26 ENCOUNTER — TELEPHONE (OUTPATIENT)
Dept: TRANSPLANT | Facility: CLINIC | Age: 39
End: 2024-01-26

## 2024-01-26 ENCOUNTER — TELEPHONE (OUTPATIENT)
Dept: TRANSPLANT | Facility: CLINIC | Age: 39
End: 2024-01-26
Payer: COMMERCIAL

## 2024-01-26 VITALS
SYSTOLIC BLOOD PRESSURE: 103 MMHG | TEMPERATURE: 97.9 F | RESPIRATION RATE: 16 BRPM | OXYGEN SATURATION: 100 % | HEART RATE: 87 BPM | DIASTOLIC BLOOD PRESSURE: 71 MMHG

## 2024-01-26 DIAGNOSIS — Z94.0 KIDNEY REPLACED BY TRANSPLANT: ICD-10-CM

## 2024-01-26 DIAGNOSIS — E86.0 DEHYDRATION: Primary | ICD-10-CM

## 2024-01-26 DIAGNOSIS — Z94.0 KIDNEY REPLACED BY TRANSPLANT: Primary | ICD-10-CM

## 2024-01-26 DIAGNOSIS — R19.7 DIARRHEA: ICD-10-CM

## 2024-01-26 DIAGNOSIS — A08.32 ASTROVIRUS ENTERITIS: ICD-10-CM

## 2024-01-26 DIAGNOSIS — A08.32 ASTROVIRUS ENTERITIS: Primary | ICD-10-CM

## 2024-01-26 DIAGNOSIS — E86.0 DEHYDRATION: ICD-10-CM

## 2024-01-26 LAB
ANION GAP SERPL CALCULATED.3IONS-SCNC: 8 MMOL/L (ref 7–15)
BUN SERPL-MCNC: 13.7 MG/DL (ref 6–20)
CALCIUM SERPL-MCNC: 8.2 MG/DL (ref 8.6–10)
CHLORIDE SERPL-SCNC: 110 MMOL/L (ref 98–107)
CREAT SERPL-MCNC: 0.94 MG/DL (ref 0.51–0.95)
DEPRECATED HCO3 PLAS-SCNC: 22 MMOL/L (ref 22–29)
EGFRCR SERPLBLD CKD-EPI 2021: 79 ML/MIN/1.73M2
GLUCOSE SERPL-MCNC: 125 MG/DL (ref 70–99)
POTASSIUM SERPL-SCNC: 3.4 MMOL/L (ref 3.4–5.3)
SODIUM SERPL-SCNC: 140 MMOL/L (ref 135–145)

## 2024-01-26 PROCEDURE — 82784 ASSAY IGA/IGD/IGG/IGM EACH: CPT

## 2024-01-26 PROCEDURE — 96360 HYDRATION IV INFUSION INIT: CPT

## 2024-01-26 PROCEDURE — 36415 COLL VENOUS BLD VENIPUNCTURE: CPT

## 2024-01-26 PROCEDURE — 80048 BASIC METABOLIC PNL TOTAL CA: CPT

## 2024-01-26 PROCEDURE — 258N000003 HC RX IP 258 OP 636: Performed by: INTERNAL MEDICINE

## 2024-01-26 RX ORDER — ALBUTEROL SULFATE 90 UG/1
1-2 AEROSOL, METERED RESPIRATORY (INHALATION)
Start: 2024-01-26

## 2024-01-26 RX ORDER — DIPHENHYDRAMINE HYDROCHLORIDE 50 MG/ML
50 INJECTION INTRAMUSCULAR; INTRAVENOUS
Start: 2024-01-26

## 2024-01-26 RX ORDER — HEPARIN SODIUM (PORCINE) LOCK FLUSH IV SOLN 100 UNIT/ML 100 UNIT/ML
5 SOLUTION INTRAVENOUS
OUTPATIENT
Start: 2024-01-26

## 2024-01-26 RX ORDER — MEPERIDINE HYDROCHLORIDE 25 MG/ML
25 INJECTION INTRAMUSCULAR; INTRAVENOUS; SUBCUTANEOUS EVERY 30 MIN PRN
OUTPATIENT
Start: 2024-01-26

## 2024-01-26 RX ORDER — NITAZOXANIDE 500 MG/1
500 TABLET ORAL 2 TIMES DAILY WITH MEALS
Qty: 14 TABLET | Refills: 0 | Status: SHIPPED | OUTPATIENT
Start: 2024-01-26 | End: 2024-01-26

## 2024-01-26 RX ORDER — MYCOPHENOLIC ACID 180 MG/1
180 TABLET, DELAYED RELEASE ORAL 2 TIMES DAILY
Qty: 10 TABLET | Refills: 0 | Status: SHIPPED | OUTPATIENT
Start: 2024-01-26 | End: 2024-01-31

## 2024-01-26 RX ORDER — METHYLPREDNISOLONE SODIUM SUCCINATE 125 MG/2ML
125 INJECTION, POWDER, LYOPHILIZED, FOR SOLUTION INTRAMUSCULAR; INTRAVENOUS
Start: 2024-01-26

## 2024-01-26 RX ORDER — EPINEPHRINE 1 MG/ML
0.3 INJECTION, SOLUTION INTRAMUSCULAR; SUBCUTANEOUS EVERY 5 MIN PRN
OUTPATIENT
Start: 2024-01-26

## 2024-01-26 RX ORDER — HEPARIN SODIUM,PORCINE 10 UNIT/ML
5-20 VIAL (ML) INTRAVENOUS DAILY PRN
OUTPATIENT
Start: 2024-01-26

## 2024-01-26 RX ORDER — EPINEPHRINE 1 MG/ML
0.3 INJECTION, SOLUTION, CONCENTRATE INTRAVENOUS EVERY 5 MIN PRN
OUTPATIENT
Start: 2024-01-26

## 2024-01-26 RX ORDER — ALBUTEROL SULFATE 0.83 MG/ML
2.5 SOLUTION RESPIRATORY (INHALATION)
OUTPATIENT
Start: 2024-01-26

## 2024-01-26 RX ORDER — NITAZOXANIDE 500 MG/1
500 TABLET ORAL 2 TIMES DAILY WITH MEALS
Qty: 14 TABLET | Refills: 0 | Status: SHIPPED | OUTPATIENT
Start: 2024-01-26 | End: 2024-02-19

## 2024-01-26 RX ADMIN — SODIUM CHLORIDE 1000 ML: 9 INJECTION, SOLUTION INTRAVENOUS at 14:44

## 2024-01-26 NOTE — TELEPHONE ENCOUNTER
ISSUE: pt positive for astrovirus on enteric panel.   Diarrhea and elevated cr        PLAN:    Reanna Cueto MD Reilly, Megan, RN  Check IgG and schedule IVF  L NS  Just supportive care -nitazoxanide can sometimes be used but questionable benefit per ID  You can look into coverage in case things don't improve

## 2024-01-26 NOTE — PATIENT INSTRUCTIONS
Dear Marilyn Ortega    Thank you for choosing [facility] ([facility]) for your infusion.  The following information is a summary of our appointment as well as important reminders.          If you are a transplant patient and require transplant education, please click on this link: [email or web address].    We look forward in seeing you on your next appointment here at [facility] ([facility]).  Please don t hesitate to call us at [phone] to reschedule any of your appointments or to speak with one of the [facility] registered nurses.  It was a pleasure taking care of you today.    Sincerely,    [facility]  [address] [place] [address]  [address]  Phone:  [phone]

## 2024-01-26 NOTE — PROGRESS NOTES
Infusion Nursing Note:  Marilyn Ortega presents today for IVF.    Patient seen by provider today: No   present during visit today: Not Applicable.    Note: As ordered, 1 L NS infused over an hour.  Administrations This Visit       sodium chloride 0.9% BOLUS 1,000 mL       Admin Date  01/26/2024 Action  $New Bag Dose  1,000 mL Route  Intravenous Documented By  Nena Thompson, EDWARD                     Intravenous Access:  Labs drawn without difficulty post infusion.  Peripheral IV placed.    Treatment Conditions:  None.      Post Infusion Assessment:  Patient tolerated infusion without incident.  Blood return noted pre and post infusion.  Site patent and intact, free from redness, edema or discomfort.  No evidence of extravasations.  Access discontinued per protocol.       Discharge Plan:   Discharge instructions reviewed with: Patient.  Patient and/or family verbalized understanding of discharge instructions and all questions answered.  AVS to patient via [employer].  Patient will return to her provider as needed for next appointment.   Patient discharged in stable condition accompanied by: self.  Departure Mode: Ambulatory.    /71 (BP Location: Left arm, Patient Position: Sitting, Cuff Size: Adult Regular)   Pulse 87   Temp 97.9  F (36.6  C) (Oral)   Resp 16   SpO2 100%      Nena Thompson, EDWARD

## 2024-01-26 NOTE — TELEPHONE ENCOUNTER
Marilyn, patient, paged on call coordinator, that nitazoxanide was denied. She leaves for [place] on Monday morning and needs a back up plan.   Goodrx cost is [phone].   Paged dr Vinson.     Per Dr Corona patient to decrease   mg BID x 5 days then increase back to 360 mg BID.  Discussed that [place] that she received IV fluids today and has diarrhea ongoing.    Patient may need a letter for airlines if she cancels.

## 2024-01-26 NOTE — TELEPHONE ENCOUNTER
PA Initiation    Medication: NITAZOXANIDE PO  Insurance Company: [employer] - Phone [phone] Fax [phone]  Pharmacy Filling the Rx:    Filling Pharmacy Phone:    Filling Pharmacy Fax:    Start Date: 1/26/2024

## 2024-01-29 ENCOUNTER — TELEPHONE (OUTPATIENT)
Dept: TRANSPLANT | Facility: CLINIC | Age: 39
End: 2024-01-29
Payer: COMMERCIAL

## 2024-01-29 DIAGNOSIS — E83.51 HYPOCALCEMIA: Primary | ICD-10-CM

## 2024-01-29 LAB — IGG SERPL-MCNC: 887 MG/DL (ref 610–1616)

## 2024-01-29 NOTE — TELEPHONE ENCOUNTER
PRIOR AUTHORIZATION DENIED    Medication: NITAZOXANIDE PO  Insurance Company: [employer] - Phone [phone] Fax [phone]  Denial Date:  1/26/2024  Denial Reason(s):       Appeal Information:             Patient Notified: yes

## 2024-02-05 ENCOUNTER — ALLIED HEALTH/NURSE VISIT (OUTPATIENT)
Dept: ALLERGY | Facility: CLINIC | Age: 39
End: 2024-02-05
Payer: COMMERCIAL

## 2024-02-05 DIAGNOSIS — J30.9 ALLERGIC RHINITIS: ICD-10-CM

## 2024-02-05 DIAGNOSIS — J30.89 ALLERGIC RHINITIS DUE TO DUST MITE: Primary | ICD-10-CM

## 2024-02-05 PROCEDURE — 95120 IMMUNOTHERAPY ONE INJECTION: CPT

## 2024-02-05 NOTE — PROGRESS NOTES
Marilyn Ortega presents to clinic today at the request of George Munson MD (ordering provider) for Allergy Immunotherapy injection(s).       This service provided today was under the care of Jolene Springer MD; the supervising provider of the day; who was available if needed.      Patient presented after waiting 30 minutes with no reaction to injections. Discharged from clinic.    Tari Escamilla, RN, BSN

## 2024-02-06 ENCOUNTER — LAB (OUTPATIENT)
Dept: LAB | Facility: CLINIC | Age: 39
End: 2024-02-06
Payer: COMMERCIAL

## 2024-02-06 DIAGNOSIS — Z94.83 PANCREAS REPLACED BY TRANSPLANT (H): ICD-10-CM

## 2024-02-06 DIAGNOSIS — Z20.828 CONTACT WITH AND (SUSPECTED) EXPOSURE TO OTHER VIRAL COMMUNICABLE DISEASES: ICD-10-CM

## 2024-02-06 DIAGNOSIS — Z98.890 OTHER SPECIFIED POSTPROCEDURAL STATES: Primary | ICD-10-CM

## 2024-02-06 DIAGNOSIS — Z94.0 KIDNEY REPLACED BY TRANSPLANT: ICD-10-CM

## 2024-02-06 DIAGNOSIS — Z48.298 AFTERCARE FOLLOWING ORGAN TRANSPLANT: ICD-10-CM

## 2024-02-06 DIAGNOSIS — R19.7 DIARRHEA: ICD-10-CM

## 2024-02-06 DIAGNOSIS — Z48.298 AFTERCARE FOLLOWING ORGAN TRANSPLANT: Primary | ICD-10-CM

## 2024-02-06 PROBLEM — J02.0 STREP THROAT: Status: ACTIVE | Noted: 2023-10-08

## 2024-02-06 LAB
AMYLASE SERPL-CCNC: 64 U/L (ref 28–100)
ANION GAP SERPL CALCULATED.3IONS-SCNC: 7 MMOL/L (ref 7–15)
BK VIRUS SPECIMEN TYPE: NORMAL
BKV DNA # SPEC NAA+PROBE: NOT DETECTED IU/ML
BUN SERPL-MCNC: 18.5 MG/DL (ref 6–20)
CALCIUM SERPL-MCNC: 9.5 MG/DL (ref 8.6–10)
CHLORIDE SERPL-SCNC: 106 MMOL/L (ref 98–107)
CREAT SERPL-MCNC: 1.04 MG/DL (ref 0.51–0.95)
DEPRECATED HCO3 PLAS-SCNC: 26 MMOL/L (ref 22–29)
EGFRCR SERPLBLD CKD-EPI 2021: 70 ML/MIN/1.73M2
ERYTHROCYTE [DISTWIDTH] IN BLOOD BY AUTOMATED COUNT: 13.4 % (ref 10–15)
FERRITIN SERPL-MCNC: 20 NG/ML (ref 6–175)
GLUCOSE SERPL-MCNC: 108 MG/DL (ref 70–99)
HCT VFR BLD AUTO: 34 % (ref 35–47)
HGB BLD-MCNC: 11 G/DL (ref 11.7–15.7)
IRON BINDING CAPACITY (ROCHE): 321 UG/DL (ref 240–430)
IRON SATN MFR SERPL: 21 % (ref 15–46)
IRON SERPL-MCNC: 68 UG/DL (ref 37–145)
LIPASE SERPL-CCNC: 34 U/L (ref 13–60)
MAGNESIUM SERPL-MCNC: 1.8 MG/DL (ref 1.7–2.3)
MCH RBC QN AUTO: 28.1 PG (ref 26.5–33)
MCHC RBC AUTO-ENTMCNC: 32.4 G/DL (ref 31.5–36.5)
MCV RBC AUTO: 87 FL (ref 78–100)
MYCOPHENOLATE SERPL LC/MS/MS-MCNC: 2.34 MG/L (ref 1–3.5)
MYCOPHENOLATE-G SERPL LC/MS/MS-MCNC: 55.4 MG/L (ref 30–95)
PHOSPHATE SERPL-MCNC: 2.4 MG/DL (ref 2.5–4.5)
PLATELET # BLD AUTO: 308 10E3/UL (ref 150–450)
POTASSIUM SERPL-SCNC: 4.9 MMOL/L (ref 3.4–5.3)
RBC # BLD AUTO: 3.91 10E6/UL (ref 3.8–5.2)
SODIUM SERPL-SCNC: 139 MMOL/L (ref 135–145)
TACROLIMUS BLD-MCNC: 5 UG/L (ref 5–15)
TME LAST DOSE: NORMAL H
WBC # BLD AUTO: 7.6 10E3/UL (ref 4–11)

## 2024-02-06 PROCEDURE — 84100 ASSAY OF PHOSPHORUS: CPT

## 2024-02-06 PROCEDURE — 83690 ASSAY OF LIPASE: CPT

## 2024-02-06 PROCEDURE — 82150 ASSAY OF AMYLASE: CPT

## 2024-02-06 PROCEDURE — 80197 ASSAY OF TACROLIMUS: CPT

## 2024-02-06 PROCEDURE — 82728 ASSAY OF FERRITIN: CPT

## 2024-02-06 PROCEDURE — 85027 COMPLETE CBC AUTOMATED: CPT

## 2024-02-06 PROCEDURE — 83550 IRON BINDING TEST: CPT

## 2024-02-06 PROCEDURE — 80180 DRUG SCRN QUAN MYCOPHENOLATE: CPT

## 2024-02-06 PROCEDURE — 36415 COLL VENOUS BLD VENIPUNCTURE: CPT

## 2024-02-06 PROCEDURE — 83735 ASSAY OF MAGNESIUM: CPT

## 2024-02-06 PROCEDURE — 87799 DETECT AGENT NOS DNA QUANT: CPT

## 2024-02-06 PROCEDURE — 80048 BASIC METABOLIC PNL TOTAL CA: CPT

## 2024-02-12 ENCOUNTER — LAB (OUTPATIENT)
Dept: LAB | Facility: CLINIC | Age: 39
End: 2024-02-12
Payer: COMMERCIAL

## 2024-02-12 DIAGNOSIS — R19.7 DIARRHEA: ICD-10-CM

## 2024-02-12 LAB — C DIFF TOX B STL QL: NEGATIVE

## 2024-02-12 PROCEDURE — 87507 IADNA-DNA/RNA PROBE TQ 12-25: CPT

## 2024-02-12 PROCEDURE — 87493 C DIFF AMPLIFIED PROBE: CPT

## 2024-02-13 LAB
ADV 40+41 DNA STL QL NAA+NON-PROBE: NEGATIVE
ASTRO TYP 1-8 RNA STL QL NAA+NON-PROBE: POSITIVE
C CAYETANENSIS DNA STL QL NAA+NON-PROBE: NEGATIVE
CAMPYLOBACTER DNA SPEC NAA+PROBE: NEGATIVE
CRYPTOSP DNA STL QL NAA+NON-PROBE: NEGATIVE
E COLI O157 DNA STL QL NAA+NON-PROBE: ABNORMAL
E HISTOLYT DNA STL QL NAA+NON-PROBE: NEGATIVE
EAEC ASTA GENE ISLT QL NAA+PROBE: NEGATIVE
EC STX1+STX2 GENES STL QL NAA+NON-PROBE: NEGATIVE
EPEC EAE GENE STL QL NAA+NON-PROBE: NEGATIVE
ETEC LTA+ST1A+ST1B TOX ST NAA+NON-PROBE: NEGATIVE
G LAMBLIA DNA STL QL NAA+NON-PROBE: NEGATIVE
NOROVIRUS GI+II RNA STL QL NAA+NON-PROBE: NEGATIVE
P SHIGELLOIDES DNA STL QL NAA+NON-PROBE: NEGATIVE
RVA RNA STL QL NAA+NON-PROBE: NEGATIVE
SALMONELLA SP RPOD STL QL NAA+PROBE: NEGATIVE
SAPO I+II+IV+V RNA STL QL NAA+NON-PROBE: NEGATIVE
SHIGELLA SP+EIEC IPAH ST NAA+NON-PROBE: NEGATIVE
V CHOLERAE DNA SPEC QL NAA+PROBE: NEGATIVE
VIBRIO DNA SPEC NAA+PROBE: NEGATIVE
Y ENTEROCOL DNA STL QL NAA+PROBE: NEGATIVE

## 2024-02-19 DIAGNOSIS — R19.7 DIARRHEA: ICD-10-CM

## 2024-02-19 DIAGNOSIS — A08.32 ASTROVIRUS ENTERITIS: ICD-10-CM

## 2024-02-19 RX ORDER — NITAZOXANIDE 500 MG/1
500 TABLET ORAL 2 TIMES DAILY WITH MEALS
Qty: 14 TABLET | Refills: 0 | Status: SHIPPED | OUTPATIENT
Start: 2024-02-19

## 2024-03-04 ENCOUNTER — ALLIED HEALTH/NURSE VISIT (OUTPATIENT)
Dept: ALLERGY | Facility: CLINIC | Age: 39
End: 2024-03-04
Payer: COMMERCIAL

## 2024-03-04 DIAGNOSIS — J30.9 ALLERGIC RHINITIS: ICD-10-CM

## 2024-03-04 DIAGNOSIS — J30.89 ALLERGIC RHINITIS DUE TO DUST MITE: Primary | ICD-10-CM

## 2024-03-04 PROCEDURE — 95120 IMMUNOTHERAPY ONE INJECTION: CPT

## 2024-03-08 ENCOUNTER — LAB (OUTPATIENT)
Dept: LAB | Facility: CLINIC | Age: 39
End: 2024-03-08
Payer: COMMERCIAL

## 2024-03-08 DIAGNOSIS — Z94.83 PANCREAS REPLACED BY TRANSPLANT (H): ICD-10-CM

## 2024-03-08 DIAGNOSIS — Z94.0 KIDNEY REPLACED BY TRANSPLANT: Primary | ICD-10-CM

## 2024-03-08 DIAGNOSIS — Z94.0 KIDNEY REPLACED BY TRANSPLANT: ICD-10-CM

## 2024-03-08 LAB
AMYLASE SERPL-CCNC: 61 U/L (ref 28–100)
ANION GAP SERPL CALCULATED.3IONS-SCNC: 11 MMOL/L (ref 7–15)
BK VIRUS SPECIMEN TYPE: NORMAL
BKV DNA # SPEC NAA+PROBE: NOT DETECTED IU/ML
BUN SERPL-MCNC: 24.7 MG/DL (ref 6–20)
CALCIUM SERPL-MCNC: 9.5 MG/DL (ref 8.6–10)
CHLORIDE SERPL-SCNC: 107 MMOL/L (ref 98–107)
CREAT SERPL-MCNC: 0.94 MG/DL (ref 0.51–0.95)
DEPRECATED HCO3 PLAS-SCNC: 21 MMOL/L (ref 22–29)
EGFRCR SERPLBLD CKD-EPI 2021: 79 ML/MIN/1.73M2
ERYTHROCYTE [DISTWIDTH] IN BLOOD BY AUTOMATED COUNT: 13.9 % (ref 10–15)
GLUCOSE SERPL-MCNC: 106 MG/DL (ref 70–99)
HCT VFR BLD AUTO: 30.9 % (ref 35–47)
HGB BLD-MCNC: 9.9 G/DL (ref 11.7–15.7)
LIPASE SERPL-CCNC: 37 U/L (ref 13–60)
MAGNESIUM SERPL-MCNC: 1.7 MG/DL (ref 1.7–2.3)
MCH RBC QN AUTO: 27.9 PG (ref 26.5–33)
MCHC RBC AUTO-ENTMCNC: 32 G/DL (ref 31.5–36.5)
MCV RBC AUTO: 87 FL (ref 78–100)
PHOSPHATE SERPL-MCNC: 2.8 MG/DL (ref 2.5–4.5)
PLATELET # BLD AUTO: 260 10E3/UL (ref 150–450)
POTASSIUM SERPL-SCNC: 4.8 MMOL/L (ref 3.4–5.3)
RBC # BLD AUTO: 3.55 10E6/UL (ref 3.8–5.2)
SODIUM SERPL-SCNC: 139 MMOL/L (ref 135–145)
TACROLIMUS BLD-MCNC: 4.8 UG/L (ref 5–15)
TME LAST DOSE: ABNORMAL H
TME LAST DOSE: ABNORMAL H
WBC # BLD AUTO: 6.1 10E3/UL (ref 4–11)

## 2024-03-08 PROCEDURE — 80048 BASIC METABOLIC PNL TOTAL CA: CPT

## 2024-03-08 PROCEDURE — 80197 ASSAY OF TACROLIMUS: CPT

## 2024-03-08 PROCEDURE — 36415 COLL VENOUS BLD VENIPUNCTURE: CPT

## 2024-03-08 PROCEDURE — 85018 HEMOGLOBIN: CPT

## 2024-03-08 PROCEDURE — 83690 ASSAY OF LIPASE: CPT

## 2024-03-08 PROCEDURE — 82150 ASSAY OF AMYLASE: CPT

## 2024-03-08 PROCEDURE — 84100 ASSAY OF PHOSPHORUS: CPT

## 2024-03-08 PROCEDURE — 87799 DETECT AGENT NOS DNA QUANT: CPT

## 2024-03-08 PROCEDURE — 83735 ASSAY OF MAGNESIUM: CPT

## 2024-03-08 NOTE — PROGRESS NOTES
Pt calling as she noticed her tacrolimus level was not ran today with labs. Leaving town this weekend and worried about her tacrolimus level. Chart reviewed and confirmed it was not drawn. Tacrolimus added to AM blood draw and pt aware results will come through likely tomorrow.

## 2024-03-18 ENCOUNTER — LAB (OUTPATIENT)
Dept: LAB | Facility: CLINIC | Age: 39
End: 2024-03-18
Payer: COMMERCIAL

## 2024-03-18 DIAGNOSIS — Z20.828 CONTACT WITH AND (SUSPECTED) EXPOSURE TO OTHER VIRAL COMMUNICABLE DISEASES: ICD-10-CM

## 2024-03-18 DIAGNOSIS — Z94.83 PANCREAS REPLACED BY TRANSPLANT (H): ICD-10-CM

## 2024-03-18 DIAGNOSIS — Z94.0 KIDNEY REPLACED BY TRANSPLANT: ICD-10-CM

## 2024-03-18 DIAGNOSIS — Z98.890 OTHER SPECIFIED POSTPROCEDURAL STATES: ICD-10-CM

## 2024-03-18 LAB
ERYTHROCYTE [DISTWIDTH] IN BLOOD BY AUTOMATED COUNT: 14.2 % (ref 10–15)
HCT VFR BLD AUTO: 32.8 % (ref 35–47)
HGB BLD-MCNC: 10.6 G/DL (ref 11.7–15.7)
MCH RBC QN AUTO: 28.5 PG (ref 26.5–33)
MCHC RBC AUTO-ENTMCNC: 32.3 G/DL (ref 31.5–36.5)
MCV RBC AUTO: 88 FL (ref 78–100)
PLATELET # BLD AUTO: 288 10E3/UL (ref 150–450)
RBC # BLD AUTO: 3.72 10E6/UL (ref 3.8–5.2)
WBC # BLD AUTO: 5.7 10E3/UL (ref 4–11)

## 2024-03-18 PROCEDURE — 85027 COMPLETE CBC AUTOMATED: CPT

## 2024-03-18 PROCEDURE — 36415 COLL VENOUS BLD VENIPUNCTURE: CPT

## 2024-04-01 ENCOUNTER — ALLIED HEALTH/NURSE VISIT (OUTPATIENT)
Dept: ALLERGY | Facility: CLINIC | Age: 39
End: 2024-04-01
Payer: COMMERCIAL

## 2024-04-01 ENCOUNTER — OFFICE VISIT (OUTPATIENT)
Dept: ALLERGY | Facility: CLINIC | Age: 39
End: 2024-04-01
Payer: COMMERCIAL

## 2024-04-01 VITALS — DIASTOLIC BLOOD PRESSURE: 91 MMHG | HEART RATE: 89 BPM | SYSTOLIC BLOOD PRESSURE: 137 MMHG | OXYGEN SATURATION: 100 %

## 2024-04-01 DIAGNOSIS — J30.9 ALLERGIC RHINITIS: ICD-10-CM

## 2024-04-01 DIAGNOSIS — J30.89 ALLERGIC RHINITIS DUE TO DUST MITE: Primary | ICD-10-CM

## 2024-04-01 DIAGNOSIS — R06.7 SNEEZING: ICD-10-CM

## 2024-04-01 PROCEDURE — 95120 IMMUNOTHERAPY ONE INJECTION: CPT

## 2024-04-01 PROCEDURE — 99213 OFFICE O/P EST LOW 20 MIN: CPT | Mod: 25 | Performed by: INTERNAL MEDICINE

## 2024-04-01 NOTE — LETTER
4/1/2024         RE: Marilyn Ortega  [address]  [facility] [address]        Dear Colleague,    Thank you for referring your patient, Marilyn Ortega, to the [facility]. Please see a copy of my visit note below.    Marilyn Ortega was seen in the Allergy Clinic at [facility].    Marilyn Ortega is a 38 year old female being seen today for ongoing evaluation of dust mite allergy who is receiving allergy Immunotherapy.    Since the last visit the patient has been feeling good. Feels like she may have additional environmental allergies.  She has symptoms when her windows are open at night and when she is in moldy environments.    She does not feel that allergy shots have provided her much benefit.  Skin testing was negative and on blood testing 1 dust mite was positive at 0.24.  We considered starting Odactra which is not covered by insurance so she has been on allergy shots for approximately 1 year.  She does take Zyrtec daily.    She moved out of her apartment that had mold and feels much better.  She no longer has significant fatigue and facial swelling in the morning.    Past Medical History:   Diagnosis Date     Anemia 12/09/2021     C. difficile diarrhea 2013     CKD (chronic kidney disease) stage 4, GFR 15-29 ml/min (H)      Complication of transplanted kidney 05/23/2022     Gastroparesis      Gluten intolerance      Heterozygous factor V Leiden mutation (H24)      HTN (hypertension)      Infection due to 2019 novel coronavirus 11/2020     Osteomyelitis (H) 2013     Shingles 2013     Strep throat 10/08/2023     Type 1 diabetes mellitus (H) 2000     Family History   Problem Relation Age of Onset     Diabetes Paternal Grandfather      Macular Degeneration Mother      Arthritis Maternal Grandmother      Hypertension Maternal Grandmother      Cancer - colorectal Maternal Grandfather      Glaucoma No family hx of      Past Surgical History:    Procedure Laterality Date     BENCH KIDNEY N/A 11/15/2021    Procedure: Bench kidney;  Surgeon: Branden Aranda MD;  Location:  OR     BENCH PANCREAS N/A 11/15/2021    Procedure: Bench pancreas;  Surgeon: Branden Aranda MD;  Location: [facility] OR     CATARACT EXTRACTION  2021     CV CORONARY ANGIOGRAM N/A 2022    Procedure: Coronary Angiogram;  Surgeon: Bebeto Ventura MD;  Location:  HEART CARDIAC CATH LAB     CV RIGHT HEART CATH MEASUREMENTS RECORDED N/A 2022    Procedure: Right Heart Catheterization;  Surgeon: Bebeto Ventura MD;  Location:  HEART CARDIAC CATH LAB     CV RIGHT HEART CATH MEASUREMENTS RECORDED N/A 2022    Procedure: Right Heart Catheterization [[id number]];  Surgeon: Trevor Jasmine MD;  Location:  HEART CARDIAC CATH LAB     IR RENAL BIOPSY LEFT  2022     IR RENAL BIOPSY LEFT  2022     ORIF HIP FRACTURE Left      ORTHOPEDIC SURGERY Bilateral     Repair of labral tear     TRANSPLANT PANCREAS, KIDNEY  DONOR, COMBINED Left 11/15/2021    Procedure: TRANSPLANT, KIDNEY AND PANCREAS,  DONOR;  Surgeon: Branden Aranda MD;  Location: [facility] OR         Current Outpatient Medications:      aflibercept (EYLEA) 2 MG/0.05ML SOSY injection prefilled syringe, 2 mg by Intravitreal route, Disp: , Rfl:      aspirin (ASA) 81 MG chewable tablet, Take 81 mg by mouth daily, Disp: , Rfl:      cetirizine (ZYRTEC) 10 MG tablet, Take 1 tablet (10 mg) by mouth every other day, Disp: , Rfl:      docusate sodium (COLACE) 100 MG capsule, Take 200 mg by mouth daily before breakfast, Disp: , Rfl:      EPINEPHrine (ANY BX GENERIC EQUIV) 0.3 MG/0.3ML injection 2-pack, Inject 0.3 mLs (0.3 mg) into the muscle as needed for anaphylaxis, Disp: 2 each, Rfl: 2     furosemide (LASIX) 20 MG tablet, Take 1 tablet (20 mg) by mouth daily, Disp: 45 tablet, Rfl: 0     mycophenolic acid (GENERIC EQUIVALENT) 360 MG EC  tablet, Take 1 tablet (360 mg) by mouth 2 times daily, Disp: 60 tablet, Rfl: 11     ORDER FOR ALLERGEN IMMUNOTHERAPY, Name of Mix: Mix #1  Dust Mite Dust Mites DF. 10,000 AU/mL, HS  1.0 ml Dust Mites DP. 10,000 AU/mL, HS  1.0 ml  Diluent: HSA 3.0 ml to 5ml, Disp: 5 mL, Rfl: PRN     prucalopride succinate (MOTEGRITY) 2 MG tablet, Take 1 tablet (2 mg) by mouth daily, Disp: 90 tablet, Rfl: 3     tacrolimus (ENVARSUS XR) 0.75 MG 24 hr tablet, Take 2 tablets (1.5 mg) by mouth every morning (before breakfast) Total dose: 5.5 mg daily, Disp: 180 tablet, Rfl: 3     tacrolimus (ENVARSUS XR) 1 MG 24 hr tablet, Hold for dose change Profile Rx: patient will contact pharmacy when needed, Disp: 90 tablet, Rfl: 3     tacrolimus (ENVARSUS XR) 4 MG 24 hr tablet, Take 1 tablet (4 mg) by mouth every morning (before breakfast) Total dose: 5.5 mg daily, Disp: 90 tablet, Rfl: 3     calcium carbonate-vitamin D (OSCAL) 500-5 MG-MCG tablet, Take 1 tablet by mouth daily (Patient not taking: Reported on 4/1/2024), Disp: 90 tablet, Rfl: 3     Dust Mite Mixed Allergen Ext (ODACTRA) 12 SQ-HDM SUBL, Place 1 Dose under the tongue daily (Patient not taking: Reported on 4/1/2024), Disp: 30 tablet, Rfl: 11     nitazoxanide (ALINIA) 500 MG tablet, Take 1 tablet (500 mg) by mouth 2 times daily (with meals) (Patient not taking: Reported on 4/1/2024), Disp: 14 tablet, Rfl: 0  Allergies   Allergen Reactions     Chlorhexidine Hives, Itching and Rash     PN: Patient had swelling/rash that was very itchy with chlorprep.     Ceclor [Cefaclor Monohydrate]      Metoclopramide Other (See Comments)     Noted mouth/facial twitching with reglan in 2022 when it was tried for gastroparesis.     Cefaclor Rash         EXAM:   BP (!) 137/91   Pulse 89   SpO2 100%     Constitutional:       General: She is not in acute distress.     Appearance: Normal appearance. She is not ill-appearing.   HENT:      Head: Normocephalic and atraumatic.     Psychiatric:         Mood and  Affect: Mood normal.         Behavior: Behavior normal.        ASSESSMENT/PLAN:  Marilyn Ortega is a 38 year old female seen today for allergic rhinitis with a dust mite allergy who is receiving allergy immunotherapy.  Not getting significant benefit with allergy shots.  After a thorough discussion will stop allergy shots at this time.    On days when allergy symptoms are increased, may use 10 mg of Zyrtec twice, Flonase, and Pataday eyedrops    Follow-up as needed      Thank you for allowing me to participate in the care of Marilyn Ortega.      I spent 20 minutes on the date of the encounter doing chart review, history and exam, documentation and further coordination as noted above exclusive of separately reported interpretations    George Munson MD  Allergy/Immunology  [facility]       Again, thank you for allowing me to participate in the care of your patient.        Sincerely,        George Munson MD

## 2024-04-01 NOTE — PROGRESS NOTES
Marilyn Ortega was seen in the Allergy Clinic at [facility].    Marilyn Ortega is a 38 year old female being seen today for ongoing evaluation of dust mite allergy who is receiving allergy Immunotherapy.    Since the last visit the patient has been feeling good. Feels like she may have additional environmental allergies.  She has symptoms when her windows are open at night and when she is in moldy environments.    She does not feel that allergy shots have provided her much benefit.  Skin testing was negative and on blood testing 1 dust mite was positive at 0.24.  We considered starting Odactra which is not covered by insurance so she has been on allergy shots for approximately 1 year.  She does take Zyrtec daily.    She moved out of her apartment that had mold and feels much better.  She no longer has significant fatigue and facial swelling in the morning.    Past Medical History:   Diagnosis Date    Anemia 12/09/2021    C. 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    Gastroparesis     Gluten intolerance     Heterozygous factor V Leiden mutation (H24)     HTN (hypertension)     Infection due to 2019 novel coronavirus 11/2020    Osteomyelitis (H) 2013    Shingles 2013    Strep throat 10/08/2023    Type 1 diabetes mellitus (H) 2000     Family History   Problem Relation Age of Onset    Diabetes Paternal Grandfather     Macular Degeneration Mother     Arthritis Maternal Grandmother     Hypertension Maternal Grandmother     Cancer - colorectal Maternal Grandfather     Glaucoma No family hx of      Past Surgical History:   Procedure Laterality Date    BENCH KIDNEY N/A 11/15/2021    Procedure: Bench kidney;  Surgeon: Branden Aranda MD;  Location:  OR    BENCH PANCREAS N/A 11/15/2021    Procedure: Bench pancreas;  Surgeon: Branden Aranda MD;  Location:  OR     CATARACT EXTRACTION  2021    CV CORONARY ANGIOGRAM N/A 2022    Procedure: Coronary Angiogram;  Surgeon: Bebeto Ventura MD;  Location: [facility]    CV RIGHT HEART CATH MEASUREMENTS RECORDED N/A 2022    Procedure: Right Heart Catheterization;  Surgeon: Bebeto Ventura MD;  Location: [facility]    CV RIGHT HEART CATH MEASUREMENTS RECORDED N/A 2022    Procedure: Right Heart Catheterization [[id number]];  Surgeon: Trevor Jasmine MD;  [facility]    IR RENAL BIOPSY LEFT  2022    IR RENAL BIOPSY LEFT  2022    ORIF HIP FRACTURE Left     ORTHOPEDIC SURGERY Bilateral     Repair of labral tear    TRANSPLANT PANCREAS, KIDNEY  DONOR, COMBINED Left 11/15/2021    Procedure: TRANSPLANT, KIDNEY AND PANCREAS,  DONOR;  Surgeon: Branden Aranda MD;  Location: [facility]         Current Outpatient Medications:     aflibercept (EYLEA) 2 MG/0.05ML SOSY injection prefilled syringe, 2 mg by Intravitreal route, Disp: , Rfl:     aspirin (ASA) 81 MG chewable tablet, Take 81 mg by mouth daily, Disp: , Rfl:     cetirizine (ZYRTEC) 10 MG tablet, Take 1 tablet (10 mg) by mouth every other day, Disp: , Rfl:     docusate sodium (COLACE) 100 MG capsule, Take 200 mg by mouth daily before breakfast, Disp: , Rfl:     EPINEPHrine (ANY BX GENERIC EQUIV) 0.3 MG/0.3ML injection 2-pack, Inject 0.3 mLs (0.3 mg) into the muscle as needed for anaphylaxis, Disp: 2 each, Rfl: 2    furosemide (LASIX) 20 MG tablet, Take 1 tablet (20 mg) by mouth daily, Disp: 45 tablet, Rfl: 0    mycophenolic acid (GENERIC EQUIVALENT) 360 MG EC tablet, Take 1 tablet (360 mg) by mouth 2 times daily, Disp: 60 tablet, Rfl: 11    ORDER FOR ALLERGEN IMMUNOTHERAPY, Name of Mix: Mix #1  Dust Mite Dust Mites DF. 10,000 AU/mL, HS  1.0 ml Dust Mites DP. 10,000 AU/mL, HS  1.0 ml  Diluent: HSA 3.0 ml to 5ml, Disp: 5 mL, Rfl: PRN    prucalopride succinate  (MOTEGRITY) 2 MG tablet, Take 1 tablet (2 mg) by mouth daily, Disp: 90 tablet, Rfl: 3    tacrolimus (ENVARSUS XR) 0.75 MG 24 hr tablet, Take 2 tablets (1.5 mg) by mouth every morning (before breakfast) Total dose: 5.5 mg daily, Disp: 180 tablet, Rfl: 3    tacrolimus (ENVARSUS XR) 1 MG 24 hr tablet, Hold for dose change Profile Rx: patient will contact pharmacy when needed, Disp: 90 tablet, Rfl: 3    tacrolimus (ENVARSUS XR) 4 MG 24 hr tablet, Take 1 tablet (4 mg) by mouth every morning (before breakfast) Total dose: 5.5 mg daily, Disp: 90 tablet, Rfl: 3    calcium carbonate-vitamin D (OSCAL) 500-5 MG-MCG tablet, Take 1 tablet by mouth daily (Patient not taking: Reported on 4/1/2024), Disp: 90 tablet, Rfl: 3    Dust Mite Mixed Allergen Ext (ODACTRA) 12 SQ-HDM SUBL, Place 1 Dose under the tongue daily (Patient not taking: Reported on 4/1/2024), Disp: 30 tablet, Rfl: 11    nitazoxanide (ALINIA) 500 MG tablet, Take 1 tablet (500 mg) by mouth 2 times daily (with meals) (Patient not taking: Reported on 4/1/2024), Disp: 14 tablet, Rfl: 0  Allergies   Allergen Reactions    Chlorhexidine Hives, Itching and Rash     PN: Patient had swelling/rash that was very itchy with chlorprep.    Ceclor [Cefaclor Monohydrate]     Metoclopramide Other (See Comments)     Noted mouth/facial twitching with reglan in 2022 when it was tried for gastroparesis.    Cefaclor Rash         EXAM:   BP (!) 137/91   Pulse 89   SpO2 100%     Constitutional:       General: She is not in acute distress.     Appearance: Normal appearance. She is not ill-appearing.   HENT:      Head: Normocephalic and atraumatic.     Psychiatric:         Mood and Affect: Mood normal.         Behavior: Behavior normal.        ASSESSMENT/PLAN:  Marilyn Ortega is a 38 year old female seen today for allergic rhinitis with a dust mite allergy who is receiving allergy immunotherapy.  Not getting significant benefit with allergy shots.  After a thorough discussion will stop  allergy shots at this time.    On days when allergy symptoms are increased, may use 10 mg of Zyrtec twice, Flonase, and Pataday eyedrops    Follow-up as needed      Thank you for allowing me to participate in the care of Marilyn Ortega.      I spent 20 minutes on the date of the encounter doing chart review, history and exam, documentation and further coordination as noted above exclusive of separately reported interpretations    George Munson MD  Allergy/Immunology  [facility]

## 2024-04-01 NOTE — PATIENT INSTRUCTIONS
On days where allergy symptoms are increased, may use 10 mg of Zyrtec twice, Flonase, and Pataday eyedrops

## 2024-04-14 ENCOUNTER — HEALTH MAINTENANCE LETTER (OUTPATIENT)
Age: 39
End: 2024-04-14

## 2024-04-23 ENCOUNTER — LAB (OUTPATIENT)
Dept: LAB | Facility: CLINIC | Age: 39
End: 2024-04-23
Payer: COMMERCIAL

## 2024-04-23 DIAGNOSIS — Z94.83 PANCREAS REPLACED BY TRANSPLANT (H): ICD-10-CM

## 2024-04-23 DIAGNOSIS — Z48.298 AFTERCARE FOLLOWING ORGAN TRANSPLANT: ICD-10-CM

## 2024-04-23 DIAGNOSIS — Z86.39 HX OF DIABETES MELLITUS: ICD-10-CM

## 2024-04-23 DIAGNOSIS — Z98.890 OTHER SPECIFIED POSTPROCEDURAL STATES: ICD-10-CM

## 2024-04-23 DIAGNOSIS — Z94.0 KIDNEY REPLACED BY TRANSPLANT: ICD-10-CM

## 2024-04-23 DIAGNOSIS — Z20.828 CONTACT WITH AND (SUSPECTED) EXPOSURE TO OTHER VIRAL COMMUNICABLE DISEASES: ICD-10-CM

## 2024-04-23 LAB
AMYLASE SERPL-CCNC: 80 U/L (ref 28–100)
ANION GAP SERPL CALCULATED.3IONS-SCNC: 15 MMOL/L (ref 7–15)
BK VIRUS SPECIMEN TYPE: NORMAL
BKV DNA # SPEC NAA+PROBE: NOT DETECTED IU/ML
BUN SERPL-MCNC: 25.6 MG/DL (ref 6–20)
CALCIUM SERPL-MCNC: 9.6 MG/DL (ref 8.6–10)
CHLORIDE SERPL-SCNC: 103 MMOL/L (ref 98–107)
CREAT SERPL-MCNC: 1.01 MG/DL (ref 0.51–0.95)
DEPRECATED HCO3 PLAS-SCNC: 21 MMOL/L (ref 22–29)
EGFRCR SERPLBLD CKD-EPI 2021: 73 ML/MIN/1.73M2
ERYTHROCYTE [DISTWIDTH] IN BLOOD BY AUTOMATED COUNT: 13.5 % (ref 10–15)
GLUCOSE SERPL-MCNC: 88 MG/DL (ref 70–99)
HBA1C MFR BLD: 5.4 %
HCT VFR BLD AUTO: 35 % (ref 35–47)
HGB BLD-MCNC: 11.1 G/DL (ref 11.7–15.7)
LIPASE SERPL-CCNC: 42 U/L (ref 13–60)
MCH RBC QN AUTO: 27.8 PG (ref 26.5–33)
MCHC RBC AUTO-ENTMCNC: 31.7 G/DL (ref 31.5–36.5)
MCV RBC AUTO: 88 FL (ref 78–100)
PLATELET # BLD AUTO: 281 10E3/UL (ref 150–450)
POTASSIUM SERPL-SCNC: 4 MMOL/L (ref 3.4–5.3)
RBC # BLD AUTO: 4 10E6/UL (ref 3.8–5.2)
SODIUM SERPL-SCNC: 139 MMOL/L (ref 135–145)
TACROLIMUS BLD-MCNC: 5.9 UG/L (ref 5–15)
TME LAST DOSE: NORMAL H
TME LAST DOSE: NORMAL H
WBC # BLD AUTO: 5.9 10E3/UL (ref 4–11)

## 2024-04-23 PROCEDURE — 82150 ASSAY OF AMYLASE: CPT

## 2024-04-23 PROCEDURE — 80048 BASIC METABOLIC PNL TOTAL CA: CPT

## 2024-04-23 PROCEDURE — 36415 COLL VENOUS BLD VENIPUNCTURE: CPT

## 2024-04-23 PROCEDURE — 85027 COMPLETE CBC AUTOMATED: CPT

## 2024-04-23 PROCEDURE — 83690 ASSAY OF LIPASE: CPT

## 2024-04-30 ENCOUNTER — LAB (OUTPATIENT)
Dept: LAB | Facility: CLINIC | Age: 39
End: 2024-04-30
Payer: COMMERCIAL

## 2024-04-30 DIAGNOSIS — Z94.0 KIDNEY REPLACED BY TRANSPLANT: ICD-10-CM

## 2024-04-30 DIAGNOSIS — Z98.890 OTHER SPECIFIED POSTPROCEDURAL STATES: ICD-10-CM

## 2024-04-30 DIAGNOSIS — Z94.83 PANCREAS REPLACED BY TRANSPLANT (H): ICD-10-CM

## 2024-04-30 DIAGNOSIS — Z20.828 CONTACT WITH AND (SUSPECTED) EXPOSURE TO OTHER VIRAL COMMUNICABLE DISEASES: ICD-10-CM

## 2024-04-30 DIAGNOSIS — Z48.298 AFTERCARE FOLLOWING ORGAN TRANSPLANT: ICD-10-CM

## 2024-04-30 LAB
CD3 CELLS # BLD: 849 CELLS/UL (ref 603–2990)
CD3 CELLS NFR BLD: 76 % (ref 49–84)
CD3+CD4+ CELLS # BLD: 510 CELLS/UL (ref 441–2156)
CD3+CD4+ CELLS NFR BLD: 46 % (ref 28–63)
CD3+CD4+ CELLS/CD3+CD8+ CLL BLD: 1.62 % (ref 1.4–2.6)
CD3+CD8+ CELLS # BLD: 315 CELLS/UL (ref 125–1312)
CD3+CD8+ CELLS NFR BLD: 28 % (ref 10–40)
CMV DNA SPEC NAA+PROBE-ACNC: NOT DETECTED IU/ML
T CELL COMMENT: NORMAL

## 2024-04-30 PROCEDURE — 36415 COLL VENOUS BLD VENIPUNCTURE: CPT

## 2024-05-01 LAB — IGG SERPL-MCNC: 1129 MG/DL (ref 610–1616)

## 2024-05-07 LAB
DONOR IDENTIFICATION: NORMAL
DSA COMMENTS: NORMAL
DSA PRESENT: NO
DSA TEST METHOD: NORMAL
ORGAN: NORMAL
SA 1  COMMENTS: NORMAL
SA 1 CELL: NORMAL
SA 1 TEST METHOD: NORMAL
SA 2 CELL: NORMAL
SA 2 COMMENTS: NORMAL
SA 2 TEST METHOD: NORMAL
SA1 HI RISK ABY: NORMAL
SA1 MOD RISK ABY: NORMAL
SA2 HI RISK ABY: NORMAL
SA2 MOD RISK ABY: NORMAL
UNACCEPTABLE ANTIGENS: NORMAL
UNOS CPRA: 31

## 2024-05-20 ENCOUNTER — MYC MEDICAL ADVICE (OUTPATIENT)
Dept: OTHER | Age: 39
End: 2024-05-20

## 2024-05-20 DIAGNOSIS — Z86.39 HISTORY OF TYPE 1 DIABETES MELLITUS: Primary | ICD-10-CM

## 2024-05-24 ENCOUNTER — TELEPHONE (OUTPATIENT)
Dept: ENDOCRINOLOGY | Facility: CLINIC | Age: 39
End: 2024-05-24
Payer: COMMERCIAL

## 2024-05-24 NOTE — TELEPHONE ENCOUNTER
[facility] Call Center    Phone Message    May a detailed message be left on voicemail: yes     Reason for Call: Other: Patient provider is requested to be seen in 1-2 weeks, please review chart notes and call if appropriate to be seen sooner     Action Taken: Message routed to:  [facility] Endo    Travel Screening: Not Applicable

## 2024-05-28 ENCOUNTER — LAB (OUTPATIENT)
Dept: LAB | Facility: CLINIC | Age: 39
End: 2024-05-28
Payer: COMMERCIAL

## 2024-05-28 DIAGNOSIS — Z98.890 OTHER SPECIFIED POSTPROCEDURAL STATES: ICD-10-CM

## 2024-05-28 DIAGNOSIS — Z94.83 PANCREAS REPLACED BY TRANSPLANT (H): ICD-10-CM

## 2024-05-28 DIAGNOSIS — Z20.828 CONTACT WITH AND (SUSPECTED) EXPOSURE TO OTHER VIRAL COMMUNICABLE DISEASES: ICD-10-CM

## 2024-05-28 DIAGNOSIS — Z94.0 KIDNEY REPLACED BY TRANSPLANT: ICD-10-CM

## 2024-05-28 DIAGNOSIS — K59.09 CHRONIC CONSTIPATION: ICD-10-CM

## 2024-05-28 LAB
AMYLASE SERPL-CCNC: 73 U/L (ref 28–100)
ANION GAP SERPL CALCULATED.3IONS-SCNC: 13 MMOL/L (ref 7–15)
BK VIRUS SPECIMEN TYPE: NORMAL
BKV DNA # SPEC NAA+PROBE: NOT DETECTED IU/ML
BUN SERPL-MCNC: 27.3 MG/DL (ref 6–20)
CALCIUM SERPL-MCNC: 9.5 MG/DL (ref 8.6–10)
CHLORIDE SERPL-SCNC: 103 MMOL/L (ref 98–107)
CREAT SERPL-MCNC: 1.04 MG/DL (ref 0.51–0.95)
DEPRECATED HCO3 PLAS-SCNC: 23 MMOL/L (ref 22–29)
EGFRCR SERPLBLD CKD-EPI 2021: 70 ML/MIN/1.73M2
ERYTHROCYTE [DISTWIDTH] IN BLOOD BY AUTOMATED COUNT: 13.7 % (ref 10–15)
GLUCOSE SERPL-MCNC: 93 MG/DL (ref 70–99)
HCT VFR BLD AUTO: 34.6 % (ref 35–47)
HGB BLD-MCNC: 10.9 G/DL (ref 11.7–15.7)
LIPASE SERPL-CCNC: 41 U/L (ref 13–60)
MCH RBC QN AUTO: 27.5 PG (ref 26.5–33)
MCHC RBC AUTO-ENTMCNC: 31.5 G/DL (ref 31.5–36.5)
MCV RBC AUTO: 87 FL (ref 78–100)
PLATELET # BLD AUTO: 248 10E3/UL (ref 150–450)
POTASSIUM SERPL-SCNC: 4.4 MMOL/L (ref 3.4–5.3)
RBC # BLD AUTO: 3.97 10E6/UL (ref 3.8–5.2)
SODIUM SERPL-SCNC: 139 MMOL/L (ref 135–145)
TACROLIMUS BLD-MCNC: 5 UG/L (ref 5–15)
TME LAST DOSE: NORMAL H
TME LAST DOSE: NORMAL H
WBC # BLD AUTO: 6.8 10E3/UL (ref 4–11)

## 2024-05-28 PROCEDURE — 80048 BASIC METABOLIC PNL TOTAL CA: CPT

## 2024-05-28 PROCEDURE — 82150 ASSAY OF AMYLASE: CPT

## 2024-05-28 PROCEDURE — 36415 COLL VENOUS BLD VENIPUNCTURE: CPT

## 2024-05-28 PROCEDURE — 85041 AUTOMATED RBC COUNT: CPT

## 2024-05-28 PROCEDURE — 83690 ASSAY OF LIPASE: CPT

## 2024-05-28 PROCEDURE — 87799 DETECT AGENT NOS DNA QUANT: CPT

## 2024-05-28 PROCEDURE — 80197 ASSAY OF TACROLIMUS: CPT

## 2024-05-28 NOTE — TELEPHONE ENCOUNTER
[facility] Call Center    Phone Message    May a detailed message be left on voicemail: yes     Reason for Call: Medication Refill Request    Has the patient contacted the pharmacy for the refill? Yes   Name of medication being requested: prucalopride succinate (MOTEGRITY) 2 MG tablet   Provider who prescribed the medication: Kelly Daniels   Pharmacy: [facility] in [place] [address]   Date medication is needed: ASAP       Action Taken: Message routed to:  [facility] GI    Travel Screening: Not Applicable

## 2024-05-29 RX ORDER — PRUCALOPRIDE 2 MG/1
2 TABLET, FILM COATED ORAL DAILY
Qty: 90 TABLET | Refills: 3 | Status: CANCELLED | OUTPATIENT
Start: 2024-05-29

## 2024-05-29 NOTE — TELEPHONE ENCOUNTER
Please review refill request:    prucalopride succinate (MOTEGRITY) 2 MG tablet   Last Written Prescription Date: 6/8/2023    Last Fill Quantity: 90,   # refills: 3    Last Office Visit: 10/4/2023 with Affeldt  Future Office visit: 12/10/2024 with Affeldt    Note per LOV:  - Continue Motegrity 2mg once daily as prescribed      Thank you,   Margareth PAVON  [phone]

## 2024-06-03 ENCOUNTER — TELEPHONE (OUTPATIENT)
Dept: INFUSION THERAPY | Facility: CLINIC | Age: 39
End: 2024-06-03
Payer: COMMERCIAL

## 2024-06-03 NOTE — TELEPHONE ENCOUNTER
[facility] Prior Authorization Team   Phone: [phone]  Fax: [phone]    PA Initiation    Medication: ENVARSUS XR 0.75 MG PO TB24  Insurance Company: [email or web address] ([facility]) - Phone [phone] Fax [phone]  Pharmacy Filling the Rx: [facility] - [place], [place] - [facility] [address]  Filling Pharmacy Phone: [phone]  Filling Pharmacy Fax: [phone]  Start Date: 6/3/2024

## 2024-06-03 NOTE — TELEPHONE ENCOUNTER
Prior Authorization Approval    Medication: ENVARSUS XR 0.75 MG PO TB24  Authorization Effective Date: 6/3/2024  Authorization Expiration Date: 6/3/2025  Approved Dose/Quantity: 30 per 30 days  Reference #: [id number]   Insurance Company: [employer] ([facility]) - Phone [phone] Fax [phone]  Expected CoPay: $ 0  CoPay Card Available: No    Financial Assistance Needed: No    Which Pharmacy is filling the prescription: [facility] - [place], [place] - [address]  Pharmacy Notified: Yes  Patient Notified: Yes

## 2024-06-05 NOTE — TELEPHONE ENCOUNTER
[facility] Call Center    Phone Message    May a detailed message be left on voicemail: yes     Reason for Call: Medication Question or concern regarding medication   Prescription Clarification  Name of Medication: prucalopride succinate (MOTEGRITY) 2 MG tablet     Prescribing Provider: Kelly Daniels     Pharmacy: [id number]     What on the order needs clarification? Patient is now out of the medication and she started refill 1 1/2 weeks ago.  She is worried that a new PA is needed due to new insurance and the pharmacy doesn't stock this so she is looking at maybe almost 2 weeks without medication.  Please follow up with patient.      Action Taken: Message routed to:  [facility]    Travel Screening: Not Applicable

## 2024-06-06 NOTE — TELEPHONE ENCOUNTER
Patient is calling back stating that she is really frustrated that she has not heard back about her refill request. She does not want to go through the weekend without her medication. She is requesting a call back today to discuss. Thanks!

## 2024-06-07 ENCOUNTER — MYC MEDICAL ADVICE (OUTPATIENT)
Dept: GASTROENTEROLOGY | Facility: CLINIC | Age: 39
End: 2024-06-07
Payer: COMMERCIAL

## 2024-06-07 ENCOUNTER — TELEPHONE (OUTPATIENT)
Dept: GASTROENTEROLOGY | Facility: CLINIC | Age: 39
End: 2024-06-07
Payer: COMMERCIAL

## 2024-06-07 DIAGNOSIS — K59.09 CHRONIC CONSTIPATION: ICD-10-CM

## 2024-06-07 RX ORDER — PRUCALOPRIDE 2 MG/1
2 TABLET, FILM COATED ORAL DAILY
Qty: 90 TABLET | Refills: 3 | Status: SHIPPED | OUTPATIENT
Start: 2024-06-07

## 2024-06-07 RX ORDER — PRUCALOPRIDE 2 MG/1
2 TABLET, FILM COATED ORAL DAILY
Qty: 90 TABLET | Refills: 3 | Status: SHIPPED | OUTPATIENT
Start: 2024-06-07 | End: 2024-06-07

## 2024-06-07 NOTE — TELEPHONE ENCOUNTER
Reached out to pt pharmacy to inquire if previous PA can cover first month of new Rx. They are not able to fill it without the PA.  Pt notified.

## 2024-06-07 NOTE — TELEPHONE ENCOUNTER
[facility] Call Center    Phone Message    May a detailed message be left on voicemail: yes     Reason for Call: Other: Patient upset that she sill has not gotten a response about med refill that started on 05/28 and is now out of the medication. Please reach out to patient to discuss.      Action Taken: Message routed to:  [facility] GI    Travel Screening: Not Applicable     Date of Service:

## 2024-06-07 NOTE — TELEPHONE ENCOUNTER
Prior Authorization Retail Medication Request     Medication/Dose: prucalopride succinate (MOTEGRITY) 2 MG tablet  Take 1 tablet (2 mg) by mouth daily   ICD code (if different than what is on RX):    Previously Tried and Failed:    Rationale:    #Chronic Constipation   With significant past medical history for multiple abdominal surgeries and pelvic orthopedic interventions.  Previously seen and evaluated with the pelvic floor specialists with findings consistent with dyssynergia and rectal intussusception for which she underwent biofeedback therapy with minimal improvement. Per prior documentation future consideration would be to obtain defecography to evaluate if she continues to have persistent inability to relax the pelvic floor, if symptoms are secondary to the rectal intussusception vs slow transit constipation.      Currently Marilyn has a bowel movement daily if not every other day with the use of  Motegrity 2 mg once daily that is consistent with Oxford stool scale type 4/5 and the continued need to rectally digitate.     - As continue to have stable symptoms please continue Motegrity 2 mg once daily and lifestyle modifications as management for your gastroparesis/constipation        Insurance Name: [employer]  Insurance ID:  [id number]

## 2024-06-12 NOTE — TELEPHONE ENCOUNTER
Retail Pharmacy Prior Authorization Team   Phone: [phone]    PA Initiation    Medication: MOTEGRITY  Insurance Company: [employer] ([facility]) - Phone [phone] Fax [phone]  Pharmacy Filling the Rx: [id number] [place], [place] - [address]  Filling Pharmacy Phone: [phone]  Filling Pharmacy Fax: [phone]  Start Date: 6/12/2024    INITIATED VIA PHONE.

## 2024-06-12 NOTE — TELEPHONE ENCOUNTER
Prior Authorization Approval    Authorization Effective Date: 6/12/2024  Authorization Expiration Date: 6/12/2025  Medication: MOTEGRITY - APPROVED  Approved Dose/Quantity:    Reference #:     Insurance Company: [employer] ([facility]) - Phone [phone] Fax [phone]  Expected CoPay:       CoPay Card Available:      Foundation Assistance Needed:    Which Pharmacy is filling the prescription (Not needed for infusion/clinic administered): [id number] [place] [facility], [place] - [address]  Pharmacy Notified:  YES  Patient Notified:  YES

## 2024-07-03 ENCOUNTER — LAB (OUTPATIENT)
Dept: LAB | Facility: CLINIC | Age: 39
End: 2024-07-03
Payer: COMMERCIAL

## 2024-07-03 DIAGNOSIS — Z94.0 KIDNEY REPLACED BY TRANSPLANT: ICD-10-CM

## 2024-07-03 DIAGNOSIS — Z20.828 CONTACT WITH AND (SUSPECTED) EXPOSURE TO OTHER VIRAL COMMUNICABLE DISEASES: ICD-10-CM

## 2024-07-03 DIAGNOSIS — Z94.83 PANCREAS REPLACED BY TRANSPLANT (H): ICD-10-CM

## 2024-07-03 DIAGNOSIS — Z98.890 OTHER SPECIFIED POSTPROCEDURAL STATES: ICD-10-CM

## 2024-07-03 LAB
ALBUMIN MFR UR ELPH: <6 MG/DL
AMYLASE SERPL-CCNC: 74 U/L (ref 28–100)
ANION GAP SERPL CALCULATED.3IONS-SCNC: 8 MMOL/L (ref 7–15)
BUN SERPL-MCNC: 23.6 MG/DL (ref 6–20)
CALCIUM SERPL-MCNC: 9.8 MG/DL (ref 8.6–10)
CHLORIDE SERPL-SCNC: 104 MMOL/L (ref 98–107)
CREAT SERPL-MCNC: 0.96 MG/DL (ref 0.51–0.95)
CREAT UR-MCNC: 28.4 MG/DL
DEPRECATED HCO3 PLAS-SCNC: 25 MMOL/L (ref 22–29)
EGFRCR SERPLBLD CKD-EPI 2021: 77 ML/MIN/1.73M2
ERYTHROCYTE [DISTWIDTH] IN BLOOD BY AUTOMATED COUNT: 14.6 % (ref 10–15)
GLUCOSE SERPL-MCNC: 92 MG/DL (ref 70–99)
HCT VFR BLD AUTO: 33.2 % (ref 35–47)
HGB BLD-MCNC: 10.5 G/DL (ref 11.7–15.7)
LIPASE SERPL-CCNC: 41 U/L (ref 13–60)
MCH RBC QN AUTO: 27.4 PG (ref 26.5–33)
MCHC RBC AUTO-ENTMCNC: 31.6 G/DL (ref 31.5–36.5)
MCV RBC AUTO: 87 FL (ref 78–100)
PLATELET # BLD AUTO: 287 10E3/UL (ref 150–450)
POTASSIUM SERPL-SCNC: 4.1 MMOL/L (ref 3.4–5.3)
PROT/CREAT 24H UR: NORMAL MG/G{CREAT}
RBC # BLD AUTO: 3.83 10E6/UL (ref 3.8–5.2)
SODIUM SERPL-SCNC: 137 MMOL/L (ref 135–145)
TACROLIMUS BLD-MCNC: 5.8 UG/L (ref 5–15)
TME LAST DOSE: NORMAL H
TME LAST DOSE: NORMAL H
WBC # BLD AUTO: 6.8 10E3/UL (ref 4–11)

## 2024-07-03 PROCEDURE — 82150 ASSAY OF AMYLASE: CPT

## 2024-07-03 PROCEDURE — 83690 ASSAY OF LIPASE: CPT

## 2024-07-03 PROCEDURE — 87799 DETECT AGENT NOS DNA QUANT: CPT

## 2024-07-03 PROCEDURE — 85027 COMPLETE CBC AUTOMATED: CPT

## 2024-07-03 PROCEDURE — 36415 COLL VENOUS BLD VENIPUNCTURE: CPT

## 2024-07-03 PROCEDURE — 80197 ASSAY OF TACROLIMUS: CPT

## 2024-07-03 PROCEDURE — 80048 BASIC METABOLIC PNL TOTAL CA: CPT

## 2024-07-03 PROCEDURE — 84156 ASSAY OF PROTEIN URINE: CPT

## 2024-07-04 LAB
BK VIRUS SPECIMEN TYPE: NORMAL
BKV DNA # SPEC NAA+PROBE: NOT DETECTED IU/ML

## 2024-07-15 ENCOUNTER — TRANSFERRED RECORDS (OUTPATIENT)
Dept: HEALTH INFORMATION MANAGEMENT | Facility: CLINIC | Age: 39
End: 2024-07-15
Payer: COMMERCIAL

## 2024-08-08 ENCOUNTER — LAB (OUTPATIENT)
Dept: LAB | Facility: CLINIC | Age: 39
End: 2024-08-08
Payer: COMMERCIAL

## 2024-08-08 DIAGNOSIS — Z94.0 KIDNEY REPLACED BY TRANSPLANT: ICD-10-CM

## 2024-08-08 DIAGNOSIS — Z20.828 CONTACT WITH AND (SUSPECTED) EXPOSURE TO OTHER VIRAL COMMUNICABLE DISEASES: ICD-10-CM

## 2024-08-08 DIAGNOSIS — Z98.890 OTHER SPECIFIED POSTPROCEDURAL STATES: ICD-10-CM

## 2024-08-08 DIAGNOSIS — Z94.83 PANCREAS REPLACED BY TRANSPLANT (H): ICD-10-CM

## 2024-08-08 LAB
AMYLASE SERPL-CCNC: 79 U/L (ref 28–100)
ANION GAP SERPL CALCULATED.3IONS-SCNC: 12 MMOL/L (ref 7–15)
BK VIRUS SPECIMEN TYPE: NORMAL
BKV DNA # SPEC NAA+PROBE: NOT DETECTED IU/ML
BUN SERPL-MCNC: 33.2 MG/DL (ref 6–20)
CALCIUM SERPL-MCNC: 9.9 MG/DL (ref 8.8–10.4)
CHLORIDE SERPL-SCNC: 101 MMOL/L (ref 98–107)
CREAT SERPL-MCNC: 1.16 MG/DL (ref 0.51–0.95)
EGFRCR SERPLBLD CKD-EPI 2021: 61 ML/MIN/1.73M2
ERYTHROCYTE [DISTWIDTH] IN BLOOD BY AUTOMATED COUNT: 15 % (ref 10–15)
GLUCOSE SERPL-MCNC: 93 MG/DL (ref 70–99)
HCO3 SERPL-SCNC: 23 MMOL/L (ref 22–29)
HCT VFR BLD AUTO: 33.3 % (ref 35–47)
HGB BLD-MCNC: 10.8 G/DL (ref 11.7–15.7)
LIPASE SERPL-CCNC: 54 U/L (ref 13–60)
MCH RBC QN AUTO: 28.6 PG (ref 26.5–33)
MCHC RBC AUTO-ENTMCNC: 32.4 G/DL (ref 31.5–36.5)
MCV RBC AUTO: 88 FL (ref 78–100)
PLATELET # BLD AUTO: 230 10E3/UL (ref 150–450)
POTASSIUM SERPL-SCNC: 4.3 MMOL/L (ref 3.4–5.3)
RBC # BLD AUTO: 3.78 10E6/UL (ref 3.8–5.2)
SODIUM SERPL-SCNC: 136 MMOL/L (ref 135–145)
TACROLIMUS BLD-MCNC: 5.9 UG/L (ref 5–15)
TME LAST DOSE: NORMAL H
TME LAST DOSE: NORMAL H
WBC # BLD AUTO: 4.9 10E3/UL (ref 4–11)

## 2024-08-08 PROCEDURE — 85014 HEMATOCRIT: CPT

## 2024-08-08 PROCEDURE — 83690 ASSAY OF LIPASE: CPT

## 2024-08-08 PROCEDURE — 80197 ASSAY OF TACROLIMUS: CPT

## 2024-08-08 PROCEDURE — 82374 ASSAY BLOOD CARBON DIOXIDE: CPT

## 2024-08-08 PROCEDURE — 87799 DETECT AGENT NOS DNA QUANT: CPT

## 2024-08-08 PROCEDURE — 82150 ASSAY OF AMYLASE: CPT

## 2024-08-08 PROCEDURE — 36415 COLL VENOUS BLD VENIPUNCTURE: CPT

## 2024-09-13 DIAGNOSIS — Z94.83 PANCREAS REPLACED BY TRANSPLANT (H): ICD-10-CM

## 2024-09-13 RX ORDER — MYCOPHENOLIC ACID 360 MG/1
360 TABLET, DELAYED RELEASE ORAL 2 TIMES DAILY
Qty: 60 TABLET | Refills: 11 | Status: SHIPPED | OUTPATIENT
Start: 2024-09-13

## 2024-09-24 ENCOUNTER — LAB (OUTPATIENT)
Dept: LAB | Facility: CLINIC | Age: 39
End: 2024-09-24
Payer: COMMERCIAL

## 2024-09-24 ENCOUNTER — MYC MEDICAL ADVICE (OUTPATIENT)
Dept: OTHER | Age: 39
End: 2024-09-24

## 2024-09-24 DIAGNOSIS — Z48.298 AFTERCARE FOLLOWING ORGAN TRANSPLANT: ICD-10-CM

## 2024-09-24 DIAGNOSIS — Z98.890 OTHER SPECIFIED POSTPROCEDURAL STATES: ICD-10-CM

## 2024-09-24 DIAGNOSIS — Z86.39 HX OF DIABETES MELLITUS: ICD-10-CM

## 2024-09-24 DIAGNOSIS — Z94.83 PANCREAS REPLACED BY TRANSPLANT (H): ICD-10-CM

## 2024-09-24 DIAGNOSIS — Z48.298 AFTERCARE FOLLOWING ORGAN TRANSPLANT: Primary | ICD-10-CM

## 2024-09-24 DIAGNOSIS — Z94.0 KIDNEY REPLACED BY TRANSPLANT: ICD-10-CM

## 2024-09-24 DIAGNOSIS — Z20.828 CONTACT WITH AND (SUSPECTED) EXPOSURE TO OTHER VIRAL COMMUNICABLE DISEASES: ICD-10-CM

## 2024-09-24 LAB
AMYLASE SERPL-CCNC: 74 U/L (ref 28–100)
ANION GAP SERPL CALCULATED.3IONS-SCNC: 13 MMOL/L (ref 7–15)
BK VIRUS SPECIMEN TYPE: NORMAL
BKV DNA # SPEC NAA+PROBE: NOT DETECTED IU/ML
BUN SERPL-MCNC: 32.1 MG/DL (ref 6–20)
CALCIUM SERPL-MCNC: 9.7 MG/DL (ref 8.8–10.4)
CHLORIDE SERPL-SCNC: 101 MMOL/L (ref 98–107)
CREAT SERPL-MCNC: 1.17 MG/DL (ref 0.51–0.95)
EGFRCR SERPLBLD CKD-EPI 2021: 61 ML/MIN/1.73M2
ERYTHROCYTE [DISTWIDTH] IN BLOOD BY AUTOMATED COUNT: 14.5 % (ref 10–15)
GLUCOSE SERPL-MCNC: 73 MG/DL (ref 70–99)
HCO3 SERPL-SCNC: 24 MMOL/L (ref 22–29)
HCT VFR BLD AUTO: 38.1 % (ref 35–47)
HGB BLD-MCNC: 12.3 G/DL (ref 11.7–15.7)
HOLD SPECIMEN: NORMAL
LIPASE SERPL-CCNC: 44 U/L (ref 13–60)
MCH RBC QN AUTO: 28.7 PG (ref 26.5–33)
MCHC RBC AUTO-ENTMCNC: 32.3 G/DL (ref 31.5–36.5)
MCV RBC AUTO: 89 FL (ref 78–100)
PLATELET # BLD AUTO: 255 10E3/UL (ref 150–450)
POTASSIUM SERPL-SCNC: 4.2 MMOL/L (ref 3.4–5.3)
RBC # BLD AUTO: 4.28 10E6/UL (ref 3.8–5.2)
SODIUM SERPL-SCNC: 138 MMOL/L (ref 135–145)
TACROLIMUS BLD-MCNC: 7.7 UG/L (ref 5–15)
TME LAST DOSE: NORMAL H
TME LAST DOSE: NORMAL H
WBC # BLD AUTO: 7.4 10E3/UL (ref 4–11)

## 2024-09-24 PROCEDURE — 80197 ASSAY OF TACROLIMUS: CPT

## 2024-09-24 PROCEDURE — 85027 COMPLETE CBC AUTOMATED: CPT

## 2024-09-24 PROCEDURE — 83690 ASSAY OF LIPASE: CPT

## 2024-09-24 PROCEDURE — 84295 ASSAY OF SERUM SODIUM: CPT

## 2024-09-24 PROCEDURE — 82150 ASSAY OF AMYLASE: CPT

## 2024-09-24 PROCEDURE — 82374 ASSAY BLOOD CARBON DIOXIDE: CPT

## 2024-09-24 PROCEDURE — 36415 COLL VENOUS BLD VENIPUNCTURE: CPT

## 2024-09-24 PROCEDURE — 87799 DETECT AGENT NOS DNA QUANT: CPT

## 2024-09-25 DIAGNOSIS — Z94.83 PANCREAS TRANSPLANTED (H): ICD-10-CM

## 2024-09-25 DIAGNOSIS — Z94.83 PANCREAS REPLACED BY TRANSPLANT (H): ICD-10-CM

## 2024-09-25 DIAGNOSIS — Z94.0 KIDNEY REPLACED BY TRANSPLANT: ICD-10-CM

## 2024-09-25 LAB
EST. AVERAGE GLUCOSE BLD GHB EST-MCNC: 103 MG/DL
HBA1C MFR BLD: 5.2 %
MAGNESIUM SERPL-MCNC: 1.9 MG/DL (ref 1.7–2.3)
PHOSPHATE SERPL-MCNC: 3 MG/DL (ref 2.5–4.5)

## 2024-09-25 RX ORDER — TACROLIMUS 1 MG/1
1 TABLET, EXTENDED RELEASE ORAL
Qty: 90 TABLET | Refills: 3 | Status: SHIPPED | OUTPATIENT
Start: 2024-09-25

## 2024-09-25 RX ORDER — TACROLIMUS 0.75 MG/1
TABLET, EXTENDED RELEASE ORAL
Qty: 180 TABLET | Refills: 3 | Status: SHIPPED | OUTPATIENT
Start: 2024-09-25

## 2024-09-25 RX ORDER — TACROLIMUS 4 MG/1
4 TABLET, EXTENDED RELEASE ORAL
Qty: 90 TABLET | Refills: 3 | Status: SHIPPED | OUTPATIENT
Start: 2024-09-25

## 2024-10-24 ENCOUNTER — LAB (OUTPATIENT)
Dept: LAB | Facility: CLINIC | Age: 39
End: 2024-10-24
Payer: COMMERCIAL

## 2024-10-24 DIAGNOSIS — Z48.298 AFTERCARE FOLLOWING ORGAN TRANSPLANT: ICD-10-CM

## 2024-10-24 LAB
ANION GAP SERPL CALCULATED.3IONS-SCNC: 9 MMOL/L (ref 7–15)
BUN SERPL-MCNC: 23.4 MG/DL (ref 6–20)
CALCIUM SERPL-MCNC: 9.4 MG/DL (ref 8.8–10.4)
CHLORIDE SERPL-SCNC: 105 MMOL/L (ref 98–107)
CREAT SERPL-MCNC: 1.11 MG/DL (ref 0.51–0.95)
EGFRCR SERPLBLD CKD-EPI 2021: 65 ML/MIN/1.73M2
ERYTHROCYTE [DISTWIDTH] IN BLOOD BY AUTOMATED COUNT: 13.3 % (ref 10–15)
GLUCOSE SERPL-MCNC: 84 MG/DL (ref 70–99)
HCO3 SERPL-SCNC: 25 MMOL/L (ref 22–29)
HCT VFR BLD AUTO: 34.7 % (ref 35–47)
HGB BLD-MCNC: 11.2 G/DL (ref 11.7–15.7)
MCH RBC QN AUTO: 28.8 PG (ref 26.5–33)
MCHC RBC AUTO-ENTMCNC: 32.3 G/DL (ref 31.5–36.5)
MCV RBC AUTO: 89 FL (ref 78–100)
PLATELET # BLD AUTO: 244 10E3/UL (ref 150–450)
POTASSIUM SERPL-SCNC: 4.8 MMOL/L (ref 3.4–5.3)
RBC # BLD AUTO: 3.89 10E6/UL (ref 3.8–5.2)
SODIUM SERPL-SCNC: 139 MMOL/L (ref 135–145)
TACROLIMUS BLD-MCNC: 6.7 UG/L (ref 5–15)
TME LAST DOSE: NORMAL H
TME LAST DOSE: NORMAL H
WBC # BLD AUTO: 4.3 10E3/UL (ref 4–11)

## 2024-10-24 PROCEDURE — 36415 COLL VENOUS BLD VENIPUNCTURE: CPT

## 2024-11-01 NOTE — LETTER
1/12/2022     RE: Marilyn Ortega  [address]  [facility] [address]     Dear Colleague,    Thank you for referring your patient, Marilyn Ortega, to the [facility] NEPHROLOGY [facility] [place] [facility]. Please see a copy of my visit note below.    TRANSPLANT NEPHROLOGY EARLY POST TRANSPLANT VISIT    Assessment & Plan   # DDKT (SPK): Stable   - Baseline Creatinine: ~ 0.9-1.2   - Proteinuria: Minimal (0.2-0.5 grams)   - Date DSA Last Checked: Dec/2021      Latest DSA: No   - BK Viremia: No   - Kidney Tx Biopsy: No    # Pancreas Tx (SPK):    - Pancreatic Exocrine Drainage: Enteric drained     - Blood glucose: Euglycemia      On insulin: No   - HbA1c: Last checked at time of transplant      Latest HbA1c: 7.7%   - Pancreatic enzymes: Stable   - Date DSA Last Checked: Dec/2021  Latest DSA: No   - Pancreas Tx Biopsy: No    # Immunosuppression: Tacrolimus extended release (goal 8-10) and Mycophenolic acid (dose 720 mg every 12 hours)   - Induction with Recent Transplant:  Intermediate Intensity   - Continue with intensive monitoring of immunosuppression for efficacy and toxicity.   - Changes: Not at this time    # Infection Prophylaxis:   - PJP: Sulfa/TMP (Bactrim)  - CMV: Valganciclovir (Valcyte) 900 mg po qd Estimated Creatinine Clearance: 78.8 mL/min (based on SCr of 0.89 mg/dL).  - Thrush: Clotrimazole yodit (Mycelex) qid-counseled about interaction with CNI and to take consistently    # Blood Pressure: Controlled;  Goal BP: < 140/90   - Volume status: Euvolemic  EDW ~ 122lbs   - Changes: Not at this time    # Anemia in Chronic Renal Disease: Hgb: Trend up      SYLVAIN: No   - Iron studies: Low iron saturation    -PRBC on 12/25/21, received IV venofer as well in 12/2021   -Followed by anemia services    # Mineral Bone Disorder:   - Secondary renal hyperparathyroidism; PTH level: Normal (18-80 pg/ml)        On treatment: None  - Vitamin D; level: High  normal        On supplement: Yes  - Calcium; level: Normal        On supplement: Yes  - Phosphorus; level: Normal        On supplement: Yes, decrease to 250mg daily     # Electrolytes:   - Potassium; level: Normal        On supplement: No  - Magnesium; level: Normal        On supplement: Yes, 400mg daily, stop  - Bicarbonate; level: Normal        On supplement: Yes, bicarb 650mg bid, stop due to edema and starting torsemide    # GERD:   -Decrease protonix to 40mg every other day x2 weeks and then stop    # Skin sensitivity:   -Likely 2/2 tac. No rashes, not concerning at this point for VZV   -Resolved    # Pruritis:   -Developed after starting IV iron infusions.    -Improving. Recommend PRN benadryl 12.5mg     # Edema:   -Stop sodium bicarb and start torsemide 10mg daily. Will monitor weight and Scr   -Continue 3L fluid intake   -Her weight fluctuates about 5 lbs during the day.       # Medical Compliance: Yes    # COVID-19 Virus Review: Discussed COVID-19 virus and the potential medical risks.  Reviewed preventative health recommendations, including wearing a mask where appropriate.  Recommended COVID vaccination should be up to date with either an initial vaccination or booster shot when appropriate.  Asked the patient to inform the transplant center if they are exposed or diagnosed with this virus.    # COVID Vaccination Up To Date: Yes. Due for 4th dose on or after 4/8/22    # Transplant History:  Etiology of Kidney Failure: Diabetes mellitus type 1  Tx: SPK  Transplant: 11/15/2021 (Kidney / Pancreas)  Donor Type: Donation after Brain Death Donor Class:   Crossmatch at time of Tx: negative  DSA at time of Tx: No  Significant changes in immunosuppression: None  CMV IgG Ab High Risk Discordance (D+/R-): No  EBV IgG Ab High Risk Discordance (D+/R-): No  Significant transplant-related complications: None    Transplant Office Phone Number: [phone]    Assessment and plan was discussed with the patient and she  voiced her understanding and agreement.      Return visit: Return in 2 months (on 3/17/2022) for 4 month post transplant visit.      Kamran Corona MD    Chief Complaint   Ms. Ortega is a 36 year old here for kidney transplant, pancreas transplant, immunosuppression management and new medical concerns.     History of Present Illness    Marilyn feels well. She denies N/V, abdominal pain. She states that her abdomen is swollen. She took lasix two days last week, 20mg each time. She has diffuse swelling everyday, worse in the afternoon.     Home BP: 120s systolic    Problem List   Patient Active Problem List   Diagnosis     Type 1 diabetes mellitus (H)     Type 1 diabetes, HbA1c goal < 7% (H)     Osteopenia     Chronic constipation     Irritable bowel syndrome     CKD (chronic kidney disease) stage 4, GFR 15-29 ml/min (H)     HTN, kidney transplant related     Gastroparesis     Heterozygous factor V Leiden mutation (H)     Status post simultaneous kidney and pancreas transplant (H)     Immunosuppression (H)     Kidney replaced by transplant     Pancreas replaced by transplant (H)     Need for pneumocystis prophylaxis     Anemia     Aftercare following organ transplant       Allergies   Allergies   Allergen Reactions     Chlorhexidine Hives, Itching and Rash     PN: Patient had swelling/rash that was very itchy with chlorprep.     Ceclor [Cefaclor Monohydrate]      Cefaclor Rash       Medications   Current Outpatient Medications   Medication Sig     acetaminophen (TYLENOL) 325 MG tablet Take 1-2 tablets (325-650 mg) by mouth every 4 hours as needed for other (For optimal non-opioid multimodal pain management to improve pain control.)     aspirin (ASA) 325 MG tablet Take 1 tablet (325 mg) by mouth daily     bromfenac (PROLENSA) 0.07 % ophthalmic solution Place 1 drop Into the left eye daily     calcium carbonate-vitamin D (OS-STEPHANIE WITH D) 500-200 MG-UNIT tablet Take 1 tablet by mouth 2 times daily (with meals)      cetirizine (ZYRTEC) 10 MG tablet Take 10 mg by mouth daily     cholecalciferol 25 MCG (1000 UT) TABS Take 2,000 Units by mouth every other day      clotrimazole (MYCELEX) 10 MG lozenge Place 1 lozenge (10 mg) inside cheek 4 times daily     Continuous Blood Gluc Sensor (DEXCOM G6 SENSOR) MISC Use as directed for continuous glucose monitoring.  Change sensor every 10 days.     Continuous Blood Gluc Transmit (DEXCOM G6 TRANSMITTER) MISC Use as directed for continuous glucose monitoring.  Change every 3 months.     glucose blood VI test strips strip 4 times daily.     Loteprednol Etabonate (LOTEMAX SM) 0.38 % GEL Apply 1 drop to eye 3 times daily Left eye     magnesium oxide (MAG-OX) 400 MG tablet Take 1 tablet (400 mg) by mouth daily     mycophenolic acid (GENERIC EQUIVALENT) 360 MG EC tablet Take 2 tablets (720 mg) by mouth 2 times daily     norgestimate-ethinyl estradiol (ORTHO-CYCLEN) 0.25-35 MG-MCG tablet Take 1 tablet by mouth daily     ondansetron (ZOFRAN-ODT) 4 MG ODT tab Take 1 tablet (4 mg) by mouth every 6 hours as needed for nausea or vomiting     pantoprazole (PROTONIX) 40 MG EC tablet Take 1 tablet (40 mg) by mouth every morning (before breakfast)     phosphorus tablet 250 mg (PHOSPHA 250 NEUTRAL) 250 MG per tablet Take 1 tablet (250 mg) by mouth 2 times daily     Prucalopride Succinate (MOTEGRITY) 2 MG TABS Take one tablet by mouth daily     senna-docusate (SENOKOT-S/PERICOLACE) 8.6-50 MG tablet Take 2 tablets by mouth daily as needed for constipation     sodium bicarbonate 650 MG tablet Take 1 tablet (650 mg) by mouth 2 times daily     sulfamethoxazole-trimethoprim (BACTRIM) 400-80 MG tablet Take 1 tablet by mouth daily     tacrolimus (ENVARSUS XR) 0.75 MG 24 hr tablet Take 1 tablet (0.75 mg) by mouth every morning (before breakfast) On hold for dose change.     tacrolimus (ENVARSUS XR) 1 MG 24 hr tablet Take 8 tablets (8 mg) by mouth every morning (before breakfast)     valGANciclovir (VALCYTE) 450 MG  "tablet Take 2 tablets (900 mg) by mouth daily     No current facility-administered medications for this visit.     There are no discontinued medications.    Physical Exam   Vital Signs: /77   Pulse 92   Ht 1.676 m (5' 6\")   Wt 57.1 kg (125 lb 14.4 oz)   SpO2 99%   BMI 20.32 kg/m      GENERAL APPEARANCE: alert and no distress  HENT: mouth without ulcers or lesions  HENT: ear canals and TM's normal and TM's pearly grey, normal light reflex bilateral  LYMPHATICS: no cervical or supraclavicular nodes  RESP: lungs clear to auscultation - no rales, rhonchi or wheezes  CV: regular rhythm, normal rate, no rub, no murmur  EDEMA: no LE edema bilaterally  ABDOMEN: soft, nondistended, nontender, bowel sounds normal  MS: extremities normal - no gross deformities noted, no evidence of inflammation in joints, no muscle tenderness  SKIN: no rash  NEURO: normal strength and tone, sensory exam grossly normal, mentation intact and speech normal  PSYCH: mentation appears normal and affect normal/bright  TX KIDNEY: normal    Data     Renal Latest Ref Rng & Units 1/10/2022 1/6/2022 1/3/2022   Na 133 - 144 mmol/L 139 139 139   K 3.4 - 5.3 mmol/L 4.0 4.3 4.8   Cl 94 - 109 mmol/L 111(H) 111(H) 111(H)   CO2 20 - 32 mmol/L 24 24 24   BUN 7 - 30 mg/dL 16 28 28   Cr 0.52 - 1.04 mg/dL 0.89 0.94 1.02   Glucose 70 - 99 mg/dL 95 104(H) 86   Ca  8.5 - 10.1 mg/dL 9.1 9.0 9.2   Mg 1.6 - 2.3 mg/dL 1.7 1.7 1.9     Bone Health Latest Ref Rng & Units 1/10/2022 1/6/2022 1/3/2022   Phos 2.5 - 4.5 mg/dL 1.8(L) 1.8(L) 2.1(L)   PTHi pg/mL - - -   Vit D Def 20 - 75 ug/L - - -     Heme Latest Ref Rng & Units 1/10/2022 1/6/2022 1/3/2022   WBC 4.0 - 11.0 10e3/uL 5.9 5.6 6.0   Hgb 11.7 - 15.7 g/dL 10.3(L) 9.6(L) 9.6(L)   Plt 150 - 450 10e3/uL 355 400 449   ABSOLUTE NEUTROPHIL 1.6 - 8.3 10e9/L - - -   ABSOLUTE LYMPHOCYTES 0.8 - 5.3 10e9/L - - -   ABSOLUTE MONOCYTES 0.0 - 1.3 10e9/L - - -   ABSOLUTE EOSINOPHILS 0.0 - 0.7 10e9/L - - -   ABSOLUTE BASOPHILS " 0.0 - 0.2 10e9/L - - -     Liver Latest Ref Rng & Units 11/15/2021 7/21/2021 6/8/2012   AP 40 - 150 U/L 104 94 96   TBili 0.2 - 1.3 mg/dL 0.4 0.4 0.3   ALT 0 - 50 U/L 23 24 13   AST 0 - 45 U/L 16 19 33   Tot Protein 6.8 - 8.8 g/dL 8.4 9.4(H) 7.8   Albumin 3.4 - 5.0 g/dL 4.0 4.7 4.2     Pancreas Latest Ref Rng & Units 1/10/2022 1/6/2022 1/3/2022   A1C 0.0 - 5.6 % - - -   Amylase 30 - 110 U/L 60 66 65   Lipase 73 - 393 U/L 104 128 131     Iron studies Latest Ref Rng & Units 12/22/2021 12/1/2021   Iron 35 - 180 ug/dL 20(L) 29(L)   Iron sat 15 - 46 % 7(L) 10(L)   Ferritin 12 - 150 ng/mL 87 49     [facility] Txp Virology Latest Ref Rng & Units 12/13/2021 7/21/2021   CMV QUANT IU/ML Not Detected IU/mL Not Detected -   EBV CAPSID ANTIBODY IGG No detectable antibody. - Positive(A)        Recent Labs   Lab Test 12/24/21  0817 12/27/21  0850 01/03/22  0837 01/06/22  0910 01/10/22  0832   DOSTAC 12/23/2021  --  1/2/2022 1/5/2022  --    TACROL 7.9   < > 9.1 7.1 7.9    < > = values in this interval not displayed.     Recent Labs   Lab Test 12/20/21  0835 12/22/21  0907 01/03/22  0837   DOSMPA 12/19/2021 2015  --  1/2/2022   7:30 PM   MPACID 8.21* 3.29 2.50   MPAG 64.9 71.0 41.7     Again, thank you for allowing me to participate in the care of your patient.      Sincerely,    [facility] Transplant     unk

## 2024-12-09 ENCOUNTER — LAB (OUTPATIENT)
Dept: LAB | Facility: CLINIC | Age: 39
End: 2024-12-09
Payer: COMMERCIAL

## 2024-12-09 DIAGNOSIS — Z20.828 CONTACT WITH AND (SUSPECTED) EXPOSURE TO OTHER VIRAL COMMUNICABLE DISEASES: ICD-10-CM

## 2024-12-09 DIAGNOSIS — Z98.890 OTHER SPECIFIED POSTPROCEDURAL STATES: ICD-10-CM

## 2024-12-09 DIAGNOSIS — Z94.83 PANCREAS REPLACED BY TRANSPLANT (H): ICD-10-CM

## 2024-12-09 DIAGNOSIS — Z94.0 KIDNEY REPLACED BY TRANSPLANT: ICD-10-CM

## 2024-12-09 LAB
AMYLASE SERPL-CCNC: 69 U/L (ref 28–100)
ANION GAP SERPL CALCULATED.3IONS-SCNC: 12 MMOL/L (ref 7–15)
BUN SERPL-MCNC: 24.8 MG/DL (ref 6–20)
CALCIUM SERPL-MCNC: 9.9 MG/DL (ref 8.8–10.4)
CHLORIDE SERPL-SCNC: 102 MMOL/L (ref 98–107)
CREAT SERPL-MCNC: 1.02 MG/DL (ref 0.51–0.95)
EGFRCR SERPLBLD CKD-EPI 2021: 71 ML/MIN/1.73M2
ERYTHROCYTE [DISTWIDTH] IN BLOOD BY AUTOMATED COUNT: 13.2 % (ref 10–15)
GLUCOSE SERPL-MCNC: 89 MG/DL (ref 70–99)
HCO3 SERPL-SCNC: 24 MMOL/L (ref 22–29)
HCT VFR BLD AUTO: 36.5 % (ref 35–47)
HGB BLD-MCNC: 11.9 G/DL (ref 11.7–15.7)
LIPASE SERPL-CCNC: 51 U/L (ref 13–60)
MCH RBC QN AUTO: 29.4 PG (ref 26.5–33)
MCHC RBC AUTO-ENTMCNC: 32.6 G/DL (ref 31.5–36.5)
MCV RBC AUTO: 90 FL (ref 78–100)
PLATELET # BLD AUTO: 221 10E3/UL (ref 150–450)
POTASSIUM SERPL-SCNC: 4.4 MMOL/L (ref 3.4–5.3)
RBC # BLD AUTO: 4.05 10E6/UL (ref 3.8–5.2)
SODIUM SERPL-SCNC: 138 MMOL/L (ref 135–145)
TACROLIMUS BLD-MCNC: 3.9 UG/L (ref 5–15)
TME LAST DOSE: ABNORMAL H
TME LAST DOSE: ABNORMAL H
WBC # BLD AUTO: 9.4 10E3/UL (ref 4–11)

## 2024-12-09 PROCEDURE — 83690 ASSAY OF LIPASE: CPT

## 2024-12-09 PROCEDURE — 36415 COLL VENOUS BLD VENIPUNCTURE: CPT

## 2024-12-09 PROCEDURE — 82150 ASSAY OF AMYLASE: CPT

## 2024-12-09 PROCEDURE — 80048 BASIC METABOLIC PNL TOTAL CA: CPT

## 2024-12-09 PROCEDURE — 87799 DETECT AGENT NOS DNA QUANT: CPT

## 2024-12-09 PROCEDURE — 80197 ASSAY OF TACROLIMUS: CPT

## 2024-12-09 PROCEDURE — 85014 HEMATOCRIT: CPT

## 2024-12-10 DIAGNOSIS — Z48.298 AFTERCARE FOLLOWING ORGAN TRANSPLANT: Primary | ICD-10-CM

## 2024-12-10 DIAGNOSIS — T86.11 KIDNEY TRANSPLANT REJECTION: ICD-10-CM

## 2024-12-10 DIAGNOSIS — I15.1 HTN, KIDNEY TRANSPLANT RELATED: ICD-10-CM

## 2024-12-10 DIAGNOSIS — Z94.0 HTN, KIDNEY TRANSPLANT RELATED: ICD-10-CM

## 2024-12-10 LAB
BK VIRUS SPECIMEN TYPE: NORMAL
BKV DNA # SPEC NAA+PROBE: NOT DETECTED IU/ML

## 2024-12-10 ASSESSMENT — ENCOUNTER SYMPTOMS: NEW SYMPTOMS OF CORONARY ARTERY DISEASE: 0

## 2024-12-10 NOTE — TELEPHONE ENCOUNTER
Patient Call: General  Route to LPN    Reason for call: patient called looking to speak with coordinator. No additional details provided.    Call back needed? Yes    Return Call Needed  Same as documented in contacts section  When to return call?: Greater than one day: Route standard priority   Detail Level: Detailed

## 2024-12-11 DIAGNOSIS — Z94.83 PANCREAS TRANSPLANTED (H): ICD-10-CM

## 2024-12-11 DIAGNOSIS — Z94.0 KIDNEY REPLACED BY TRANSPLANT: ICD-10-CM

## 2024-12-11 DIAGNOSIS — Z94.83 PANCREAS REPLACED BY TRANSPLANT (H): ICD-10-CM

## 2024-12-11 DIAGNOSIS — Z94.0 HTN, KIDNEY TRANSPLANT RELATED: ICD-10-CM

## 2024-12-11 DIAGNOSIS — T86.11 KIDNEY TRANSPLANT REJECTION: ICD-10-CM

## 2024-12-11 DIAGNOSIS — I15.1 HTN, KIDNEY TRANSPLANT RELATED: ICD-10-CM

## 2024-12-11 RX ORDER — TACROLIMUS 0.75 MG/1
3 TABLET, EXTENDED RELEASE ORAL
Qty: 280 TABLET | Refills: 3 | Status: SHIPPED | OUTPATIENT
Start: 2024-12-11

## 2024-12-11 RX ORDER — TACROLIMUS 1 MG/1
3 TABLET, EXTENDED RELEASE ORAL
Qty: 280 TABLET | Refills: 3 | Status: SHIPPED | OUTPATIENT
Start: 2024-12-11

## 2024-12-11 RX ORDER — TACROLIMUS 4 MG/1
TABLET, EXTENDED RELEASE ORAL
Qty: 90 TABLET | Refills: 3 | Status: SHIPPED | OUTPATIENT
Start: 2024-12-11

## 2024-12-15 ENCOUNTER — HOSPITAL ENCOUNTER (EMERGENCY)
Facility: CLINIC | Age: 39
Discharge: HOME | End: 2024-12-15
Attending: EMERGENCY MEDICINE | Admitting: EMERGENCY MEDICINE
Payer: COMMERCIAL

## 2024-12-15 VITALS
SYSTOLIC BLOOD PRESSURE: 143 MMHG | OXYGEN SATURATION: 100 % | HEART RATE: 98 BPM | TEMPERATURE: 97 F | DIASTOLIC BLOOD PRESSURE: 89 MMHG | RESPIRATION RATE: 20 BRPM

## 2024-12-15 DIAGNOSIS — D72.829 LEUKOCYTOSIS, UNSPECIFIED TYPE: ICD-10-CM

## 2024-12-15 DIAGNOSIS — R19.7 NAUSEA VOMITING AND DIARRHEA: ICD-10-CM

## 2024-12-15 DIAGNOSIS — R11.2 NAUSEA VOMITING AND DIARRHEA: ICD-10-CM

## 2024-12-15 DIAGNOSIS — E86.0 DEHYDRATION: ICD-10-CM

## 2024-12-15 LAB
ALBUMIN SERPL BCG-MCNC: 4.6 G/DL (ref 3.5–5.2)
ALP SERPL-CCNC: 85 U/L (ref 40–150)
ALT SERPL W P-5'-P-CCNC: 25 U/L (ref 0–50)
ANION GAP SERPL CALCULATED.3IONS-SCNC: 15 MMOL/L (ref 7–15)
AST SERPL W P-5'-P-CCNC: 34 U/L (ref 0–45)
BASOPHILS # BLD AUTO: 0.1 10E3/UL (ref 0–0.2)
BASOPHILS NFR BLD AUTO: 1 %
BILIRUB SERPL-MCNC: 0.3 MG/DL
BUN SERPL-MCNC: 32.5 MG/DL (ref 6–20)
CALCIUM SERPL-MCNC: 9.7 MG/DL (ref 8.8–10.4)
CHLORIDE SERPL-SCNC: 105 MMOL/L (ref 98–107)
CREAT SERPL-MCNC: 1.05 MG/DL (ref 0.51–0.95)
EGFRCR SERPLBLD CKD-EPI 2021: 69 ML/MIN/1.73M2
EOSINOPHIL # BLD AUTO: 0 10E3/UL (ref 0–0.7)
EOSINOPHIL NFR BLD AUTO: 0 %
ERYTHROCYTE [DISTWIDTH] IN BLOOD BY AUTOMATED COUNT: 13.2 % (ref 10–15)
GLUCOSE SERPL-MCNC: 120 MG/DL (ref 70–99)
HCO3 SERPL-SCNC: 23 MMOL/L (ref 22–29)
HCT VFR BLD AUTO: 33 % (ref 35–47)
HGB BLD-MCNC: 11.3 G/DL (ref 11.7–15.7)
IMM GRANULOCYTES # BLD: 0.1 10E3/UL
IMM GRANULOCYTES NFR BLD: 1 %
LYMPHOCYTES # BLD AUTO: 0.7 10E3/UL (ref 0.8–5.3)
LYMPHOCYTES NFR BLD AUTO: 5 %
MCH RBC QN AUTO: 29.8 PG (ref 26.5–33)
MCHC RBC AUTO-ENTMCNC: 34.2 G/DL (ref 31.5–36.5)
MCV RBC AUTO: 87 FL (ref 78–100)
MONOCYTES # BLD AUTO: 0.3 10E3/UL (ref 0–1.3)
MONOCYTES NFR BLD AUTO: 2 %
NEUTROPHILS # BLD AUTO: 13 10E3/UL (ref 1.6–8.3)
NEUTROPHILS NFR BLD AUTO: 92 %
NRBC # BLD AUTO: 0 10E3/UL
NRBC BLD AUTO-RTO: 0 /100
PLATELET # BLD AUTO: 274 10E3/UL (ref 150–450)
POTASSIUM SERPL-SCNC: 4.2 MMOL/L (ref 3.4–5.3)
PROT SERPL-MCNC: 7.4 G/DL (ref 6.4–8.3)
RBC # BLD AUTO: 3.79 10E6/UL (ref 3.8–5.2)
SODIUM SERPL-SCNC: 143 MMOL/L (ref 135–145)
WBC # BLD AUTO: 14.1 10E3/UL (ref 4–11)

## 2024-12-15 PROCEDURE — 85025 COMPLETE CBC W/AUTO DIFF WBC: CPT | Performed by: EMERGENCY MEDICINE

## 2024-12-15 PROCEDURE — 96375 TX/PRO/DX INJ NEW DRUG ADDON: CPT

## 2024-12-15 PROCEDURE — 96361 HYDRATE IV INFUSION ADD-ON: CPT

## 2024-12-15 PROCEDURE — 258N000003 HC RX IP 258 OP 636: Performed by: EMERGENCY MEDICINE

## 2024-12-15 PROCEDURE — 96376 TX/PRO/DX INJ SAME DRUG ADON: CPT

## 2024-12-15 PROCEDURE — 250N000011 HC RX IP 250 OP 636: Performed by: EMERGENCY MEDICINE

## 2024-12-15 PROCEDURE — 99284 EMERGENCY DEPT VISIT MOD MDM: CPT | Mod: 25

## 2024-12-15 PROCEDURE — 96374 THER/PROPH/DIAG INJ IV PUSH: CPT

## 2024-12-15 PROCEDURE — 36415 COLL VENOUS BLD VENIPUNCTURE: CPT | Performed by: EMERGENCY MEDICINE

## 2024-12-15 PROCEDURE — 80053 COMPREHEN METABOLIC PANEL: CPT | Performed by: EMERGENCY MEDICINE

## 2024-12-15 PROCEDURE — 82040 ASSAY OF SERUM ALBUMIN: CPT | Performed by: EMERGENCY MEDICINE

## 2024-12-15 RX ORDER — ONDANSETRON 2 MG/ML
4 INJECTION INTRAMUSCULAR; INTRAVENOUS ONCE
Status: COMPLETED | OUTPATIENT
Start: 2024-12-15 | End: 2024-12-15

## 2024-12-15 RX ORDER — LORAZEPAM 2 MG/ML
0.5 INJECTION INTRAMUSCULAR ONCE
Status: COMPLETED | OUTPATIENT
Start: 2024-12-15 | End: 2024-12-15

## 2024-12-15 RX ORDER — PROCHLORPERAZINE MALEATE 10 MG
10 TABLET ORAL EVERY 6 HOURS PRN
Qty: 12 TABLET | Refills: 0 | Status: SHIPPED | OUTPATIENT
Start: 2024-12-15

## 2024-12-15 RX ADMIN — LORAZEPAM 0.5 MG: 2 INJECTION INTRAMUSCULAR; INTRAVENOUS at 19:05

## 2024-12-15 RX ADMIN — PROCHLORPERAZINE EDISYLATE 5 MG: 5 INJECTION INTRAMUSCULAR; INTRAVENOUS at 17:08

## 2024-12-15 RX ADMIN — ONDANSETRON 4 MG: 2 INJECTION, SOLUTION INTRAMUSCULAR; INTRAVENOUS at 14:46

## 2024-12-15 RX ADMIN — SODIUM CHLORIDE 1000 ML: 9 INJECTION, SOLUTION INTRAVENOUS at 17:36

## 2024-12-15 RX ADMIN — SODIUM CHLORIDE 1000 ML: 9 INJECTION, SOLUTION INTRAVENOUS at 14:46

## 2024-12-15 RX ADMIN — ONDANSETRON 4 MG: 2 INJECTION, SOLUTION INTRAMUSCULAR; INTRAVENOUS at 16:16

## 2024-12-15 RX ADMIN — SODIUM CHLORIDE 1000 ML: 9 INJECTION, SOLUTION INTRAVENOUS at 19:05

## 2024-12-15 ASSESSMENT — COLUMBIA-SUICIDE SEVERITY RATING SCALE - C-SSRS
6. HAVE YOU EVER DONE ANYTHING, STARTED TO DO ANYTHING, OR PREPARED TO DO ANYTHING TO END YOUR LIFE?: NO
1. IN THE PAST MONTH, HAVE YOU WISHED YOU WERE DEAD OR WISHED YOU COULD GO TO SLEEP AND NOT WAKE UP?: NO
2. HAVE YOU ACTUALLY HAD ANY THOUGHTS OF KILLING YOURSELF IN THE PAST MONTH?: NO

## 2024-12-15 ASSESSMENT — ACTIVITIES OF DAILY LIVING (ADL)
ADLS_ACUITY_SCORE: 52

## 2024-12-15 NOTE — ED PROVIDER NOTES
"  Emergency Department Note      History of Present Illness     Chief Complaint   Nausea, Vomiting, & Diarrhea      HPI   Marilyn Ortega is a 39 year old female with a history of kidney transplant for chronic kidney disease, factor V Leiden mutation and type 1 diabetes mellitus who presents for evaluation of nausea, vomiting, diarrhea which she started experiencing last night. She reports that she has had few episodes of vomiting since then and last couple episodes has been darker than usual. She also had one episode of diarrhea prior to arrival, denies any nancy blood in stool or black stool, however endorses \"funny\" smell from vomit and diarrhea. She reports she attended a work party last night where she ate chicken and potatoes and also had couple \"cocktail\" drinks, she denies any other individuals from party having similar symptoms. She endorses decrease in urine output which she attributes to dehydration however denies any other urinary symptoms. She also denies any fever, cough, cold, sore throat, abdominal pain or headache. She also has a past surgical history of kidney and pancreas transplant 3 years ago along with removal of appendix.  She is on immunosuppressant medicine after transplant. Denies taking blood thinner.     Independent Historian   Mother as detailed above.    Review of External Notes   No notes reviewed.   Past Medical History     Medical History and Problem List   Anemia   Chronic kidney disease  Gastroparesis   Factor V Leiden mutation  Osteomyelitis   Type 1 Diabetes mellitus    Medications   Aflibercept  Aspirin 81 mg   Cetrizine   Epinephrine   Furosamide   Nitazoxide   Prucalopride succinate   Tacrolimus    Surgical History   Bench kidney  Bench pancreas   Cataract surgery   Coronary angio   Right heart cath  PICC placement  Renal biopsy   ORIF hip  Orthopedic surgery   Physical Exam     Patient Vitals for the past 24 hrs:   BP Temp Temp src Pulse Resp SpO2   12/15/24 1423 (!) 143/89 " 97  F (36.1  C) Temporal 98 20 100 %     Physical Exam  General:  Sitting on bed with mother on bedside.   HENT:  No obvious trauma to head  Right Ear:  External ear normal.   Left Ear:  External ear normal.   Nose:  Nose normal.   Eyes:  Conjunctivae and EOM are normal. Pupils are equal, round, and reactive.   Neck: Normal range of motion. Neck supple. No tracheal deviation present.   CV:  Normal heart sounds. No murmur heard.  Pulm/Chest: Effort normal and breath sounds normal.   Abd: Soft. No distension. There is no tenderness. There is no rigidity, no rebound and no guarding.   M/S: Normal range of motion.   Neuro: Awake and alert.   Skin: Skin is warm and dry. No rash noted. Not diaphoretic.   Psych: Normal mood and affect. Behavior is normal.    Diagnostics     Lab Results   Labs Ordered and Resulted from Time of ED Arrival to Time of ED Departure   COMPREHENSIVE METABOLIC PANEL - Abnormal       Result Value    Sodium 143      Potassium 4.2      Carbon Dioxide (CO2) 23      Anion Gap 15      Urea Nitrogen 32.5 (*)     Creatinine 1.05 (*)     GFR Estimate 69      Calcium 9.7      Chloride 105      Glucose 120 (*)     Alkaline Phosphatase 85      AST 34      ALT 25      Protein Total 7.4      Albumin 4.6      Bilirubin Total 0.3     CBC WITH PLATELETS AND DIFFERENTIAL - Abnormal    WBC Count 14.1 (*)     RBC Count 3.79 (*)     Hemoglobin 11.3 (*)     Hematocrit 33.0 (*)     MCV 87      MCH 29.8      MCHC 34.2      RDW 13.2      Platelet Count 274      % Neutrophils 92      % Lymphocytes 5      % Monocytes 2      % Eosinophils 0      % Basophils 1      % Immature Granulocytes 1      NRBCs per 100 WBC 0      Absolute Neutrophils 13.0 (*)     Absolute Lymphocytes 0.7 (*)     Absolute Monocytes 0.3      Absolute Eosinophils 0.0      Absolute Basophils 0.1      Absolute Immature Granulocytes 0.1      Absolute NRBCs 0.0       Imaging   No orders to display     Independent Interpretation   None    ED Course       Medications Administered   Medications   sodium chloride 0.9% BOLUS 1,000 mL (has no administration in time range)   LORazepam (ATIVAN) injection 0.5 mg (has no administration in time range)   sodium chloride 0.9% BOLUS 1,000 mL (0 mLs Intravenous Stopped 12/15/24 1738)   ondansetron (ZOFRAN) injection 4 mg (4 mg Intravenous $Given 12/15/24 1446)   ondansetron (ZOFRAN) injection 4 mg (4 mg Intravenous $Given 12/15/24 1616)   prochlorperazine (COMPAZINE) injection 5 mg (5 mg Intravenous $Given 12/15/24 1708)   sodium chloride 0.9% BOLUS 1,000 mL (1,000 mLs Intravenous $New Bag 12/15/24 1736)       Procedures   Procedures     Discussion of Management   None    ED Course   ED Course as of 12/15/24 1901   Sun Dec 15, 2024   1615 I obtained the history and examined the patient as above.        Additional Documentation  None    Medical Decision Making / Diagnosis     CMS Diagnoses: None    MIPS       None    MDM   Marilyn Ortega is a very pleasant 39 year old female who presents with acute nausea, vomiting and diarrhea. She has a benign abdominal exam without focal RLQ or LLQ tenderness to suggest diverticulitis, cholecystitis or other acute abdominal process.  She has already had appendectomy.  She had a pancreas and kidney transplant.  No dysuria, urgency or frequency to suggest UTI.  She has a completely benign abdominal exam.  Doubt ureteral stone or pyelonephritis since she has no pain.  Her main concern is nausea, vomiting and diarrhea.  Likely has a GI illness.  She cannot produce a stool sample here for stool testing, but discussed she can follow-up with her PCP to consider this if the diarrhea is persistent.  Norovirus has been prevalent in the community and discussed with her if the chicken was undercooked it may be Campylobacter.  She denies any coffee-ground emesis to suggest GI bleed.  No focal right upper quadrant pain to suggest cholecystitis and liver enzymes are normal.    I did discuss the  sometimes vague initial constellation of symptoms early in the course of acute abdominal processes, and the need for immediate return should pain or other concerning symptoms develop.  Symptoms are improved after IV fluids and symptomatic treatment.  The patient denies any chance of being pregnant.  No vaginal bleeding or discharge.  No concern for STIs. There has been no travel or antibiotic exposure to suggest more concerning cause of diarrhea, and there has been no hematemesis or BRBPR/melena.  She was at a work holiday party yesterday and had a few mixed drinks and chicken and potatoes.  She has not heard of any coworkers being ill with similar GI illnesses.  Vital signs have remained normal and stable throughout the ED course, and the abdominal re-exam remains benign.  She was still nauseous after Zofran so was then provided Compazine.  She continued to be nauseous so was provided a small dose of Ativan.  After 2 L of fluid, she did not feel the need to urinate close provided a 3rd L of fluids.  She was able to tolerate fluids and desired to go home and declined observation admission overnight.  Compazine prescribed.  She feels comfortable going home to take her antirejection medications.  I believe she is safe for discharge in improved condition at this time with strict return precautions for recurrent vomiting, pain, fever or any other concerning symptoms.    The treatment plan was discussed with the patient and they expressed understanding of this plan and consented to the plan.  In addition, the patient will return to the emergency department if their symptoms persist, worsen, if new symptoms arise or if there is any concern as other pathology may be present that is not evident at this time. They also understand the importance of close follow up in the clinic and if unable to do so will return to the emergency department for a reevaluation. All questions were answered.    Disposition   The patient was  discharged.     Diagnosis     ICD-10-CM    1. Nausea vomiting and diarrhea  R11.2     R19.7       2. Leukocytosis, unspecified type  D72.829       3. Dehydration  E86.0            Discharge Medications   New Prescriptions    PROCHLORPERAZINE (COMPAZINE) 10 MG TABLET    Take 1 tablet (10 mg) by mouth every 6 hours as needed for nausea or vomiting.     Scribe Disclosure:  Adeel DERAS, am serving as a scribe at 3:50 PM on 12/15/2024 to document services personally performed by Ta Salter DO based on my observations and the provider's statements to me.        Ta Salter DO  12/15/24 [id number]

## 2024-12-15 NOTE — ED TRIAGE NOTES
Pt reports N/V/D that started last night and did not improve with PPO zofran.  Pt reports she had a kidney transplant 3 years ago.     Triage Assessment (Adult)       Row Name 12/15/24 1423          Triage Assessment    Airway WDL WDL        Respiratory WDL    Respiratory WDL WDL        Skin Circulation/Temperature WDL    Skin Circulation/Temperature WDL WDL        Cardiac WDL    Cardiac WDL WDL        Peripheral/Neurovascular WDL    Peripheral Neurovascular WDL WDL        Cognitive/Neuro/Behavioral WDL    Cognitive/Neuro/Behavioral WDL WDL

## 2025-01-10 ENCOUNTER — LAB (OUTPATIENT)
Dept: LAB | Facility: CLINIC | Age: 40
End: 2025-01-10
Payer: COMMERCIAL

## 2025-01-10 DIAGNOSIS — Z20.828 CONTACT WITH AND (SUSPECTED) EXPOSURE TO OTHER VIRAL COMMUNICABLE DISEASES: ICD-10-CM

## 2025-01-10 DIAGNOSIS — Z94.83 PANCREAS REPLACED BY TRANSPLANT (H): ICD-10-CM

## 2025-01-10 DIAGNOSIS — Z94.0 KIDNEY REPLACED BY TRANSPLANT: ICD-10-CM

## 2025-01-10 DIAGNOSIS — Z98.890 OTHER SPECIFIED POSTPROCEDURAL STATES: ICD-10-CM

## 2025-01-10 LAB
AMYLASE SERPL-CCNC: 71 U/L (ref 28–100)
ANION GAP SERPL CALCULATED.3IONS-SCNC: 13 MMOL/L (ref 7–15)
BUN SERPL-MCNC: 37.6 MG/DL (ref 6–20)
CALCIUM SERPL-MCNC: 9.5 MG/DL (ref 8.8–10.4)
CHLORIDE SERPL-SCNC: 106 MMOL/L (ref 98–107)
CREAT SERPL-MCNC: 1.18 MG/DL (ref 0.51–0.95)
EGFRCR SERPLBLD CKD-EPI 2021: 60 ML/MIN/1.73M2
ERYTHROCYTE [DISTWIDTH] IN BLOOD BY AUTOMATED COUNT: 13 % (ref 10–15)
GLUCOSE SERPL-MCNC: 80 MG/DL (ref 70–99)
HCO3 SERPL-SCNC: 20 MMOL/L (ref 22–29)
HCT VFR BLD AUTO: 35.4 % (ref 35–47)
HGB BLD-MCNC: 11.4 G/DL (ref 11.7–15.7)
LIPASE SERPL-CCNC: 49 U/L (ref 13–60)
MCH RBC QN AUTO: 28.9 PG (ref 26.5–33)
MCHC RBC AUTO-ENTMCNC: 32.2 G/DL (ref 31.5–36.5)
MCV RBC AUTO: 90 FL (ref 78–100)
PLATELET # BLD AUTO: 234 10E3/UL (ref 150–450)
POTASSIUM SERPL-SCNC: 4.3 MMOL/L (ref 3.4–5.3)
RBC # BLD AUTO: 3.94 10E6/UL (ref 3.8–5.2)
SODIUM SERPL-SCNC: 139 MMOL/L (ref 135–145)
TACROLIMUS BLD-MCNC: 5.8 UG/L (ref 5–15)
TME LAST DOSE: NORMAL H
TME LAST DOSE: NORMAL H
WBC # BLD AUTO: 6.2 10E3/UL (ref 4–11)

## 2025-01-10 PROCEDURE — 83690 ASSAY OF LIPASE: CPT

## 2025-01-10 PROCEDURE — 80197 ASSAY OF TACROLIMUS: CPT

## 2025-01-10 PROCEDURE — 80048 BASIC METABOLIC PNL TOTAL CA: CPT

## 2025-01-10 PROCEDURE — 85027 COMPLETE CBC AUTOMATED: CPT

## 2025-01-10 PROCEDURE — 36415 COLL VENOUS BLD VENIPUNCTURE: CPT

## 2025-01-10 PROCEDURE — 87799 DETECT AGENT NOS DNA QUANT: CPT

## 2025-01-10 PROCEDURE — 82150 ASSAY OF AMYLASE: CPT

## 2025-01-12 LAB
BK VIRUS SPECIMEN TYPE: NORMAL
BKV DNA # SPEC NAA+PROBE: NOT DETECTED IU/ML

## 2025-01-23 PROBLEM — C44.91 BASAL CELL CARCINOMA: Status: ACTIVE | Noted: 2024-11-13

## 2025-01-24 ENCOUNTER — LAB (OUTPATIENT)
Dept: LAB | Facility: CLINIC | Age: 40
End: 2025-01-24
Payer: COMMERCIAL

## 2025-01-24 DIAGNOSIS — Z94.83 PANCREAS REPLACED BY TRANSPLANT (H): ICD-10-CM

## 2025-01-24 DIAGNOSIS — Z20.828 CONTACT WITH AND (SUSPECTED) EXPOSURE TO OTHER VIRAL COMMUNICABLE DISEASES: ICD-10-CM

## 2025-01-24 DIAGNOSIS — Z94.0 KIDNEY REPLACED BY TRANSPLANT: ICD-10-CM

## 2025-01-24 DIAGNOSIS — Z98.890 OTHER SPECIFIED POSTPROCEDURAL STATES: ICD-10-CM

## 2025-01-24 DIAGNOSIS — Z48.298 AFTERCARE FOLLOWING ORGAN TRANSPLANT: ICD-10-CM

## 2025-01-24 LAB
ANION GAP SERPL CALCULATED.3IONS-SCNC: 11 MMOL/L (ref 7–15)
BUN SERPL-MCNC: 26.5 MG/DL (ref 6–20)
CALCIUM SERPL-MCNC: 9.6 MG/DL (ref 8.8–10.4)
CHLORIDE SERPL-SCNC: 104 MMOL/L (ref 98–107)
CREAT SERPL-MCNC: 1 MG/DL (ref 0.51–0.95)
EGFRCR SERPLBLD CKD-EPI 2021: 73 ML/MIN/1.73M2
ERYTHROCYTE [DISTWIDTH] IN BLOOD BY AUTOMATED COUNT: 13 % (ref 10–15)
GLUCOSE SERPL-MCNC: 84 MG/DL (ref 70–99)
HCO3 SERPL-SCNC: 22 MMOL/L (ref 22–29)
HCT VFR BLD AUTO: 36.7 % (ref 35–47)
HGB BLD-MCNC: 12.3 G/DL (ref 11.7–15.7)
MCH RBC QN AUTO: 29.8 PG (ref 26.5–33)
MCHC RBC AUTO-ENTMCNC: 33.5 G/DL (ref 31.5–36.5)
MCV RBC AUTO: 89 FL (ref 78–100)
PLATELET # BLD AUTO: 227 10E3/UL (ref 150–450)
POTASSIUM SERPL-SCNC: 4.3 MMOL/L (ref 3.4–5.3)
RBC # BLD AUTO: 4.13 10E6/UL (ref 3.8–5.2)
SODIUM SERPL-SCNC: 137 MMOL/L (ref 135–145)
TACROLIMUS BLD-MCNC: 5.8 UG/L (ref 5–15)
TME LAST DOSE: NORMAL H
TME LAST DOSE: NORMAL H
WBC # BLD AUTO: 5.8 10E3/UL (ref 4–11)

## 2025-01-24 PROCEDURE — 85027 COMPLETE CBC AUTOMATED: CPT

## 2025-01-24 PROCEDURE — 82374 ASSAY BLOOD CARBON DIOXIDE: CPT

## 2025-01-24 PROCEDURE — 80048 BASIC METABOLIC PNL TOTAL CA: CPT

## 2025-01-24 PROCEDURE — 36415 COLL VENOUS BLD VENIPUNCTURE: CPT

## 2025-01-24 PROCEDURE — 82310 ASSAY OF CALCIUM: CPT

## 2025-01-24 PROCEDURE — 80197 ASSAY OF TACROLIMUS: CPT

## 2025-03-02 ENCOUNTER — HEALTH MAINTENANCE LETTER (OUTPATIENT)
Age: 40
End: 2025-03-02

## 2025-03-19 DIAGNOSIS — Z48.298 AFTERCARE FOLLOWING ORGAN TRANSPLANT: Primary | ICD-10-CM

## 2025-03-19 DIAGNOSIS — Z94.0 HTN, KIDNEY TRANSPLANT RELATED: ICD-10-CM

## 2025-03-19 DIAGNOSIS — I15.1 HTN, KIDNEY TRANSPLANT RELATED: ICD-10-CM

## 2025-03-19 DIAGNOSIS — T86.11 KIDNEY TRANSPLANT REJECTION: ICD-10-CM

## 2025-03-25 ENCOUNTER — MYC MEDICAL ADVICE (OUTPATIENT)
Dept: TRANSPLANT | Facility: CLINIC | Age: 40
End: 2025-03-25
Payer: COMMERCIAL

## 2025-03-25 DIAGNOSIS — I15.1 HTN, KIDNEY TRANSPLANT RELATED: ICD-10-CM

## 2025-03-25 DIAGNOSIS — Z94.0 HTN, KIDNEY TRANSPLANT RELATED: ICD-10-CM

## 2025-03-25 DIAGNOSIS — T86.11 KIDNEY TRANSPLANT REJECTION: ICD-10-CM

## 2025-03-29 ENCOUNTER — HEALTH MAINTENANCE LETTER (OUTPATIENT)
Age: 40
End: 2025-03-29

## 2025-04-22 ENCOUNTER — LAB (OUTPATIENT)
Dept: LAB | Facility: CLINIC | Age: 40
End: 2025-04-22
Payer: COMMERCIAL

## 2025-04-22 ENCOUNTER — TRANSFERRED RECORDS (OUTPATIENT)
Dept: HEALTH INFORMATION MANAGEMENT | Facility: CLINIC | Age: 40
End: 2025-04-22

## 2025-04-22 DIAGNOSIS — T86.11 KIDNEY TRANSPLANT REJECTION: ICD-10-CM

## 2025-04-22 DIAGNOSIS — Z94.0 KIDNEY REPLACED BY TRANSPLANT: ICD-10-CM

## 2025-04-22 DIAGNOSIS — Z94.0 HTN, KIDNEY TRANSPLANT RELATED: ICD-10-CM

## 2025-04-22 DIAGNOSIS — Z94.83 PANCREAS REPLACED BY TRANSPLANT (H): ICD-10-CM

## 2025-04-22 DIAGNOSIS — Z48.298 AFTERCARE FOLLOWING ORGAN TRANSPLANT: ICD-10-CM

## 2025-04-22 DIAGNOSIS — Z20.828 CONTACT WITH AND (SUSPECTED) EXPOSURE TO OTHER VIRAL COMMUNICABLE DISEASES: ICD-10-CM

## 2025-04-22 DIAGNOSIS — I15.1 HTN, KIDNEY TRANSPLANT RELATED: ICD-10-CM

## 2025-04-22 DIAGNOSIS — Z98.890 OTHER SPECIFIED POSTPROCEDURAL STATES: ICD-10-CM

## 2025-04-22 LAB
ALBUMIN MFR UR ELPH: <6 MG/DL
AMYLASE SERPL-CCNC: 78 U/L (ref 28–100)
ANION GAP SERPL CALCULATED.3IONS-SCNC: 13 MMOL/L (ref 7–15)
BUN SERPL-MCNC: 40.5 MG/DL (ref 6–20)
CALCIUM SERPL-MCNC: 9.6 MG/DL (ref 8.8–10.4)
CHLORIDE SERPL-SCNC: 102 MMOL/L (ref 98–107)
CMV DNA SPEC NAA+PROBE-ACNC: NOT DETECTED IU/ML
CREAT SERPL-MCNC: 0.97 MG/DL (ref 0.51–0.95)
CREAT UR-MCNC: 11.8 MG/DL
EGFRCR SERPLBLD CKD-EPI 2021: 76 ML/MIN/1.73M2
ERYTHROCYTE [DISTWIDTH] IN BLOOD BY AUTOMATED COUNT: 12.7 % (ref 10–15)
EST. AVERAGE GLUCOSE BLD GHB EST-MCNC: 103 MG/DL
GLUCOSE SERPL-MCNC: 88 MG/DL (ref 70–99)
HBA1C MFR BLD: 5.2 %
HCO3 SERPL-SCNC: 23 MMOL/L (ref 22–29)
HCT VFR BLD AUTO: 33.7 % (ref 35–47)
HGB BLD-MCNC: 11.1 G/DL (ref 11.7–15.7)
LIPASE SERPL-CCNC: 48 U/L (ref 13–60)
MAGNESIUM SERPL-MCNC: 1.9 MG/DL (ref 1.7–2.3)
MCH RBC QN AUTO: 29.1 PG (ref 26.5–33)
MCHC RBC AUTO-ENTMCNC: 32.9 G/DL (ref 31.5–36.5)
MCV RBC AUTO: 89 FL (ref 78–100)
PHOSPHATE SERPL-MCNC: 2.9 MG/DL (ref 2.5–4.5)
PLATELET # BLD AUTO: 228 10E3/UL (ref 150–450)
POTASSIUM SERPL-SCNC: 4.1 MMOL/L (ref 3.4–5.3)
PROT/CREAT 24H UR: NORMAL MG/G{CREAT}
RBC # BLD AUTO: 3.81 10E6/UL (ref 3.8–5.2)
SODIUM SERPL-SCNC: 138 MMOL/L (ref 135–145)
SPECIMEN TYPE: NORMAL
TACROLIMUS BLD-MCNC: 6.6 UG/L (ref 5–15)
TME LAST DOSE: NORMAL H
TME LAST DOSE: NORMAL H
WBC # BLD AUTO: 7 10E3/UL (ref 4–11)

## 2025-04-22 PROCEDURE — 83735 ASSAY OF MAGNESIUM: CPT

## 2025-04-22 PROCEDURE — 80048 BASIC METABOLIC PNL TOTAL CA: CPT

## 2025-04-22 PROCEDURE — 85018 HEMOGLOBIN: CPT

## 2025-04-22 PROCEDURE — 84100 ASSAY OF PHOSPHORUS: CPT

## 2025-04-22 PROCEDURE — 84156 ASSAY OF PROTEIN URINE: CPT

## 2025-04-22 PROCEDURE — 87799 DETECT AGENT NOS DNA QUANT: CPT

## 2025-04-22 PROCEDURE — 83690 ASSAY OF LIPASE: CPT

## 2025-04-22 PROCEDURE — 83036 HEMOGLOBIN GLYCOSYLATED A1C: CPT

## 2025-04-22 PROCEDURE — 36415 COLL VENOUS BLD VENIPUNCTURE: CPT

## 2025-04-22 PROCEDURE — 80197 ASSAY OF TACROLIMUS: CPT

## 2025-04-22 PROCEDURE — 82150 ASSAY OF AMYLASE: CPT

## 2025-04-23 LAB
BK VIRUS SPECIMEN TYPE: NORMAL
BKV DNA # SPEC NAA+PROBE: NOT DETECTED IU/ML

## 2025-04-30 NOTE — PROGRESS NOTES
"Chief Complaint  Follow-up of the Left Hip    Subjective      Maegan Madera presents to [facility]     History of Present Illness  The patient is here today for a follow-up on her left hip. She is 3 years status post left total hip arthroplasty from 05/06/2022.    A satisfactory condition of the left hip is reported, with no significant concerns or complications. No falls have been experienced, and an active lifestyle is maintained, including household chores such as cleaning and laundry. Intermittent rest periods are required during these activities.    Additionally, arthritis in the back is mentioned.    SOCIAL HISTORY  Exercise: Cleaning the house and doing laundry, with breaks due to arthritis pain.      No Known Allergies    Objective     Vital Signs:   Vitals:    04/30/25 1323   BP: 133/82   BP Location: Right arm   Patient Position: Sitting   Cuff Size: Adult   Pulse: 94   SpO2: 94%   Weight: 70.3 kg (155 lb)   Height: 172.7 cm (67.99\")     Body mass index is 23.57 kg/m².    I reviewed the patient's chief complaint, history of present illness, review of systems, past medical history, surgical history, family history, social history, medications, and allergy list.     Ortho Exam    General: Alert. No acute distress.  Gait: Nonantalgic gait.    Left Hip:No pain with passive hip ROM.  Intact active hip ROM with no reported pain. Non tender over the thigh or knee distally. Demonstrates intact active ankle dorsiflexion and plantarflexion. Demonstrates intact sensation over dorsal and plantar foot.  Calf soft, nontender. Neurovascular intact distally. Palpable pedal pulses.         Imaging Results (Most Recent)       Procedure Component Value Units Date/Time    XR Hip With or Without Pelvis 2 - 3 View Left [[id number]] Resulted: 04/30/25 1541     Updated: 04/30/25 1541    Narrative:      X-Ray Report:  Study: X-rays ordered, taken in the office, and reviewed today.   Site: Left Hip " Visit Date: 2022      D: 2022   T: 2022   MT: [id number]    Name:     NORY CAMARGO  MRN:      [id number]        Account:    [id number]   :      1985           Visit Date: 2022     Document: [id number]    To Whom It May Concern:      It was a pleasure participating in the care of your patient, Ms. Nory Camargo.  As you know, she is a 37-year-old lady who I saw today over virtual video visit via [employer] for exertional lightheadedness and shortness of breath.      PAST MEDICAL HISTORY:     1.  Type 1 diabetes since age 14.  2.  Hypertension.  3.  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  4.  Heterozygous factor V Leiden mutation.  5.  IBS.  6.  Constipation and gastroparesis.  7.  Osteopenia.  8.  Left femoral neck fracture, status post open reduction and internal fixation.    I last saw her on 2022 and at that time, she was feeling exertional lightheadedness and shortness of breath.  We performed coronary angiogram on 2022 with the following results:    LEFT MAIN: Normal.  LAD: Normal.  CIRCUMFLEX: Normal.  RCA: Normal.    Right heart catheterization was also performed, which showed the following:      Mean right atrial pressure 10 mmHg.  Mean PA pressure 24 mmHg.  Mean pulmonary capillary wedge pressure 17 mmHg.  Cardiac output 6.1 liters per minute.    Echocardiogram was unremarkable, showed normal function without significant valvular pathology and normal diastolic function .    She presents today for followup from these test results and to discuss clinical progress.    Since her procedures have been completed, she notes that her weight fluctuates quite prominently and 5 days ago, she weighed 130 pounds and had some swelling; however, after some diuresis of Lasix 20 mg every other day, her weight is now down to the 123.6 range.    She does note that with extra diuresis she is able to walk  Xray  Indication: Left total hip arthroplasty follow-up  View: AP, frog lateral left hip  Findings: Intact left total hip arthroplasty.  No evidence of hardware   complications or loosening.  No periprosthetic fractures are visualized.    Hip joint is reduced.  Prior studies available for comparison: yes                        Assessment and Plan   Diagnoses and all orders for this visit:    1. History of total hip arthroplasty, left (Primary)  -     XR Hip With or Without Pelvis 2 - 3 View Left    2. Left hip pain         Assessment & Plan  1. Status post left total hip arthroplasty from 05/06/2022:  Left hip is in excellent condition with no signs of displacement or other complications.     Maintain current level of activity and be cautious to prevent falls. In the event of a fall resulting in severe pain, an immediate visit to the emergency room is recommended. If a fall occurs and immediate medical attention is not needed, but discomfort persists after a day, seek an x-ray examination and contact our office.    2. Arthritis in the back:  Reports arthritis in the back causing pain during activities.  Take breaks and rest as needed while performing tasks like cleaning and laundry. Continue activities to prevent worsening symptoms due to inactivity.     Follow-up: Return if any problems arise with the hip.  Recommend occasional xrays of the hip after a few years for continued re-evaluation            Tobacco Use: Medium Risk (5/1/2025)    Patient History     Smoking Tobacco Use: Former     Smokeless Tobacco Use: Never     Passive Exposure: Not on file     Patient reports they have a history of tobacco use; encouraged continued tobacco cessation for further health benefits.     BMI is within normal parameters. No other follow-up for BMI required.      Follow Up   Return in about 3 years (around 4/30/2028).  There are no Patient Instructions on file for this visit.    Patient was given instructions and counseling  faster for longer at a steeper incline on the treadmill (she typically walks at 3.5 miles per hour, but can only go to a 5-degree incline when she is at a heavier weight.  When she is diuresed down to 123lbs, she can go ahead and set the incline up to 10 at a more strenuous pace.    She otherwise denies gross new chest pains.  She does get short of breath with exertion.  She denies nancy PND, orthopnea, but she does get some edema.  She denies palpitations, syncope or near syncope.    CURRENT MEDICATIONS:       1.  Aspirin 81 mg a day.  2.  Lasix 20 mg 3 times a week.  3.  Insulin.  4.  Tacrolimus.  5.  Mycophenolic acid.  6.  Bactrim.    PHYSICAL EXAMINATION:      VITAL SIGNS:  Her blood pressure was last recorded in the 135 mmHg range.  Her weight is currently 123.6 pounds.  GENERAL:  She appears comfortable, well groomed.  PSYCHIATRIC:  She is alert and oriented x3.  HEENT:  Eyes do not appear grossly erythematous or have exudate.  RESPIRATORY:  She is breathing comfortably without gross cough.    The remainder of the comprehensive physical exam was deferred secondary to the COVID-19 pandemic and secondary to video visit restrictions.    LABORATORY:  Labs 09/19/2022, potassium 4.4, GFR 42, hemoglobin 9.7.    Echocardiogram 09/12/2022 reveals an ejection fraction of 60-65%.  No significant valvular pathology identified.    Stress echo 07/21/2022 reveals normal resting LV systolic function.  However, her blood pressure dropped from baseline of 132/68 to 120/78 despite her pulse going up to the 156 beats per minute range.  She did achieve 14 metabolic equivalents.      IMPRESSION:      Marilyn is a 37-year-old lady whose past medical history is significant for having had a kidney and pancreas transplant in 11/2021 with some rejection, who has a baseline GFR 44 mL per minute and a heterozygous factor V Leiden mutation who presents with following issues:    1.  Exertional lightheadedness, shortness of breath and  inappropriate blood pressure response to exercise on stress test.    Her symptoms are likely multifactorial in origin.  She did have a normal coronary angiogram showing normal coronary arteries recently and her shortness of breath is likely at least partially secondary to her elevated pulmonary capillary wedge pressure noted on right heart catheterization recently as well.  Her symptoms may have multiple underlying contributing factors, including mild renal insufficiency, low baseline hemoglobin, possible autonomic dysfunction, etc.    It is reassuring to know that her symptoms do improve with some diuresis and that she is able to exert herself further and more strenuously after diuresis (when weight goes down recently from 130 down to 123.6 lbs).    We will perform further noninvasive evaluation and consultation for management of treatment.      PLAN:       1.  Cardiac MRI with contrast in order to rule out significant infiltrative process or other structural pathology that could contribute to symptoms.    2.  CORE clinic visit in order to aid in optimizing weight, renal function, fluid status and symptoms in the long run.    3.  She already has an appointment with electrophysiologist Dr. Prema Diez to consider the possibility of some form of autonomic dysfunction that might have led to the documented drop in blood pressure with exercise, despite having normal coronary arteries & echo, and to consider further treatment and/or workup that might include medicines/lifestyle modification and/or even tilt table or other testing if indicated.    Further updates and followup to be determined pending above test results and consultations along with clinical progress.    Once again, it was a pleasure participating in the care of your patient, Ms. Marilyn Ortega.  Please feel free to contact me at any time if any questions regarding her care in the future.      Sincerely,          Osiel Raphael MD        D: 09/20/2022   T:  regarding her condition or for health maintenance advice. Please see specific information pulled into the AVS if appropriate.     Patient or patient representative verbalized consent for the use of Ambient Listening during the visit with  BELA Cash for chart documentation. 5/1/2025  19:25 EDT    Dictated Utilizing Dragon Dictation. Please note that portions of this note were completed with a voice recognition program. Part of this note may be an electronic transcription/translation of spoken language to printed text using the Dragon Dictation System.                                           2022   MT: GHMT1    Name:     NORY CAMARGOAlfred  MRN:      [id number]        Account:    [id number]   :      1985           Visit Date: 2022     Document: [id number]

## 2025-05-05 ENCOUNTER — TELEPHONE (OUTPATIENT)
Dept: TRANSPLANT | Facility: CLINIC | Age: 40
End: 2025-05-05
Payer: COMMERCIAL

## 2025-05-05 DIAGNOSIS — T86.11 KIDNEY TRANSPLANT REJECTION: ICD-10-CM

## 2025-05-05 DIAGNOSIS — Z94.0 HTN, KIDNEY TRANSPLANT RELATED: ICD-10-CM

## 2025-05-05 DIAGNOSIS — I15.1 HTN, KIDNEY TRANSPLANT RELATED: ICD-10-CM

## 2025-05-05 NOTE — TELEPHONE ENCOUNTER
PA Initiation    Medication: ENVARSUS XR 1 MG PO TB24  Insurance Company: [employer] ([facility]) - Phone [phone] Fax [phone]  Pharmacy Filling the Rx:    Filling Pharmacy Phone:    Filling Pharmacy Fax:    Start Date: 5/5/2025

## 2025-05-07 NOTE — TELEPHONE ENCOUNTER
Prior Authorization Approval    Medication: ENVARSUS XR 1 MG PO TB24  Authorization Effective Date: 6/4/2025  Authorization Expiration Date: 6/4/2026  Approved Dose/Quantity: UD  Reference #:     Insurance Company: [employer] ([facility]) - Phone [phone] Fax [phone]  Expected CoPay: $  0  CoPay Card Available: No    Financial Assistance Needed: N/A  Which Pharmacy is filling the prescription:    Pharmacy Notified: PHARMACY ALREADY FILLED  Patient Notified:NO

## 2025-05-10 ENCOUNTER — HEALTH MAINTENANCE LETTER (OUTPATIENT)
Age: 40
End: 2025-05-10

## 2025-05-28 ENCOUNTER — VIRTUAL VISIT (OUTPATIENT)
Dept: TRANSPLANT | Facility: CLINIC | Age: 40
End: 2025-05-28
Attending: INTERNAL MEDICINE
Payer: COMMERCIAL

## 2025-05-28 VITALS — BODY MASS INDEX: 20.09 KG/M2 | WEIGHT: 125 LBS | HEIGHT: 66 IN

## 2025-05-28 DIAGNOSIS — Z94.0 KIDNEY REPLACED BY TRANSPLANT: ICD-10-CM

## 2025-05-28 DIAGNOSIS — Z48.298 AFTERCARE FOLLOWING ORGAN TRANSPLANT: ICD-10-CM

## 2025-05-28 DIAGNOSIS — Z94.0 HTN, KIDNEY TRANSPLANT RELATED: ICD-10-CM

## 2025-05-28 DIAGNOSIS — D84.9 IMMUNOSUPPRESSION: Primary | ICD-10-CM

## 2025-05-28 DIAGNOSIS — I15.1 HTN, KIDNEY TRANSPLANT RELATED: ICD-10-CM

## 2025-05-28 DIAGNOSIS — Z94.83 PANCREAS REPLACED BY TRANSPLANT (H): ICD-10-CM

## 2025-05-28 DIAGNOSIS — T86.11 KIDNEY TRANSPLANT REJECTION: ICD-10-CM

## 2025-05-28 PROCEDURE — 1126F AMNT PAIN NOTED NONE PRSNT: CPT | Mod: 95 | Performed by: INTERNAL MEDICINE

## 2025-05-28 PROCEDURE — 98006 SYNCH AUDIO-VIDEO EST MOD 30: CPT | Performed by: INTERNAL MEDICINE

## 2025-05-28 PROCEDURE — G2211 COMPLEX E/M VISIT ADD ON: HCPCS | Performed by: INTERNAL MEDICINE

## 2025-05-28 ASSESSMENT — PAIN SCALES - GENERAL: PAINLEVEL_OUTOF10: NO PAIN (0)

## 2025-05-28 NOTE — PATIENT INSTRUCTIONS
Transplant Patient Information  Your Post Transplant Coordinator is: Hamida Guevara  You and your care team can contact your transplant coordinator Monday - Friday, 8am - 5pm at [phone] (Option 2 to reach the coordinator or Option 4 to schedule an appointment).  You can also reach your care team online via [employer].  After hours for urgent matters, please call [facility] at [phone].

## 2025-05-28 NOTE — PROGRESS NOTES
Virtual Visit Details    Type of service:  Video Visit     Originating Location (pt. Location): Home    Distant Location (provider location):  Off-site  Platform used for Video Visit: Herman    Start: 10:37 AM  Stop:  10:54 AM    TRANSPLANT NEPHROLOGY VISIT    Assessment & Plan   # DDKT (SPK): Stable               - Baseline Creatinine: ~ 0.9-1.1              - Proteinuria: Not checked post transplant              - DSA: No              - BK Viremia: detectable, but not quantifiable in the past              - Kidney Tx Biopsy: yes   # kidney bx 5/2/22: read as borderline cellular rejection, isolated tubulitis i0t1, IF suggests possible IgA nephropathy MEST0    # closure Bx: 6/9/22 very limited sample, 2 glomeruli on EM, c4d not available, no signs of rejection       # Pancreas Tx (SPK):               - Pancreatic Exocrine Drainage: Enteric drained                     - Blood glucose: Euglycemia       On insulin: No              - HbA1c: 5.2%              - Pancreatic enzymes: Stable              - DSA: No              - Pancreas Tx Biopsy: No     # Immunosuppression: Envarsus (goal trough 5-8),  mg po bid               - Induction with Recent Transplant:  Intermediate Intensity              - Continue with intensive monitoring of immunosuppression for efficacy and toxicity.              - Changes: Not at this time.    # Infection Prophylaxis:   - PJP: None     # Blood Pressure: Controlled;          Goal BP:<130/80              # Anemia in Chronic Renal Disease: Hgb: stable     SYLVAIN: No              - due for updated iron studies     # Mineral Bone Disorder:   - stable Ca/P on most recent check, last PTH:83 in 2023     # Electrolytes:   - stable      #  Venous insufficiency:   - intermittent leg swelling post transplant, w/up included Echo with nl ef, no valvular abnormalities. Angiogram nl coronaries. RHC mildly elevated PCWP. Cardiac MRI unremarkable   - seen by vascular surgery at [place] in 2023, conservatives  measures     # Skin Cancer:   - Recommend regular follow up with Dermatology.      # Transplant History:  Etiology of Kidney Failure: Diabetes mellitus type 1  Tx: SPK  Transplant: 11/15/2021 (Kidney / Pancreas)  Donor Type: Donation after Brain Death Donor Class:   Crossmatch at time of Tx: negative  DSA at time of Tx: No  Significant changes in immunosuppression: None  CMV IgG Ab High Risk Discordance (D+/R-): No  EBV IgG Ab High Risk Discordance (D+/R-): No  Significant transplant-related complications: None    Transplant Office Phone Number: [phone]    Assessment and plan was discussed with the patient and she voiced her understanding and agreement.    Return visit: 1 year    Chief Complaint   Ms. Ortega is a 40 year old here for kidney transplant, pancreas transplant and immunosuppression management.    History of Present Illness     Feels good overall.   No recent illness or hospitalization.   Intermittent stable leg swelling   Fevers, chills, weight loss, night sweats.   No nausea, vomiting, abdominal pain, diarrhea.     IS: Envarsus 5.25 /360     Problem List   Patient Active Problem List   Diagnosis    Osteopenia    Chronic constipation    Irritable bowel syndrome    Stage 3a chronic kidney disease (H)    HTN, kidney transplant related    Gastroparesis    Heterozygous factor V Leiden mutation    Immunosuppression    Kidney replaced by transplant    Pancreas replaced by transplant (H)    Anemia due to stage 3a chronic kidney disease (H)    Aftercare following organ transplant    Generalized edema    Kidney transplant rejection    Dyspnea on exertion    Chest pain, unspecified type    Dehydration    Strep throat    Basal cell carcinoma       Allergies   Allergies   Allergen Reactions    Chlorhexidine Hives, Itching and Rash     PN: Patient had swelling/rash that was very itchy with chlorprep.    Ceclor [Cefaclor Monohydrate]     Metoclopramide Other (See Comments)     Noted mouth/facial twitching  with reglan in 2022 when it was tried for gastroparesis.    Cefaclor Rash       Medications   Current Outpatient Medications   Medication Sig Dispense Refill    aflibercept (EYLEA) 2 MG/0.05ML SOSY injection prefilled syringe 2 mg by Intravitreal route      aspirin (ASA) 81 MG chewable tablet Take 81 mg by mouth daily      cetirizine (ZYRTEC) 10 MG tablet Take 1 tablet (10 mg) by mouth every other day      docusate sodium (COLACE) 100 MG capsule Take 200 mg by mouth daily before breakfast      Dust Mite Mixed Allergen Ext (ODACTRA) 12 SQ-HDM SUBL Place 1 Dose under the tongue daily (Patient not taking: Reported on 4/1/2024) 30 tablet 11    EPINEPHrine (ANY BX GENERIC EQUIV) 0.3 MG/0.3ML injection 2-pack Inject 0.3 mLs (0.3 mg) into the muscle as needed for anaphylaxis 2 each 2    furosemide (LASIX) 20 MG tablet Take 1 tablet (20 mg) by mouth daily 45 tablet 0    mycophenolic acid (GENERIC EQUIVALENT) 360 MG EC tablet Take 1 tablet (360 mg) by mouth 2 times daily. 60 tablet 11    ORDER FOR ALLERGEN IMMUNOTHERAPY Name of Mix: Mix #1  Dust Mite  Dust Mites DF. 10,000 AU/mL, HS  1.0 ml  Dust Mites DP. 10,000 AU/mL, HS  1.0 ml   Diluent: HSA 3.0 ml to 5ml 5 mL PRN    prochlorperazine (COMPAZINE) 10 MG tablet Take 1 tablet (10 mg) by mouth every 6 hours as needed for nausea or vomiting. 12 tablet 0    prucalopride succinate (MOTEGRITY) 2 MG tablet Take 1 tablet (2 mg) by mouth daily 90 tablet 3    tacrolimus (ENVARSUS XR) 0.75 MG 24 hr tablet Take 3 tablets (2.25 mg) by mouth every morning (before breakfast). Total dose: 5.25 mg daily 280 tablet 3    tacrolimus (ENVARSUS XR) 1 MG 24 hr tablet Take 3 tablets (3 mg) by mouth every morning (before breakfast). Total dose: 5.25 mg daily 280 tablet 3    tacrolimus (ENVARSUS XR) 4 MG 24 hr tablet Profile Rx: patient will contact pharmacy when needed 90 tablet 3     No current facility-administered medications for this visit.     Medications Discontinued During This Encounter  "  Medication Reason    nitazoxanide (ALINIA) 500 MG tablet     calcium carbonate-vitamin D (OSCAL) 500-5 MG-MCG tablet        Physical Exam   Vital Signs: Ht 1.676 m (5' 6\")   Wt 56.7 kg (125 lb)   BMI 20.18 kg/m      GENERAL: alert and no distress  EYES: Eyes grossly normal to inspection.  No discharge or erythema, or obvious scleral/conjunctival abnormalities.  RESP: No audible wheeze, cough, or visible cyanosis.    SKIN: Visible skin clear. No significant rash, abnormal pigmentation or lesions.  NEURO: Cranial nerves grossly intact.  Mentation and speech appropriate for age.  PSYCH: Appropriate affect, tone, and pace of words    Data         Latest Ref Rng & Units 4/22/2025     8:00 AM 1/24/2025     8:35 AM 1/10/2025     8:45 AM   Renal   Sodium 135 - 145 mmol/L 138  137  139    K 3.4 - 5.3 mmol/L 4.1  4.3  4.3    Cl 98 - 107 mmol/L 102  104  106    Cl (external) 98 - 107 mmol/L 102  104  106    CO2 22 - 29 mmol/L 23  22  20    Urea Nitrogen 6.0 - 20.0 mg/dL 40.5  26.5  37.6    Creatinine 0.51 - 0.95 mg/dL 0.97  1.00  1.18    Glucose 70 - 99 mg/dL 88  84  80    Calcium 8.8 - 10.4 mg/dL 9.6  9.6  9.5    Magnesium 1.7 - 2.3 mg/dL 1.9            Latest Ref Rng & Units 4/22/2025     8:00 AM 9/24/2024     8:10 AM 3/8/2024     8:35 AM   Bone Health   Phosphorus 2.5 - 4.5 mg/dL 2.9  3.0  2.8          Latest Ref Rng & Units 4/22/2025     8:00 AM 1/24/2025     8:35 AM 1/10/2025     8:45 AM   Heme   WBC 4.0 - 11.0 10e3/uL 7.0  5.8  6.2    Hgb 11.7 - 15.7 g/dL 11.1  12.3  11.4    Plt 150 - 450 10e3/uL 228  227  234          Latest Ref Rng & Units 12/15/2024     2:40 PM 7/14/2022     8:04 AM 5/23/2022     8:19 AM   Liver   AP 40 - 150 U/L 85   84    TBili <=1.2 mg/dL 0.3   0.3    Bilirubin Direct 0.0 - 0.2 mg/dL   <0.1    ALT 0 - 50 U/L 25   20    AST 0 - 45 U/L 34   13    Tot Protein 6.4 - 8.3 g/dL 7.4   7.9    Albumin 3.4 - 5.0 g/dL  4.0  4.0    Albumin 3.5 - 5.2 g/dL 4.6            Latest Ref Rng & Units 4/22/2025     " 8:00 AM 1/10/2025     8:45 AM 12/9/2024     8:56 AM   Pancreas   A1C <5.7 % 5.2      Amylase 28 - 100 U/L 78  71  69    Lipase (Roche) 13 - 60 U/L 48  49  51          Latest Ref Rng & Units 2/6/2024     8:38 AM 4/27/2022     5:45 PM 4/18/2022     8:05 AM   Iron studies   Iron 37 - 145 ug/dL 68  56  106    Iron Saturation Index 15 - 46 %  20  35    Iron Sat Index 15 - 46 % 21      Ferritin 6 - 175 ng/mL 20  110  120          Latest Ref Rng & Units 1/12/2024     8:36 AM 4/18/2022     8:05 AM 1/28/2022     3:40 PM   [facility] Txp Virology   CMV QUANT IU/ML Not Detected IU/mL  Not Detected  Not Detected    EBV DNA COPIES/ML Not Detected copies/mL Not Detected  Not Detected         Recent Labs   Lab Test 01/10/25  0845 01/24/25  0835 04/22/25  0800   DOSTAC 1/9/2025 1/23/2025 4/21/2025   TACROL 5.8 5.8 6.6     Recent Labs   Lab Test 09/05/23  0835 10/09/23  0848 02/06/24  0838   DOSMPA 9/4/2023   8:00 PM  --  2/5/2024   8:00 PM   MPACID 6.18* 2.25 2.34   MPAG 53.5 29.9* 55.4

## 2025-05-28 NOTE — NURSING NOTE
Current patient location: [address] [place] [address] N  [facility] [id number]    Is the patient currently in the state of [place]? YES    Visit mode: VIDEO    If the visit is dropped, the patient can be reconnected by:VIDEO VISIT: Send to e-mail at: [email or web address]    Will anyone else be joining the visit? NO  (If patient encounters technical issues they should call [phone]    Are changes needed to the allergy or medication list? No    Are refills needed on medications prescribed by this physician? NO    Rooming Documentation:  Questionnaire(s) completed    Reason for visit: GILL OLIVER

## 2025-05-28 NOTE — LETTER
5/28/2025      Marilyn Ortega  [address]  [place] [address]      Dear Colleague,    Thank you for referring your patient, Marilyn Ortega, to the [facility]. Please see a copy of my visit note below.    Virtual Visit Details    Type of service:  Video Visit     Originating Location (pt. Location): Home    Distant Location (provider location):  Off-site  Platform used for Video Visit: AmWell    Start: 10:37 AM  Stop:  10:54 AM    TRANSPLANT NEPHROLOGY VISIT    Assessment & Plan  # DDKT (SPK): Stable               - Baseline Creatinine: ~ 0.9-1.1              - Proteinuria: Not checked post transplant              - DSA: No              - BK Viremia: detectable, but not quantifiable in the past              - Kidney Tx Biopsy: yes   # kidney bx 5/2/22: read as borderline cellular rejection, isolated tubulitis i0t1, IF suggests possible IgA nephropathy MEST0    # closure Bx: 6/9/22 very limited sample, 2 glomeruli on EM, c4d not available, no signs of rejection       # Pancreas Tx (SPK):               - Pancreatic Exocrine Drainage: Enteric drained                     - Blood glucose: Euglycemia       On insulin: No              - HbA1c: 5.2%              - Pancreatic enzymes: Stable              - DSA: No              - Pancreas Tx Biopsy: No     # Immunosuppression: Envarsus (goal trough 5-8),  mg po bid               - Induction with Recent Transplant:  Intermediate Intensity              - Continue with intensive monitoring of immunosuppression for efficacy and toxicity.              - Changes: Not at this time.    # Infection Prophylaxis:   - PJP: None     # Blood Pressure: Controlled;          Goal BP:<130/80              # Anemia in Chronic Renal Disease: Hgb: stable     SYLVAIN: No              - due for updated iron studies     # Mineral Bone Disorder:   - stable Ca/P on most recent check, last PTH:83 in 2023     # Electrolytes:   - stable      #  Venous  insufficiency:   - intermittent leg swelling post transplant, w/up included Echo with nl ef, no valvular abnormalities. Angiogram nl coronaries. RHC mildly elevated PCWP. Cardiac MRI unremarkable   - seen by vascular surgery at [place] in 2023, conservatives measures     # Skin Cancer:   - Recommend regular follow up with Dermatology.      # Transplant History:  Etiology of Kidney Failure: Diabetes mellitus type 1  Tx: SPK  Transplant: 11/15/2021 (Kidney / Pancreas)  Donor Type: Donation after Brain Death Donor Class:   Crossmatch at time of Tx: negative  DSA at time of Tx: No  Significant changes in immunosuppression: None  CMV IgG Ab High Risk Discordance (D+/R-): No  EBV IgG Ab High Risk Discordance (D+/R-): No  Significant transplant-related complications: None    Transplant Office Phone Number: [phone]    Assessment and plan was discussed with the patient and she voiced her understanding and agreement.    Return visit: 1 year    Chief Complaint  Ms. Ortega is a 40 year old here for kidney transplant, pancreas transplant and immunosuppression management.    History of Present Illness    Feels good overall.   No recent illness or hospitalization.   Intermittent stable leg swelling   Fevers, chills, weight loss, night sweats.   No nausea, vomiting, abdominal pain, diarrhea.     IS: Envarsus 5.25 /360     Problem List  Patient Active Problem List   Diagnosis     Osteopenia     Chronic constipation     Irritable bowel syndrome     Stage 3a chronic kidney disease (H)     HTN, kidney transplant related     Gastroparesis     Heterozygous factor V Leiden mutation     Immunosuppression     Kidney replaced by transplant     Pancreas replaced by transplant (H)     Anemia due to stage 3a chronic kidney disease (H)     Aftercare following organ transplant     Generalized edema     Kidney transplant rejection     Dyspnea on exertion     Chest pain, unspecified type     Dehydration     Strep throat     Basal cell  carcinoma       Allergies  Allergies   Allergen Reactions     Chlorhexidine Hives, Itching and Rash     PN: Patient had swelling/rash that was very itchy with chlorprep.     Ceclor [Cefaclor Monohydrate]      Metoclopramide Other (See Comments)     Noted mouth/facial twitching with reglan in 2022 when it was tried for gastroparesis.     Cefaclor Rash       Medications  Current Outpatient Medications   Medication Sig Dispense Refill     aflibercept (EYLEA) 2 MG/0.05ML SOSY injection prefilled syringe 2 mg by Intravitreal route       aspirin (ASA) 81 MG chewable tablet Take 81 mg by mouth daily       cetirizine (ZYRTEC) 10 MG tablet Take 1 tablet (10 mg) by mouth every other day       docusate sodium (COLACE) 100 MG capsule Take 200 mg by mouth daily before breakfast       Dust Mite Mixed Allergen Ext (ODACTRA) 12 SQ-HDM SUBL Place 1 Dose under the tongue daily (Patient not taking: Reported on 4/1/2024) 30 tablet 11     EPINEPHrine (ANY BX GENERIC EQUIV) 0.3 MG/0.3ML injection 2-pack Inject 0.3 mLs (0.3 mg) into the muscle as needed for anaphylaxis 2 each 2     furosemide (LASIX) 20 MG tablet Take 1 tablet (20 mg) by mouth daily 45 tablet 0     mycophenolic acid (GENERIC EQUIVALENT) 360 MG EC tablet Take 1 tablet (360 mg) by mouth 2 times daily. 60 tablet 11     ORDER FOR ALLERGEN IMMUNOTHERAPY Name of Mix: Mix #1  Dust Mite  Dust Mites DF. 10,000 AU/mL, HS  1.0 ml  Dust Mites DP. 10,000 AU/mL, HS  1.0 ml   Diluent: HSA 3.0 ml to 5ml 5 mL PRN     prochlorperazine (COMPAZINE) 10 MG tablet Take 1 tablet (10 mg) by mouth every 6 hours as needed for nausea or vomiting. 12 tablet 0     prucalopride succinate (MOTEGRITY) 2 MG tablet Take 1 tablet (2 mg) by mouth daily 90 tablet 3     tacrolimus (ENVARSUS XR) 0.75 MG 24 hr tablet Take 3 tablets (2.25 mg) by mouth every morning (before breakfast). Total dose: 5.25 mg daily 280 tablet 3     tacrolimus (ENVARSUS XR) 1 MG 24 hr tablet Take 3 tablets (3 mg) by mouth every morning  "(before breakfast). Total dose: 5.25 mg daily 280 tablet 3     tacrolimus (ENVARSUS XR) 4 MG 24 hr tablet Profile Rx: patient will contact pharmacy when needed 90 tablet 3     No current facility-administered medications for this visit.     Medications Discontinued During This Encounter   Medication Reason     nitazoxanide (ALINIA) 500 MG tablet      calcium carbonate-vitamin D (OSCAL) 500-5 MG-MCG tablet        Physical Exam  Vital Signs: Ht 1.676 m (5' 6\")   Wt 56.7 kg (125 lb)   BMI 20.18 kg/m      GENERAL: alert and no distress  EYES: Eyes grossly normal to inspection.  No discharge or erythema, or obvious scleral/conjunctival abnormalities.  RESP: No audible wheeze, cough, or visible cyanosis.    SKIN: Visible skin clear. No significant rash, abnormal pigmentation or lesions.  NEURO: Cranial nerves grossly intact.  Mentation and speech appropriate for age.  PSYCH: Appropriate affect, tone, and pace of words    Data        Latest Ref Rng & Units 4/22/2025     8:00 AM 1/24/2025     8:35 AM 1/10/2025     8:45 AM   Renal   Sodium 135 - 145 mmol/L 138  137  139    K 3.4 - 5.3 mmol/L 4.1  4.3  4.3    Cl 98 - 107 mmol/L 102  104  106    Cl (external) 98 - 107 mmol/L 102  104  106    CO2 22 - 29 mmol/L 23  22  20    Urea Nitrogen 6.0 - 20.0 mg/dL 40.5  26.5  37.6    Creatinine 0.51 - 0.95 mg/dL 0.97  1.00  1.18    Glucose 70 - 99 mg/dL 88  84  80    Calcium 8.8 - 10.4 mg/dL 9.6  9.6  9.5    Magnesium 1.7 - 2.3 mg/dL 1.9            Latest Ref Rng & Units 4/22/2025     8:00 AM 9/24/2024     8:10 AM 3/8/2024     8:35 AM   Bone Health   Phosphorus 2.5 - 4.5 mg/dL 2.9  3.0  2.8          Latest Ref Rng & Units 4/22/2025     8:00 AM 1/24/2025     8:35 AM 1/10/2025     8:45 AM   Heme   WBC 4.0 - 11.0 10e3/uL 7.0  5.8  6.2    Hgb 11.7 - 15.7 g/dL 11.1  12.3  11.4    Plt 150 - 450 10e3/uL 228  227  234          Latest Ref Rng & Units 12/15/2024     2:40 PM 7/14/2022     8:04 AM 5/23/2022     8:19 AM   Liver   AP 40 - 150 U/L " 85   84    TBili <=1.2 mg/dL 0.3   0.3    Bilirubin Direct 0.0 - 0.2 mg/dL   <0.1    ALT 0 - 50 U/L 25   20    AST 0 - 45 U/L 34   13    Tot Protein 6.4 - 8.3 g/dL 7.4   7.9    Albumin 3.4 - 5.0 g/dL  4.0  4.0    Albumin 3.5 - 5.2 g/dL 4.6            Latest Ref Rng & Units 4/22/2025     8:00 AM 1/10/2025     8:45 AM 12/9/2024     8:56 AM   Pancreas   A1C <5.7 % 5.2      Amylase 28 - 100 U/L 78  71  69    Lipase (Roche) 13 - 60 U/L 48  49  51          Latest Ref Rng & Units 2/6/2024     8:38 AM 4/27/2022     5:45 PM 4/18/2022     8:05 AM   Iron studies   Iron 37 - 145 ug/dL 68  56  106    Iron Saturation Index 15 - 46 %  20  35    Iron Sat Index 15 - 46 % 21      Ferritin 6 - 175 ng/mL 20  110  120          Latest Ref Rng & Units 1/12/2024     8:36 AM 4/18/2022     8:05 AM 1/28/2022     3:40 PM   [facility] Virology   CMV QUANT IU/ML Not Detected IU/mL  Not Detected  Not Detected    EBV DNA COPIES/ML Not Detected copies/mL Not Detected  Not Detected         Recent Labs   Lab Test 01/10/25  0845 01/24/25  0835 04/22/25  0800   DOSTAC 1/9/2025 1/23/2025 4/21/2025   TACROL 5.8 5.8 6.6     Recent Labs   Lab Test 09/05/23  0835 10/09/23  0848 02/06/24  0838   DOSMPA 9/4/2023   8:00 PM  --  2/5/2024   8:00 PM   MPACID 6.18* 2.25 2.34   MPAG 53.5 29.9* 55.4       Again, thank you for allowing me to participate in the care of your patient.        Sincerely,        Reanna Lee MD    Electronically signed

## 2025-05-30 NOTE — TELEPHONE ENCOUNTER
Marilyn calling to speak with Coordinator regarding her having really bad back pain and would like to know what medication is she able to take for the pain   Please pull report

## 2025-06-02 ENCOUNTER — TRANSFERRED RECORDS (OUTPATIENT)
Dept: HEALTH INFORMATION MANAGEMENT | Facility: CLINIC | Age: 40
End: 2025-06-02
Payer: COMMERCIAL

## 2025-08-01 ENCOUNTER — LAB (OUTPATIENT)
Dept: LAB | Facility: CLINIC | Age: 40
End: 2025-08-01
Payer: COMMERCIAL

## 2025-08-01 DIAGNOSIS — T86.11 KIDNEY TRANSPLANT REJECTION: ICD-10-CM

## 2025-08-01 DIAGNOSIS — Z98.890 OTHER SPECIFIED POSTPROCEDURAL STATES: ICD-10-CM

## 2025-08-01 DIAGNOSIS — Z48.298 AFTERCARE FOLLOWING ORGAN TRANSPLANT: ICD-10-CM

## 2025-08-01 DIAGNOSIS — Z20.828 CONTACT WITH AND (SUSPECTED) EXPOSURE TO OTHER VIRAL COMMUNICABLE DISEASES: ICD-10-CM

## 2025-08-01 DIAGNOSIS — Z94.83 PANCREAS REPLACED BY TRANSPLANT (H): ICD-10-CM

## 2025-08-01 DIAGNOSIS — Z94.0 KIDNEY TRANSPLANT RECIPIENT: ICD-10-CM

## 2025-08-01 DIAGNOSIS — Z94.0 KIDNEY REPLACED BY TRANSPLANT: ICD-10-CM

## 2025-08-01 DIAGNOSIS — D84.9 IMMUNOSUPPRESSED STATUS: ICD-10-CM

## 2025-08-01 DIAGNOSIS — Z94.0 HTN, KIDNEY TRANSPLANT RELATED: ICD-10-CM

## 2025-08-01 DIAGNOSIS — I15.1 HTN, KIDNEY TRANSPLANT RELATED: ICD-10-CM

## 2025-08-01 LAB
AMYLASE SERPL-CCNC: 66 U/L (ref 28–100)
ANION GAP SERPL CALCULATED.3IONS-SCNC: 12 MMOL/L (ref 7–15)
BUN SERPL-MCNC: 34.8 MG/DL (ref 6–20)
CALCIUM SERPL-MCNC: 9.3 MG/DL (ref 8.8–10.4)
CHLORIDE SERPL-SCNC: 103 MMOL/L (ref 98–107)
CREAT SERPL-MCNC: 1.05 MG/DL (ref 0.51–0.95)
EGFRCR SERPLBLD CKD-EPI 2021: 69 ML/MIN/1.73M2
ERYTHROCYTE [DISTWIDTH] IN BLOOD BY AUTOMATED COUNT: 13.2 % (ref 10–15)
EST. AVERAGE GLUCOSE BLD GHB EST-MCNC: 111 MG/DL
GLUCOSE SERPL-MCNC: 81 MG/DL (ref 70–99)
HBA1C MFR BLD: 5.5 %
HCO3 SERPL-SCNC: 22 MMOL/L (ref 22–29)
HCT VFR BLD AUTO: 32.7 % (ref 35–47)
HGB BLD-MCNC: 11 G/DL (ref 11.7–15.7)
LIPASE SERPL-CCNC: 44 U/L (ref 13–60)
MCH RBC QN AUTO: 30.1 PG (ref 26.5–33)
MCHC RBC AUTO-ENTMCNC: 33.6 G/DL (ref 31.5–36.5)
MCV RBC AUTO: 90 FL (ref 78–100)
PLATELET # BLD AUTO: 232 10E3/UL (ref 150–450)
POTASSIUM SERPL-SCNC: 4 MMOL/L (ref 3.4–5.3)
RBC # BLD AUTO: 3.65 10E6/UL (ref 3.8–5.2)
SODIUM SERPL-SCNC: 137 MMOL/L (ref 135–145)
TACROLIMUS BLD-MCNC: 6 UG/L (ref 5–15)
TME LAST DOSE: NORMAL H
TME LAST DOSE: NORMAL H
WBC # BLD AUTO: 6.3 10E3/UL (ref 4–11)

## 2025-08-01 PROCEDURE — 80048 BASIC METABOLIC PNL TOTAL CA: CPT

## 2025-08-01 PROCEDURE — 87799 DETECT AGENT NOS DNA QUANT: CPT

## 2025-08-01 PROCEDURE — 83036 HEMOGLOBIN GLYCOSYLATED A1C: CPT

## 2025-08-01 PROCEDURE — 36415 COLL VENOUS BLD VENIPUNCTURE: CPT

## 2025-08-01 PROCEDURE — 83690 ASSAY OF LIPASE: CPT

## 2025-08-01 PROCEDURE — 82150 ASSAY OF AMYLASE: CPT

## 2025-08-01 PROCEDURE — 85018 HEMOGLOBIN: CPT

## 2025-08-01 PROCEDURE — 80197 ASSAY OF TACROLIMUS: CPT

## 2025-08-02 LAB
BK VIRUS SPECIMEN TYPE: NORMAL
BKV DNA # SPEC NAA+PROBE: NOT DETECTED IU/ML

## 2025-08-25 DIAGNOSIS — Z94.83 PANCREAS REPLACED BY TRANSPLANT (H): Primary | ICD-10-CM

## 2025-08-25 DIAGNOSIS — I15.1 HTN, KIDNEY TRANSPLANT RELATED: ICD-10-CM

## 2025-08-25 DIAGNOSIS — Z94.0 HTN, KIDNEY TRANSPLANT RELATED: ICD-10-CM

## 2025-08-25 DIAGNOSIS — T86.11 KIDNEY TRANSPLANT REJECTION: ICD-10-CM

## 2025-08-25 RX ORDER — MYCOPHENOLIC ACID 360 MG/1
360 TABLET, DELAYED RELEASE ORAL 2 TIMES DAILY
Qty: 60 TABLET | Refills: 11 | Status: SHIPPED | OUTPATIENT
Start: 2025-08-25

## (undated) DEVICE — DRAPE ISOLATION BAG 1003

## (undated) DEVICE — SYR 01ML 27GA 0.5" NDL TBC 309623

## (undated) DEVICE — SOL NACL 0.9% IRRIG 1000ML BOTTLE 2F7124

## (undated) DEVICE — SU PROLENE 7-0 BV-1DA 4X24" M8702

## (undated) DEVICE — SU SILK 3-0 TIE 12X30" A304H

## (undated) DEVICE — CATH PLUG W/CAP 000076

## (undated) DEVICE — DRAIN JACKSON PRATT RESERVOIR 100ML SU130-1305

## (undated) DEVICE — DRSG MEDIPORE 3 1/2X13 3/4" 3573

## (undated) DEVICE — INSERT FOGARTY 61MM TRACTION HYDRAJAW HYDRA61

## (undated) DEVICE — SUCTION TIP YANKAUER STR K87

## (undated) DEVICE — CLIP HORIZON MED BLUE 002200

## (undated) DEVICE — BASIN SET SINGLE STERILE 13752-624

## (undated) DEVICE — SU SILK 3-0 SH CR 8X18" C013D

## (undated) DEVICE — SU SILK 1 TIE 6X30" A307H

## (undated) DEVICE — TUBING PRESSURE 30"

## (undated) DEVICE — DRAIN JACKSON PRATT ROUND SIL 19FR W/TROCAR LF JP-2232

## (undated) DEVICE — SPONGE LAP 18X18" X8435

## (undated) DEVICE — SYR BULB IRRIG DOVER 60 ML LATEX FREE 67000

## (undated) DEVICE — BLADE CLIPPER SGL USE 9680

## (undated) DEVICE — INSERT FOGARTY 33MM TRACTION HYDRAJAW HYDRA33

## (undated) DEVICE — SU PROLENE 6-0 RB-2DA 30" 8711H

## (undated) DEVICE — Device

## (undated) DEVICE — NDL COUNTER 40CT  31142311

## (undated) DEVICE — WIPES FOLEY CARE SURESTEP PROVON DFC100

## (undated) DEVICE — SUCTION TIP YANKAUER W/O VENT K86

## (undated) DEVICE — KIT RIGHT HEART CATH 60130719

## (undated) DEVICE — INTRO SHEATH 7FRX10CM PINNACLE RSS702

## (undated) DEVICE — SU SILK 4-0 TIE 12X30" A303H

## (undated) DEVICE — SYR BULB IRRIG 50ML LATEX FREE 0035280

## (undated) DEVICE — PACK HEART LEFT CUSTOM

## (undated) DEVICE — STPL SKIN 35W ROTATING HEAD PRW35

## (undated) DEVICE — PREP CHLORAPREP 26ML TINTED ORANGE  260815

## (undated) DEVICE — MANIFOLD KIT ANGIO AUTOMATED 014613

## (undated) DEVICE — SU PROLENE 4-0 SHDA 36" 8521H

## (undated) DEVICE — NDL COUNTER 20CT 31142493

## (undated) DEVICE — TUBING IRRIG CYSTO/BLADDER SET 81" LF 2C4040

## (undated) DEVICE — DEVICE CATH STABILIZATION STATLOCK FOLEY 3-WAY FOL0105

## (undated) DEVICE — NDL BLUNT 18GA 1" W/O FILTER 305181

## (undated) DEVICE — SU SILK 0 TIE 6X30" A306H

## (undated) DEVICE — SU PDS II 4-0 SH 27" Z315H

## (undated) DEVICE — SU PDS II 5-0 RB-1 27" Z303H

## (undated) DEVICE — SOL NACL 0.9% INJ 1000ML BAG 2B1324X

## (undated) DEVICE — LINEN TOWEL PACK X6 WHITE 5487

## (undated) DEVICE — SU SILK 2-0 TIE 12X30" A305H

## (undated) DEVICE — INTRO GLIDESHEATH SLENDER 6FR 10X45CM 60-1060

## (undated) DEVICE — SYR 10ML LL W/O NDL 302995

## (undated) DEVICE — PACK HEART RIGHT CUSTOM SAN32RHF18

## (undated) DEVICE — DRAPE FLUID WARMING 52 X 60" ORS-321

## (undated) DEVICE — FASTENER CATH BALLOON CLAMPX2 STATLOCK 0684-00-493

## (undated) DEVICE — STPL LINEAR CUT 55MM TLC55

## (undated) DEVICE — SU ETHILON 3-0 PS-1 18" 1663H

## (undated) DEVICE — CATH FOLEY 3WAY 18FR 30ML LATEX 0167SI18

## (undated) DEVICE — SYR 30ML LL W/O NDL 302832

## (undated) DEVICE — SLEEVE TR BAND RADIAL COMPRESSION DEVICE 24CM TRB24-REG

## (undated) DEVICE — KIT HAND CONTROL ACIST 016795

## (undated) DEVICE — SUCTION MANIFOLD NEPTUNE 2 SYS 4 PORT 0702-020-000

## (undated) DEVICE — SU VICRYL 3-0 SH 27" UND J416H

## (undated) DEVICE — LINEN TOWEL PACK X30 5481

## (undated) DEVICE — SU PDS II 6-0 RB-2DA 30" Z149H

## (undated) RX ORDER — LIDOCAINE HYDROCHLORIDE 10 MG/ML
INJECTION, SOLUTION INFILTRATION; PERINEURAL
Status: DISPENSED
Start: 2017-01-06

## (undated) RX ORDER — LIDOCAINE HYDROCHLORIDE 10 MG/ML
INJECTION, SOLUTION EPIDURAL; INFILTRATION; INTRACAUDAL; PERINEURAL
Status: DISPENSED
Start: 2022-06-09

## (undated) RX ORDER — SODIUM CHLORIDE 9 MG/ML
INJECTION, SOLUTION INTRAVENOUS
Status: DISPENSED
Start: 2022-09-08

## (undated) RX ORDER — ONDANSETRON 2 MG/ML
INJECTION INTRAMUSCULAR; INTRAVENOUS
Status: DISPENSED
Start: 2021-11-16

## (undated) RX ORDER — ACETAMINOPHEN 325 MG/1
TABLET ORAL
Status: DISPENSED
Start: 2021-11-15

## (undated) RX ORDER — DIPHENHYDRAMINE HYDROCHLORIDE 50 MG/ML
INJECTION INTRAMUSCULAR; INTRAVENOUS
Status: DISPENSED
Start: 2022-09-08

## (undated) RX ORDER — HYDROMORPHONE HCL IN WATER/PF 6 MG/30 ML
PATIENT CONTROLLED ANALGESIA SYRINGE INTRAVENOUS
Status: DISPENSED
Start: 2021-11-16

## (undated) RX ORDER — HEPARIN SODIUM 1000 [USP'U]/ML
INJECTION, SOLUTION INTRAVENOUS; SUBCUTANEOUS
Status: DISPENSED
Start: 2021-11-15

## (undated) RX ORDER — SODIUM CHLORIDE 450 MG/100ML
INJECTION, SOLUTION INTRAVENOUS
Status: DISPENSED
Start: 2021-11-16

## (undated) RX ORDER — LIDOCAINE 40 MG/G
CREAM TOPICAL
Status: DISPENSED
Start: 2022-11-23

## (undated) RX ORDER — DEXTROSE, SODIUM CHLORIDE, SODIUM LACTATE, POTASSIUM CHLORIDE, AND CALCIUM CHLORIDE 5; .6; .31; .03; .02 G/100ML; G/100ML; G/100ML; G/100ML; G/100ML
INJECTION, SOLUTION INTRAVENOUS
Status: DISPENSED
Start: 2021-11-16

## (undated) RX ORDER — NITROGLYCERIN 5 MG/ML
VIAL (ML) INTRAVENOUS
Status: DISPENSED
Start: 2022-09-08

## (undated) RX ORDER — PROPOFOL 10 MG/ML
INJECTION, EMULSION INTRAVENOUS
Status: DISPENSED
Start: 2021-11-16

## (undated) RX ORDER — DEXAMETHASONE SODIUM PHOSPHATE 4 MG/ML
INJECTION, SOLUTION INTRA-ARTICULAR; INTRALESIONAL; INTRAMUSCULAR; INTRAVENOUS; SOFT TISSUE
Status: DISPENSED
Start: 2021-11-15

## (undated) RX ORDER — SODIUM CHLORIDE 9 MG/ML
INJECTION, SOLUTION INTRAVENOUS
Status: DISPENSED
Start: 2021-11-16

## (undated) RX ORDER — FENTANYL CITRATE-0.9 % NACL/PF 10 MCG/ML
PLASTIC BAG, INJECTION (ML) INTRAVENOUS
Status: DISPENSED
Start: 2021-11-15

## (undated) RX ORDER — HYDROMORPHONE HYDROCHLORIDE 1 MG/ML
INJECTION, SOLUTION INTRAMUSCULAR; INTRAVENOUS; SUBCUTANEOUS
Status: DISPENSED
Start: 2021-11-16

## (undated) RX ORDER — TRIAMCINOLONE ACETONIDE 40 MG/ML
INJECTION, SUSPENSION INTRA-ARTICULAR; INTRAMUSCULAR
Status: DISPENSED
Start: 2017-01-06

## (undated) RX ORDER — NICARDIPINE HCL-0.9% SOD CHLOR 1 MG/10 ML
SYRINGE (ML) INTRAVENOUS
Status: DISPENSED
Start: 2022-09-08

## (undated) RX ORDER — PAPAVERINE HYDROCHLORIDE 30 MG/ML
INJECTION INTRAMUSCULAR; INTRAVENOUS
Status: DISPENSED
Start: 2021-11-15

## (undated) RX ORDER — CALCIUM CHLORIDE 100 MG/ML
INJECTION INTRAVENOUS; INTRAVENTRICULAR
Status: DISPENSED
Start: 2021-11-16

## (undated) RX ORDER — GLYCOPYRROLATE 0.2 MG/ML
INJECTION, SOLUTION INTRAMUSCULAR; INTRAVENOUS
Status: DISPENSED
Start: 2021-11-15

## (undated) RX ORDER — LIDOCAINE HYDROCHLORIDE 20 MG/ML
INJECTION, SOLUTION EPIDURAL; INFILTRATION; INTRACAUDAL; PERINEURAL
Status: DISPENSED
Start: 2021-11-15

## (undated) RX ORDER — ASPIRIN 81 MG/1
TABLET, CHEWABLE ORAL
Status: DISPENSED
Start: 2022-09-08

## (undated) RX ORDER — FENTANYL CITRATE 50 UG/ML
INJECTION, SOLUTION INTRAMUSCULAR; INTRAVENOUS
Status: DISPENSED
Start: 2022-09-08

## (undated) RX ORDER — SODIUM CHLORIDE 9 MG/ML
INJECTION, SOLUTION INTRAVENOUS
Status: DISPENSED
Start: 2022-06-09

## (undated) RX ORDER — FENTANYL CITRATE-0.9 % NACL/PF 10 MCG/ML
PLASTIC BAG, INJECTION (ML) INTRAVENOUS
Status: DISPENSED
Start: 2021-11-16

## (undated) RX ORDER — FUROSEMIDE 10 MG/ML
INJECTION INTRAMUSCULAR; INTRAVENOUS
Status: DISPENSED
Start: 2021-11-16

## (undated) RX ORDER — VERAPAMIL HYDROCHLORIDE 2.5 MG/ML
INJECTION, SOLUTION INTRAVENOUS
Status: DISPENSED
Start: 2021-11-15

## (undated) RX ORDER — CIPROFLOXACIN 2 MG/ML
INJECTION, SOLUTION INTRAVENOUS
Status: DISPENSED
Start: 2021-11-15

## (undated) RX ORDER — FENTANYL CITRATE 50 UG/ML
INJECTION, SOLUTION INTRAMUSCULAR; INTRAVENOUS
Status: DISPENSED
Start: 2022-05-02

## (undated) RX ORDER — SODIUM CHLORIDE 9 MG/ML
INJECTION, SOLUTION INTRAVENOUS
Status: DISPENSED
Start: 2022-05-02

## (undated) RX ORDER — FUROSEMIDE 10 MG/ML
INJECTION INTRAMUSCULAR; INTRAVENOUS
Status: DISPENSED
Start: 2021-11-15

## (undated) RX ORDER — ESMOLOL HYDROCHLORIDE 10 MG/ML
INJECTION INTRAVENOUS
Status: DISPENSED
Start: 2021-11-15

## (undated) RX ORDER — ONDANSETRON 2 MG/ML
INJECTION INTRAMUSCULAR; INTRAVENOUS
Status: DISPENSED
Start: 2021-11-15

## (undated) RX ORDER — DEXTROSE MONOHYDRATE 25 G/50ML
INJECTION, SOLUTION INTRAVENOUS
Status: DISPENSED
Start: 2021-11-15

## (undated) RX ORDER — CARDIOPLEG/ORGAN PRESERV NO.1 9-198-2-1
BOTTLE PERFUSION
Status: DISPENSED
Start: 2021-11-15

## (undated) RX ORDER — ALBUMIN, HUMAN INJ 5% 5 %
SOLUTION INTRAVENOUS
Status: DISPENSED
Start: 2021-11-15

## (undated) RX ORDER — FENTANYL CITRATE 50 UG/ML
INJECTION, SOLUTION INTRAMUSCULAR; INTRAVENOUS
Status: DISPENSED
Start: 2021-11-15

## (undated) RX ORDER — FENTANYL CITRATE 50 UG/ML
INJECTION, SOLUTION INTRAMUSCULAR; INTRAVENOUS
Status: DISPENSED
Start: 2021-11-16

## (undated) RX ORDER — FENTANYL CITRATE 50 UG/ML
INJECTION, SOLUTION INTRAMUSCULAR; INTRAVENOUS
Status: DISPENSED
Start: 2022-06-09

## (undated) RX ORDER — PROPOFOL 10 MG/ML
INJECTION, EMULSION INTRAVENOUS
Status: DISPENSED
Start: 2021-11-15

## (undated) RX ORDER — LIDOCAINE HYDROCHLORIDE 10 MG/ML
INJECTION, SOLUTION EPIDURAL; INFILTRATION; INTRACAUDAL; PERINEURAL
Status: DISPENSED
Start: 2022-05-02

## (undated) RX ORDER — OXYMETAZOLINE HYDROCHLORIDE 0.05 G/100ML
SPRAY NASAL
Status: DISPENSED
Start: 2021-11-15